# Patient Record
Sex: MALE | Race: WHITE | Employment: UNEMPLOYED | URBAN - METROPOLITAN AREA
[De-identification: names, ages, dates, MRNs, and addresses within clinical notes are randomized per-mention and may not be internally consistent; named-entity substitution may affect disease eponyms.]

---

## 2020-12-12 ENCOUNTER — HOSPITAL ENCOUNTER (OUTPATIENT)
Age: 55
Discharge: HOME | End: 2020-12-12
Payer: COMMERCIAL

## 2020-12-12 LAB
ALBUMIN SERPL-MCNC: 3.8 G/DL (ref 3.5–5.2)
ALP BLD-CCNC: 132 U/L (ref 40–129)
ALT SERPL-CCNC: 24 U/L (ref 0–40)
ANION GAP SERPL CALCULATED.3IONS-SCNC: 10 MMOL/L (ref 7–16)
AST SERPL-CCNC: 16 U/L (ref 0–39)
BASOPHILS ABSOLUTE: 0.02 E9/L (ref 0–0.2)
BASOPHILS RELATIVE PERCENT: 0.4 % (ref 0–2)
BILIRUB SERPL-MCNC: 0.3 MG/DL (ref 0–1.2)
BUN BLDV-MCNC: 12 MG/DL (ref 6–20)
CALCIUM SERPL-MCNC: 9.6 MG/DL (ref 8.6–10.2)
CHLORIDE BLD-SCNC: 101 MMOL/L (ref 98–107)
CHOLESTEROL, TOTAL: 223 MG/DL (ref 0–199)
CO2: 26 MMOL/L (ref 22–29)
CREAT SERPL-MCNC: 0.8 MG/DL (ref 0.7–1.2)
EOSINOPHILS ABSOLUTE: 0.07 E9/L (ref 0.05–0.5)
EOSINOPHILS RELATIVE PERCENT: 1.4 % (ref 0–6)
FOLATE: 15.6 NG/ML (ref 4.8–24.2)
GFR AFRICAN AMERICAN: >60
GFR NON-AFRICAN AMERICAN: >60 ML/MIN/1.73
GLUCOSE BLD-MCNC: 342 MG/DL (ref 74–99)
HBA1C MFR BLD: 11.9 % (ref 4–5.6)
HCT VFR BLD CALC: 45.6 % (ref 37–54)
HDLC SERPL-MCNC: 50 MG/DL
HEMOGLOBIN: 15.6 G/DL (ref 12.5–16.5)
IMMATURE GRANULOCYTES #: 0.02 E9/L
IMMATURE GRANULOCYTES %: 0.4 % (ref 0–5)
LACTATE DEHYDROGENASE: 144 U/L (ref 135–225)
LDL CHOLESTEROL CALCULATED: 139 MG/DL (ref 0–99)
LYMPHOCYTES ABSOLUTE: 1.09 E9/L (ref 1.5–4)
LYMPHOCYTES RELATIVE PERCENT: 21.6 % (ref 20–42)
MCH RBC QN AUTO: 30.4 PG (ref 26–35)
MCHC RBC AUTO-ENTMCNC: 34.2 % (ref 32–34.5)
MCV RBC AUTO: 88.7 FL (ref 80–99.9)
MONOCYTES ABSOLUTE: 0.63 E9/L (ref 0.1–0.95)
MONOCYTES RELATIVE PERCENT: 12.5 % (ref 2–12)
NEUTROPHILS ABSOLUTE: 3.21 E9/L (ref 1.8–7.3)
NEUTROPHILS RELATIVE PERCENT: 63.7 % (ref 43–80)
PDW BLD-RTO: 12.1 FL (ref 11.5–15)
PHENYTOIN DOSE AMOUNT: ABNORMAL
PHENYTOIN LEVEL: 6.5 UG/ML (ref 10–20)
PLATELET # BLD: 145 E9/L (ref 130–450)
PMV BLD AUTO: 10.2 FL (ref 7–12)
POTASSIUM SERPL-SCNC: 5.2 MMOL/L (ref 3.5–5)
PROSTATE SPECIFIC ANTIGEN: 0.84 NG/ML (ref 0–4)
RBC # BLD: 5.14 E12/L (ref 3.8–5.8)
SODIUM BLD-SCNC: 137 MMOL/L (ref 132–146)
T4 FREE: 1.13 NG/DL (ref 0.93–1.7)
TOTAL CK: 35 U/L (ref 20–200)
TOTAL PROTEIN: 7 G/DL (ref 6.4–8.3)
TRIGL SERPL-MCNC: 171 MG/DL (ref 0–149)
TSH SERPL DL<=0.05 MIU/L-ACNC: 1.39 UIU/ML (ref 0.27–4.2)
VITAMIN B-12: 280 PG/ML (ref 211–946)
VITAMIN D 25-HYDROXY: 12 NG/ML (ref 30–100)
VLDLC SERPL CALC-MCNC: 34 MG/DL
WBC # BLD: 5 E9/L (ref 4.5–11.5)

## 2020-12-12 PROCEDURE — 84439 ASSAY OF FREE THYROXINE: CPT

## 2020-12-12 PROCEDURE — 82306 VITAMIN D 25 HYDROXY: CPT

## 2020-12-12 PROCEDURE — 82746 ASSAY OF FOLIC ACID SERUM: CPT

## 2020-12-12 PROCEDURE — 84443 ASSAY THYROID STIM HORMONE: CPT

## 2020-12-12 PROCEDURE — 83615 LACTATE (LD) (LDH) ENZYME: CPT

## 2020-12-12 PROCEDURE — 80185 ASSAY OF PHENYTOIN TOTAL: CPT

## 2020-12-12 PROCEDURE — 80053 COMPREHEN METABOLIC PANEL: CPT

## 2020-12-12 PROCEDURE — 36415 COLL VENOUS BLD VENIPUNCTURE: CPT

## 2020-12-12 PROCEDURE — 83036 HEMOGLOBIN GLYCOSYLATED A1C: CPT

## 2020-12-12 PROCEDURE — 85025 COMPLETE CBC W/AUTO DIFF WBC: CPT

## 2020-12-12 PROCEDURE — 80061 LIPID PANEL: CPT

## 2020-12-12 PROCEDURE — 82550 ASSAY OF CK (CPK): CPT

## 2020-12-12 PROCEDURE — G0103 PSA SCREENING: HCPCS

## 2020-12-12 PROCEDURE — 82607 VITAMIN B-12: CPT

## 2021-02-05 ENCOUNTER — HOSPITAL ENCOUNTER (OUTPATIENT)
Dept: DIABETES SERVICES | Age: 56
Setting detail: THERAPIES SERIES
Discharge: HOME | End: 2021-02-05
Payer: COMMERCIAL

## 2021-02-05 PROCEDURE — G0109 DIAB MANAGE TRN IND/GROUP: HCPCS

## 2021-02-05 SDOH — ECONOMIC STABILITY: FOOD INSECURITY: ADDITIONAL INFORMATION: NO

## 2021-02-05 ASSESSMENT — PROBLEM AREAS IN DIABETES QUESTIONNAIRE (PAID)
FEELING DEPRESSED WHEN YOU THINK ABOUT LIVING WITH DIABETES: 0
COPING WITH COMPLICATIONS OF DIABETES: 0
WORRYING ABOUT THE FUTURE AND THE POSSIBILITY OF SERIOUS COMPLICATIONS: 0

## 2021-02-05 NOTE — PROGRESS NOTES
Diabetes Self-Management Education Record    Participant Name: Chavo Tran  Referring Provider: Rosalva Velez DO  Assessment/Evaluation Ratings:  1=Needs Instruction   4=Demonstrates Understanding/Competency  2=Needs Review   NC=Not Covered    3=Comprehends Key Points  N/A=Not Applicable  Topics/Learning Objectives Pre-session Assess Date:  Instructor initials/date  2/5/21 KMS Instr. Date    Instructor initials/date  2/5/21 KMS Follow-up Post- session Eval Comments   Diabetes disease process & Treatment process:   -Define type of diabetes in simple terms.  - Describe the ABCs of  diabetes management  -Identify own type of diabetes  -Identify lifestyle changes/treatment options  -other:  1 [x] All     []  []  []  []  []  4 2/5/21 KMS  Newly diagnosed type 2, A1C 11.9%    Developing strategies for Healthy coping/psychosocial issues:    -Describe feelings about living with diabetes  -Identify coping strategies and sources of stress  -Identify support needed & support network available  -Complete PAID-5 Diabetes questionnaire 1 [x] All     []  []  []    []  4   2/5/21 KMS  PAID-5 score: 0     Prevention, detection & treatment of Chronic complications:    -Identify the prevention, detection and treatment for complications including immunizations, preventive eye, foot, dental and renal exams as indicated per the participant's duration of diabetes and health status.  -Define the natural course of diabetes and the relationship of blood glucose levels to long term complications of diabetes.   1 [x] All     []            []  4    Prevention, detection & treatment of acute complications:    -State the causes,signs & symptoms of hyper & hypoglycemia, and prevention & treatment strategies.   -Describe sick day guidelines  DKA /indications for ketone testing &  when to call physician  1 [x] All     []      []    4            -Identify severe weather/situation crisis  & diabetes supplies management  []      Using medications safely:   -State effects of diabetes medicines on blood glucose levels;  -List diabetes medication taken, action & side effects 1 [x] All     []  []  4 2/5/21 KMS  Metformin 500 mg BID  Januvia 100 mg daily    Insulin/Injectables/glucagon  -Name appropriate injection sites; proper storage; supplies needed;  n/a   []       Demonstrate proper technique  []      Monitoring blood glucose, interpreting and using results:   -Identify the purpose of testing   -Identify recommended & personal blood glucose targets & HgbA1C target levels  -State the Importance of logging blood glucose levels for pattern recognition;   -State benefits of reading/using pt generated health data  -Verbalize safe lancet disposal 1 [x] All     []  []    []  []  []  4 2/5/21 KMS  Instructed patient on an One Touch Ultra 2 glucose meter. Patient successfully performed a return demonstration with a glucose reading of 239 mg/dl after lunch. Target glucose ranges reviewed. Patient recorded result on his glucose log sheet. Discussed pattern recognition and problem solving. Patient understands he is ordered to test twice daily and as needed. -Demonstrate proper testing technique  []      Incorporating physical activity into lifestyle:   -State effect of exercise on blood glucose levels;   -State benefits of regular exercise;   -Define safety considerations/food choices if needed.  -Describe contraindications/maintenance of activity. 1 [x] All     []  []    []  []  4 2/5/21 KMS  Walks occasionally, but plans to increase activity as part of his goal.  May join gym with his daughter and/or walk at the mall.     Incorporating nutritional management into lifestyle:   -Describe effect of type, amount & timing of food on blood glucose  -Describe methods for preparing and planning healthy meals  -Correctly read food labels  -Name 3 foods high in Carbohydrate 1 [x] All       []    []    []  []  3 2/5/2021 Discussed food sources of carbohydrate, portion control, timing of meals. Pt eats fast food breakfast some days. Briefly discussed dining out   -Plan a carbohydrate-controlled meal based on individualized meal plan  -Demonstrate CHO counting/portion control  1 [x]  [x]  3 2/5/2021 Instructed on 2200 calorie meal plan, 4 carbohydrate choices at each meal, 1 in between breakfast and lunch and lunch and dinner. 2 at night. 12 oz protein, 6 fats daily. Provided sample meals and snacks   Developing strategies for problem solving to promote health/change behavior. -Identify 7 self-care behaviors; Personal health risk factors; Benefits, challenges & strategies for behavioral change and set an individualized goal selection. 1   [x]  4 2/5/21 KMS  [x]Nutrition: I will follow my meal plan. []Monitoring  [x]Exercise:  I will start exercising for at least 30 minutes 3 days per week. []Medication  []Other     Identified Barriers to learning/adherence to self management plan:    None  []  other    Instruction Method:  Lecture/Discussion and Handouts    Supporting Education Materials/Equipment Provided: Self-management manual   []Kiswahili materials       [] services     []Other:      Encounter Type Date Attended Start Time End Time Comments No Show Dates   Assessment          Session 1 2/5/21 KMS 1300 1415      Session 2 2/5/21 JA 1415 1500     1:1 DSMES          In person Follow-up         Gestational Diabetes         Individual MNT        Meter Instrx 2/5/21 KMS    mg/dl    Insulin Instrx           Additional Comments: [x] Patient attended class with his wife. PCP notified via EMR.          Date:   Follow-up goal attainment based on patients initial DSMES goal    Dr Notified by [] EMR []Fax        []Post class Hgb A1C  []Medication compliance   []Plate method/meal plan compliance   []Able to state the number of Carbohydrate servings eaten at B,L,D   []Testing blood glucose as prescribed by PCP   []Exercise Routine   []Other:   []Other:     []Patient lost to follow-up  Dr Notified by []EMR []Fax     Personal Support Plan:      [x] Keep all scheduled doctor appointments   [x] Make and keep appointments with specialists (foot, eye, dentist) as recommended   [] Consult my pharmacist about all new medications or to ask any medication questions   [] Get tested for sleep apnea   [] Seek help for:   [] Make an appointment with:   [] Attend smoking cessation classes or call 1-800-QUIT-NOW  [] Attend Diabetes Support Group   [] Use diabetes magazines, books, or credible web-sites like the ADA for more information  [x] Increase exercise at home or join an exercise program  [] Other:

## 2021-02-05 NOTE — LETTER
[facility]- Diabetes Education Department Initial Diabetes Education Letter    2021       Re:     Mady Guerrero         :  1965  Dear Dr. Vasile Gutierrez: Thank you for referring your patient, Mady Guerrero, for diabetes education. Mady Guerrero has completed his comprehensive education plan today which included the topics below:    Nursing/Medical [x]      Nutrition [x]  [] Diabetes disease process & treatment   [] Diabetes medication use and safety   [] Self-monitoring blood glucose/interpreting results  [] Prevention, detection and treatment of acute complications  [] Prevention, detection and treatment of chronic complications  [] Developing strategies to address psychosocial issues   2021 Instructed on 2200 calorie meal plan, 4 carbohydrate choices at each meal, 1 in between breakfast and lunch and lunch and dinner. 2 at night. 12 oz protein, 6 fats daily. Provided sample meals and snacks [] Nutritional management: basic principles   [] Carbohydrate counting, plate method, label reading  [] Benefits of/ways to incorporate physical activity  [] Problem solving and preparing for crisis situations  [] Diabetes supply management  [] Goal setting and behavior modification       In addition, we provided the following services. [x]  Glucometer instruction. Blood glucose was 239 mg/dl after lunch. Patient successfully performed a return demonstration. []  Insulin instruction   [] Other:    PAID -5 (Problem Areas in Diabetes) Survey Results  0  A total score of 8 or greater indicates possible diabetes related emotional distress which warrants further assessment.  [] [address] and Crisis Resource information provided. Comments:     PATIENT SELECTED GOAL:   [x]  I will follow my meal plan and measure my carbohydrate foods. [x]  I will increase my activity to: at least 30 minutes 3 days per week.   []  I will check my blood glucose as ordered by my doctor. []  I will take my medications at the correct times as ordered by my doctor. []  Other:      DIABETES SELF-MANAGEMENT SUPPORT PLAN/REFERRALS (patient identified):  [x] Keep my scheduled visits with my doctor   [x] Make and keep appointments with specialists (foot, eye, dentist) as recommended  [] Consult my pharmacist with all new medications and/or any medication questions  [] Get tested for sleep apnea  [] Seek help for:   [] Make an appointment with:   [] Attend smoking cessation classes or call help-line (1-817-QUIT-NOW; 854.541.8751)  [] Attend a diabetes support group  [] Use diabetes magazines, books, or the American Diabetes Association website (www.diabetes. org) for more information    [x] Start or increase exercising at home or join a program:  [] Other: There will be a follow-up in 3 months to evaluate the attainment of their chosen goal, and self identified support plan and you will be notified of their progress. Thank you for referring this patient to our program.  Please do not hesitate to call if you have any questions at [phone] [facility] or [facility]) or [phone] ([address] [place] [address]).         Sincerely,     Haritha Gusman RN, BSN, Yoselyn Rush RDN, JENNIFER, LD    [facility]) Diabetes Education Department  American Diabetes Association Recognized DSMES Program

## 2021-02-05 NOTE — PROGRESS NOTES
Diabetes Self-Management Education Record    Participant Name: Mady Guerrero  Referring Provider: Jeannine Shaver DO  Assessment/Evaluation Ratings:  1=Needs Instruction   4=Demonstrates Understanding/Competency  2=Needs Review   NC=Not Covered    3=Comprehends Key Points  N/A=Not Applicable  Topics/Learning Objectives Pre-session Assess Date:  Instructor initials/date  2/5/21 KMS Instr. Date    Instructor initials/date  2/5/21 KMS Follow-up Post- session Eval Comments   Diabetes disease process & Treatment process:   -Define type of diabetes in simple terms.  - Describe the ABCs of  diabetes management  -Identify own type of diabetes  -Identify lifestyle changes/treatment options  -other:  1 [x] All     []  []  []  []  []  4 2/5/21 KMS  Newly diagnosed type 2, A1C 11.9%    Developing strategies for Healthy coping/psychosocial issues:    -Describe feelings about living with diabetes  -Identify coping strategies and sources of stress  -Identify support needed & support network available  -Complete PAID-5 Diabetes questionnaire 1 [x] All     []  []  []    []  4   2/5/21 KMS  PAID-5 score: 0     Prevention, detection & treatment of Chronic complications:    -Identify the prevention, detection and treatment for complications including immunizations, preventive eye, foot, dental and renal exams as indicated per the participant's duration of diabetes and health status.  -Define the natural course of diabetes and the relationship of blood glucose levels to long term complications of diabetes.   1 [x] All     []            []  4    Prevention, detection & treatment of acute complications:    -State the causes,signs & symptoms of hyper & hypoglycemia, and prevention & treatment strategies.   -Describe sick day guidelines  DKA /indications for ketone testing &  when to call physician  1 [x] All     []      []    4            -Identify severe weather/situation crisis  & diabetes supplies management  []      Using medications safely:   -State effects of diabetes medicines on blood glucose levels;  -List diabetes medication taken, action & side effects 1 [x] All     []  []  4 2/5/21 KMS  Metformin 500 mg BID  Januvia 100 mg daily    Insulin/Injectables/glucagon  -Name appropriate injection sites; proper storage; supplies needed;  n/a   []       Demonstrate proper technique  []      Monitoring blood glucose, interpreting and using results:   -Identify the purpose of testing   -Identify recommended & personal blood glucose targets & HgbA1C target levels  -State the Importance of logging blood glucose levels for pattern recognition;   -State benefits of reading/using pt generated health data  -Verbalize safe lancet disposal 1 [x] All     []  []    []  []  []  4 2/5/21 KMS  Instructed patient on an One Touch Ultra 2 glucose meter. Patient successfully performed a return demonstration with a glucose reading of 239 mg/dl after lunch. Target glucose ranges reviewed. Patient recorded result on his glucose log sheet. Discussed pattern recognition and problem solving. Patient understands he is ordered to test twice daily and as needed. -Demonstrate proper testing technique  []      Incorporating physical activity into lifestyle:   -State effect of exercise on blood glucose levels;   -State benefits of regular exercise;   -Define safety considerations/food choices if needed.  -Describe contraindications/maintenance of activity. 1 [x] All     []  []    []  []  4 2/5/21 KMS  Walks occasionally, but plans to increase activity as part of his goal.  May join gym with his daughter and/or walk at the mall.     Incorporating nutritional management into lifestyle:   -Describe effect of type, amount & timing of food on blood glucose  -Describe methods for preparing and planning healthy meals  -Correctly read food labels  -Name 3 foods high in Carbohydrate 1 [] All       []    []    []  []      -Plan a carbohydrate-controlled meal based on individualized meal plan  -Demonstrate CHO counting/portion control  1 []  []      Developing strategies for problem solving to promote health/change behavior. -Identify 7 self-care behaviors; Personal health risk factors; Benefits, challenges & strategies for behavioral change and set an individualized goal selection. 1   [x]  4 2/5/21 KMS  [x]Nutrition: I will follow my meal plan. []Monitoring  [x]Exercise:  I will start exercising for at least 30 minutes 3 days per week. []Medication  []Other     Identified Barriers to learning/adherence to self management plan:    None  []  other    Instruction Method:  Lecture/Discussion and Handouts    Supporting Education Materials/Equipment Provided: Self-management manual   [][place] materials       [] services     []Other:      Encounter Type Date Attended Start Time End Time Comments No Show Dates   Assessment          Session 1 2/5/21 KMS 1300 1415      Session 2 2/5/21 JA 1415 1500     1:1 DSMES          In person Follow-up         Gestational Diabetes         Individual MNT        Meter Instrx 2/5/21 KMS    mg/dl    Insulin Instrx           Additional Comments: [x] Patient attended class with his wife. PCP notified via fax.          Date:   Follow-up goal attainment based on patients initial DSMES goal    Dr Notified by [] EMR []Fax        []Post class Hgb A1C  []Medication compliance   []Plate method/meal plan compliance   []Able to state the number of Carbohydrate servings eaten at B,L,D   []Testing blood glucose as prescribed by PCP   []Exercise Routine   []Other:   []Other:     []Patient lost to follow-up  Dr Notified by []EMR []Fax     Personal Support Plan:      [x] Keep all scheduled doctor appointments   [x] Make and keep appointments with specialists (foot, eye, dentist) as recommended   [] Consult my pharmacist about all new medications or to ask any medication questions   [] Get tested for sleep apnea   [] Seek help for:   [] Make an appointment with:   [] Attend smoking cessation classes or call 1-800-QUIT-NOW  [] Attend Diabetes Support Group   [] Use diabetes magazines, books, or credible web-sites like the ADA for more information  [x] Increase exercise at home or join an exercise program  [] Other:

## 2021-02-25 LAB — DIABETIC RETINOPATHY: NEGATIVE

## 2021-05-10 ENCOUNTER — FOLLOWUP TELEPHONE ENCOUNTER (OUTPATIENT)
Dept: DIABETES SERVICES | Age: 56
End: 2021-05-10

## 2021-05-10 NOTE — LETTER
[facility] Diabetes Education DSMES Follow-up Letter    Name: Lio Covington  :   1965    Follow-up plan/Date:   5/10/2021               POST CLASS Letter     Dear Gera Morris; Thank you for referring  Lio Covington  to [facility] Diabetes Education Services. Lio Covington  has completed their personalized comprehensive education plan. The education plan included the following topics: Diabetes Disease Process, Nutrition, Exercise, Glucose Monitoring, Acute and Chronic Complication, Behavioral and Lifestyle Change, Healthy Coping and goal setting. We contact participants 3 months after attending our services to review their progress on their chosen goal.      The following area was chosen: [x] Healthy Eating   [x] Being Active  [] Monitoring [] Problem Solving [] Taking Medication [] Healthy Coping  []Reducing Risks    Selected goal outcome Post Education:     Patient states they met their goal 75 % of time, but not activity goal      Thank you for referring this patient to our program. Please do not hesitate to call if we can be of any service for this patient.      Elisabet Mcdonald [facility] or [facility]: Lucia: Naty    [facility] Diabetes Education Department  American Diabetes Association Recognized [facility] Program

## 2021-05-10 NOTE — PROGRESS NOTES
Diabetes Self-Management Education Record    Participant Name: Digna De Santiago  Referring Provider: Soledad Faulkner DO  Assessment/Evaluation Ratings:  1=Needs Instruction   4=Demonstrates Understanding/Competency  2=Needs Review   NC=Not Covered    3=Comprehends Key Points  N/A=Not Applicable  Topics/Learning Objectives Pre-session Assess Date:  Instructor initials/date  2/5/21 KMS Instr. Date    Instructor initials/date  2/5/21 KMS Follow-up Post- session Eval Comments   Diabetes disease process & Treatment process:   -Define type of diabetes in simple terms.  - Describe the ABCs of  diabetes management  -Identify own type of diabetes  -Identify lifestyle changes/treatment options  -other:  1 [x] All     []  []  []  []  []  4 2/5/21 KMS  Newly diagnosed type 2, A1C 11.9%    Developing strategies for Healthy coping/psychosocial issues:    -Describe feelings about living with diabetes  -Identify coping strategies and sources of stress  -Identify support needed & support network available  -Complete PAID-5 Diabetes questionnaire 1 [x] All     []  []  []    []  4   2/5/21 KMS  PAID-5 score: 0     Prevention, detection & treatment of Chronic complications:    -Identify the prevention, detection and treatment for complications including immunizations, preventive eye, foot, dental and renal exams as indicated per the participant's duration of diabetes and health status.  -Define the natural course of diabetes and the relationship of blood glucose levels to long term complications of diabetes.   1 [x] All     []            []  4    Prevention, detection & treatment of acute complications:    -State the causes,signs & symptoms of hyper & hypoglycemia, and prevention & treatment strategies.   -Describe sick day guidelines  DKA /indications for ketone testing &  when to call physician  1 [x] All     []      []    4            -Identify severe weather/situation crisis  & diabetes supplies management  []      Using medications safely:   -State effects of diabetes medicines on blood glucose levels;  -List diabetes medication taken, action & side effects 1 [x] All     []  []  4 2/5/21 KMS  Metformin 500 mg BID  Januvia 100 mg daily    Insulin/Injectables/glucagon  -Name appropriate injection sites; proper storage; supplies needed;  n/a   []       Demonstrate proper technique  []      Monitoring blood glucose, interpreting and using results:   -Identify the purpose of testing   -Identify recommended & personal blood glucose targets & HgbA1C target levels  -State the Importance of logging blood glucose levels for pattern recognition;   -State benefits of reading/using pt generated health data  -Verbalize safe lancet disposal 1 [x] All     []  []    []  []  []  4 2/5/21 KMS  Instructed patient on an One Touch Ultra 2 glucose meter. Patient successfully performed a return demonstration with a glucose reading of 239 mg/dl after lunch. Target glucose ranges reviewed. Patient recorded result on his glucose log sheet. Discussed pattern recognition and problem solving. Patient understands he is ordered to test twice daily and as needed. -Demonstrate proper testing technique  []      Incorporating physical activity into lifestyle:   -State effect of exercise on blood glucose levels;   -State benefits of regular exercise;   -Define safety considerations/food choices if needed.  -Describe contraindications/maintenance of activity. 1 [x] All     []  []    []  []  4 2/5/21 KMS  Walks occasionally, but plans to increase activity as part of his goal.  May join gym with his daughter and/or walk at the mall.     Incorporating nutritional management into lifestyle:   -Describe effect of type, amount & timing of food on blood glucose  -Describe methods for preparing and planning healthy meals  -Correctly read food labels  -Name 3 foods high in Carbohydrate 1 [x] All       []    []    []  []  3 2/5/2021 Discussed food sources of carbohydrate, portion control, timing of meals. Pt eats fast food breakfast some days. Briefly discussed dining out   -Plan a carbohydrate-controlled meal based on individualized meal plan  -Demonstrate CHO counting/portion control  1 [x]  [x]  3 2/5/2021 Instructed on 2200 calorie meal plan, 4 carbohydrate choices at each meal, 1 in between breakfast and lunch and lunch and dinner. 2 at night. 12 oz protein, 6 fats daily. Provided sample meals and snacks   Developing strategies for problem solving to promote health/change behavior. -Identify 7 self-care behaviors; Personal health risk factors; Benefits, challenges & strategies for behavioral change and set an individualized goal selection. 1   [x]  4 2/5/21 KMS  [x]Nutrition: I will follow my meal plan. []Monitoring  [x]Exercise:  I will start exercising for at least 30 minutes 3 days per week. []Medication  []Other     Identified Barriers to learning/adherence to self management plan:    None  []  other    Instruction Method:  Lecture/Discussion and Handouts    Supporting Education Materials/Equipment Provided: Self-management manual   []Turkmen materials       [] services     []Other:      Encounter Type Date Attended Start Time End Time Comments No Show Dates   Assessment          Session 1 2/5/21 KMS 1300 1415      Session 2 2/5/21 JA 1415 1500     1:1 DSMES          In person Follow-up         Gestational Diabetes         Individual MNT        Meter Instrx 2/5/21 KMS    mg/dl    Insulin Instrx           Additional Comments: [x] Patient attended class with his wife. PCP notified via EMR.          Date:   Follow-up goal attainment based on patients initial DSMES goal  5/10/21   Dr Notified by [] EMR [x]Fax        []Post class Hgb A1C  []Medication compliance   []Plate method/meal plan compliance   [x]Able to state the number of Carbohydrate servings eaten at B,L,D   []Testing blood glucose as prescribed by PCP   []Exercise Routine   []Other:   []Other: []Patient lost to follow-up  Dr Notified by []EMR []Fax     Personal Support Plan:      [x] Keep all scheduled doctor appointments   [x] Make and keep appointments with specialists (foot, eye, dentist) as recommended   [] Consult my pharmacist about all new medications or to ask any medication questions   [] Get tested for sleep apnea   [] Seek help for:   [] Make an appointment with:   [] Attend smoking cessation classes or call 1-800-QUIT-NOW  [] Attend Diabetes Support Group   [] Use diabetes magazines, books, or credible web-sites like the ADA for more information  [x] Increase exercise at home or join an exercise program  [] Other:

## 2021-08-10 ENCOUNTER — TELEPHONE (OUTPATIENT)
Dept: FAMILY MEDICINE CLINIC | Age: 56
End: 2021-08-10

## 2021-08-10 ENCOUNTER — OFFICE VISIT (OUTPATIENT)
Dept: FAMILY MEDICINE CLINIC | Age: 56
End: 2021-08-10
Payer: COMMERCIAL

## 2021-08-10 VITALS
HEIGHT: 73 IN | SYSTOLIC BLOOD PRESSURE: 155 MMHG | DIASTOLIC BLOOD PRESSURE: 87 MMHG | RESPIRATION RATE: 20 BRPM | HEART RATE: 92 BPM | WEIGHT: 315 LBS | TEMPERATURE: 96.8 F | BODY MASS INDEX: 41.75 KG/M2

## 2021-08-10 DIAGNOSIS — E11.9 TYPE 2 DIABETES MELLITUS WITHOUT COMPLICATION, WITHOUT LONG-TERM CURRENT USE OF INSULIN (HCC): Primary | ICD-10-CM

## 2021-08-10 DIAGNOSIS — E55.9 VITAMIN D DEFICIENCY: ICD-10-CM

## 2021-08-10 DIAGNOSIS — R56.9 SEIZURE (HCC): ICD-10-CM

## 2021-08-10 DIAGNOSIS — E78.5 HYPERLIPIDEMIA, UNSPECIFIED HYPERLIPIDEMIA TYPE: ICD-10-CM

## 2021-08-10 DIAGNOSIS — I10 ESSENTIAL HYPERTENSION: ICD-10-CM

## 2021-08-10 LAB — HBA1C MFR BLD: 6.2 %

## 2021-08-10 PROCEDURE — 99204 OFFICE O/P NEW MOD 45 MIN: CPT | Performed by: FAMILY MEDICINE

## 2021-08-10 PROCEDURE — 2022F DILAT RTA XM EVC RTNOPTHY: CPT | Performed by: FAMILY MEDICINE

## 2021-08-10 PROCEDURE — 1036F TOBACCO NON-USER: CPT | Performed by: FAMILY MEDICINE

## 2021-08-10 PROCEDURE — 3044F HG A1C LEVEL LT 7.0%: CPT | Performed by: FAMILY MEDICINE

## 2021-08-10 PROCEDURE — G8417 CALC BMI ABV UP PARAM F/U: HCPCS | Performed by: FAMILY MEDICINE

## 2021-08-10 PROCEDURE — 82044 UR ALBUMIN SEMIQUANTITATIVE: CPT | Performed by: FAMILY MEDICINE

## 2021-08-10 PROCEDURE — G8427 DOCREV CUR MEDS BY ELIG CLIN: HCPCS | Performed by: FAMILY MEDICINE

## 2021-08-10 PROCEDURE — 83036 HEMOGLOBIN GLYCOSYLATED A1C: CPT | Performed by: FAMILY MEDICINE

## 2021-08-10 PROCEDURE — 3017F COLORECTAL CA SCREEN DOC REV: CPT | Performed by: FAMILY MEDICINE

## 2021-08-10 RX ORDER — ATORVASTATIN CALCIUM 40 MG/1
TABLET, FILM COATED ORAL
COMMUNITY
Start: 2021-07-12 | End: 2021-08-10 | Stop reason: SDUPTHER

## 2021-08-10 RX ORDER — TERAZOSIN 10 MG/1
CAPSULE ORAL
COMMUNITY
Start: 2021-06-12 | End: 2021-08-10 | Stop reason: SDUPTHER

## 2021-08-10 RX ORDER — BLOOD SUGAR DIAGNOSTIC
STRIP MISCELLANEOUS
COMMUNITY
Start: 2021-07-14 | End: 2021-08-10 | Stop reason: SDUPTHER

## 2021-08-10 RX ORDER — AMLODIPINE BESYLATE 10 MG/1
10 TABLET ORAL DAILY
Qty: 90 TABLET | Refills: 3 | Status: SHIPPED | OUTPATIENT
Start: 2021-08-10

## 2021-08-10 RX ORDER — SITAGLIPTIN 100 MG/1
TABLET, FILM COATED ORAL
COMMUNITY
Start: 2021-07-11 | End: 2021-08-10 | Stop reason: SDUPTHER

## 2021-08-10 RX ORDER — PHENYTOIN SODIUM 100 MG/1
CAPSULE, EXTENDED RELEASE ORAL
COMMUNITY
Start: 2021-06-03 | End: 2021-08-10 | Stop reason: SDUPTHER

## 2021-08-10 RX ORDER — BENAZEPRIL HYDROCHLORIDE 40 MG/1
40 TABLET, FILM COATED ORAL DAILY
Qty: 90 TABLET | Refills: 3 | Status: ON HOLD
Start: 2021-08-10 | End: 2021-09-20 | Stop reason: HOSPADM

## 2021-08-10 RX ORDER — BENAZEPRIL HYDROCHLORIDE 40 MG/1
TABLET, FILM COATED ORAL
COMMUNITY
Start: 2021-06-12 | End: 2021-08-10 | Stop reason: SDUPTHER

## 2021-08-10 RX ORDER — TERAZOSIN 10 MG/1
10 CAPSULE ORAL NIGHTLY
Qty: 90 CAPSULE | Refills: 3 | Status: ON HOLD
Start: 2021-08-10 | End: 2021-09-20 | Stop reason: HOSPADM

## 2021-08-10 RX ORDER — PHENYTOIN SODIUM 100 MG/1
CAPSULE, EXTENDED RELEASE ORAL
Qty: 450 CAPSULE | Refills: 3 | Status: SHIPPED
Start: 2021-08-10 | End: 2021-12-10

## 2021-08-10 RX ORDER — LANCETS 33 GAUGE
EACH MISCELLANEOUS
COMMUNITY
Start: 2021-07-14 | End: 2021-08-11 | Stop reason: SDUPTHER

## 2021-08-10 RX ORDER — ATORVASTATIN CALCIUM 40 MG/1
40 TABLET, FILM COATED ORAL DAILY
Qty: 90 TABLET | Refills: 3 | Status: SHIPPED
Start: 2021-08-10 | End: 2021-12-01

## 2021-08-10 SDOH — ECONOMIC STABILITY: FOOD INSECURITY: WITHIN THE PAST 12 MONTHS, THE FOOD YOU BOUGHT JUST DIDN'T LAST AND YOU DIDN'T HAVE MONEY TO GET MORE.: NEVER TRUE

## 2021-08-10 SDOH — ECONOMIC STABILITY: FOOD INSECURITY: WITHIN THE PAST 12 MONTHS, YOU WORRIED THAT YOUR FOOD WOULD RUN OUT BEFORE YOU GOT MONEY TO BUY MORE.: NEVER TRUE

## 2021-08-10 ASSESSMENT — ENCOUNTER SYMPTOMS
DIARRHEA: 0
RHINORRHEA: 0
BACK PAIN: 0
NAUSEA: 0
CONSTIPATION: 0
COUGH: 0
EYE ITCHING: 0
CHEST TIGHTNESS: 0
EYE PAIN: 0
ABDOMINAL PAIN: 0
SHORTNESS OF BREATH: 0
SORE THROAT: 0

## 2021-08-10 ASSESSMENT — PATIENT HEALTH QUESTIONNAIRE - PHQ9
2. FEELING DOWN, DEPRESSED OR HOPELESS: 0
SUM OF ALL RESPONSES TO PHQ QUESTIONS 1-9: 0
1. LITTLE INTEREST OR PLEASURE IN DOING THINGS: 0
SUM OF ALL RESPONSES TO PHQ9 QUESTIONS 1 & 2: 0
SUM OF ALL RESPONSES TO PHQ QUESTIONS 1-9: 0
SUM OF ALL RESPONSES TO PHQ QUESTIONS 1-9: 0

## 2021-08-10 ASSESSMENT — SOCIAL DETERMINANTS OF HEALTH (SDOH): HOW HARD IS IT FOR YOU TO PAY FOR THE VERY BASICS LIKE FOOD, HOUSING, MEDICAL CARE, AND HEATING?: NOT HARD AT ALL

## 2021-08-10 NOTE — TELEPHONE ENCOUNTER
Pt remembered at check out that he needs   ONETOUCH ULTRA strip      and Lancets ([address]) [address] [facility]

## 2021-08-10 NOTE — PROGRESS NOTES
 None     Social Determinants of Health     Financial Resource Strain: Low Risk     Difficulty of Paying Living Expenses: Not hard at all   Food Insecurity: No Food Insecurity    Worried About Running Out of Food in the Last Year: Never true    Teresa of Food in the Last Year: Never true   Transportation Needs:     Lack of Transportation (Medical):  Lack of Transportation (Non-Medical):    Physical Activity:     Days of Exercise per Week:     Minutes of Exercise per Session:    Stress:     Feeling of Stress :    Social Connections:     Frequency of Communication with Friends and Family:     Frequency of Social Gatherings with Friends and Family:     Attends Religion Services:     Active Member of Clubs or Organizations:     Attends Club or Organization Meetings:     Marital Status:    Intimate Partner Violence:     Fear of Current or Ex-Partner:     Emotionally Abused:     Physically Abused:     Sexually Abused:        No family history on file. Current Outpatient Medications   Medication Sig Dispense Refill    ONETOUCH ULTRA strip       Lancets (ONETOUCH DELICA PLUS [id number]) MISC       amLODIPine (NORVASC) 10 MG tablet Take 1 tablet by mouth daily 90 tablet 3    atorvastatin (LIPITOR) 40 MG tablet Take 1 tablet by mouth daily 90 tablet 3    benazepril (LOTENSIN) 40 MG tablet Take 1 tablet by mouth daily 90 tablet 3    SITagliptin (JANUVIA) 100 MG tablet Take 1 tablet by mouth daily 90 tablet 3    metFORMIN (GLUCOPHAGE) 1000 MG tablet Take 1 tablet by mouth 2 times daily (with meals) 180 tablet 3    phenytoin (DILANTIN) 100 MG ER capsule Take 2 tabs in the am and 3 tabs in pm 450 capsule 3    terazosin (HYTRIN) 10 MG capsule Take 1 capsule by mouth nightly 90 capsule 3     No current facility-administered medications for this visit.        No Known Allergies    Health Maintenance Due   Topic Date Due    Hepatitis C screen  Never done    Diabetic foot exam  Never done   Salome Philippe Diabetic retinal exam  Never done    COVID-19 Vaccine (1) Never done    HIV screen  Never done    Diabetic microalbuminuria test  Never done    DTaP/Tdap/Td vaccine (1 - Tdap) Never done    Colon cancer screen colonoscopy  Never done    Shingles Vaccine (1 of 2) Never done           REVIEW OF SYSTEMS  Review of Systems   Constitutional: Negative for chills, fatigue and fever. HENT: Negative for congestion, ear pain, postnasal drip, rhinorrhea and sore throat. Eyes: Negative for pain and itching. Respiratory: Negative for cough, chest tightness and shortness of breath. Cardiovascular: Negative for chest pain and leg swelling. Gastrointestinal: Negative for abdominal pain, constipation, diarrhea and nausea. Endocrine: Negative for heat intolerance. Genitourinary: Negative for flank pain, frequency and urgency. Musculoskeletal: Negative for arthralgias and back pain. Skin: Negative for pallor and rash. Allergic/Immunologic: Negative for environmental allergies. Neurological: Negative for dizziness, numbness and headaches. Hematological: Does not bruise/bleed easily. Psychiatric/Behavioral: Negative for agitation, behavioral problems and hallucinations. PHYSICAL EXAM  BP (!) 155/87   Pulse 92   Temp 96.8 °F (36 °C)   Resp 20   Ht 6' 1\" (1.854 m)   Wt (!) 321 lb 9.6 oz (145.9 kg)   BMI 42.43 kg/m²   Physical Exam  Vitals and nursing note reviewed. Constitutional:       General: He is not in acute distress. Appearance: He is well-developed. He is not diaphoretic. HENT:      Head: Normocephalic and atraumatic. Right Ear: External ear normal.      Left Ear: External ear normal.      Nose: Nose normal.      Mouth/Throat:      Pharynx: No oropharyngeal exudate. Eyes:      General:         Right eye: No discharge. Left eye: No discharge. Conjunctiva/sclera: Conjunctivae normal.   Neck:      Thyroid: No thyromegaly.    Cardiovascular:      Rate and Rhythm: Results   Component Value Date    LDLCALC 139 (H) 12/12/2020    CREATININE 0.8 12/12/2020       ASSESSMENT/PLAN:     Diagnosis Orders   1. Type 2 diabetes mellitus without complication, without long-term current use of insulin (HCC)  POCT glycosylated hemoglobin (Hb A1C)    POCT microalbumin   2. Seizure ([address]     3. Hyperlipidemia, unspecified hyperlipidemia type  Lipid Panel   4. Essential hypertension     5. Vitamin D deficiency  Vitamin D 25 Hydroxy     A1c under very good control will continue all medications at this time. Micro to be completed at his convenience. Seizures under good control continue to monitor. We will repeat lipid panel as cholesterol slightly high on last check. We will continue statin in the meantime. BP slightly elevated will have him return for reevaluation and continue to monitor this at home. We will continue other medications at this time. Will recheck vitamin D as well. He is not currently taking supplementation. Problem list reviewed andsimplified/updated  HM reviewed today and counseled as appropriate    Call or go to ED immediately if symptoms worsen or persist.  Future Appointments   Date Time Provider Sandra Neville   12/7/2021  4:45 PM DO Grant Grijalva [facility]     Or sooner if necessary.       Educational materials and/or homeexercises printed for patient's review and were included in patient instructions on his/her After Visit Summary and given to patient at the end of visit.       Counseled regarding above diagnosis, including possible risks and complications,  especially if left uncontrolled.     Counseled regarding the possible side effects, risks, benefits and alternatives to treatment; patient and/or guardian verbalizes understanding, agrees,feels comfortable with and wishes to proceed with above treatment plan.     Advised patient to call [facility] new medication issues, and read all Rx info from pharmacy to assure aware of all possible risks and

## 2021-08-11 ENCOUNTER — TELEPHONE (OUTPATIENT)
Dept: FAMILY MEDICINE CLINIC | Age: 56
End: 2021-08-11

## 2021-08-11 RX ORDER — LANCETS 33 GAUGE
EACH MISCELLANEOUS
Qty: 100 EACH | Refills: 2 | Status: ON HOLD
Start: 2021-08-11 | End: 2021-09-20 | Stop reason: HOSPADM

## 2021-08-11 RX ORDER — BLOOD SUGAR DIAGNOSTIC
STRIP MISCELLANEOUS
Qty: 100 EACH | Refills: 2 | Status: ON HOLD
Start: 2021-08-11 | End: 2021-09-20 | Stop reason: HOSPADM

## 2021-08-11 NOTE — TELEPHONE ENCOUNTER
Calli Souza from [employer] called today, need clarification for testing supplies for this pt    [phone]

## 2021-08-16 ENCOUNTER — NURSE TRIAGE (OUTPATIENT)
Dept: OTHER | Facility: CLINIC | Age: 56
End: 2021-08-16

## 2021-08-16 NOTE — TELEPHONE ENCOUNTER
Reason for Disposition   Symptoms of anxiety or panic and has not been evaluated for this by physician    Answer Assessment - Initial Assessment Questions  1. CONCERN: \"What happened that made you call today? \"      He is having several symptoms pile up and is concerned. Panic attacks (shaking and panicky) no appetite, metallic taste in the mouth, problems sleeping    2. ANXIETY SYMPTOM SCREENING: \"Can you describe how you have been feeling? \"  (e.g., tense, restless, panicky, anxious, keyed up, trouble sleeping, trouble concentrating)      Trouble sleeping, panicky (shaking), no appetite    3. ONSET: \"How long have you been feeling this way? \"      Onset was Friday    4. RECURRENT: \"Have you felt this way before? \"  If yes: \"What happened that time? \" \"What helped these feelings go away in the past?\"       No    5. RISK OF HARM - SUICIDAL IDEATION:  \"Do you ever have thoughts of hurting or killing yourself? \"  (e.g., yes, no, no but preoccupation with thoughts about death)    - INTENT:  \"Do you have thoughts of hurting or killing yourself right NOW? \" (e.g., yes, no, N/A)    - PLAN: \"Do you have a specific plan for how you would do this? \" (e.g., gun, knife, overdose, no plan, N/A)      No thoughts of suicide    6. RISK OF HARM - HOMICIDAL IDEATION:  \"Do you ever have thoughts of hurting or killing someone else? \"  (e.g., yes, no, no but preoccupation with thoughts about death)    - INTENT:  \"Do you have thoughts of hurting or killing someone right NOW? \" (e.g., yes, no, N/A)    - PLAN: \"Do you have a specific plan for how you would do this? \" (e.g., gun, knife, no plan, N/A)       No thoughts of harming anyone else    7. FUNCTIONAL IMPAIRMENT: \"How have things been going for you overall in your life? Have you had any more difficulties than usual doing your normal daily activities? \"  (e.g., better, same, worse; self-care, school, work, interactions)      Handles normal daily activities    8.  SUPPORT: \"Who is with you

## 2021-08-17 ENCOUNTER — APPOINTMENT (OUTPATIENT)
Dept: GENERAL RADIOLOGY | Age: 56
End: 2021-08-17
Payer: COMMERCIAL

## 2021-08-17 ENCOUNTER — HOSPITAL ENCOUNTER (EMERGENCY)
Age: 56
Discharge: HOME | End: 2021-08-18
Attending: EMERGENCY MEDICINE
Payer: COMMERCIAL

## 2021-08-17 ENCOUNTER — TELEPHONE (OUTPATIENT)
Dept: FAMILY MEDICINE CLINIC | Age: 56
End: 2021-08-17

## 2021-08-17 VITALS
HEIGHT: 73 IN | RESPIRATION RATE: 18 BRPM | TEMPERATURE: 98.7 F | DIASTOLIC BLOOD PRESSURE: 81 MMHG | HEART RATE: 100 BPM | WEIGHT: 315 LBS | OXYGEN SATURATION: 95 % | SYSTOLIC BLOOD PRESSURE: 136 MMHG | BODY MASS INDEX: 41.75 KG/M2

## 2021-08-17 DIAGNOSIS — J18.9 PNEUMONIA OF BOTH LOWER LOBES DUE TO INFECTIOUS ORGANISM: Primary | ICD-10-CM

## 2021-08-17 DIAGNOSIS — Z20.822 ENCOUNTER FOR LABORATORY TESTING FOR COVID-19 VIRUS: ICD-10-CM

## 2021-08-17 PROCEDURE — 71046 X-RAY EXAM CHEST 2 VIEWS: CPT

## 2021-08-17 PROCEDURE — 99284 EMERGENCY DEPT VISIT MOD MDM: CPT

## 2021-08-17 NOTE — TELEPHONE ENCOUNTER
----- Message from Minerva Letagalo sent at 8/16/2021  6:03 PM EDT -----  Subject: Appointment Request    Reason for Call: Semi-Routine Return from RN Triage    QUESTIONS  Type of Appointment? Established Patient  Reason for appointment request? No appointments available during search  Additional Information for Provider? Pt RN triage for   shaky/panicky/metallic taste in mouth-to be seen within 3 days-no appts   available; screened green  ---------------------------------------------------------------------------  --------------  CALL BACK INFO  What is the best way for the office to contact you? OK to leave message on   voicemail  Preferred Call Back Phone Number? [phone]  ---------------------------------------------------------------------------  --------------  SCRIPT ANSWERS  Patient needs to be seen within 5 days? Yes  Nurse Name? Ray  Have you been diagnosed with, awaiting test results for, or told that you   are suspected of having COVID-19 (Coronavirus)? (If patient has tested   negative or was tested as a requirement for work, school, or travel and   not based on symptoms, answer no)? No  Do you currently have flu-like symptoms including fever or chills, cough,   shortness of breath, difficulty breathing, or new loss of taste or smell? No  Have you had close contact with someone with COVID-19 in the last 14 days? No  (Service Expert - click yes below to proceed with [employer] As Usual   Scheduling)?  Yes      Please Advise

## 2021-08-18 PROCEDURE — 6370000000 HC RX 637 (ALT 250 FOR IP): Performed by: EMERGENCY MEDICINE

## 2021-08-18 PROCEDURE — U0003 INFECTIOUS AGENT DETECTION BY NUCLEIC ACID (DNA OR RNA); SEVERE ACUTE RESPIRATORY SYNDROME CORONAVIRUS 2 (SARS-COV-2) (CORONAVIRUS DISEASE [COVID-19]), AMPLIFIED PROBE TECHNIQUE, MAKING USE OF HIGH THROUGHPUT TECHNOLOGIES AS DESCRIBED BY CMS-2020-01-R: HCPCS

## 2021-08-18 PROCEDURE — U0005 INFEC AGEN DETEC AMPLI PROBE: HCPCS

## 2021-08-18 RX ORDER — BENZONATATE 100 MG/1
200 CAPSULE ORAL 3 TIMES DAILY PRN
Qty: 21 CAPSULE | Refills: 0 | Status: ON HOLD | OUTPATIENT
Start: 2021-08-18 | End: 2021-09-20

## 2021-08-18 RX ORDER — CEFDINIR 300 MG/1
300 CAPSULE ORAL EVERY 12 HOURS SCHEDULED
Status: DISCONTINUED | OUTPATIENT
Start: 2021-08-18 | End: 2021-08-18 | Stop reason: HOSPADM

## 2021-08-18 RX ORDER — GUAIFENESIN/DEXTROMETHORPHAN 100-10MG/5
10 SYRUP ORAL ONCE
Status: COMPLETED | OUTPATIENT
Start: 2021-08-18 | End: 2021-08-18

## 2021-08-18 RX ORDER — LORATADINE AND PSEUDOEPHEDRINE SULFATE 5; 120 MG/1; MG/1
1 TABLET, EXTENDED RELEASE ORAL 2 TIMES DAILY
Qty: 20 TABLET | Refills: 0 | Status: ON HOLD
Start: 2021-08-18 | End: 2021-09-20 | Stop reason: HOSPADM

## 2021-08-18 RX ORDER — DOXYCYCLINE HYCLATE 100 MG
100 TABLET ORAL 2 TIMES DAILY
Qty: 20 TABLET | Refills: 0 | Status: ON HOLD
Start: 2021-08-18 | End: 2021-09-20 | Stop reason: HOSPADM

## 2021-08-18 RX ORDER — CEFDINIR 300 MG/1
300 CAPSULE ORAL 2 TIMES DAILY
Qty: 20 CAPSULE | Refills: 0 | Status: ON HOLD
Start: 2021-08-18 | End: 2021-09-20 | Stop reason: HOSPADM

## 2021-08-18 RX ORDER — DOXYCYCLINE HYCLATE 100 MG/1
100 CAPSULE ORAL ONCE
Status: COMPLETED | OUTPATIENT
Start: 2021-08-18 | End: 2021-08-18

## 2021-08-18 RX ADMIN — GUAIFENESIN SYRUP AND DEXTROMETHORPHAN 10 ML: 100; 10 SYRUP ORAL at 01:11

## 2021-08-18 RX ADMIN — DOXYCYCLINE HYCLATE 100 MG: 100 CAPSULE ORAL at 01:11

## 2021-08-18 NOTE — ED NOTES
Multiple rx escribed   To follow with pmd  Encourage my chart for covid results     Ethel Bonner RN  08/18/21 2046

## 2021-08-19 ENCOUNTER — CARE COORDINATION (OUTPATIENT)
Dept: CARE COORDINATION | Age: 56
End: 2021-08-19

## 2021-08-19 NOTE — CARE COORDINATION
Date/Time:  8/19/2021 8:41 AM  Attempted to reach patient by telephone. Call within 2 business days of discharge: Yes Left HIPPA compliant message requesting a return call. Will attempt to reach patient again.

## 2021-08-19 NOTE — ED PROVIDER NOTES
HPI:  8/19/21, Time: 7:41 AM EDT        Maximino Arriaga is a 60 y.o. male presenting to the ED for cough and congestion but no shortness of breath, beginning 1 to 2 days ago. The complaint has been intermittent, moderate in severity, and worsened by cough. Patient states he is gagging secondary to thick phlegm. She has not had any fever/chills associated, no history of any known infectious exposures. No relieving factors are reported. No other complaints. No chest heaviness/pain/tightness, no abdominal pain, no nausea/vomiting, no other complaints. Review of Systems:   A complete review of systems was performed and pertinent positives and negatives are stated within HPI, all other systems reviewed and are negative.    --------------------------------------------- PAST HISTORY ---------------------------------------------  Past Medical History:  has a past medical history of HIGH CHOLESTEROL, Hypertension, and Seizures ([facility] [address] Past Surgical History:  has a past surgical history that includes Appendectomy. Social History:  reports that he has never smoked. He has never used smokeless tobacco.    Family History: family history is not on file. The patients home medications have been reviewed. Allergies: Patient has no known allergies. -------------------------------------------------- RESULTS -------------------------------------------------  All laboratory and radiology results have been personally reviewed by myself   LABS:  No results found for this visit on 08/17/21. RADIOLOGY:  Interpreted by Radiologist.  XR CHEST (2 VW)   Final Result   Multifocal bilateral patchy opacities are most pronounced in the mid and   lower lung zones. Differential considerations include an   infectious/inflammatory process including atypical or viral pneumonia and   pulmonary edema.              ------------------------- NURSING NOTES AND VITALS REVIEWED ---------------------------      The nursing notes within the ED encounter and vital signs as below have been reviewed. /81   Pulse 100   Temp 98.7 °F (37.1 °C) (Oral)   Resp 18   Ht 6' 1\" (1.854 m)   Wt (!) 320 lb (145.2 kg)   SpO2 95%   BMI 42.22 kg/m²   Oxygen Saturation Interpretation: Normal      ---------------------------------------------------PHYSICAL EXAM--------------------------------------      Constitutional/General: Alert and oriented x3, well appearing, non toxic in mild distress  Head: Normocephalic and atraumatic  Eyes: PERRL, EOMI  Mouth: Oropharynx clear, handling secretions, no trismus  Neck: Supple, full ROM, no stridor no dysphonia trachea midline  Pulmonary: Lungs --crackles noted at the bases with decreased air entry but no wheezes, good air entry throughout; not in respiratory distress  Cardiovascular:  Regular rate and rhythm, no murmurs, gallops, or rubs. 2+ distal pulses  GI: Abdomen soft non tender, non distended, otherwise normal no organomegaly no masses no rebound no guarding  Extremities: Moves all extremities x 4. Warm and well perfused  Skin: warm and dry without rash  Neurologic: GCS 15, nerves II through XII intact no focal deficits. Psych: Normal Affect      ------------------------------ ED COURSE/MEDICAL DECISION MAKING----------------------  Medications   doxycycline hyclate (VIBRAMYCIN) capsule 100 mg (100 mg Oral Given 8/18/21 0111)   guaiFENesin-dextromethorphan (ROBITUSSIN DM) 100-10 MG/5ML syrup 10 mL (10 mLs Oral Given 8/18/21 0111)         ED COURSE:       Medical Decision Making:   Rapid Covid 19 testing was not available but send out Covid screen was provided for the patient. Patient given no prescriptions for antibiotics for coverage for possible community-acquired pneumonia but he is also advised his symptoms could be related to COVID-19. Patient understands he is to get immediate medical attention if any new or worsening symptoms develop. Counseling:   The emergency provider has spoken with the patient and spouse/SO and discussed todays results, in addition to providing specific details for the plan of care and counseling regarding the diagnosis and prognosis. Questions are answered at this time and they are agreeable with the plan.      --------------------------------- IMPRESSION AND DISPOSITION ---------------------------------    IMPRESSION  1. Pneumonia of both lower lobes due to infectious organism    2. Encounter for laboratory testing for COVID-19 virus        DISPOSITION  Disposition: Discharge to home  Patient condition is stable      NOTE: This report was transcribed using voice recognition software.  Every effort was made to ensure accuracy; however, inadvertent computerized transcription errors may be present       Jacob Vergara MD  08/19/21 8411

## 2021-08-20 LAB — SARS-COV-2, PCR: DETECTED

## 2021-08-20 NOTE — CARE COORDINATION
Patient contacted regarding COVID-19 risk, exposure, diagnosis, pulse oximeter ordered at discharge and monoclonal antibody infusion follow up. Discussed COVID-19 related testing which was available at this time. Test results were positive. Patient informed of results, if available? Yes. LPN Care Coordinator contacted the patient by telephone to perform post discharge assessment. Call within 2 business days of discharge: Yes. Verified name and  with patient as identifiers. Provided introduction to self, and explanation of the CTN/ACM role, and reason for call due to risk factors for infection and/or exposure to COVID-19. Symptoms reviewed with patient who verbalized the following symptoms: fatigue, pain or aching joints and cough. Due to no new or worsening symptoms encounter was not routed to provider for escalation. Discussed follow-up appointments. If no appointment was previously scheduled, appointment scheduling offered: No.  [facility] follow up appointment(s):   Future Appointments   Date Time Provider Sandra Neville   2021  3:00 PM HOMERO MAJOR CNP [facility] AND [facility]   2021  4:45 PM DO Grant Linares [facility] follow up appointment(s): pt states he is feeling a little better does have a follow up appt with pcp reviewed cdc guidelines and advised to return to er if symptoms persist or worsen will follow up with pt in one week    Non-face-to-face services provided:  Obtained and reviewed discharge summary and/or continuity of care documents     Advance Care Planning:   Does patient have an Advance Directive:  not on file. Educated patient about risk for severe COVID-19 due to risk factors according to CDC guidelines. LPN CC reviewed discharge instructions, medical action plan and red flag symptoms with the patient who verbalized understanding. Discussed COVID vaccination status: No. Education provided on COVID-19 vaccination as appropriate. Discussed exposure protocols and quarantine with CDC Guidelines. Patient was given an opportunity to verbalize any questions and concerns and agrees to contact LPN CC or health care provider for questions related to their healthcare. Reviewed and educated patient on any new and changed medications related to discharge diagnosis     Was patient discharged with a pulse oximeter? No Discussed and confirmed pulse oximeter discharge instructions and when to notify provider or seek emergency care. LPN CC provided contact information. Plan for follow-up call in 5-7 days based on severity of symptoms and risk factors.

## 2021-08-21 ENCOUNTER — APPOINTMENT (OUTPATIENT)
Dept: GENERAL RADIOLOGY | Age: 56
DRG: 005 | End: 2021-08-21
Payer: COMMERCIAL

## 2021-08-21 ENCOUNTER — HOSPITAL ENCOUNTER (INPATIENT)
Age: 56
LOS: 30 days | Discharge: NURSING, REHAB OR LONG-TERM CARE | DRG: 005 | End: 2021-09-20
Attending: EMERGENCY MEDICINE | Admitting: FAMILY MEDICINE
Payer: COMMERCIAL

## 2021-08-21 ENCOUNTER — APPOINTMENT (OUTPATIENT)
Dept: CT IMAGING | Age: 56
DRG: 005 | End: 2021-08-21
Payer: COMMERCIAL

## 2021-08-21 DIAGNOSIS — G89.4 CHRONIC PAIN SYNDROME: ICD-10-CM

## 2021-08-21 DIAGNOSIS — F41.9 ANXIETY: ICD-10-CM

## 2021-08-21 DIAGNOSIS — J12.82 PNEUMONIA DUE TO COVID-19 VIRUS: Primary | ICD-10-CM

## 2021-08-21 DIAGNOSIS — J96.01 ACUTE HYPOXEMIC RESPIRATORY FAILURE DUE TO COVID-19 (HCC): ICD-10-CM

## 2021-08-21 DIAGNOSIS — U07.1 ACUTE HYPOXEMIC RESPIRATORY FAILURE DUE TO COVID-19 (HCC): ICD-10-CM

## 2021-08-21 DIAGNOSIS — U07.1 PNEUMONIA DUE TO COVID-19 VIRUS: Primary | ICD-10-CM

## 2021-08-21 LAB
AADO2: 582.1 MMHG
ALBUMIN SERPL-MCNC: 3.2 G/DL (ref 3.5–5.2)
ALP BLD-CCNC: 78 U/L (ref 40–129)
ALT SERPL-CCNC: 48 U/L (ref 0–40)
ANION GAP SERPL CALCULATED.3IONS-SCNC: 21 MMOL/L (ref 7–16)
ANISOCYTOSIS: ABNORMAL
AST SERPL-CCNC: 72 U/L (ref 0–39)
B.E.: -7.8 MMOL/L (ref -3–3)
BACTERIA: ABNORMAL /HPF
BASOPHILS ABSOLUTE: 0.02 E9/L (ref 0–0.2)
BASOPHILS RELATIVE PERCENT: 0.2 % (ref 0–2)
BILIRUB SERPL-MCNC: 0.5 MG/DL (ref 0–1.2)
BILIRUBIN URINE: NEGATIVE
BLOOD, URINE: ABNORMAL
BUN BLDV-MCNC: 45 MG/DL (ref 6–20)
BURR CELLS: ABNORMAL
C-REACTIVE PROTEIN: 26.3 MG/DL (ref 0–0.4)
CALCIUM SERPL-MCNC: 8.4 MG/DL (ref 8.6–10.2)
CHLORIDE BLD-SCNC: 98 MMOL/L (ref 98–107)
CLARITY: CLEAR
CO2: 17 MMOL/L (ref 22–29)
COARSE CASTS, UA: ABNORMAL /LPF (ref 0–2)
COHB: 1.2 % (ref 0–1.5)
COLOR: YELLOW
CREAT SERPL-MCNC: 1.3 MG/DL (ref 0.7–1.2)
CRITICAL: ABNORMAL
D DIMER: >5250 NG/ML DDU
DATE ANALYZED: ABNORMAL
DATE OF COLLECTION: ABNORMAL
EOSINOPHILS ABSOLUTE: 0 E9/L (ref 0.05–0.5)
EOSINOPHILS RELATIVE PERCENT: 0 % (ref 0–6)
FIO2: 100 %
GFR AFRICAN AMERICAN: >60
GFR NON-AFRICAN AMERICAN: 57 ML/MIN/1.73
GLUCOSE BLD-MCNC: 231 MG/DL (ref 74–99)
GLUCOSE URINE: NEGATIVE MG/DL
HBA1C MFR BLD: 6.7 % (ref 4–5.6)
HCO3: 16.3 MMOL/L (ref 22–26)
HCT VFR BLD CALC: 42.2 % (ref 37–54)
HEMOGLOBIN: 14.6 G/DL (ref 12.5–16.5)
HHB: 5.9 % (ref 0–5)
IMMATURE GRANULOCYTES #: 0.15 E9/L
IMMATURE GRANULOCYTES %: 1.3 % (ref 0–5)
KETONES, URINE: NEGATIVE MG/DL
LAB: ABNORMAL
LACTATE DEHYDROGENASE: 746 U/L (ref 135–225)
LACTIC ACID, SEPSIS: 1.3 MMOL/L (ref 0.5–1.9)
LACTIC ACID, SEPSIS: 1.3 MMOL/L (ref 0.5–1.9)
LACTIC ACID: 3.9 MMOL/L (ref 0.5–2.2)
LEUKOCYTE ESTERASE, URINE: NEGATIVE
LYMPHOCYTES ABSOLUTE: 0.47 E9/L (ref 1.5–4)
LYMPHOCYTES RELATIVE PERCENT: 4.1 % (ref 20–42)
Lab: ABNORMAL
MCH RBC QN AUTO: 29.6 PG (ref 26–35)
MCHC RBC AUTO-ENTMCNC: 34.6 % (ref 32–34.5)
MCV RBC AUTO: 85.4 FL (ref 80–99.9)
METER GLUCOSE: 179 MG/DL (ref 74–99)
METER GLUCOSE: 235 MG/DL (ref 74–99)
METHB: 0.3 % (ref 0–1.5)
MODE: ABNORMAL
MONOCYTES ABSOLUTE: 0.39 E9/L (ref 0.1–0.95)
MONOCYTES RELATIVE PERCENT: 3.4 % (ref 2–12)
NEUTROPHILS ABSOLUTE: 10.46 E9/L (ref 1.8–7.3)
NEUTROPHILS RELATIVE PERCENT: 91 % (ref 43–80)
NITRITE, URINE: NEGATIVE
O2 CONTENT: 20.2 ML/DL
O2 SATURATION: 94 % (ref 92–98.5)
O2HB: 92.6 % (ref 94–97)
OPERATOR ID: 797
OVALOCYTES: ABNORMAL
PATIENT TEMP: 37 C
PCO2: 30.1 MMHG (ref 35–45)
PDW BLD-RTO: 12.9 FL (ref 11.5–15)
PFO2: 0.76 MMHG/%
PH BLOOD GAS: 7.35 (ref 7.35–7.45)
PH UA: 5 (ref 5–9)
PLATELET # BLD: 163 E9/L (ref 130–450)
PMV BLD AUTO: 10 FL (ref 7–12)
PO2: 75.8 MMHG (ref 75–100)
POIKILOCYTES: ABNORMAL
POTASSIUM REFLEX MAGNESIUM: 3.7 MMOL/L (ref 3.5–5)
PRO-BNP: 216 PG/ML (ref 0–125)
PROCALCITONIN: 0.5 NG/ML (ref 0–0.08)
PROTEIN UA: NEGATIVE MG/DL
RBC # BLD: 4.94 E12/L (ref 3.8–5.8)
RBC UA: ABNORMAL /HPF (ref 0–2)
RI(T): 768 %
SEDIMENTATION RATE, ERYTHROCYTE: 32 MM/HR (ref 0–15)
SODIUM BLD-SCNC: 136 MMOL/L (ref 132–146)
SOURCE, BLOOD GAS: ABNORMAL
SPECIFIC GRAVITY UA: 1.02 (ref 1–1.03)
TEAR DROP CELLS: ABNORMAL
THB: 15.5 G/DL (ref 11.5–16.5)
TIME ANALYZED: 1017
TOTAL PROTEIN: 7.1 G/DL (ref 6.4–8.3)
TROPONIN, HIGH SENSITIVITY: 17 NG/L (ref 0–11)
UROBILINOGEN, URINE: 0.2 E.U./DL
WBC # BLD: 11.5 E9/L (ref 4.5–11.5)
WBC UA: ABNORMAL /HPF (ref 0–5)

## 2021-08-21 PROCEDURE — 2500000003 HC RX 250 WO HCPCS: Performed by: EMERGENCY MEDICINE

## 2021-08-21 PROCEDURE — 2500000003 HC RX 250 WO HCPCS: Performed by: STUDENT IN AN ORGANIZED HEALTH CARE EDUCATION/TRAINING PROGRAM

## 2021-08-21 PROCEDURE — 71275 CT ANGIOGRAPHY CHEST: CPT

## 2021-08-21 PROCEDURE — 2580000003 HC RX 258: Performed by: SPECIALIST

## 2021-08-21 PROCEDURE — 96365 THER/PROPH/DIAG IV INF INIT: CPT

## 2021-08-21 PROCEDURE — 6370000000 HC RX 637 (ALT 250 FOR IP): Performed by: INTERNAL MEDICINE

## 2021-08-21 PROCEDURE — 81001 URINALYSIS AUTO W/SCOPE: CPT

## 2021-08-21 PROCEDURE — 2580000003 HC RX 258: Performed by: EMERGENCY MEDICINE

## 2021-08-21 PROCEDURE — 6360000002 HC RX W HCPCS: Performed by: EMERGENCY MEDICINE

## 2021-08-21 PROCEDURE — 84484 ASSAY OF TROPONIN QUANT: CPT

## 2021-08-21 PROCEDURE — 6370000000 HC RX 637 (ALT 250 FOR IP): Performed by: STUDENT IN AN ORGANIZED HEALTH CARE EDUCATION/TRAINING PROGRAM

## 2021-08-21 PROCEDURE — 82962 GLUCOSE BLOOD TEST: CPT

## 2021-08-21 PROCEDURE — 85651 RBC SED RATE NONAUTOMATED: CPT

## 2021-08-21 PROCEDURE — 2580000003 HC RX 258: Performed by: STUDENT IN AN ORGANIZED HEALTH CARE EDUCATION/TRAINING PROGRAM

## 2021-08-21 PROCEDURE — 82805 BLOOD GASES W/O2 SATURATION: CPT

## 2021-08-21 PROCEDURE — 96375 TX/PRO/DX INJ NEW DRUG ADDON: CPT

## 2021-08-21 PROCEDURE — 82728 ASSAY OF FERRITIN: CPT

## 2021-08-21 PROCEDURE — 87449 NOS EACH ORGANISM AG IA: CPT

## 2021-08-21 PROCEDURE — 6360000002 HC RX W HCPCS: Performed by: STUDENT IN AN ORGANIZED HEALTH CARE EDUCATION/TRAINING PROGRAM

## 2021-08-21 PROCEDURE — 6360000004 HC RX CONTRAST MEDICATION: Performed by: RADIOLOGY

## 2021-08-21 PROCEDURE — 83615 LACTATE (LD) (LDH) ENZYME: CPT

## 2021-08-21 PROCEDURE — 87040 BLOOD CULTURE FOR BACTERIA: CPT

## 2021-08-21 PROCEDURE — 86140 C-REACTIVE PROTEIN: CPT

## 2021-08-21 PROCEDURE — 36415 COLL VENOUS BLD VENIPUNCTURE: CPT

## 2021-08-21 PROCEDURE — 2000000000 HC ICU R&B

## 2021-08-21 PROCEDURE — 6360000002 HC RX W HCPCS: Performed by: INTERNAL MEDICINE

## 2021-08-21 PROCEDURE — 51702 INSERT TEMP BLADDER CATH: CPT

## 2021-08-21 PROCEDURE — 6360000002 HC RX W HCPCS: Performed by: SPECIALIST

## 2021-08-21 PROCEDURE — XW033E5 INTRODUCTION OF REMDESIVIR ANTI-INFECTIVE INTO PERIPHERAL VEIN, PERCUTANEOUS APPROACH, NEW TECHNOLOGY GROUP 5: ICD-10-PCS | Performed by: INTERNAL MEDICINE

## 2021-08-21 PROCEDURE — 71045 X-RAY EXAM CHEST 1 VIEW: CPT

## 2021-08-21 PROCEDURE — 83036 HEMOGLOBIN GLYCOSYLATED A1C: CPT

## 2021-08-21 PROCEDURE — 85025 COMPLETE CBC W/AUTO DIFF WBC: CPT

## 2021-08-21 PROCEDURE — 99223 1ST HOSP IP/OBS HIGH 75: CPT | Performed by: INTERNAL MEDICINE

## 2021-08-21 PROCEDURE — 94660 CPAP INITIATION&MGMT: CPT

## 2021-08-21 PROCEDURE — XW033H5 INTRODUCTION OF TOCILIZUMAB INTO PERIPHERAL VEIN, PERCUTANEOUS APPROACH, NEW TECHNOLOGY GROUP 5: ICD-10-PCS | Performed by: SPECIALIST

## 2021-08-21 PROCEDURE — 84145 PROCALCITONIN (PCT): CPT

## 2021-08-21 PROCEDURE — 83605 ASSAY OF LACTIC ACID: CPT

## 2021-08-21 PROCEDURE — C9113 INJ PANTOPRAZOLE SODIUM, VIA: HCPCS | Performed by: STUDENT IN AN ORGANIZED HEALTH CARE EDUCATION/TRAINING PROGRAM

## 2021-08-21 PROCEDURE — 99285 EMERGENCY DEPT VISIT HI MDM: CPT

## 2021-08-21 PROCEDURE — 80053 COMPREHEN METABOLIC PANEL: CPT

## 2021-08-21 PROCEDURE — 85378 FIBRIN DEGRADE SEMIQUANT: CPT

## 2021-08-21 PROCEDURE — 83880 ASSAY OF NATRIURETIC PEPTIDE: CPT

## 2021-08-21 RX ORDER — DEXTROSE MONOHYDRATE 50 MG/ML
100 INJECTION, SOLUTION INTRAVENOUS PRN
Status: DISCONTINUED | OUTPATIENT
Start: 2021-08-21 | End: 2021-08-21 | Stop reason: SDUPTHER

## 2021-08-21 RX ORDER — DEXTROSE MONOHYDRATE 25 G/50ML
12.5 INJECTION, SOLUTION INTRAVENOUS PRN
Status: DISCONTINUED | OUTPATIENT
Start: 2021-08-21 | End: 2021-08-21 | Stop reason: SDUPTHER

## 2021-08-21 RX ORDER — NICOTINE POLACRILEX 4 MG
15 LOZENGE BUCCAL PRN
Status: DISCONTINUED | OUTPATIENT
Start: 2021-08-21 | End: 2021-08-21 | Stop reason: SDUPTHER

## 2021-08-21 RX ORDER — ALBUTEROL SULFATE 90 UG/1
2 AEROSOL, METERED RESPIRATORY (INHALATION) EVERY 6 HOURS PRN
Status: DISCONTINUED | OUTPATIENT
Start: 2021-08-21 | End: 2021-08-23

## 2021-08-21 RX ORDER — DEXAMETHASONE SODIUM PHOSPHATE 4 MG/ML
6 INJECTION, SOLUTION INTRA-ARTICULAR; INTRALESIONAL; INTRAMUSCULAR; INTRAVENOUS; SOFT TISSUE EVERY 12 HOURS
Status: DISCONTINUED | OUTPATIENT
Start: 2021-08-22 | End: 2021-08-24

## 2021-08-21 RX ORDER — BENZONATATE 100 MG/1
200 CAPSULE ORAL 3 TIMES DAILY PRN
Status: DISCONTINUED | OUTPATIENT
Start: 2021-08-21 | End: 2021-09-20 | Stop reason: HOSPADM

## 2021-08-21 RX ORDER — AMLODIPINE BESYLATE 10 MG/1
10 TABLET ORAL DAILY
Status: DISCONTINUED | OUTPATIENT
Start: 2021-08-22 | End: 2021-09-20 | Stop reason: HOSPADM

## 2021-08-21 RX ORDER — DOXAZOSIN MESYLATE 4 MG/1
8 TABLET ORAL DAILY
Status: DISCONTINUED | OUTPATIENT
Start: 2021-08-22 | End: 2021-09-05

## 2021-08-21 RX ORDER — 0.9 % SODIUM CHLORIDE 0.9 %
30 INTRAVENOUS SOLUTION INTRAVENOUS PRN
Status: DISCONTINUED | OUTPATIENT
Start: 2021-08-21 | End: 2021-08-23

## 2021-08-21 RX ORDER — PHENYTOIN SODIUM 100 MG/1
200 CAPSULE, EXTENDED RELEASE ORAL
Status: DISCONTINUED | OUTPATIENT
Start: 2021-08-22 | End: 2021-08-23

## 2021-08-21 RX ORDER — DEXAMETHASONE SODIUM PHOSPHATE 4 MG/ML
6 INJECTION, SOLUTION INTRA-ARTICULAR; INTRALESIONAL; INTRAMUSCULAR; INTRAVENOUS; SOFT TISSUE EVERY 24 HOURS
Status: DISCONTINUED | OUTPATIENT
Start: 2021-08-21 | End: 2021-08-21

## 2021-08-21 RX ORDER — SODIUM CHLORIDE 9 MG/ML
1000 INJECTION, SOLUTION INTRAVENOUS CONTINUOUS
Status: DISCONTINUED | OUTPATIENT
Start: 2021-08-21 | End: 2021-08-21

## 2021-08-21 RX ORDER — CETIRIZINE HYDROCHLORIDE 10 MG/1
5 TABLET ORAL 2 TIMES DAILY
Status: DISCONTINUED | OUTPATIENT
Start: 2021-08-22 | End: 2021-09-04

## 2021-08-21 RX ORDER — DEXTROSE MONOHYDRATE 50 MG/ML
100 INJECTION, SOLUTION INTRAVENOUS PRN
Status: DISCONTINUED | OUTPATIENT
Start: 2021-08-21 | End: 2021-09-20 | Stop reason: HOSPADM

## 2021-08-21 RX ORDER — 0.9 % SODIUM CHLORIDE 0.9 %
30 INTRAVENOUS SOLUTION INTRAVENOUS PRN
Status: DISCONTINUED | OUTPATIENT
Start: 2021-08-21 | End: 2021-09-20 | Stop reason: HOSPADM

## 2021-08-21 RX ORDER — DEXTROSE MONOHYDRATE 25 G/50ML
12.5 INJECTION, SOLUTION INTRAVENOUS PRN
Status: DISCONTINUED | OUTPATIENT
Start: 2021-08-21 | End: 2021-09-20 | Stop reason: HOSPADM

## 2021-08-21 RX ORDER — PSEUDOEPHEDRINE HCL 120 MG/1
120 TABLET, FILM COATED, EXTENDED RELEASE ORAL 2 TIMES DAILY
Status: DISCONTINUED | OUTPATIENT
Start: 2021-08-22 | End: 2021-08-23

## 2021-08-21 RX ORDER — SODIUM CHLORIDE 9 MG/ML
INJECTION, SOLUTION INTRAVENOUS CONTINUOUS
Status: DISCONTINUED | OUTPATIENT
Start: 2021-08-21 | End: 2021-08-24

## 2021-08-21 RX ORDER — ZINC SULFATE 50(220)MG
50 CAPSULE ORAL DAILY
Status: DISCONTINUED | OUTPATIENT
Start: 2021-08-22 | End: 2021-09-20 | Stop reason: HOSPADM

## 2021-08-21 RX ORDER — LISINOPRIL 20 MG/1
40 TABLET ORAL DAILY
Status: DISCONTINUED | OUTPATIENT
Start: 2021-08-22 | End: 2021-08-22

## 2021-08-21 RX ORDER — PHENYTOIN SODIUM 100 MG/1
300 CAPSULE, EXTENDED RELEASE ORAL NIGHTLY
Status: DISCONTINUED | OUTPATIENT
Start: 2021-08-21 | End: 2021-08-23

## 2021-08-21 RX ORDER — ASCORBIC ACID 500 MG
500 TABLET ORAL 2 TIMES DAILY
Status: DISCONTINUED | OUTPATIENT
Start: 2021-08-21 | End: 2021-08-21 | Stop reason: SDUPTHER

## 2021-08-21 RX ORDER — ASCORBIC ACID 500 MG
500 TABLET ORAL DAILY
Status: DISCONTINUED | OUTPATIENT
Start: 2021-08-22 | End: 2021-09-05

## 2021-08-21 RX ORDER — PANTOPRAZOLE SODIUM 40 MG/10ML
40 INJECTION, POWDER, LYOPHILIZED, FOR SOLUTION INTRAVENOUS DAILY
Status: DISCONTINUED | OUTPATIENT
Start: 2021-08-21 | End: 2021-09-13

## 2021-08-21 RX ORDER — VITAMIN B COMPLEX
2000 TABLET ORAL DAILY
Status: DISCONTINUED | OUTPATIENT
Start: 2021-08-22 | End: 2021-09-20 | Stop reason: HOSPADM

## 2021-08-21 RX ORDER — 0.9 % SODIUM CHLORIDE 0.9 %
500 INTRAVENOUS SOLUTION INTRAVENOUS ONCE
Status: COMPLETED | OUTPATIENT
Start: 2021-08-21 | End: 2021-08-21

## 2021-08-21 RX ORDER — ZINC SULFATE 50(220)MG
50 CAPSULE ORAL DAILY
Status: DISCONTINUED | OUTPATIENT
Start: 2021-08-21 | End: 2021-08-21 | Stop reason: SDUPTHER

## 2021-08-21 RX ORDER — NICOTINE POLACRILEX 4 MG
15 LOZENGE BUCCAL PRN
Status: DISCONTINUED | OUTPATIENT
Start: 2021-08-21 | End: 2021-09-20 | Stop reason: HOSPADM

## 2021-08-21 RX ORDER — INSULIN GLARGINE 100 [IU]/ML
10 INJECTION, SOLUTION SUBCUTANEOUS NIGHTLY
Status: DISCONTINUED | OUTPATIENT
Start: 2021-08-21 | End: 2021-08-26

## 2021-08-21 RX ORDER — ATORVASTATIN CALCIUM 40 MG/1
40 TABLET, FILM COATED ORAL DAILY
Status: DISCONTINUED | OUTPATIENT
Start: 2021-08-22 | End: 2021-09-20 | Stop reason: HOSPADM

## 2021-08-21 RX ORDER — DEXAMETHASONE SODIUM PHOSPHATE 10 MG/ML
10 INJECTION, SOLUTION INTRAMUSCULAR; INTRAVENOUS ONCE
Status: COMPLETED | OUTPATIENT
Start: 2021-08-21 | End: 2021-08-21

## 2021-08-21 RX ADMIN — CEFTRIAXONE 1000 MG: 1 INJECTION, POWDER, FOR SOLUTION INTRAMUSCULAR; INTRAVENOUS at 10:35

## 2021-08-21 RX ADMIN — OXYCODONE HYDROCHLORIDE AND ACETAMINOPHEN 500 MG: 500 TABLET ORAL at 21:45

## 2021-08-21 RX ADMIN — PHENYTOIN SODIUM 300 MG: 100 CAPSULE ORAL at 23:46

## 2021-08-21 RX ADMIN — ENOXAPARIN SODIUM 40 MG: 40 INJECTION SUBCUTANEOUS at 17:02

## 2021-08-21 RX ADMIN — INSULIN LISPRO 2 UNITS: 100 INJECTION, SOLUTION INTRAVENOUS; SUBCUTANEOUS at 17:06

## 2021-08-21 RX ADMIN — REMDESIVIR 200 MG: 100 INJECTION, POWDER, LYOPHILIZED, FOR SOLUTION INTRAVENOUS at 21:07

## 2021-08-21 RX ADMIN — PANTOPRAZOLE SODIUM 40 MG: 40 INJECTION, POWDER, FOR SOLUTION INTRAVENOUS at 17:50

## 2021-08-21 RX ADMIN — SODIUM CHLORIDE 500 ML: 9 INJECTION, SOLUTION INTRAVENOUS at 10:29

## 2021-08-21 RX ADMIN — DOXYCYCLINE 100 MG: 100 INJECTION, POWDER, LYOPHILIZED, FOR SOLUTION INTRAVENOUS at 10:39

## 2021-08-21 RX ADMIN — TOCILIZUMAB 810 MG: 180 INJECTION, SOLUTION SUBCUTANEOUS at 21:51

## 2021-08-21 RX ADMIN — SODIUM CHLORIDE 500 ML: 9 INJECTION, SOLUTION INTRAVENOUS at 13:00

## 2021-08-21 RX ADMIN — DEXAMETHASONE SODIUM PHOSPHATE 6 MG: 4 INJECTION INTRA-ARTICULAR; INTRALESIONAL; INTRAMUSCULAR; INTRAVENOUS; SOFT TISSUE at 17:02

## 2021-08-21 RX ADMIN — DEXAMETHASONE SODIUM PHOSPHATE 10 MG: 10 INJECTION, SOLUTION INTRAMUSCULAR; INTRAVENOUS at 10:33

## 2021-08-21 RX ADMIN — IOPAMIDOL 75 ML: 755 INJECTION, SOLUTION INTRAVENOUS at 13:25

## 2021-08-21 RX ADMIN — ZINC SULFATE 220 MG (50 MG) CAPSULE 50 MG: CAPSULE at 17:49

## 2021-08-21 RX ADMIN — SODIUM CHLORIDE 1000 ML: 9 INJECTION, SOLUTION INTRAVENOUS at 10:51

## 2021-08-21 RX ADMIN — INSULIN LISPRO 1 UNITS: 100 INJECTION, SOLUTION INTRAVENOUS; SUBCUTANEOUS at 21:46

## 2021-08-21 ASSESSMENT — ENCOUNTER SYMPTOMS
ABDOMINAL PAIN: 0
VOMITING: 0
COUGH: 1
SHORTNESS OF BREATH: 1

## 2021-08-21 ASSESSMENT — PAIN SCALES - GENERAL
PAINLEVEL_OUTOF10: 0

## 2021-08-21 NOTE — H&P
[facility] History and Physical      CHIEF COMPLAINT:  Shortness of breath, cough    History of Present Illness: 49-year-old male presents to the ER with complaints of worsening shortness of breath and cough. Patient states he was diagnosed with COVID-19 2 days ago. His symptoms began about 4 days ago. As outpatient patient was initially started on doxycycline as well as Omnicef as his provider thought it was pneumonia however his symptoms worsened. Patient denies any tobacco use currently or in the past.  Past medical history includes hyperlipidemia, hypertension, seizures. In the ER patient received 500 cc NS bolus x2, Rocephin 1 g IV x1, Decadron 10 mg IV x1, doxycycline 100 mg IV x1. Informant(s) for H&P: Patient    REVIEW OF SYSTEMS:  A comprehensive review of systems was negative except for: what is in the HPI      PMH:  Past Medical History:   Diagnosis Date    HIGH CHOLESTEROL     Hypertension     Seizures ([address]        Surgical History:  Past Surgical History:   Procedure Laterality Date    APPENDECTOMY         Medications Prior to Admission:    Prior to Admission medications    Medication Sig Start Date End Date Taking? Authorizing Provider   cefdinir (OMNICEF) 300 MG capsule Take 1 capsule by mouth 2 times daily for 10 days 8/18/21 8/28/21  Graciela Huang MD   doxycycline hyclate (VIBRA-TABS) 100 MG tablet Take 1 tablet by mouth 2 times daily for 10 days (Pharmacist: May substitute monohydrate form prn) 8/18/21 8/28/21  Graciela Huang MD   CLARITIN-D 12 HOUR 5-120 MG per extended release tablet Take 1 tablet by mouth 2 times daily For congestion relief as needed.  8/18/21   Graciela Huang MD   benzonatate (TESSALON PERLES) 100 MG capsule Take 2 capsules by mouth 3 times daily as needed for Cough 8/18/21 8/25/21  Graciela Huang MD   [facility] ULTRA strip Use daily 8/11/21   DO [facility] PLUS [id number]) MISC Use daily 8/11/21   David Han Recent Labs     08/21/21  1014   WBC 11.5   RBC 4.94   HGB 14.6   HCT 42.2   MCV 85.4   MCH 29.6   MCHC 34.6*   RDW 12.9      MPV 10.0       No results for input(s): POCGLU in the last 72 hours. Radiology:   CTA PULMONARY W CONTRAST   Final Result   Extensive ground-glass and consolidative airspace opacities throughout both   lungs, in keeping with the provided history of COVID pneumonia. The examination is nondiagnostic for detection of pulmonary emboli in   multiple segmental and subsegmental pulmonary artery distributions, secondary   to poor contrast bolus timing and respiratory motion artifact. There is no   evidence of a central pulmonary embolism. Hepatic steatosis. Enlarged mediastinal lymph nodes, likely reactive. XR CHEST PORTABLE   Final Result   Significantly increased airspace opacities throughout both lungs, in keeping   with multifocal pneumonia. EKG: None ordered    ASSESSMENT:      Active Problems:    Acute hypoxemic respiratory failure due to COVID-19 [facility])  Resolved Problems:    * No resolved hospital problems. *      PLAN:    1. Acute hypoxic respiratory failure secondary to COVID-19 pneumonia -blood gases show primary high anion gap metabolic acidosis with respiratory alkalosis. Continue BiPAP as per pulmonology. Patient is to continue Spiriva as well as albuterol. 2.  COVID-19 pneumonia -inpatient consult to pharmacy has been placed for remdesivir. Continue Decadron 6 mg IV daily for 10 days. Patient was started on zinc, vitamin C, vitamin D. Continue to monitor inflammatory markers. Follow-up with further recommendations from intensivist.  3.  Elevated LFTs secondary to COVID-19 -continue to trend  4. OSCAR likely prerenal -hold nephrotoxins such as the benazapril  5. Diabetes mellitus type 2 -continue insulin sliding scale. Hold Metformin as well as Januvia  6. Hypertension -controlled on Norvasc, benazepril  7.   Hyperlipidemia -continue Lipitor  8. DVT prophylaxis -Lovenox    Code Status: Full code    NOTE: This report was transcribed using voice recognition software. Every effort was made to ensure accuracy; however, inadvertent computerized transcription errors may be present.   Electronically signed by Rita Day DO on 8/21/2021 at 3:45 PM

## 2021-08-21 NOTE — PROGRESS NOTES
Date: 8/21/2021    Time: 10:16 AM    Patient Placed On BIPAP/CPAP/ Non-Invasive Ventilation? Yes    If no must comment. Facial area red/color change? No           If YES are Blister/Lesion present? No   If yes must notify nursing staff  BIPAP/CPAP skin barrier?   Yes    Skin barrier type:mepilexlite       Comments:     08/21/21 1013   NIV Type   Skin Assessment Clean, dry, & intact   Skin Protection for O2 Device Yes   Orientation Middle   Location Nose   NIV Started/Stopped On   Equipment Type v60   Mode Bilevel   Mask Type Full face mask   Mask Size Medium   Settings/Measurements   IPAP 16 cmH20   CPAP/EPAP 8 cmH2O   Rate Ordered 12   Resp (!) 55   FiO2  100 %   I Time/ I Time % 0.9 s   Vt Exhaled 946 mL   Minute Volume 44 Liters   Mask Leak (lpm) 43 lpm   Comfort Level Good   Using Accessory Muscles Yes   SpO2 98   Alarm Settings   Alarms On Y   High Pressure Alarm 40 cmH2O   Apnea (secs) 20 secs   Resp Rate Low Alarm 10   High Respiratory Rate 60 br/min  (pt currently breathing 50 BPM)         Kristin Jackson RCP

## 2021-08-21 NOTE — ED PROVIDER NOTES
HPI:  8/21/21,   Time: 10:20 AM EDT         Maria Alejandra Alvarado is a 60 y.o. male presenting to the ED for severe shortness of breath diaphoresis and cough, beginning 4 days ago. The complaint has been persistent, severe in severity, and worsened by light exertion. Patient was seen in the emergency department at [facility] and diagnosed with COVID-19 versus community-acquired pneumonia started on doxycycline and Omnicef but has gotten progressively worse. Patient presents today with profound dyspnea with an initial oxygen saturation that was extremely low for EMS and they were only able to get up to 74 on nonrebreather mask. Patient denies chest pain abdominal pain vomiting diarrhea or melena. He states he has no fever    ROS:   Pertinent positives and negatives are stated within HPI, all other systems reviewed and are negative.  --------------------------------------------- PAST HISTORY ---------------------------------------------  Past Medical History:  has a past medical history of HIGH CHOLESTEROL, Hypertension, and Seizures ([address] Past Surgical History:  has a past surgical history that includes Appendectomy. Social History:  reports that he has never smoked. He has never used smokeless tobacco.    Family History: family history is not on file. The patients home medications have been reviewed. Allergies: Patient has no known allergies.     -------------------------------------------------- RESULTS -------------------------------------------------  All laboratory and radiology results have been personally reviewed by myself   LABS:  Results for orders placed or performed during the hospital encounter of 08/21/21   CBC Auto Differential   Result Value Ref Range    WBC 11.5 4.5 - 11.5 E9/L    RBC 4.94 3.80 - 5.80 E12/L    Hemoglobin 14.6 12.5 - 16.5 g/dL    Hematocrit 42.2 37.0 - 54.0 %    MCV 85.4 80.0 - 99.9 fL    MCH 29.6 26.0 - 35.0 pg    MCHC 34.6 (H) 32.0 - 34.5 %    RDW 12.9 11.5 - 15.0 fL    Platelets 783 546 - 751 E9/L    MPV 10.0 7.0 - 12.0 fL   Comprehensive Metabolic Panel w/ Reflex to MG   Result Value Ref Range    Sodium 136 132 - 146 mmol/L    Potassium reflex Magnesium 3.7 3.5 - 5.0 mmol/L    Chloride 98 98 - 107 mmol/L    CO2 17 (L) 22 - 29 mmol/L    Anion Gap 21 (H) 7 - 16 mmol/L    Glucose 231 (H) 74 - 99 mg/dL    BUN 45 (H) 6 - 20 mg/dL    CREATININE 1.3 (H) 0.7 - 1.2 mg/dL    GFR Non-African American 57 >=60 mL/min/1.73    GFR African American >60     Calcium 8.4 (L) 8.6 - 10.2 mg/dL    Total Protein 7.1 6.4 - 8.3 g/dL    Albumin 3.2 (L) 3.5 - 5.2 g/dL    Total Bilirubin 0.5 0.0 - 1.2 mg/dL    Alkaline Phosphatase 78 40 - 129 U/L    ALT 48 (H) 0 - 40 U/L    AST 72 (H) 0 - 39 U/L   Troponin   Result Value Ref Range    Troponin, High Sensitivity 17 (H) 0 - 11 ng/L   Brain Natriuretic Peptide   Result Value Ref Range    Pro- (H) 0 - 125 pg/mL   Lactic Acid, Plasma   Result Value Ref Range    Lactic Acid 3.9 (HH) 0.5 - 2.2 mmol/L   Blood Gas, Arterial   Result Value Ref Range    Date Analyzed [id number]     Time Analyzed 1017     Source: Blood Arterial     pH, Blood Gas 7.351 7.350 - 7.450    PCO2 30.1 (L) 35.0 - 45.0 mmHg    PO2 75.8 75.0 - 100.0 mmHg    HCO3 16.3 (L) 22.0 - 26.0 mmol/L    B.E. -7.8 (L) -3.0 - 3.0 mmol/L    O2 Sat 94.0 92.0 - 98.5 %    PO2/FIO2 0.76 mmHg/%    AaDO2 582.1 mmHg    RI(T) 768 %    O2Hb 92.6 (L) 94.0 - 97.0 %    COHb 1.2 0.0 - 1.5 %    MetHb 0.3 0.0 - 1.5 %    O2 Content 20.2 mL/dL    HHb 5.9 (H) 0.0 - 5.0 %    tHb (est) 15.5 11.5 - 16.5 g/dL    Mode BILEVEL 14/8     FIO2 100.0 %    Date Of Collection      Time Collected      Pt Temp 37.0 C          Lab [id number]     Critical(s) Notified . No Critical Values        RADIOLOGY:  Interpreted by Radiologist.  XR CHEST PORTABLE   Final Result   Significantly increased airspace opacities throughout both lungs, in keeping   with multifocal pneumonia.          CTA PULMONARY W CONTRAST    (Results Pending)       ------------------------- NURSING NOTES AND VITALS REVIEWED ---------------------------   The nursing notes within the ED encounter and vital signs as below have been reviewed. BP (!) 152/78   Pulse 110   Temp 98.7 °F (37.1 °C) (Oral)   Resp (!) 55   Ht 6' (1.829 m)   Wt (!) 302 lb (137 kg)   SpO2 97%   BMI 40.96 kg/m²   Oxygen Saturation Interpretation: Abnormal      ---------------------------------------------------PHYSICAL EXAM--------------------------------------      Constitutional/General: Alert and oriented x3, well appearing, non toxic in NAD  Head: NC/AT  Eyes: PERRL, EOMI  Mouth: Oropharynx clear, handling secretions, no trismus  Neck: Supple, full ROM, no meningeal signs  Pulmonary: Bilateral crackles are noted with profound increased work of breathing even on BiPAP   cardiovascular: Tachycardic rate and rhythm, no murmurs, gallops, or rubs. 2+ distal pulses  Abdomen: Soft, non tender, non distended,   Extremities: Moves all extremities x 4. Warm and well perfused  Skin: Cool and moist without rash  Neurologic: GCS 15, cranial nerves intact grossly nonfocal  Psych: Normal Affect      ------------------------------ ED COURSE/MEDICAL DECISION MAKING----------------------  Medications   0.9 % sodium chloride infusion (1,000 mLs Intravenous New Bag 8/21/21 1051)   dexamethasone (PF) (DECADRON) injection 10 mg (10 mg Intravenous Given 8/21/21 1033)   0.9 % sodium chloride bolus (500 mLs Intravenous New Bag 8/21/21 1029)   cefTRIAXone (ROCEPHIN) 1,000 mg in sterile water 10 mL IV syringe (0 mg Intravenous Stopped 8/21/21 1054)   doxycycline (VIBRAMYCIN) 100 mg in dextrose 5 % 100 mL IVPB (100 mg Intravenous New Bag 8/21/21 1039)         Medical Decision Making:    Patient was given IV fluids he was placed on BiPAP he was given Decadron ceftriaxone and doxycycline. He was discussed with the [facility] hospitalist and admitted to the hospital to the intensive care unit.   Patient was also discussed with the intensivist.  Patient did improve with treatment. Please note that the withdrawal or failure to initiate urgent interventions for this patient would likely result in a life threatening deterioration or permanent disability. Accordingly this patient received 30 minutes of critical care time, excluding separately billable procedures. Counseling: The emergency provider has spoken with the patient and discussed todays results, in addition to providing specific details for the plan of care and counseling regarding the diagnosis and prognosis. Questions are answered at this time and they are agreeable with the plan.      --------------------------------- IMPRESSION AND DISPOSITION ---------------------------------    IMPRESSION  1. Pneumonia due to COVID-19 virus    2.  Acute hypoxemic respiratory failure due to COVID-19 [facility])        DISPOSITION  Disposition: Admit to CCU/ICU  Patient condition is critical                  Gokul Fofana MD  08/21/21 5491

## 2021-08-21 NOTE — CONSULTS
Critical Care Admit/Consult Note         Patient - Jama Graves   MRN -  [id number]   [facility]   - 1965      Date of Admission -  2021  9:35 AM  Date of evaluation -  2021  [address] Day - 0            ADMIT/CONSULT DETAILS     Reason for Admit/Consult   Acute hypoxemic respiratory failure due to  [facility]  Primary Care Physician - [address], DO         HPI   The patient is a 60 y.o. male with significant past medical history of obesity, seizures and diabetes. Presenting with 4 days of shortness of breath symptoms have been worsening. Patient was diagnosed with Covid 19 and started on cefdinir/doxycycline. Symptoms continuing to get worse. He arrived to the ED 74% on nonrebreather. Patient placed on BiPAP with improvement of pulse ox. He is not complaining of any chest pain, nausea, vomiting, diarrhea. Past Medical History         Diagnosis Date    HIGH CHOLESTEROL     Hypertension     Seizures ([address]         Past Surgical History           Procedure Laterality Date    APPENDECTOMY         Lines and Devices   Peripheral access    Current Medications   Current Medications    insulin lispro  0-6 Units Subcutaneous TID WC    insulin lispro  0-3 Units Subcutaneous Nightly    insulin glargine  10 Units Subcutaneous Nightly     glucose, dextrose, glucagon (rDNA), dextrose  IV Drips/Infusions   sodium chloride 1,000 mL (21 1051)    dextrose       Home Medications  Medications Prior to Admission: cefdinir (OMNICEF) 300 MG capsule, Take 1 capsule by mouth 2 times daily for 10 days  doxycycline hyclate (VIBRA-TABS) 100 MG tablet, Take 1 tablet by mouth 2 times daily for 10 days (Pharmacist: May substitute monohydrate form prn)  CLARITIN-D 12 HOUR 5-120 MG per extended release tablet, Take 1 tablet by mouth 2 times daily For congestion relief as needed.   benzonatate (TESSALON PERLES) 100 MG temperature is 98.6 °F (37 °C). His blood pressure is 124/78 and his pulse is 117. His respiration is 42 (abnormal) and oxygen saturation is 96%. Temperature Range: Temp: 98.6 °F (37 °C) Temp  Av.7 °F (37.1 °C)  Min: 98.6 °F (37 °C)  Max: 98.7 °F (37.1 °C)  BP Range:  Systolic (18NBH), NZV:596 , Min:113 , AYR:030     Diastolic (74PYZ), UEO:72, Min:73, Max:78    Pulse Range: Pulse  Av.5  Min: 103  Max: 124  Respiration Range: Resp  Av.8  Min: 30  Max: 55  Current Pulse Ox[de-identified]  SpO2: 96 %  24HR Pulse Ox Range:  SpO2  Av.3 %  Min: 96 %  Max: 99 %  Oxygen Amount and Delivery:        I/O (24 Hours)    Patient Vitals for the past 8 hrs:   BP Temp Temp src Pulse Resp SpO2 Height Weight   21 1420 -- 98.6 °F (37 °C) Bladder -- -- -- 6' (1.829 m) (!) 302 lb (137 kg)   21 1412 -- -- -- -- (!) 42 -- -- --   21 1345 124/78 -- -- 117 (!) 45 96 % -- --   21 1226 113/73 -- -- 103 (!) 32 99 % -- --   21 1054 -- -- -- 110 -- -- -- --   21 1028 -- -- -- -- -- -- 6' (1.829 m) (!) 302 lb (137 kg)   21 1017 -- -- -- -- -- 97 % -- --   21 1013 -- -- -- -- (!) 55 -- -- --   21 1009 -- -- -- -- 30 -- -- --   21 0947 (!) 152/78 98.7 °F (37.1 °C) Oral 124 -- -- -- --     No intake or output data in the 24 hours ending 21 1520  No intake/output data recorded.       Patient Vitals for the past 96 hrs (Last 3 readings):   Weight   21 1420 (!) 302 lb (137 kg)   21 1028 (!) 302 lb (137 kg)       Exam         PHYSICAL EXAM:    General appearance -patient is alert, in respiratory distress on BiPAP   mental status - alert, oriented to person, place, and time, normal mood, behavior, speech, dress, motor activity, and thought processes  Eyes - pupils equal and reactive, extraocular eye movements intact, sclera anicteric  Ears - external ear normal  Nose - normal and patent, no erythema, discharge or polyps  Mouth - mucous membranes moist, pharynx 8/18/2021  EXAMINATION: TWO XRAY VIEWS OF THE CHEST 8/17/2021 11:32 pm COMPARISON: None. HISTORY: ORDERING SYSTEM PROVIDED HISTORY: cough TECHNOLOGIST PROVIDED HISTORY: Reason for exam:->cough FINDINGS: Frontal and lateral views of the chest.  Low lung volume. Multifocal bilateral patchy opacities are most pronounced in the mid and lower lung zones. No pleural effusion or pneumothorax. Normal cardiomediastinal silhouette and great vessels. Multilevel degenerative disc disease. Multifocal bilateral patchy opacities are most pronounced in the mid and lower lung zones. Differential considerations include an infectious/inflammatory process including atypical or viral pneumonia and pulmonary edema. XR CHEST PORTABLE    Result Date: 8/21/2021  EXAMINATION: ONE XRAY VIEW OF THE CHEST 8/21/2021 9:56 am COMPARISON: August 17, 2021 HISTORY: ORDERING SYSTEM PROVIDED HISTORY: pneumonia TECHNOLOGIST PROVIDED HISTORY: Reason for exam:->pneumonia FINDINGS: Compared with most recent examination, there has been a significant interval increase in the degree of patchy bilateral airspace opacities, in keeping with the patient's known diagnosis of COVID pneumonia. No evidence of a sizable pleural effusion. No pneumothorax is identified. Heart size is unable to be accurately assessed on this single portable view of the chest, but appears to be stable. No acute osseous abnormality. Significantly increased airspace opacities throughout both lungs, in keeping with multifocal pneumonia. CTA PULMONARY W CONTRAST    Result Date: 8/21/2021  EXAMINATION: CTA OF THE CHEST, 8/21/2021 12:11 pm TECHNIQUE: CTA of the chest was performed after the administration of intravenous contrast.  Multiplanar reformatted images are provided for review. MIP images are provided for review.  Dose modulation, iterative reconstruction, and/or weight based adjustment of the mA/kV was utilized to reduce the radiation dose to as low as reasonably achievable. COMPARISON: None HISTORY: ORDERING SYSTEM PROVIDED HISTORY:  PE vs COVID TECHNOLOGIST PROVIDED HISTORY: Reason for Exam:  PE vs COVID Decision Support Exception - unselect if not a suspected or confirmed emergency medical condition->Emergency Medical Condition (MA) FINDINGS: Pulmonary Arteries: There is a combination of poor contrast bolus timing as well as significant respiratory motion artifact that severely degrades the examination. Unfortunately, multiple segmental and subsegmental pulmonary artery branches are not opacified well enough to exclude a pulmonary embolism. There is no evidence of a central pulmonary embolism or convincing evidence of right heart strain. Mediastinum: There is at least one enlarged mediastinal lymph node measuring 17 mm in short axis in the subcarinal distribution. Several additional smaller but nonenlarged mediastinal lymph nodes are present, likely reactive. The thoracic esophagus is decompressed, limiting assessment. No obvious esophageal abnormality. The heart size is top normal.  Trace pericardial fluid is present. Lungs/pleura: The central airways are patent. There is no evidence of a pleural effusion or pneumothorax. Extensive ground-glass and consolidative airspace opacities are present bilaterally, in keeping with the provided history of COVID pneumonia. Upper Abdomen: Imaged portions of the upper abdomen demonstrates diffuse hepatic steatosis. There are also multiple nonobstructing bilateral renal calculi, partially imaged. Soft Tissues/Bones: No acute bone or soft tissue abnormality. Extensive ground-glass and consolidative airspace opacities throughout both lungs, in keeping with the provided history of COVID pneumonia. The examination is nondiagnostic for detection of pulmonary emboli in multiple segmental and subsegmental pulmonary artery distributions, secondary to poor contrast bolus timing and respiratory motion artifact.   There is no evidence of a central pulmonary embolism. Hepatic steatosis. Enlarged mediastinal lymph nodes, likely reactive. SYSTEMS ASSESSMENT    Neuro   Alert and oriented no acute concerns    Respiratory   Acute hypoxic respiratory failure due to COVID-19  -Requiring BiPAP  -Started on Decadron  -We will try weaning patient with OptiFlow  -Continue monitoring here in the ICU    Cardiovascular   Continue telemetry monitoring no acute concerns  Hyperlipidemia  -On statins    Gastrointestinal   GI prophylaxis  -Protonix    Renal   OSCAR  -Mildly elevated creatinine 1.3  -Continue fluid rehydration  -Hold ACE inhibitor    Lactic acidosis  -Repeat after fluids     Infectious Disease   COVID-19  -Given Rocephin and doxy in the ED  -Started on Decadron  -Start remdesivir  -Respiratory cultures pending  -Inflammatory markers  -Consult infectious disease    Hematology/Oncology   DVT prophylaxis  -Lovenox    Endocrine   Type 2 diabetes  -On Lantus and sliding scale  -Diabetic diet    Social/Spiritual/DNR/Other   Full code    PLAN:     WEAN PER PROTOCOL:  [] No   [x] Yes  [] N/A    DISCONTINUE ANY LABS:   [x] No   [] Yes    ICU PROPHYLAXIS:  Stress ulcer:  [x] PPI Agent  [] [id number] [] Sucralfate  [] Other:  VTE:   [x] Enoxaparin  [] Unfract. Heparin Subcut  [] EPC Cuffs    NUTRITION:  [x] NPO [] Tube Feeding (Specify: ) [] TPN  [] PO (Diet: No diet orders on file)    HOME MEDICATIONS RECONCILED: [] No  [] Yes    INSULIN DRIP:   [x] No   [] Yes    CONSULTATION NEEDED:  [] No   [x] Yes    FAMILY UPDATED:    [x] No   [] Yes    TRANSFER OUT OF ICU:   [x] No   [] Yes      Opal Oliveira DO, PGY2                       8/21/2021, 3:20 PM          I personally saw, examined and provided care for the patient. Radiographs, labs and medication list were reviewed by me independently. Review of Residents documentation was conducted and revisions were made as appropriate.  I agree with the above documented exam, problem list and plan of

## 2021-08-22 ENCOUNTER — APPOINTMENT (OUTPATIENT)
Dept: GENERAL RADIOLOGY | Age: 56
DRG: 005 | End: 2021-08-22
Payer: COMMERCIAL

## 2021-08-22 PROBLEM — E78.5 HYPERLIPIDEMIA: Status: ACTIVE | Noted: 2021-08-22

## 2021-08-22 PROBLEM — E87.20 LACTIC ACIDOSIS: Status: ACTIVE | Noted: 2021-08-22

## 2021-08-22 PROBLEM — N17.9 AKI (ACUTE KIDNEY INJURY) (HCC): Status: RESOLVED | Noted: 2021-08-22 | Resolved: 2021-08-22

## 2021-08-22 PROBLEM — N17.9 AKI (ACUTE KIDNEY INJURY) (HCC): Status: ACTIVE | Noted: 2021-08-22

## 2021-08-22 LAB
AADO2: 587.4 MMHG
AADO2: 601.6 MMHG
ALBUMIN SERPL-MCNC: 2.9 G/DL (ref 3.5–5.2)
ALP BLD-CCNC: 80 U/L (ref 40–129)
ALT SERPL-CCNC: 37 U/L (ref 0–40)
ANION GAP SERPL CALCULATED.3IONS-SCNC: 8 MMOL/L (ref 7–16)
AST SERPL-CCNC: 57 U/L (ref 0–39)
B.E.: -3.6 MMOL/L (ref -3–3)
B.E.: -4 MMOL/L (ref -3–3)
BASOPHILS ABSOLUTE: 0.01 E9/L (ref 0–0.2)
BASOPHILS RELATIVE PERCENT: 0.1 % (ref 0–2)
BILIRUB SERPL-MCNC: 0.4 MG/DL (ref 0–1.2)
BUN BLDV-MCNC: 26 MG/DL (ref 6–20)
BURR CELLS: ABNORMAL
C-REACTIVE PROTEIN: 24.3 MG/DL (ref 0–0.4)
CALCIUM SERPL-MCNC: 7.9 MG/DL (ref 8.6–10.2)
CHLORIDE BLD-SCNC: 109 MMOL/L (ref 98–107)
CO2: 24 MMOL/L (ref 22–29)
COHB: 0.8 % (ref 0–1.5)
COHB: 0.8 % (ref 0–1.5)
CREAT SERPL-MCNC: 1 MG/DL (ref 0.7–1.2)
CRITICAL: ABNORMAL
CRITICAL: ABNORMAL
D DIMER: >5250 NG/ML DDU
DATE ANALYZED: ABNORMAL
DATE ANALYZED: ABNORMAL
DATE OF COLLECTION: ABNORMAL
DATE OF COLLECTION: ABNORMAL
EOSINOPHILS ABSOLUTE: 0 E9/L (ref 0.05–0.5)
EOSINOPHILS RELATIVE PERCENT: 0 % (ref 0–6)
FERRITIN: 3476 NG/ML
FIBRINOGEN: 603 MG/DL (ref 225–540)
FIO2: 100 %
FIO2: 100 %
GFR AFRICAN AMERICAN: >60
GFR NON-AFRICAN AMERICAN: >60 ML/MIN/1.73
GLUCOSE BLD-MCNC: 169 MG/DL (ref 74–99)
HCO3: 19.5 MMOL/L (ref 22–26)
HCO3: 19.7 MMOL/L (ref 22–26)
HCT VFR BLD CALC: 40.1 % (ref 37–54)
HEMOGLOBIN: 13.5 G/DL (ref 12.5–16.5)
HHB: 12.2 % (ref 0–5)
HHB: 5.9 % (ref 0–5)
IMMATURE GRANULOCYTES #: 0.08 E9/L
IMMATURE GRANULOCYTES %: 1.2 % (ref 0–5)
L. PNEUMOPHILA SEROGP 1 UR AG: NORMAL
LAB: ABNORMAL
LAB: ABNORMAL
LYMPHOCYTES ABSOLUTE: 0.54 E9/L (ref 1.5–4)
LYMPHOCYTES RELATIVE PERCENT: 8 % (ref 20–42)
Lab: ABNORMAL
Lab: ABNORMAL
MCH RBC QN AUTO: 29.6 PG (ref 26–35)
MCHC RBC AUTO-ENTMCNC: 33.7 % (ref 32–34.5)
MCV RBC AUTO: 87.9 FL (ref 80–99.9)
METER GLUCOSE: 120 MG/DL (ref 74–99)
METER GLUCOSE: 160 MG/DL (ref 74–99)
METER GLUCOSE: 179 MG/DL (ref 74–99)
METER GLUCOSE: 181 MG/DL (ref 74–99)
METHB: 0.2 % (ref 0–1.5)
METHB: 0.3 % (ref 0–1.5)
MODE: ABNORMAL
MODE: ABNORMAL
MONOCYTES ABSOLUTE: 0.2 E9/L (ref 0.1–0.95)
MONOCYTES RELATIVE PERCENT: 3 % (ref 2–12)
NEUTROPHILS ABSOLUTE: 5.94 E9/L (ref 1.8–7.3)
NEUTROPHILS RELATIVE PERCENT: 87.7 % (ref 43–80)
O2 CONTENT: 17.2 ML/DL
O2 CONTENT: 19.1 ML/DL
O2 SATURATION: 87.7 % (ref 92–98.5)
O2 SATURATION: 94 % (ref 92–98.5)
O2HB: 86.7 % (ref 94–97)
O2HB: 93.1 % (ref 94–97)
OPERATOR ID: 2485
OPERATOR ID: 420
PATIENT TEMP: 37 C
PATIENT TEMP: 37 C
PCO2: 30.3 MMHG (ref 35–45)
PCO2: 32.3 MMHG (ref 35–45)
PDW BLD-RTO: 13.2 FL (ref 11.5–15)
PEEP/CPAP: 10 CMH2O
PEEP/CPAP: 8 CMH2O
PFO2: 0.54 MMHG/%
PFO2: 0.7 MMHG/%
PH BLOOD GAS: 7.4 (ref 7.35–7.45)
PH BLOOD GAS: 7.43 (ref 7.35–7.45)
PLATELET # BLD: 144 E9/L (ref 130–450)
PMV BLD AUTO: 9.6 FL (ref 7–12)
PO2: 54.1 MMHG (ref 75–100)
PO2: 70.3 MMHG (ref 75–100)
POIKILOCYTES: ABNORMAL
POTASSIUM SERPL-SCNC: 3.9 MMOL/L (ref 3.5–5)
PS: 16 CMH20
RBC # BLD: 4.56 E12/L (ref 3.8–5.8)
RI(T): 1112 %
RI(T): 836 %
RR MECHANICAL: 18 B/MIN
SEDIMENTATION RATE, ERYTHROCYTE: 44 MM/HR (ref 0–15)
SODIUM BLD-SCNC: 141 MMOL/L (ref 132–146)
SOURCE, BLOOD GAS: ABNORMAL
SOURCE, BLOOD GAS: ABNORMAL
STREP PNEUMONIAE ANTIGEN, URINE: NORMAL
THB: 14.1 G/DL (ref 11.5–16.5)
THB: 14.6 G/DL (ref 11.5–16.5)
TIME ANALYZED: 1244
TIME ANALYZED: 538
TOTAL PROTEIN: 5.6 G/DL (ref 6.4–8.3)
VT MECHANICAL: 500 ML
WBC # BLD: 6.8 E9/L (ref 4.5–11.5)

## 2021-08-22 PROCEDURE — 2580000003 HC RX 258: Performed by: STUDENT IN AN ORGANIZED HEALTH CARE EDUCATION/TRAINING PROGRAM

## 2021-08-22 PROCEDURE — 36600 WITHDRAWAL OF ARTERIAL BLOOD: CPT

## 2021-08-22 PROCEDURE — 71045 X-RAY EXAM CHEST 1 VIEW: CPT

## 2021-08-22 PROCEDURE — 82962 GLUCOSE BLOOD TEST: CPT

## 2021-08-22 PROCEDURE — 85651 RBC SED RATE NONAUTOMATED: CPT

## 2021-08-22 PROCEDURE — 85378 FIBRIN DEGRADE SEMIQUANT: CPT

## 2021-08-22 PROCEDURE — 6370000000 HC RX 637 (ALT 250 FOR IP): Performed by: INTERNAL MEDICINE

## 2021-08-22 PROCEDURE — 6360000002 HC RX W HCPCS: Performed by: INTERNAL MEDICINE

## 2021-08-22 PROCEDURE — 2500000003 HC RX 250 WO HCPCS: Performed by: STUDENT IN AN ORGANIZED HEALTH CARE EDUCATION/TRAINING PROGRAM

## 2021-08-22 PROCEDURE — 85384 FIBRINOGEN ACTIVITY: CPT

## 2021-08-22 PROCEDURE — 86140 C-REACTIVE PROTEIN: CPT

## 2021-08-22 PROCEDURE — 82805 BLOOD GASES W/O2 SATURATION: CPT

## 2021-08-22 PROCEDURE — C9113 INJ PANTOPRAZOLE SODIUM, VIA: HCPCS | Performed by: STUDENT IN AN ORGANIZED HEALTH CARE EDUCATION/TRAINING PROGRAM

## 2021-08-22 PROCEDURE — 6360000002 HC RX W HCPCS: Performed by: STUDENT IN AN ORGANIZED HEALTH CARE EDUCATION/TRAINING PROGRAM

## 2021-08-22 PROCEDURE — 80053 COMPREHEN METABOLIC PANEL: CPT

## 2021-08-22 PROCEDURE — 36415 COLL VENOUS BLD VENIPUNCTURE: CPT

## 2021-08-22 PROCEDURE — 2000000000 HC ICU R&B

## 2021-08-22 PROCEDURE — 99233 SBSQ HOSP IP/OBS HIGH 50: CPT | Performed by: INTERNAL MEDICINE

## 2021-08-22 PROCEDURE — 85025 COMPLETE CBC W/AUTO DIFF WBC: CPT

## 2021-08-22 PROCEDURE — 94660 CPAP INITIATION&MGMT: CPT

## 2021-08-22 RX ORDER — LORAZEPAM 2 MG/ML
0.5 INJECTION INTRAMUSCULAR ONCE
Status: COMPLETED | OUTPATIENT
Start: 2021-08-22 | End: 2021-08-22

## 2021-08-22 RX ORDER — SODIUM CHLORIDE 0.9 % (FLUSH) 0.9 %
10 SYRINGE (ML) INJECTION PRN
Status: DISCONTINUED | OUTPATIENT
Start: 2021-08-22 | End: 2021-08-23

## 2021-08-22 RX ORDER — SODIUM CHLORIDE 0.9 % (FLUSH) 0.9 %
10 SYRINGE (ML) INJECTION 2 TIMES DAILY
Status: DISCONTINUED | OUTPATIENT
Start: 2021-08-22 | End: 2021-09-09

## 2021-08-22 RX ADMIN — INSULIN LISPRO 1 UNITS: 100 INJECTION, SOLUTION INTRAVENOUS; SUBCUTANEOUS at 08:38

## 2021-08-22 RX ADMIN — Medication 10 ML: at 08:43

## 2021-08-22 RX ADMIN — REMDESIVIR 100 MG: 100 INJECTION, POWDER, LYOPHILIZED, FOR SOLUTION INTRAVENOUS at 22:10

## 2021-08-22 RX ADMIN — DEXAMETHASONE SODIUM PHOSPHATE 6 MG: 4 INJECTION, SOLUTION INTRAMUSCULAR; INTRAVENOUS at 05:50

## 2021-08-22 RX ADMIN — PSEUDOEPHEDRINE HCL 120 MG: 120 TABLET, FILM COATED, EXTENDED RELEASE ORAL at 21:36

## 2021-08-22 RX ADMIN — LORAZEPAM 0.5 MG: 2 INJECTION INTRAMUSCULAR; INTRAVENOUS at 04:47

## 2021-08-22 RX ADMIN — LISINOPRIL 40 MG: 20 TABLET ORAL at 08:40

## 2021-08-22 RX ADMIN — ATORVASTATIN CALCIUM 40 MG: 40 TABLET, FILM COATED ORAL at 21:34

## 2021-08-22 RX ADMIN — PANTOPRAZOLE SODIUM 40 MG: 40 INJECTION, POWDER, FOR SOLUTION INTRAVENOUS at 08:39

## 2021-08-22 RX ADMIN — DEXAMETHASONE SODIUM PHOSPHATE 6 MG: 4 INJECTION, SOLUTION INTRAMUSCULAR; INTRAVENOUS at 17:08

## 2021-08-22 RX ADMIN — PSEUDOEPHEDRINE HCL 120 MG: 120 TABLET, FILM COATED, EXTENDED RELEASE ORAL at 08:39

## 2021-08-22 RX ADMIN — DOXAZOSIN 8 MG: 4 TABLET ORAL at 08:40

## 2021-08-22 RX ADMIN — AMLODIPINE BESYLATE 10 MG: 10 TABLET ORAL at 08:40

## 2021-08-22 RX ADMIN — CETIRIZINE HYDROCHLORIDE 5 MG: 10 TABLET, FILM COATED ORAL at 08:40

## 2021-08-22 RX ADMIN — ENOXAPARIN SODIUM 70 MG: 80 INJECTION SUBCUTANEOUS at 21:35

## 2021-08-22 RX ADMIN — CETIRIZINE HYDROCHLORIDE 5 MG: 10 TABLET, FILM COATED ORAL at 21:34

## 2021-08-22 RX ADMIN — Medication 2000 UNITS: at 08:40

## 2021-08-22 RX ADMIN — ENOXAPARIN SODIUM 40 MG: 40 INJECTION SUBCUTANEOUS at 08:39

## 2021-08-22 RX ADMIN — OXYCODONE HYDROCHLORIDE AND ACETAMINOPHEN 500 MG: 500 TABLET ORAL at 08:40

## 2021-08-22 RX ADMIN — INSULIN LISPRO 1 UNITS: 100 INJECTION, SOLUTION INTRAVENOUS; SUBCUTANEOUS at 12:00

## 2021-08-22 RX ADMIN — INSULIN LISPRO 1 UNITS: 100 INJECTION, SOLUTION INTRAVENOUS; SUBCUTANEOUS at 21:38

## 2021-08-22 RX ADMIN — Medication 10 ML: at 21:36

## 2021-08-22 RX ADMIN — PHENYTOIN SODIUM 300 MG: 100 CAPSULE ORAL at 21:34

## 2021-08-22 RX ADMIN — PHENYTOIN SODIUM 200 MG: 100 CAPSULE ORAL at 08:40

## 2021-08-22 RX ADMIN — ZINC SULFATE 220 MG (50 MG) CAPSULE 50 MG: CAPSULE at 08:40

## 2021-08-22 ASSESSMENT — PAIN SCALES - GENERAL
PAINLEVEL_OUTOF10: 0

## 2021-08-22 NOTE — CONSULTS
[address] [place] [address] [facility]    Consultation Note     Admit Date: 8/21/2021  9:35 AM    Reason for Consult:   COVID-19    Attending Physician:  Susan De Los Santos DO     Chief Complaint: Shortness of breath and cough    HISTORY OF PRESENT ILLNESS:   The patient is a 60 y.o.  man not known to the Infectious Diseases service. The patient is admitted with shortness of breath and cough he was recently diagnosed with Covid 19 2 days prior to admission. When his symptoms started he saw his primary care and he was started on doxycycline and Omnicef. Because of this being Covid pneumonia the antimicrobials were not effective and he was admitted. The patient started vaccinated and is high risk with hyperlipidemia hypertension and CVA/seizure issues. He was started on fluid boluses Rocephin dexamethasone and doxycycline infectious disease was asked to evaluate  BUN of 45 and creatinine 1.3 as well as a lactic acid of 3.9 LFTs of 48 and 72 respectively ALT and AST with a white count of 11.5. C-reactive protein is pending as is the Legionella antigen and urine strep pneumo blood cultures are pending as well. The patient's wife is a healthcare worker she is not vaccinated neither is the patient  He is on 100% FiO2 awake and alert.             Past Medical History:        Diagnosis Date    HIGH CHOLESTEROL     Hypertension     Seizures ([address]      Past Surgical History:        Procedure Laterality Date    APPENDECTOMY       Current Medications:   Scheduled Meds:   insulin lispro  0-6 Units Subcutaneous TID WC    insulin lispro  0-3 Units Subcutaneous Nightly    insulin glargine  10 Units Subcutaneous Nightly    tiotropium  18 mcg Inhalation Daily    enoxaparin  40 mg Subcutaneous Daily    zinc sulfate  50 mg Oral Daily    ascorbic acid  500 mg Oral BID    pantoprazole  40 mg Intravenous Daily    remdesivir IVPB  200 mg Intravenous Once    Followed by   Erich Epstein ON 8/22/2021] remdesivir IVPB  100 mg Intravenous Q24H    [START ON 8/22/2021] dexamethasone  6 mg Intravenous Q12H     Continuous Infusions:   dextrose       PRN Meds:glucose, dextrose, glucagon (rDNA), dextrose, albuterol sulfate HFA, sodium chloride    Allergies:  Patient has no known allergies. Social History:   Social History     Socioeconomic History    Marital status:      Spouse name: None    Number of children: None    Years of education: None    Highest education level: None   Occupational History    None   Tobacco Use    Smoking status: Never Smoker    Smokeless tobacco: Never Used   Substance and Sexual Activity    Alcohol use: None    Drug use: None    Sexual activity: None   Other Topics Concern    None   Social History Narrative    None     Social Determinants of Health     Financial Resource Strain: Low Risk     Difficulty of Paying Living Expenses: Not hard at all   Food Insecurity: No Food Insecurity    Worried About Running Out of Food in the Last Year: Never true    [address] in the Last Year: Never true   Transportation Needs:     Lack of Transportation (Medical):  Lack of Transportation (Non-Medical):    Physical Activity:     Days of Exercise per Week:     Minutes of Exercise per Session:    Stress:     Feeling of Stress :    Social Connections:     Frequency of Communication with Friends and Family:     Frequency of Social Gatherings with Friends and Family:     Attends L[employer] Services:     Active Member of Clubs or Organizations:     Attends Club or Organization Meetings:     Marital Status:    Intimate Partner Violence:     Fear of Current or Ex-Partner:     Emotionally Abused:     Physically Abused:     Sexually Abused:      Family History:   No family history on file. . Otherwise non-pertinent to the chief complaint. REVIEW OF SYSTEMS:    CONSTITUTIONAL:  No chills, positive fevers or night sweats. No loss of weight.   EYES:  No double vision or drainage from eyes, ears or throat. HEENT:  No neck stiffness. No dysphagia. No drainage from eyes, ears or throat  RESPIRATORY: Positive cough, productive sputum or hemoptysis. CARDIOVASCULAR:  No chest pain, palpitations, orthopnea or dyspnea on exertion. GASTROINTESTINAL:  No nausea, vomiting, diarrhea or constipation or hematochezia   GENITOURINARY:  No frequency burning dysuria or hematuria. INTEGUMENT/BREAST:  No rash or breast masses. HEMATOLOGIC/LYMPHATIC:  No lymphadenopathy or blood dyscrasics. ALLERGIC/IMMUNOLOGIC:  No anaphylaxis. ENDOCRINE:  No polyuria or polydipsia or temperature intolerance. MUSCULOSKELETAL: Positive myalgia or arthralgia. Full ROM. NEUROLOGICAL:  No focal motor sensory deficit. BEHAVIOR/PSYCH:  No psychosis. PHYSICAL EXAM:    Vitals:    BP (!) 151/86   Pulse 112   Temp 99.1 °F (37.3 °C) (Bladder)   Resp (!) 49   Ht 6' (1.829 m)   Wt (!) 302 lb (137 kg)   SpO2 96%   BMI 40.96 kg/m²   Constitutional: The patient is awake, alert, and oriented. Positive airway pressure mask:  Skin: Warm and dry. No rashes were noted. No jaundice. HEENT: Eyes show round, and reactive pupils. Moist mucous membranes, no ulcerations, no thrush. Neck: Supple to movements. No lymphadenopathy. Chest: No use of accessory muscles to breathe. Symmetrical expansion. Auscultation reveals no wheezing,, or rhonchi. Poor overall air exchange with a few crackles at the bases  Cardiovascular: S1 and S2 are rhythmic and regular. No murmurs appreciated. Abdomen: Positive bowel sounds to auscultation. Benign to palpation. No masses felt. No hepatosplenomegaly. Genitourinary: Male  Extremities: No clubbing, no cyanosis, no edema.   Musculoskeletal: Equal and symmetrica  Neurological: No focal  Lines: peripheral      CBC+dif:  Recent Labs     08/21/21  1014   WBC 11.5   HGB 14.6   HCT 42.2   MCV 85.4      NEUTROABS 10.46*     No results found for: CRP  No results found for: CRPHS  No results found for: SEDRATE  Lab Results   Component Value Date    ALT 48 (H) 08/21/2021    AST 72 (H) 08/21/2021    ALKPHOS 78 08/21/2021    BILITOT 0.5 08/21/2021     Lab Results   Component Value Date     08/21/2021    K 3.7 08/21/2021    CL 98 08/21/2021    CO2 17 08/21/2021    BUN 45 08/21/2021    CREATININE 1.3 08/21/2021    GFRAA >60 08/21/2021    LABGLOM 57 08/21/2021    GLUCOSE 231 08/21/2021    PROT 7.1 08/21/2021    LABALBU 3.2 08/21/2021    CALCIUM 8.4 08/21/2021    BILITOT 0.5 08/21/2021    ALKPHOS 78 08/21/2021    AST 72 08/21/2021    ALT 48 08/21/2021       No results found for: PROTIME, INR    Lab Results   Component Value Date    TSH 1.390 12/12/2020       Lab Results   Component Value Date    COLORU Yellow 08/21/2021    PHUR 5.0 08/21/2021    WBCUA 0-1 08/21/2021    RBCUA 2-5 08/21/2021    BACTERIA MODERATE 08/21/2021    CLARITYU Clear 08/21/2021    SPECGRAV 1.020 08/21/2021    LEUKOCYTESUR Negative 08/21/2021    UROBILINOGEN 0.2 08/21/2021    BILIRUBINUR Negative 08/21/2021    BLOODU SMALL 08/21/2021    GLUCOSEU Negative 08/21/2021       No results found for: Karma Outhouse, PHART, THGBART, LCI7TOF, PO2ART, TNI3HXB  Radiology:  CTA PULMONARY W CONTRAST   Final Result   Extensive ground-glass and consolidative airspace opacities throughout both   lungs, in keeping with the provided history of COVID pneumonia. The examination is nondiagnostic for detection of pulmonary emboli in   multiple segmental and subsegmental pulmonary artery distributions, secondary   to poor contrast bolus timing and respiratory motion artifact. There is no   evidence of a central pulmonary embolism. Hepatic steatosis. Enlarged mediastinal lymph nodes, likely reactive. XR CHEST PORTABLE   Final Result   Significantly increased airspace opacities throughout both lungs, in keeping   with multifocal pneumonia.              Microbiology:  Pending  No results for input(s): BC in the last 72 hours. No results for input(s): ORG in the last 72 hours. No results for input(s): Felix Konig in the last 72 hours. No results for input(s): STREPNEUMAGU in the last 72 hours. No results for input(s): LP1UAG in the last 72 hours. No results for input(s): ASO in the last 72 hours. No results for input(s): CULTRESP in the last 72 hours. Assessment:  · Severe COVID-19 pneumonia    Plan:    · Cont steroids and remdesivir  · Start Tosi  · Check cultures  · Baseline ESR, CRP  · Monitor labs  · Will follow with you    Thank you for having us see this patient in consultation. I will be discussing this case with the treating physicians.       Electronically signed by Ken Jensen MD on 8/21/2021 at 8:29 PM

## 2021-08-22 NOTE — PROGRESS NOTES
pupils. Moist mucous membranes. No ulcerations or thrush. Neck: Supple to movements. Chest: No use of accessory muscles to breathe. Symmetrical expansion. No wheezing, crackles or rhonchi. Cardiovascular: S1 and S2 are rhythmic and regular. No murmurs appreciated. Abdomen: Positive bowel sounds to auscultation. Benign to palpation. No masses felt. No hepatosplenomegaly. Genitourinary:   Extremities: No clubbing, no cyanosis, no edema.   Lines: peripheral    Laboratory and Tests Review:  Lab Results   Component Value Date    WBC 6.8 08/22/2021    WBC 11.5 08/21/2021    WBC 5.0 12/12/2020    HGB 13.5 08/22/2021    HCT 40.1 08/22/2021    MCV 87.9 08/22/2021     08/22/2021     Lab Results   Component Value Date    NEUTROABS 5.94 08/22/2021    NEUTROABS 10.46 (H) 08/21/2021    NEUTROABS 3.21 12/12/2020     No results found for: CRP  Lab Results   Component Value Date    ALT 37 08/22/2021    AST 57 (H) 08/22/2021    ALKPHOS 80 08/22/2021    BILITOT 0.4 08/22/2021     Lab Results   Component Value Date     08/22/2021    K 3.9 08/22/2021    K 3.7 08/21/2021     08/22/2021    CO2 24 08/22/2021    BUN 26 08/22/2021    CREATININE 1.0 08/22/2021    CREATININE 1.3 08/21/2021    CREATININE 0.8 12/12/2020    GFRAA >60 08/22/2021    LABGLOM >60 08/22/2021    GLUCOSE 169 08/22/2021    PROT 5.6 08/22/2021    LABALBU 2.9 08/22/2021    CALCIUM 7.9 08/22/2021    BILITOT 0.4 08/22/2021    ALKPHOS 80 08/22/2021    AST 57 08/22/2021    ALT 37 08/22/2021     Lab Results   Component Value Date    CRP 24.3 (H) 08/22/2021    CRP 26.3 (H) 08/21/2021     Lab Results   Component Value Date    SEDRATE 44 (H) 08/22/2021    SEDRATE 32 (H) 08/21/2021     Radiology:  CAT scan diffuse groundglass infiltrates    Microbiology:   No results found for: BC, ORG  No results found for: BLOODCULT2, ORG  No results found for: WNDABS  No results found for: RESPSMEAR  No results found for: Joylene Friendly, LABLEGI, AFBCX, FUNGSM, LABFUNG  No results found for: CULTRESP  No results found for: CXCATHTIP  No results found for: BFCS  No results found for: CXSURG  No results found for: LABURIN  No results found for: [facility]    ASSESSMENT:  · Severe Covid pneumonia   · cytokine storm  · Poor prognosis    PLAN:  · Continue remdesivir; steroids;  Tosi x1 completed  · Check final cultures  · Monitor labs  · Discussed with critical care    Elijah Cantrell MD  12:21 PM  8/22/2021

## 2021-08-22 NOTE — PATIENT CARE CONFERENCE
Intensive Care Daily Quality Rounding Checklist      ICU Team Members: Dr. Guido Haas, resident, charge nurse, bedside nurse and respiratory therpy    ICU Day #: 2    Intubation Date: N/A    Ventilator Day #: N/A    Central Line Insertion Date: N/A        Day #: N/A     Arterial Line Insertion Date: N/A      Day #: N/A    Temporary Hemodialysis Catheter Insertion Date: N/A      Day # N/A    DVT Prophylaxis: Lovenox    GI Prophylaxis: Protonix    Busch Catheter Insertion Date: 08/21/2021       Day #: 2        Continued need (if yes, reason documented and discussed with physician): critical care patient, strict I+O    Skin Issues/ Wounds and ordered treatment discussed on rounds: None    Goals/ Plans for the Day: monitor labs and vitals.  Switch to AVAPS, ABGS and chest x-ray

## 2021-08-22 NOTE — PROGRESS NOTES
puff, Q6H PRN  sodium chloride, 30 mL, PRN  sodium chloride, 30 mL, PRN         Objective:    BP (!) 154/81   Pulse 95   Temp 98.4 °F (36.9 °C) (Bladder)   Resp (!) 40   Ht 6' (1.829 m)   Wt (!) 302 lb (137 kg)   SpO2 (!) 89%   BMI 40.96 kg/m²   General Appearance: alert and oriented to person, place and time, well-developed and well-nourished, in no acute distress  Skin: warm and dry, no rash or erythema  Head: normocephalic and atraumatic  Eyes: pupils equal, round, and reactive to light, extraocular eye movements intact, conjunctivae normal  ENT: tympanic membrane, external ear and ear canal normal bilaterally, oropharynx clear and moist with normal mucous membranes  Neck: neck supple and non tender without mass, no thyromegaly or thyroid nodules, no cervical lymphadenopathy   Pulmonary/Chest: clear to auscultation bilaterally- no wheezes, rales or rhonchi, normal air movement, no respiratory distress  Cardiovascular: normal rate, normal S1 and S2, no gallops, intact distal pulses and no carotid bruits  Abdomen: soft, non-tender, non-distended, normal bowel sounds, no masses or organomegaly      Recent Labs     08/21/21  1014 08/22/21  0520    141   K 3.7 3.9   CL 98 109*   CO2 17* 24   BUN 45* 26*   CREATININE 1.3* 1.0   GLUCOSE 231* 169*   CALCIUM 8.4* 7.9*       Recent Labs     08/21/21  1014 08/22/21  0520   WBC 11.5 6.8   RBC 4.94 4.56   HGB 14.6 13.5   HCT 42.2 40.1   MCV 85.4 87.9   MCH 29.6 29.6   MCHC 34.6* 33.7   RDW 12.9 13.2    144   MPV 10.0 9.6     WBC 6.8  Creatinine improved back to 1.0    Radiology:   CTA PULMONARY W CONTRAST   Final Result   Extensive ground-glass and consolidative airspace opacities throughout both   lungs, in keeping with the provided history of COVID pneumonia.       The examination is nondiagnostic for detection of pulmonary emboli in   multiple segmental and subsegmental pulmonary artery distributions, secondary   to poor contrast bolus timing and respiratory motion artifact. There is no   evidence of a central pulmonary embolism. Hepatic steatosis. Enlarged mediastinal lymph nodes, likely reactive. XR CHEST PORTABLE   Final Result   Significantly increased airspace opacities throughout both lungs, in keeping   with multifocal pneumonia. XR CHEST PORTABLE    (Results Pending)       Assessment:    Principal Problem:    Acute hypoxemic respiratory failure due to COVID-19 [facility])  Active Problems:    Type 2 diabetes mellitus without complication, without long-term current use of insulin (HCC)    Seizure (HCC)    Hyperlipidemia    Lactic acidosis  Resolved Problems:    OSCAR (acute kidney injury) ([facility] [address]      Plan:  1. Acute hypoxic respiratory failure, now requiring support with AVAPS, patient remains in ICU under care of critical care. 2. More due to severe COVID-19 pneumonia, cytokine storm, ID on board, patient remains on Decadron, remdesivir, Tocilizumab. Also on supplements of zinc, vitamin D, vitamin C.  3. Hypertension, controlled. 4. Hyperlipidemia, remains on statins. 5. Diabetes, requiring insulin, Accu-Cheks well controlled  6. OSCAR, improved. 7. Patient on DVT prophylaxis.         Electronically signed by Manny Baron MD on 8/22/2021 at 3:15 PM

## 2021-08-22 NOTE — PROGRESS NOTES
Date: 8/21/2021    Time: 10:18 PM    Patient Placed On BIPAP/CPAP/ Non-Invasive Ventilation? No    If no must comment. Facial area red/color change? No           If YES are Blister/Lesion present? No   If yes must notify nursing staff  BIPAP/CPAP skin barrier? Yes    Skin barrier type:mepilexlite        Comments: Pt remains on BiPAP at this time, mask adjusted for comfort. FiO2 weaned to 95%.         [facility]     08/21/21 3551   NIV Type   Mode Bilevel   Mask Type Full face mask   Mask Size Medium   Settings/Measurements   IPAP 16 cmH20   CPAP/EPAP 8 cmH2O   Rate Ordered 12   Resp (!) 49   FiO2  95 %   Vt Exhaled 872 mL   Minute Volume 40 Liters   Mask Leak (lpm) 45 lpm   Comfort Level Good   Using Accessory Muscles No   SpO2 93

## 2021-08-22 NOTE — PROGRESS NOTES
Pt. Is comfortable. VSS pt. Family to the window to visit for a moment. Remains on Avaps tolerating well.

## 2021-08-22 NOTE — PROGRESS NOTES
Critical Care Team - Daily Progress Note         Date and time: 2021 2:48 PM  Patient's name:  Maria Alejandra Alvarado  Medical Record Number: [id number]  Patient's account/billing number: [de-identified]  Patient's YOB: 1965  Age: 60 y.o. Date of Admission: 2021  9:35 AM  Length of stay during current admission: 1      Primary Care Physician: Ally Jasmien DO  ICU Attending Physician: Dr. Kaleigh Rodriguez    Code Status: No Order, full    Reason for ICU admission: Acute hypoxemic respiratory failure due to COVID-19      SUBJECTIVE:     OVERNIGHT EVENTS:         : No acute events overnight. This morning patient states he is a little tired with breathing so quickly.   He denies chest pain, nausea, vomiting      CURRENT VENTILATION STATUS:     [] Ventilator  [x] BIPAP  [] Nasal Cannula [] Room Air      IF INTUBATED, ET TUBE MARKING AT LOWER LIP:  cms    SECRETIONS  Amount:  [] Small [] Moderate  [] Large  [x] None  Color:     [] White [] Colored  [] Bloody    SEDATION:  RAAS Score:  [] Propofol gtt  [] Versed gtt  [] Fentanyl gtt [] Ativan gtt   [x] No Sedation    PARALYZED:  [x] No    [] Yes      VASOPRESSORS:  [x] No    [] Yes    If yes -   [] Levophed       [] Dopamine     [] Vasopressin       [] Dobutamine  [] Phenylephrine         [] Epinephrine    CENTRAL LINES:     [x] No   [] Yes   (Date of Insertion: )           If yes -     [] Right IJ     [] Left IJ [] Right Femoral [] Left Femoral                   [] Right Subclavian [] Left Subclavian       ANDERSON'S CATHETER:   [] No   [x] Yes  (Date of Insertion: )     URINE OUTPUT:            [x] Good   [] Low              [] Anuric      OBJECTIVE:     VITAL SIGNS:  BP (!) 154/81   Pulse 95   Temp 98.4 °F (36.9 °C) (Bladder)   Resp (!) 40   Ht 6' (1.829 m)   Wt (!) 302 lb (137 kg)   SpO2 (!) 89%   BMI 40.96 kg/m²   Tmax over 24 hours:  Temp (24hrs), Av.9 °F (37.2 °C), Min:98.4 °F (36.9 °C), Max:99.3 °F (37.4 °C)      Patient Vitals for the past 6 hrs:   BP Temp Temp src Pulse Resp SpO2   08/22/21 1300 (!) 154/81 -- -- 95 (!) 40 (!) 89 %   08/22/21 1200 (!) 146/78 98.4 °F (36.9 °C) Bladder 92 (!) 44 93 %   08/22/21 1100 (!) 143/87 -- -- 97 (!) 40 91 %   08/22/21 1000 (!) 160/85 -- -- 102 (!) 44 92 %   08/22/21 0926 -- -- -- -- (!) 48 --   08/22/21 0900 (!) 181/85 -- -- 100 (!) 38 (!) 88 %         Intake/Output Summary (Last 24 hours) at 8/22/2021 1448  Last data filed at 8/22/2021 0555  Gross per 24 hour   Intake 2360 ml   Output 2150 ml   Net 210 ml     Wt Readings from Last 2 Encounters:   08/21/21 (!) 302 lb (137 kg)   08/17/21 (!) 320 lb (145.2 kg)     Body mass index is 40.96 kg/m². PHYSICAL EXAMINATION:    General appearance -patient is alert, somewhat diaphoretic, on BiPAP   Mental status - alert, oriented to person, place, and time, normal mood, behavior, speech, dress, motor activity, and thought processes  Eyes - pupils equal and reactive, extraocular eye movements intact, sclera anicteric  Ears - external ears normal  Nose - normal and patent, no erythema, discharge or polyps  Mouth - mucous membranes moist, pharynx normal without lesions  Neck - supple, no significant adenopathy  Chest -tachypneic, diminished and coarse breath sounds  Heart - normal rate, regular rhythm, normal S1, S2, no murmurs, rubs, clicks or gallops  Abdomen - soft, nontender, nondistended, obese  Neurological - alert, oriented, normal speech, no focal findings or movement disorder noted  Extremities - peripheral pulses normal, no clubbing or cyanosis. No edema.   Skin - normal coloration and turgor, no rashes, no suspicious skin lesions noted    Any additional physical findings:     MEDICATIONS:    Scheduled Meds:   sodium chloride flush  10 mL Intravenous BID    enoxaparin  0.5 mg/kg Subcutaneous BID    amLODIPine  10 mg Oral Daily    atorvastatin  40 mg Oral Daily    phenytoin  200 mg Oral QAM AC    doxazosin  8 mg Oral Daily    phenytoin 300 mg Oral Nightly    ascorbic acid  500 mg Oral Daily    Vitamin D  2,000 Units Oral Daily    zinc sulfate  50 mg Oral Daily    insulin lispro  0-6 Units Subcutaneous TID WC    insulin lispro  0-3 Units Subcutaneous Nightly    insulin glargine  10 Units Subcutaneous Nightly    tiotropium  18 mcg Inhalation Daily    pantoprazole  40 mg Intravenous Daily    remdesivir IVPB  100 mg Intravenous Q24H    dexamethasone  6 mg Intravenous Q12H    pseudoephedrine  120 mg Oral BID    And    cetirizine  5 mg Oral BID     Continuous Infusions:   dextrose      sodium chloride 100 mL/hr at 08/21/21 2304     PRN Meds:   sodium chloride flush, 10 mL, PRN  benzonatate, 200 mg, TID PRN  glucose, 15 g, PRN  dextrose, 12.5 g, PRN  glucagon (rDNA), 1 mg, PRN  dextrose, 100 mL/hr, PRN  albuterol sulfate HFA, 2 puff, Q6H PRN  sodium chloride, 30 mL, PRN  sodium chloride, 30 mL, PRN          VENT SETTINGS (Comprehensive) (if applicable):  Vent Information  Skin Assessment: Clean, dry, & intact  FiO2 : 100 %  SpO2: (!) 89 %  SpO2/FiO2 ratio: 89  I Time/ I Time %: 0.9 s  Mask Type: Full face mask  Mask Size: Medium  Additional Respiratory  Assessments  Pulse: 95  Resp: (!) 40  SpO2: (!) 89 %    ABGs:   Recent Labs     08/22/21  1244   PH 7.427   PCO2 30.3*   PO2 70.3*   HCO3 19.5*   BE -3.6*   O2SAT 94.0       Laboratory findings:    Complete Blood Count:   Recent Labs     08/21/21  1014 08/22/21  0520   WBC 11.5 6.8   HGB 14.6 13.5   HCT 42.2 40.1    144        Last 3 Blood Glucose:   Recent Labs     08/21/21  1014 08/22/21  0520   GLUCOSE 231* 169*        PT/INR:  No results found for: PROTIME, INR  PTT:  No results found for: APTT, PTT    Comprehensive Metabolic Profile:   Recent Labs     08/21/21  1014 08/21/21  1014 08/22/21  0520     --  141   K 3.7  --  3.9   CL 98  --  109*   CO2 17*  --  24   BUN 45*  --  26*   CREATININE 1.3*  --  1.0   GLUCOSE 231*  --  169*   CALCIUM 8.4*  --  7.9*   PROT 7.1   < > 5.6*   LABALBU 3.2*   < > 2.9*   BILITOT 0.5   < > 0.4   ALKPHOS 78   < > 80   AST 72*   < > 57*   ALT 48*   < > 37    < > = values in this interval not displayed. Magnesium: No results found for: MG  Phosphorus: No results found for: PHOS  Ionized Calcium: No results found for: CAION     Urinalysis: No results found for: UA     Troponin: No results for input(s): TROPONINI in the last 72 hours. Microbiology: No results found for: CULTBLDPCRRP, ISO1, CX, BLOODCULT2, BFCX, CULTUREFLUID, CULTRESP, RESPVCX    Cultures during this admission:     Blood cultures: Pending [] None drawn      [] Negative             []  Positive (Details:  )  Urine Culture:   [x] None drawn      [] Negative             []  Positive (Details:  )  Sputum Culture:  [x] None drawn      [] Negative             []  Positive (Details:  )   Endotracheal aspirate: [x] None drawn      [] Negative             []  Positive (Details:  )     Other pertinent Labs:   Positive for COVID-19    Radiology/Imaging:     Chest Xray (8/22/2021):  Pending    ASSESSMENT:     Principal Problem:    Acute hypoxemic respiratory failure due to COVID-19 [facility])  Active Problems:    Type 2 diabetes mellitus without complication, without long-term current use of insulin (HCC)    Seizure ([facility] [address]    Hyperlipidemia    Lactic acidosis  Resolved Problems:    [facility] ([facility]      PLAN:     WEAN PER PROTOCOL:  [] No   [x] Yes  [] N/A    DISCONTINUE ANY LABS:   [x] No   [] Yes    ICU PROPHYLAXIS:  Stress ulcer:  [x] PPI Agent  [] [id number] [] Sucralfate  [] Other:  VTE:   [x] Enoxaparin  [] Unfract.  Heparin Subcut  [] EPC Cuffs    NUTRITION:  [x] NPO [] Tube Feeding (Specify: ) [] TPN  [] PO (Diet: No diet orders on file), patient unable to maintain oxygen saturations long enough to eat    HOME MEDICATIONS RECONCILED: [] No  [x] Yes    INSULIN DRIP:   [x] No   [] Yes    CONSULTATION NEEDED:  [x] No   [] Yes    FAMILY UPDATED:    [] No   [x] Yes    TRANSFER OUT OF ICU:   [x] No   [] Yes    SYSTEMS ASSESSMENT    Neuro   Alert and oriented no acute concerns     Seizures, history of  -Continue home phenytoin  -Telemetry monitoring    Respiratory   Acute hypoxic respiratory failure due to COVID-19  -Requiring BiPAP, trial of AVAPS today  -Continue Decadron, tocilizumab, remdesivir day 2  -Wean oxygen as tolerated to maintain saturations above 88%     Cardiovascular   Continue telemetry monitoring no acute concerns  Hyperlipidemia  -On statins    Gastrointestinal   GI prophylaxis  -Protonix     Renal   Lactic acidosis  -Repeat level tomorrow a.m. Infectious Disease   COVID-19  -Given Rocephin and doxy in the ED  -Continue Decadron, tocilizumab, remdesivir day 2  -Respiratory cultures pending  -Inflammatory markers  -ID following     Hematology/Oncology   DVT prophylaxis  -Lovenox, weight-based     Endocrine   Type 2 diabetes  -On Lantus and sliding scale  -Diabetic diet when able to tolerate being off BiPAP or AVAPS for long enough to eat, currently n.p.o.     Social/Spiritual/DNR/Other   Full code      Ruthie Sood DO, PGY-2            8/22/2021, 2:48 PM      NOTE: This report, in part or full, may have been transcribed using voice recognition software. Every effort was made to ensure accuracy; however, inadvertent computerized transcription errors may be present. Please excuse any transcriptional grammatical or spelling errors that may have escaped my editorial review. I personally saw, examined and provided care for the patient. Radiographs, labs and medication list were reviewed by me independently. Review of Residents documentation was conducted and revisions were made as appropriate. I agree with the above documented exam, problem list and plan of care. NIV adjusted to AVAPS with improvement in blood gas. Respiratory status remains tenuous. Monitor closely and if worsens then would need intubation.     CCT excluding procedures 35 minutes    Duy Keepers, DO

## 2021-08-22 NOTE — PROGRESS NOTES
In to assess pt. Pt. Awake alert and oriented. VSS pt. Denies any complaints of pain or discomfort. Pt. States that his breathing is no worse and no better. Attempted to place pt. On Airbo to give him a break from the bipap. Pt. Was able to take meds and sips of water and desaturated to the 60 %. Pt. Placed back on bi-pap. Pt. Became very anxious and agitated. Emotional support given.

## 2021-08-23 ENCOUNTER — APPOINTMENT (OUTPATIENT)
Dept: GENERAL RADIOLOGY | Age: 56
DRG: 005 | End: 2021-08-23
Payer: COMMERCIAL

## 2021-08-23 LAB
AADO2: 297 MMHG
AADO2: 521.2 MMHG
AADO2: 601.2 MMHG
ALBUMIN SERPL-MCNC: 2.6 G/DL (ref 3.5–5.2)
ALP BLD-CCNC: 105 U/L (ref 40–129)
ALT SERPL-CCNC: 31 U/L (ref 0–40)
ANION GAP SERPL CALCULATED.3IONS-SCNC: 14 MMOL/L (ref 7–16)
AST SERPL-CCNC: 53 U/L (ref 0–39)
B.E.: -2 MMOL/L (ref -3–3)
B.E.: -3.8 MMOL/L (ref -3–3)
B.E.: -6.1 MMOL/L (ref -3–3)
BASOPHILS ABSOLUTE: 0 E9/L (ref 0–0.2)
BASOPHILS RELATIVE PERCENT: 0 % (ref 0–2)
BILIRUB SERPL-MCNC: 0.4 MG/DL (ref 0–1.2)
BUN BLDV-MCNC: 23 MG/DL (ref 6–20)
BURR CELLS: ABNORMAL
C-REACTIVE PROTEIN: 14.3 MG/DL (ref 0–0.4)
CALCIUM SERPL-MCNC: 7.9 MG/DL (ref 8.6–10.2)
CHLORIDE BLD-SCNC: 112 MMOL/L (ref 98–107)
CO2: 19 MMOL/L (ref 22–29)
COHB: 0.7 % (ref 0–1.5)
COHB: 0.9 % (ref 0–1.5)
COHB: 1 % (ref 0–1.5)
CREAT SERPL-MCNC: 0.8 MG/DL (ref 0.7–1.2)
CRITICAL: ABNORMAL
D DIMER: >5250 NG/ML DDU
DATE ANALYZED: ABNORMAL
DATE OF COLLECTION: ABNORMAL
EOSINOPHILS ABSOLUTE: 0.07 E9/L (ref 0.05–0.5)
EOSINOPHILS RELATIVE PERCENT: 1 % (ref 0–6)
FIO2: 100 %
FIO2: 100 %
FIO2: 70 %
GFR AFRICAN AMERICAN: >60
GFR NON-AFRICAN AMERICAN: >60 ML/MIN/1.73
GLUCOSE BLD-MCNC: 141 MG/DL (ref 74–99)
HCO3: 19.7 MMOL/L (ref 22–26)
HCO3: 20.7 MMOL/L (ref 22–26)
HCO3: 21.1 MMOL/L (ref 22–26)
HCT VFR BLD CALC: 41.1 % (ref 37–54)
HEMOGLOBIN: 13.8 G/DL (ref 12.5–16.5)
HHB: 1.6 % (ref 0–5)
HHB: 2 % (ref 0–5)
HHB: 9.5 % (ref 0–5)
LAB: ABNORMAL
LACTIC ACID: 1.8 MMOL/L (ref 0.5–2.2)
LYMPHOCYTES ABSOLUTE: 0.28 E9/L (ref 1.5–4)
LYMPHOCYTES RELATIVE PERCENT: 4 % (ref 20–42)
Lab: ABNORMAL
MAGNESIUM: 1.8 MG/DL (ref 1.6–2.6)
MCH RBC QN AUTO: 29.8 PG (ref 26–35)
MCHC RBC AUTO-ENTMCNC: 33.6 % (ref 32–34.5)
MCV RBC AUTO: 88.8 FL (ref 80–99.9)
METER GLUCOSE: 161 MG/DL (ref 74–99)
METER GLUCOSE: 167 MG/DL (ref 74–99)
METER GLUCOSE: 187 MG/DL (ref 74–99)
METHB: 0.3 % (ref 0–1.5)
METHB: 0.3 % (ref 0–1.5)
METHB: 0.4 % (ref 0–1.5)
MODE: ABNORMAL
MODE: AC
MODE: AC
MONOCYTES ABSOLUTE: 0.14 E9/L (ref 0.1–0.95)
MONOCYTES RELATIVE PERCENT: 2 % (ref 2–12)
NEUTROPHILS ABSOLUTE: 6.51 E9/L (ref 1.8–7.3)
NEUTROPHILS RELATIVE PERCENT: 93 % (ref 43–80)
O2 CONTENT: 18.3 ML/DL
O2 CONTENT: 18.8 ML/DL
O2 CONTENT: 19.8 ML/DL
O2 SATURATION: 90.4 % (ref 92–98.5)
O2 SATURATION: 98 % (ref 92–98.5)
O2 SATURATION: 98.4 % (ref 92–98.5)
O2HB: 89.2 % (ref 94–97)
O2HB: 96.8 % (ref 94–97)
O2HB: 97.3 % (ref 94–97)
OPERATOR ID: 2485
OPERATOR ID: 316
OPERATOR ID: 421
PATIENT TEMP: 37 C
PCO2: 30 MMHG (ref 35–45)
PCO2: 37.9 MMHG (ref 35–45)
PCO2: 40.1 MMHG (ref 35–45)
PDW BLD-RTO: 13.5 FL (ref 11.5–15)
PEEP/CPAP: 10 CMH2O
PEEP/CPAP: 16 CMH2O
PEEP/CPAP: 16 CMH2O
PFO2: 0.57 MMHG/%
PFO2: 1.27 MMHG/%
PFO2: 2.06 MMHG/%
PH BLOOD GAS: 7.31 (ref 7.35–7.45)
PH BLOOD GAS: 7.36 (ref 7.35–7.45)
PH BLOOD GAS: 7.46 (ref 7.35–7.45)
PHOSPHORUS: 2.3 MG/DL (ref 2.5–4.5)
PHOSPHORUS: 4.7 MG/DL (ref 2.5–4.5)
PLATELET # BLD: 173 E9/L (ref 130–450)
PMV BLD AUTO: 10 FL (ref 7–12)
PO2: 126.7 MMHG (ref 75–100)
PO2: 143.9 MMHG (ref 75–100)
PO2: 56.8 MMHG (ref 75–100)
POIKILOCYTES: ABNORMAL
POTASSIUM SERPL-SCNC: 4.1 MMOL/L (ref 3.5–5)
PRO-BNP: 393 PG/ML (ref 0–125)
PROCALCITONIN: 0.36 NG/ML (ref 0–0.08)
PS: 22 CMH20
RBC # BLD: 4.63 E12/L (ref 3.8–5.8)
RI(T): 1058 %
RI(T): 206 %
RI(T): 411 %
RR MECHANICAL: 18 B/MIN
RR MECHANICAL: 20 B/MIN
RR MECHANICAL: 24 B/MIN
SEDIMENTATION RATE, ERYTHROCYTE: 5 MM/HR (ref 0–15)
SODIUM BLD-SCNC: 145 MMOL/L (ref 132–146)
SOURCE, BLOOD GAS: ABNORMAL
THB: 13.6 G/DL (ref 11.5–16.5)
THB: 14.4 G/DL (ref 11.5–16.5)
THB: 14.6 G/DL (ref 11.5–16.5)
TIME ANALYZED: 1236
TIME ANALYZED: 1822
TIME ANALYZED: 632
TOTAL PROTEIN: 5.5 G/DL (ref 6.4–8.3)
VT MECHANICAL: 500 ML
VT MECHANICAL: 500 ML
WBC # BLD: 7 E9/L (ref 4.5–11.5)

## 2021-08-23 PROCEDURE — 37799 UNLISTED PX VASCULAR SURGERY: CPT

## 2021-08-23 PROCEDURE — 2580000003 HC RX 258

## 2021-08-23 PROCEDURE — 71045 X-RAY EXAM CHEST 1 VIEW: CPT

## 2021-08-23 PROCEDURE — 82962 GLUCOSE BLOOD TEST: CPT

## 2021-08-23 PROCEDURE — C9113 INJ PANTOPRAZOLE SODIUM, VIA: HCPCS | Performed by: STUDENT IN AN ORGANIZED HEALTH CARE EDUCATION/TRAINING PROGRAM

## 2021-08-23 PROCEDURE — 2500000003 HC RX 250 WO HCPCS: Performed by: FAMILY MEDICINE

## 2021-08-23 PROCEDURE — 99233 SBSQ HOSP IP/OBS HIGH 50: CPT | Performed by: FAMILY MEDICINE

## 2021-08-23 PROCEDURE — 80053 COMPREHEN METABOLIC PANEL: CPT

## 2021-08-23 PROCEDURE — 83880 ASSAY OF NATRIURETIC PEPTIDE: CPT

## 2021-08-23 PROCEDURE — 2500000003 HC RX 250 WO HCPCS

## 2021-08-23 PROCEDURE — 86140 C-REACTIVE PROTEIN: CPT

## 2021-08-23 PROCEDURE — 94660 CPAP INITIATION&MGMT: CPT

## 2021-08-23 PROCEDURE — 2500000003 HC RX 250 WO HCPCS: Performed by: INTERNAL MEDICINE

## 2021-08-23 PROCEDURE — 83605 ASSAY OF LACTIC ACID: CPT

## 2021-08-23 PROCEDURE — 82805 BLOOD GASES W/O2 SATURATION: CPT

## 2021-08-23 PROCEDURE — 36415 COLL VENOUS BLD VENIPUNCTURE: CPT

## 2021-08-23 PROCEDURE — 84145 PROCALCITONIN (PCT): CPT

## 2021-08-23 PROCEDURE — 02HV33Z INSERTION OF INFUSION DEVICE INTO SUPERIOR VENA CAVA, PERCUTANEOUS APPROACH: ICD-10-PCS | Performed by: INTERNAL MEDICINE

## 2021-08-23 PROCEDURE — 36600 WITHDRAWAL OF ARTERIAL BLOOD: CPT

## 2021-08-23 PROCEDURE — 83735 ASSAY OF MAGNESIUM: CPT

## 2021-08-23 PROCEDURE — 6360000002 HC RX W HCPCS: Performed by: INTERNAL MEDICINE

## 2021-08-23 PROCEDURE — 84100 ASSAY OF PHOSPHORUS: CPT

## 2021-08-23 PROCEDURE — 6360000002 HC RX W HCPCS

## 2021-08-23 PROCEDURE — 2580000003 HC RX 258: Performed by: INTERNAL MEDICINE

## 2021-08-23 PROCEDURE — 85651 RBC SED RATE NONAUTOMATED: CPT

## 2021-08-23 PROCEDURE — 6370000000 HC RX 637 (ALT 250 FOR IP): Performed by: INTERNAL MEDICINE

## 2021-08-23 PROCEDURE — C1751 CATH, INF, PER/CENT/MIDLINE: HCPCS

## 2021-08-23 PROCEDURE — 85378 FIBRIN DEGRADE SEMIQUANT: CPT

## 2021-08-23 PROCEDURE — 94640 AIRWAY INHALATION TREATMENT: CPT

## 2021-08-23 PROCEDURE — 31500 INSERT EMERGENCY AIRWAY: CPT

## 2021-08-23 PROCEDURE — 2500000003 HC RX 250 WO HCPCS: Performed by: STUDENT IN AN ORGANIZED HEALTH CARE EDUCATION/TRAINING PROGRAM

## 2021-08-23 PROCEDURE — 85025 COMPLETE CBC W/AUTO DIFF WBC: CPT

## 2021-08-23 PROCEDURE — 36556 INSERT NON-TUNNEL CV CATH: CPT

## 2021-08-23 PROCEDURE — 2000000000 HC ICU R&B

## 2021-08-23 PROCEDURE — 94002 VENT MGMT INPAT INIT DAY: CPT

## 2021-08-23 PROCEDURE — 6360000002 HC RX W HCPCS: Performed by: STUDENT IN AN ORGANIZED HEALTH CARE EDUCATION/TRAINING PROGRAM

## 2021-08-23 PROCEDURE — 2580000003 HC RX 258: Performed by: FAMILY MEDICINE

## 2021-08-23 PROCEDURE — 36620 INSERTION CATHETER ARTERY: CPT

## 2021-08-23 PROCEDURE — 2580000003 HC RX 258: Performed by: STUDENT IN AN ORGANIZED HEALTH CARE EDUCATION/TRAINING PROGRAM

## 2021-08-23 PROCEDURE — 94664 DEMO&/EVAL PT USE INHALER: CPT

## 2021-08-23 RX ORDER — ROCURONIUM BROMIDE 10 MG/ML
130 INJECTION, SOLUTION INTRAVENOUS ONCE
Status: COMPLETED | OUTPATIENT
Start: 2021-08-23 | End: 2021-08-23

## 2021-08-23 RX ORDER — PHENYTOIN SODIUM 50 MG/ML
200 INJECTION, SOLUTION INTRAMUSCULAR; INTRAVENOUS
Status: DISCONTINUED | OUTPATIENT
Start: 2021-08-24 | End: 2021-08-26

## 2021-08-23 RX ORDER — VECURONIUM BROMIDE 1 MG/ML
INJECTION, POWDER, LYOPHILIZED, FOR SOLUTION INTRAVENOUS
Status: COMPLETED
Start: 2021-08-23 | End: 2021-08-23

## 2021-08-23 RX ORDER — LIDOCAINE HYDROCHLORIDE 10 MG/ML
5 INJECTION, SOLUTION EPIDURAL; INFILTRATION; INTRACAUDAL; PERINEURAL ONCE
Status: DISCONTINUED | OUTPATIENT
Start: 2021-08-23 | End: 2021-08-23

## 2021-08-23 RX ORDER — SODIUM CHLORIDE 0.9 % (FLUSH) 0.9 %
5-40 SYRINGE (ML) INJECTION EVERY 12 HOURS SCHEDULED
Status: DISCONTINUED | OUTPATIENT
Start: 2021-08-23 | End: 2021-08-23

## 2021-08-23 RX ORDER — MIDAZOLAM HYDROCHLORIDE 1 MG/ML
INJECTION INTRAMUSCULAR; INTRAVENOUS
Status: COMPLETED
Start: 2021-08-23 | End: 2021-08-23

## 2021-08-23 RX ORDER — ETOMIDATE 2 MG/ML
20 INJECTION INTRAVENOUS ONCE
Status: COMPLETED | OUTPATIENT
Start: 2021-08-23 | End: 2021-08-23

## 2021-08-23 RX ORDER — VECURONIUM BROMIDE 1 MG/ML
10 INJECTION, POWDER, LYOPHILIZED, FOR SOLUTION INTRAVENOUS ONCE
Status: COMPLETED | OUTPATIENT
Start: 2021-08-23 | End: 2021-08-23

## 2021-08-23 RX ORDER — SODIUM CHLORIDE 9 MG/ML
25 INJECTION, SOLUTION INTRAVENOUS PRN
Status: DISCONTINUED | OUTPATIENT
Start: 2021-08-23 | End: 2021-09-09

## 2021-08-23 RX ORDER — HEPARIN SODIUM (PORCINE) LOCK FLUSH IV SOLN 100 UNIT/ML 100 UNIT/ML
3 SOLUTION INTRAVENOUS PRN
Status: DISCONTINUED | OUTPATIENT
Start: 2021-08-23 | End: 2021-08-23

## 2021-08-23 RX ORDER — PHENYTOIN 125 MG/5ML
300 SUSPENSION ORAL NIGHTLY
Status: DISCONTINUED | OUTPATIENT
Start: 2021-08-23 | End: 2021-08-23

## 2021-08-23 RX ORDER — HEPARIN SODIUM (PORCINE) LOCK FLUSH IV SOLN 100 UNIT/ML 100 UNIT/ML
3 SOLUTION INTRAVENOUS EVERY 12 HOURS SCHEDULED
Status: DISCONTINUED | OUTPATIENT
Start: 2021-08-23 | End: 2021-08-23

## 2021-08-23 RX ORDER — PHENYTOIN 125 MG/5ML
200 SUSPENSION ORAL DAILY
Status: DISCONTINUED | OUTPATIENT
Start: 2021-08-24 | End: 2021-08-23

## 2021-08-23 RX ORDER — ALBUTEROL SULFATE 2.5 MG/3ML
2.5 SOLUTION RESPIRATORY (INHALATION) EVERY 6 HOURS PRN
Status: DISCONTINUED | OUTPATIENT
Start: 2021-08-23 | End: 2021-09-20 | Stop reason: HOSPADM

## 2021-08-23 RX ORDER — PROPOFOL 10 MG/ML
5-50 INJECTION, EMULSION INTRAVENOUS
Status: DISCONTINUED | OUTPATIENT
Start: 2021-08-23 | End: 2021-08-23

## 2021-08-23 RX ORDER — IPRATROPIUM BROMIDE AND ALBUTEROL SULFATE 2.5; .5 MG/3ML; MG/3ML
1 SOLUTION RESPIRATORY (INHALATION) EVERY 4 HOURS
Status: DISCONTINUED | OUTPATIENT
Start: 2021-08-23 | End: 2021-09-20 | Stop reason: HOSPADM

## 2021-08-23 RX ORDER — MIDAZOLAM HYDROCHLORIDE 2 MG/2ML
4 INJECTION, SOLUTION INTRAMUSCULAR; INTRAVENOUS ONCE
Status: COMPLETED | OUTPATIENT
Start: 2021-08-23 | End: 2021-08-23

## 2021-08-23 RX ORDER — PROPOFOL 10 MG/ML
INJECTION, EMULSION INTRAVENOUS
Status: DISCONTINUED
Start: 2021-08-23 | End: 2021-08-23

## 2021-08-23 RX ORDER — SODIUM CHLORIDE 0.9 % (FLUSH) 0.9 %
5-40 SYRINGE (ML) INJECTION PRN
Status: DISCONTINUED | OUTPATIENT
Start: 2021-08-23 | End: 2021-09-09

## 2021-08-23 RX ADMIN — SODIUM BICARBONATE 50 MEQ: 84 INJECTION, SOLUTION INTRAVENOUS at 13:27

## 2021-08-23 RX ADMIN — Medication 10 MG/HR: at 18:34

## 2021-08-23 RX ADMIN — MIDAZOLAM: 1 INJECTION INTRAMUSCULAR; INTRAVENOUS at 11:26

## 2021-08-23 RX ADMIN — INSULIN LISPRO 1 UNITS: 100 INJECTION, SOLUTION INTRAVENOUS; SUBCUTANEOUS at 08:00

## 2021-08-23 RX ADMIN — Medication 200 MCG/HR: at 23:15

## 2021-08-23 RX ADMIN — IPRATROPIUM BROMIDE AND ALBUTEROL SULFATE 1 AMPULE: .5; 3 SOLUTION RESPIRATORY (INHALATION) at 15:54

## 2021-08-23 RX ADMIN — DEXAMETHASONE SODIUM PHOSPHATE 6 MG: 4 INJECTION, SOLUTION INTRAMUSCULAR; INTRAVENOUS at 18:21

## 2021-08-23 RX ADMIN — Medication 10 ML: at 09:00

## 2021-08-23 RX ADMIN — Medication 200 MCG/HR: at 17:28

## 2021-08-23 RX ADMIN — SODIUM CHLORIDE, PRESERVATIVE FREE 10 ML: 5 INJECTION INTRAVENOUS at 09:00

## 2021-08-23 RX ADMIN — DEXAMETHASONE SODIUM PHOSPHATE 6 MG: 4 INJECTION, SOLUTION INTRAMUSCULAR; INTRAVENOUS at 06:00

## 2021-08-23 RX ADMIN — POTASSIUM PHOSPHATE, MONOBASIC AND POTASSIUM PHOSPHATE, DIBASIC 30 MMOL: 224; 236 INJECTION, SOLUTION, CONCENTRATE INTRAVENOUS at 12:00

## 2021-08-23 RX ADMIN — ENOXAPARIN SODIUM 70 MG: 80 INJECTION SUBCUTANEOUS at 09:00

## 2021-08-23 RX ADMIN — VECURONIUM BROMIDE 0.5 MCG/KG/MIN: 1 INJECTION, POWDER, LYOPHILIZED, FOR SOLUTION INTRAVENOUS at 12:46

## 2021-08-23 RX ADMIN — ATORVASTATIN CALCIUM 40 MG: 40 TABLET, FILM COATED ORAL at 20:28

## 2021-08-23 RX ADMIN — PHENYTOIN SODIUM 300 MG: 50 INJECTION INTRAMUSCULAR; INTRAVENOUS at 20:52

## 2021-08-23 RX ADMIN — ENOXAPARIN SODIUM 70 MG: 80 INJECTION SUBCUTANEOUS at 20:28

## 2021-08-23 RX ADMIN — PANTOPRAZOLE SODIUM 40 MG: 40 INJECTION, POWDER, FOR SOLUTION INTRAVENOUS at 09:00

## 2021-08-23 RX ADMIN — ETOMIDATE 20 MG: 2 INJECTION, SOLUTION INTRAVENOUS at 11:26

## 2021-08-23 RX ADMIN — Medication 50 MEQ: at 13:27

## 2021-08-23 RX ADMIN — MIDAZOLAM HYDROCHLORIDE 4 MG: 1 INJECTION, SOLUTION INTRAMUSCULAR; INTRAVENOUS at 11:25

## 2021-08-23 RX ADMIN — Medication 2 MG/HR: at 11:34

## 2021-08-23 RX ADMIN — ROCURONIUM BROMIDE 130 MG: 50 INJECTION, SOLUTION INTRAVENOUS at 11:26

## 2021-08-23 RX ADMIN — INSULIN LISPRO 1 UNITS: 100 INJECTION, SOLUTION INTRAVENOUS; SUBCUTANEOUS at 12:30

## 2021-08-23 RX ADMIN — PHENYTOIN SODIUM 200 MG: 100 CAPSULE ORAL at 05:59

## 2021-08-23 RX ADMIN — Medication 100 MCG/HR: at 11:34

## 2021-08-23 RX ADMIN — SODIUM CHLORIDE: 9 INJECTION, SOLUTION INTRAVENOUS at 20:51

## 2021-08-23 RX ADMIN — WATER: 1 INJECTION INTRAMUSCULAR; INTRAVENOUS; SUBCUTANEOUS at 16:15

## 2021-08-23 RX ADMIN — IPRATROPIUM BROMIDE AND ALBUTEROL SULFATE 1 AMPULE: .5; 3 SOLUTION RESPIRATORY (INHALATION) at 12:54

## 2021-08-23 RX ADMIN — IPRATROPIUM BROMIDE AND ALBUTEROL SULFATE 1 AMPULE: .5; 3 SOLUTION RESPIRATORY (INHALATION) at 20:36

## 2021-08-23 RX ADMIN — VECURONIUM BROMIDE 1 MCG/KG/MIN: 1 INJECTION, POWDER, LYOPHILIZED, FOR SOLUTION INTRAVENOUS at 23:13

## 2021-08-23 RX ADMIN — Medication 10 ML: at 20:29

## 2021-08-23 RX ADMIN — VECURONIUM BROMIDE 10 MG: 1 INJECTION, POWDER, LYOPHILIZED, FOR SOLUTION INTRAVENOUS at 16:15

## 2021-08-23 RX ADMIN — INSULIN LISPRO 1 UNITS: 100 INJECTION, SOLUTION INTRAVENOUS; SUBCUTANEOUS at 18:25

## 2021-08-23 RX ADMIN — CETIRIZINE HYDROCHLORIDE 5 MG: 10 TABLET, FILM COATED ORAL at 20:28

## 2021-08-23 RX ADMIN — VECURONIUM BROMIDE 10 MG: 10 INJECTION, POWDER, LYOPHILIZED, FOR SOLUTION INTRAVENOUS at 16:15

## 2021-08-23 RX ADMIN — REMDESIVIR 100 MG: 100 INJECTION, POWDER, LYOPHILIZED, FOR SOLUTION INTRAVENOUS at 20:48

## 2021-08-23 ASSESSMENT — PULMONARY FUNCTION TESTS
PIF_VALUE: 39
PIF_VALUE: 37
PIF_VALUE: 39
PIF_VALUE: 37
PIF_VALUE: 39
PIF_VALUE: 37
PIF_VALUE: 39
PIF_VALUE: 37
PIF_VALUE: 39
PIF_VALUE: 37
PIF_VALUE: 39

## 2021-08-23 ASSESSMENT — PAIN SCALES - GENERAL
PAINLEVEL_OUTOF10: 0

## 2021-08-23 NOTE — PROGRESS NOTES
Verbal order to retract ET tube 1 cm per resident. Currently reading 27 at the lip, retracted to 26. Tube resecured. Performed byAlma.  Dinora RRT-ACCS

## 2021-08-23 NOTE — CONSULTS
8/23/2021   4:28 PM      Comprehensive Nutrition Assessment    Type and Reason for Visit:  Initial    Nutrition Recommendations/Plan: Continue current TF, as tolerated    TF ORDERED: Diabetic TF to goal rate 60 ml/hr and Protein Modular once daily and 30 ml water flush  This will provide: 1440 ml/d, 2260 angel, 145 g pro, 1273 ml total free water    Nutrition Assessment:  Pt admit 2/2 Covid and intubated/sedated and paralyzed. Recent diagnosis DM. Will order Diabetic TF to meet needs and promote BGL, as pt on steroid. Continue to monitor tolerance    Malnutrition Assessment:  Malnutrition Status: At risk of Malnutrition   Context:     Acute illness   Findings of the 6 clinical characteristics of malnutrition:  Energy Intake:   Mild decrease in intake  Weight Loss:      Unable to assess  Body Fat Loss:      Unable to assess (Covid Isolation)  Muscle Mass Loss:     Unable to assess (Covid Isolation)  Fluid Accumulation:     No significant fluid accumulation (multiple factors)   Strength:   Not performed    Estimated Daily Nutrient Needs:  Energy (kcal):   (PSE x 80% wk 1 critical care); Weight Used for Energy Requirements:  Current     Protein (g):  125-160 (1.5-1.8 g Pro/kg);  Weight Used for Protein Requirements:  Ideal        Fluid (ml/day):  Per Critical Care management; Method Used for Fluid Requirements:  Other (Comment)      Nutrition Related Findings:  intubated/sedated/paralyzed, NGT, distended abd +BS, +I/O 1L      Wounds:  None       Current Nutrition Therapies:    ADULT TUBE FEEDING; Nasogastric; Diabetic; Continuous; 20; Yes; 20; Q 6 hours; 60; 30; Q 4 hours; Protein; Proteinex 2 go once daily    Anthropometric Measures:  · Height: 6' (182.9 cm)  · Current Body Weight: 302 lb (137 kg) (8/21 [facility])    · Usual Body Weight:  (No recent actual wt hx available)     · Ideal Body Weight: 178 lbs; % Ideal Body Weight 169.7 %   · BMI: 40.9  · BMI Categories: Obese Class 3 (BMI 40.0 or greater) Nutrition Diagnosis:   · Inadequate oral intake related to impaired respiratory function as evidenced by NPO or clear liquid status due to medical condition, intubation, nutrition support - enteral nutrition      Nutrition Interventions:   Food and/or Nutrient Delivery:  Continue NPO, Continue Current Tube Feeding  Nutrition Education/Counseling:  No recommendation at this time   Coordination of Nutrition Care:  Continue to monitor while inpatient    Goals:  Pt tolerate EN at goal rate       Nutrition Monitoring and Evaluation:   Behavioral-Environmental Outcomes:  None Identified   Food/Nutrient Intake Outcomes:  Enteral Nutrition Intake/Tolerance  Physical Signs/Symptoms Outcomes:  Biochemical Data, Fluid Status or Edema, Hemodynamic Status, Nutrition Focused Physical Findings, Nausea or Vomiting, GI Status     Discharge Planning:     Too soon to determine     Electronically signed by Castillo Piper RD, CNSC, LD on 8/23/21 at 4:28 PM EDT    Contact: [phone]

## 2021-08-23 NOTE — PROGRESS NOTES
Dr. Earle Tejeda at bedside discussed intubation with pt. Got the pt's wife on the phone and discussed with her as well. All parties verbalized understanding. Wife and pt. Were able to talk for a moment. 11:26 pt. Medicated with versed, rocuronium and etomidate, pt. Intubated with glidescope with 8.0 ETT at 27 cm at the lip. And NG was placed into the left nare at the 65cm moni. Right radial art line placed and Right IJ triple lumen was placed. Pt. Remains sedated and paralyzed.

## 2021-08-23 NOTE — PLAN OF CARE
Problem: Airway Clearance - Ineffective  Goal: Achieve or maintain patent airway  Outcome: Met This Shift     Problem: Gas Exchange - Impaired  Goal: Absence of hypoxia  Outcome: Ongoing  Goal: Promote optimal lung function  Outcome: Met This Shift     Problem: Breathing Pattern - Ineffective  Goal: Ability to achieve and maintain a regular respiratory rate  Outcome: Not Met This Shift     Problem:  Body Temperature -  Risk of, Imbalanced  Goal: Ability to maintain a body temperature within defined limits  Outcome: Met This Shift  Goal: Will regain or maintain usual level of consciousness  Outcome: Met This Shift  Goal: Complications related to the disease process, condition or treatment will be avoided or minimized  Outcome: Met This Shift     Problem: Isolation Precautions - Risk of Spread of Infection  Goal: Prevent transmission of infection  Outcome: Met This Shift     Problem: Nutrition Deficits  Goal: Optimize nutritional status  Outcome: Met This Shift     Problem: Risk for Fluid Volume Deficit  Goal: Maintain normal heart rhythm  Outcome: Met This Shift  Goal: Maintain absence of muscle cramping  Outcome: Met This Shift  Goal: Maintain normal serum potassium, sodium, calcium, phosphorus, and pH  Outcome: Met This Shift     Problem: Loneliness or Risk for Loneliness  Goal: Demonstrate positive use of time alone when socialization is not possible  Outcome: Met This Shift     Problem: Fatigue  Goal: Verbalize increase energy and improved vitality  Outcome: Met This Shift     Problem: Patient Education: Go to Patient Education Activity  Goal: Patient/Family Education  Outcome: Met This Shift     Problem: Skin Integrity:  Goal: Will show no infection signs and symptoms  Description: Will show no infection signs and symptoms  Outcome: Met This Shift  Goal: Absence of new skin breakdown  Description: Absence of new skin breakdown  Outcome: Met This Shift

## 2021-08-23 NOTE — PROGRESS NOTES
Date: 8/23/2021    Time: 8:33 AM    Patient Placed On BIPAP/CPAP/ Non-Invasive Ventilation? No    If no must comment. Facial area red/color change? No           If YES are Blister/Lesion present? No   If yes must notify nursing staff  BIPAP/CPAP skin barrier? Yes    Skin barrier type:mepilexlite     Comments: pt remains on from previous shift    Josefina Villalba

## 2021-08-23 NOTE — PROGRESS NOTES
[facility] Progress Note    Admitting Date and Time: 8/21/2021  9:35 AM  Admit Dx: Acute hypoxemic respiratory failure due to COVID-19 (HCC) [U07.1, J96.01]  Pneumonia due to COVID-19 virus [U07.1, J12.82]    Subjective:  Patient is being followed for Acute hypoxemic respiratory failure due to COVID-19 ([address] [U07.1, J96.01]  Pneumonia due to COVID-19 virus [U07.1, J12.82]     Pt with increased respiratory rate. On NIV. RR in the 40s. Feeling slightly better. Aware of need for ET to improve RR and avoid fatigue    O2 sat:  100%  on AVAPS    Fever:  98.8    Per RN: RR in the 50s with low sats. Possibly ET for treatment and avoidance of respiratory fatigue. ROS: denies cp, n/v, HA unless stated above.       lidocaine PF  5 mL Intradermal Once    sodium chloride flush  5-40 mL Intravenous 2 times per day    heparin flush  3 mL Intravenous 2 times per day    sodium chloride flush  10 mL Intravenous BID    enoxaparin  0.5 mg/kg Subcutaneous BID    amLODIPine  10 mg Oral Daily    atorvastatin  40 mg Oral Daily    phenytoin  200 mg Oral QAM AC    doxazosin  8 mg Oral Daily    phenytoin  300 mg Oral Nightly    ascorbic acid  500 mg Oral Daily    Vitamin D  2,000 Units Oral Daily    zinc sulfate  50 mg Oral Daily    insulin lispro  0-6 Units Subcutaneous TID WC    insulin lispro  0-3 Units Subcutaneous Nightly    insulin glargine  10 Units Subcutaneous Nightly    tiotropium  18 mcg Inhalation Daily    pantoprazole  40 mg Intravenous Daily    remdesivir IVPB  100 mg Intravenous Q24H    dexamethasone  6 mg Intravenous Q12H    pseudoephedrine  120 mg Oral BID    And    cetirizine  5 mg Oral BID     sodium chloride flush, 5-40 mL, PRN  sodium chloride, 25 mL, PRN  heparin flush, 3 mL, PRN  sodium chloride flush, 10 mL, PRN  benzonatate, 200 mg, TID PRN  glucose, 15 g, PRN  dextrose, 12.5 g, PRN  glucagon (rDNA), 1 mg, PRN  dextrose, 100 mL/hr, PRN  albuterol sulfate HFA, 2 puff, Q6H PRN  sodium chloride, 30 mL, PRN  sodium chloride, 30 mL, PRN    Objective:    BP (!) 157/88   Pulse 104   Temp 98.8 °F (37.1 °C) (Bladder)   Resp (!) 52   Ht 6' (1.829 m)   Wt (!) 302 lb (137 kg)   SpO2 (!) 89%   BMI 40.96 kg/m²     General Appearance: alert and oriented to person, place and time and in  acute respiratory distress. Retractions and Tachypnea noted  Skin: warm and dry  Head: normocephalic and atraumatic  Eyes: pupils equal, round, and reactive to light, extraocular eye movements intact, conjunctivae normal  Neck: neck supple and non tender without mass   Pulmonary/Chest: crackles bilaterally- no wheezes, acute respiratory distress noted  Cardiovascular: normal rate, normal S1 and S2 and no carotid bruits  Abdomen: soft, non-tender, non-distended, normal bowel sounds, no masses or organomegaly  Extremities: no cyanosis, no clubbing and no edema  Neurologic: no cranial nerve deficit and speech normal    Recent Labs     08/21/21  1014 08/22/21  0520    141   K 3.7 3.9   CL 98 109*   CO2 17* 24   BUN 45* 26*   CREATININE 1.3* 1.0   GLUCOSE 231* 169*   CALCIUM 8.4* 7.9*       Recent Labs     08/21/21  1014 08/22/21  0520   WBC 11.5 6.8   RBC 4.94 4.56   HGB 14.6 13.5   HCT 42.2 40.1   MCV 85.4 87.9   MCH 29.6 29.6   MCHC 34.6* 33.7   RDW 12.9 13.2    144   MPV 10.0 9.6       Radiology:   XR CHEST PORTABLE    Result Date: 8/23/2021  EXAMINATION: ONE XRAY VIEW OF THE CHEST 8/23/2021 7:51 am COMPARISON: Previous CT of the thorax of 08/21/2021 and previous chest x-ray of 08/22/2021 HISTORY: ORDERING SYSTEM PROVIDED HISTORY: increasing SOB TECHNOLOGIST PROVIDED HISTORY: Reason for exam:->increasing SOB FINDINGS: There is significant motion artifact throughout the lungs. Significant multifocal bilateral airspace disease is noted unchanged compared to prior study. There is no right or left gross pleural effusion. The heart is enlarged.      1. There is no interval change in the significant multifocal bilateral airspace disease. XR CHEST PORTABLE    Result Date: 8/22/2021  EXAMINATION: ONE XRAY VIEW OF THE CHEST 8/22/2021 12:00 pm COMPARISON: 08/21/2021 HISTORY: ORDERING SYSTEM PROVIDED HISTORY: Acute resp failure, COVID TECHNOLOGIST PROVIDED HISTORY: Reason for exam:->Acute resp failure, COVID FINDINGS: Bilateral airspace opacities with improved aeration of the right lung compared to prior study. There is no effusion or pneumothorax. The cardiomediastinal silhouette is without acute process. The osseous structures are without acute process. Improved aeration of the right lung compared to prior study. XR CHEST PORTABLE    Result Date: 8/21/2021  EXAMINATION: ONE XRAY VIEW OF THE CHEST 8/21/2021 9:56 am COMPARISON: August 17, 2021 HISTORY: ORDERING SYSTEM PROVIDED HISTORY: pneumonia TECHNOLOGIST PROVIDED HISTORY: Reason for exam:->pneumonia FINDINGS: Compared with most recent examination, there has been a significant interval increase in the degree of patchy bilateral airspace opacities, in keeping with the patient's known diagnosis of COVID pneumonia. No evidence of a sizable pleural effusion. No pneumothorax is identified. Heart size is unable to be accurately assessed on this single portable view of the chest, but appears to be stable. No acute osseous abnormality. Significantly increased airspace opacities throughout both lungs, in keeping with multifocal pneumonia. CTA PULMONARY W CONTRAST    Result Date: 8/21/2021  EXAMINATION: CTA OF THE CHEST, 8/21/2021 12:11 pm TECHNIQUE: CTA of the chest was performed after the administration of intravenous contrast.  Multiplanar reformatted images are provided for review. MIP images are provided for review. Dose modulation, iterative reconstruction, and/or weight based adjustment of the mA/kV was utilized to reduce the radiation dose to as low as reasonably achievable.  COMPARISON: None HISTORY: ORDERING SYSTEM PROVIDED HISTORY:  PE vs COVID TECHNOLOGIST PROVIDED HISTORY: Reason for Exam:  PE vs COVID Decision Support Exception - unselect if not a suspected or confirmed emergency medical condition->Emergency Medical Condition (MA) FINDINGS: Pulmonary Arteries: There is a combination of poor contrast bolus timing as well as significant respiratory motion artifact that severely degrades the examination. Unfortunately, multiple segmental and subsegmental pulmonary artery branches are not opacified well enough to exclude a pulmonary embolism. There is no evidence of a central pulmonary embolism or convincing evidence of right heart strain. Mediastinum: There is at least one enlarged mediastinal lymph node measuring 17 mm in short axis in the subcarinal distribution. Several additional smaller but nonenlarged mediastinal lymph nodes are present, likely reactive. The thoracic esophagus is decompressed, limiting assessment. No obvious esophageal abnormality. The heart size is top normal.  Trace pericardial fluid is present. Lungs/pleura: The central airways are patent. There is no evidence of a pleural effusion or pneumothorax. Extensive ground-glass and consolidative airspace opacities are present bilaterally, in keeping with the provided history of COVID pneumonia. Upper Abdomen: Imaged portions of the upper abdomen demonstrates diffuse hepatic steatosis. There are also multiple nonobstructing bilateral renal calculi, partially imaged. Soft Tissues/Bones: No acute bone or soft tissue abnormality. Extensive ground-glass and consolidative airspace opacities throughout both lungs, in keeping with the provided history of COVID pneumonia. The examination is nondiagnostic for detection of pulmonary emboli in multiple segmental and subsegmental pulmonary artery distributions, secondary to poor contrast bolus timing and respiratory motion artifact. There is no evidence of a central pulmonary embolism. Hepatic steatosis.  Enlarged mediastinal lymph nodes, likely reactive. Assessment:    Principal Problem:    Acute hypoxemic respiratory failure due to COVID-19 [facility])  Active Problems:    Type 2 diabetes mellitus without complication, without long-term current use of insulin (HCC)    Seizure ([facility] [address]    Hyperlipidemia     Resolved Problems:    OSCAR (acute kidney injury) ([facility]    Lactic acidosis    Plan:  1. Acute hypoxic respiratory failure, most likely due to viral pneumonia, O2 sat was below 90% on room air, requiring  AVAPS with oxygen saturation below 90%. Treating the underlying process. ET most likely  2. Acute viral pneumonia, rapid influenza a and B were negative, respiratory panel was not done, procalcitonin was  0.51, CAT scan of the chest showed bilateral patchy groundglass opacities, sent for COVID-19, currently still pending. 3.  T2DM - Glucose POCT. Continue basal and SS insulin. 4.  Hyperlipidemia - continue statins. 5.  HTN - continue meds. 6.  Elevated D-dimer - continue Lovenox bid. NOTE: This report was transcribed using voice recognition software. Every effort was made to ensure accuracy; however, inadvertent computerized transcription errors may be present.     Electronically signed by Allie Mancera MD on 8/23/2021 at 9:04 AM

## 2021-08-23 NOTE — PROGRESS NOTES
Date: 8/22/2021    Time: 10:25 PM    Patient Placed On BIPAP/CPAP/ Non-Invasive Ventilation? No    If no must comment. Facial area red/color change? No           If YES are Blister/Lesion present? No   If yes must notify nursing staff  BIPAP/CPAP skin barrier? Yes    Skin barrier type:mepilexlite       Comments: Pt remains on AVAPS at this time, FiO2 remains at 100% (sat 89-90). Mepilex in place.         Vita Reyes PEBBLES      08/22/21 4702   NIV Type   Mode AVAPS   Mask Type Full face mask   Mask Size Medium   Settings/Measurements   CPAP/EPAP 10 cmH2O   IPAP Min 20 cmH2O   IPAP Max 30 cmH2O   Rate Ordered 18   Resp (!) 42   FiO2  100 %   Vt Exhaled 862 mL   Minute Volume 36.3 Liters   Mask Leak (lpm) 81 lpm   Comfort Level Good   Using Accessory Muscles No   SpO2 90

## 2021-08-23 NOTE — PROGRESS NOTES
[address] [place] [address] [facility]  Progress Note      Chief Complaint   Patient presents with    Shortness of Breath     covid, per family SaO2 75% RA, hx of DM and panic attack        SUBJECTIVE:  8/22/2021  Patient is tolerating medications. No reported adverse drug reactions. No nausea, vomiting, diarrhea. Afebrile BiPAP 100% FiO2 with breathing at 44 times a minute and probably is going to decompensate  8/23/2021  Not doing well intubated on a rotating bed  FiO2 70% and PEEP of 16        Review of systems:  As stated above in the chief complaint, otherwise negative.     Medications:  Scheduled Meds:   insulin lispro  0-6 Units Subcutaneous Q6H    ipratropium-albuterol  1 ampule Inhalation Q4H    [START ON 8/24/2021] phenytoin  200 mg Intravenous QAM AC    phenytoin  300 mg Intravenous Nightly    sodium chloride flush  10 mL Intravenous BID    enoxaparin  0.5 mg/kg Subcutaneous BID    amLODIPine  10 mg Oral Daily    atorvastatin  40 mg Oral Daily    doxazosin  8 mg Oral Daily    ascorbic acid  500 mg Oral Daily    Vitamin D  2,000 Units Oral Daily    zinc sulfate  50 mg Oral Daily    insulin glargine  10 Units Subcutaneous Nightly    pantoprazole  40 mg Intravenous Daily    remdesivir IVPB  100 mg Intravenous Q24H    dexamethasone  6 mg Intravenous Q12H    cetirizine  5 mg Oral BID     Continuous Infusions:   sodium chloride      fentaNYL 5 mcg/ml in 0.9%  ml infusion 200 mcg/hr (08/23/21 1728)    midazolam 10 mg/hr (08/23/21 1244)    vecuronium (NORCURON) infusion 0.5 mcg/kg/min (08/23/21 1246)    dextrose      sodium chloride 75 mL/hr at 08/23/21 1804     PRN Meds:sodium chloride flush, sodium chloride, albuterol, benzonatate, glucose, dextrose, glucagon (rDNA), dextrose, sodium chloride    OBJECTIVE:  BP (!) 154/89   Pulse 82   Temp 97.2 °F (36.2 °C) (Bladder)   Resp 23   Ht 6' (1.829 m)   Wt (!) 302 lb (137 kg)   SpO2 100%   BMI 40.96 kg/m²   Temp  Avg: (H) 08/22/2021    SEDRATE 32 (H) 08/21/2021     Radiology:  CAT scan diffuse groundglass infiltrates    Microbiology:   Lab Results   Component Value Date    BC 24 Hours no growth 08/21/2021     Lab Results   Component Value Date    BLOODCULT2 24 Hours no growth 08/21/2021     No results found for: WNDABS  No results found for: RESPSMEAR  No results found for: MPNEUMO, CLAMYDCU, LABLEGI, AFBCX, FUNGSM, LABFUNG  No results found for: CULTRESP  No results found for: CXCATHTIP  No results found for: BFCS  No results found for: CXSURG  No results found for: LABURIN  No results found for: [facility]    ASSESSMENT:  · Severe Covid pneumonia   · cytokine storm  · Poor prognosis    PLAN:  · Continue remdesivir; steroids;  Tosi x1 completed  · Check final cultures  · Monitor labs  · Discussed with critical care    Wandy Starks MD  6:31 PM  8/23/2021

## 2021-08-23 NOTE — PATIENT CARE CONFERENCE
Intensive Care Daily Quality Rounding Checklist        ICU Team Members:  Dr. Teto Hu, Shila  [address] [facility] (residents), clinical pharmacist, charge nurse, bedside nurse    ICU Day #: 3     Intubation Date: N/A     Ventilator Day #: N/A     Central Line Insertion Date: N/A                                                    Day #: N/A      Arterial Line Insertion Date: N/A                             Day #: N/A     Temporary Hemodialysis Catheter Insertion Date: N/A                             Day # N/A     DVT Prophylaxis: Lovenox    GI Prophylaxis: Protonix     Busch Catheter Insertion Date: 08/21/2021                                        Day #: 3       Continued need (if yes, reason documented and discussed with physician): critical care patient, strict I+O     Skin Issues/ Wounds and ordered treatment discussed on rounds: None     Goals/ Plans for the Day: Daily labs, wean O2 as able, will intubate and place lines today with +/- prone and paralyze

## 2021-08-23 NOTE — CARE COORDINATION
Social Work discharge planning   Pt intubated and sedated. Can not complete ACP at this time.    Electronically signed by Natalya Neri on 8/23/2021 at 3:56 PM

## 2021-08-23 NOTE — CARE COORDINATION
COVID+ 8/18. [facility] ICU. Vent support, paralytic. Iv decadron. Referral given to [facility].  Will follow-adairo

## 2021-08-23 NOTE — PROGRESS NOTES
Critical Care Team - Daily Progress Note      Date and time: 8/23/2021 8:47 AM  Patient's name:  Graciela Lesch  Medical Record Number: [id number]  Patient's account/billing number: [de-identified]  Patient's YOB: 1965  Age: 60 y.o. Date of Admission: 8/21/2021  9:35 AM  Length of stay during current admission: 2      Primary Care Physician: Salena Chamberlain DO  ICU Attending Physician: Dr. Lenwood Merlin    Code Status: No Order    Reason for ICU admission: Acute hypoxic respiratory failure secondary to COVID-19      SUBJECTIVE:     OVERNIGHT EVENTS:       Overnight patient remained on the BiPAP. Pulse ox hovering around 88 to 90%. Patient still very tachypneic. This morning nurse asked me to get labs on patient as his IVs were not drawn back.     AWAKE & FOLLOWING COMMANDS:  [] No   [x] Yes    CURRENT VENTILATION STATUS:     [] Ventilator  [x] BIPAP  [] Nasal Cannula [] Room Air      IF INTUBATED, ET TUBE MARKING AT LOWER LIP:       cms    SECRETIONS Amount:  [] Small [] Moderate  [] Large  [x] None  Color:     [] White [] Colored  [] Bloody    SEDATION:  RAAS Score:  [] Propofol gtt  [] Versed gtt  [] Ativan gtt   [x] No Sedation    PARALYZED:  [x] No    [] Yes    DIARRHEA:                [x] No                [] Yes  (C. Difficile status: [] positive                                                                                                                       [] negative                                                                                                                     [] pending)    VASOPRESSORS:  [x] No    [] Yes    If yes -   [] Levophed       [] Dopamine     [] Vasopressin       [] Dobutamine  [] Phenylephrine         [] Epinephrine    CENTRAL LINES:     [x] No   [] Yes         If yes -     [] Right IJ     [] Left IJ [] Right Femoral [] Left Femoral                   [] Right Subclavian [] Left Subclavian       ANDERSON'S CATHETER:   [x] No   [] Yes          OBJECTIVE: VITAL SIGNS:  BP (!) 157/88   Pulse 104   Temp 98.8 °F (37.1 °C) (Bladder)   Resp (!) 52   Ht 6' (1.829 m)   Wt (!) 302 lb (137 kg)   SpO2 (!) 89%   BMI 40.96 kg/m²   Tmax over 24 hours:  Temp (24hrs), Av.7 °F (37.1 °C), Min:98.4 °F (36.9 °C), Max:99 °F (37.2 °C)      Patient Vitals for the past 6 hrs:   BP Temp Temp src Pulse Resp SpO2   21 0831 -- -- -- -- (!) 52 --   21 0800 (!) 157/88 98.8 °F (37.1 °C) Bladder 104 (!) 57 (!) 89 %   21 0700 (!) 150/87 -- -- 89 (!) 38 93 %   21 0600 (!) 157/88 -- -- 97 (!) 45 (!) 85 %   21 0500 (!) 147/80 -- -- 83 (!) 41 93 %   21 0400 (!) 141/84 98.6 °F (37 °C) Bladder 85 (!) 42 91 %   21 0358 -- -- -- -- (!) 40 --   21 0300 (!) 148/85 -- -- 70 (!) 39 93 %         Intake/Output Summary (Last 24 hours) at 2021 0847  Last data filed at 2021 0600  Gross per 24 hour   Intake 2674 ml   Output 1850 ml   Net 824 ml     Wt Readings from Last 2 Encounters:   21 (!) 302 lb (137 kg)   21 (!) 320 lb (145.2 kg)     Body mass index is 40.96 kg/m². PHYSICAL EXAMINATION:  General appearance -patient is alert, on BiPAP   Mental status - alert, oriented to person, place, and time, normal mood, behavior, speech, dress, motor activity, and thought processes  Eyes - pupils equal and reactive, extraocular eye movements intact, sclera anicteric  Ears - external ears normal  Nose - normal and patent, no erythema, discharge or polyps  Mouth - mucous membranes moist, pharynx normal without lesions  Neck - supple, no significant adenopathy  Chest -tachypneic, diminished and coarse breath sounds  Heart - normal rate, regular rhythm, normal S1, S2, no murmurs, rubs, clicks or gallops  Abdomen - soft, nontender, nondistended, obese  Neurological - alert, oriented, normal speech, no focal findings or movement disorder noted  Extremities - peripheral pulses normal, no clubbing or cyanosis. No edema.   Skin - normal coloration and turgor, no rashes, no suspicious skin lesions noted    Any additional physical findings:       MEDICATIONS:    Scheduled Meds:   lidocaine PF  5 mL Intradermal Once    sodium chloride flush  5-40 mL Intravenous 2 times per day    heparin flush  3 mL Intravenous 2 times per day    sodium chloride flush  10 mL Intravenous BID    enoxaparin  0.5 mg/kg Subcutaneous BID    amLODIPine  10 mg Oral Daily    atorvastatin  40 mg Oral Daily    phenytoin  200 mg Oral QAM AC    doxazosin  8 mg Oral Daily    phenytoin  300 mg Oral Nightly    ascorbic acid  500 mg Oral Daily    Vitamin D  2,000 Units Oral Daily    zinc sulfate  50 mg Oral Daily    insulin lispro  0-6 Units Subcutaneous TID WC    insulin lispro  0-3 Units Subcutaneous Nightly    insulin glargine  10 Units Subcutaneous Nightly    tiotropium  18 mcg Inhalation Daily    pantoprazole  40 mg Intravenous Daily    remdesivir IVPB  100 mg Intravenous Q24H    dexamethasone  6 mg Intravenous Q12H    pseudoephedrine  120 mg Oral BID    And    cetirizine  5 mg Oral BID     Continuous Infusions:   sodium chloride      dextrose      sodium chloride 100 mL/hr at 08/21/21 2304     PRN Meds:   sodium chloride flush, 5-40 mL, PRN  sodium chloride, 25 mL, PRN  heparin flush, 3 mL, PRN  sodium chloride flush, 10 mL, PRN  benzonatate, 200 mg, TID PRN  glucose, 15 g, PRN  dextrose, 12.5 g, PRN  glucagon (rDNA), 1 mg, PRN  dextrose, 100 mL/hr, PRN  albuterol sulfate HFA, 2 puff, Q6H PRN  sodium chloride, 30 mL, PRN  sodium chloride, 30 mL, PRN          VENT SETTINGS (Comprehensive) (if applicable):  Vent Information  Skin Assessment: Clean, dry, & intact  Vt Ordered: 500 mL  FiO2 : 100 %  SpO2: (!) 89 %  SpO2/FiO2 ratio: 89  I Time/ I Time %: 0.9 s  Mask Type: Full face mask  Mask Size: Medium  Additional Respiratory  Assessments  Pulse: 104  Resp: (!) 52  SpO2: (!) 89 %    ABG  Lab Results   Component Value Date    PH 7.457 08/23/2021    PCO2 30.0 and great vessels. Multilevel degenerative disc disease. Multifocal bilateral patchy opacities are most pronounced in the mid and lower lung zones. Differential considerations include an infectious/inflammatory process including atypical or viral pneumonia and pulmonary edema. XR CHEST PORTABLE    Result Date: 8/23/2021  EXAMINATION: ONE XRAY VIEW OF THE CHEST 8/23/2021 7:51 am COMPARISON: Previous CT of the thorax of 08/21/2021 and previous chest x-ray of 08/22/2021 HISTORY: ORDERING SYSTEM PROVIDED HISTORY: increasing SOB TECHNOLOGIST PROVIDED HISTORY: Reason for exam:->increasing SOB FINDINGS: There is significant motion artifact throughout the lungs. Significant multifocal bilateral airspace disease is noted unchanged compared to prior study. There is no right or left gross pleural effusion. The heart is enlarged. 1. There is no interval change in the significant multifocal bilateral airspace disease. XR CHEST PORTABLE    Result Date: 8/22/2021  EXAMINATION: ONE XRAY VIEW OF THE CHEST 8/22/2021 12:00 pm COMPARISON: 08/21/2021 HISTORY: ORDERING SYSTEM PROVIDED HISTORY: Acute resp failure, COVID TECHNOLOGIST PROVIDED HISTORY: Reason for exam:->Acute resp failure, COVID FINDINGS: Bilateral airspace opacities with improved aeration of the right lung compared to prior study. There is no effusion or pneumothorax. The cardiomediastinal silhouette is without acute process. The osseous structures are without acute process. Improved aeration of the right lung compared to prior study.      XR CHEST PORTABLE    Result Date: 8/21/2021  EXAMINATION: ONE XRAY VIEW OF THE CHEST 8/21/2021 9:56 am COMPARISON: August 17, 2021 HISTORY: ORDERING SYSTEM PROVIDED HISTORY: pneumonia TECHNOLOGIST PROVIDED HISTORY: Reason for exam:->pneumonia FINDINGS: Compared with most recent examination, there has been a significant interval increase in the degree of patchy bilateral airspace opacities, in keeping with the patient's known diagnosis of COVID pneumonia. No evidence of a sizable pleural effusion. No pneumothorax is identified. Heart size is unable to be accurately assessed on this single portable view of the chest, but appears to be stable. No acute osseous abnormality. Significantly increased airspace opacities throughout both lungs, in keeping with multifocal pneumonia. CTA PULMONARY W CONTRAST    Result Date: 8/21/2021  EXAMINATION: CTA OF THE CHEST, 8/21/2021 12:11 pm TECHNIQUE: CTA of the chest was performed after the administration of intravenous contrast.  Multiplanar reformatted images are provided for review. MIP images are provided for review. Dose modulation, iterative reconstruction, and/or weight based adjustment of the mA/kV was utilized to reduce the radiation dose to as low as reasonably achievable. COMPARISON: None HISTORY: ORDERING SYSTEM PROVIDED HISTORY:  PE vs COVID TECHNOLOGIST PROVIDED HISTORY: Reason for Exam:  PE vs COVID Decision Support Exception - unselect if not a suspected or confirmed emergency medical condition->Emergency Medical Condition (MA) FINDINGS: Pulmonary Arteries: There is a combination of poor contrast bolus timing as well as significant respiratory motion artifact that severely degrades the examination. Unfortunately, multiple segmental and subsegmental pulmonary artery branches are not opacified well enough to exclude a pulmonary embolism. There is no evidence of a central pulmonary embolism or convincing evidence of right heart strain. Mediastinum: There is at least one enlarged mediastinal lymph node measuring 17 mm in short axis in the subcarinal distribution. Several additional smaller but nonenlarged mediastinal lymph nodes are present, likely reactive. The thoracic esophagus is decompressed, limiting assessment. No obvious esophageal abnormality. The heart size is top normal.  Trace pericardial fluid is present. Lungs/pleura:  The central airways are patent. There is no evidence of a pleural effusion or pneumothorax. Extensive ground-glass and consolidative airspace opacities are present bilaterally, in keeping with the provided history of COVID pneumonia. Upper Abdomen: Imaged portions of the upper abdomen demonstrates diffuse hepatic steatosis. There are also multiple nonobstructing bilateral renal calculi, partially imaged. Soft Tissues/Bones: No acute bone or soft tissue abnormality. Extensive ground-glass and consolidative airspace opacities throughout both lungs, in keeping with the provided history of COVID pneumonia. The examination is nondiagnostic for detection of pulmonary emboli in multiple segmental and subsegmental pulmonary artery distributions, secondary to poor contrast bolus timing and respiratory motion artifact. There is no evidence of a central pulmonary embolism. Hepatic steatosis. Enlarged mediastinal lymph nodes, likely reactive.         Chest Xray (8/23/2021):    SYSTEMS ASSESSMENT  Neuro   Alert and oriented no acute concerns     Seizures, history of  -Continue home phenytoin  -Telemetry monitoring     Respiratory   Acute hypoxic respiratory failure due to COVID-19  -Worsening P/F today is 0.57  -Will require intubation today  -Continue Decadron, tocilizumab, remdesivir day 3  -Patient will need to be proned today as well     Cardiovascular   Continue telemetry monitoring no acute concerns  Hyperlipidemia  -On statins     Gastrointestinal   GI prophylaxis  -Protonix     Renal   Lactic acidosis  -resolved     Infectious Disease   COVID-19  -Given Rocephin and doxy in the ED  -Continue Decadron, tocilizumab, remdesivir day 3  -Respiratory cultures pending  -Inflammatory markers  -ID following     Hematology/Oncology   DVT prophylaxis  -Lovenox, weight-based     Endocrine   Type 2 diabetes  -On Lantus and sliding scale  -Diabetic diet when able to tolerate being off AVAPS for long enough to eat, currently n.p.o.     Social/Spiritual/DNR/Other   Full code      PLAN:     WEAN PER PROTOCOL:  [] No   [x] Yes  [] N/A    DISCONTINUE ANY LABS:   [x] No   [] Yes    ICU PROPHYLAXIS:  Stress ulcer:  [x] PPI Agent  [] [id number] [] Sucralfate  [] Other:  VTE:   [x] Enoxaparin  [] Unfract. Heparin Subcut  [] EPC Cuffs    NUTRITION:  [] NPO [] Tube Feeding (Specify: ) [] TPN  [x] PO (Diet: No diet orders on file)    HOME MEDICATIONS RECONCILED: [] No  [x] Yes    INSULIN DRIP:   [x] No   [] Yes    CONSULTATION NEEDED:  [x] No   [] Yes    FAMILY UPDATED:    [x] No   [] Yes    TRANSFER OUT OF ICU:   [x] No   [] Yes      Chencho Valera DO, PGY2              8/23/2021, 8:47 AM      Addendum: This morning despite being on maximum NIV patient still hypoxic severely tachypneic with a respiratory rate in the 50s. I's personally talk to patient and also communicated with his wife via phone at the bedside. Patient is agreeable to proceed with intubation. He admits that he is tiring out. Proceeded with intubating patient please see the note followed by placing central line arterial line. Following intubation with rapid sequence proceeded with sedating and paralyzing patient. Had to place him on pressure control ventilation to improve his oxygenation. Also proceeded with prone positioning him. After proning patient's oxygenation significantly improved. Saturations 100%. I was able to wean down his FiO2 so far down to 60%. I personally saw, examined and provided care for the patient. Radiographs, labs and medication list were reviewed by me independently. I spoke with bedside nursing, therapists and consultants. Critical care services and times documented are independent of procedures and multidisciplinary rounds with Residents. Additionally comprehensive, multidisciplinary rounds were conducted with the MICU team. The case was discussed in detail and plans for care were established.  Review of Residents documentation was conducted and revisions were made as appropriate. I agree with the above documented exam, problem list and plan of care.   Alley Cornejo MD   CCT excluding procedures: 80'

## 2021-08-24 LAB
AADO2: 301 MMHG
AADO2: 445 MMHG
ALBUMIN SERPL-MCNC: 2.3 G/DL (ref 3.5–5.2)
ALP BLD-CCNC: 109 U/L (ref 40–129)
ALT SERPL-CCNC: 24 U/L (ref 0–40)
ANION GAP SERPL CALCULATED.3IONS-SCNC: 12 MMOL/L (ref 7–16)
AST SERPL-CCNC: 43 U/L (ref 0–39)
B.E.: -3 MMOL/L (ref -3–3)
B.E.: -3.3 MMOL/L (ref -3–3)
BACTERIA: ABNORMAL /HPF
BASOPHILS ABSOLUTE: 0.02 E9/L (ref 0–0.2)
BASOPHILS RELATIVE PERCENT: 0.2 % (ref 0–2)
BILIRUB SERPL-MCNC: 0.3 MG/DL (ref 0–1.2)
BILIRUBIN URINE: NEGATIVE
BLOOD, URINE: ABNORMAL
BUN BLDV-MCNC: 20 MG/DL (ref 6–20)
C-REACTIVE PROTEIN: 6.1 MG/DL (ref 0–0.4)
CALCIUM SERPL-MCNC: 7.6 MG/DL (ref 8.6–10.2)
CHLORIDE BLD-SCNC: 115 MMOL/L (ref 98–107)
CLARITY: ABNORMAL
CO2: 22 MMOL/L (ref 22–29)
COHB: 1.1 % (ref 0–1.5)
COHB: 1.5 % (ref 0–1.5)
COLOR: ABNORMAL
CREAT SERPL-MCNC: 0.8 MG/DL (ref 0.7–1.2)
CRITICAL: ABNORMAL
CRITICAL: ABNORMAL
CRYSTALS, UA: ABNORMAL /HPF
D DIMER: >5250 NG/ML DDU
DATE ANALYZED: ABNORMAL
DATE ANALYZED: ABNORMAL
DATE OF COLLECTION: ABNORMAL
DATE OF COLLECTION: ABNORMAL
EOSINOPHILS ABSOLUTE: 0.09 E9/L (ref 0.05–0.5)
EOSINOPHILS RELATIVE PERCENT: 0.8 % (ref 0–6)
FIO2: 100 %
FIO2: 60 %
GFR AFRICAN AMERICAN: >60
GFR NON-AFRICAN AMERICAN: >60 ML/MIN/1.73
GLUCOSE BLD-MCNC: 185 MG/DL (ref 74–99)
GLUCOSE URINE: NEGATIVE MG/DL
HCO3: 20.3 MMOL/L (ref 22–26)
HCO3: 22.3 MMOL/L (ref 22–26)
HCT VFR BLD CALC: 40.6 % (ref 37–54)
HEMOGLOBIN: 13.7 G/DL (ref 12.5–16.5)
HHB: 0.9 % (ref 0–5)
HHB: 4.7 % (ref 0–5)
IMMATURE GRANULOCYTES #: 0.16 E9/L
IMMATURE GRANULOCYTES %: 1.5 % (ref 0–5)
KETONES, URINE: NEGATIVE MG/DL
LAB: ABNORMAL
LAB: ABNORMAL
LEUKOCYTE ESTERASE, URINE: NEGATIVE
LYMPHOCYTES ABSOLUTE: 0.73 E9/L (ref 1.5–4)
LYMPHOCYTES RELATIVE PERCENT: 6.7 % (ref 20–42)
Lab: ABNORMAL
Lab: ABNORMAL
MAGNESIUM: 1.8 MG/DL (ref 1.6–2.6)
MCH RBC QN AUTO: 30.2 PG (ref 26–35)
MCHC RBC AUTO-ENTMCNC: 33.7 % (ref 32–34.5)
MCV RBC AUTO: 89.6 FL (ref 80–99.9)
METER GLUCOSE: 172 MG/DL (ref 74–99)
METER GLUCOSE: 188 MG/DL (ref 74–99)
METER GLUCOSE: 200 MG/DL (ref 74–99)
METER GLUCOSE: 202 MG/DL (ref 74–99)
METHB: 0.1 % (ref 0–1.5)
METHB: 0.3 % (ref 0–1.5)
MODE: ABNORMAL
MODE: ABNORMAL
MONOCYTES ABSOLUTE: 0.19 E9/L (ref 0.1–0.95)
MONOCYTES RELATIVE PERCENT: 1.7 % (ref 2–12)
NEUTROPHILS ABSOLUTE: 9.7 E9/L (ref 1.8–7.3)
NEUTROPHILS RELATIVE PERCENT: 89.1 % (ref 43–80)
NITRITE, URINE: NEGATIVE
O2 CONTENT: 18.7 ML/DL
O2 CONTENT: 19.9 ML/DL
O2 SATURATION: 95.2 % (ref 92–98.5)
O2 SATURATION: 99.1 % (ref 92–98.5)
O2HB: 93.7 % (ref 94–97)
O2HB: 97.7 % (ref 94–97)
OPERATOR ID: 316
OPERATOR ID: 316
PATIENT TEMP: 37 C
PATIENT TEMP: 37 C
PCO2: 31.5 MMHG (ref 35–45)
PCO2: 42.1 MMHG (ref 35–45)
PDW BLD-RTO: 13.9 FL (ref 11.5–15)
PEEP/CPAP: 10 CMH2O
PEEP/CPAP: 12 CMH2O
PFO2: 1.29 MMHG/%
PFO2: 2.01 MMHG/%
PH BLOOD GAS: 7.34 (ref 7.35–7.45)
PH BLOOD GAS: 7.43 (ref 7.35–7.45)
PH UA: 5.5 (ref 5–9)
PHOSPHORUS: 3 MG/DL (ref 2.5–4.5)
PLATELET # BLD: 114 E9/L (ref 130–450)
PMV BLD AUTO: 9.3 FL (ref 7–12)
PO2: 200.9 MMHG (ref 75–100)
PO2: 77.2 MMHG (ref 75–100)
POTASSIUM SERPL-SCNC: 4.3 MMOL/L (ref 3.5–5)
PROTEIN UA: NEGATIVE MG/DL
PS: 22 CMH20
PS: 22 CMH20
RBC # BLD: 4.53 E12/L (ref 3.8–5.8)
RBC UA: ABNORMAL /HPF (ref 0–2)
RI(T): 222 %
RI(T): 390 %
RR MECHANICAL: 24 B/MIN
RR MECHANICAL: 24 B/MIN
SEDIMENTATION RATE, ERYTHROCYTE: 0 MM/HR (ref 0–15)
SODIUM BLD-SCNC: 149 MMOL/L (ref 132–146)
SOURCE, BLOOD GAS: ABNORMAL
SOURCE, BLOOD GAS: ABNORMAL
SPECIFIC GRAVITY UA: 1.01 (ref 1–1.03)
THB: 14.2 G/DL (ref 11.5–16.5)
THB: 14.2 G/DL (ref 11.5–16.5)
TIME ANALYZED: 1227
TIME ANALYZED: 812
TOTAL PROTEIN: 5 G/DL (ref 6.4–8.3)
UROBILINOGEN, URINE: 0.2 E.U./DL
WBC # BLD: 10.9 E9/L (ref 4.5–11.5)
WBC UA: ABNORMAL /HPF (ref 0–5)

## 2021-08-24 PROCEDURE — 51702 INSERT TEMP BLADDER CATH: CPT

## 2021-08-24 PROCEDURE — 2000000000 HC ICU R&B

## 2021-08-24 PROCEDURE — 85025 COMPLETE CBC W/AUTO DIFF WBC: CPT

## 2021-08-24 PROCEDURE — 94003 VENT MGMT INPAT SUBQ DAY: CPT

## 2021-08-24 PROCEDURE — 86140 C-REACTIVE PROTEIN: CPT

## 2021-08-24 PROCEDURE — 6370000000 HC RX 637 (ALT 250 FOR IP): Performed by: INTERNAL MEDICINE

## 2021-08-24 PROCEDURE — 6360000002 HC RX W HCPCS: Performed by: STUDENT IN AN ORGANIZED HEALTH CARE EDUCATION/TRAINING PROGRAM

## 2021-08-24 PROCEDURE — 83735 ASSAY OF MAGNESIUM: CPT

## 2021-08-24 PROCEDURE — 82805 BLOOD GASES W/O2 SATURATION: CPT

## 2021-08-24 PROCEDURE — 2580000003 HC RX 258: Performed by: INTERNAL MEDICINE

## 2021-08-24 PROCEDURE — 87081 CULTURE SCREEN ONLY: CPT

## 2021-08-24 PROCEDURE — 2580000003 HC RX 258: Performed by: STUDENT IN AN ORGANIZED HEALTH CARE EDUCATION/TRAINING PROGRAM

## 2021-08-24 PROCEDURE — 99233 SBSQ HOSP IP/OBS HIGH 50: CPT | Performed by: FAMILY MEDICINE

## 2021-08-24 PROCEDURE — 87088 URINE BACTERIA CULTURE: CPT

## 2021-08-24 PROCEDURE — 84100 ASSAY OF PHOSPHORUS: CPT

## 2021-08-24 PROCEDURE — 2500000003 HC RX 250 WO HCPCS: Performed by: STUDENT IN AN ORGANIZED HEALTH CARE EDUCATION/TRAINING PROGRAM

## 2021-08-24 PROCEDURE — 6360000002 HC RX W HCPCS: Performed by: INTERNAL MEDICINE

## 2021-08-24 PROCEDURE — 2500000003 HC RX 250 WO HCPCS: Performed by: INTERNAL MEDICINE

## 2021-08-24 PROCEDURE — 85651 RBC SED RATE NONAUTOMATED: CPT

## 2021-08-24 PROCEDURE — 82962 GLUCOSE BLOOD TEST: CPT

## 2021-08-24 PROCEDURE — 81001 URINALYSIS AUTO W/SCOPE: CPT

## 2021-08-24 PROCEDURE — 36592 COLLECT BLOOD FROM PICC: CPT

## 2021-08-24 PROCEDURE — 85378 FIBRIN DEGRADE SEMIQUANT: CPT

## 2021-08-24 PROCEDURE — C9113 INJ PANTOPRAZOLE SODIUM, VIA: HCPCS | Performed by: STUDENT IN AN ORGANIZED HEALTH CARE EDUCATION/TRAINING PROGRAM

## 2021-08-24 PROCEDURE — 80053 COMPREHEN METABOLIC PANEL: CPT

## 2021-08-24 PROCEDURE — 37799 UNLISTED PX VASCULAR SURGERY: CPT

## 2021-08-24 PROCEDURE — 36415 COLL VENOUS BLD VENIPUNCTURE: CPT

## 2021-08-24 PROCEDURE — 94640 AIRWAY INHALATION TREATMENT: CPT

## 2021-08-24 RX ORDER — LISINOPRIL 20 MG/1
40 TABLET ORAL DAILY
Status: DISCONTINUED | OUTPATIENT
Start: 2021-08-24 | End: 2021-09-05

## 2021-08-24 RX ORDER — DEXAMETHASONE SODIUM PHOSPHATE 4 MG/ML
10 INJECTION, SOLUTION INTRA-ARTICULAR; INTRALESIONAL; INTRAMUSCULAR; INTRAVENOUS; SOFT TISSUE EVERY 12 HOURS
Status: DISCONTINUED | OUTPATIENT
Start: 2021-08-25 | End: 2021-08-31

## 2021-08-24 RX ORDER — MINERAL OIL, PETROLATUM 425; 568 MG/G; MG/G
OINTMENT OPHTHALMIC EVERY 6 HOURS
Status: DISCONTINUED | OUTPATIENT
Start: 2021-08-24 | End: 2021-09-09

## 2021-08-24 RX ORDER — CHLORHEXIDINE GLUCONATE 0.12 MG/ML
15 RINSE ORAL 2 TIMES DAILY
Status: DISCONTINUED | OUTPATIENT
Start: 2021-08-24 | End: 2021-09-20 | Stop reason: HOSPADM

## 2021-08-24 RX ADMIN — ENOXAPARIN SODIUM 70 MG: 80 INJECTION SUBCUTANEOUS at 21:07

## 2021-08-24 RX ADMIN — CHLORHEXIDINE GLUCONATE 0.12% ORAL RINSE 15 ML: 1.2 LIQUID ORAL at 21:08

## 2021-08-24 RX ADMIN — ENOXAPARIN SODIUM 70 MG: 80 INJECTION SUBCUTANEOUS at 09:12

## 2021-08-24 RX ADMIN — INSULIN LISPRO 1 UNITS: 100 INJECTION, SOLUTION INTRAVENOUS; SUBCUTANEOUS at 17:59

## 2021-08-24 RX ADMIN — PANTOPRAZOLE SODIUM 40 MG: 40 INJECTION, POWDER, FOR SOLUTION INTRAVENOUS at 10:38

## 2021-08-24 RX ADMIN — Medication 10 ML: at 21:07

## 2021-08-24 RX ADMIN — INSULIN LISPRO 1 UNITS: 100 INJECTION, SOLUTION INTRAVENOUS; SUBCUTANEOUS at 06:20

## 2021-08-24 RX ADMIN — CETIRIZINE HYDROCHLORIDE 5 MG: 10 TABLET, FILM COATED ORAL at 21:07

## 2021-08-24 RX ADMIN — DEXAMETHASONE SODIUM PHOSPHATE 6 MG: 4 INJECTION, SOLUTION INTRAMUSCULAR; INTRAVENOUS at 17:01

## 2021-08-24 RX ADMIN — INSULIN LISPRO 2 UNITS: 100 INJECTION, SOLUTION INTRAVENOUS; SUBCUTANEOUS at 12:00

## 2021-08-24 RX ADMIN — VECURONIUM BROMIDE 1 MCG/KG/MIN: 1 INJECTION, POWDER, LYOPHILIZED, FOR SOLUTION INTRAVENOUS at 21:45

## 2021-08-24 RX ADMIN — FENTANYL CITRATE: 0.05 INJECTION, SOLUTION INTRAMUSCULAR; INTRAVENOUS at 17:31

## 2021-08-24 RX ADMIN — CETIRIZINE HYDROCHLORIDE 5 MG: 10 TABLET, FILM COATED ORAL at 09:12

## 2021-08-24 RX ADMIN — DEXAMETHASONE SODIUM PHOSPHATE 6 MG: 4 INJECTION, SOLUTION INTRAMUSCULAR; INTRAVENOUS at 05:50

## 2021-08-24 RX ADMIN — Medication 10 ML: at 09:12

## 2021-08-24 RX ADMIN — Medication 10 MG/HR: at 04:18

## 2021-08-24 RX ADMIN — IPRATROPIUM BROMIDE AND ALBUTEROL SULFATE 1 AMPULE: .5; 3 SOLUTION RESPIRATORY (INHALATION) at 16:14

## 2021-08-24 RX ADMIN — LISINOPRIL 40 MG: 20 TABLET ORAL at 18:19

## 2021-08-24 RX ADMIN — Medication 10 MG/HR: at 15:36

## 2021-08-24 RX ADMIN — REMDESIVIR 100 MG: 100 INJECTION, POWDER, LYOPHILIZED, FOR SOLUTION INTRAVENOUS at 21:20

## 2021-08-24 RX ADMIN — ZINC SULFATE 220 MG (50 MG) CAPSULE 50 MG: CAPSULE at 09:12

## 2021-08-24 RX ADMIN — PHENYTOIN SODIUM 200 MG: 50 INJECTION INTRAMUSCULAR; INTRAVENOUS at 08:21

## 2021-08-24 RX ADMIN — IPRATROPIUM BROMIDE AND ALBUTEROL SULFATE 1 AMPULE: .5; 3 SOLUTION RESPIRATORY (INHALATION) at 08:30

## 2021-08-24 RX ADMIN — Medication 2000 UNITS: at 09:12

## 2021-08-24 RX ADMIN — Medication 200 MCG/HR: at 06:33

## 2021-08-24 RX ADMIN — OXYCODONE HYDROCHLORIDE AND ACETAMINOPHEN 500 MG: 500 TABLET ORAL at 09:12

## 2021-08-24 RX ADMIN — DOXAZOSIN 8 MG: 4 TABLET ORAL at 09:12

## 2021-08-24 RX ADMIN — VECURONIUM BROMIDE 1 MCG/KG/MIN: 1 INJECTION, POWDER, LYOPHILIZED, FOR SOLUTION INTRAVENOUS at 10:53

## 2021-08-24 RX ADMIN — FENTANYL CITRATE: 0.05 INJECTION, SOLUTION INTRAMUSCULAR; INTRAVENOUS at 10:53

## 2021-08-24 RX ADMIN — PHENYTOIN SODIUM 300 MG: 50 INJECTION INTRAMUSCULAR; INTRAVENOUS at 21:42

## 2021-08-24 RX ADMIN — AMLODIPINE BESYLATE 10 MG: 10 TABLET ORAL at 10:37

## 2021-08-24 RX ADMIN — FENTANYL CITRATE: 0.05 INJECTION, SOLUTION INTRAMUSCULAR; INTRAVENOUS at 23:02

## 2021-08-24 RX ADMIN — IPRATROPIUM BROMIDE AND ALBUTEROL SULFATE 1 AMPULE: .5; 3 SOLUTION RESPIRATORY (INHALATION) at 00:10

## 2021-08-24 RX ADMIN — IPRATROPIUM BROMIDE AND ALBUTEROL SULFATE 1 AMPULE: .5; 3 SOLUTION RESPIRATORY (INHALATION) at 04:13

## 2021-08-24 RX ADMIN — CHLORHEXIDINE GLUCONATE 0.12% ORAL RINSE 15 ML: 1.2 LIQUID ORAL at 09:12

## 2021-08-24 RX ADMIN — ATORVASTATIN CALCIUM 40 MG: 40 TABLET, FILM COATED ORAL at 21:07

## 2021-08-24 RX ADMIN — Medication 10 MG/HR: at 23:02

## 2021-08-24 RX ADMIN — IPRATROPIUM BROMIDE AND ALBUTEROL SULFATE 1 AMPULE: .5; 3 SOLUTION RESPIRATORY (INHALATION) at 12:41

## 2021-08-24 ASSESSMENT — PULMONARY FUNCTION TESTS
PIF_VALUE: 36
PIF_VALUE: 34
PIF_VALUE: 33
PIF_VALUE: 35
PIF_VALUE: 33
PIF_VALUE: 33
PIF_VALUE: 37
PIF_VALUE: 36
PIF_VALUE: 34
PIF_VALUE: 35
PIF_VALUE: 34
PIF_VALUE: 35
PIF_VALUE: 33
PIF_VALUE: 33
PIF_VALUE: 34
PIF_VALUE: 34
PIF_VALUE: 36
PIF_VALUE: 35
PIF_VALUE: 35
PIF_VALUE: 36
PIF_VALUE: 34
PIF_VALUE: 33
PIF_VALUE: 33
PIF_VALUE: 36
PIF_VALUE: 34
PIF_VALUE: 33
PIF_VALUE: 33
PIF_VALUE: 35
PIF_VALUE: 33
PIF_VALUE: 33
PIF_VALUE: 36
PIF_VALUE: 34
PIF_VALUE: 34
PIF_VALUE: 33
PIF_VALUE: 33

## 2021-08-24 ASSESSMENT — PAIN SCALES - GENERAL: PAINLEVEL_OUTOF10: 0

## 2021-08-24 NOTE — PROGRESS NOTES
[facility] Progress Note    Admitting Date and Time: 8/21/2021  9:35 AM  Admit Dx: Acute hypoxemic respiratory failure due to COVID-19 (HCC) [U07.1, J96.01]  Pneumonia due to COVID-19 virus [U07.1, J12.82]    Subjective:  Patient is being followed for Acute hypoxemic respiratory failure due to COVID-19 ([address] [U07.1, J96.01]  Pneumonia due to COVID-19 virus [U07.1, J12.82]     Pt intubated and sedated    Per RN:   On Rotoprone bed. Had issue with desaturations early this am. As Busch being irrigated. Better now. ROS: denies fever, chills, cp, sob, n/v, HA unless stated above.       chlorhexidine  15 mL Mouth/Throat BID    Refresh Lacri-Lube   Ophthalmic Q6H    insulin lispro  0-6 Units Subcutaneous Q6H    ipratropium-albuterol  1 ampule Inhalation Q4H    phenytoin  200 mg Intravenous QAM AC    phenytoin  300 mg Intravenous Nightly    sodium chloride flush  10 mL Intravenous BID    enoxaparin  0.5 mg/kg Subcutaneous BID    amLODIPine  10 mg Oral Daily    atorvastatin  40 mg Oral Daily    doxazosin  8 mg Oral Daily    ascorbic acid  500 mg Oral Daily    Vitamin D  2,000 Units Oral Daily    zinc sulfate  50 mg Oral Daily    insulin glargine  10 Units Subcutaneous Nightly    pantoprazole  40 mg Intravenous Daily    remdesivir IVPB  100 mg Intravenous Q24H    dexamethasone  6 mg Intravenous Q12H    cetirizine  5 mg Oral BID     sodium chloride flush, 5-40 mL, PRN  sodium chloride, 25 mL, PRN  albuterol, 2.5 mg, Q6H PRN  benzonatate, 200 mg, TID PRN  glucose, 15 g, PRN  dextrose, 12.5 g, PRN  glucagon (rDNA), 1 mg, PRN  dextrose, 100 mL/hr, PRN  sodium chloride, 30 mL, PRN         Objective:    BP (!) 154/89   Pulse 108   Temp 97.2 °F (36.2 °C) (Bladder)   Resp 24   Ht 6' (1.829 m)   Wt (!) 302 lb (137 kg)   SpO2 100%   BMI 40.96 kg/m²     General Appearance: intubated sedated and paralyzed  Skin: warm and dry  Head: normocephalic and atraumatic  Eyes: pupils equal, round, and reactive to light, extraocular eye movements intact, conjunctivae normal  Neck: neck supple and non tender without mass   Pulmonary/Chest: clear to auscultation bilaterally- no wheezes, rales or rhonchi, normal air movement, no respiratory distress  Cardiovascular: normal rate, normal S1 and S2 and no carotid bruits  Abdomen: soft, non-tender, non-distended, normal bowel sounds, no masses or organomegaly  Extremities: no cyanosis, no clubbing and no edema  Neurologic:  Intubated and sedated    Recent Labs     08/22/21  0520 08/23/21  0540 08/24/21  0550    145 149*   K 3.9 4.1 4.3   * 112* 115*   CO2 24 19* 22   BUN 26* 23* 20   CREATININE 1.0 0.8 0.8   GLUCOSE 169* 141* 185*   CALCIUM 7.9* 7.9* 7.6*       Recent Labs     08/22/21  0520 08/23/21  0540 08/24/21  0550   WBC 6.8 7.0 10.9   RBC 4.56 4.63 4.53   HGB 13.5 13.8 13.7   HCT 40.1 41.1 40.6   MCV 87.9 88.8 89.6   MCH 29.6 29.8 30.2   MCHC 33.7 33.6 33.7   RDW 13.2 13.5 13.9    173 114*   MPV 9.6 10.0 9.3       Radiology:   XR CHEST PORTABLE    Result Date: 8/23/2021  EXAMINATION: ONE XRAY VIEW OF THE CHEST 8/23/2021 12:26 pm COMPARISON: August 23, 2021 HISTORY: ORDERING SYSTEM PROVIDED HISTORY: intubated, cvc placement TECHNOLOGIST PROVIDED HISTORY: Reason for exam:->intubated, cvc placement FINDINGS: Endotracheal tube is 3.1 cm above the donna. NG tube courses below the level of the diaphragm. Right IJ central venous catheter is present with distal tip of location of SVC. Stable interstitial and hazy opacities throughout both lungs. No pneumothorax. The heart appears normal in size. 1. Endotracheal tube is 3.1 cm above the donna. 2. Stable interstitial pulmonary edema or pneumonia throughout both lungs.      XR CHEST PORTABLE    Result Date: 8/23/2021  EXAMINATION: ONE XRAY VIEW OF THE CHEST 8/23/2021 7:51 am COMPARISON: Previous CT of the thorax of 08/21/2021 and previous chest x-ray of 08/22/2021 HISTORY: [address] PROVIDED HISTORY: increasing SOB TECHNOLOGIST PROVIDED HISTORY: Reason for exam:->increasing SOB FINDINGS: There is significant motion artifact throughout the lungs. Significant multifocal bilateral airspace disease is noted unchanged compared to prior study. There is no right or left gross pleural effusion. The heart is enlarged. 1. There is no interval change in the significant multifocal bilateral airspace disease. XR CHEST PORTABLE    Result Date: 8/22/2021  EXAMINATION: ONE XRAY VIEW OF THE CHEST 8/22/2021 12:00 pm COMPARISON: 08/21/2021 HISTORY: ORDERING SYSTEM PROVIDED HISTORY: Acute resp failure, COVID TECHNOLOGIST PROVIDED HISTORY: Reason for exam:->Acute resp failure, COVID FINDINGS: Bilateral airspace opacities with improved aeration of the right lung compared to prior study. There is no effusion or pneumothorax. The cardiomediastinal silhouette is without acute process. The osseous structures are without acute process. Improved aeration of the right lung compared to prior study. XR CHEST ABDOMEN NG PLACEMENT    Result Date: 8/23/2021  EXAMINATION: ONE SUPINE XRAY VIEW(S) OF THE ABDOMEN 8/23/2021 12:26 pm COMPARISON: None. HISTORY: ORDERING SYSTEM PROVIDED HISTORY: ng placement TECHNOLOGIST PROVIDED HISTORY: Reason for exam:->ng placement FINDINGS: Nasogastric tube courses below the level of the diaphragm with distal tip in the expected location of the stomach. Satisfactory position of nasogastric tube. Assessment:    Principal Problem:    Acute hypoxemic respiratory failure due to COVID-19 [facility])  Active Problems:    Type 2 diabetes mellitus without complication, without long-term current use of insulin (HCC)    Seizure (HCC)    Hyperlipidemia    Lactic acidosis  Resolved Problems:    OSCAR (acute kidney injury) ([facility] [address]      Plan:  1. Acute hypoxic respiratory failure, most likely due to viral pneumonia, ABG better. On Rotoprone bed. Ventilation corrected.   Continue same therapy. 2.  Acute viral pneumonia, rapid influenza a and B were negative, respiratory panel was not done, procalcitonin was  0.51, CAT scan of the chest showed bilateral patchy groundglass opacities, sent for COVID-19, positive. 3.  T2DM - Glucose POCT. Continue basal and SS insulin. 4.  Hyperlipidemia - continue statins. 5.  HTN - continue meds. 6.  Elevated D-dimer - continue Lovenox bid. NOTE: This report was transcribed using voice recognition software. Every effort was made to ensure accuracy; however, inadvertent computerized transcription errors may be present.     Electronically signed by Marvin Frank MD on 8/24/2021 at 8:42 AM

## 2021-08-24 NOTE — PROGRESS NOTES
[address] [place] [address] [facility]  Progress Note      Chief Complaint   Patient presents with    Shortness of Breath     covid, per family SaO2 75% RA, hx of DM and panic attack        SUBJECTIVE:  8/21/21  Prone. FiO2 60% and PEEP of 16    8/22/2021  Patient is tolerating medications. No reported adverse drug reactions. No nausea, vomiting, diarrhea. Afebrile BiPAP 100% FiO2 with breathing at 44 times a minute and probably is going to decompensate  8/23/2021  Not doing well intubated on a rotating bed  FiO2 70% and PEEP 16  8/24/2021  Prone 50% FiO2-PEEP of 10  Issues with the urinary Busch last night this was changed  Paralyzed and on Versed  Review of systems:  As stated above in the chief complaint, otherwise negative.     Medications:  Scheduled Meds:   chlorhexidine  15 mL Mouth/Throat BID    Refresh Lacri-Lube   Ophthalmic Q6H    insulin lispro  0-6 Units Subcutaneous Q6H    ipratropium-albuterol  1 ampule Inhalation Q4H    phenytoin  200 mg Intravenous QAM AC    phenytoin  300 mg Intravenous Nightly    sodium chloride flush  10 mL Intravenous BID    enoxaparin  0.5 mg/kg Subcutaneous BID    amLODIPine  10 mg Oral Daily    atorvastatin  40 mg Oral Daily    doxazosin  8 mg Oral Daily    ascorbic acid  500 mg Oral Daily    Vitamin D  2,000 Units Oral Daily    zinc sulfate  50 mg Oral Daily    insulin glargine  10 Units Subcutaneous Nightly    pantoprazole  40 mg Intravenous Daily    remdesivir IVPB  100 mg Intravenous Q24H    dexamethasone  6 mg Intravenous Q12H    cetirizine  5 mg Oral BID     Continuous Infusions:   IV infusion builder 40 mL/hr at 08/24/21 1053    sodium chloride      midazolam 10 mg/hr (08/24/21 0418)    vecuronium (NORCURON) infusion 1 mcg/kg/min (08/24/21 1053)    dextrose       PRN Meds:sodium chloride flush, sodium chloride, albuterol, benzonatate, glucose, dextrose, glucagon (rDNA), dextrose, sodium chloride    OBJECTIVE:  BP (!) 154/89 Pulse 82   Temp 98.2 °F (36.8 °C) (Bladder)   Resp 24   Ht 6' (1.829 m)   Wt (!) 302 lb (137 kg)   SpO2 98%   BMI 40.96 kg/m²   Temp  Av.7 °F (36.5 °C)  Min: 97.2 °F (36.2 °C)  Max: 98.2 °F (36.8 °C)  Constitutional: The patient is intubated, sedated, prone. Skin: Warm and dry. No rashes were noted. HEENT: Round and reactive pupils. Moist mucous membranes. No ulcerations or thrush. Neck: Supple to movements. Chest: No use of accessory muscles to breathe. Symmetrical expansion. No wheezing, crackles. Per RN Rhonchi  Cardiovascular: S1 and S2 are rhythmic and regular. Abdomen: prone  Genitourinary:   Extremities: No clubbing, no cyanosis, no edema.   Lines: peripheral  Art line 2021  Triple-lumen catheter 2021  Busch changed 3/24/2021  Laboratory and Tests Review:  Lab Results   Component Value Date    WBC 10.9 2021    WBC 7.0 2021    WBC 6.8 2021    HGB 13.7 2021    HCT 40.6 2021    MCV 89.6 2021     (L) 2021     Lab Results   Component Value Date    NEUTROABS 9.70 (H) 2021    NEUTROABS 6.51 2021    NEUTROABS 5.94 2021     No results found for: [facility]  Lab Results   Component Value Date    ALT 24 2021    AST 43 (H) 2021    ALKPHOS 109 2021    BILITOT 0.3 2021     Lab Results   Component Value Date     2021    K 4.3 2021    K 3.7 2021     2021    CO2 22 2021    BUN 20 2021    CREATININE 0.8 2021    CREATININE 0.8 2021    CREATININE 1.0 2021    GFRAA >60 2021    LABGLOM >60 2021    GLUCOSE 185 2021    PROT 5.0 2021    LABALBU 2.3 2021    CALCIUM 7.6 2021    BILITOT 0.3 2021    ALKPHOS 109 2021    AST 43 2021    ALT 24 2021     Lab Results   Component Value Date    CRP 6.1 (H) 2021    CRP 14.3 (H) 2021    CRP 24.3 (H) 2021     Lab Results   Component Value Date    SEDRATE 0 08/24/2021    SEDRATE 5 08/23/2021    SEDRATE 40 (H) 08/22/2021     Radiology:  CAT scan diffuse groundglass infiltrates    Microbiology:   Lab Results   Component Value Date    BC 24 Hours no growth 08/21/2021     Lab Results   Component Value Date    BLOODCULT2 24 Hours no growth 08/21/2021     No results found for: WNDABS  No results found for: RESPSMEAR  No results found for: MPNEUMO, CLAMYDCU, LABLEGI, AFBCX, FUNGSM, LABFUNG  No results found for: CULTRESP  No results found for: CXCATHTIP  No results found for: BFCS  No results found for: CXSURG  No results found for: LABURIN  No results found for: [address]     ASSESSMENT:  · Severe Covid pneumonia   · cytokine storm  · Poor prognosis    PLAN:  · Continue remdesivir; steroids; Tosi x1 completed  · Follow-up sputum and urine culture  · Check final cultures  · Monitor labs      HOMERO Vargas - CNP  11:27 AM       Pt seen and examined. Above discussed agree with advanced practice nurse. Labs, cultures, and radiographs reviewed. Face to Face encounter occurred. Changes made as necessary.      Tiffanie Cisneros MD  8/24/2021

## 2021-08-24 NOTE — PROGRESS NOTES
This note also relates to the following rows which could not be included:  Vt Ordered - Cannot attach notes to unvalidated device data  Rate Set - Cannot attach notes to unvalidated device data  Peak Flow - Cannot attach notes to unvalidated device data  Pressure Support - Cannot attach notes to unvalidated device data  FiO2  - Cannot attach notes to unvalidated device data  SpO2 - Cannot attach notes to unvalidated device data  Sensitivity - Cannot attach notes to unvalidated device data  I Time/ I Time % - Cannot attach notes to unvalidated device data  High Peep/I Pressure - Cannot attach notes to unvalidated device data  Peak Inspiratory Pressure - Cannot attach notes to unvalidated device data  Mean Airway Pressure - Cannot attach notes to unvalidated device data  Rate Measured - Cannot attach notes to unvalidated device data  Vt Exhaled - Cannot attach notes to unvalidated device data  Minute Volume - Cannot attach notes to unvalidated device data  I:E Ratio - Cannot attach notes to unvalidated device data  Pulse - Cannot attach notes to unvalidated device data  Resp - Cannot attach notes to unvalidated device data  High Pressure Alarm - Cannot attach notes to unvalidated device data       08/24/21 0543   Vent Information   Vent Mode AC/PC   PEEP/CPAP 10  (decreased at this time per md order)

## 2021-08-24 NOTE — PROGRESS NOTES
We placed pt. In supine position, villagran catheter removed. Pt. Incontinent of large amount of urine. New villagran placed. Villagran quickly emptied for 550cc. Pt. Secured and placed back in prone position as pt. Is unable to tolerate being supine. Respiratory therapy and 3 RN's at bedside to facilitate safe rotation of pt. With Dr. Aleta Brown outside of the room for assistance if necessary.

## 2021-08-24 NOTE — PROGRESS NOTES
This note also relates to the following rows which could not be included:  Vt Ordered - Cannot attach notes to unvalidated device data  Rate Set - Cannot attach notes to unvalidated device data  Peak Flow - Cannot attach notes to unvalidated device data  Pressure Support - Cannot attach notes to unvalidated device data  FiO2  - Cannot attach notes to unvalidated device data  SpO2 - Cannot attach notes to unvalidated device data  Sensitivity - Cannot attach notes to unvalidated device data  PEEP/CPAP - Cannot attach notes to unvalidated device data  I Time/ I Time % - Cannot attach notes to unvalidated device data  High Peep/I Pressure - Cannot attach notes to unvalidated device data  Peak Inspiratory Pressure - Cannot attach notes to unvalidated device data  Mean Airway Pressure - Cannot attach notes to unvalidated device data  Rate Measured - Cannot attach notes to unvalidated device data  Vt Exhaled - Cannot attach notes to unvalidated device data  Minute Volume - Cannot attach notes to unvalidated device data  I:E Ratio - Cannot attach notes to unvalidated device data  Pulse - Cannot attach notes to unvalidated device data  Resp - Cannot attach notes to unvalidated device data  High Pressure Alarm - Cannot attach notes to unvalidated device data       08/24/21 0412   Vent Information   Vent Mode SIMV/PC   Vent Patient Data   Plateau Pressure 31 GMM17   Static Compliance 36 mL/cmH2O

## 2021-08-24 NOTE — CARE COORDINATION
Continue ICU. COVID+ 8/18. Continue vent support, paralytic, proned. Iv decadron. Remdesivir day 4. [facility] following.  Discharge TBD pending progress-adairo

## 2021-08-24 NOTE — PROGRESS NOTES
Critical Care Team - Daily Progress Note      Date and time: 8/24/2021 1:39 PM  Patient's name:  Geoff Markham  Medical Record Number: [id number]  Patient's account/billing number: [de-identified]  Patient's YOB: 1965  Age: 60 y.o. Date of Admission: 8/21/2021  9:35 AM  Length of stay during current admission: 3      Primary Care Physician: Virginia Frye DO  ICU Attending Physician: Dr. Lev Centeno    Code Status: No Order    Reason for ICU admission: Acute hypoxic respiratory failure      SUBJECTIVE:     OVERNIGHT EVENTS:      Patient remained proned overnight. No new acute events. Still sedated, paralyzed, intubated.     AWAKE & FOLLOWING COMMANDS:  [x] No   [] Yes    CURRENT VENTILATION STATUS:     [x] Ventilator  [] BIPAP  [] Nasal Cannula [] Room Air      IF INTUBATED, ET TUBE MARKING AT LOWER LIP:       cms    SECRETIONS Amount:  [] Small [] Moderate  [] Large  [x] None  Color:     [] White [] Colored  [] Bloody    SEDATION:  RAAS Score:  [x]Fentanyl gtt  [x] Versed gtt  [] Ativan gtt   [] No Sedation    PARALYZED:  [] No    [x] Yes    DIARRHEA:                [x] No                [] Yes  (C. Difficile status: [] positive                                                                                                                       [] negative                                                                                                                     [] pending)    VASOPRESSORS:  [x] No    [] Yes    If yes -   [] Levophed       [] Dopamine     [] Vasopressin       [] Dobutamine  [] Phenylephrine         [] Epinephrine    CENTRAL LINES:     [x] No   [x] Yes        If yes -     [] Right IJ     [] Left IJ [] Right Femoral [] Left Femoral                   [] Right Subclavian [] Left Subclavian       ANDERSON'S CATHETER:   [] No   [x] Yes           OBJECTIVE:     VITAL SIGNS:  BP (!) 154/89   Pulse 95   Temp 98.8 °F (37.1 °C) (Bladder)   Resp 23   Ht 6' (1.829 m)   Wt (!) 302 lb (137 kg)   SpO2 96%   BMI 40.96 kg/m²   Tmax over 24 hours:  Temp (24hrs), Av.8 °F (36.6 °C), Min:97.2 °F (36.2 °C), Max:98.8 °F (37.1 °C)      Patient Vitals for the past 6 hrs:   Temp Temp src Pulse Resp SpO2   21 1300 -- -- 95 23 96 %   21 1241 -- -- 76 23 96 %   21 1200 98.8 °F (37.1 °C) Bladder 82 24 96 %   21 1100 -- -- 84 24 93 %   21 1006 -- -- 82 24 98 %   21 1000 -- -- 95 24 98 %   21 0900 -- -- 116 23 99 %   21 0832 -- -- 108 24 100 %   21 0830 -- -- -- 23 100 %   21 0800 98.2 °F (36.8 °C) Bladder 101 24 100 %         Intake/Output Summary (Last 24 hours) at 2021 1339  Last data filed at 2021 0413  Gross per 24 hour   Intake 2098 ml   Output 1250 ml   Net 848 ml     Wt Readings from Last 2 Encounters:   21 (!) 302 lb (137 kg)   21 (!) 320 lb (145.2 kg)     Body mass index is 40.96 kg/m². PHYSICAL EXAMINATION:    General appearance -intubated, sedated, paralyzed, proned  Mental status -unable to assess secondary to patient being intubated  Eyes -unable to assess as patient is inside RotoProne  Ears - external ear normal  Nose - normal and patent, no erythema, discharge or polyps  Mouth -endotracheal tube in place  Neck - supple, no significant adenopathy  Chest -decreased breath sounds  Heart - normal rate, regular rhythm, normal S1, S2, no murmurs, rubs, clicks or gallops  Abdomen -unable to access abdomen series   neurological -sedated  Extremities - peripheral pulses normal, no clubbing or cyanosis. No edema.   Skin - normal coloration and turgor, no rashes, no suspicious skin lesions noted      Any additional physical findings:    MEDICATIONS:    Scheduled Meds:   chlorhexidine  15 mL Mouth/Throat BID    Refresh Lacri-Lube   Ophthalmic Q6H    insulin lispro  0-6 Units Subcutaneous Q6H    ipratropium-albuterol  1 ampule Inhalation Q4H    phenytoin  200 mg IntraVENous QAM AC    phenytoin  300 mg IntraVENous Nightly    sodium chloride flush  10 mL IntraVENous BID    enoxaparin  0.5 mg/kg Subcutaneous BID    amLODIPine  10 mg Oral Daily    atorvastatin  40 mg Oral Daily    doxazosin  8 mg Oral Daily    ascorbic acid  500 mg Oral Daily    Vitamin D  2,000 Units Oral Daily    zinc sulfate  50 mg Oral Daily    insulin glargine  10 Units Subcutaneous Nightly    pantoprazole  40 mg IntraVENous Daily    remdesivir IVPB  100 mg IntraVENous Q24H    dexamethasone  6 mg IntraVENous Q12H    cetirizine  5 mg Oral BID     Continuous Infusions:   IV infusion builder 40 mL/hr at 08/24/21 1053    sodium chloride      midazolam 10 mg/hr (08/24/21 0418)    vecuronium (NORCURON) infusion 1 mcg/kg/min (08/24/21 1053)    dextrose       PRN Meds:   sodium chloride flush, 5-40 mL, PRN  sodium chloride, 25 mL, PRN  albuterol, 2.5 mg, Q6H PRN  benzonatate, 200 mg, TID PRN  glucose, 15 g, PRN  dextrose, 12.5 g, PRN  glucagon (rDNA), 1 mg, PRN  dextrose, 100 mL/hr, PRN  sodium chloride, 30 mL, PRN          VENT SETTINGS (Comprehensive) (if applicable):  Vent Information  $Ventilation: $Subsequent Day  Skin Assessment: Clean, dry, & intact  Equipment ID: [id number]  Vent Type: [phone]  Vent Mode: AC/PC  Vt Ordered: 0 mL  Rate Set: 24 bmp  Peak Flow: 0 L/min  Pressure Support: 0 cmH20  FiO2 : 60 %  SpO2: 96 %  SpO2/FiO2 ratio: 160  Sensitivity: 3  PEEP/CPAP: 10  I Time/ I Time %: 0 s  Humidification Source: Heated wire  Humidification Temp: 37  Humidification Temp Measured: 36.5  Circuit Condensation: Drained  Mask Type: Full face mask  Mask Size: Medium  Additional Respiratory  Assessments  Pulse: 95  Resp: 23  SpO2: 96 %  Humidification Source: Heated wire  Humidification Temp: 37  Circuit Condensation: Drained  Airway Type: ET  Airway Size: 8  Cuff Pressure (cm H2O): 29 cm H2O    ABG  Lab Results   Component Value Date    PH 7.427 08/24/2021    PCO2 31.5 08/24/2021    PO2 77.2 08/24/2021    HCO3 20.3 08/24/2021    O2SAT 95.2 08/24/2021         Laboratory findings:    Complete Blood Count:   Recent Labs     08/22/21  0520 08/23/21  0540 08/24/21  0550   WBC 6.8 7.0 10.9   HGB 13.5 13.8 13.7   HCT 40.1 41.1 40.6    173 114*        Lactic Acid  Lab Results   Component Value Date    LACTA 1.8 08/23/2021    LACTA 3.9 08/21/2021        Last 3 Blood Glucose:   Recent Labs     08/22/21  0520 08/23/21  0540 08/24/21  0550   GLUCOSE 169* 141* 185*        PT/INR:  No results found for: PROTIME, INR  PTT:  No results found for: APTT, PTT    Comprehensive Metabolic Profile:   Recent Labs     08/22/21  0520 08/22/21  0520 08/23/21  0540 08/23/21  0540 08/24/21  0550     --  145  --  149*   K 3.9  --  4.1  --  4.3   *  --  112*  --  115*   CO2 24  --  19*  --  22   BUN 26*  --  23*  --  20   CREATININE 1.0  --  0.8  --  0.8   GLUCOSE 169*  --  141*  --  185*   CALCIUM 7.9*  --  7.9*  --  7.6*   PROT 5.6*   < > 5.5*   < > 5.0*   LABALBU 2.9*   < > 2.6*   < > 2.3*   BILITOT 0.4   < > 0.4   < > 0.3   ALKPHOS 80   < > 105   < > 109   AST 57*   < > 53*   < > 43*   ALT 37   < > 31   < > 24    < > = values in this interval not displayed. Magnesium:   Lab Results   Component Value Date    MG 1.8 08/24/2021     Phosphorus:   Lab Results   Component Value Date    PHOS 3.0 08/24/2021     Ionized Calcium: No results found for: CAION       Urinalysis:     Troponin: No results for input(s): TROPONINI in the last 72 hours. Cultures     Cultures during this admission:       No results for input(s): BC in the last 72 hours. No results for input(s): Edwin Crumble in the last 72 hours. No results for input(s): LABURIN in the last 72 hours. Other pertinent Labs:       Radiology/Imaging:   XR CHEST (2 VW)    Result Date: 8/18/2021  EXAMINATION: TWO XRAY VIEWS OF THE CHEST 8/17/2021 11:32 pm COMPARISON: None.  HISTORY: ORDERING SYSTEM PROVIDED HISTORY: cough TECHNOLOGIST PROVIDED HISTORY: Reason for exam:->cough FINDINGS: Frontal and lateral views of the chest.  Low lung volume. Multifocal bilateral patchy opacities are most pronounced in the mid and lower lung zones. No pleural effusion or pneumothorax. Normal cardiomediastinal silhouette and great vessels. Multilevel degenerative disc disease. Multifocal bilateral patchy opacities are most pronounced in the mid and lower lung zones. Differential considerations include an infectious/inflammatory process including atypical or viral pneumonia and pulmonary edema. XR CHEST PORTABLE    Result Date: 8/23/2021  EXAMINATION: ONE XRAY VIEW OF THE CHEST 8/23/2021 12:26 pm COMPARISON: August 23, 2021 HISTORY: ORDERING SYSTEM PROVIDED HISTORY: intubated, cvc placement TECHNOLOGIST PROVIDED HISTORY: Reason for exam:->intubated, cvc placement FINDINGS: Endotracheal tube is 3.1 cm above the donna. NG tube courses below the level of the diaphragm. Right IJ central venous catheter is present with distal tip of location of SVC. Stable interstitial and hazy opacities throughout both lungs. No pneumothorax. The heart appears normal in size. 1. Endotracheal tube is 3.1 cm above the donna. 2. Stable interstitial pulmonary edema or pneumonia throughout both lungs. XR CHEST PORTABLE    Result Date: 8/23/2021  EXAMINATION: ONE XRAY VIEW OF THE CHEST 8/23/2021 7:51 am COMPARISON: Previous CT of the thorax of 08/21/2021 and previous chest x-ray of 08/22/2021 HISTORY: ORDERING SYSTEM PROVIDED HISTORY: increasing SOB TECHNOLOGIST PROVIDED HISTORY: Reason for exam:->increasing SOB FINDINGS: There is significant motion artifact throughout the lungs. Significant multifocal bilateral airspace disease is noted unchanged compared to prior study. There is no right or left gross pleural effusion. The heart is enlarged. 1. There is no interval change in the significant multifocal bilateral airspace disease.      XR CHEST PORTABLE    Result Date: 8/22/2021  EXAMINATION: ONE XRAY VIEW OF THE CHEST 8/22/2021 12:00 pm COMPARISON: 08/21/2021 HISTORY: ORDERING SYSTEM PROVIDED HISTORY: Acute resp failure, COVID TECHNOLOGIST PROVIDED HISTORY: Reason for exam:->Acute resp failure, COVID FINDINGS: Bilateral airspace opacities with improved aeration of the right lung compared to prior study. There is no effusion or pneumothorax. The cardiomediastinal silhouette is without acute process. The osseous structures are without acute process. Improved aeration of the right lung compared to prior study. XR CHEST PORTABLE    Result Date: 8/21/2021  EXAMINATION: ONE XRAY VIEW OF THE CHEST 8/21/2021 9:56 am COMPARISON: August 17, 2021 HISTORY: ORDERING SYSTEM PROVIDED HISTORY: pneumonia TECHNOLOGIST PROVIDED HISTORY: Reason for exam:->pneumonia FINDINGS: Compared with most recent examination, there has been a significant interval increase in the degree of patchy bilateral airspace opacities, in keeping with the patient's known diagnosis of COVID pneumonia. No evidence of a sizable pleural effusion. No pneumothorax is identified. Heart size is unable to be accurately assessed on this single portable view of the chest, but appears to be stable. No acute osseous abnormality. Significantly increased airspace opacities throughout both lungs, in keeping with multifocal pneumonia. CTA PULMONARY W CONTRAST    Result Date: 8/21/2021  EXAMINATION: CTA OF THE CHEST, 8/21/2021 12:11 pm TECHNIQUE: CTA of the chest was performed after the administration of intravenous contrast.  Multiplanar reformatted images are provided for review. MIP images are provided for review. Dose modulation, iterative reconstruction, and/or weight based adjustment of the mA/kV was utilized to reduce the radiation dose to as low as reasonably achievable.  COMPARISON: None HISTORY: ORDERING SYSTEM PROVIDED HISTORY:  PE vs COVID TECHNOLOGIST PROVIDED HISTORY: Reason for Exam:  PE vs COVID Decision Support Exception - unselect if not a suspected or confirmed emergency medical condition->Emergency Medical Condition (MA) FINDINGS: Pulmonary Arteries: There is a combination of poor contrast bolus timing as well as significant respiratory motion artifact that severely degrades the examination. Unfortunately, multiple segmental and subsegmental pulmonary artery branches are not opacified well enough to exclude a pulmonary embolism. There is no evidence of a central pulmonary embolism or convincing evidence of right heart strain. Mediastinum: There is at least one enlarged mediastinal lymph node measuring 17 mm in short axis in the subcarinal distribution. Several additional smaller but nonenlarged mediastinal lymph nodes are present, likely reactive. The thoracic esophagus is decompressed, limiting assessment. No obvious esophageal abnormality. The heart size is top normal.  Trace pericardial fluid is present. Lungs/pleura: The central airways are patent. There is no evidence of a pleural effusion or pneumothorax. Extensive ground-glass and consolidative airspace opacities are present bilaterally, in keeping with the provided history of COVID pneumonia. Upper Abdomen: Imaged portions of the upper abdomen demonstrates diffuse hepatic steatosis. There are also multiple nonobstructing bilateral renal calculi, partially imaged. Soft Tissues/Bones: No acute bone or soft tissue abnormality. Extensive ground-glass and consolidative airspace opacities throughout both lungs, in keeping with the provided history of COVID pneumonia. The examination is nondiagnostic for detection of pulmonary emboli in multiple segmental and subsegmental pulmonary artery distributions, secondary to poor contrast bolus timing and respiratory motion artifact. There is no evidence of a central pulmonary embolism. Hepatic steatosis. Enlarged mediastinal lymph nodes, likely reactive.      XR CHEST ABDOMEN NG PLACEMENT    Result Date: 8/23/2021  EXAMINATION: ONE SUPINE XRAY VIEW(S) OF THE ABDOMEN 8/23/2021 12:26 pm COMPARISON: None. HISTORY: ORDERING SYSTEM PROVIDED HISTORY: ng placement TECHNOLOGIST PROVIDED HISTORY: Reason for exam:->ng placement FINDINGS: Nasogastric tube courses below the level of the diaphragm with distal tip in the expected location of the stomach. Satisfactory position of nasogastric tube. Chest Xray (8/24/2021):    SYSTEMS ASSESSMENT  Neuro   Intubated, sedated, paralyzed     History of seizures  -Continue home phenytoin  -Telemetry monitoring     Respiratory   Acute hypoxic respiratory failure due to COVID-19  -P/F today 1.29  -Currently proned  -Continue Decadron  -Supinated patient for a few minutes he could not tolerate it     Cardiovascular   Continue telemetry monitoring no acute concerns  Hyperlipidemia  -On statins     Gastrointestinal   GI prophylaxis  -Protonix     Renal   Lactic acidosis  -resolved     Infectious Disease   COVID-19  -Given Rocephin and doxy in the ED  -Continue Decadron, remdesivir. Tocilizumab complete  -Cultures so far negative  -ID following     Hematology/Oncology   DVT prophylaxis  -Lovenox, weight-based     Endocrine   Type 2 diabetes  -Receiving tube feeds  -Glucose running high  -Sliding scale insulin     Social/Spiritual/DNR/Other   Full code      PLAN:     WEAN PER PROTOCOL:  [] No   [x] Yes  [] N/A    DISCONTINUE ANY LABS:   [x] No   [] Yes    ICU PROPHYLAXIS:  Stress ulcer:  [x] PPI Agent  [] [id number] [] Sucralfate  [] Other:  VTE:   [x] Enoxaparin  [] Unfract.  Heparin Subcut  [] EPC Cuffs    NUTRITION:  [] NPO [] Tube Feeding (Specify: ) [] TPN  [x] PO (Diet: ADULT TUBE FEEDING; Nasogastric; Diabetic; Continuous; 20; Yes; 20; Q 6 hours; 60; 30; Q 4 hours; Protein; Proteinex 2 go once daily)    HOME MEDICATIONS RECONCILED: [] No  [x] Yes    INSULIN DRIP:   [x] No   [] Yes    CONSULTATION NEEDED:  [x] No   [] Yes    FAMILY UPDATED:    [x] No   [] Yes    TRANSFER OUT OF ICU:   [x] No   [] Yes    Sherly Michael DO, PGY2

## 2021-08-24 NOTE — PLAN OF CARE
Problem: Airway Clearance - Ineffective  Goal: Achieve or maintain patent airway  Outcome: Ongoing     Problem: Gas Exchange - Impaired  Goal: Absence of hypoxia  Outcome: Met This Shift  Goal: Promote optimal lung function  Outcome: Met This Shift     Problem: Breathing Pattern - Ineffective  Goal: Ability to achieve and maintain a regular respiratory rate  Outcome: Met This Shift     Problem: Body Temperature -  Risk of, Imbalanced  Goal: Ability to maintain a body temperature within defined limits  Outcome: Met This Shift  Goal: Complications related to the disease process, condition or treatment will be avoided or minimized  Outcome: Not Met This Shift     Problem: Isolation Precautions - Risk of Spread of Infection  Goal: Prevent transmission of infection  Outcome: Met This Shift     Problem: Risk for Fluid Volume Deficit  Goal: Maintain normal heart rhythm  Outcome: Met This Shift  Goal: Maintain normal serum potassium, sodium, calcium, phosphorus, and pH  Outcome: Met This Shift     Problem: Skin Integrity:  Goal: Will show no infection signs and symptoms  Description: Will show no infection signs and symptoms  Outcome: Met This Shift  Goal: Absence of new skin breakdown  Description: Absence of new skin breakdown  Outcome: Met This Shift     Problem: Inadequate oral food/beverage intake (NI-2.1)  Goal: Food and/or Nutrient Delivery  Description: Individualized approach for food/nutrient provision.   8/23/2021 1627 by Maryan Navas RD, CNSC, LD  Outcome: Met This Shift

## 2021-08-24 NOTE — FLOWSHEET NOTE
Urine output tapering off. Attempts to irrigate the villagran were  unsuccessful while patient prone. Villagran not obviously occluded. 2 RNs in room and attempted to supine patient to irrigate the villagran. When patient supine and first latches were released vent began to alarm low tidal volume. Sat rapidly dropping to the point of unable to obtain. HR became bradycardic. Dr Anthony Newell in the room. Patient returned to prone.  HR and sats returned to normal.

## 2021-08-25 ENCOUNTER — APPOINTMENT (OUTPATIENT)
Dept: GENERAL RADIOLOGY | Age: 56
DRG: 005 | End: 2021-08-25
Payer: COMMERCIAL

## 2021-08-25 LAB
AADO2: 231.4 MMHG
AADO2: 260.1 MMHG
ALBUMIN SERPL-MCNC: 2.4 G/DL (ref 3.5–5.2)
ALP BLD-CCNC: 122 U/L (ref 40–129)
ALT SERPL-CCNC: 35 U/L (ref 0–40)
ANION GAP SERPL CALCULATED.3IONS-SCNC: 8 MMOL/L (ref 7–16)
ANION GAP SERPL CALCULATED.3IONS-SCNC: 9 MMOL/L (ref 7–16)
AST SERPL-CCNC: 67 U/L (ref 0–39)
B.E.: -1.8 MMOL/L (ref -3–3)
B.E.: -3 MMOL/L (ref -3–3)
BACTERIA: ABNORMAL /HPF
BASOPHILS ABSOLUTE: 0 E9/L (ref 0–0.2)
BASOPHILS ABSOLUTE: 0.03 E9/L (ref 0–0.2)
BASOPHILS RELATIVE PERCENT: 0 % (ref 0–2)
BASOPHILS RELATIVE PERCENT: 0.3 % (ref 0–2)
BILIRUB SERPL-MCNC: 0.3 MG/DL (ref 0–1.2)
BILIRUBIN URINE: NEGATIVE
BLOOD, URINE: ABNORMAL
BUN BLDV-MCNC: 18 MG/DL (ref 6–20)
BUN BLDV-MCNC: 19 MG/DL (ref 6–20)
BURR CELLS: ABNORMAL
BURR CELLS: ABNORMAL
C-REACTIVE PROTEIN: 4 MG/DL (ref 0–0.4)
CALCIUM SERPL-MCNC: 7.8 MG/DL (ref 8.6–10.2)
CALCIUM SERPL-MCNC: 8 MG/DL (ref 8.6–10.2)
CHLORIDE BLD-SCNC: 113 MMOL/L (ref 98–107)
CHLORIDE BLD-SCNC: 114 MMOL/L (ref 98–107)
CLARITY: CLEAR
CO2: 24 MMOL/L (ref 22–29)
CO2: 25 MMOL/L (ref 22–29)
COHB: 0.9 % (ref 0–1.5)
COHB: 1 % (ref 0–1.5)
COLOR: YELLOW
CREAT SERPL-MCNC: 0.8 MG/DL (ref 0.7–1.2)
CREAT SERPL-MCNC: 0.8 MG/DL (ref 0.7–1.2)
CRITICAL: ABNORMAL
CRITICAL: ABNORMAL
D DIMER: >5250 NG/ML DDU
DATE ANALYZED: ABNORMAL
DATE ANALYZED: ABNORMAL
DATE OF COLLECTION: ABNORMAL
DATE OF COLLECTION: ABNORMAL
EOSINOPHILS ABSOLUTE: 0.06 E9/L (ref 0.05–0.5)
EOSINOPHILS ABSOLUTE: 0.14 E9/L (ref 0.05–0.5)
EOSINOPHILS RELATIVE PERCENT: 0.7 % (ref 0–6)
EOSINOPHILS RELATIVE PERCENT: 1.7 % (ref 0–6)
FIO2: 50 %
FIO2: 55 %
GFR AFRICAN AMERICAN: >60
GFR AFRICAN AMERICAN: >60
GFR NON-AFRICAN AMERICAN: >60 ML/MIN/1.73
GFR NON-AFRICAN AMERICAN: >60 ML/MIN/1.73
GLUCOSE BLD-MCNC: 188 MG/DL (ref 74–99)
GLUCOSE BLD-MCNC: 255 MG/DL (ref 74–99)
GLUCOSE URINE: NEGATIVE MG/DL
HCO3: 20.4 MMOL/L (ref 22–26)
HCO3: 21.6 MMOL/L (ref 22–26)
HCT VFR BLD CALC: 40.9 % (ref 37–54)
HCT VFR BLD CALC: 41.7 % (ref 37–54)
HEMOGLOBIN: 13.6 G/DL (ref 12.5–16.5)
HEMOGLOBIN: 13.7 G/DL (ref 12.5–16.5)
HHB: 3.9 % (ref 0–5)
HHB: 4.8 % (ref 0–5)
IMMATURE GRANULOCYTES #: 0.39 E9/L
IMMATURE GRANULOCYTES %: 4.4 % (ref 0–5)
KETONES, URINE: NEGATIVE MG/DL
LAB: ABNORMAL
LAB: ABNORMAL
LACTIC ACID: 2.2 MMOL/L (ref 0.5–2.2)
LEUKOCYTE ESTERASE, URINE: NEGATIVE
LYMPHOCYTES ABSOLUTE: 0.16 E9/L (ref 1.5–4)
LYMPHOCYTES ABSOLUTE: 0.53 E9/L (ref 1.5–4)
LYMPHOCYTES RELATIVE PERCENT: 1.7 % (ref 20–42)
LYMPHOCYTES RELATIVE PERCENT: 6 % (ref 20–42)
Lab: ABNORMAL
Lab: ABNORMAL
MAGNESIUM: 1.9 MG/DL (ref 1.6–2.6)
MCH RBC QN AUTO: 29.9 PG (ref 26–35)
MCH RBC QN AUTO: 30 PG (ref 26–35)
MCHC RBC AUTO-ENTMCNC: 32.9 % (ref 32–34.5)
MCHC RBC AUTO-ENTMCNC: 33.3 % (ref 32–34.5)
MCV RBC AUTO: 89.9 FL (ref 80–99.9)
MCV RBC AUTO: 91.4 FL (ref 80–99.9)
METAMYELOCYTES RELATIVE PERCENT: 0.9 % (ref 0–1)
METER GLUCOSE: 144 MG/DL (ref 74–99)
METER GLUCOSE: 202 MG/DL (ref 74–99)
METER GLUCOSE: 237 MG/DL (ref 74–99)
METHB: 0.2 % (ref 0–1.5)
METHB: 0.2 % (ref 0–1.5)
MODE: ABNORMAL
MODE: ABNORMAL
MONOCYTES ABSOLUTE: 0 E9/L (ref 0.1–0.95)
MONOCYTES ABSOLUTE: 0.2 E9/L (ref 0.1–0.95)
MONOCYTES RELATIVE PERCENT: 0 % (ref 2–12)
MONOCYTES RELATIVE PERCENT: 2.3 % (ref 2–12)
MRSA CULTURE ONLY: NORMAL
MYELOCYTE PERCENT: 1.8 % (ref 0–0)
NEUTROPHILS ABSOLUTE: 7.67 E9/L (ref 1.8–7.3)
NEUTROPHILS ABSOLUTE: 7.95 E9/L (ref 1.8–7.3)
NEUTROPHILS RELATIVE PERCENT: 86.3 % (ref 43–80)
NEUTROPHILS RELATIVE PERCENT: 93.9 % (ref 43–80)
NITRITE, URINE: NEGATIVE
NUCLEATED RED BLOOD CELLS: 0 /100 WBC
O2 CONTENT: 18.2 ML/DL
O2 CONTENT: 18.5 ML/DL
O2 SATURATION: 95.1 % (ref 92–98.5)
O2 SATURATION: 96.1 % (ref 92–98.5)
O2HB: 94 % (ref 94–97)
O2HB: 95 % (ref 94–97)
OPERATOR ID: 2485
OPERATOR ID: 3342
OVALOCYTES: ABNORMAL
PATIENT TEMP: 37 C
PATIENT TEMP: 37 C
PCO2: 32.1 MMHG (ref 35–45)
PCO2: 33.1 MMHG (ref 35–45)
PDW BLD-RTO: 14 FL (ref 11.5–15)
PDW BLD-RTO: 14 FL (ref 11.5–15)
PEEP/CPAP: 12 CMH2O
PEEP/CPAP: 12 CMH2O
PFO2: 1.48 MMHG/%
PFO2: 1.53 MMHG/%
PH BLOOD GAS: 7.42 (ref 7.35–7.45)
PH BLOOD GAS: 7.43 (ref 7.35–7.45)
PH UA: 6 (ref 5–9)
PHOSPHORUS: 2.7 MG/DL (ref 2.5–4.5)
PLATELET # BLD: 100 E9/L (ref 130–450)
PLATELET # BLD: 89 E9/L (ref 130–450)
PLATELET CONFIRMATION: NORMAL
PMV BLD AUTO: 10.1 FL (ref 7–12)
PMV BLD AUTO: 9.8 FL (ref 7–12)
PO2: 76.5 MMHG (ref 75–100)
PO2: 81.5 MMHG (ref 75–100)
POIKILOCYTES: ABNORMAL
POIKILOCYTES: ABNORMAL
POTASSIUM SERPL-SCNC: 3.8 MMOL/L (ref 3.5–5)
POTASSIUM SERPL-SCNC: 4.4 MMOL/L (ref 3.5–5)
PROTEIN UA: ABNORMAL MG/DL
PS: 20 CMH20
RBC # BLD: 4.55 E12/L (ref 3.8–5.8)
RBC # BLD: 4.56 E12/L (ref 3.8–5.8)
RBC UA: ABNORMAL /HPF (ref 0–2)
RI(T): 302 %
RI(T): 319 %
RR MECHANICAL: 26 B/MIN
RR MECHANICAL: 26 B/MIN
SEDIMENTATION RATE, ERYTHROCYTE: 2 MM/HR (ref 0–15)
SMUDGE CELLS: ABNORMAL
SODIUM BLD-SCNC: 146 MMOL/L (ref 132–146)
SODIUM BLD-SCNC: 147 MMOL/L (ref 132–146)
SOURCE, BLOOD GAS: ABNORMAL
SOURCE, BLOOD GAS: ABNORMAL
SPECIFIC GRAVITY UA: 1.01 (ref 1–1.03)
TEAR DROP CELLS: ABNORMAL
TEAR DROP CELLS: ABNORMAL
THB: 13.6 G/DL (ref 11.5–16.5)
THB: 14 G/DL (ref 11.5–16.5)
TIME ANALYZED: 549
TIME ANALYZED: 8
TOTAL PROTEIN: 4.9 G/DL (ref 6.4–8.3)
UROBILINOGEN, URINE: 0.2 E.U./DL
WBC # BLD: 8.2 E9/L (ref 4.5–11.5)
WBC # BLD: 8.9 E9/L (ref 4.5–11.5)
WBC UA: ABNORMAL /HPF (ref 0–5)

## 2021-08-25 PROCEDURE — 2500000003 HC RX 250 WO HCPCS: Performed by: STUDENT IN AN ORGANIZED HEALTH CARE EDUCATION/TRAINING PROGRAM

## 2021-08-25 PROCEDURE — 2500000003 HC RX 250 WO HCPCS: Performed by: INTERNAL MEDICINE

## 2021-08-25 PROCEDURE — 94640 AIRWAY INHALATION TREATMENT: CPT

## 2021-08-25 PROCEDURE — 82805 BLOOD GASES W/O2 SATURATION: CPT

## 2021-08-25 PROCEDURE — 85025 COMPLETE CBC W/AUTO DIFF WBC: CPT

## 2021-08-25 PROCEDURE — 84100 ASSAY OF PHOSPHORUS: CPT

## 2021-08-25 PROCEDURE — 6360000002 HC RX W HCPCS: Performed by: INTERNAL MEDICINE

## 2021-08-25 PROCEDURE — 2580000003 HC RX 258: Performed by: STUDENT IN AN ORGANIZED HEALTH CARE EDUCATION/TRAINING PROGRAM

## 2021-08-25 PROCEDURE — 94003 VENT MGMT INPAT SUBQ DAY: CPT

## 2021-08-25 PROCEDURE — 2580000003 HC RX 258: Performed by: INTERNAL MEDICINE

## 2021-08-25 PROCEDURE — 83735 ASSAY OF MAGNESIUM: CPT

## 2021-08-25 PROCEDURE — P9047 ALBUMIN (HUMAN), 25%, 50ML: HCPCS | Performed by: STUDENT IN AN ORGANIZED HEALTH CARE EDUCATION/TRAINING PROGRAM

## 2021-08-25 PROCEDURE — C9113 INJ PANTOPRAZOLE SODIUM, VIA: HCPCS | Performed by: STUDENT IN AN ORGANIZED HEALTH CARE EDUCATION/TRAINING PROGRAM

## 2021-08-25 PROCEDURE — 6360000002 HC RX W HCPCS: Performed by: STUDENT IN AN ORGANIZED HEALTH CARE EDUCATION/TRAINING PROGRAM

## 2021-08-25 PROCEDURE — 99233 SBSQ HOSP IP/OBS HIGH 50: CPT | Performed by: FAMILY MEDICINE

## 2021-08-25 PROCEDURE — 37799 UNLISTED PX VASCULAR SURGERY: CPT

## 2021-08-25 PROCEDURE — 85378 FIBRIN DEGRADE SEMIQUANT: CPT

## 2021-08-25 PROCEDURE — 85651 RBC SED RATE NONAUTOMATED: CPT

## 2021-08-25 PROCEDURE — 86140 C-REACTIVE PROTEIN: CPT

## 2021-08-25 PROCEDURE — 6370000000 HC RX 637 (ALT 250 FOR IP): Performed by: INTERNAL MEDICINE

## 2021-08-25 PROCEDURE — 36415 COLL VENOUS BLD VENIPUNCTURE: CPT

## 2021-08-25 PROCEDURE — 83605 ASSAY OF LACTIC ACID: CPT

## 2021-08-25 PROCEDURE — 80048 BASIC METABOLIC PNL TOTAL CA: CPT

## 2021-08-25 PROCEDURE — 82962 GLUCOSE BLOOD TEST: CPT

## 2021-08-25 PROCEDURE — 2000000000 HC ICU R&B

## 2021-08-25 PROCEDURE — 81001 URINALYSIS AUTO W/SCOPE: CPT

## 2021-08-25 PROCEDURE — 80053 COMPREHEN METABOLIC PANEL: CPT

## 2021-08-25 RX ORDER — MAGNESIUM SULFATE IN WATER 40 MG/ML
2000 INJECTION, SOLUTION INTRAVENOUS ONCE
Status: COMPLETED | OUTPATIENT
Start: 2021-08-25 | End: 2021-08-25

## 2021-08-25 RX ORDER — ALBUMIN (HUMAN) 12.5 G/50ML
25 SOLUTION INTRAVENOUS 2 TIMES DAILY
Status: COMPLETED | OUTPATIENT
Start: 2021-08-25 | End: 2021-08-28

## 2021-08-25 RX ORDER — POTASSIUM CHLORIDE 29.8 MG/ML
40 INJECTION INTRAVENOUS ONCE
Status: COMPLETED | OUTPATIENT
Start: 2021-08-25 | End: 2021-08-25

## 2021-08-25 RX ADMIN — OXYCODONE HYDROCHLORIDE AND ACETAMINOPHEN 500 MG: 500 TABLET ORAL at 08:42

## 2021-08-25 RX ADMIN — REMDESIVIR 100 MG: 100 INJECTION, POWDER, LYOPHILIZED, FOR SOLUTION INTRAVENOUS at 21:30

## 2021-08-25 RX ADMIN — IPRATROPIUM BROMIDE AND ALBUTEROL SULFATE 1 AMPULE: .5; 3 SOLUTION RESPIRATORY (INHALATION) at 04:28

## 2021-08-25 RX ADMIN — FENTANYL CITRATE: 0.05 INJECTION, SOLUTION INTRAMUSCULAR; INTRAVENOUS at 06:39

## 2021-08-25 RX ADMIN — ATORVASTATIN CALCIUM 40 MG: 40 TABLET, FILM COATED ORAL at 20:24

## 2021-08-25 RX ADMIN — IPRATROPIUM BROMIDE AND ALBUTEROL SULFATE 1 AMPULE: .5; 3 SOLUTION RESPIRATORY (INHALATION) at 00:04

## 2021-08-25 RX ADMIN — VECURONIUM BROMIDE 1 MCG/KG/MIN: 1 INJECTION, POWDER, LYOPHILIZED, FOR SOLUTION INTRAVENOUS at 13:02

## 2021-08-25 RX ADMIN — ENOXAPARIN SODIUM 70 MG: 80 INJECTION SUBCUTANEOUS at 20:23

## 2021-08-25 RX ADMIN — INSULIN LISPRO 2 UNITS: 100 INJECTION, SOLUTION INTRAVENOUS; SUBCUTANEOUS at 00:27

## 2021-08-25 RX ADMIN — CETIRIZINE HYDROCHLORIDE 5 MG: 10 TABLET, FILM COATED ORAL at 08:41

## 2021-08-25 RX ADMIN — Medication 10 ML: at 08:42

## 2021-08-25 RX ADMIN — IPRATROPIUM BROMIDE AND ALBUTEROL SULFATE 1 AMPULE: .5; 3 SOLUTION RESPIRATORY (INHALATION) at 13:42

## 2021-08-25 RX ADMIN — INSULIN LISPRO 1 UNITS: 100 INJECTION, SOLUTION INTRAVENOUS; SUBCUTANEOUS at 18:29

## 2021-08-25 RX ADMIN — INSULIN GLARGINE 10 UNITS: 100 INJECTION, SOLUTION SUBCUTANEOUS at 00:26

## 2021-08-25 RX ADMIN — ALBUMIN (HUMAN) 25 G: 0.25 INJECTION, SOLUTION INTRAVENOUS at 10:14

## 2021-08-25 RX ADMIN — Medication 2000 UNITS: at 08:42

## 2021-08-25 RX ADMIN — MAGNESIUM SULFATE HEPTAHYDRATE 2000 MG: 2 INJECTION, SOLUTION INTRAVENOUS at 08:27

## 2021-08-25 RX ADMIN — DOXAZOSIN 8 MG: 4 TABLET ORAL at 08:42

## 2021-08-25 RX ADMIN — PHENYTOIN SODIUM 200 MG: 50 INJECTION INTRAMUSCULAR; INTRAVENOUS at 07:45

## 2021-08-25 RX ADMIN — ZINC SULFATE 220 MG (50 MG) CAPSULE 50 MG: CAPSULE at 08:42

## 2021-08-25 RX ADMIN — VECURONIUM BROMIDE 1 MCG/KG/MIN: 1 INJECTION, POWDER, LYOPHILIZED, FOR SOLUTION INTRAVENOUS at 20:01

## 2021-08-25 RX ADMIN — IPRATROPIUM BROMIDE AND ALBUTEROL SULFATE 1 AMPULE: .5; 3 SOLUTION RESPIRATORY (INHALATION) at 16:58

## 2021-08-25 RX ADMIN — CETIRIZINE HYDROCHLORIDE 5 MG: 10 TABLET, FILM COATED ORAL at 20:24

## 2021-08-25 RX ADMIN — FENTANYL CITRATE: 0.05 INJECTION, SOLUTION INTRAMUSCULAR; INTRAVENOUS at 13:03

## 2021-08-25 RX ADMIN — CHLORHEXIDINE GLUCONATE 0.12% ORAL RINSE 15 ML: 1.2 LIQUID ORAL at 08:43

## 2021-08-25 RX ADMIN — CHLORHEXIDINE GLUCONATE 0.12% ORAL RINSE 15 ML: 1.2 LIQUID ORAL at 20:23

## 2021-08-25 RX ADMIN — DEXAMETHASONE SODIUM PHOSPHATE 10 MG: 4 INJECTION, SOLUTION INTRAMUSCULAR; INTRAVENOUS at 05:46

## 2021-08-25 RX ADMIN — INSULIN LISPRO 1 UNITS: 100 INJECTION, SOLUTION INTRAVENOUS; SUBCUTANEOUS at 07:20

## 2021-08-25 RX ADMIN — ENOXAPARIN SODIUM 70 MG: 80 INJECTION SUBCUTANEOUS at 08:41

## 2021-08-25 RX ADMIN — AMLODIPINE BESYLATE 10 MG: 10 TABLET ORAL at 08:42

## 2021-08-25 RX ADMIN — PHENYTOIN SODIUM 300 MG: 50 INJECTION INTRAMUSCULAR; INTRAVENOUS at 21:59

## 2021-08-25 RX ADMIN — POTASSIUM CHLORIDE 40 MEQ: 29.8 INJECTION, SOLUTION INTRAVENOUS at 08:27

## 2021-08-25 RX ADMIN — DEXAMETHASONE SODIUM PHOSPHATE 10 MG: 4 INJECTION, SOLUTION INTRAMUSCULAR; INTRAVENOUS at 17:55

## 2021-08-25 RX ADMIN — Medication 10 MG/HR: at 15:24

## 2021-08-25 RX ADMIN — LISINOPRIL 40 MG: 20 TABLET ORAL at 08:42

## 2021-08-25 RX ADMIN — IPRATROPIUM BROMIDE AND ALBUTEROL SULFATE 1 AMPULE: .5; 3 SOLUTION RESPIRATORY (INHALATION) at 09:58

## 2021-08-25 RX ADMIN — IPRATROPIUM BROMIDE AND ALBUTEROL SULFATE 1 AMPULE: .5; 3 SOLUTION RESPIRATORY (INHALATION) at 20:52

## 2021-08-25 RX ADMIN — INSULIN LISPRO 2 UNITS: 100 INJECTION, SOLUTION INTRAVENOUS; SUBCUTANEOUS at 12:03

## 2021-08-25 RX ADMIN — Medication 10 MG/HR: at 09:52

## 2021-08-25 RX ADMIN — FENTANYL CITRATE: 0.05 INJECTION, SOLUTION INTRAMUSCULAR; INTRAVENOUS at 20:00

## 2021-08-25 RX ADMIN — ALBUMIN (HUMAN) 25 G: 0.25 INJECTION, SOLUTION INTRAVENOUS at 21:59

## 2021-08-25 RX ADMIN — MINERAL OIL AND PETROLATUM: 150; 830 OINTMENT OPHTHALMIC at 20:23

## 2021-08-25 RX ADMIN — PANTOPRAZOLE SODIUM 40 MG: 40 INJECTION, POWDER, FOR SOLUTION INTRAVENOUS at 08:41

## 2021-08-25 ASSESSMENT — PULMONARY FUNCTION TESTS
PIF_VALUE: 33
PIF_VALUE: 31
PIF_VALUE: 31
PIF_VALUE: 33
PIF_VALUE: 34
PIF_VALUE: 33
PIF_VALUE: 31
PIF_VALUE: 33
PIF_VALUE: 31
PIF_VALUE: 31
PIF_VALUE: 33
PIF_VALUE: 31
PIF_VALUE: 34
PIF_VALUE: 33
PIF_VALUE: 34
PIF_VALUE: 33
PIF_VALUE: 33
PIF_VALUE: 34
PIF_VALUE: 33
PIF_VALUE: 33

## 2021-08-25 ASSESSMENT — PAIN SCALES - GENERAL: PAINLEVEL_OUTOF10: 0

## 2021-08-25 NOTE — PROGRESS NOTES
infusion 1 mcg/kg/min (21 1530)    dextrose       PRN Meds:sodium chloride flush, sodium chloride, albuterol, benzonatate, glucose, dextrose, glucagon (rDNA), dextrose, sodium chloride    OBJECTIVE:  BP (!) 154/89   Pulse 118   Temp 99.7 °F (37.6 °C) (Bladder)   Resp 25   Ht 6' (1.829 m)   Wt (!) 302 lb (137 kg)   SpO2 93%   BMI 40.96 kg/m²   Temp  Av.4 °F (37.4 °C)  Min: 98.8 °F (37.1 °C)  Max: 99.7 °F (37.6 °C)  Constitutional: The patient is intubated, sedated, prone. Skin: Warm and dry. No rashes were noted. HEENT: Round and reactive pupils. Moist mucous membranes. No ulcerations or thrush. Neck: Supple to movements. Chest: No use of accessory muscles to breathe. Symmetrical expansion. No wheezing, crackles. Per RN Rhonchi  Cardiovascular: S1 and S2 are rhythmic and regular. Abdomen: prone  Genitourinary:   Extremities: No clubbing, no cyanosis, no edema.   Lines: peripheral  Art line 2021  Triple-lumen catheter 2021  Busch changed 3/24/2021  Laboratory and Tests Review:  Lab Results   Component Value Date    WBC 8.2 2021    WBC 8.9 2021    WBC 10.9 2021    HGB 13.7 2021    HCT 41.7 2021    MCV 91.4 2021     (L) 2021     Lab Results   Component Value Date    NEUTROABS 7.95 (H) 2021    NEUTROABS 7.67 (H) 2021    NEUTROABS 9.70 (H) 2021     No results found for: [facility]  Lab Results   Component Value Date    ALT 35 2021    AST 67 (H) 2021    ALKPHOS 122 2021    BILITOT 0.3 2021     Lab Results   Component Value Date     2021    K 3.8 2021    K 3.7 2021     2021    CO2 25 2021    BUN 18 2021    CREATININE 0.8 2021    CREATININE 0.8 2021    CREATININE 0.8 2021    GFRAA >60 2021    LABGLOM >60 2021    GLUCOSE 188 2021    PROT 4.9 2021    LABALBU 2.4 2021    CALCIUM 8.0 2021    BILITOT 0.3 08/25/2021    ALKPHOS 122 08/25/2021    AST 67 08/25/2021    ALT 35 08/25/2021     Lab Results   Component Value Date    CRP 4.0 (H) 08/25/2021    CRP 6.1 (H) 08/24/2021    CRP 14.3 (H) 08/23/2021     Lab Results   Component Value Date    SEDRATE 2 08/25/2021    SEDRATE 0 08/24/2021    SEDRATE 5 08/23/2021     Radiology:  CAT scan diffuse groundglass infiltrates    Microbiology:   Lab Results   Component Value Date    BC 24 Hours no growth 08/21/2021     Lab Results   Component Value Date    BLOODCULT2 24 Hours no growth 08/21/2021     No results found for: WNDABS  No results found for: RESPSMEAR  No results found for: MPNEUMO, CLAMYDCU, LABLEGI, AFBCX, FUNGSM, LABFUNG  No results found for: CULTRESP  No results found for: CXCATHTIP  No results found for: BFCS  No results found for: CXSURG  Urine Culture, Routine   Date Value Ref Range Status   08/24/2021 Growth not present, incubation continues  Preliminary     MRSA Culture Only   Date Value Ref Range Status   08/24/2021 Methicillin resistant Staph aureus not isolated  Final   Microbiology:  Blood cultures pending  Legionella antigen and strep pneumo antigen are negative  Urine culture negative    ASSESSMENT:  · Severe Covid pneumonia   · cytokine storm  · Poor prognosis    PLAN:  · Continue remdesivir; steroids;  Tosi x1 completed   · Check final cultures  · Monitor labs      Gee De MD  6:13 PM   8/25/2021

## 2021-08-25 NOTE — CARE COORDINATION
Continue ICU. COVID+8/18. Continue vent, sedated and paralytic. Pt currently proned. [facility] is following.  Discharge TBD pending progress-adairo

## 2021-08-25 NOTE — PROGRESS NOTES
Pt. Remains sedated and paralyzed VSS at this time. Pt. Is in the roto prone bed. He has generalized edema, his tongue is swollen and protruding from his mouth, his eyes are swollen. Unable to assess remainder of skin due to position in bed. Wife updated.

## 2021-08-25 NOTE — PROGRESS NOTES
(Bladder)   Resp 26   Ht 6' (1.829 m)   Wt (!) 302 lb (137 kg)   SpO2 95%   BMI 40.96 kg/m²     General Appearance: intubated and prone with Rotoprone apparatus  Skin: warm and dry  Head: normocephalic and atraumatic  Eyes: pupils equal, round, and reactive to light, extraocular eye movements intact, conjunctivae normal  Neck: neck supple and non tender without mass   Pulmonary/Chest: crackles bilaterally- no wheezes, no respiratory distress  Cardiovascular: normal rate, normal S1 and S2 and no carotid bruits  Abdomen: soft, non-tender, non-distended, normal bowel sounds, no masses or organomegaly  Extremities: no cyanosis, no clubbing and no edema  Neurologic: intubated and sedated; cannot assess    Recent Labs     08/24/21  0550 08/25/21  0020 08/25/21  0520   * 146 147*   K 4.3 4.4 3.8   * 113* 114*   CO2 22 24 25   BUN 20 19 18   CREATININE 0.8 0.8 0.8   GLUCOSE 185* 255* 188*   CALCIUM 7.6* 7.8* 8.0*       Recent Labs     08/24/21  0550 08/25/21  0020 08/25/21  0520   WBC 10.9 8.9 8.2   RBC 4.53 4.55 4.56   HGB 13.7 13.6 13.7   HCT 40.6 40.9 41.7   MCV 89.6 89.9 91.4   MCH 30.2 29.9 30.0   MCHC 33.7 33.3 32.9   RDW 13.9 14.0 14.0   * 89* 100*   MPV 9.3 9.8 10.1       Radiology:   XR CHEST PORTABLE    Result Date: 8/23/2021  EXAMINATION: ONE XRAY VIEW OF THE CHEST 8/23/2021 12:26 pm COMPARISON: August 23, 2021 HISTORY: ORDERING SYSTEM PROVIDED HISTORY: intubated, cvc placement TECHNOLOGIST PROVIDED HISTORY: Reason for exam:->intubated, cvc placement FINDINGS: Endotracheal tube is 3.1 cm above the donna. NG tube courses below the level of the diaphragm. Right IJ central venous catheter is present with distal tip of location of SVC. Stable interstitial and hazy opacities throughout both lungs. No pneumothorax. The heart appears normal in size. 1. Endotracheal tube is 3.1 cm above the donna. 2. Stable interstitial pulmonary edema or pneumonia throughout both lungs.      XR CHEST ABDOMEN NG PLACEMENT    Result Date: 8/23/2021  EXAMINATION: ONE SUPINE XRAY VIEW(S) OF THE ABDOMEN 8/23/2021 12:26 pm COMPARISON: None. HISTORY: ORDERING SYSTEM PROVIDED HISTORY: ng placement TECHNOLOGIST PROVIDED HISTORY: Reason for exam:->ng placement FINDINGS: Nasogastric tube courses below the level of the diaphragm with distal tip in the expected location of the stomach. Satisfactory position of nasogastric tube. Assessment:    Principal Problem:    Acute hypoxemic respiratory failure due to COVID-19 [facility])  Active Problems:    Type 2 diabetes mellitus without complication, without long-term current use of insulin (HCC)    Seizure (HCC)    Hyperlipidemia    Lactic acidosis  Resolved Problems:    OSCAR (acute kidney injury) ([facility] [address]      Plan:  1. Acute hypoxic respiratory failure, most likely due to viral pneumonia, O2 sat was below 90% on room air, requiring AC and Rotoprone apparatus with oxygen saturation above 90%. Treating the underlying process. 2.  Acute viral pneumonia, rapid influenza a and B were negative, respiratory panel was not done, procalcitonin was 0.36, CAT scan of the chest showed bilateral patchy groundglass opacities, sent for COVID-19, results positive. 3.  T2DM - Glucose POCT.  Continue basal and SS insulin. 4.  Hyperlipidemia - continue statins. 5.  HTN - continue meds. 6.  Elevated D-dimer - continue Lovenox bid. NOTE: This report was transcribed using voice recognition software. Every effort was made to ensure accuracy; however, inadvertent computerized transcription errors may be present.     Electronically signed by Gulshan Bryan MD on 8/25/2021 at 8:29 AM

## 2021-08-25 NOTE — PATIENT CARE CONFERENCE
Intensive Care Daily Quality Rounding Checklist        ICU Team Members: Dr. Keshawn Robles, Shila  [facility] (residents), clinical pharmacist, charge nurse, bedside nurse, respiratory therapist     ICU Day #: 5     Intubation Date: August 23     Ventilator Day #: 3     Central Line Insertion Date: August 23                                                    Day #: 3      Arterial Line Insertion Date: August 23                             Day #: 3     Temporary Hemodialysis Catheter Insertion Date: N/A                             Day # N/A     DVT Prophylaxis: Lovenox    GI Prophylaxis: Protonix     Busch Catheter Insertion Date: 08/24/2021 Changed)                                        Day #: 2                             Continued need (if yes, reason documented and discussed with physician): critical care patient, strict I+O     Skin Issues/ Wounds and ordered treatment discussed on rounds: No issues, SOS precautions     Goals/ Plans for the Day: Daily labs, wean vent as able, continue tube feeds, give Albumin, continue paralytic and proning

## 2021-08-25 NOTE — PROGRESS NOTES
Critical Care Team - Daily Progress Note      Date and time: 8/25/2021 11:22 AM  Patient's name:  Richardson Dewey Record Number: [id number]  Patient's account/billing number: [de-identified]  Patient's YOB: 1965  Age: 60 y.o. Date of Admission: 8/21/2021  9:35 AM  Length of stay during current admission: 4      Primary Care Physician: Loco Salazar, DO  ICU Attending Physician: Dr. Hogue    Code Status: No Order    Reason for ICU admission: Acute hypoxic respiratory failure      SUBJECTIVE:     OVERNIGHT EVENTS:      Patient remained proned overnight. No new acute events. Still sedated, paralyzed, intubated. 11/25: Patient remains intubated sedated paralyzed and in prone position. Overnight his blood pressure slightly was low but never required to be started on pressors. Is currently on FiO2 of 50% and PEEP of 12 and his PF ratio is 153. Has good urine output. Tolerating tube feeds.     AWAKE & FOLLOWING COMMANDS:  [x] No   [] Yes    CURRENT VENTILATION STATUS:     [x] Ventilator  [] BIPAP  [] Nasal Cannula [] Room Air      IF INTUBATED, ET TUBE MARKING AT LOWER LIP:       cms    SECRETIONS Amount:  [] Small [] Moderate  [] Large  [x] None  Color:     [] White [] Colored  [] Bloody    SEDATION:  RAAS Score:  [x]Fentanyl gtt  [x] Versed gtt  [] Ativan gtt   [] No Sedation    PARALYZED:  [] No    [x] Yes    DIARRHEA:                [x] No                [] Yes  (C. Difficile status: [] positive                                                                                                                       [] negative                                                                                                                     [] pending)    VASOPRESSORS:  [x] No    [] Yes    If yes -   [] Levophed       [] Dopamine     [] Vasopressin       [] Dobutamine  [] Phenylephrine         [] Epinephrine    CENTRAL LINES:     [x] No   [x] Yes        If yes -     [] Right IJ     [] Left IJ [] Right Femoral [] Left Femoral                   [] Right Subclavian [] Left Subclavian       ANDERSON'S CATHETER:   [] No   [x] Yes           OBJECTIVE:     VITAL SIGNS:  BP (!) 154/89   Pulse 96   Temp 99.7 °F (37.6 °C) (Bladder)   Resp 25   Ht 6' (1.829 m)   Wt (!) 302 lb (137 kg)   SpO2 91%   BMI 40.96 kg/m²   Tmax over 24 hours:  Temp (24hrs), Av.1 °F (37.3 °C), Min:98.6 °F (37 °C), Max:99.7 °F (37.6 °C)      Patient Vitals for the past 6 hrs:   Temp Temp src Pulse Resp SpO2   21 1000 -- -- 96 25 91 %   21 0959 -- -- 100 25 90 %   21 0900 -- -- 95 25 94 %   21 0800 99.7 °F (37.6 °C) Bladder 114 25 91 %   21 0700 -- -- 129 26 95 %   21 0600 -- -- 143 25 94 %         Intake/Output Summary (Last 24 hours) at 2021 1122  Last data filed at 2021 0700  Gross per 24 hour   Intake 5905 ml   Output 2475 ml   Net 3430 ml     Wt Readings from Last 2 Encounters:   21 (!) 302 lb (137 kg)   21 (!) 320 lb (145.2 kg)     Body mass index is 40.96 kg/m². PHYSICAL EXAMINATION:    General appearance -intubated, sedated, paralyzed, proned  Mental status -unable to assess secondary to patient being intubated  Eyes -unable to assess as patient is inside RotoProne  Ears - external ear normal  Nose - normal and patent, no erythema, discharge or polyps  Mouth -endotracheal tube in place  Neck - supple, no significant adenopathy  Chest -upon posterior examination clear to auscultation no wheeze or rhonchi  Heart - normal rate, regular rhythm, normal S1, S2, no murmurs, rubs, clicks or gallops  Abdomen -unable to access abdomen series   neurological -sedated  Extremities - peripheral pulses normal, no clubbing or cyanosis. No edema.   Skin - normal coloration and turgor, no rashes, no suspicious skin lesions noted      Any additional physical findings:    MEDICATIONS:    Scheduled Meds:   albumin human  25 g IntraVENous BID    chlorhexidine  15 mL Mouth/Throat BID    Refresh Lacri-Lube   Ophthalmic Q6H    dexamethasone  10 mg IntraVENous Q12H    lisinopril  40 mg Oral Daily    insulin lispro  0-6 Units Subcutaneous Q6H    ipratropium-albuterol  1 ampule Inhalation Q4H    phenytoin  200 mg IntraVENous QAM AC    phenytoin  300 mg IntraVENous Nightly    sodium chloride flush  10 mL IntraVENous BID    enoxaparin  0.5 mg/kg Subcutaneous BID    amLODIPine  10 mg Oral Daily    atorvastatin  40 mg Oral Daily    doxazosin  8 mg Oral Daily    ascorbic acid  500 mg Oral Daily    Vitamin D  2,000 Units Oral Daily    zinc sulfate  50 mg Oral Daily    insulin glargine  10 Units Subcutaneous Nightly    pantoprazole  40 mg IntraVENous Daily    remdesivir IVPB  100 mg IntraVENous Q24H    cetirizine  5 mg Oral BID     Continuous Infusions:   midazolam 10 mg/hr (08/25/21 0952)    IV infusion builder 40 mL/hr at 08/25/21 0639    norepinephrine      sodium chloride      vecuronium (NORCURON) infusion 1 mcg/kg/min (08/24/21 2023)    dextrose       PRN Meds:   sodium chloride flush, 5-40 mL, PRN  sodium chloride, 25 mL, PRN  albuterol, 2.5 mg, Q6H PRN  benzonatate, 200 mg, TID PRN  glucose, 15 g, PRN  dextrose, 12.5 g, PRN  glucagon (rDNA), 1 mg, PRN  dextrose, 100 mL/hr, PRN  sodium chloride, 30 mL, PRN          VENT SETTINGS (Comprehensive) (if applicable):  Vent Information  $Ventilation: $Subsequent Day  Skin Assessment: Clean, dry, & intact  Equipment ID: [id number]  Equipment Changed: Humidification  Vent Type: [phone]  Vent Mode: AC/PC  Vt Ordered: 0 mL  Pressure Ordered: 20  Rate Set: 26 bmp  Peak Flow: 0 L/min  Pressure Support: 0 cmH20  FiO2 : 50 %  SpO2: 91 %  SpO2/FiO2 ratio: 182  Sensitivity: 3  PEEP/CPAP: 12  I Time/ I Time %: 0 s  Humidification Source: Heated wire  Humidification Temp: 37  Humidification Temp Measured: 37  Circuit Condensation: Drained  Mask Type: Full face mask  Mask Size: Medium  Additional Respiratory  Assessments  Pulse: 96  Resp: 25  SpO2: 91 %  Position: Prone  Humidification Source: Heated wire  Humidification Temp: 37  Circuit Condensation: Drained  Oral Care: Lip moisturizer applied, Mouthwash with chlorhexidine, Mouth swabbed, Mouth suctioned  Subglottic Suction Done?: Yes  Airway Type: ET  Airway Size: 8  Cuff Pressure (cm H2O): 29 cm H2O    ABG  Lab Results   Component Value Date    PH 7.422 08/25/2021    PCO2 32.1 08/25/2021    PO2 76.5 08/25/2021    HCO3 20.4 08/25/2021    O2SAT 95.1 08/25/2021         Laboratory findings:    Complete Blood Count:   Recent Labs     08/24/21  0550 08/25/21  0020 08/25/21  0520   WBC 10.9 8.9 8.2   HGB 13.7 13.6 13.7   HCT 40.6 40.9 41.7   * 89* 100*        Lactic Acid  Lab Results   Component Value Date    LACTA 2.2 08/25/2021    LACTA 1.8 08/23/2021    LACTA 3.9 08/21/2021        Last 3 Blood Glucose:   Recent Labs     08/24/21  0550 08/25/21  0020 08/25/21  0520   GLUCOSE 185* 255* 188*        PT/INR:  No results found for: PROTIME, INR  PTT:  No results found for: APTT, PTT    Comprehensive Metabolic Profile:   Recent Labs     08/24/21  0550 08/24/21  0550 08/25/21  0020 08/25/21  0520   *  --  146 147*   K 4.3  --  4.4 3.8   *  --  113* 114*   CO2 22  --  24 25   BUN 20  --  19 18   CREATININE 0.8  --  0.8 0.8   GLUCOSE 185*  --  255* 188*   CALCIUM 7.6*  --  7.8* 8.0*   PROT 5.0*   < >  --  4.9*   LABALBU 2.3*   < >  --  2.4*   BILITOT 0.3   < >  --  0.3   ALKPHOS 109   < >  --  122   AST 43*   < >  --  67*   ALT 24   < >  --  35    < > = values in this interval not displayed. Magnesium:   Lab Results   Component Value Date    MG 1.9 08/25/2021     Phosphorus:   Lab Results   Component Value Date    PHOS 2.7 08/25/2021     Ionized Calcium: No results found for: CAION       Urinalysis:     Troponin: No results for input(s): TROPONINI in the last 72 hours. Cultures     Cultures during this admission:       No results for input(s): BC in the last 72 hours.   No results for input(s): Redgie Jyoti in the last 72 hours. No results for input(s): LABURIN in the last 72 hours. Other pertinent Labs:       Radiology/Imaging:   XR CHEST (2 VW)    Result Date: 8/18/2021  EXAMINATION: TWO XRAY VIEWS OF THE CHEST 8/17/2021 11:32 pm COMPARISON: None. HISTORY: ORDERING SYSTEM PROVIDED HISTORY: cough TECHNOLOGIST PROVIDED HISTORY: Reason for exam:->cough FINDINGS: Frontal and lateral views of the chest.  Low lung volume. Multifocal bilateral patchy opacities are most pronounced in the mid and lower lung zones. No pleural effusion or pneumothorax. Normal cardiomediastinal silhouette and great vessels. Multilevel degenerative disc disease. Multifocal bilateral patchy opacities are most pronounced in the mid and lower lung zones. Differential considerations include an infectious/inflammatory process including atypical or viral pneumonia and pulmonary edema. XR CHEST PORTABLE    Result Date: 8/23/2021  EXAMINATION: ONE XRAY VIEW OF THE CHEST 8/23/2021 12:26 pm COMPARISON: August 23, 2021 HISTORY: ORDERING SYSTEM PROVIDED HISTORY: intubated, cvc placement TECHNOLOGIST PROVIDED HISTORY: Reason for exam:->intubated, cvc placement FINDINGS: Endotracheal tube is 3.1 cm above the donna. NG tube courses below the level of the diaphragm. Right IJ central venous catheter is present with distal tip of location of SVC. Stable interstitial and hazy opacities throughout both lungs. No pneumothorax. The heart appears normal in size. 1. Endotracheal tube is 3.1 cm above the donna. 2. Stable interstitial pulmonary edema or pneumonia throughout both lungs.      XR CHEST PORTABLE    Result Date: 8/23/2021  EXAMINATION: ONE XRAY VIEW OF THE CHEST 8/23/2021 7:51 am COMPARISON: Previous CT of the thorax of 08/21/2021 and previous chest x-ray of 08/22/2021 HISTORY: ORDERING SYSTEM PROVIDED HISTORY: increasing SOB TECHNOLOGIST PROVIDED HISTORY: Reason for exam:->increasing SOB FINDINGS: There is significant motion artifact throughout the lungs. Significant multifocal bilateral airspace disease is noted unchanged compared to prior study. There is no right or left gross pleural effusion. The heart is enlarged. 1. There is no interval change in the significant multifocal bilateral airspace disease. XR CHEST PORTABLE    Result Date: 8/22/2021  EXAMINATION: ONE XRAY VIEW OF THE CHEST 8/22/2021 12:00 pm COMPARISON: 08/21/2021 HISTORY: ORDERING SYSTEM PROVIDED HISTORY: Acute resp failure, COVID TECHNOLOGIST PROVIDED HISTORY: Reason for exam:->Acute resp failure, COVID FINDINGS: Bilateral airspace opacities with improved aeration of the right lung compared to prior study. There is no effusion or pneumothorax. The cardiomediastinal silhouette is without acute process. The osseous structures are without acute process. Improved aeration of the right lung compared to prior study. XR CHEST PORTABLE    Result Date: 8/21/2021  EXAMINATION: ONE XRAY VIEW OF THE CHEST 8/21/2021 9:56 am COMPARISON: August 17, 2021 HISTORY: ORDERING SYSTEM PROVIDED HISTORY: pneumonia TECHNOLOGIST PROVIDED HISTORY: Reason for exam:->pneumonia FINDINGS: Compared with most recent examination, there has been a significant interval increase in the degree of patchy bilateral airspace opacities, in keeping with the patient's known diagnosis of COVID pneumonia. No evidence of a sizable pleural effusion. No pneumothorax is identified. Heart size is unable to be accurately assessed on this single portable view of the chest, but appears to be stable. No acute osseous abnormality. Significantly increased airspace opacities throughout both lungs, in keeping with multifocal pneumonia.      CTA PULMONARY W CONTRAST    Result Date: 8/21/2021  EXAMINATION: CTA OF THE CHEST, 8/21/2021 12:11 pm TECHNIQUE: CTA of the chest was performed after the administration of intravenous contrast.  Multiplanar reformatted images are provided for review. MIP images are provided for review. Dose modulation, iterative reconstruction, and/or weight based adjustment of the mA/kV was utilized to reduce the radiation dose to as low as reasonably achievable. COMPARISON: None HISTORY: ORDERING SYSTEM PROVIDED HISTORY:  PE vs COVID TECHNOLOGIST PROVIDED HISTORY: Reason for Exam:  PE vs COVID Decision Support Exception - unselect if not a suspected or confirmed emergency medical condition->Emergency Medical Condition (MA) FINDINGS: Pulmonary Arteries: There is a combination of poor contrast bolus timing as well as significant respiratory motion artifact that severely degrades the examination. Unfortunately, multiple segmental and subsegmental pulmonary artery branches are not opacified well enough to exclude a pulmonary embolism. There is no evidence of a central pulmonary embolism or convincing evidence of right heart strain. Mediastinum: There is at least one enlarged mediastinal lymph node measuring 17 mm in short axis in the subcarinal distribution. Several additional smaller but nonenlarged mediastinal lymph nodes are present, likely reactive. The thoracic esophagus is decompressed, limiting assessment. No obvious esophageal abnormality. The heart size is top normal.  Trace pericardial fluid is present. Lungs/pleura: The central airways are patent. There is no evidence of a pleural effusion or pneumothorax. Extensive ground-glass and consolidative airspace opacities are present bilaterally, in keeping with the provided history of COVID pneumonia. Upper Abdomen: Imaged portions of the upper abdomen demonstrates diffuse hepatic steatosis. There are also multiple nonobstructing bilateral renal calculi, partially imaged. Soft Tissues/Bones: No acute bone or soft tissue abnormality. Extensive ground-glass and consolidative airspace opacities throughout both lungs, in keeping with the provided history of COVID pneumonia.  The examination is nondiagnostic for detection of pulmonary emboli in multiple segmental and subsegmental pulmonary artery distributions, secondary to poor contrast bolus timing and respiratory motion artifact. There is no evidence of a central pulmonary embolism. Hepatic steatosis. Enlarged mediastinal lymph nodes, likely reactive. XR CHEST ABDOMEN NG PLACEMENT    Result Date: 8/23/2021  EXAMINATION: ONE SUPINE XRAY VIEW(S) OF THE ABDOMEN 8/23/2021 12:26 pm COMPARISON: None. HISTORY: ORDERING SYSTEM PROVIDED HISTORY: ng placement TECHNOLOGIST PROVIDED HISTORY: Reason for exam:->ng placement FINDINGS: Nasogastric tube courses below the level of the diaphragm with distal tip in the expected location of the stomach. Satisfactory position of nasogastric tube. Chest Xray (8/25/2021):    SYSTEMS ASSESSMENT  Neuro   Intubated, sedated, paralyzed     History of seizures  -Continue home phenytoin  -Telemetry monitoring     Respiratory   Acute hypoxic respiratory failure/severe ARDS due to COVID-19  -PF ratio today 153. Plan to wean down PEEP to twin. -Continue prone positioning  -Decadron 10 mg IV twice daily  -We will add bronchodilators DuoNebs every 6 hours.     Cardiovascular     Currently hemodynamically stable. Has fluctuating heart rate. Hyperlipidemia  -On statins     Gastrointestinal   GI prophylaxis  -Protonix  -Tolerating tube feeds  -We will add albumin 25 g IV every 12 hours for 4 doses     Renal   Lactic acidosis  -resolved  -Patient with normal BUN and creatinine good urine output     Infectious Disease   COVID-19  -Given Rocephin and doxy in the ED  -Continue Decadron, remdesivir today last dose.   Tocilizumab complete  -Cultures so far negative  -ID following,      Hematology/Oncology   DVT prophylaxis  -Lovenox, weight-based     Endocrine   Type 2 diabetes  -Receiving tube feeds  -Glucose running high  -Sliding scale insulin     Social/Spiritual/DNR/Other   Full code      PLAN:     WEAN PER PROTOCOL:  [] No   [x] Yes  [] N/A    DISCONTINUE ANY LABS:   [x] No   [] Yes    ICU PROPHYLAXIS:  Stress ulcer:  [x] PPI Agent  [] [id number] [] Sucralfate  [] Other:  VTE:   [x] Enoxaparin  [] Unfract.  Heparin Subcut  [] EPC Cuffs    NUTRITION:  [] NPO [] Tube Feeding (Specify: ) [] TPN  [x] PO (Diet: ADULT TUBE FEEDING; Nasogastric; Diabetic; Continuous; 20; Yes; 20; Q 6 hours; 60; 30; Q 4 hours; Protein; Proteinex 2 go once daily)    HOME MEDICATIONS RECONCILED: [] No  [x] Yes    INSULIN DRIP:   [x] No   [] Yes    CONSULTATION NEEDED:  [x] No   [] Yes    FAMILY UPDATED:    [x] No   [] Yes    TRANSFER OUT OF ICU:   [x] No   [] Yes    Gab Sellers MD, [facility]                  8/25/2021, 11:22 AM    CCT excluding procedures:40'

## 2021-08-26 ENCOUNTER — APPOINTMENT (OUTPATIENT)
Dept: GENERAL RADIOLOGY | Age: 56
DRG: 005 | End: 2021-08-26
Payer: COMMERCIAL

## 2021-08-26 PROBLEM — Z87.898 HISTORY OF SEIZURES: Status: ACTIVE | Noted: 2021-08-10

## 2021-08-26 PROBLEM — E87.20 LACTIC ACIDOSIS: Status: RESOLVED | Noted: 2021-08-22 | Resolved: 2021-08-26

## 2021-08-26 PROBLEM — D69.6 THROMBOCYTOPENIA (HCC): Status: ACTIVE | Noted: 2021-08-26

## 2021-08-26 PROBLEM — T83.9XXA FOLEY CATHETER PROBLEM (HCC): Status: ACTIVE | Noted: 2021-08-26

## 2021-08-26 PROBLEM — A41.9 SEPSIS (HCC): Status: ACTIVE | Noted: 2021-08-26

## 2021-08-26 LAB
AADO2: 248 MMHG
AADO2: 268.8 MMHG
ALBUMIN SERPL-MCNC: 2.7 G/DL (ref 3.5–5.2)
ALP BLD-CCNC: 119 U/L (ref 40–129)
ALT SERPL-CCNC: 47 U/L (ref 0–40)
ANION GAP SERPL CALCULATED.3IONS-SCNC: 10 MMOL/L (ref 7–16)
AST SERPL-CCNC: 92 U/L (ref 0–39)
B.E.: -0.1 MMOL/L (ref -3–0)
B.E.: -3.1 MMOL/L (ref -3–3)
B.E.: -3.8 MMOL/L (ref -3–3)
BASOPHILS ABSOLUTE: 0.03 E9/L (ref 0–0.2)
BASOPHILS RELATIVE PERCENT: 0.3 % (ref 0–2)
BILIRUB SERPL-MCNC: 0.4 MG/DL (ref 0–1.2)
BLOOD CULTURE, ROUTINE: NORMAL
BUN BLDV-MCNC: 24 MG/DL (ref 6–20)
BURR CELLS: ABNORMAL
C-REACTIVE PROTEIN: 1.9 MG/DL (ref 0–0.4)
CALCIUM SERPL-MCNC: 7.9 MG/DL (ref 8.6–10.2)
CHLORIDE BLD-SCNC: 111 MMOL/L (ref 98–107)
CO2: 25 MMOL/L (ref 22–29)
COHB: 1.2 % (ref 0–1.5)
COHB: 1.2 % (ref 0–1.5)
CREAT SERPL-MCNC: 0.8 MG/DL (ref 0.7–1.2)
CRITICAL: ABNORMAL
CRITICAL: ABNORMAL
CULTURE, BLOOD 2: NORMAL
DATE ANALYZED: ABNORMAL
DATE ANALYZED: ABNORMAL
DATE OF COLLECTION: ABNORMAL
DATE OF COLLECTION: ABNORMAL
DEVICE: NORMAL
EOSINOPHILS ABSOLUTE: 0.13 E9/L (ref 0.05–0.5)
EOSINOPHILS RELATIVE PERCENT: 1.3 % (ref 0–6)
FIO2 ARTERIAL: 70
FIO2: 55 %
FIO2: 55 %
GFR AFRICAN AMERICAN: >60
GFR NON-AFRICAN AMERICAN: >60 ML/MIN/1.73
GLUCOSE BLD-MCNC: 249 MG/DL (ref 74–99)
HCO3 ARTERIAL: 25.5 MMOL/L (ref 22–26)
HCO3: 19.9 MMOL/L (ref 22–26)
HCO3: 23.4 MMOL/L (ref 22–26)
HCT VFR BLD CALC: 40.4 % (ref 37–54)
HEMOGLOBIN: 13.5 G/DL (ref 12.5–16.5)
HHB: 5.9 % (ref 0–5)
HHB: 6.9 % (ref 0–5)
IMMATURE GRANULOCYTES #: 0.46 E9/L
IMMATURE GRANULOCYTES %: 4.8 % (ref 0–5)
LAB: ABNORMAL
LAB: ABNORMAL
LYMPHOCYTES ABSOLUTE: 0.83 E9/L (ref 1.5–4)
LYMPHOCYTES RELATIVE PERCENT: 8.6 % (ref 20–42)
Lab: ABNORMAL
Lab: ABNORMAL
MAGNESIUM: 1.9 MG/DL (ref 1.6–2.6)
MCH RBC QN AUTO: 30.5 PG (ref 26–35)
MCHC RBC AUTO-ENTMCNC: 33.4 % (ref 32–34.5)
MCV RBC AUTO: 91.2 FL (ref 80–99.9)
METER GLUCOSE: 210 MG/DL (ref 74–99)
METER GLUCOSE: 239 MG/DL (ref 74–99)
METER GLUCOSE: 272 MG/DL (ref 74–99)
METHB: 0.2 % (ref 0–1.5)
METHB: 0.3 % (ref 0–1.5)
MODE: ABNORMAL
MODE: AC
MODE: AC
MONOCYTES ABSOLUTE: 0.32 E9/L (ref 0.1–0.95)
MONOCYTES RELATIVE PERCENT: 3.3 % (ref 2–12)
NEUTROPHILS ABSOLUTE: 7.89 E9/L (ref 1.8–7.3)
NEUTROPHILS RELATIVE PERCENT: 81.7 % (ref 43–80)
O2 CONTENT: 16.8 ML/DL
O2 CONTENT: 18.6 ML/DL
O2 SATURATION: 93 % (ref 92–98.5)
O2 SATURATION: 94 % (ref 92–98.5)
O2 SATURATION: 96.8 % (ref 92–98.5)
O2HB: 91.7 % (ref 94–97)
O2HB: 92.6 % (ref 94–97)
OPERATOR ID: 5100
OPERATOR ID: 5100
OPERATOR ID: ABNORMAL
PATIENT TEMP: 37
PATIENT TEMP: 37 C
PATIENT TEMP: 37 C
PCO2 (TEMP CORRECTED): 44.4 MMHG (ref 35–45)
PCO2: 32.2 MMHG (ref 35–45)
PCO2: 47.7 MMHG (ref 35–45)
PDW BLD-RTO: 14.1 FL (ref 11.5–15)
PEEP/CPAP: 10 CMH2O
PEEP/CPAP: 10 CMH2O
PFO2: 1.34 MMHG/%
PFO2: 1.41 MMHG/%
PH (TEMPERATURE CORRECTED): 7.37 (ref 7.35–7.45)
PH BLOOD GAS: 7.31 (ref 7.35–7.45)
PH BLOOD GAS: 7.41 (ref 7.35–7.45)
PHENYTOIN DOSE AMOUNT: ABNORMAL
PHENYTOIN LEVEL: 5.4 UG/ML (ref 10–20)
PHOSPHORUS: 3.8 MG/DL (ref 2.5–4.5)
PLATELET # BLD: 93 E9/L (ref 130–450)
PLATELET CONFIRMATION: NORMAL
PMV BLD AUTO: 10.3 FL (ref 7–12)
PO2 (TEMP CORRECTED): 92.3 MMHG (ref 60–100)
PO2: 73.8 MMHG (ref 75–100)
PO2: 77.3 MMHG (ref 75–100)
POIKILOCYTES: ABNORMAL
POSITIVE END EXP PRESS: 10 CMH2O
POTASSIUM SERPL-SCNC: 4.3 MMOL/L (ref 3.5–5)
PS: 22 CMH20
RBC # BLD: 4.43 E12/L (ref 3.8–5.8)
RESPIRATORY RATE: 30 B/MIN
RI(T): 321 %
RI(T): 364 %
RR MECHANICAL: 26 B/MIN
RR MECHANICAL: 28 B/MIN
SEDIMENTATION RATE, ERYTHROCYTE: 0 MM/HR (ref 0–15)
SODIUM BLD-SCNC: 146 MMOL/L (ref 132–146)
SOURCE, BLOOD GAS: ABNORMAL
SOURCE, BLOOD GAS: ABNORMAL
SOURCE, BLOOD GAS: NORMAL
THB: 13 G/DL (ref 11.5–16.5)
THB: 14.3 G/DL (ref 11.5–16.5)
TIDAL VOLUME: 550 ML
TIME ANALYZED: 1215
TIME ANALYZED: 622
TOTAL PROTEIN: 5.3 G/DL (ref 6.4–8.3)
URINE CULTURE, ROUTINE: NORMAL
VT MECHANICAL: 480 ML
WBC # BLD: 9.7 E9/L (ref 4.5–11.5)

## 2021-08-26 PROCEDURE — 51702 INSERT TEMP BLADDER CATH: CPT

## 2021-08-26 PROCEDURE — 80053 COMPREHEN METABOLIC PANEL: CPT

## 2021-08-26 PROCEDURE — 2580000003 HC RX 258: Performed by: FAMILY MEDICINE

## 2021-08-26 PROCEDURE — 99233 SBSQ HOSP IP/OBS HIGH 50: CPT | Performed by: FAMILY MEDICINE

## 2021-08-26 PROCEDURE — 82805 BLOOD GASES W/O2 SATURATION: CPT

## 2021-08-26 PROCEDURE — C9113 INJ PANTOPRAZOLE SODIUM, VIA: HCPCS | Performed by: STUDENT IN AN ORGANIZED HEALTH CARE EDUCATION/TRAINING PROGRAM

## 2021-08-26 PROCEDURE — 80185 ASSAY OF PHENYTOIN TOTAL: CPT

## 2021-08-26 PROCEDURE — 85025 COMPLETE CBC W/AUTO DIFF WBC: CPT

## 2021-08-26 PROCEDURE — 2500000003 HC RX 250 WO HCPCS: Performed by: INTERNAL MEDICINE

## 2021-08-26 PROCEDURE — 6370000000 HC RX 637 (ALT 250 FOR IP): Performed by: INTERNAL MEDICINE

## 2021-08-26 PROCEDURE — 6360000002 HC RX W HCPCS: Performed by: STUDENT IN AN ORGANIZED HEALTH CARE EDUCATION/TRAINING PROGRAM

## 2021-08-26 PROCEDURE — 84100 ASSAY OF PHOSPHORUS: CPT

## 2021-08-26 PROCEDURE — 2000000000 HC ICU R&B

## 2021-08-26 PROCEDURE — 85651 RBC SED RATE NONAUTOMATED: CPT

## 2021-08-26 PROCEDURE — 94003 VENT MGMT INPAT SUBQ DAY: CPT

## 2021-08-26 PROCEDURE — P9047 ALBUMIN (HUMAN), 25%, 50ML: HCPCS | Performed by: STUDENT IN AN ORGANIZED HEALTH CARE EDUCATION/TRAINING PROGRAM

## 2021-08-26 PROCEDURE — 82962 GLUCOSE BLOOD TEST: CPT

## 2021-08-26 PROCEDURE — 6360000002 HC RX W HCPCS: Performed by: INTERNAL MEDICINE

## 2021-08-26 PROCEDURE — 82803 BLOOD GASES ANY COMBINATION: CPT

## 2021-08-26 PROCEDURE — 6360000002 HC RX W HCPCS: Performed by: FAMILY MEDICINE

## 2021-08-26 PROCEDURE — 2580000003 HC RX 258: Performed by: STUDENT IN AN ORGANIZED HEALTH CARE EDUCATION/TRAINING PROGRAM

## 2021-08-26 PROCEDURE — 86140 C-REACTIVE PROTEIN: CPT

## 2021-08-26 PROCEDURE — 83735 ASSAY OF MAGNESIUM: CPT

## 2021-08-26 PROCEDURE — 36415 COLL VENOUS BLD VENIPUNCTURE: CPT

## 2021-08-26 PROCEDURE — 71045 X-RAY EXAM CHEST 1 VIEW: CPT

## 2021-08-26 PROCEDURE — 37799 UNLISTED PX VASCULAR SURGERY: CPT

## 2021-08-26 PROCEDURE — 2580000003 HC RX 258: Performed by: INTERNAL MEDICINE

## 2021-08-26 PROCEDURE — 6360000002 HC RX W HCPCS: Performed by: SPECIALIST

## 2021-08-26 PROCEDURE — 94640 AIRWAY INHALATION TREATMENT: CPT

## 2021-08-26 RX ORDER — INSULIN GLARGINE 100 [IU]/ML
5 INJECTION, SOLUTION SUBCUTANEOUS ONCE
Status: COMPLETED | OUTPATIENT
Start: 2021-08-26 | End: 2021-08-26

## 2021-08-26 RX ORDER — INSULIN GLARGINE 100 [IU]/ML
14 INJECTION, SOLUTION SUBCUTANEOUS NIGHTLY
Status: DISCONTINUED | OUTPATIENT
Start: 2021-08-26 | End: 2021-08-26

## 2021-08-26 RX ORDER — INSULIN GLARGINE 100 [IU]/ML
15 INJECTION, SOLUTION SUBCUTANEOUS DAILY
Status: DISCONTINUED | OUTPATIENT
Start: 2021-08-27 | End: 2021-08-27

## 2021-08-26 RX ORDER — FLUCONAZOLE 2 MG/ML
400 INJECTION, SOLUTION INTRAVENOUS EVERY 24 HOURS
Status: COMPLETED | OUTPATIENT
Start: 2021-08-26 | End: 2021-08-29

## 2021-08-26 RX ORDER — PROPOFOL 10 MG/ML
5-50 INJECTION, EMULSION INTRAVENOUS
Status: DISCONTINUED | OUTPATIENT
Start: 2021-08-26 | End: 2021-09-13

## 2021-08-26 RX ORDER — FLUCONAZOLE 2 MG/ML
400 INJECTION, SOLUTION INTRAVENOUS EVERY 24 HOURS
Status: DISCONTINUED | OUTPATIENT
Start: 2021-08-26 | End: 2021-08-26 | Stop reason: SDUPTHER

## 2021-08-26 RX ORDER — FLUCONAZOLE 2 MG/ML
400 INJECTION, SOLUTION INTRAVENOUS EVERY 24 HOURS
Status: DISCONTINUED | OUTPATIENT
Start: 2021-08-26 | End: 2021-08-26 | Stop reason: CLARIF

## 2021-08-26 RX ADMIN — MINERAL OIL AND PETROLATUM: 150; 830 OINTMENT OPHTHALMIC at 02:00

## 2021-08-26 RX ADMIN — IPRATROPIUM BROMIDE AND ALBUTEROL SULFATE 1 AMPULE: .5; 3 SOLUTION RESPIRATORY (INHALATION) at 15:25

## 2021-08-26 RX ADMIN — CETIRIZINE HYDROCHLORIDE 5 MG: 10 TABLET, FILM COATED ORAL at 22:02

## 2021-08-26 RX ADMIN — INSULIN LISPRO 6 UNITS: 100 INJECTION, SOLUTION INTRAVENOUS; SUBCUTANEOUS at 12:16

## 2021-08-26 RX ADMIN — FENTANYL CITRATE: 0.05 INJECTION, SOLUTION INTRAMUSCULAR; INTRAVENOUS at 09:34

## 2021-08-26 RX ADMIN — FENTANYL CITRATE: 0.05 INJECTION, SOLUTION INTRAMUSCULAR; INTRAVENOUS at 02:34

## 2021-08-26 RX ADMIN — PROPOFOL 15 MCG/KG/MIN: 10 INJECTION, EMULSION INTRAVENOUS at 15:45

## 2021-08-26 RX ADMIN — PANTOPRAZOLE SODIUM 40 MG: 40 INJECTION, POWDER, FOR SOLUTION INTRAVENOUS at 09:59

## 2021-08-26 RX ADMIN — Medication 10 MG/HR: at 08:50

## 2021-08-26 RX ADMIN — PROPOFOL 25 MCG/KG/MIN: 10 INJECTION, EMULSION INTRAVENOUS at 09:58

## 2021-08-26 RX ADMIN — FLUCONAZOLE 400 MG: 2 INJECTION, SOLUTION INTRAVENOUS at 22:35

## 2021-08-26 RX ADMIN — FENTANYL CITRATE: 0.05 INJECTION, SOLUTION INTRAMUSCULAR; INTRAVENOUS at 16:28

## 2021-08-26 RX ADMIN — Medication 10 ML: at 22:21

## 2021-08-26 RX ADMIN — PHENYTOIN SODIUM 300 MG: 50 INJECTION INTRAMUSCULAR; INTRAVENOUS at 22:01

## 2021-08-26 RX ADMIN — DEXAMETHASONE SODIUM PHOSPHATE 10 MG: 4 INJECTION, SOLUTION INTRAMUSCULAR; INTRAVENOUS at 17:01

## 2021-08-26 RX ADMIN — Medication 6 MG/HR: at 23:27

## 2021-08-26 RX ADMIN — CHLORHEXIDINE GLUCONATE 0.12% ORAL RINSE 15 ML: 1.2 LIQUID ORAL at 10:02

## 2021-08-26 RX ADMIN — IPRATROPIUM BROMIDE AND ALBUTEROL SULFATE 1 AMPULE: .5; 3 SOLUTION RESPIRATORY (INHALATION) at 20:28

## 2021-08-26 RX ADMIN — CHLORHEXIDINE GLUCONATE 0.12% ORAL RINSE 15 ML: 1.2 LIQUID ORAL at 22:21

## 2021-08-26 RX ADMIN — INSULIN LISPRO 2 UNITS: 100 INJECTION, SOLUTION INTRAVENOUS; SUBCUTANEOUS at 07:40

## 2021-08-26 RX ADMIN — AMLODIPINE BESYLATE 10 MG: 10 TABLET ORAL at 10:38

## 2021-08-26 RX ADMIN — Medication 2000 UNITS: at 09:59

## 2021-08-26 RX ADMIN — ATORVASTATIN CALCIUM 40 MG: 40 TABLET, FILM COATED ORAL at 22:02

## 2021-08-26 RX ADMIN — Medication 10 ML: at 09:59

## 2021-08-26 RX ADMIN — INSULIN GLARGINE 10 UNITS: 100 INJECTION, SOLUTION SUBCUTANEOUS at 00:38

## 2021-08-26 RX ADMIN — LISINOPRIL 40 MG: 20 TABLET ORAL at 10:38

## 2021-08-26 RX ADMIN — OXYCODONE HYDROCHLORIDE AND ACETAMINOPHEN 500 MG: 500 TABLET ORAL at 09:59

## 2021-08-26 RX ADMIN — CETIRIZINE HYDROCHLORIDE 5 MG: 10 TABLET, FILM COATED ORAL at 09:59

## 2021-08-26 RX ADMIN — INSULIN GLARGINE 5 UNITS: 100 INJECTION, SOLUTION SUBCUTANEOUS at 10:35

## 2021-08-26 RX ADMIN — DOXAZOSIN 8 MG: 4 TABLET ORAL at 10:38

## 2021-08-26 RX ADMIN — ENOXAPARIN SODIUM 70 MG: 80 INJECTION SUBCUTANEOUS at 09:59

## 2021-08-26 RX ADMIN — PROPOFOL 25 MCG/KG/MIN: 10 INJECTION, EMULSION INTRAVENOUS at 22:29

## 2021-08-26 RX ADMIN — FENTANYL CITRATE: 0.05 INJECTION, SOLUTION INTRAMUSCULAR; INTRAVENOUS at 23:27

## 2021-08-26 RX ADMIN — IPRATROPIUM BROMIDE AND ALBUTEROL SULFATE 1 AMPULE: .5; 3 SOLUTION RESPIRATORY (INHALATION) at 12:12

## 2021-08-26 RX ADMIN — ZINC SULFATE 220 MG (50 MG) CAPSULE 50 MG: CAPSULE at 09:59

## 2021-08-26 RX ADMIN — IPRATROPIUM BROMIDE AND ALBUTEROL SULFATE 1 AMPULE: .5; 3 SOLUTION RESPIRATORY (INHALATION) at 04:49

## 2021-08-26 RX ADMIN — DEXAMETHASONE SODIUM PHOSPHATE 10 MG: 4 INJECTION, SOLUTION INTRAMUSCULAR; INTRAVENOUS at 06:04

## 2021-08-26 RX ADMIN — ALBUMIN (HUMAN) 25 G: 0.25 INJECTION, SOLUTION INTRAVENOUS at 22:02

## 2021-08-26 RX ADMIN — ENOXAPARIN SODIUM 70 MG: 80 INJECTION SUBCUTANEOUS at 22:15

## 2021-08-26 RX ADMIN — IPRATROPIUM BROMIDE AND ALBUTEROL SULFATE 1 AMPULE: .5; 3 SOLUTION RESPIRATORY (INHALATION) at 00:22

## 2021-08-26 RX ADMIN — VECURONIUM BROMIDE 1 MCG/KG/MIN: 1 INJECTION, POWDER, LYOPHILIZED, FOR SOLUTION INTRAVENOUS at 14:54

## 2021-08-26 RX ADMIN — IPRATROPIUM BROMIDE AND ALBUTEROL SULFATE 1 AMPULE: .5; 3 SOLUTION RESPIRATORY (INHALATION) at 08:08

## 2021-08-26 RX ADMIN — ALBUMIN (HUMAN) 25 G: 0.25 INJECTION, SOLUTION INTRAVENOUS at 09:05

## 2021-08-26 RX ADMIN — MINERAL OIL AND PETROLATUM: 150; 830 OINTMENT OPHTHALMIC at 22:03

## 2021-08-26 RX ADMIN — INSULIN LISPRO 2 UNITS: 100 INJECTION, SOLUTION INTRAVENOUS; SUBCUTANEOUS at 00:38

## 2021-08-26 RX ADMIN — INSULIN LISPRO 4 UNITS: 100 INJECTION, SOLUTION INTRAVENOUS; SUBCUTANEOUS at 17:54

## 2021-08-26 RX ADMIN — PHENYTOIN SODIUM 200 MG: 50 INJECTION INTRAMUSCULAR; INTRAVENOUS at 07:38

## 2021-08-26 ASSESSMENT — PULMONARY FUNCTION TESTS
PIF_VALUE: 32
PIF_VALUE: 32
PIF_VALUE: 41
PIF_VALUE: 31
PIF_VALUE: 27
PIF_VALUE: 28
PIF_VALUE: 31
PIF_VALUE: 31
PIF_VALUE: 32
PIF_VALUE: 32
PIF_VALUE: 28
PIF_VALUE: 29
PIF_VALUE: 31
PIF_VALUE: 27
PIF_VALUE: 24
PIF_VALUE: 27
PIF_VALUE: 25
PIF_VALUE: 27
PIF_VALUE: 32
PIF_VALUE: 24
PIF_VALUE: 27
PIF_VALUE: 32
PIF_VALUE: 28
PIF_VALUE: 24
PIF_VALUE: 28
PIF_VALUE: 27
PIF_VALUE: 31
PIF_VALUE: 32
PIF_VALUE: 31
PIF_VALUE: 24
PIF_VALUE: 29

## 2021-08-26 NOTE — PROGRESS NOTES
[facility] Progress Note    Admitting Date and Time: 8/21/2021  9:35 AM  Admit Dx: Acute hypoxemic respiratory failure due to COVID-19 (HCC) [U07.1, J96.01]  Pneumonia due to COVID-19 virus [U07.1, J12.82]    Subjective:  Patient is being followed for Acute hypoxemic respiratory failure due to COVID-19 ([address] [U07.1, J96.01]  Pneumonia due to COVID-19 virus [U07.1, J12.82]     Pt paralyzed,intubated and sedated    O2 sat:  92% on 55% FiO2     Fever:  99.5    Per RN:  Desaturates when supine. Busch clogging. UOP good. Ventilation unchanged. Rotoprone use in place. ROS: denies cp, n/v, HA unless stated above.         insulin lispro  0-12 Units Subcutaneous TID WC    insulin lispro  0-6 Units Subcutaneous Nightly    insulin glargine  14 Units Subcutaneous Nightly    albumin human  25 g IntraVENous BID    chlorhexidine  15 mL Mouth/Throat BID    Refresh Lacri-Lube   Ophthalmic Q6H    dexamethasone  10 mg IntraVENous Q12H    lisinopril  40 mg Oral Daily    ipratropium-albuterol  1 ampule Inhalation Q4H    phenytoin  200 mg IntraVENous QAM AC    phenytoin  300 mg IntraVENous Nightly    sodium chloride flush  10 mL IntraVENous BID    enoxaparin  0.5 mg/kg Subcutaneous BID    amLODIPine  10 mg Oral Daily    atorvastatin  40 mg Oral Daily    doxazosin  8 mg Oral Daily    ascorbic acid  500 mg Oral Daily    Vitamin D  2,000 Units Oral Daily    zinc sulfate  50 mg Oral Daily    pantoprazole  40 mg IntraVENous Daily    cetirizine  5 mg Oral BID     sodium chloride flush, 5-40 mL, PRN  sodium chloride, 25 mL, PRN  albuterol, 2.5 mg, Q6H PRN  benzonatate, 200 mg, TID PRN  glucose, 15 g, PRN  dextrose, 12.5 g, PRN  glucagon (rDNA), 1 mg, PRN  dextrose, 100 mL/hr, PRN  sodium chloride, 30 mL, PRN         Objective:    BP (!) 154/89   Pulse 127   Temp 100 °F (37.8 °C) (Bladder)   Resp 26   Ht 6' (1.829 m)   Wt (!) 302 lb (137 kg)   SpO2 96%   BMI 40.96 kg/m²     General Appearance: intubated and sedated  Skin: warm and dry  Head: normocephalic and atraumatic  Eyes: pupils equal, round, and reactive to light, extraocular eye movements intact, conjunctivae normal  Neck: neck supple and non tender without mass   Pulmonary/Chest: crackles bilaterally- no wheezes, no respiratory distress  Cardiovascular: normal rate, normal S1 and S2 and no carotid bruits  Abdomen: soft, non-tender, non-distended, normal bowel sounds, no masses or organomegaly  Extremities: no cyanosis, no clubbing and no edema  Neurologic: intubated and sedated      Recent Labs     08/25/21  0020 08/25/21  0520 08/26/21  0600    147* 146   K 4.4 3.8 4.3   * 114* 111*   CO2 24 25 25   BUN 19 18 24*   CREATININE 0.8 0.8 0.8   GLUCOSE 255* 188* 249*   CALCIUM 7.8* 8.0* 7.9*       Recent Labs     08/25/21  0020 08/25/21  0520 08/26/21  0600   WBC 8.9 8.2 9.7   RBC 4.55 4.56 4.43   HGB 13.6 13.7 13.5   HCT 40.9 41.7 40.4   MCV 89.9 91.4 91.2   MCH 29.9 30.0 30.5   MCHC 33.3 32.9 33.4   RDW 14.0 14.0 14.1   PLT 89* 100* 93*   MPV 9.8 10.1 10.3     Radiology:   No results found. Assessment:    Principal Problem:    Acute hypoxemic respiratory failure due to COVID-19 [facility])  Active Problems:    Type 2 diabetes mellitus without complication, without long-term current use of insulin (HCC)    Seizure (HCC)    Hyperlipidemia    Lactic acidosis  Resolved Problems:    OSCAR (acute kidney injury) ([facility] [address]      Plan:  1. Acute hypoxic respiratory failure, most likely due to viral pneumonia, O2 sat was below 90% on room air, requiring 55% on PCV and oxygen saturation above 90%. Treating the underlying process. 2.  Acute viral pneumonia, rapid influenza a and B were negative, respiratory panel was not done, procalcitonin was 0.36, CAT scan of the chest showed bilateral patchy groundglass opacities, sent for COVID-19, results positive. 3.  T2DM - Glucose POCT.  Continue basal and SS insulin.   4.  Hyperlipidemia - continue statins. 5.  HTN - continue meds. 6.  Elevated D-dimer - continue Lovenox bid. NOTE: This report was transcribed using voice recognition software. Every effort was made to ensure accuracy; however, inadvertent computerized transcription errors may be present.     Electronically signed by Jessica Upton MD on 8/26/2021 at 8:24 AM

## 2021-08-26 NOTE — FLOWSHEET NOTE
Wife and then separately daughter here to see patient from outside of room. Updated on status.  Dr. Christy Ojeda did speak to wife and answered all questions

## 2021-08-26 NOTE — FLOWSHEET NOTE
Intensive Care Daily Quality Rounding Checklist        ICU Team Members: Dr. Ashok Pan, Dr. Goss & (resident), clinical pharmacist, charge nurse, bedside nurse, respiratory therapist     ICU Day #: 6     Intubation Date: August 23     Ventilator Day #: 4     Central Line Insertion Date: August 23                                                    Day #: 4      Arterial Line Insertion Date: August 23                             Day #: 4     Temporary Hemodialysis Catheter Insertion Date: N/A                             Day # N/A     DVT Prophylaxis: Lovenox    GI Prophylaxis: Protonix     Busch Catheter Insertion Date: 08/26/2021 Changed)                                        Day #: 1                             Continued need (if yes, reason documented and discussed with physician): critical care patient, strict I+O     Skin Issues/ Wounds and ordered treatment discussed on rounds: No issues, SOS precautions     Goals/ Plans for the Day: Daily labs, wean O2 as able, change to volume control ventilation, continue to prone-possible supine later today, add Propofol for sedation

## 2021-08-26 NOTE — PROGRESS NOTES
[address] [place] [address] [facility]  Progress Note      Chief Complaint   Patient presents with    Shortness of Breath     covid, per family SaO2 75% RA, hx of DM and panic attack        SUBJECTIVE:  8/21/21  Prone. FiO2 60% and PEEP of 16    8/22/2021  Patient is tolerating medications. No reported adverse drug reactions. No nausea, vomiting, diarrhea. Afebrile BiPAP 100% FiO2 with breathing at 44 times a minute and probably is going to decompensate  8/23/2021  Not doing well intubated on a rotating bed  FiO2 70% and PEEP 16  8/24/2021  Prone 50% FiO2-PEEP of 10  Issues with the urinary Busch last night this was changed  Paralyzed and on Versed  8/25/2021  Paralyzed and sedated with Versed no pressors  Prone intubated on 50% FiO2, PEEP of 10  Tolerating medications    8/26/21  Paralyzed and sedated with Versed no pressors  Prone intubated on 70% FiO2, PEEP of 10  Does not tolerate supine position    Review of systems:  As stated above in the chief complaint, otherwise negative.     Medications:  Scheduled Meds:   phenytoin  300 mg IntraVENous Q12H    insulin lispro  0-12 Units Subcutaneous Q6H    [START ON 8/27/2021] insulin glargine  15 Units Subcutaneous Daily    albumin human  25 g IntraVENous BID    chlorhexidine  15 mL Mouth/Throat BID    Refresh Lacri-Lube   Ophthalmic Q6H    dexamethasone  10 mg IntraVENous Q12H    lisinopril  40 mg Oral Daily    ipratropium-albuterol  1 ampule Inhalation Q4H    sodium chloride flush  10 mL IntraVENous BID    enoxaparin  0.5 mg/kg Subcutaneous BID    amLODIPine  10 mg Oral Daily    atorvastatin  40 mg Oral Daily    doxazosin  8 mg Oral Daily    ascorbic acid  500 mg Oral Daily    Vitamin D  2,000 Units Oral Daily    zinc sulfate  50 mg Oral Daily    pantoprazole  40 mg IntraVENous Daily    cetirizine  5 mg Oral BID     Continuous Infusions:   propofol 15 mcg/kg/min (08/26/21 2890)    midazolam 6 mg/hr (08/26/21 125)    IV infusion builder 40 mL/hr at 21 0934    norepinephrine      sodium chloride      vecuronium (NORCURON) infusion 1 mcg/kg/min (21 1454)    dextrose       PRN Meds:sodium chloride flush, sodium chloride, albuterol, benzonatate, glucose, dextrose, glucagon (rDNA), dextrose, sodium chloride    OBJECTIVE:  BP (!) 154/89   Pulse 121   Temp 99.9 °F (37.7 °C) (Bladder)   Resp 30   Ht 6' (1.829 m)   Wt (!) 302 lb (137 kg)   SpO2 94%   BMI 40.96 kg/m²   Temp  Av.4 °F (37.4 °C)  Min: 98.6 °F (37 °C)  Max: 100 °F (37.8 °C)  Constitutional: The patient is intubated, sedated, prone. Skin: Warm and dry. No rashes were noted. HEENT: Round and reactive pupils. Moist mucous membranes. No ulcerations or thrush. Neck: Supple to movements. Chest: No use of accessory muscles to breathe. Symmetrical expansion. No wheezing, crackles. Rhonchi  Cardiovascular: S1 and S2 are rhythmic and regular. Abdomen: prone  Genitourinary:   Extremities: No clubbing, no cyanosis, no edema.   Lines: peripheral  Art line 2021  Triple-lumen catheter 2021  Busch changed 3/24/2021  Laboratory and Tests Review:  Lab Results   Component Value Date    WBC 9.7 2021    WBC 8.2 2021    WBC 8.9 2021    HGB 13.5 2021    HCT 40.4 2021    MCV 91.2 2021    PLT 93 (L) 2021     Lab Results   Component Value Date    NEUTROABS 7.89 (H) 2021    NEUTROABS 7.95 (H) 2021    NEUTROABS 7.67 (H) 2021     No results found for: [facility]  Lab Results   Component Value Date    ALT 47 (H) 2021    AST 92 (H) 2021    ALKPHOS 119 2021    BILITOT 0.4 2021     Lab Results   Component Value Date     2021    K 4.3 2021    K 3.7 2021     2021    CO2 25 2021    BUN 24 2021    CREATININE 0.8 2021    CREATININE 0.8 2021    CREATININE 0.8 2021    GFRAA >60 2021    LABGLOM >60 2021    GLUCOSE 249 08/26/2021    PROT 5.3 08/26/2021    LABALBU 2.7 08/26/2021    CALCIUM 7.9 08/26/2021    BILITOT 0.4 08/26/2021    ALKPHOS 119 08/26/2021    AST 92 08/26/2021    ALT 47 08/26/2021     Lab Results   Component Value Date    CRP 1.9 (H) 08/26/2021    CRP 4.0 (H) 08/25/2021    CRP 6.1 (H) 08/24/2021     Lab Results   Component Value Date    SEDRATE 0 08/26/2021    SEDRATE 2 08/25/2021    SEDRATE 0 08/24/2021     Radiology:  CAT scan diffuse groundglass infiltrates    Microbiology:   Lab Results   Component Value Date    BC 24 Hours no growth 08/21/2021     Lab Results   Component Value Date    BLOODCULT2 24 Hours no growth 08/21/2021     No results found for: WNDABS  No results found for: RESPSMEAR  No results found for: Betsy Yo, CLAMYDCU, LABLEGI, AFBCX, FUNGSM, LABFUNG  No results found for: CULTRESP  No results found for: CXCATHTIP  No results found for: BFCS  No results found for: CXSURG  Urine Culture, Routine   Date Value Ref Range Status   08/24/2021 Growth not present  Final     MRSA Culture Only   Date Value Ref Range Status   08/24/2021 Methicillin resistant Staph aureus not isolated  Final   Microbiology:  Blood cultures pending  Legionella antigen and strep pneumo antigen are negative  Urine culture negative    ASSESSMENT:  · Severe Covid pneumonia   · cytokine storm  · Poor prognosis    PLAN:  · Completed remdesivir; steroids; Tosi x1 completed   · Check final cultures  · Monitor labs; check fungitell and start diflucan      Clau Thorpe APRN - CNP  3:50 PM   8/26/2021       Pt seen and examined. Above discussed agree with advanced practice nurse. Labs, cultures, and radiographs reviewed. Face to Face encounter occurred. Changes made as necessary.      Nehal Vega MD

## 2021-08-26 NOTE — PROGRESS NOTES
Critical Care Team - Daily Progress Note      Date and time: 8/26/2021 1:55 PM  Patient's name:  Kait Robles  Medical Record Number: [id number]  Patient's account/billing number: [de-identified]  Patient's YOB: 1965  Age: 60 y.o. Date of Admission: 8/21/2021  9:35 AM  Length of stay during current admission: 5      Primary Care Physician: Loco Salazar,   ICU Attending Physician: Dr. Teto Hu    Code Status: No Order    Reason for ICU admission: Acute hypoxic respiratory failure      SUBJECTIVE:     OVERNIGHT EVENTS:     8/25: Patient remains intubated sedated paralyzed and in prone position. Overnight his blood pressure slightly was low but never required to be started on pressors. Is currently on FiO2 of 50% and PEEP of 12 and his PF ratio is 153. Has good urine output. Tolerating tube feeds. 8/26: Patient was noted to have a blood pressure dropped into the 80s overnight. Patient was started on Levophed very briefly, his blood pressure recovered quickly. Patient's Busch catheter was also noted to clogged with sediment and was not patent. This was replaced overnight. Patient remains proned.     AWAKE & FOLLOWING COMMANDS:  [x] No   [] Yes    CURRENT VENTILATION STATUS:     [x] Ventilator  [] BIPAP  [] Nasal Cannula [] Room Air      IF INTUBATED, ET TUBE MARKING AT LOWER LIP:       cms    SECRETIONS Amount:  [x] Small [] Moderate  [] Large  [] None  Color:     [x] White [] Colored  [] Bloody    SEDATION:  RAAS Score: -3  [x]Fentanyl gtt  [x] Versed gtt  [] Ativan gtt   [] No Sedation    PARALYZED:  [] No    [x] Yes    DIARRHEA:                [x] No                [] Yes  (C. Difficile status: [] positive                                                                                                                       [] negative                                                                                                                     [] pending)    VASOPRESSORS:  [x] No [] Yes    If yes -   [] Levophed       [] Dopamine     [] Vasopressin       [] Dobutamine  [] Phenylephrine         [] Epinephrine    CENTRAL LINES:     [] No   [x] Yes        If yes -     [x] Right IJ     [] Left IJ [] Right Femoral [] Left Femoral                   [] Right Subclavian [] Left Subclavian       ANDERSON'S CATHETER:   [] No   [x] Yes           OBJECTIVE:     VITAL SIGNS:  BP (!) 154/89   Pulse 99   Temp 99.5 °F (37.5 °C) (Bladder)   Resp 29   Ht 6' (1.829 m)   Wt (!) 302 lb (137 kg)   SpO2 93%   BMI 40.96 kg/m²   Tmax over 24 hours:  Temp (24hrs), Av.3 °F (37.4 °C), Min:98.6 °F (37 °C), Max:100 °F (37.8 °C)      Patient Vitals for the past 6 hrs:   Temp Temp src Pulse Resp SpO2   21 1300 -- -- 99 29 93 %   21 1223 -- -- 108 30 92 %   21 1211 -- -- -- 28 93 %   21 1210 -- -- 109 28 94 %   21 1200 99.5 °F (37.5 °C) Bladder 105 28 92 %   21 1100 -- -- 118 24 91 %   21 1000 -- -- 133 23 93 %   21 0946 -- -- 135 24 94 %   21 0900 -- -- 132 25 95 %   21 0814 -- -- -- 26 96 %   21 0809 -- -- 127 23 95 %   21 0800 100 °F (37.8 °C) Bladder 128 25 95 %         Intake/Output Summary (Last 24 hours) at 2021 1355  Last data filed at 2021 1200  Gross per 24 hour   Intake 2739 ml   Output 2504 ml   Net 235 ml     Wt Readings from Last 2 Encounters:   21 (!) 302 lb (137 kg)   21 (!) 320 lb (145.2 kg)     Body mass index is 40.96 kg/m².         PHYSICAL EXAMINATION:    General appearance -intubated, sedated, paralyzed, proned  Mental status -unable to assess secondary to patient being intubated  Eyes -unable to assess as patient is inside RotoProne  Ears - external ear normal  Nose - normal and patent, no erythema, discharge or polyps  Mouth -endotracheal tube in place, patient's tongue is swollen and protruding from mouth, remains moist  Neck - unable to assess secondary to RotoProne  Chest -upon posterior examination diffuse rhonchi, bilateral upper lobe crackles  Heart -tachycardic rate, regular rhythm, normal S1, S2, no murmurs, rubs, clicks or gallops  Abdomen -unable to access abdomen  neurological -sedated  Extremities - peripheral pulses normal, no clubbing or cyanosis  Skin - normal coloration and turgor, no rashes, no suspicious skin lesions noted      Any additional physical findings:    MEDICATIONS:    Scheduled Meds:   phenytoin  300 mg IntraVENous Q12H    insulin lispro  0-12 Units Subcutaneous Q6H    [START ON 8/27/2021] insulin glargine  15 Units Subcutaneous Daily    albumin human  25 g IntraVENous BID    chlorhexidine  15 mL Mouth/Throat BID    Refresh Lacri-Lube   Ophthalmic Q6H    dexamethasone  10 mg IntraVENous Q12H    lisinopril  40 mg Oral Daily    ipratropium-albuterol  1 ampule Inhalation Q4H    sodium chloride flush  10 mL IntraVENous BID    enoxaparin  0.5 mg/kg Subcutaneous BID    amLODIPine  10 mg Oral Daily    atorvastatin  40 mg Oral Daily    doxazosin  8 mg Oral Daily    ascorbic acid  500 mg Oral Daily    Vitamin D  2,000 Units Oral Daily    zinc sulfate  50 mg Oral Daily    pantoprazole  40 mg IntraVENous Daily    cetirizine  5 mg Oral BID     Continuous Infusions:   propofol 10 mcg/kg/min (08/26/21 1236)    midazolam 6 mg/hr (08/26/21 1259)    IV infusion builder 40 mL/hr at 08/26/21 0934    norepinephrine      sodium chloride      vecuronium (NORCURON) infusion 1 mcg/kg/min (08/25/21 2001)    dextrose       PRN Meds:   sodium chloride flush, 5-40 mL, PRN  sodium chloride, 25 mL, PRN  albuterol, 2.5 mg, Q6H PRN  benzonatate, 200 mg, TID PRN  glucose, 15 g, PRN  dextrose, 12.5 g, PRN  glucagon (rDNA), 1 mg, PRN  dextrose, 100 mL/hr, PRN  sodium chloride, 30 mL, PRN          VENT SETTINGS (Comprehensive) (if applicable):  Vent Information  $Ventilation: $Subsequent Day  Skin Assessment: Clean, dry, & intact  Equipment ID: [id number]  Equipment Changed: (S) Humidification  Vent Type: [id number]  Vent Mode: AC/VC  Vt Ordered: 550 mL  Pressure Ordered: 20  Rate Set: 30 bmp  Peak Flow: 65 L/min  Pressure Support: 0 cmH20  FiO2 : 55 %  SpO2: 93 %  SpO2/FiO2 ratio: 169.09  Sensitivity: 3  PEEP/CPAP: 10  I Time/ I Time %: 0 s  Humidification Source: Heated wire  Humidification Temp: 37  Humidification Temp Measured: 35.7  Circuit Condensation: (S) Drained (f ull bag standby)  Mask Type: Full face mask  Mask Size: Medium  Additional Respiratory  Assessments  Pulse: 99  Resp: 29  SpO2: 93 %  Position: Prone  Humidification Source: Heated wire  Humidification Temp: 37  Circuit Condensation: (S) Drained (f ull bag standby)  Oral Care: Lip moisturizer applied, Mouthwash with chlorhexidine, Mouth swabbed, Mouth suctioned  Subglottic Suction Done?: Yes  Airway Type: ET  Airway Size: 8  Cuff Pressure (cm H2O): 29 cm H2O    ABG  Lab Results   Component Value Date    PH 7.308 08/26/2021    PCO2 47.7 08/26/2021    PO2 77.3 08/26/2021    HCO3 23.4 08/26/2021    O2SAT 93.0 08/26/2021         Laboratory findings:    Complete Blood Count:   Recent Labs     08/25/21  0020 08/25/21  0520 08/26/21  0600   WBC 8.9 8.2 9.7   HGB 13.6 13.7 13.5   HCT 40.9 41.7 40.4   PLT 89* 100* 93*        Lactic Acid  Lab Results   Component Value Date    LACTA 2.2 08/25/2021    LACTA 1.8 08/23/2021    LACTA 3.9 08/21/2021        Last 3 Blood Glucose:   Recent Labs     08/25/21  0020 08/25/21  0520 08/26/21  0600   GLUCOSE 255* 188* 249*        PT/INR:  No results found for: PROTIME, INR  PTT:  No results found for: APTT, PTT    Comprehensive Metabolic Profile:   Recent Labs     08/24/21  0550 08/25/21  0020 08/25/21  0520 08/25/21  0520 08/26/21  0600   NA  --  146 147*  --  146   K  --  4.4 3.8  --  4.3   CL  --  113* 114*  --  111*   CO2  --  24 25  --  25   BUN  --  19 18  --  24*   CREATININE  --  0.8 0.8  --  0.8   GLUCOSE  --  255* 188*  --  249*   CALCIUM  --  7.8* 8.0*  --  7.9*   PROT   < >  --  4.9*   < > 5.3*   LABALBU   < >  --  2.4*   < > 2.7*   BILITOT   < >  --  0.3   < > 0.4   ALKPHOS   < >  --  122   < > 119   AST   < >  --  67*   < > 92*   ALT   < >  --  35   < > 47*    < > = values in this interval not displayed. Magnesium:   Lab Results   Component Value Date    MG 1.9 08/26/2021     Phosphorus:   Lab Results   Component Value Date    PHOS 3.8 08/26/2021     Ionized Calcium: No results found for: CAION       Urinalysis:     Troponin: No results for input(s): TROPONINI in the last 72 hours. Cultures     Cultures during this admission:       No results for input(s): BC in the last 72 hours. No results for input(s): Cletis Daft in the last 72 hours. Recent Labs     08/24/21  1505   [address] not present       Other pertinent Labs:       Radiology/Imaging:   XR CHEST (2 VW)    Result Date: 8/18/2021  EXAMINATION: TWO XRAY VIEWS OF THE CHEST 8/17/2021 11:32 pm COMPARISON: None. HISTORY: ORDERING SYSTEM PROVIDED HISTORY: cough TECHNOLOGIST PROVIDED HISTORY: Reason for exam:->cough FINDINGS: Frontal and lateral views of the chest.  Low lung volume. Multifocal bilateral patchy opacities are most pronounced in the mid and lower lung zones. No pleural effusion or pneumothorax. Normal cardiomediastinal silhouette and great vessels. Multilevel degenerative disc disease. Multifocal bilateral patchy opacities are most pronounced in the mid and lower lung zones. Differential considerations include an infectious/inflammatory process including atypical or viral pneumonia and pulmonary edema. XR CHEST PORTABLE    Result Date: 8/23/2021  EXAMINATION: ONE XRAY VIEW OF THE CHEST 8/23/2021 12:26 pm COMPARISON: August 23, 2021 HISTORY: ORDERING SYSTEM PROVIDED HISTORY: intubated, cvc placement TECHNOLOGIST PROVIDED HISTORY: Reason for exam:->intubated, cvc placement FINDINGS: Endotracheal tube is 3.1 cm above the donna. NG tube courses below the level of the diaphragm.   Right IJ central venous catheter is present with distal tip of location of SVC. Stable interstitial and hazy opacities throughout both lungs. No pneumothorax. The heart appears normal in size. 1. Endotracheal tube is 3.1 cm above the donna. 2. Stable interstitial pulmonary edema or pneumonia throughout both lungs. XR CHEST PORTABLE    Result Date: 8/23/2021  EXAMINATION: ONE XRAY VIEW OF THE CHEST 8/23/2021 7:51 am COMPARISON: Previous CT of the thorax of 08/21/2021 and previous chest x-ray of 08/22/2021 HISTORY: ORDERING SYSTEM PROVIDED HISTORY: increasing SOB TECHNOLOGIST PROVIDED HISTORY: Reason for exam:->increasing SOB FINDINGS: There is significant motion artifact throughout the lungs. Significant multifocal bilateral airspace disease is noted unchanged compared to prior study. There is no right or left gross pleural effusion. The heart is enlarged. 1. There is no interval change in the significant multifocal bilateral airspace disease. XR CHEST PORTABLE    Result Date: 8/22/2021  EXAMINATION: ONE XRAY VIEW OF THE CHEST 8/22/2021 12:00 pm COMPARISON: 08/21/2021 HISTORY: ORDERING SYSTEM PROVIDED HISTORY: Acute resp failure, COVID TECHNOLOGIST PROVIDED HISTORY: Reason for exam:->Acute resp failure, COVID FINDINGS: Bilateral airspace opacities with improved aeration of the right lung compared to prior study. There is no effusion or pneumothorax. The cardiomediastinal silhouette is without acute process. The osseous structures are without acute process. Improved aeration of the right lung compared to prior study.      XR CHEST PORTABLE    Result Date: 8/21/2021  EXAMINATION: ONE XRAY VIEW OF THE CHEST 8/21/2021 9:56 am COMPARISON: August 17, 2021 HISTORY: ORDERING SYSTEM PROVIDED HISTORY: pneumonia TECHNOLOGIST PROVIDED HISTORY: Reason for exam:->pneumonia FINDINGS: Compared with most recent examination, there has been a significant interval increase in the degree of patchy bilateral airspace opacities, in keeping with the patient's known diagnosis of COVID pneumonia. No evidence of a sizable pleural effusion. No pneumothorax is identified. Heart size is unable to be accurately assessed on this single portable view of the chest, but appears to be stable. No acute osseous abnormality. Significantly increased airspace opacities throughout both lungs, in keeping with multifocal pneumonia. CTA PULMONARY W CONTRAST    Result Date: 8/21/2021  EXAMINATION: CTA OF THE CHEST, 8/21/2021 12:11 pm TECHNIQUE: CTA of the chest was performed after the administration of intravenous contrast.  Multiplanar reformatted images are provided for review. MIP images are provided for review. Dose modulation, iterative reconstruction, and/or weight based adjustment of the mA/kV was utilized to reduce the radiation dose to as low as reasonably achievable. COMPARISON: None HISTORY: ORDERING SYSTEM PROVIDED HISTORY:  PE vs COVID TECHNOLOGIST PROVIDED HISTORY: Reason for Exam:  PE vs COVID Decision Support Exception - unselect if not a suspected or confirmed emergency medical condition->Emergency Medical Condition (MA) FINDINGS: Pulmonary Arteries: There is a combination of poor contrast bolus timing as well as significant respiratory motion artifact that severely degrades the examination. Unfortunately, multiple segmental and subsegmental pulmonary artery branches are not opacified well enough to exclude a pulmonary embolism. There is no evidence of a central pulmonary embolism or convincing evidence of right heart strain. Mediastinum: There is at least one enlarged mediastinal lymph node measuring 17 mm in short axis in the subcarinal distribution. Several additional smaller but nonenlarged mediastinal lymph nodes are present, likely reactive. The thoracic esophagus is decompressed, limiting assessment. No obvious esophageal abnormality. The heart size is top normal.  Trace pericardial fluid is present. Lungs/pleura:  The central airways are patent. There is no evidence of a pleural effusion or pneumothorax. Extensive ground-glass and consolidative airspace opacities are present bilaterally, in keeping with the provided history of COVID pneumonia. Upper Abdomen: Imaged portions of the upper abdomen demonstrates diffuse hepatic steatosis. There are also multiple nonobstructing bilateral renal calculi, partially imaged. Soft Tissues/Bones: No acute bone or soft tissue abnormality. Extensive ground-glass and consolidative airspace opacities throughout both lungs, in keeping with the provided history of COVID pneumonia. The examination is nondiagnostic for detection of pulmonary emboli in multiple segmental and subsegmental pulmonary artery distributions, secondary to poor contrast bolus timing and respiratory motion artifact. There is no evidence of a central pulmonary embolism. Hepatic steatosis. Enlarged mediastinal lymph nodes, likely reactive. XR CHEST ABDOMEN NG PLACEMENT    Result Date: 8/23/2021  EXAMINATION: ONE SUPINE XRAY VIEW(S) OF THE ABDOMEN 8/23/2021 12:26 pm COMPARISON: None. HISTORY: ORDERING SYSTEM PROVIDED HISTORY: ng placement TECHNOLOGIST PROVIDED HISTORY: Reason for exam:->ng placement FINDINGS: Nasogastric tube courses below the level of the diaphragm with distal tip in the expected location of the stomach. Satisfactory position of nasogastric tube.         Chest Xray (8/26/2021):  None today        Principal Problem:    Acute hypoxemic respiratory failure due to COVID-19 [facility])  Active Problems:    Type 2 diabetes mellitus without complication, without long-term current use of insulin (HCC)    History of seizures    Hyperlipidemia    Busch catheter problem (HCC)    Sepsis (HCC)    Thrombocytopenia ([address]  Resolved Problems:    OSCAR (acute kidney injury) ([address]    Lactic acidosis    SYSTEMS ASSESSMENT    Neuro   Intubated, sedated, paralyzed  -Sedated with fentanyl, Versed, added propofol today due to elevated BIS and elevated blood pressure, pulse     History of seizures  -Continue home phenytoin, dose increased secondary to low phenytoin level  -Recheck phenytoin level on 8/30, order placed  -Telemetry monitoring     Respiratory   Acute hypoxic respiratory failure/severe ARDS due to COVID-19  -Intubated 8/23, day 4  -Completed remdesivir, tocilizumab  -Continues on AC VC  -Chest x-ray when supine briefly for care  -Continue prone positioning  -Decadron 10 mg IV twice daily  -DuoNebs every 6 hours  -ID following     Cardiovascular   Sepsis secondary to COVID-19  -Has fluctuating heart rate and blood pressure  -Was on Levophed very briefly overnight for systolic blood pressure less than 80  -Antihypertensives held this morning secondary to low pressure overnight, however blood pressure this morning is in the 542U systolic and antihypertensives were administered  -Continue to monitor with telemetry    Hyperlipidemia  -On statin     Gastrointestinal   GI prophylaxis  -Protonix  -Tolerating tube feeds  -We will add albumin 25 g IV every 12 hours for 4 doses     Renal   Busch catheter dysfunction  -Busch has been unable to be irrigated twice now, replaced  -Kidney function appears to be preserved  -Scheduled irrigations  -Continue to monitor urine output, sediment     Infectious Disease   COVID-19  -Given Rocephin and doxy in the ED  -Continue Decadron  -Status post remdesivir, tocilizumab  -Cultures so far negative  -ID following     Hematology/Oncology   DVT prophylaxis  -Lovenox, weight-based    Thrombocytopenia  -Monitor     Endocrine   Type 2 diabetes  -Receiving tube feeds  -Glucose running high  -Sliding scale insulin, change from low to medium today  -Lantus increased from 10 to 14 units today     Social/Spiritual/DNR/Other   Full code      PLAN:     WEAN PER PROTOCOL:  [] No   [x] Yes  [] N/A    DISCONTINUE ANY LABS:   [x] No   [] Yes    ICU PROPHYLAXIS:  Stress ulcer:  [x] PPI Agent  [] [id number] [] Sucralfate  [] Other:  VTE:   [x] Enoxaparin  [] Unfract. Heparin Subcut  [] EPC Cuffs    NUTRITION:  [] NPO [x] Tube Feeding (Specify: ) [] TPN  [] PO (Diet: ADULT TUBE FEEDING; Nasogastric; Diabetic; Continuous; 20; Yes; 20; Q 6 hours; 60; 30; Q 4 hours; Protein; Proteinex 2 go once daily)    HOME MEDICATIONS RECONCILED: [] No  [x] Yes    INSULIN DRIP:   [x] No   [] Yes    CONSULTATION NEEDED:  [x] No   [] Yes    FAMILY UPDATED:    [] No   [x] Yes    TRANSFER OUT OF ICU:   [x] No   [] Yes    Mega Vidales DO, PGY-2                  8/26/2021, 1:55 PM     I personally saw, examined and provided care for the patient. Radiographs, labs and medication list were reviewed by me independently. I spoke with bedside nursing, therapists and consultants. Critical care services and times documented are independent of procedures and multidisciplinary rounds with Residents. Additionally comprehensive, multidisciplinary rounds were conducted with the MICU team. The case was discussed in detail and plans for care were established. Review of Residents documentation was conducted and revisions were made as appropriate. I agree with the above documented exam, problem list and plan of care.   Bryson Mooney MD   CCT excluding procedures:38'

## 2021-08-26 NOTE — CARE COORDINATION
Continue ICU. COVID+ 8/18. Continue prone. Continue vent support, paralytic, and sedation. LTAC following.  Discharge planning TBD pending progress-james

## 2021-08-26 NOTE — FLOWSHEET NOTE
UPDATE: spoke with Dr Rox Mora regarding urine output that had been tapering off tonight. Attempts to irrigate the villagran were unsuccessful (unable to instill any irrigant into the villagran). Order rec'd to put FiO2 up to 100%, supine the patient and change the villagran as quickly as possible. Respiratory therapist and four nurses  in the room and villagran was changed as ordered. There a blood clot and brown drainage on the tip with initial blood draining followed by 550 mL dark favian urine once the villagran was placed. HR increased to 140s, BP down as low as 88 systolic. SpO2 down to 79%. Once villagran was secure and latches closed on Rotoprone, the patient returned to prone position, BP, HR and SpO2 returned to WNL.

## 2021-08-27 ENCOUNTER — APPOINTMENT (OUTPATIENT)
Dept: GENERAL RADIOLOGY | Age: 56
DRG: 005 | End: 2021-08-27
Payer: COMMERCIAL

## 2021-08-27 LAB
AADO2: 270.6 MMHG
AADO2: 481.3 MMHG
AADO2: 569.5 MMHG
ALBUMIN SERPL-MCNC: 2.9 G/DL (ref 3.5–5.2)
ALP BLD-CCNC: 77 U/L (ref 40–129)
ALT SERPL-CCNC: 31 U/L (ref 0–40)
ANION GAP SERPL CALCULATED.3IONS-SCNC: 5 MMOL/L (ref 7–16)
ANISOCYTOSIS: ABNORMAL
AST SERPL-CCNC: 51 U/L (ref 0–39)
ATYPICAL LYMPHOCYTE RELATIVE PERCENT: 1 % (ref 0–4)
B.E.: -0.3 MMOL/L (ref -3–3)
B.E.: -2.1 MMOL/L (ref -3–3)
B.E.: 2 MMOL/L (ref -3–3)
BASOPHILS ABSOLUTE: 0 E9/L (ref 0–0.2)
BASOPHILS RELATIVE PERCENT: 0 % (ref 0–2)
BILIRUB SERPL-MCNC: 0.3 MG/DL (ref 0–1.2)
BUN BLDV-MCNC: 29 MG/DL (ref 6–20)
BURR CELLS: ABNORMAL
C-REACTIVE PROTEIN: 1 MG/DL (ref 0–0.4)
CALCIUM SERPL-MCNC: 8.2 MG/DL (ref 8.6–10.2)
CHLORIDE BLD-SCNC: 112 MMOL/L (ref 98–107)
CO2: 29 MMOL/L (ref 22–29)
COHB: 0.6 % (ref 0–1.5)
COHB: 0.7 % (ref 0–1.5)
COHB: 0.9 % (ref 0–1.5)
CREAT SERPL-MCNC: 0.8 MG/DL (ref 0.7–1.2)
CRITICAL: ABNORMAL
DATE ANALYZED: ABNORMAL
DATE OF COLLECTION: ABNORMAL
EOSINOPHILS ABSOLUTE: 0.42 E9/L (ref 0.05–0.5)
EOSINOPHILS RELATIVE PERCENT: 4 % (ref 0–6)
FIO2: 100 %
FIO2: 100 %
FIO2: 60 %
GFR AFRICAN AMERICAN: >60
GFR NON-AFRICAN AMERICAN: >60 ML/MIN/1.73
GLUCOSE BLD-MCNC: 186 MG/DL (ref 74–99)
HCO3: 22 MMOL/L (ref 22–26)
HCO3: 25.2 MMOL/L (ref 22–26)
HCO3: 26.4 MMOL/L (ref 22–26)
HCT VFR BLD CALC: 37.7 % (ref 37–54)
HEMOGLOBIN: 11.8 G/DL (ref 12.5–16.5)
HHB: 1 % (ref 0–5)
HHB: 2.5 % (ref 0–5)
HHB: 5.9 % (ref 0–5)
HYPOCHROMIA: ABNORMAL
LAB: ABNORMAL
LYMPHOCYTES ABSOLUTE: 0.53 E9/L (ref 1.5–4)
LYMPHOCYTES RELATIVE PERCENT: 4 % (ref 20–42)
Lab: ABNORMAL
MAGNESIUM: 2.2 MG/DL (ref 1.6–2.6)
MCH RBC QN AUTO: 29.1 PG (ref 26–35)
MCHC RBC AUTO-ENTMCNC: 31.3 % (ref 32–34.5)
MCV RBC AUTO: 92.9 FL (ref 80–99.9)
METAMYELOCYTES RELATIVE PERCENT: 1 % (ref 0–1)
METER GLUCOSE: 186 MG/DL (ref 74–99)
METER GLUCOSE: 201 MG/DL (ref 74–99)
METER GLUCOSE: 252 MG/DL (ref 74–99)
METER GLUCOSE: 262 MG/DL (ref 74–99)
METHB: 0.1 % (ref 0–1.5)
METHB: 0.2 % (ref 0–1.5)
METHB: 0.2 % (ref 0–1.5)
MODE: ABNORMAL
MODE: AC
MODE: AC
MONOCYTES ABSOLUTE: 0.52 E9/L (ref 0.1–0.95)
MONOCYTES RELATIVE PERCENT: 5 % (ref 2–12)
NEUTROPHILS ABSOLUTE: 9.03 E9/L (ref 1.8–7.3)
NEUTROPHILS RELATIVE PERCENT: 85 % (ref 43–80)
O2 CONTENT: 15.3 ML/DL
O2 CONTENT: 16.3 ML/DL
O2 CONTENT: 16.5 ML/DL
O2 SATURATION: 94 % (ref 92–98.5)
O2 SATURATION: 97.5 % (ref 92–98.5)
O2 SATURATION: 99 % (ref 92–98.5)
O2HB: 93 % (ref 94–97)
O2HB: 96.8 % (ref 94–97)
O2HB: 98.1 % (ref 94–97)
OPERATOR ID: 1741
OPERATOR ID: 3224
OPERATOR ID: ABNORMAL
OVALOCYTES: ABNORMAL
PATIENT TEMP: 37 C
PCO2: 35.5 MMHG (ref 35–45)
PCO2: 40.2 MMHG (ref 35–45)
PCO2: 44.1 MMHG (ref 35–45)
PDW BLD-RTO: 14.2 FL (ref 11.5–15)
PEEP/CPAP: 10 CMH2O
PEEP/CPAP: 14 CMH2O
PEEP/CPAP: 14 CMH2O
PFO2: 0.74 MMHG/%
PFO2: 1.66 MMHG/%
PFO2: 1.72 MMHG/%
PH BLOOD GAS: 7.37 (ref 7.35–7.45)
PH BLOOD GAS: 7.41 (ref 7.35–7.45)
PH BLOOD GAS: 7.43 (ref 7.35–7.45)
PHOSPHORUS: 3.6 MG/DL (ref 2.5–4.5)
PIP: 34 CMH2O
PLATELET # BLD: 91 E9/L (ref 130–450)
PLATELET CONFIRMATION: NORMAL
PMV BLD AUTO: 10.1 FL (ref 7–12)
PO2: 103.2 MMHG (ref 75–100)
PO2: 166.5 MMHG (ref 75–100)
PO2: 74.4 MMHG (ref 75–100)
POIKILOCYTES: ABNORMAL
POLYCHROMASIA: ABNORMAL
POTASSIUM SERPL-SCNC: 4.4 MMOL/L (ref 3.5–5)
RBC # BLD: 4.06 E12/L (ref 3.8–5.8)
RI(T): 262 %
RI(T): 289 %
RI(T): 765 %
RR MECHANICAL: 30 B/MIN
SEDIMENTATION RATE, ERYTHROCYTE: 2 MM/HR (ref 0–15)
SMUDGE CELLS: ABNORMAL
SODIUM BLD-SCNC: 146 MMOL/L (ref 132–146)
SOURCE, BLOOD GAS: ABNORMAL
THB: 11.1 G/DL (ref 11.5–16.5)
THB: 11.7 G/DL (ref 11.5–16.5)
THB: 12.4 G/DL (ref 11.5–16.5)
TIME ANALYZED: 1309
TIME ANALYZED: 1845
TIME ANALYZED: 623
TOTAL PROTEIN: 5 G/DL (ref 6.4–8.3)
VT MECHANICAL: 550 ML
VT MECHANICAL: 550 ML
WBC # BLD: 10.5 E9/L (ref 4.5–11.5)

## 2021-08-27 PROCEDURE — 2500000003 HC RX 250 WO HCPCS: Performed by: INTERNAL MEDICINE

## 2021-08-27 PROCEDURE — 2000000000 HC ICU R&B

## 2021-08-27 PROCEDURE — 6360000002 HC RX W HCPCS: Performed by: INTERNAL MEDICINE

## 2021-08-27 PROCEDURE — 71045 X-RAY EXAM CHEST 1 VIEW: CPT

## 2021-08-27 PROCEDURE — 83735 ASSAY OF MAGNESIUM: CPT

## 2021-08-27 PROCEDURE — 2580000003 HC RX 258: Performed by: INTERNAL MEDICINE

## 2021-08-27 PROCEDURE — 6370000000 HC RX 637 (ALT 250 FOR IP): Performed by: INTERNAL MEDICINE

## 2021-08-27 PROCEDURE — C9113 INJ PANTOPRAZOLE SODIUM, VIA: HCPCS | Performed by: STUDENT IN AN ORGANIZED HEALTH CARE EDUCATION/TRAINING PROGRAM

## 2021-08-27 PROCEDURE — 99233 SBSQ HOSP IP/OBS HIGH 50: CPT | Performed by: FAMILY MEDICINE

## 2021-08-27 PROCEDURE — 80053 COMPREHEN METABOLIC PANEL: CPT

## 2021-08-27 PROCEDURE — 87206 SMEAR FLUORESCENT/ACID STAI: CPT

## 2021-08-27 PROCEDURE — 84100 ASSAY OF PHOSPHORUS: CPT

## 2021-08-27 PROCEDURE — 87070 CULTURE OTHR SPECIMN AEROBIC: CPT

## 2021-08-27 PROCEDURE — 85651 RBC SED RATE NONAUTOMATED: CPT

## 2021-08-27 PROCEDURE — 94640 AIRWAY INHALATION TREATMENT: CPT

## 2021-08-27 PROCEDURE — P9047 ALBUMIN (HUMAN), 25%, 50ML: HCPCS | Performed by: INTERNAL MEDICINE

## 2021-08-27 PROCEDURE — 2580000003 HC RX 258: Performed by: FAMILY MEDICINE

## 2021-08-27 PROCEDURE — 6360000002 HC RX W HCPCS: Performed by: SPECIALIST

## 2021-08-27 PROCEDURE — 2580000003 HC RX 258: Performed by: STUDENT IN AN ORGANIZED HEALTH CARE EDUCATION/TRAINING PROGRAM

## 2021-08-27 PROCEDURE — 82962 GLUCOSE BLOOD TEST: CPT

## 2021-08-27 PROCEDURE — 37799 UNLISTED PX VASCULAR SURGERY: CPT

## 2021-08-27 PROCEDURE — 94003 VENT MGMT INPAT SUBQ DAY: CPT

## 2021-08-27 PROCEDURE — 85025 COMPLETE CBC W/AUTO DIFF WBC: CPT

## 2021-08-27 PROCEDURE — 36415 COLL VENOUS BLD VENIPUNCTURE: CPT

## 2021-08-27 PROCEDURE — 6360000002 HC RX W HCPCS: Performed by: STUDENT IN AN ORGANIZED HEALTH CARE EDUCATION/TRAINING PROGRAM

## 2021-08-27 PROCEDURE — 82805 BLOOD GASES W/O2 SATURATION: CPT

## 2021-08-27 PROCEDURE — 86140 C-REACTIVE PROTEIN: CPT

## 2021-08-27 PROCEDURE — 74018 RADEX ABDOMEN 1 VIEW: CPT

## 2021-08-27 PROCEDURE — 87449 NOS EACH ORGANISM AG IA: CPT

## 2021-08-27 PROCEDURE — 6360000002 HC RX W HCPCS: Performed by: FAMILY MEDICINE

## 2021-08-27 RX ORDER — INSULIN GLARGINE 100 [IU]/ML
20 INJECTION, SOLUTION SUBCUTANEOUS DAILY
Status: DISCONTINUED | OUTPATIENT
Start: 2021-08-27 | End: 2021-08-31

## 2021-08-27 RX ORDER — SODIUM CHLORIDE FOR INHALATION 3 %
4 VIAL, NEBULIZER (ML) INHALATION PRN
Status: DISCONTINUED | OUTPATIENT
Start: 2021-08-27 | End: 2021-09-20 | Stop reason: HOSPADM

## 2021-08-27 RX ADMIN — DEXAMETHASONE SODIUM PHOSPHATE 10 MG: 4 INJECTION, SOLUTION INTRAMUSCULAR; INTRAVENOUS at 17:12

## 2021-08-27 RX ADMIN — MINERAL OIL AND PETROLATUM: 150; 830 OINTMENT OPHTHALMIC at 10:44

## 2021-08-27 RX ADMIN — PANTOPRAZOLE SODIUM 40 MG: 40 INJECTION, POWDER, FOR SOLUTION INTRAVENOUS at 09:11

## 2021-08-27 RX ADMIN — ENOXAPARIN SODIUM 70 MG: 80 INJECTION SUBCUTANEOUS at 09:10

## 2021-08-27 RX ADMIN — PROPOFOL 30 MCG/KG/MIN: 10 INJECTION, EMULSION INTRAVENOUS at 13:20

## 2021-08-27 RX ADMIN — SODIUM CHLORIDE SOLN NEBU 3% 4 ML: 3 NEBU SOLN at 16:05

## 2021-08-27 RX ADMIN — FENTANYL CITRATE: 0.05 INJECTION, SOLUTION INTRAMUSCULAR; INTRAVENOUS at 11:36

## 2021-08-27 RX ADMIN — Medication 10 ML: at 10:43

## 2021-08-27 RX ADMIN — ALBUMIN (HUMAN) 25 G: 0.25 INJECTION, SOLUTION INTRAVENOUS at 21:15

## 2021-08-27 RX ADMIN — IPRATROPIUM BROMIDE AND ALBUTEROL SULFATE 1 AMPULE: .5; 3 SOLUTION RESPIRATORY (INHALATION) at 21:29

## 2021-08-27 RX ADMIN — PHENYTOIN SODIUM 300 MG: 50 INJECTION INTRAMUSCULAR; INTRAVENOUS at 21:13

## 2021-08-27 RX ADMIN — ENOXAPARIN SODIUM 70 MG: 80 INJECTION SUBCUTANEOUS at 21:13

## 2021-08-27 RX ADMIN — CETIRIZINE HYDROCHLORIDE 5 MG: 10 TABLET, FILM COATED ORAL at 09:12

## 2021-08-27 RX ADMIN — FENTANYL CITRATE: 0.05 INJECTION, SOLUTION INTRAMUSCULAR; INTRAVENOUS at 18:56

## 2021-08-27 RX ADMIN — IPRATROPIUM BROMIDE AND ALBUTEROL SULFATE 1 AMPULE: .5; 3 SOLUTION RESPIRATORY (INHALATION) at 10:20

## 2021-08-27 RX ADMIN — INSULIN GLARGINE 20 UNITS: 100 INJECTION, SOLUTION SUBCUTANEOUS at 09:00

## 2021-08-27 RX ADMIN — IPRATROPIUM BROMIDE AND ALBUTEROL SULFATE 1 AMPULE: .5; 3 SOLUTION RESPIRATORY (INHALATION) at 05:14

## 2021-08-27 RX ADMIN — OXYCODONE HYDROCHLORIDE AND ACETAMINOPHEN 500 MG: 500 TABLET ORAL at 09:12

## 2021-08-27 RX ADMIN — INSULIN LISPRO 4 UNITS: 100 INJECTION, SOLUTION INTRAVENOUS; SUBCUTANEOUS at 13:28

## 2021-08-27 RX ADMIN — Medication 2000 UNITS: at 09:11

## 2021-08-27 RX ADMIN — Medication 5 MG/HR: at 15:11

## 2021-08-27 RX ADMIN — VECURONIUM BROMIDE 1.5 MCG/KG/MIN: 1 INJECTION, POWDER, LYOPHILIZED, FOR SOLUTION INTRAVENOUS at 22:33

## 2021-08-27 RX ADMIN — VECURONIUM BROMIDE 1 MCG/KG/MIN: 1 INJECTION, POWDER, LYOPHILIZED, FOR SOLUTION INTRAVENOUS at 00:25

## 2021-08-27 RX ADMIN — IPRATROPIUM BROMIDE AND ALBUTEROL SULFATE 1 AMPULE: .5; 3 SOLUTION RESPIRATORY (INHALATION) at 12:32

## 2021-08-27 RX ADMIN — PROPOFOL 40 MCG/KG/MIN: 10 INJECTION, EMULSION INTRAVENOUS at 10:18

## 2021-08-27 RX ADMIN — INSULIN LISPRO 2 UNITS: 100 INJECTION, SOLUTION INTRAVENOUS; SUBCUTANEOUS at 06:17

## 2021-08-27 RX ADMIN — FENTANYL CITRATE: 0.05 INJECTION, SOLUTION INTRAMUSCULAR; INTRAVENOUS at 05:42

## 2021-08-27 RX ADMIN — ZINC SULFATE 220 MG (50 MG) CAPSULE 50 MG: CAPSULE at 09:11

## 2021-08-27 RX ADMIN — INSULIN LISPRO 6 UNITS: 100 INJECTION, SOLUTION INTRAVENOUS; SUBCUTANEOUS at 00:23

## 2021-08-27 RX ADMIN — FLUCONAZOLE 400 MG: 2 INJECTION, SOLUTION INTRAVENOUS at 22:31

## 2021-08-27 RX ADMIN — Medication 6 MG/HR: at 00:24

## 2021-08-27 RX ADMIN — PHENYTOIN SODIUM 300 MG: 50 INJECTION INTRAMUSCULAR; INTRAVENOUS at 09:58

## 2021-08-27 RX ADMIN — CHLORHEXIDINE GLUCONATE 0.12% ORAL RINSE 15 ML: 1.2 LIQUID ORAL at 10:44

## 2021-08-27 RX ADMIN — IPRATROPIUM BROMIDE AND ALBUTEROL SULFATE 1 AMPULE: .5; 3 SOLUTION RESPIRATORY (INHALATION) at 01:05

## 2021-08-27 RX ADMIN — ALBUTEROL SULFATE 2.5 MG: 2.5 SOLUTION RESPIRATORY (INHALATION) at 16:05

## 2021-08-27 RX ADMIN — CETIRIZINE HYDROCHLORIDE 5 MG: 10 TABLET, FILM COATED ORAL at 21:14

## 2021-08-27 RX ADMIN — PROPOFOL 25 MCG/KG/MIN: 10 INJECTION, EMULSION INTRAVENOUS at 21:12

## 2021-08-27 RX ADMIN — VECURONIUM BROMIDE 1.3 MCG/KG/MIN: 1 INJECTION, POWDER, LYOPHILIZED, FOR SOLUTION INTRAVENOUS at 13:33

## 2021-08-27 RX ADMIN — MINERAL OIL AND PETROLATUM: 150; 830 OINTMENT OPHTHALMIC at 15:56

## 2021-08-27 RX ADMIN — ATORVASTATIN CALCIUM 40 MG: 40 TABLET, FILM COATED ORAL at 21:14

## 2021-08-27 RX ADMIN — INSULIN LISPRO 6 UNITS: 100 INJECTION, SOLUTION INTRAVENOUS; SUBCUTANEOUS at 17:26

## 2021-08-27 RX ADMIN — ALBUMIN (HUMAN) 25 G: 0.25 INJECTION, SOLUTION INTRAVENOUS at 11:04

## 2021-08-27 RX ADMIN — FENTANYL CITRATE: 0.05 INJECTION, SOLUTION INTRAMUSCULAR; INTRAVENOUS at 00:24

## 2021-08-27 RX ADMIN — PROPOFOL 25 MCG/KG/MIN: 10 INJECTION, EMULSION INTRAVENOUS at 17:06

## 2021-08-27 RX ADMIN — CHLORHEXIDINE GLUCONATE 0.12% ORAL RINSE 15 ML: 1.2 LIQUID ORAL at 21:00

## 2021-08-27 RX ADMIN — PROPOFOL 20 MCG/KG/MIN: 10 INJECTION, EMULSION INTRAVENOUS at 04:01

## 2021-08-27 RX ADMIN — DEXAMETHASONE SODIUM PHOSPHATE 10 MG: 4 INJECTION, SOLUTION INTRAMUSCULAR; INTRAVENOUS at 05:42

## 2021-08-27 ASSESSMENT — PULMONARY FUNCTION TESTS
PIF_VALUE: 34
PIF_VALUE: 34
PIF_VALUE: 32
PIF_VALUE: 26
PIF_VALUE: 36
PIF_VALUE: 26
PIF_VALUE: 36
PIF_VALUE: 27
PIF_VALUE: 26
PIF_VALUE: 26
PIF_VALUE: 31
PIF_VALUE: 33
PIF_VALUE: 36
PIF_VALUE: 31
PIF_VALUE: 26
PIF_VALUE: 44
PIF_VALUE: 26
PIF_VALUE: 36
PIF_VALUE: 29
PIF_VALUE: 36
PIF_VALUE: 32
PIF_VALUE: 31
PIF_VALUE: 27
PIF_VALUE: 32
PIF_VALUE: 36
PIF_VALUE: 34
PIF_VALUE: 27
PIF_VALUE: 36
PIF_VALUE: 36
PIF_VALUE: 34
PIF_VALUE: 45
PIF_VALUE: 34
PIF_VALUE: 26
PIF_VALUE: 31
PIF_VALUE: 33
PIF_VALUE: 38
PIF_VALUE: 26
PIF_VALUE: 26
PIF_VALUE: 34
PIF_VALUE: 28
PIF_VALUE: 37
PIF_VALUE: 25
PIF_VALUE: 26
PIF_VALUE: 37
PIF_VALUE: 35
PIF_VALUE: 26
PIF_VALUE: 32
PIF_VALUE: 31
PIF_VALUE: 27
PIF_VALUE: 34
PIF_VALUE: 34
PIF_VALUE: 38
PIF_VALUE: 36
PIF_VALUE: 26
PIF_VALUE: 36
PIF_VALUE: 46
PIF_VALUE: 27
PIF_VALUE: 27
PIF_VALUE: 32

## 2021-08-27 NOTE — PATIENT CARE CONFERENCE
Intensive Care Daily Quality Rounding Checklist        ICU Team Members: Dr. Scout Parrish, Sh[facility] (residents), clinical pharmacist, charge nurse, bedside nurse, respiratory therapist     ICU Day #: 5     Intubation Date: August 23     Ventilator Day #: 3     Central Line Insertion Date: August 23                                                    Day #: 5      Arterial Line Insertion Date: August 23                             Day #: 5     Temporary Hemodialysis Catheter Insertion Date: N/A                             Day # N/A     DVT Prophylaxis: Lovenox    GI Prophylaxis: Protonix     Busch Catheter Insertion Date: 08/24/2021 Changed)                                        Day #: 2                             Continued need (if yes, reason documented and discussed with physician): critical care patient, strict I+O     Skin Issues/ Wounds and ordered treatment discussed on rounds: No issues, SOS precautions     Goals/ Plans for the Day: Daily labs, wean vent as able, continue tube feeds, give Albumin, continue paralytic and proning.  Vent changes and titrate sedation

## 2021-08-27 NOTE — PROGRESS NOTES
[address] [place] [address] [facility]  Progress Note      Chief Complaint   Patient presents with    Shortness of Breath     covid, per family SaO2 75% RA, hx of DM and panic attack        SUBJECTIVE:  8/21/21  Prone. FiO2 60% and PEEP of 16    8/22/2021  Patient is tolerating medications. No reported adverse drug reactions. No nausea, vomiting, diarrhea. Afebrile BiPAP 100% FiO2 with breathing at 44 times a minute and probably is going to decompensate  8/23/2021  Not doing well intubated on a rotating bed  FiO2 70% and PEEP 16  8/24/2021  Prone 50% FiO2-PEEP of 10  Issues with the urinary Busch last night this was changed  Paralyzed and on Versed  8/25/2021  Paralyzed and sedated with Versed no pressors  Prone intubated on 50% FiO2, PEEP of 10  Tolerating medications    8/26/21  Paralyzed and sedated with Versed no pressors  Prone intubated on 70% FiO2, PEEP of 10  Does not tolerate supine position    8/27/2021  Afebrile  Still prone on FiO2 of 100%  Paralyzed and sedated      Review of systems:  As stated above in the chief complaint, otherwise negative.     Medications:  Scheduled Meds:   insulin glargine  20 Units Subcutaneous Daily    phenytoin  300 mg IntraVENous Q12H    insulin lispro  0-12 Units Subcutaneous Q6H    fluconazole  400 mg IntraVENous Q24H    albumin human  25 g IntraVENous BID    chlorhexidine  15 mL Mouth/Throat BID    Refresh Lacri-Lube   Ophthalmic Q6H    dexamethasone  10 mg IntraVENous Q12H    [Held by provider] lisinopril  40 mg Oral Daily    ipratropium-albuterol  1 ampule Inhalation Q4H    sodium chloride flush  10 mL IntraVENous BID    enoxaparin  0.5 mg/kg Subcutaneous BID    amLODIPine  10 mg Oral Daily    atorvastatin  40 mg Oral Daily    [Held by provider] doxazosin  8 mg Oral Daily    ascorbic acid  500 mg Oral Daily    Vitamin D  2,000 Units Oral Daily    zinc sulfate  50 mg Oral Daily    pantoprazole  40 mg IntraVENous Daily    cetirizine  5 mg Oral BID     Continuous Infusions:   propofol 25 mcg/kg/min (21 1706)    midazolam 5 mg/hr (21 1511)    IV infusion builder 40 mL/hr at 21 1136    sodium chloride      vecuronium (NORCURON) infusion 1.3 mcg/kg/min (21 1333)    dextrose       PRN Meds:sodium chloride (Inhalant), sodium chloride flush, sodium chloride, albuterol, benzonatate, glucose, dextrose, glucagon (rDNA), dextrose, sodium chloride    OBJECTIVE:  /66   Pulse 106   Temp 99 °F (37.2 °C) (Bladder)   Resp 30   Ht 6' (1.829 m)   Wt (!) 302 lb (137 kg)   SpO2 99%   BMI 40.96 kg/m²   Temp  Av.3 °F (37.4 °C)  Min: 99 °F (37.2 °C)  Max: 99.7 °F (37.6 °C)  Constitutional: The patient is intubated, sedated, prone. Skin: Warm and dry. No rashes were noted. HEENT: Round and reactive pupils. Moist mucous membranes. No ulcerations or thrush. Neck: Supple to movements. Chest: No use of accessory muscles to breathe. Symmetrical expansion. No wheezing, crackles. Rhonchi  Cardiovascular: S1 and S2 are rhythmic and regular. Abdomen: prone  Genitourinary:   Extremities: No clubbing, no cyanosis, no edema.   Lines: peripheral  Art line 2021  Triple-lumen catheter 2021  Busch changed 3/24/2021  Laboratory and Tests Review:  Lab Results   Component Value Date    WBC 10.5 2021    WBC 9.7 2021    WBC 8.2 2021    HGB 11.8 (L) 2021    HCT 37.7 2021    MCV 92.9 2021    PLT 91 (L) 2021     Lab Results   Component Value Date    NEUTROABS 9.03 (H) 2021    NEUTROABS 7.89 (H) 2021    NEUTROABS 7.95 (H) 2021     No results found for: [facility]  Lab Results   Component Value Date    ALT 31 2021    AST 51 (H) 2021    ALKPHOS 77 2021    BILITOT 0.3 2021     Lab Results   Component Value Date     2021    K 4.4 2021    K 3.7 2021     2021    CO2 29 2021    BUN 29 2021    CREATININE 0.8 08/27/2021    CREATININE 0.8 08/26/2021    CREATININE 0.8 08/25/2021    GFRAA >60 08/27/2021    LABGLOM >60 08/27/2021    GLUCOSE 186 08/27/2021    PROT 5.0 08/27/2021    LABALBU 2.9 08/27/2021    CALCIUM 8.2 08/27/2021    BILITOT 0.3 08/27/2021    ALKPHOS 77 08/27/2021    AST 51 08/27/2021    ALT 31 08/27/2021     Lab Results   Component Value Date    CRP 1.0 (H) 08/27/2021    CRP 1.9 (H) 08/26/2021    CRP 4.0 (H) 08/25/2021     Lab Results   Component Value Date    SEDRATE 2 08/27/2021    SEDRATE 0 08/26/2021    SEDRATE 2 08/25/2021     Radiology:  CAT scan diffuse groundglass infiltrates    Microbiology:   Lab Results   Component Value Date    BC 5 Days no growth 08/21/2021     Lab Results   Component Value Date    BLOODCULT2 5 Days no growth 08/21/2021     No results found for: WNDABS  No results found for: RESPSMEAR  No results found for: MPNEUMO, CLAMYDCU, LABLEGI, AFBCX, FUNGSM, LABFUNG  No results found for: CULTRESP  No results found for: CXCATHTIP  No results found for: BFCS  No results found for: CXSURG  Urine Culture, Routine   Date Value Ref Range Status   08/24/2021 Growth not present  Final     MRSA Culture Only   Date Value Ref Range Status   08/24/2021 Methicillin resistant Staph aureus not isolated  Final   Microbiology:  Blood cultures pending  Legionella antigen and strep pneumo antigen are negative  Urine culture negative    ASSESSMENT:  · Severe Covid pneumonia   · cytokine storm  · Poor prognosis    PLAN:  · Completed remdesivir; steroids;  Tosi x1 completed   · Check final cultures  · Monitor labs  · check fungitell -continue Martita Vasques MD  5:19 PM   8/27/2021

## 2021-08-27 NOTE — PROGRESS NOTES
ml Q4 zw=361 ml/d  · Current TF & Flush Orders Provides: 20 ml/hr (prone)=480 ml/d, 820 angel (1580 angel total w/propofol) 66 g pro, 544 ml total free water  · Goal TF & Flush Orders Provides: 1440 ml/d, 2260 angel (3020 angel total w/propofol), 145 g pro, 1273 ml total free water    Additional Calorie Sources:    propofol = 760 angel    Anthropometric Measures:  · Height: 6' (182.9 cm)  · Current Body Weight: 302 lb (137 kg) (8/21 bedscale)   · Admission Body Weight:      · Usual Body Weight:  (No recent actual wt hx available)     · Ideal Body Weight: 178 lbs; % Ideal Body Weight 169.7 %   · BMI: 40.9  · BMI Categories: Obese Class 3 (BMI 40.0 or greater)       Nutrition Diagnosis:   · Inadequate oral intake related to impaired respiratory function as evidenced by NPO or clear liquid status due to medical condition, intubation, nutrition support - enteral nutrition      Nutrition Interventions:   Food and/or Nutrient Delivery:  Continue NPO, Modify Tube Feeding (will provide TF rec goal rate to include propofol angel)  Nutrition Education/Counseling:  No recommendation at this time   Coordination of Nutrition Care:  Continue to monitor while inpatient    Goals:  Pt michelle EN at goal rate       Nutrition Monitoring and Evaluation:   Behavioral-Environmental Outcomes:  None Identified   Food/Nutrient Intake Outcomes:  Enteral Nutrition Intake/Tolerance  Physical Signs/Symptoms Outcomes:  GI Status, Biochemical Data, Fluid Status or Edema, Nutrition Focused Physical Findings, Skin, Weight     Discharge Planning:     Too soon to determine     Electronically signed by Kostas Murillo RD, CNSC, LD on 8/27/21 at 10:40 AM EDT    Contact: [phone]

## 2021-08-27 NOTE — PROGRESS NOTES
Critical Care Team - Daily Progress Note      Date and time: 8/27/2021 7:13 AM  Patient's name:  Richardson Dewey Record Number: [id number]  Patient's account/billing number: [de-identified]  Patient's YOB: 1965  Age: 60 y.o. Date of Admission: 8/21/2021  9:35 AM  Length of stay during current admission: 6      Primary Care Physician: Loco Salazar, DO  ICU Attending Physician: Dr. Hogue    Code Status: No Order    Reason for ICU admission: Acute hypoxic respiratory failure      SUBJECTIVE:     OVERNIGHT EVENTS:     8/25: Patient remains intubated sedated paralyzed and in prone position. Overnight his blood pressure slightly was low but never required to be started on pressors. Is currently on FiO2 of 50% and PEEP of 12 and his PF ratio is 153. Has good urine output. Tolerating tube feeds. 8/26: Patient was noted to have a blood pressure dropped into the 80s overnight. Patient was started on Levophed very briefly, his blood pressure recovered quickly. Patient's Busch catheter was also noted to clogged with sediment and was not patent. This was replaced overnight. Patient remains proned. 8/27: Patient remains prone overnight. Fall he did not have a dysfunction overnight. Patient was noted to have soft blood pressures on propofol, Versed, vecuronium, fentanyl, Cardura, Norvasc. Patient's Versed to be weaned off. Patient's blood pressure very labile going from systolic of 374V to 630A in a few minutes time. Today, plan to supine patient and see how he does. Does have facial swelling which should improve once supine for a while.     AWAKE & FOLLOWING COMMANDS:  [x] No   [] Yes    CURRENT VENTILATION STATUS:     [x] Ventilator  [] BIPAP  [] Nasal Cannula [] Room Air      IF INTUBATED, ET TUBE MARKING AT LOWER LIP:       cms    SECRETIONS Amount:  [x] Small [] Moderate  [] Large  [] None  Color:     [] White [x] Colored  [] Bloody    SEDATION:  RAAS Score: -3  [x]Fentanyl gtt, propofol  [x] Versed gtt  [] Ativan gtt   [] No Sedation    PARALYZED:  [] No    [x] Yes    DIARRHEA:                [x] No                [] Yes  (C. Difficile status: [] positive                                                                                                                       [] negative                                                                                                                     [] pending)    VASOPRESSORS:  [x] No    [] Yes    If yes -   [] Levophed       [] Dopamine     [] Vasopressin       [] Dobutamine  [] Phenylephrine         [] Epinephrine    CENTRAL LINES:     [] No   [x] Yes     placed    If yes -     [x] Right IJ     [] Left IJ [] Right Femoral [] Left Femoral                   [] Right Subclavian [] Left Subclavian       ANDERSON'S CATHETER:   [] No   [x] Yes           OBJECTIVE:     VITAL SIGNS:  BP (!) 154/89   Pulse 80   Temp 99.5 °F (37.5 °C) (Bladder)   Resp 30   Ht 6' (1.829 m)   Wt (!) 302 lb (137 kg)   SpO2 94%   BMI 40.96 kg/m²   Tmax over 24 hours:  Temp (24hrs), Av.6 °F (37.6 °C), Min:99.1 °F (37.3 °C), Max:100 °F (37.8 °C)      Patient Vitals for the past 6 hrs:   Temp Temp src Pulse Resp SpO2   21 0600 -- -- 80 30 94 %   21 0512 -- -- 73 30 93 %   21 0500 -- -- 74 30 92 %   21 0400 99.5 °F (37.5 °C) Bladder 78 30 92 %   21 0300 -- -- 84 30 92 %   21 0200 -- -- 89 29 93 %         Intake/Output Summary (Last 24 hours) at 2021 0713  Last data filed at 2021 0542  Gross per 24 hour   Intake 4238 ml   Output 1185 ml   Net 3053 ml     Wt Readings from Last 2 Encounters:   21 (!) 302 lb (137 kg)   21 (!) 320 lb (145.2 kg)     Body mass index is 40.96 kg/m².         PHYSICAL EXAMINATION:    General appearance -intubated, sedated, paralyzed, proned  Mental status -unable to assess secondary to patient being intubated  Eyes -unable to assess as patient is inside RotoProne  Ears - external ear normal  Nose - normal and patent, no erythema, discharge or polyps  Mouth -endotracheal tube in place, patient's tongue is swollen and protruding from mouth, remains moist  Neck - unable to assess secondary to RotoProne  Chest -upon posterior examination diffuse rhonchi, improved from yesterday, bilateral upper lobe crackles  Heart -tachycardic rate, regular rhythm  Abdomen -unable to access abdomen  neurological -sedated  Extremities - peripheral pulses normal, no clubbing or cyanosis  Skin - normal coloration and turgor, no rashes, no suspicious skin lesions noted      Any additional physical findings:    MEDICATIONS:    Scheduled Meds:   phenytoin  300 mg IntraVENous Q12H    insulin lispro  0-12 Units Subcutaneous Q6H    insulin glargine  15 Units Subcutaneous Daily    fluconazole  400 mg IntraVENous Q24H    albumin human  25 g IntraVENous BID    chlorhexidine  15 mL Mouth/Throat BID    Refresh Lacri-Lube   Ophthalmic Q6H    dexamethasone  10 mg IntraVENous Q12H    lisinopril  40 mg Oral Daily    ipratropium-albuterol  1 ampule Inhalation Q4H    sodium chloride flush  10 mL IntraVENous BID    enoxaparin  0.5 mg/kg Subcutaneous BID    amLODIPine  10 mg Oral Daily    atorvastatin  40 mg Oral Daily    doxazosin  8 mg Oral Daily    ascorbic acid  500 mg Oral Daily    Vitamin D  2,000 Units Oral Daily    zinc sulfate  50 mg Oral Daily    pantoprazole  40 mg IntraVENous Daily    cetirizine  5 mg Oral BID     Continuous Infusions:   propofol 20 mcg/kg/min (08/27/21 0401)    midazolam 6 mg/hr (08/27/21 0024)    IV infusion builder 40 mL/hr at 08/27/21 0542    norepinephrine      sodium chloride      vecuronium (NORCURON) infusion 1 mcg/kg/min (08/27/21 0025)    dextrose       PRN Meds:   sodium chloride flush, 5-40 mL, PRN  sodium chloride, 25 mL, PRN  albuterol, 2.5 mg, Q6H PRN  benzonatate, 200 mg, TID PRN  glucose, 15 g, PRN  dextrose, 12.5 g, PRN  glucagon (rDNA), 1 mg, PRN  dextrose, 100 mL/hr, PRN  sodium chloride, 30 mL, PRN          VENT SETTINGS (Comprehensive) (if applicable):  Vent Information  $Ventilation: $Subsequent Day  Skin Assessment: Clean, dry, & intact  Equipment ID: [id number]  Equipment Changed: (S) Humidification  Vent Type: [id number]  Vent Mode: AC/VC  Vt Ordered: 550 mL  Pressure Ordered: 20  Rate Set: 30 bmp  Peak Flow: 65 L/min  Pressure Support: 0 cmH20  FiO2 : 60 %  SpO2: 94 %  SpO2/FiO2 ratio: 156.67  Sensitivity: 3  PEEP/CPAP: 10  I Time/ I Time %: 0 s  Humidification Source: Heated wire  Humidification Temp: 37  Humidification Temp Measured: 36.4  Circuit Condensation: Drained  Mask Type: Full face mask  Mask Size: Medium  Additional Respiratory  Assessments  Pulse: 80  Resp: 30  SpO2: 94 %  Position: Prone  Humidification Source: Heated wire  Humidification Temp: 37  Circuit Condensation: Drained  Oral Care: Lip moisturizer applied, Mouthwash with chlorhexidine, Mouth swabbed, Mouth suctioned  Subglottic Suction Done?: Yes  Airway Type: ET  Airway Size: 8  Cuff Pressure (cm H2O): 29 cm H2O    ABG  Lab Results   Component Value Date    PH 7.411 08/27/2021    PCO2 35.5 08/27/2021    PO2 103.2 08/27/2021    HCO3 22.0 08/27/2021    O2SAT 97.5 08/27/2021         Laboratory findings:    Complete Blood Count:   Recent Labs     08/25/21  0020 08/25/21  0520 08/26/21  0600   WBC 8.9 8.2 9.7   HGB 13.6 13.7 13.5   HCT 40.9 41.7 40.4   PLT 89* 100* 93*        Lactic Acid  Lab Results   Component Value Date    LACTA 2.2 08/25/2021    LACTA 1.8 08/23/2021    LACTA 3.9 08/21/2021        Last 3 Blood Glucose:   Recent Labs     08/25/21  0020 08/25/21  0520 08/26/21  0600   GLUCOSE 255* 188* 249*        PT/INR:  No results found for: PROTIME, INR  PTT:  No results found for: APTT, PTT    Comprehensive Metabolic Profile:   Recent Labs     08/25/21  0020 08/25/21  0520 08/25/21  0520 08/26/21  0600    147*  --  146   K 4.4 3.8  --  4.3   * 114*  --  111*   CO2 24 25  --  25   BUN 19 18  --  24*   CREATININE 0.8 0.8  --  0.8   GLUCOSE 255* 188*  --  249*   CALCIUM 7.8* 8.0*  --  7.9*   PROT  --  4.9*   < > 5.3*   LABALBU  --  2.4*   < > 2.7*   BILITOT  --  0.3   < > 0.4   ALKPHOS  --  122   < > 119   AST  --  67*   < > 92*   ALT  --  35   < > 47*    < > = values in this interval not displayed. Magnesium:   Lab Results   Component Value Date    MG 1.9 08/26/2021     Phosphorus:   Lab Results   Component Value Date    PHOS 3.8 08/26/2021     Ionized Calcium: No results found for: CAION       Urinalysis:     Troponin: No results for input(s): TROPONINI in the last 72 hours. Cultures     Cultures during this admission:       No results for input(s): BC in the last 72 hours. No results for input(s): Felix Konig in the last 72 hours. Recent Labs     08/24/21  1505   [address] not present       Other pertinent Labs:       Radiology/Imaging:   XR CHEST (2 VW)    Result Date: 8/18/2021  EXAMINATION: TWO XRAY VIEWS OF THE CHEST 8/17/2021 11:32 pm COMPARISON: None. HISTORY: ORDERING SYSTEM PROVIDED HISTORY: cough TECHNOLOGIST PROVIDED HISTORY: Reason for exam:->cough FINDINGS: Frontal and lateral views of the chest.  Low lung volume. Multifocal bilateral patchy opacities are most pronounced in the mid and lower lung zones. No pleural effusion or pneumothorax. Normal cardiomediastinal silhouette and great vessels. Multilevel degenerative disc disease. Multifocal bilateral patchy opacities are most pronounced in the mid and lower lung zones. Differential considerations include an infectious/inflammatory process including atypical or viral pneumonia and pulmonary edema.      XR CHEST PORTABLE    Result Date: 8/23/2021  EXAMINATION: ONE XRAY VIEW OF THE CHEST 8/23/2021 12:26 pm COMPARISON: August 23, 2021 HISTORY: ORDERING SYSTEM PROVIDED HISTORY: intubated, cvc placement TECHNOLOGIST PROVIDED HISTORY: Reason for exam:->intubated, cvc placement FINDINGS: Endotracheal tube is 3.1 cm significant interval increase in the degree of patchy bilateral airspace opacities, in keeping with the patient's known diagnosis of COVID pneumonia. No evidence of a sizable pleural effusion. No pneumothorax is identified. Heart size is unable to be accurately assessed on this single portable view of the chest, but appears to be stable. No acute osseous abnormality. Significantly increased airspace opacities throughout both lungs, in keeping with multifocal pneumonia. CTA PULMONARY W CONTRAST    Result Date: 8/21/2021  EXAMINATION: CTA OF THE CHEST, 8/21/2021 12:11 pm TECHNIQUE: CTA of the chest was performed after the administration of intravenous contrast.  Multiplanar reformatted images are provided for review. MIP images are provided for review. Dose modulation, iterative reconstruction, and/or weight based adjustment of the mA/kV was utilized to reduce the radiation dose to as low as reasonably achievable. COMPARISON: None HISTORY: ORDERING SYSTEM PROVIDED HISTORY:  PE vs COVID TECHNOLOGIST PROVIDED HISTORY: Reason for Exam:  PE vs COVID Decision Support Exception - unselect if not a suspected or confirmed emergency medical condition->Emergency Medical Condition (MA) FINDINGS: Pulmonary Arteries: There is a combination of poor contrast bolus timing as well as significant respiratory motion artifact that severely degrades the examination. Unfortunately, multiple segmental and subsegmental pulmonary artery branches are not opacified well enough to exclude a pulmonary embolism. There is no evidence of a central pulmonary embolism or convincing evidence of right heart strain. Mediastinum: There is at least one enlarged mediastinal lymph node measuring 17 mm in short axis in the subcarinal distribution. Several additional smaller but nonenlarged mediastinal lymph nodes are present, likely reactive. The thoracic esophagus is decompressed, limiting assessment. No obvious esophageal abnormality.   The heart size is top normal.  Trace pericardial fluid is present. Lungs/pleura: The central airways are patent. There is no evidence of a pleural effusion or pneumothorax. Extensive ground-glass and consolidative airspace opacities are present bilaterally, in keeping with the provided history of COVID pneumonia. Upper Abdomen: Imaged portions of the upper abdomen demonstrates diffuse hepatic steatosis. There are also multiple nonobstructing bilateral renal calculi, partially imaged. Soft Tissues/Bones: No acute bone or soft tissue abnormality. Extensive ground-glass and consolidative airspace opacities throughout both lungs, in keeping with the provided history of COVID pneumonia. The examination is nondiagnostic for detection of pulmonary emboli in multiple segmental and subsegmental pulmonary artery distributions, secondary to poor contrast bolus timing and respiratory motion artifact. There is no evidence of a central pulmonary embolism. Hepatic steatosis. Enlarged mediastinal lymph nodes, likely reactive. XR CHEST ABDOMEN NG PLACEMENT    Result Date: 8/23/2021  EXAMINATION: ONE SUPINE XRAY VIEW(S) OF THE ABDOMEN 8/23/2021 12:26 pm COMPARISON: None. HISTORY: ORDERING SYSTEM PROVIDED HISTORY: ng placement TECHNOLOGIST PROVIDED HISTORY: Reason for exam:->ng placement FINDINGS: Nasogastric tube courses below the level of the diaphragm with distal tip in the expected location of the stomach. Satisfactory position of nasogastric tube.         Chest Xray (8/27/2021):  Pending        Principal Problem:    Acute hypoxemic respiratory failure due to COVID-19 [facility])  Active Problems:    Type 2 diabetes mellitus without complication, without long-term current use of insulin (HCC)    History of seizures    Hyperlipidemia    Busch catheter problem (HCC)    Sepsis (HCC)    Thrombocytopenia ([address]  Resolved Problems:    OSCAR (acute kidney injury) ([address]    Lactic acidosis    SYSTEMS ASSESSMENT    Neuro   Intubated, Agent  [] [id number] [] Sucralfate  [] Other:  VTE:   [x] Enoxaparin  [] Unfract. Heparin Subcut  [] EPC Cuffs    NUTRITION:  [] NPO [x] Tube Feeding (Specify: ) [] TPN  [] PO (Diet: ADULT TUBE FEEDING; Nasogastric; Diabetic; Continuous; 20; Yes; 20; Q 6 hours; 60; 30; Q 4 hours; Protein; Proteinex 2 go once daily)    HOME MEDICATIONS RECONCILED: [] No  [x] Yes    INSULIN DRIP:   [x] No   [] Yes    CONSULTATION NEEDED:  [x] No   [] Yes    FAMILY UPDATED:    [] No   [x] Yes    TRANSFER OUT OF ICU:   [x] No   [] Yes    Gerard Hong DO, PGY-2                  8/27/2021, 7:13 AM     Addendum:  X-ray today shows worsening of a right lung infiltrate/mucous plug. We put patient in supine position. Due to significant lavage with suctioning thick dark tan secretions. Sputum was sent for respiratory cultures. Patient's PF ratios down to 73 in supine position proceeded with proning him again. I personally saw, examined and provided care for the patient. Radiographs, labs and medication list were reviewed by me independently. I spoke with bedside nursing, therapists and consultants. Critical care services and times documented are independent of procedures and multidisciplinary rounds with Residents. Additionally comprehensive, multidisciplinary rounds were conducted with the MICU team. The case was discussed in detail and plans for care were established. Review of Residents documentation was conducted and revisions were made as appropriate. I agree with the above documented exam, problem list and plan of care.   Barbi Burns MD   CCT excluding procedures: 39'

## 2021-08-27 NOTE — PROGRESS NOTES
[facility] Progress Note    Admitting Date and Time: 8/21/2021  9:35 AM  Admit Dx: Acute hypoxemic respiratory failure due to COVID-19 (HCC) [U07.1, J96.01]  Pneumonia due to COVID-19 virus [U07.1, J12.82]    Subjective:  Patient is being followed for Acute hypoxemic respiratory failure due to COVID-19 ([address] [U07.1, J96.01]  Pneumonia due to COVID-19 virus [U07.1, J12.82]     Pt intubated and sedated. On Rotoprone apparatus. O2 sat:  94% on  AC 60%     Fever:  995    Per RN:  Not improving. ROS: denies cp, n/v, HA unless stated above.  insulin glargine  20 Units Subcutaneous Daily    phenytoin  300 mg IntraVENous Q12H    insulin lispro  0-12 Units Subcutaneous Q6H    fluconazole  400 mg IntraVENous Q24H    albumin human  25 g IntraVENous BID    chlorhexidine  15 mL Mouth/Throat BID    Refresh Lacri-Lube   Ophthalmic Q6H    dexamethasone  10 mg IntraVENous Q12H    lisinopril  40 mg Oral Daily    ipratropium-albuterol  1 ampule Inhalation Q4H    sodium chloride flush  10 mL IntraVENous BID    enoxaparin  0.5 mg/kg Subcutaneous BID    amLODIPine  10 mg Oral Daily    atorvastatin  40 mg Oral Daily    doxazosin  8 mg Oral Daily    ascorbic acid  500 mg Oral Daily    Vitamin D  2,000 Units Oral Daily    zinc sulfate  50 mg Oral Daily    pantoprazole  40 mg IntraVENous Daily    cetirizine  5 mg Oral BID     sodium chloride flush, 5-40 mL, PRN  sodium chloride, 25 mL, PRN  albuterol, 2.5 mg, Q6H PRN  benzonatate, 200 mg, TID PRN  glucose, 15 g, PRN  dextrose, 12.5 g, PRN  glucagon (rDNA), 1 mg, PRN  dextrose, 100 mL/hr, PRN  sodium chloride, 30 mL, PRN         Objective:    BP (!) 154/89   Pulse 80   Temp 99 °F (37.2 °C) (Bladder)   Resp 30   Ht 6' (1.829 m)   Wt (!) 302 lb (137 kg)   SpO2 94%   BMI 40.96 kg/m²     General Appearance: intubated and sedated. Prone on Rotoprone apparatus  Skin: warm and dry; unchanged  Head: normocephalic and atraumatic.   Facial pressure points noted by nursing  Neck: neck supple and non tender without mass   Pulmonary/Chest: crackles bilaterally- unchanged; respiratory distress when supine  Cardiovascular: normal rate, normal S1 and S2 and no carotid bruits  Abdomen: soft, non-tender, non-distended, normal bowel sounds, no masses or organomegaly  Extremities: no cyanosis, no clubbing and edema  noted  Neurologic: sedated and intubated; unable to assess    Recent Labs     08/25/21  0020 08/25/21  0520 08/26/21  0600    147* 146   K 4.4 3.8 4.3   * 114* 111*   CO2 24 25 25   BUN 19 18 24*   CREATININE 0.8 0.8 0.8   GLUCOSE 255* 188* 249*   CALCIUM 7.8* 8.0* 7.9*       Recent Labs     08/25/21  0020 08/25/21  0520 08/26/21  0600   WBC 8.9 8.2 9.7   RBC 4.55 4.56 4.43   HGB 13.6 13.7 13.5   HCT 40.9 41.7 40.4   MCV 89.9 91.4 91.2   MCH 29.9 30.0 30.5   MCHC 33.3 32.9 33.4   RDW 14.0 14.0 14.1   PLT 89* 100* 93*   MPV 9.8 10.1 10.3       Radiology:  XR CHEST PORTABLE    Result Date: 8/26/2021  EXAMINATION: ONE XRAY VIEW OF THE CHEST 8/26/2021 3:24 pm COMPARISON: August 23, 2021 HISTORY: ORDERING SYSTEM PROVIDED HISTORY: Covid pneumonia TECHNOLOGIST PROVIDED HISTORY: Reason for exam:->Covid pneumonia FINDINGS: Slight improved aeration in right lung base. Slight worsening in mid left lung field and left lung base. Persistent interstitial and hazy opacities throughout both lungs. NG tube courses below the diaphragm. Distal tip of right IJ central venous catheter is at level of superior vena cava. Endotracheal tube is 5.1 cm above the donna. No evidence of pneumothorax. 1. Slight improved aeration in right lung base. Slight worsening in mid left lung field and left lung base. 2. Persistent diffuse interstitial pulmonary edema or bilateral pneumonia.      Assessment:    Principal Problem:    Acute hypoxemic respiratory failure due to COVID-19 [facility])  Active Problems:    Type 2 diabetes mellitus without complication, without long-term current use of insulin (HCC)    History of seizures    Hyperlipidemia    Busch catheter problem (HCC)    Sepsis ([facility] [address]    Thrombocytopenia ([facility]  Resolved Problems:    OSCAR (acute kidney injury) ([facility] [address]    Lactic acidosis    Plan:  1. Acute hypoxic respiratory failure, most likely due to viral pneumonia, O2 sat was below 90% on room air, AC 60% FiO2, RR 30; 550 IV; +10 PEEP. oxygen saturation above 90%. Treating the underlying process. 2.  Acute viral pneumonia, rapid influenza a and B were negative, respiratory panel was not done, procalcitonin was 0.36. CAT scan of the chest showed bilateral patchy groundglass opacities, sent for COVID-19, results positive. 3.  T2DM - Glucose POCT.  Continue basal and SS insulin. 4.  Hyperlipidemia - continue statins. 5.  HTN - continue meds. 6.  Elevated D-dimer - continue Lovenox bid. NOTE: This report was transcribed using voice recognition software. Every effort was made to ensure accuracy; however, inadvertent computerized transcription errors may be present.     Electronically signed by Allie Mancera MD on 8/27/2021 at 8:48 AM

## 2021-08-27 NOTE — PROGRESS NOTES
Patient tolerated supine position for 4 hrs. And proned with Dr Carmen Gomez at bedside after much suction and culture sent.   Wife was updated on todays events

## 2021-08-28 ENCOUNTER — APPOINTMENT (OUTPATIENT)
Dept: GENERAL RADIOLOGY | Age: 56
DRG: 005 | End: 2021-08-28
Payer: COMMERCIAL

## 2021-08-28 LAB
(1,3)-BETA-D-GLUCAN (FUNGITELL) INTERPRETATION: NEGATIVE
(1,3)-BETA-D-GLUCAN (FUNGITELL): 50 PG/ML
AADO2: 287.3 MMHG
AADO2: 333.2 MMHG
ALBUMIN SERPL-MCNC: 3.5 G/DL (ref 3.5–5.2)
ALP BLD-CCNC: 88 U/L (ref 40–129)
ALT SERPL-CCNC: 28 U/L (ref 0–40)
ANION GAP SERPL CALCULATED.3IONS-SCNC: 8 MMOL/L (ref 7–16)
AST SERPL-CCNC: 45 U/L (ref 0–39)
B.E.: 1.6 MMOL/L (ref -3–3)
B.E.: 2.2 MMOL/L (ref -3–3)
BASOPHILS ABSOLUTE: 0.01 E9/L (ref 0–0.2)
BASOPHILS RELATIVE PERCENT: 0.1 % (ref 0–2)
BILIRUB SERPL-MCNC: 0.4 MG/DL (ref 0–1.2)
BUN BLDV-MCNC: 27 MG/DL (ref 6–20)
C-REACTIVE PROTEIN: 0.7 MG/DL (ref 0–0.4)
CALCIUM SERPL-MCNC: 8.7 MG/DL (ref 8.6–10.2)
CHLORIDE BLD-SCNC: 110 MMOL/L (ref 98–107)
CO2: 26 MMOL/L (ref 22–29)
COHB: 0.2 % (ref 0–1.5)
COHB: 0.6 % (ref 0–1.5)
CREAT SERPL-MCNC: 0.7 MG/DL (ref 0.7–1.2)
CRITICAL: ABNORMAL
CRITICAL: ABNORMAL
DATE ANALYZED: ABNORMAL
DATE ANALYZED: ABNORMAL
DATE OF COLLECTION: ABNORMAL
DATE OF COLLECTION: ABNORMAL
EOSINOPHILS ABSOLUTE: 0.11 E9/L (ref 0.05–0.5)
EOSINOPHILS RELATIVE PERCENT: 1.1 % (ref 0–6)
FIO2: 60 %
FIO2: 80 %
GFR AFRICAN AMERICAN: >60
GFR NON-AFRICAN AMERICAN: >60 ML/MIN/1.73
GLUCOSE BLD-MCNC: 202 MG/DL (ref 74–99)
HCO3: 24.9 MMOL/L (ref 22–26)
HCO3: 26.4 MMOL/L (ref 22–26)
HCT VFR BLD CALC: 33.4 % (ref 37–54)
HEMOGLOBIN: 10.8 G/DL (ref 12.5–16.5)
HHB: 1 % (ref 0–5)
HHB: 5.3 % (ref 0–5)
IMMATURE GRANULOCYTES #: 0.46 E9/L
IMMATURE GRANULOCYTES %: 4.7 % (ref 0–5)
LAB: ABNORMAL
LAB: ABNORMAL
LYMPHOCYTES ABSOLUTE: 0.9 E9/L (ref 1.5–4)
LYMPHOCYTES RELATIVE PERCENT: 9.2 % (ref 20–42)
Lab: ABNORMAL
Lab: ABNORMAL
MAGNESIUM: 2.1 MG/DL (ref 1.6–2.6)
MCH RBC QN AUTO: 29.6 PG (ref 26–35)
MCHC RBC AUTO-ENTMCNC: 32.3 % (ref 32–34.5)
MCV RBC AUTO: 91.5 FL (ref 80–99.9)
METER GLUCOSE: 181 MG/DL (ref 74–99)
METER GLUCOSE: 195 MG/DL (ref 74–99)
METER GLUCOSE: 242 MG/DL (ref 74–99)
METER GLUCOSE: 273 MG/DL (ref 74–99)
METHB: 0.1 % (ref 0–1.5)
METHB: 0.2 % (ref 0–1.5)
MODE: ABNORMAL
MODE: ABNORMAL
MONOCYTES ABSOLUTE: 0.52 E9/L (ref 0.1–0.95)
MONOCYTES RELATIVE PERCENT: 5.3 % (ref 2–12)
NEUTROPHILS ABSOLUTE: 7.74 E9/L (ref 1.8–7.3)
NEUTROPHILS RELATIVE PERCENT: 79.6 % (ref 43–80)
O2 CONTENT: 15.5 ML/DL
O2 CONTENT: 16.5 ML/DL
O2 SATURATION: 94.7 % (ref 92–98.5)
O2 SATURATION: 99 % (ref 92–98.5)
O2HB: 93.9 % (ref 94–97)
O2HB: 98.7 % (ref 94–97)
OPERATOR ID: 316
OPERATOR ID: ABNORMAL
PATIENT TEMP: 37 C
PATIENT TEMP: 37 C
PCO2: 32.3 MMHG (ref 35–45)
PCO2: 42.5 MMHG (ref 35–45)
PDW BLD-RTO: 13.9 FL (ref 11.5–15)
PEEP/CPAP: 12 CMH2O
PEEP/CPAP: 14 CMH2O
PFO2: 1.31 MMHG/%
PFO2: 2.29 MMHG/%
PH BLOOD GAS: 7.41 (ref 7.35–7.45)
PH BLOOD GAS: 7.5 (ref 7.35–7.45)
PHOSPHORUS: 2.7 MG/DL (ref 2.5–4.5)
PIP: 20 CMH2O
PLATELET # BLD: 103 E9/L (ref 130–450)
PMV BLD AUTO: 10.8 FL (ref 7–12)
PO2: 183.3 MMHG (ref 75–100)
PO2: 78.8 MMHG (ref 75–100)
POTASSIUM SERPL-SCNC: 4.5 MMOL/L (ref 3.5–5)
PS: 18 CMH20
RBC # BLD: 3.65 E12/L (ref 3.8–5.8)
RI(T): 182 %
RI(T): 365 %
RR MECHANICAL: 26 B/MIN
RR MECHANICAL: 30 B/MIN
SEDIMENTATION RATE, ERYTHROCYTE: 2 MM/HR (ref 0–15)
SODIUM BLD-SCNC: 144 MMOL/L (ref 132–146)
SOURCE, BLOOD GAS: ABNORMAL
SOURCE, BLOOD GAS: ABNORMAL
THB: 11.6 G/DL (ref 11.5–16.5)
THB: 11.7 G/DL (ref 11.5–16.5)
TIME ANALYZED: 1735
TIME ANALYZED: 540
TOTAL PROTEIN: 5.7 G/DL (ref 6.4–8.3)
WBC # BLD: 9.7 E9/L (ref 4.5–11.5)

## 2021-08-28 PROCEDURE — 6360000002 HC RX W HCPCS: Performed by: INTERNAL MEDICINE

## 2021-08-28 PROCEDURE — 6370000000 HC RX 637 (ALT 250 FOR IP): Performed by: INTERNAL MEDICINE

## 2021-08-28 PROCEDURE — 82962 GLUCOSE BLOOD TEST: CPT

## 2021-08-28 PROCEDURE — 71045 X-RAY EXAM CHEST 1 VIEW: CPT

## 2021-08-28 PROCEDURE — 82805 BLOOD GASES W/O2 SATURATION: CPT

## 2021-08-28 PROCEDURE — C9113 INJ PANTOPRAZOLE SODIUM, VIA: HCPCS | Performed by: STUDENT IN AN ORGANIZED HEALTH CARE EDUCATION/TRAINING PROGRAM

## 2021-08-28 PROCEDURE — 2580000003 HC RX 258: Performed by: FAMILY MEDICINE

## 2021-08-28 PROCEDURE — 2500000003 HC RX 250 WO HCPCS: Performed by: INTERNAL MEDICINE

## 2021-08-28 PROCEDURE — 36415 COLL VENOUS BLD VENIPUNCTURE: CPT

## 2021-08-28 PROCEDURE — 94640 AIRWAY INHALATION TREATMENT: CPT

## 2021-08-28 PROCEDURE — 99233 SBSQ HOSP IP/OBS HIGH 50: CPT | Performed by: FAMILY MEDICINE

## 2021-08-28 PROCEDURE — 84100 ASSAY OF PHOSPHORUS: CPT

## 2021-08-28 PROCEDURE — 80053 COMPREHEN METABOLIC PANEL: CPT

## 2021-08-28 PROCEDURE — 6360000002 HC RX W HCPCS: Performed by: STUDENT IN AN ORGANIZED HEALTH CARE EDUCATION/TRAINING PROGRAM

## 2021-08-28 PROCEDURE — 37799 UNLISTED PX VASCULAR SURGERY: CPT

## 2021-08-28 PROCEDURE — 2000000000 HC ICU R&B

## 2021-08-28 PROCEDURE — 86140 C-REACTIVE PROTEIN: CPT

## 2021-08-28 PROCEDURE — P9047 ALBUMIN (HUMAN), 25%, 50ML: HCPCS | Performed by: INTERNAL MEDICINE

## 2021-08-28 PROCEDURE — 6360000002 HC RX W HCPCS: Performed by: FAMILY MEDICINE

## 2021-08-28 PROCEDURE — 85025 COMPLETE CBC W/AUTO DIFF WBC: CPT

## 2021-08-28 PROCEDURE — 85651 RBC SED RATE NONAUTOMATED: CPT

## 2021-08-28 PROCEDURE — 6360000002 HC RX W HCPCS: Performed by: SPECIALIST

## 2021-08-28 PROCEDURE — 2580000003 HC RX 258: Performed by: INTERNAL MEDICINE

## 2021-08-28 PROCEDURE — 94003 VENT MGMT INPAT SUBQ DAY: CPT

## 2021-08-28 PROCEDURE — 83735 ASSAY OF MAGNESIUM: CPT

## 2021-08-28 RX ADMIN — IPRATROPIUM BROMIDE AND ALBUTEROL SULFATE 1 AMPULE: .5; 3 SOLUTION RESPIRATORY (INHALATION) at 06:57

## 2021-08-28 RX ADMIN — INSULIN LISPRO 4 UNITS: 100 INJECTION, SOLUTION INTRAVENOUS; SUBCUTANEOUS at 01:33

## 2021-08-28 RX ADMIN — FENTANYL CITRATE: 0.05 INJECTION, SOLUTION INTRAMUSCULAR; INTRAVENOUS at 09:05

## 2021-08-28 RX ADMIN — CETIRIZINE HYDROCHLORIDE 5 MG: 10 TABLET, FILM COATED ORAL at 08:42

## 2021-08-28 RX ADMIN — PROPOFOL 25 MCG/KG/MIN: 10 INJECTION, EMULSION INTRAVENOUS at 14:58

## 2021-08-28 RX ADMIN — IPRATROPIUM BROMIDE AND ALBUTEROL SULFATE 1 AMPULE: .5; 3 SOLUTION RESPIRATORY (INHALATION) at 12:38

## 2021-08-28 RX ADMIN — OXYCODONE HYDROCHLORIDE AND ACETAMINOPHEN 500 MG: 500 TABLET ORAL at 08:42

## 2021-08-28 RX ADMIN — INSULIN LISPRO 2 UNITS: 100 INJECTION, SOLUTION INTRAVENOUS; SUBCUTANEOUS at 17:36

## 2021-08-28 RX ADMIN — CHLORHEXIDINE GLUCONATE 0.12% ORAL RINSE 15 ML: 1.2 LIQUID ORAL at 21:30

## 2021-08-28 RX ADMIN — ATORVASTATIN CALCIUM 40 MG: 40 TABLET, FILM COATED ORAL at 21:31

## 2021-08-28 RX ADMIN — PROPOFOL 25 MCG/KG/MIN: 10 INJECTION, EMULSION INTRAVENOUS at 05:03

## 2021-08-28 RX ADMIN — INSULIN LISPRO 2 UNITS: 100 INJECTION, SOLUTION INTRAVENOUS; SUBCUTANEOUS at 07:00

## 2021-08-28 RX ADMIN — CHLORHEXIDINE GLUCONATE 0.12% ORAL RINSE 15 ML: 1.2 LIQUID ORAL at 08:42

## 2021-08-28 RX ADMIN — VECURONIUM BROMIDE 1.5 MCG/KG/MIN: 1 INJECTION, POWDER, LYOPHILIZED, FOR SOLUTION INTRAVENOUS at 21:23

## 2021-08-28 RX ADMIN — IPRATROPIUM BROMIDE AND ALBUTEROL SULFATE 1 AMPULE: .5; 3 SOLUTION RESPIRATORY (INHALATION) at 23:45

## 2021-08-28 RX ADMIN — FENTANYL CITRATE: 0.05 INJECTION, SOLUTION INTRAMUSCULAR; INTRAVENOUS at 22:25

## 2021-08-28 RX ADMIN — Medication 5 MG/HR: at 15:08

## 2021-08-28 RX ADMIN — ZINC SULFATE 220 MG (50 MG) CAPSULE 50 MG: CAPSULE at 08:42

## 2021-08-28 RX ADMIN — CETIRIZINE HYDROCHLORIDE 5 MG: 10 TABLET, FILM COATED ORAL at 21:30

## 2021-08-28 RX ADMIN — ENOXAPARIN SODIUM 70 MG: 80 INJECTION SUBCUTANEOUS at 08:42

## 2021-08-28 RX ADMIN — AMLODIPINE BESYLATE 10 MG: 10 TABLET ORAL at 08:42

## 2021-08-28 RX ADMIN — INSULIN LISPRO 6 UNITS: 100 INJECTION, SOLUTION INTRAVENOUS; SUBCUTANEOUS at 12:19

## 2021-08-28 RX ADMIN — FENTANYL CITRATE: 0.05 INJECTION, SOLUTION INTRAMUSCULAR; INTRAVENOUS at 01:53

## 2021-08-28 RX ADMIN — PHENYTOIN SODIUM 300 MG: 50 INJECTION INTRAMUSCULAR; INTRAVENOUS at 21:09

## 2021-08-28 RX ADMIN — VECURONIUM BROMIDE 1.5 MCG/KG/MIN: 1 INJECTION, POWDER, LYOPHILIZED, FOR SOLUTION INTRAVENOUS at 14:59

## 2021-08-28 RX ADMIN — IPRATROPIUM BROMIDE AND ALBUTEROL SULFATE 1 AMPULE: .5; 3 SOLUTION RESPIRATORY (INHALATION) at 18:37

## 2021-08-28 RX ADMIN — IPRATROPIUM BROMIDE AND ALBUTEROL SULFATE 1 AMPULE: .5; 3 SOLUTION RESPIRATORY (INHALATION) at 15:43

## 2021-08-28 RX ADMIN — VECURONIUM BROMIDE 1.5 MCG/KG/MIN: 1 INJECTION, POWDER, LYOPHILIZED, FOR SOLUTION INTRAVENOUS at 06:49

## 2021-08-28 RX ADMIN — PROPOFOL 25 MCG/KG/MIN: 10 INJECTION, EMULSION INTRAVENOUS at 01:53

## 2021-08-28 RX ADMIN — Medication 2000 UNITS: at 08:42

## 2021-08-28 RX ADMIN — PANTOPRAZOLE SODIUM 40 MG: 40 INJECTION, POWDER, FOR SOLUTION INTRAVENOUS at 08:42

## 2021-08-28 RX ADMIN — PROPOFOL 25 MCG/KG/MIN: 10 INJECTION, EMULSION INTRAVENOUS at 19:30

## 2021-08-28 RX ADMIN — IPRATROPIUM BROMIDE AND ALBUTEROL SULFATE 1 AMPULE: .5; 3 SOLUTION RESPIRATORY (INHALATION) at 03:24

## 2021-08-28 RX ADMIN — FENTANYL CITRATE: 0.05 INJECTION, SOLUTION INTRAMUSCULAR; INTRAVENOUS at 15:50

## 2021-08-28 RX ADMIN — ALBUMIN (HUMAN) 25 G: 0.25 INJECTION, SOLUTION INTRAVENOUS at 08:42

## 2021-08-28 RX ADMIN — PHENYTOIN SODIUM 300 MG: 50 INJECTION INTRAMUSCULAR; INTRAVENOUS at 09:41

## 2021-08-28 RX ADMIN — FLUCONAZOLE 400 MG: 2 INJECTION, SOLUTION INTRAVENOUS at 22:10

## 2021-08-28 RX ADMIN — ENOXAPARIN SODIUM 70 MG: 80 INJECTION SUBCUTANEOUS at 21:27

## 2021-08-28 RX ADMIN — DEXAMETHASONE SODIUM PHOSPHATE 10 MG: 4 INJECTION, SOLUTION INTRAMUSCULAR; INTRAVENOUS at 17:16

## 2021-08-28 RX ADMIN — ALBUMIN (HUMAN) 25 G: 0.25 INJECTION, SOLUTION INTRAVENOUS at 21:27

## 2021-08-28 RX ADMIN — INSULIN GLARGINE 20 UNITS: 100 INJECTION, SOLUTION SUBCUTANEOUS at 08:45

## 2021-08-28 RX ADMIN — PROPOFOL 25 MCG/KG/MIN: 10 INJECTION, EMULSION INTRAVENOUS at 10:26

## 2021-08-28 RX ADMIN — DEXAMETHASONE SODIUM PHOSPHATE 10 MG: 4 INJECTION, SOLUTION INTRAMUSCULAR; INTRAVENOUS at 06:00

## 2021-08-28 RX ADMIN — IPRATROPIUM BROMIDE AND ALBUTEROL SULFATE 1 AMPULE: .5; 3 SOLUTION RESPIRATORY (INHALATION) at 00:02

## 2021-08-28 ASSESSMENT — PULMONARY FUNCTION TESTS
PIF_VALUE: 36
PIF_VALUE: 31
PIF_VALUE: 36
PIF_VALUE: 31
PIF_VALUE: 32
PIF_VALUE: 31
PIF_VALUE: 37

## 2021-08-28 ASSESSMENT — PAIN SCALES - GENERAL: PAINLEVEL_OUTOF10: 0

## 2021-08-28 NOTE — PROGRESS NOTES
[facility] Progress Note    Admitting Date and Time: 8/21/2021  9:35 AM  Admit Dx: Acute hypoxemic respiratory failure due to COVID-19 (HCC) [U07.1, J96.01]  Pneumonia due to COVID-19 virus [U07.1, J12.82]    Subjective:  Patient is being followed for Acute hypoxemic respiratory failure due to COVID-19 ([address] [U07.1, J96.01]  Pneumonia due to COVID-19 virus [U07.1, J12.82]     Pt intubated and sedated    O2 sat:  96%  On PCV 30/ 550/+14/80%    Fever:  97    Per RN:  Still desaturates quickly if not in prone positioning. Developing dependent facial edema. Still issues with Busch    ROS: denies cp, n/v, HA unless stated above.       insulin glargine  20 Units Subcutaneous Daily    phenytoin  300 mg IntraVENous Q12H    insulin lispro  0-12 Units Subcutaneous Q6H    fluconazole  400 mg IntraVENous Q24H    albumin human  25 g IntraVENous BID    chlorhexidine  15 mL Mouth/Throat BID    Refresh Lacri-Lube   Ophthalmic Q6H    dexamethasone  10 mg IntraVENous Q12H    [Held by provider] lisinopril  40 mg Oral Daily    ipratropium-albuterol  1 ampule Inhalation Q4H    sodium chloride flush  10 mL IntraVENous BID    enoxaparin  0.5 mg/kg Subcutaneous BID    amLODIPine  10 mg Oral Daily    atorvastatin  40 mg Oral Daily    [Held by provider] doxazosin  8 mg Oral Daily    ascorbic acid  500 mg Oral Daily    Vitamin D  2,000 Units Oral Daily    zinc sulfate  50 mg Oral Daily    pantoprazole  40 mg IntraVENous Daily    cetirizine  5 mg Oral BID     sodium chloride (Inhalant), 4 mL, PRN  sodium chloride flush, 5-40 mL, PRN  sodium chloride, 25 mL, PRN  albuterol, 2.5 mg, Q6H PRN  benzonatate, 200 mg, TID PRN  glucose, 15 g, PRN  dextrose, 12.5 g, PRN  glucagon (rDNA), 1 mg, PRN  dextrose, 100 mL/hr, PRN  sodium chloride, 30 mL, PRN         Objective:    /66   Pulse 61   Temp 97 °F (36.1 °C) (Bladder)   Resp 29   Ht 6' (1.829 m)   Wt (!) 302 lb (137 kg)   SpO2 98%   BMI 40.96 kg/m²     General Appearance: intubated and sedated  Skin: warm and dry  Head: normocephalic and atraumatic  Eyes: unchanged  Neck: neck supple and non tender without mass   Pulmonary/Chest: as per nursing  Cardiovascular: normal rate, normal S1 and S2 and no carotid bruits  Abdomen: as per nursing  Extremities: no cyanosis, no clubbing and dependent edema noted  Neurologic: intubated and sedated    Recent Labs     08/26/21  0600 08/27/21  0610 08/28/21  0530    146 144   K 4.3 4.4 4.5   * 112* 110*   CO2 25 29 26   BUN 24* 29* 27*   CREATININE 0.8 0.8 0.7   GLUCOSE 249* 186* 202*   CALCIUM 7.9* 8.2* 8.7       Recent Labs     08/26/21  0600 08/27/21  1015 08/28/21  0530   WBC 9.7 10.5 9.7   RBC 4.43 4.06 3.65*   HGB 13.5 11.8* 10.8*   HCT 40.4 37.7 33.4*   MCV 91.2 92.9 91.5   MCH 30.5 29.1 29.6   MCHC 33.4 31.3* 32.3   RDW 14.1 14.2 13.9   PLT 93* 91* 103*   MPV 10.3 10.1 10.8       Radiology:   XR CHEST PORTABLE    Result Date: 8/26/2021  EXAMINATION: ONE XRAY VIEW OF THE CHEST 8/26/2021 3:24 pm COMPARISON: August 23, 2021 HISTORY: ORDERING SYSTEM PROVIDED HISTORY: Covid pneumonia TECHNOLOGIST PROVIDED HISTORY: Reason for exam:->Covid pneumonia FINDINGS: Slight improved aeration in right lung base. Slight worsening in mid left lung field and left lung base. Persistent interstitial and hazy opacities throughout both lungs. NG tube courses below the diaphragm. Distal tip of right IJ central venous catheter is at level of superior vena cava. Endotracheal tube is 5.1 cm above the donna. No evidence of pneumothorax. 1. Slight improved aeration in right lung base. Slight worsening in mid left lung field and left lung base. 2. Persistent diffuse interstitial pulmonary edema or bilateral pneumonia.      XR ABDOMEN FOR NG/OG/NE TUBE PLACEMENT    Result Date: 8/27/2021  EXAMINATION: ONE SUPINE XRAY VIEW(S) OF THE ABDOMEN 8/27/2021 1:01 pm COMPARISON: August 23, 2021 HISTORY: [id number] PROVIDED HISTORY: Confirmation of course of NG/OG/NE tube and location of tip of tube TECHNOLOGIST PROVIDED HISTORY: Reason for exam:->Confirmation of course of NG/OG/NE tube and location of tip of tube Portable? ->Yes FINDINGS: Nasogastric tube courses below the level of the diaphragm with distal tip in the expected location of the stomach. Satisfactory position of nasogastric tube. Assessment:    Principal Problem:    Acute hypoxemic respiratory failure due to COVID-19 [facility])  Active Problems:    Type 2 diabetes mellitus without complication, without long-term current use of insulin ([facility])    History of seizures    Hyperlipidemia    Busch catheter problem ([facility])    Sepsis ([facility])    Thrombocytopenia ([facility] [address]  Resolved Problems:    OSCAR (acute kidney injury) ([facility] [address]    Lactic acidosis      Plan:  1. Acute hypoxic respiratory failure, most likely due to viral pneumonia, O2 sat was below 90% on room air, requiring PCV 80% and oxygen saturation above 90%. Treating the underlying process. Prognosis poor. 2.  Acute viral pneumonia, rapid influenza a and B were negative, respiratory panel was not tested, procalcitonin was   0.36, CAT scan of the chest showed bilateral patchy groundglass opacities, sent for COVID-19, result positive. 3.  T2DM - Glucose POCT.  Continue basal and SS insulin. 4.  Hyperlipidemia - continue statins. 5.  HTN - continue meds. 6.  Elevated D-dimer - continue Lovenox bid.     NOTE: This report was transcribed using voice recognition software. Every effort was made to ensure accuracy; however, inadvertent computerized transcription errors may be present.     Electronically signed by Marcos Melchor MD on 8/28/2021 at 9:04 AM

## 2021-08-28 NOTE — PROGRESS NOTES
Critical Care Team - Daily Progress Note      Date and time: 8/28/2021 4:40 PM  Patient's name:  Juan Luis Markham  Medical Record Number: [id number]  Patient's account/billing number: [de-identified]  Patient's YOB: 1965  Age: 60 y.o. Date of Admission: 8/21/2021  9:35 AM  Length of stay during current admission: 7      Primary Care Physician: Dmitriy Mas DO  ICU Attending Physician: Dr. Jose Alfredo Kilpatrick    Code Status: No Order    Reason for ICU admission: Acute hypoxic respiratory failure      SUBJECTIVE:     OVERNIGHT EVENTS:     8/25: Patient remains intubated sedated paralyzed and in prone position. Overnight his blood pressure slightly was low but never required to be started on pressors. Is currently on FiO2 of 50% and PEEP of 12 and his PF ratio is 153. Has good urine output. Tolerating tube feeds. 8/26: Patient was noted to have a blood pressure dropped into the 80s overnight. Patient was started on Levophed very briefly, his blood pressure recovered quickly. Patient's Busch catheter was also noted to clogged with sediment and was not patent. This was replaced overnight. Patient remains proned. 8/27: Patient remains prone overnight. Fall he did not have a dysfunction overnight. Patient was noted to have soft blood pressures on propofol, Versed, vecuronium, fentanyl, Cardura, Norvasc. Patient's Versed to be weaned off. Patient's blood pressure very labile going from systolic of 653L to 845C in a few minutes time. Today, plan to supine patient and see how he does. Does have facial swelling which should improve once supine for a while. 8/28: Patient remains critically ill. Remains intubated in prone position paralyzed and sedated. I had switched him to pressure control ventilation yesterday due to high peak airway pressures. His ABG this morning on FiO2 of 80% and PEEP of 14 pressure control ventilation with inspiratory pressure of 20 patient had a PF ratio of 229.     Had to put him in supine position for care and obtaining chest x-ray did not tolerate and desaturated to the 60s. Had to place him back in prone positioning. Continues to have thick secretions orally and from his ET tube.     AWAKE & FOLLOWING COMMANDS:  [x] No   [] Yes    CURRENT VENTILATION STATUS:     [x] Ventilator  [] BIPAP  [] Nasal Cannula [] Room Air      IF INTUBATED, ET TUBE MARKING AT LOWER LIP:       cms    SECRETIONS Amount:  [] Small [x] Moderate  [] Large  [] None  Color:     [] White [x] Colored  [] Bloody    SEDATION:  RAAS Score: -3  [x]Fentanyl gtt, propofol  [x] Versed gtt  [] Ativan gtt   [] No Sedation    PARALYZED:  [] No    [x] Yes    DIARRHEA:                [x] No                [] Yes  (C. Difficile status: [] positive                                                                                                                       [] negative                                                                                                                     [] pending)    VASOPRESSORS:  [x] No    [] Yes    If yes -   [] Levophed       [] Dopamine     [] Vasopressin       [] Dobutamine  [] Phenylephrine         [] Epinephrine    CENTRAL LINES:     [] No   [x] Yes     placed    If yes -     [x] Right IJ     [] Left IJ [] Right Femoral [] Left Femoral                   [] Right Subclavian [] Left Subclavian       ANDERSON'S CATHETER:   [] No   [x] Yes           OBJECTIVE:     VITAL SIGNS:  /66   Pulse 70   Temp 99.1 °F (37.3 °C) (Bladder)   Resp 25   Ht 6' (1.829 m)   Wt (!) 302 lb (137 kg)   SpO2 96%   BMI 40.96 kg/m²   Tmax over 24 hours:  Temp (24hrs), Av.1 °F (37.3 °C), Min:97 °F (36.1 °C), Max:100.2 °F (37.9 °C)      Patient Vitals for the past 6 hrs:   Temp Temp src Pulse Resp SpO2   21 1630 -- -- 70 25 96 %   21 1600 99.1 °F (37.3 °C) Bladder 70 25 95 %   21 1544 -- -- 64 22 94 %   21 1543 -- -- -- 25 94 %   21 1540 -- -- 65 26 94 % 08/28/21 1530 -- -- 64 26 95 %   08/28/21 1500 -- -- 76 25 94 %   08/28/21 1430 -- -- 70 (!) 65 96 %   08/28/21 1400 -- -- 62 25 95 %   08/28/21 1330 -- -- 69 25 95 %   08/28/21 1300 -- -- 63 25 95 %   08/28/21 1239 -- -- 58 23 95 %   08/28/21 1238 -- -- -- 26 95 %   08/28/21 1230 -- -- 58 25 96 %   08/28/21 1200 100.2 °F (37.9 °C) -- 60 25 96 %   08/28/21 1130 -- -- 59 26 96 %   08/28/21 1100 -- -- 58 25 96 %         Intake/Output Summary (Last 24 hours) at 8/28/2021 1640  Last data filed at 8/28/2021 1600  Gross per 24 hour   Intake 2466 ml   Output 2965 ml   Net -499 ml     Wt Readings from Last 2 Encounters:   08/21/21 (!) 302 lb (137 kg)   08/17/21 (!) 320 lb (145.2 kg)     Body mass index is 40.96 kg/m².         PHYSICAL EXAMINATION:    General appearance -intubated, sedated, paralyzed, proned  Mental status -unable to assess secondary to patient being intubated  Eyes -unable to assess as patient is inside RotoProne  Ears - external ear normal  Nose - normal and patent, no erythema, discharge or polyps  Mouth -endotracheal tube in place, patient's tongue is swollen and protruding from mouth, remains moist  Neck - unable to assess secondary to RotoProne  Chest -upon posterior examination diffuse rhonchi, improved from yesterday, bilateral upper lobe crackles  Heart -tachycardic rate, regular rhythm  Abdomen -unable to access abdomen  neurological -sedated  Extremities - peripheral pulses normal, no clubbing or cyanosis  Skin - normal coloration and turgor, no rashes, no suspicious skin lesions noted      Any additional physical findings:    MEDICATIONS:    Scheduled Meds:   insulin glargine  20 Units Subcutaneous Daily    phenytoin  300 mg IntraVENous Q12H    insulin lispro  0-12 Units Subcutaneous Q6H    fluconazole  400 mg IntraVENous Q24H    albumin human  25 g IntraVENous BID    chlorhexidine  15 mL Mouth/Throat BID    Refresh Lacri-Lube   Ophthalmic Q6H    dexamethasone  10 mg IntraVENous Q12H    suctioned  Subglottic Suction Done?:  (pt prone unable to see subglottic sx)  Airway Type: ET  Airway Size: 8  Cuff Pressure (cm H2O): 29 cm H2O    ABG  Lab Results   Component Value Date    PH 7.505 08/28/2021    PCO2 32.3 08/28/2021    PO2 183.3 08/28/2021    HCO3 24.9 08/28/2021    O2SAT 99.0 08/28/2021         Laboratory findings:    Complete Blood Count:   Recent Labs     08/26/21  0600 08/27/21  1015 08/28/21  0530   WBC 9.7 10.5 9.7   HGB 13.5 11.8* 10.8*   HCT 40.4 37.7 33.4*   PLT 93* 91* 103*        Lactic Acid  Lab Results   Component Value Date    LACTA 2.2 08/25/2021    LACTA 1.8 08/23/2021    LACTA 3.9 08/21/2021        Last 3 Blood Glucose:   Recent Labs     08/26/21  0600 08/27/21  0610 08/28/21  0530   GLUCOSE 249* 186* 202*        PT/INR:  No results found for: PROTIME, INR  PTT:  No results found for: APTT, PTT    Comprehensive Metabolic Profile:   Recent Labs     08/26/21  0600 08/26/21  0600 08/27/21  0610 08/27/21  0610 08/28/21  0530     --  146  --  144   K 4.3  --  4.4  --  4.5   *  --  112*  --  110*   CO2 25  --  29  --  26   BUN 24*  --  29*  --  27*   CREATININE 0.8  --  0.8  --  0.7   GLUCOSE 249*  --  186*  --  202*   CALCIUM 7.9*  --  8.2*  --  8.7   PROT 5.3*   < > 5.0*   < > 5.7*   LABALBU 2.7*   < > 2.9*   < > 3.5   BILITOT 0.4   < > 0.3   < > 0.4   ALKPHOS 119   < > 77   < > 88   AST 92*   < > 51*   < > 45*   ALT 47*   < > 31   < > 28    < > = values in this interval not displayed. Magnesium:   Lab Results   Component Value Date    MG 2.1 08/28/2021     Phosphorus:   Lab Results   Component Value Date    PHOS 2.7 08/28/2021     Ionized Calcium: No results found for: CAION       Urinalysis:     Troponin: No results for input(s): TROPONINI in the last 72 hours. Cultures     Cultures during this admission:       No results for input(s): BC in the last 72 hours. No results for input(s): Kris Lav in the last 72 hours.     No results for input(s): LABURIN in the last 72 hours. Other pertinent Labs:       Radiology/Imaging:   XR CHEST (2 VW)    Result Date: 8/18/2021  EXAMINATION: TWO XRAY VIEWS OF THE CHEST 8/17/2021 11:32 pm COMPARISON: None. HISTORY: ORDERING SYSTEM PROVIDED HISTORY: cough TECHNOLOGIST PROVIDED HISTORY: Reason for exam:->cough FINDINGS: Frontal and lateral views of the chest.  Low lung volume. Multifocal bilateral patchy opacities are most pronounced in the mid and lower lung zones. No pleural effusion or pneumothorax. Normal cardiomediastinal silhouette and great vessels. Multilevel degenerative disc disease. Multifocal bilateral patchy opacities are most pronounced in the mid and lower lung zones. Differential considerations include an infectious/inflammatory process including atypical or viral pneumonia and pulmonary edema. XR CHEST PORTABLE    Result Date: 8/23/2021  EXAMINATION: ONE XRAY VIEW OF THE CHEST 8/23/2021 12:26 pm COMPARISON: August 23, 2021 HISTORY: ORDERING SYSTEM PROVIDED HISTORY: intubated, cvc placement TECHNOLOGIST PROVIDED HISTORY: Reason for exam:->intubated, cvc placement FINDINGS: Endotracheal tube is 3.1 cm above the donna. NG tube courses below the level of the diaphragm. Right IJ central venous catheter is present with distal tip of location of SVC. Stable interstitial and hazy opacities throughout both lungs. No pneumothorax. The heart appears normal in size. 1. Endotracheal tube is 3.1 cm above the donna. 2. Stable interstitial pulmonary edema or pneumonia throughout both lungs. XR CHEST PORTABLE    Result Date: 8/23/2021  EXAMINATION: ONE XRAY VIEW OF THE CHEST 8/23/2021 7:51 am COMPARISON: Previous CT of the thorax of 08/21/2021 and previous chest x-ray of 08/22/2021 HISTORY: ORDERING SYSTEM PROVIDED HISTORY: increasing SOB TECHNOLOGIST PROVIDED HISTORY: Reason for exam:->increasing SOB FINDINGS: There is significant motion artifact throughout the lungs.  Significant multifocal bilateral airspace disease is noted unchanged compared to prior study. There is no right or left gross pleural effusion. The heart is enlarged. 1. There is no interval change in the significant multifocal bilateral airspace disease. XR CHEST PORTABLE    Result Date: 8/22/2021  EXAMINATION: ONE XRAY VIEW OF THE CHEST 8/22/2021 12:00 pm COMPARISON: 08/21/2021 HISTORY: ORDERING SYSTEM PROVIDED HISTORY: Acute resp failure, COVID TECHNOLOGIST PROVIDED HISTORY: Reason for exam:->Acute resp failure, COVID FINDINGS: Bilateral airspace opacities with improved aeration of the right lung compared to prior study. There is no effusion or pneumothorax. The cardiomediastinal silhouette is without acute process. The osseous structures are without acute process. Improved aeration of the right lung compared to prior study. XR CHEST PORTABLE    Result Date: 8/21/2021  EXAMINATION: ONE XRAY VIEW OF THE CHEST 8/21/2021 9:56 am COMPARISON: August 17, 2021 HISTORY: ORDERING SYSTEM PROVIDED HISTORY: pneumonia TECHNOLOGIST PROVIDED HISTORY: Reason for exam:->pneumonia FINDINGS: Compared with most recent examination, there has been a significant interval increase in the degree of patchy bilateral airspace opacities, in keeping with the patient's known diagnosis of COVID pneumonia. No evidence of a sizable pleural effusion. No pneumothorax is identified. Heart size is unable to be accurately assessed on this single portable view of the chest, but appears to be stable. No acute osseous abnormality. Significantly increased airspace opacities throughout both lungs, in keeping with multifocal pneumonia. CTA PULMONARY W CONTRAST    Result Date: 8/21/2021  EXAMINATION: CTA OF THE CHEST, 8/21/2021 12:11 pm TECHNIQUE: CTA of the chest was performed after the administration of intravenous contrast.  Multiplanar reformatted images are provided for review. MIP images are provided for review.  Dose modulation, iterative reconstruction, and/or weight based adjustment of the mA/kV was utilized to reduce the radiation dose to as low as reasonably achievable. COMPARISON: None HISTORY: ORDERING SYSTEM PROVIDED HISTORY:  PE vs COVID TECHNOLOGIST PROVIDED HISTORY: Reason for Exam:  PE vs COVID Decision Support Exception - unselect if not a suspected or confirmed emergency medical condition->Emergency Medical Condition (MA) FINDINGS: Pulmonary Arteries: There is a combination of poor contrast bolus timing as well as significant respiratory motion artifact that severely degrades the examination. Unfortunately, multiple segmental and subsegmental pulmonary artery branches are not opacified well enough to exclude a pulmonary embolism. There is no evidence of a central pulmonary embolism or convincing evidence of right heart strain. Mediastinum: There is at least one enlarged mediastinal lymph node measuring 17 mm in short axis in the subcarinal distribution. Several additional smaller but nonenlarged mediastinal lymph nodes are present, likely reactive. The thoracic esophagus is decompressed, limiting assessment. No obvious esophageal abnormality. The heart size is top normal.  Trace pericardial fluid is present. Lungs/pleura: The central airways are patent. There is no evidence of a pleural effusion or pneumothorax. Extensive ground-glass and consolidative airspace opacities are present bilaterally, in keeping with the provided history of COVID pneumonia. Upper Abdomen: Imaged portions of the upper abdomen demonstrates diffuse hepatic steatosis. There are also multiple nonobstructing bilateral renal calculi, partially imaged. Soft Tissues/Bones: No acute bone or soft tissue abnormality. Extensive ground-glass and consolidative airspace opacities throughout both lungs, in keeping with the provided history of COVID pneumonia.  The examination is nondiagnostic for detection of pulmonary emboli in multiple segmental and subsegmental pulmonary artery distributions, secondary to poor contrast bolus timing and respiratory motion artifact. There is no evidence of a central pulmonary embolism. Hepatic steatosis. Enlarged mediastinal lymph nodes, likely reactive. XR CHEST ABDOMEN NG PLACEMENT    Result Date: 8/23/2021  EXAMINATION: ONE SUPINE XRAY VIEW(S) OF THE ABDOMEN 8/23/2021 12:26 pm COMPARISON: None. HISTORY: ORDERING SYSTEM PROVIDED HISTORY: ng placement TECHNOLOGIST PROVIDED HISTORY: Reason for exam:->ng placement FINDINGS: Nasogastric tube courses below the level of the diaphragm with distal tip in the expected location of the stomach. Satisfactory position of nasogastric tube. Chest Xray (8/28/2021):    Chest x-ray reviewed. Worsening bilateral patchy infiltrates although it is expected considering patient has been prone for 6 days now. Principal Problem:    1. Acute hypoxemic respiratory failure due to COVID-19 (HCC)/ Severe ARDS  2. Sepsis- resolved  3. Thrombocytopenia - improving  4. Hypertension  5. Type 2 diabetes mellitus without complication, without long-term current use of insulin ([facility] [address]  6. History of seizures  7.   OSCAR - improved      SYSTEMS ASSESSMENT    Neuro   Intubated, sedated, paralyzed  -Sedated with fentanyl, Versed, propofol, vecuronium,        History of seizures  -Continue phenytoin  -Recheck phenytoin level on 8/30, order placed  -Telemetry monitoring     Respiratory   Acute hypoxic respiratory failure/severe ARDS due to COVID-19  -Intubated 8/23, day 6  -Patient desaturates very quickly with slight movements  -Significant facial and tongue swelling secondary to prolonged requirement of prone positioning  -Completed remdesivir, tocilizumab  -Continues on PC/AC  -Chest x-ray when supine  -Continue-Decadron 10 mg IV twice daily for total 5 days and then 10 mg daily  -DuoNebs every 6 hours       Cardiovascular   Sepsis secondary to COVID-19  -Has fluctuating heart rate and blood pressure  -Continue Norvasc 10 mg  -Hold Cardura, lisinopril secondary to labile blood pressure  -Continue to monitor with telemetry    Hyperlipidemia  -On statin     Gastrointestinal   GI prophylaxis  -Protonix  -Tolerating tube feeds     Renal   Busch catheter dysfunction  -Busch has been unable to be irrigated twice now, replaced  -8/27 overnight no Busch issues  -Kidney function appears to be preserved  -Scheduled irrigations  -Continue to monitor urine output, sediment     Infectious Disease   COVID-19  -Given Rocephin and doxy in the ED  -Status post remdesivir, tocilizumab  -Cultures so far negative  -Continue Decadron  -Continue Diflucan per ID  --ID following     Hematology/Oncology   DVT prophylaxis  -Lovenox, weight-based    Thrombocytopenia  -Monitor     Endocrine   Type 2 diabetes  -Receiving tube feeds  -Glucose now closer to goal  -Medium dose sliding scale insulin  -Lantus 20u nightly     Social/Spiritual/DNR/Other   Full code      PLAN:     WEAN PER PROTOCOL:  [] No   [x] Yes  [] N/A    DISCONTINUE ANY LABS:   [x] No   [] Yes    ICU PROPHYLAXIS:  Stress ulcer:  [x] PPI Agent  [] C2Alszc [] Sucralfate  [] Other:  VTE:   [x] Enoxaparin  [] Unfract.  Heparin Subcut  [] EPC Cuffs    NUTRITION:  [] NPO [x] Tube Feeding (Specify: ) [] TPN  [] PO (Diet: ADULT TUBE FEEDING; Nasogastric; Diabetic; Continuous; 20; Yes; 20; Q 6 hours; 60; 30; Q 4 hours; Protein; Proteinex 2 go once daily)    HOME MEDICATIONS RECONCILED: [] No  [x] Yes    INSULIN DRIP:   [x] No   [] Yes    CONSULTATION NEEDED:  [x] No   [] Yes    FAMILY UPDATED:    [] No   [x] Yes    TRANSFER OUT OF ICU:   [x] No   [] Yes    Ramesh Lo MD,                 8/28/2021, 4:40 PM     Ramesh Lo MD    CCT excluding procedures:40'

## 2021-08-28 NOTE — PATIENT CARE CONFERENCE
Intensive Care Daily Quality Rounding Checklist        ICU Team Members: Dr. Brooke Calderon, resident, charge nurse, bedside nurse and respiratory therapy      ICU Day #: 6     Intubation Date: August 23     Ventilator Day #: 6     Central Line Insertion Date: August 23                                                    Day #: 6      Arterial Line Insertion Date: August 23                             Day #: 6     Temporary Hemodialysis Catheter Insertion Date: N/A                             Day # N/A     DVT Prophylaxis: Lovenox    GI Prophylaxis: Protonix     Busch Catheter Insertion Date: 08/24/2021 Changed)                                        Day #: 3                             Continued need (if yes, reason documented and discussed with physician): critical care patient, strict I+O     Skin Issues/ Wounds and ordered treatment discussed on rounds: No issues, SOS precautions     Goals/ Plans for the Day: Monitor labs and vitals.  Vent changes, x-ray, supine if able

## 2021-08-28 NOTE — PROGRESS NOTES
supined patient for xray of chest.  Patient immediately desated to 60%. Dr Vani Mims aware will place patient proned again and get an abg at 1800 tonight.

## 2021-08-28 NOTE — PROGRESS NOTES
Oral Daily    Vitamin D  2,000 Units Oral Daily    zinc sulfate  50 mg Oral Daily    pantoprazole  40 mg IntraVENous Daily    cetirizine  5 mg Oral BID     Continuous Infusions:   propofol 25 mcg/kg/min (21 0503)    midazolam 5 mg/hr (21 1511)    IV infusion builder 40 mL/hr at 21 0153    sodium chloride      vecuronium (NORCURON) infusion 1.5 mcg/kg/min (21 0649)    dextrose       PRN Meds:sodium chloride (Inhalant), sodium chloride flush, sodium chloride, albuterol, benzonatate, glucose, dextrose, glucagon (rDNA), dextrose, sodium chloride    OBJECTIVE:  /66   Pulse 61   Temp 97 °F (36.1 °C) (Bladder)   Resp 29   Ht 6' (1.829 m)   Wt (!) 302 lb (137 kg)   SpO2 98%   BMI 40.96 kg/m²   Temp  Av.6 °F (37 °C)  Min: 97 °F (36.1 °C)  Max: 99.5 °F (37.5 °C)  Constitutional: The patient is intubated, sedated, prone. Physical exam is limited. Skin: Warm and dry. No rashes were noted. HEENT: Not seen  Neck: Not tested  Chest: No use of accessory muscles to breathe. Symmetrical expansion. No wheezing, crackles. Cardiovascular: S1 and S2 are rhythmic and regular. Abdomen: Not examined  Genitourinary:  Busch catheter  Extremities: Minimal edema  Lines: peripheral  Right radial arterial line 2021  Right IJ triple-lumen catheter 2021  Busch changed 3/24/2021    Laboratory and Tests Review:  Lab Results   Component Value Date    WBC 9.7 2021    WBC 10.5 2021    WBC 9.7 2021    HGB 10.8 (L) 2021    HCT 33.4 (L) 2021    MCV 91.5 2021     (L) 2021     Lab Results   Component Value Date    NEUTROABS 7.74 (H) 2021    NEUTROABS 9.03 (H) 2021    NEUTROABS 7.89 (H) 2021     No results found for: CRP  Lab Results   Component Value Date    ALT 28 2021    AST 45 (H) 2021    ALKPHOS 88 2021    BILITOT 0.4 2021     Lab Results   Component Value Date     2021    K 4.5 08/28/2021    K 3.7 08/21/2021     08/28/2021    CO2 26 08/28/2021    BUN 27 08/28/2021    CREATININE 0.7 08/28/2021    CREATININE 0.8 08/27/2021    CREATININE 0.8 08/26/2021    GFRAA >60 08/28/2021    LABGLOM >60 08/28/2021    GLUCOSE 202 08/28/2021    PROT 5.7 08/28/2021    LABALBU 3.5 08/28/2021    CALCIUM 8.7 08/28/2021    BILITOT 0.4 08/28/2021    ALKPHOS 88 08/28/2021    AST 45 08/28/2021    ALT 28 08/28/2021     Lab Results   Component Value Date    CRP 1.0 (H) 08/27/2021    CRP 1.9 (H) 08/26/2021    CRP 4.0 (H) 08/25/2021     Lab Results   Component Value Date    SEDRATE 2 08/27/2021    SEDRATE 0 08/26/2021    SEDRATE 2 08/25/2021     Radiology:  CAT scan diffuse groundglass infiltrates    Microbiology:   Lab Results   Component Value Date    BC 5 Days no growth 08/21/2021     Lab Results   Component Value Date    BLOODCULT2 5 Days no growth 08/21/2021     No results found for: WNDABS  Smear, Respiratory   Date Value Ref Range Status   08/27/2021   Final    Group 6: <25 PMN's/LPF and <25 Epithelial cells/LPF  Few Polymorphonuclear leukocytes  Rare Epithelial cells  No organisms seen       No results found for: MPNEUMO, CLAMYDCU, LABLEGI, AFBCX, FUNGSM, LABFUNG  No results found for: CULTRESP  No results found for: CXCATHTIP  No results found for: BFCS  No results found for: CXSURG  Urine Culture, Routine   Date Value Ref Range Status   08/24/2021 Growth not present  Final     MRSA Culture Only   Date Value Ref Range Status   08/24/2021 Methicillin resistant Staph aureus not isolated  Final   Microbiology:  Blood cultures pending  Legionella antigen and strep pneumo antigen are negative  Urine culture negative    ASSESSMENT:  · Severe Covid pneumonia causing acute respiratory failure  · Cytokine storm  · Poor prognosis    PLAN:  · Completed remdesivir; steroids; Tosi x1 completed   · Monitor labs  · check fungitell.  Continue Diflucan    Discussed with Dr. Deandre Beckett MD  8:49 AM 8/28/2021

## 2021-08-29 ENCOUNTER — APPOINTMENT (OUTPATIENT)
Dept: GENERAL RADIOLOGY | Age: 56
DRG: 005 | End: 2021-08-29
Payer: COMMERCIAL

## 2021-08-29 LAB
AADO2: 246.1 MMHG
AADO2: 395.8 MMHG
ALBUMIN SERPL-MCNC: 3.7 G/DL (ref 3.5–5.2)
ALP BLD-CCNC: 69 U/L (ref 40–129)
ALT SERPL-CCNC: 24 U/L (ref 0–40)
ANION GAP SERPL CALCULATED.3IONS-SCNC: 10 MMOL/L (ref 7–16)
ANION GAP SERPL CALCULATED.3IONS-SCNC: 8 MMOL/L (ref 7–16)
AST SERPL-CCNC: 34 U/L (ref 0–39)
B.E.: 0.6 MMOL/L (ref -3–3)
B.E.: 4.7 MMOL/L (ref -3–3)
BASOPHILS ABSOLUTE: 0.02 E9/L (ref 0–0.2)
BASOPHILS RELATIVE PERCENT: 0.2 % (ref 0–2)
BILIRUB SERPL-MCNC: 0.4 MG/DL (ref 0–1.2)
BUN BLDV-MCNC: 26 MG/DL (ref 6–20)
BUN BLDV-MCNC: 27 MG/DL (ref 6–20)
C-REACTIVE PROTEIN: 0.5 MG/DL (ref 0–0.4)
CALCIUM SERPL-MCNC: 8.7 MG/DL (ref 8.6–10.2)
CALCIUM SERPL-MCNC: 9.1 MG/DL (ref 8.6–10.2)
CHLORIDE BLD-SCNC: 105 MMOL/L (ref 98–107)
CHLORIDE BLD-SCNC: 99 MMOL/L (ref 98–107)
CO2: 25 MMOL/L (ref 22–29)
CO2: 28 MMOL/L (ref 22–29)
COHB: 0.7 % (ref 0–1.5)
COHB: 0.8 % (ref 0–1.5)
CREAT SERPL-MCNC: 0.6 MG/DL (ref 0.7–1.2)
CREAT SERPL-MCNC: 0.6 MG/DL (ref 0.7–1.2)
CRITICAL: ABNORMAL
CRITICAL: NORMAL
CULTURE, RESPIRATORY: NORMAL
DATE ANALYZED: ABNORMAL
DATE ANALYZED: NORMAL
DATE OF COLLECTION: ABNORMAL
DATE OF COLLECTION: NORMAL
EOSINOPHILS ABSOLUTE: 0.02 E9/L (ref 0.05–0.5)
EOSINOPHILS RELATIVE PERCENT: 0.2 % (ref 0–6)
FIO2: 55 %
FIO2: 90 %
GFR AFRICAN AMERICAN: >60
GFR AFRICAN AMERICAN: >60
GFR NON-AFRICAN AMERICAN: >60 ML/MIN/1.73
GFR NON-AFRICAN AMERICAN: >60 ML/MIN/1.73
GLUCOSE BLD-MCNC: 235 MG/DL (ref 74–99)
GLUCOSE BLD-MCNC: 265 MG/DL (ref 74–99)
HCO3: 24.9 MMOL/L (ref 22–26)
HCO3: 28.8 MMOL/L (ref 22–26)
HCT VFR BLD CALC: 33.7 % (ref 37–54)
HEMOGLOBIN: 10.7 G/DL (ref 12.5–16.5)
HHB: 0.8 % (ref 0–5)
HHB: 3.7 % (ref 0–5)
IMMATURE GRANULOCYTES #: 0.3 E9/L
IMMATURE GRANULOCYTES %: 2.4 % (ref 0–5)
LAB: ABNORMAL
LAB: NORMAL
LYMPHOCYTES ABSOLUTE: 0.75 E9/L (ref 1.5–4)
LYMPHOCYTES RELATIVE PERCENT: 6 % (ref 20–42)
Lab: ABNORMAL
Lab: NORMAL
MAGNESIUM: 2 MG/DL (ref 1.6–2.6)
MCH RBC QN AUTO: 29.2 PG (ref 26–35)
MCHC RBC AUTO-ENTMCNC: 31.8 % (ref 32–34.5)
MCV RBC AUTO: 92.1 FL (ref 80–99.9)
METER GLUCOSE: 239 MG/DL (ref 74–99)
METER GLUCOSE: 242 MG/DL (ref 74–99)
METER GLUCOSE: 248 MG/DL (ref 74–99)
METER GLUCOSE: 258 MG/DL (ref 74–99)
METHB: 0.1 % (ref 0–1.5)
METHB: 0.2 % (ref 0–1.5)
MODE: ABNORMAL
MODE: NORMAL
MONOCYTES ABSOLUTE: 0.64 E9/L (ref 0.1–0.95)
MONOCYTES RELATIVE PERCENT: 5.1 % (ref 2–12)
NEUTROPHILS ABSOLUTE: 10.74 E9/L (ref 1.8–7.3)
NEUTROPHILS RELATIVE PERCENT: 86.1 % (ref 43–80)
O2 CONTENT: 15.7 ML/DL
O2 CONTENT: 17.8 ML/DL
O2 SATURATION: 96.3 % (ref 92–98.5)
O2 SATURATION: 99.2 % (ref 92–98.5)
O2HB: 95.4 % (ref 94–97)
O2HB: 98.3 % (ref 94–97)
OPERATOR ID: 1023
OPERATOR ID: 1687
PATIENT TEMP: 37 C
PATIENT TEMP: 37 C
PCO2: 38.8 MMHG (ref 35–45)
PCO2: 40.7 MMHG (ref 35–45)
PDW BLD-RTO: 13.7 FL (ref 11.5–15)
PEEP/CPAP: 12 CMH2O
PEEP/CPAP: 14 CMH2O
PFO2: 1.62 MMHG/%
PFO2: 2.02 MMHG/%
PH BLOOD GAS: 7.43 (ref 7.35–7.45)
PH BLOOD GAS: 7.47 (ref 7.35–7.45)
PHOSPHORUS: 3.8 MG/DL (ref 2.5–4.5)
PIP: 18 CMH2O
PLATELET # BLD: 113 E9/L (ref 130–450)
PMV BLD AUTO: 10.4 FL (ref 7–12)
PO2: 181.7 MMHG (ref 75–100)
PO2: 89.1 MMHG (ref 75–100)
POTASSIUM SERPL-SCNC: 4.8 MMOL/L (ref 3.5–5)
POTASSIUM SERPL-SCNC: 4.9 MMOL/L (ref 3.5–5)
PS: 18 CMH20
RBC # BLD: 3.66 E12/L (ref 3.8–5.8)
RI(T): 218 %
RI(T): 276 %
RR MECHANICAL: 26 B/MIN
RR MECHANICAL: 26 B/MIN
SEDIMENTATION RATE, ERYTHROCYTE: 4 MM/HR (ref 0–15)
SMEAR, RESPIRATORY: NORMAL
SODIUM BLD-SCNC: 137 MMOL/L (ref 132–146)
SODIUM BLD-SCNC: 138 MMOL/L (ref 132–146)
SOURCE, BLOOD GAS: ABNORMAL
SOURCE, BLOOD GAS: NORMAL
THB: 11.6 G/DL (ref 11.5–16.5)
THB: 12.6 G/DL (ref 11.5–16.5)
TIME ANALYZED: 1752
TIME ANALYZED: 525
TOTAL PROTEIN: 5.7 G/DL (ref 6.4–8.3)
WBC # BLD: 12.5 E9/L (ref 4.5–11.5)

## 2021-08-29 PROCEDURE — 6360000002 HC RX W HCPCS: Performed by: FAMILY MEDICINE

## 2021-08-29 PROCEDURE — P9047 ALBUMIN (HUMAN), 25%, 50ML: HCPCS | Performed by: INTERNAL MEDICINE

## 2021-08-29 PROCEDURE — 2500000003 HC RX 250 WO HCPCS: Performed by: INTERNAL MEDICINE

## 2021-08-29 PROCEDURE — 82962 GLUCOSE BLOOD TEST: CPT

## 2021-08-29 PROCEDURE — 84100 ASSAY OF PHOSPHORUS: CPT

## 2021-08-29 PROCEDURE — 6360000002 HC RX W HCPCS: Performed by: STUDENT IN AN ORGANIZED HEALTH CARE EDUCATION/TRAINING PROGRAM

## 2021-08-29 PROCEDURE — 80048 BASIC METABOLIC PNL TOTAL CA: CPT

## 2021-08-29 PROCEDURE — 2000000000 HC ICU R&B

## 2021-08-29 PROCEDURE — 82805 BLOOD GASES W/O2 SATURATION: CPT

## 2021-08-29 PROCEDURE — 37799 UNLISTED PX VASCULAR SURGERY: CPT

## 2021-08-29 PROCEDURE — 36415 COLL VENOUS BLD VENIPUNCTURE: CPT

## 2021-08-29 PROCEDURE — 6370000000 HC RX 637 (ALT 250 FOR IP): Performed by: INTERNAL MEDICINE

## 2021-08-29 PROCEDURE — 2580000003 HC RX 258: Performed by: FAMILY MEDICINE

## 2021-08-29 PROCEDURE — 94003 VENT MGMT INPAT SUBQ DAY: CPT

## 2021-08-29 PROCEDURE — 83735 ASSAY OF MAGNESIUM: CPT

## 2021-08-29 PROCEDURE — 2580000003 HC RX 258: Performed by: STUDENT IN AN ORGANIZED HEALTH CARE EDUCATION/TRAINING PROGRAM

## 2021-08-29 PROCEDURE — 2580000003 HC RX 258: Performed by: INTERNAL MEDICINE

## 2021-08-29 PROCEDURE — 86140 C-REACTIVE PROTEIN: CPT

## 2021-08-29 PROCEDURE — 94640 AIRWAY INHALATION TREATMENT: CPT

## 2021-08-29 PROCEDURE — 6360000002 HC RX W HCPCS: Performed by: INTERNAL MEDICINE

## 2021-08-29 PROCEDURE — 85651 RBC SED RATE NONAUTOMATED: CPT

## 2021-08-29 PROCEDURE — 6360000002 HC RX W HCPCS: Performed by: SPECIALIST

## 2021-08-29 PROCEDURE — 99233 SBSQ HOSP IP/OBS HIGH 50: CPT | Performed by: FAMILY MEDICINE

## 2021-08-29 PROCEDURE — 71045 X-RAY EXAM CHEST 1 VIEW: CPT

## 2021-08-29 PROCEDURE — C9113 INJ PANTOPRAZOLE SODIUM, VIA: HCPCS | Performed by: STUDENT IN AN ORGANIZED HEALTH CARE EDUCATION/TRAINING PROGRAM

## 2021-08-29 PROCEDURE — 80053 COMPREHEN METABOLIC PANEL: CPT

## 2021-08-29 PROCEDURE — 85025 COMPLETE CBC W/AUTO DIFF WBC: CPT

## 2021-08-29 RX ORDER — ALBUMIN (HUMAN) 12.5 G/50ML
25 SOLUTION INTRAVENOUS ONCE
Status: COMPLETED | OUTPATIENT
Start: 2021-08-29 | End: 2021-08-29

## 2021-08-29 RX ORDER — FUROSEMIDE 10 MG/ML
40 INJECTION INTRAMUSCULAR; INTRAVENOUS ONCE
Status: COMPLETED | OUTPATIENT
Start: 2021-08-29 | End: 2021-08-29

## 2021-08-29 RX ADMIN — FLUCONAZOLE 400 MG: 2 INJECTION, SOLUTION INTRAVENOUS at 22:30

## 2021-08-29 RX ADMIN — FENTANYL CITRATE: 0.05 INJECTION, SOLUTION INTRAMUSCULAR; INTRAVENOUS at 11:04

## 2021-08-29 RX ADMIN — ENOXAPARIN SODIUM 70 MG: 80 INJECTION SUBCUTANEOUS at 21:45

## 2021-08-29 RX ADMIN — FENTANYL CITRATE: 0.05 INJECTION, SOLUTION INTRAMUSCULAR; INTRAVENOUS at 05:00

## 2021-08-29 RX ADMIN — INSULIN LISPRO 6 UNITS: 100 INJECTION, SOLUTION INTRAVENOUS; SUBCUTANEOUS at 12:17

## 2021-08-29 RX ADMIN — VECURONIUM BROMIDE 1.7 MCG/KG/MIN: 1 INJECTION, POWDER, LYOPHILIZED, FOR SOLUTION INTRAVENOUS at 21:46

## 2021-08-29 RX ADMIN — INSULIN LISPRO 4 UNITS: 100 INJECTION, SOLUTION INTRAVENOUS; SUBCUTANEOUS at 05:11

## 2021-08-29 RX ADMIN — CHLORHEXIDINE GLUCONATE 0.12% ORAL RINSE 15 ML: 1.2 LIQUID ORAL at 08:19

## 2021-08-29 RX ADMIN — IPRATROPIUM BROMIDE AND ALBUTEROL SULFATE 1 AMPULE: .5; 3 SOLUTION RESPIRATORY (INHALATION) at 15:51

## 2021-08-29 RX ADMIN — PROPOFOL 25 MCG/KG/MIN: 10 INJECTION, EMULSION INTRAVENOUS at 00:50

## 2021-08-29 RX ADMIN — IPRATROPIUM BROMIDE AND ALBUTEROL SULFATE 1 AMPULE: .5; 3 SOLUTION RESPIRATORY (INHALATION) at 13:03

## 2021-08-29 RX ADMIN — ENOXAPARIN SODIUM 70 MG: 80 INJECTION SUBCUTANEOUS at 08:19

## 2021-08-29 RX ADMIN — FUROSEMIDE 40 MG: 10 INJECTION, SOLUTION INTRAMUSCULAR; INTRAVENOUS at 10:14

## 2021-08-29 RX ADMIN — PROPOFOL 25 MCG/KG/MIN: 10 INJECTION, EMULSION INTRAVENOUS at 05:00

## 2021-08-29 RX ADMIN — DEXAMETHASONE SODIUM PHOSPHATE 10 MG: 4 INJECTION, SOLUTION INTRAMUSCULAR; INTRAVENOUS at 05:12

## 2021-08-29 RX ADMIN — PHENYTOIN SODIUM 300 MG: 50 INJECTION INTRAMUSCULAR; INTRAVENOUS at 21:45

## 2021-08-29 RX ADMIN — CETIRIZINE HYDROCHLORIDE 5 MG: 10 TABLET, FILM COATED ORAL at 22:03

## 2021-08-29 RX ADMIN — Medication 10 ML: at 08:20

## 2021-08-29 RX ADMIN — INSULIN LISPRO 4 UNITS: 100 INJECTION, SOLUTION INTRAVENOUS; SUBCUTANEOUS at 17:53

## 2021-08-29 RX ADMIN — OXYCODONE HYDROCHLORIDE AND ACETAMINOPHEN 500 MG: 500 TABLET ORAL at 08:19

## 2021-08-29 RX ADMIN — CETIRIZINE HYDROCHLORIDE 5 MG: 10 TABLET, FILM COATED ORAL at 08:19

## 2021-08-29 RX ADMIN — ZINC SULFATE 220 MG (50 MG) CAPSULE 50 MG: CAPSULE at 08:19

## 2021-08-29 RX ADMIN — PHENYTOIN SODIUM 300 MG: 50 INJECTION INTRAMUSCULAR; INTRAVENOUS at 10:06

## 2021-08-29 RX ADMIN — FUROSEMIDE 40 MG: 10 INJECTION, SOLUTION INTRAMUSCULAR; INTRAVENOUS at 15:34

## 2021-08-29 RX ADMIN — IPRATROPIUM BROMIDE AND ALBUTEROL SULFATE 1 AMPULE: .5; 3 SOLUTION RESPIRATORY (INHALATION) at 06:34

## 2021-08-29 RX ADMIN — MINERAL OIL AND PETROLATUM: 150; 830 OINTMENT OPHTHALMIC at 22:04

## 2021-08-29 RX ADMIN — PROPOFOL 35 MCG/KG/MIN: 10 INJECTION, EMULSION INTRAVENOUS at 18:25

## 2021-08-29 RX ADMIN — PROPOFOL 25 MCG/KG/MIN: 10 INJECTION, EMULSION INTRAVENOUS at 21:44

## 2021-08-29 RX ADMIN — CHLORHEXIDINE GLUCONATE 0.12% ORAL RINSE 15 ML: 1.2 LIQUID ORAL at 22:02

## 2021-08-29 RX ADMIN — PROPOFOL 25 MCG/KG/MIN: 10 INJECTION, EMULSION INTRAVENOUS at 14:23

## 2021-08-29 RX ADMIN — IPRATROPIUM BROMIDE AND ALBUTEROL SULFATE 1 AMPULE: .5; 3 SOLUTION RESPIRATORY (INHALATION) at 20:57

## 2021-08-29 RX ADMIN — IPRATROPIUM BROMIDE AND ALBUTEROL SULFATE 1 AMPULE: .5; 3 SOLUTION RESPIRATORY (INHALATION) at 03:39

## 2021-08-29 RX ADMIN — VECURONIUM BROMIDE 1.5 MCG/KG/MIN: 1 INJECTION, POWDER, LYOPHILIZED, FOR SOLUTION INTRAVENOUS at 13:50

## 2021-08-29 RX ADMIN — DEXAMETHASONE SODIUM PHOSPHATE 10 MG: 4 INJECTION, SOLUTION INTRAMUSCULAR; INTRAVENOUS at 16:40

## 2021-08-29 RX ADMIN — ALBUMIN (HUMAN) 25 G: 0.25 INJECTION, SOLUTION INTRAVENOUS at 15:34

## 2021-08-29 RX ADMIN — INSULIN LISPRO 4 UNITS: 100 INJECTION, SOLUTION INTRAVENOUS; SUBCUTANEOUS at 01:11

## 2021-08-29 RX ADMIN — Medication 5 MG/HR: at 11:05

## 2021-08-29 RX ADMIN — FENTANYL CITRATE: 0.05 INJECTION, SOLUTION INTRAMUSCULAR; INTRAVENOUS at 18:25

## 2021-08-29 RX ADMIN — ATORVASTATIN CALCIUM 40 MG: 40 TABLET, FILM COATED ORAL at 22:03

## 2021-08-29 RX ADMIN — INSULIN GLARGINE 20 UNITS: 100 INJECTION, SOLUTION SUBCUTANEOUS at 08:38

## 2021-08-29 RX ADMIN — AMLODIPINE BESYLATE 10 MG: 10 TABLET ORAL at 08:19

## 2021-08-29 RX ADMIN — Medication 2000 UNITS: at 08:19

## 2021-08-29 RX ADMIN — PANTOPRAZOLE SODIUM 40 MG: 40 INJECTION, POWDER, FOR SOLUTION INTRAVENOUS at 08:19

## 2021-08-29 RX ADMIN — PROPOFOL 15 MCG/KG/MIN: 10 INJECTION, EMULSION INTRAVENOUS at 10:05

## 2021-08-29 ASSESSMENT — PULMONARY FUNCTION TESTS
PIF_VALUE: 34
PIF_VALUE: 31
PIF_VALUE: 31
PIF_VALUE: 32
PIF_VALUE: 32
PIF_VALUE: 34
PIF_VALUE: 34
PIF_VALUE: 31
PIF_VALUE: 34
PIF_VALUE: 34
PIF_VALUE: 32
PIF_VALUE: 31
PIF_VALUE: 31
PIF_VALUE: 40
PIF_VALUE: 32
PIF_VALUE: 32

## 2021-08-29 NOTE — PROGRESS NOTES
Critical Care Team - Daily Progress Note      Date and time: 8/29/2021 8:44 AM  Patient's name:  Karlee Cantor  Medical Record Number: [id number]  Patient's account/billing number: [de-identified]  Patient's YOB: 1965  Age: 60 y.o. Date of Admission: 8/21/2021  9:35 AM  Length of stay during current admission: 8      Primary Care Physician: Loco Salazar, DO  ICU Attending Physician: Dr. Ashok Pan    Code Status: No Order    Reason for ICU admission: Acute hypoxic respiratory failure      SUBJECTIVE:     OVERNIGHT EVENTS:     8/25: Patient remains intubated sedated paralyzed and in prone position. Overnight his blood pressure slightly was low but never required to be started on pressors. Is currently on FiO2 of 50% and PEEP of 12 and his PF ratio is 153. Has good urine output. Tolerating tube feeds. 8/26: Patient was noted to have a blood pressure dropped into the 80s overnight. Patient was started on Levophed very briefly, his blood pressure recovered quickly. Patient's Busch catheter was also noted to clogged with sediment and was not patent. This was replaced overnight. Patient remains proned. 8/27: Patient remains prone overnight. Fall he did not have a dysfunction overnight. Patient was noted to have soft blood pressures on propofol, Versed, vecuronium, fentanyl, Cardura, Norvasc. Patient's Versed to be weaned off. Patient's blood pressure very labile going from systolic of 060F to 951M in a few minutes time. Today, plan to supine patient and see how he does. Does have facial swelling which should improve once supine for a while. 8/28: Patient remains critically ill. Remains intubated in prone position paralyzed and sedated. I had switched him to pressure control ventilation yesterday due to high peak airway pressures. His ABG this morning on FiO2 of 80% and PEEP of 14 pressure control ventilation with inspiratory pressure of 20 patient had a PF ratio of 229.     Had to put him in supine position for care and obtaining chest x-ray did not tolerate and desaturated to the 60s. Had to place him back in prone positioning. Continues to have thick secretions orally and from his ET tube. : Patient did have an episode of desaturation and hypotension overnight, from which he recovered. Patient is responding very well to diuresis, with improvement in chest x-ray. Patient supine today for care and chest x-ray, is currently tolerating this well.     AWAKE & FOLLOWING COMMANDS:  [x] No   [] Yes    CURRENT VENTILATION STATUS:     [x] Ventilator  [] BIPAP  [] Nasal Cannula [] Room Air      IF INTUBATED, ET TUBE MARKING AT LOWER LIP:       cms    SECRETIONS Amount:  [] Small [x] Moderate  [] Large  [] None  Color:     [] White [x] Colored  [] Bloody    SEDATION:  RAAS Score: -3  [x]Fentanyl gtt, propofol  [x] Versed gtt  [] Ativan gtt   [] No Sedation    PARALYZED:  [] No    [x] Yes    DIARRHEA:                [x] No                [] Yes  (C. Difficile status: [] positive                                                                                                                       [] negative                                                                                                                     [] pending)    VASOPRESSORS:  [x] No    [] Yes    If yes -   [] Levophed       [] Dopamine     [] Vasopressin       [] Dobutamine  [] Phenylephrine         [] Epinephrine    CENTRAL LINES:     [] No   [x] Yes     placed    If yes -     [x] Right IJ     [] Left IJ [] Right Femoral [] Left Femoral                   [] Right Subclavian [] Left Subclavian       ANDERSON'S CATHETER:   [] No   [x] Yes           OBJECTIVE:     VITAL SIGNS:  /66   Pulse 54   Temp 98.6 °F (37 °C) (Bladder)   Resp 25   Ht 6' (1.829 m)   Wt (!) 302 lb (137 kg)   SpO2 96%   BMI 40.96 kg/m²   Tmax over 24 hours:  Temp (24hrs), Av.2 °F (37.3 °C), Min:98.6 °F (37 °C), Max:100.2 °F (37.9 lisinopril  40 mg Oral Daily    ipratropium-albuterol  1 ampule Inhalation Q4H    sodium chloride flush  10 mL IntraVENous BID    enoxaparin  0.5 mg/kg Subcutaneous BID    amLODIPine  10 mg Oral Daily    atorvastatin  40 mg Oral Daily    [Held by provider] doxazosin  8 mg Oral Daily    ascorbic acid  500 mg Oral Daily    Vitamin D  2,000 Units Oral Daily    zinc sulfate  50 mg Oral Daily    pantoprazole  40 mg IntraVENous Daily    cetirizine  5 mg Oral BID     Continuous Infusions:   propofol 20 mcg/kg/min (08/29/21 0743)    midazolam 5 mg/hr (08/28/21 1508)    IV infusion builder 40 mL/hr at 08/29/21 0500    sodium chloride      vecuronium (NORCURON) infusion 1.5 mcg/kg/min (08/28/21 8743)    dextrose       PRN Meds:   sodium chloride (Inhalant), 4 mL, PRN  sodium chloride flush, 5-40 mL, PRN  sodium chloride, 25 mL, PRN  albuterol, 2.5 mg, Q6H PRN  benzonatate, 200 mg, TID PRN  glucose, 15 g, PRN  dextrose, 12.5 g, PRN  glucagon (rDNA), 1 mg, PRN  dextrose, 100 mL/hr, PRN  sodium chloride, 30 mL, PRN          VENT SETTINGS (Comprehensive) (if applicable):  Vent Information  $Ventilation: $Subsequent Day  Skin Assessment: Clean, dry, & intact  Equipment ID: [id number]  Equipment Changed: (S) Humidification  Vent Type: [id number]  Vent Mode: AC/PC  Vt Ordered: 0 mL  Pressure Ordered: 18  Rate Set: 26 bmp  Peak Flow: 0 L/min  Pressure Support: 0 cmH20  FiO2 : 55 %  SpO2: 96 %  SpO2/FiO2 ratio: 174.55  Sensitivity: 3  PEEP/CPAP: 12  I Time/ I Time %: 0 s  Humidification Source: Heated wire  Humidification Temp: 37  Humidification Temp Measured: 37  Circuit Condensation: Drained  Mask Type: Full face mask  Mask Size: Medium  Additional Respiratory  Assessments  Pulse: 54  Resp: 25  SpO2: 96 %  Position: Prone  Humidification Source: Heated wire  Humidification Temp: 37  Circuit Condensation: Drained  Oral Care: Lip moisturizer applied, Mouthwash with chlorhexidine, Mouth swabbed, Mouth suctioned  Subglottic Suction Done?: Yes  Airway Type: ET  Airway Size: 8  Cuff Pressure (cm H2O): 29 cm H2O    ABG  Lab Results   Component Value Date    PH 7.426 08/29/2021    PCO2 38.8 08/29/2021    PO2 89.1 08/29/2021    HCO3 24.9 08/29/2021    O2SAT 96.3 08/29/2021         Laboratory findings:    Complete Blood Count:   Recent Labs     08/27/21  1015 08/28/21  0530 08/29/21  0520   WBC 10.5 9.7 12.5*   HGB 11.8* 10.8* 10.7*   HCT 37.7 33.4* 33.7*   PLT 91* 103* 113*        Lactic Acid  Lab Results   Component Value Date    LACTA 2.2 08/25/2021    LACTA 1.8 08/23/2021    LACTA 3.9 08/21/2021        Last 3 Blood Glucose:   Recent Labs     08/27/21  0610 08/28/21  0530 08/29/21  0520   GLUCOSE 186* 202* 235*        PT/INR:  No results found for: PROTIME, INR  PTT:  No results found for: APTT, PTT    Comprehensive Metabolic Profile:   Recent Labs     08/27/21  0610 08/27/21  0610 08/28/21  0530 08/28/21  0530 08/29/21  0520     --  144  --  138   K 4.4  --  4.5  --  4.9   *  --  110*  --  105   CO2 29  --  26  --  25   BUN 29*  --  27*  --  26*   CREATININE 0.8  --  0.7  --  0.6*   GLUCOSE 186*  --  202*  --  235*   CALCIUM 8.2*  --  8.7  --  8.7   PROT 5.0*   < > 5.7*   < > 5.7*   LABALBU 2.9*   < > 3.5   < > 3.7   BILITOT 0.3   < > 0.4   < > 0.4   ALKPHOS 77   < > 88   < > 69   AST 51*   < > 45*   < > 34   ALT 31   < > 28   < > 24    < > = values in this interval not displayed. Magnesium:   Lab Results   Component Value Date    MG 2.0 08/29/2021     Phosphorus:   Lab Results   Component Value Date    PHOS 3.8 08/29/2021     Ionized Calcium: No results found for: CAION       Urinalysis:     Troponin: No results for input(s): TROPONINI in the last 72 hours. Cultures     Cultures during this admission:       No results for input(s): BC in the last 72 hours. No results for input(s): Katherleen Judy in the last 72 hours. No results for input(s): LABURIN in the last 72 hours.     Other pertinent Labs: Radiology/Imaging:   XR CHEST (2 VW)    Result Date: 8/18/2021  EXAMINATION: TWO XRAY VIEWS OF THE CHEST 8/17/2021 11:32 pm COMPARISON: None. HISTORY: ORDERING SYSTEM PROVIDED HISTORY: cough TECHNOLOGIST PROVIDED HISTORY: Reason for exam:->cough FINDINGS: Frontal and lateral views of the chest.  Low lung volume. Multifocal bilateral patchy opacities are most pronounced in the mid and lower lung zones. No pleural effusion or pneumothorax. Normal cardiomediastinal silhouette and great vessels. Multilevel degenerative disc disease. Multifocal bilateral patchy opacities are most pronounced in the mid and lower lung zones. Differential considerations include an infectious/inflammatory process including atypical or viral pneumonia and pulmonary edema. XR CHEST PORTABLE    Result Date: 8/23/2021  EXAMINATION: ONE XRAY VIEW OF THE CHEST 8/23/2021 12:26 pm COMPARISON: August 23, 2021 HISTORY: ORDERING SYSTEM PROVIDED HISTORY: intubated, cvc placement TECHNOLOGIST PROVIDED HISTORY: Reason for exam:->intubated, cvc placement FINDINGS: Endotracheal tube is 3.1 cm above the donna. NG tube courses below the level of the diaphragm. Right IJ central venous catheter is present with distal tip of location of SVC. Stable interstitial and hazy opacities throughout both lungs. No pneumothorax. The heart appears normal in size. 1. Endotracheal tube is 3.1 cm above the donna. 2. Stable interstitial pulmonary edema or pneumonia throughout both lungs. XR CHEST PORTABLE    Result Date: 8/23/2021  EXAMINATION: ONE XRAY VIEW OF THE CHEST 8/23/2021 7:51 am COMPARISON: Previous CT of the thorax of 08/21/2021 and previous chest x-ray of 08/22/2021 HISTORY: ORDERING SYSTEM PROVIDED HISTORY: increasing SOB TECHNOLOGIST PROVIDED HISTORY: Reason for exam:->increasing SOB FINDINGS: There is significant motion artifact throughout the lungs.  Significant multifocal bilateral airspace disease is noted unchanged compared to prior study. There is no right or left gross pleural effusion. The heart is enlarged. 1. There is no interval change in the significant multifocal bilateral airspace disease. XR CHEST PORTABLE    Result Date: 8/22/2021  EXAMINATION: ONE XRAY VIEW OF THE CHEST 8/22/2021 12:00 pm COMPARISON: 08/21/2021 HISTORY: ORDERING SYSTEM PROVIDED HISTORY: Acute resp failure, COVID TECHNOLOGIST PROVIDED HISTORY: Reason for exam:->Acute resp failure, COVID FINDINGS: Bilateral airspace opacities with improved aeration of the right lung compared to prior study. There is no effusion or pneumothorax. The cardiomediastinal silhouette is without acute process. The osseous structures are without acute process. Improved aeration of the right lung compared to prior study. XR CHEST PORTABLE    Result Date: 8/21/2021  EXAMINATION: ONE XRAY VIEW OF THE CHEST 8/21/2021 9:56 am COMPARISON: August 17, 2021 HISTORY: ORDERING SYSTEM PROVIDED HISTORY: pneumonia TECHNOLOGIST PROVIDED HISTORY: Reason for exam:->pneumonia FINDINGS: Compared with most recent examination, there has been a significant interval increase in the degree of patchy bilateral airspace opacities, in keeping with the patient's known diagnosis of COVID pneumonia. No evidence of a sizable pleural effusion. No pneumothorax is identified. Heart size is unable to be accurately assessed on this single portable view of the chest, but appears to be stable. No acute osseous abnormality. Significantly increased airspace opacities throughout both lungs, in keeping with multifocal pneumonia. CTA PULMONARY W CONTRAST    Result Date: 8/21/2021  EXAMINATION: CTA OF THE CHEST, 8/21/2021 12:11 pm TECHNIQUE: CTA of the chest was performed after the administration of intravenous contrast.  Multiplanar reformatted images are provided for review. MIP images are provided for review.  Dose modulation, iterative reconstruction, and/or weight based adjustment of the mA/kV was utilized to reduce the radiation dose to as low as reasonably achievable. COMPARISON: None HISTORY: ORDERING SYSTEM PROVIDED HISTORY:  PE vs COVID TECHNOLOGIST PROVIDED HISTORY: Reason for Exam:  PE vs COVID Decision Support Exception - unselect if not a suspected or confirmed emergency medical condition->Emergency Medical Condition (MA) FINDINGS: Pulmonary Arteries: There is a combination of poor contrast bolus timing as well as significant respiratory motion artifact that severely degrades the examination. Unfortunately, multiple segmental and subsegmental pulmonary artery branches are not opacified well enough to exclude a pulmonary embolism. There is no evidence of a central pulmonary embolism or convincing evidence of right heart strain. Mediastinum: There is at least one enlarged mediastinal lymph node measuring 17 mm in short axis in the subcarinal distribution. Several additional smaller but nonenlarged mediastinal lymph nodes are present, likely reactive. The thoracic esophagus is decompressed, limiting assessment. No obvious esophageal abnormality. The heart size is top normal.  Trace pericardial fluid is present. Lungs/pleura: The central airways are patent. There is no evidence of a pleural effusion or pneumothorax. Extensive ground-glass and consolidative airspace opacities are present bilaterally, in keeping with the provided history of COVID pneumonia. Upper Abdomen: Imaged portions of the upper abdomen demonstrates diffuse hepatic steatosis. There are also multiple nonobstructing bilateral renal calculi, partially imaged. Soft Tissues/Bones: No acute bone or soft tissue abnormality. Extensive ground-glass and consolidative airspace opacities throughout both lungs, in keeping with the provided history of COVID pneumonia.  The examination is nondiagnostic for detection of pulmonary emboli in multiple segmental and subsegmental pulmonary artery distributions, secondary to poor contrast bolus timing and respiratory motion artifact. There is no evidence of a central pulmonary embolism. Hepatic steatosis. Enlarged mediastinal lymph nodes, likely reactive. XR CHEST ABDOMEN NG PLACEMENT    Result Date: 8/23/2021  EXAMINATION: ONE SUPINE XRAY VIEW(S) OF THE ABDOMEN 8/23/2021 12:26 pm COMPARISON: None. HISTORY: ORDERING SYSTEM PROVIDED HISTORY: ng placement TECHNOLOGIST PROVIDED HISTORY: Reason for exam:->ng placement FINDINGS: Nasogastric tube courses below the level of the diaphragm with distal tip in the expected location of the stomach. Satisfactory position of nasogastric tube. Chest Xray (8/29/2021): Improved from yesterday, in process      Principal Problem:    1. Acute hypoxemic respiratory failure due to COVID-19 (HCC)/ Severe ARDS  2. Sepsis- resolved  3. Thrombocytopenia - improving  4. Hypertension  5. Type 2 diabetes mellitus without complication, without long-term current use of insulin ([facility] [address]  6. History of seizures  7.   OSCAR - improved      SYSTEMS ASSESSMENT    Neuro   Intubated, sedated, paralyzed  -Sedated with fentanyl, Versed, propofol, vecuronium    History of seizures  -Continue phenytoin  -Recheck phenytoin level on 8/30, order placed  -Telemetry monitoring     Respiratory   Acute hypoxic respiratory failure/severe ARDS due to COVID-19  -Intubated 8/23, day 7  -Patient desaturates very quickly with slight movements, takes long time to recover  -Significant facial and tongue swelling secondary to prolonged requirement of prone positioning, resolves with supine positioning for a good amount of time  -Completed remdesivir, tocilizumab  -Continues on PC/AC  -Chest x-ray when supine, today improved from previous after diuresis  -Continue-Decadron 10 mg IV twice daily, transition to Decadron 10 mg daily tomorrow  -DuoNebs every 6 hours  -Lasix 40 mg  -CBC to be exchanged tomorrow  -ID following    Cardiovascular   Sepsis secondary to COVID-19  -Has fluctuating heart rate and blood pressure  -Continue Norvasc 10 mg   -Hold Cardura, lisinopril secondary to labile blood pressure  -Continue to monitor with telemetry    Hyperlipidemia  -On statin     Gastrointestinal   GI prophylaxis  -Protonix  -Tolerating tube feeds     Renal   Busch catheter dysfunction, resolved  -Busch has been unable to be irrigated twice now, replaced  -No issues for 3 nights in a row now  -Kidney function appears to be preserved  -Continue to monitor urine output     Infectious Disease   COVID-19  -Given Rocephin and doxy in the ED  -Status post remdesivir, tocilizumab  -Cultures so far negative  -Continue Decadron, dose to be decreased tomorrow  -Continue Diflucan per ID, stop after today's dose  --ID following     Hematology/Oncology   DVT prophylaxis  -Lovenox, weight-based    Thrombocytopenia  -Monitor     Endocrine   Type 2 diabetes  -Receiving tube feeds  -Medium dose sliding scale insulin  -Lantus 20u nightly  -Maintain glucose between 140 and 180     Social/Spiritual/DNR/Other   Full code      PLAN:     WEAN PER PROTOCOL:  [] No   [x] Yes  [] N/A    DISCONTINUE ANY LABS:   [x] No   [] Yes    ICU PROPHYLAXIS:  Stress ulcer:  [x] PPI Agent  [] [id number] [] Sucralfate  [] Other:  VTE:   [x] Enoxaparin  [] Unfract. Heparin Subcut  [] EPC Cuffs    NUTRITION:  [] NPO [x] Tube Feeding (Specify: ) [] TPN  [] PO (Diet: ADULT TUBE FEEDING; Nasogastric; Diabetic; Continuous; 20; Yes; 20; Q 6 hours; 60; 30; Q 4 hours; Protein; Proteinex 2 go once daily)    HOME MEDICATIONS RECONCILED: [] No  [x] Yes    INSULIN DRIP:   [x] No   [] Yes    CONSULTATION NEEDED:  [x] No   [] Yes    FAMILY UPDATED:    [] No   [x] Yes    TRANSFER OUT OF ICU:   [x] No   [] Yes    Jay Leiva DO,                 8/29/2021, 8:44 AM     I personally saw, examined and provided care for the patient. Radiographs, labs and medication list were reviewed by me independently. I spoke with bedside nursing, therapists and consultants.  Critical care services and times documented are independent of procedures and multidisciplinary rounds with Residents. Additionally comprehensive, multidisciplinary rounds were conducted with the MICU team. The case was discussed in detail and plans for care were established. Review of Residents documentation was conducted and revisions were made as appropriate. I agree with the above documented exam, problem list and plan of care.   Rekha Love MD   CCT excluding procedures:40'

## 2021-08-29 NOTE — PROGRESS NOTES
Dr Flip Mascorro notifed of patients abg results and results of bmp. Will leave patient supine for tonight and work on bringing the fio2 to 70% by morning.

## 2021-08-29 NOTE — PROGRESS NOTES
[address] [place] [address] [facility]  Progress Note      Chief Complaint   Patient presents with    Shortness of Breath     covid, per family SaO2 75% RA, hx of DM and panic attack        SUBJECTIVE:  8/21/21  Prone. FiO2 60% and PEEP of 16    8/22/2021  Patient is tolerating medications. No reported adverse drug reactions. No nausea, vomiting, diarrhea. Afebrile BiPAP 100% FiO2 with breathing at 44 times a minute and probably is going to decompensate  8/23/2021  Not doing well intubated on a rotating bed  FiO2 70% and PEEP 16  8/24/2021  Prone 50% FiO2-PEEP of 10  Issues with the urinary Busch last night this was changed  Paralyzed and on Versed  8/25/2021  Paralyzed and sedated with Versed no pressors  Prone intubated on 50% FiO2, PEEP of 10  Tolerating medications    8/26/21  Paralyzed and sedated with Versed no pressors  Prone intubated on 70% FiO2, PEEP of 10  Does not tolerate supine position    8/27/2021  Afebrile  Still prone on FiO2 of 100%  Paralyzed and sedated    8/28/2021  The patient is still in the ICU. He is pronated. FiO2 100%. Is still sedated and paralyzed. Fair amount of thick, brown secretions    8/29/2021  The patient is still on a RotoProne bed. You are still intubated, sedated and paralyzed. O2 is being weaned down. FiO2 55%. PEEP 12. Review of systems:  As stated above in the chief complaint, otherwise negative.     Medications:  Scheduled Meds:   insulin glargine  20 Units Subcutaneous Daily    phenytoin  300 mg IntraVENous Q12H    insulin lispro  0-12 Units Subcutaneous Q6H    fluconazole  400 mg IntraVENous Q24H    chlorhexidine  15 mL Mouth/Throat BID    Refresh Lacri-Lube   Ophthalmic Q6H    dexamethasone  10 mg IntraVENous Q12H    [Held by provider] lisinopril  40 mg Oral Daily    ipratropium-albuterol  1 ampule Inhalation Q4H    sodium chloride flush  10 mL IntraVENous BID    enoxaparin  0.5 mg/kg Subcutaneous BID    amLODIPine  10 mg Oral Daily  atorvastatin  40 mg Oral Daily    [Held by provider] doxazosin  8 mg Oral Daily    ascorbic acid  500 mg Oral Daily    Vitamin D  2,000 Units Oral Daily    zinc sulfate  50 mg Oral Daily    pantoprazole  40 mg IntraVENous Daily    cetirizine  5 mg Oral BID     Continuous Infusions:   propofol 15 mcg/kg/min (21 0858)    midazolam 5 mg/hr (21 1508)    IV infusion builder 40 mL/hr at 21 0500    sodium chloride      vecuronium (NORCURON) infusion 1.5 mcg/kg/min (213)    dextrose       PRN Meds:sodium chloride (Inhalant), sodium chloride flush, sodium chloride, albuterol, benzonatate, glucose, dextrose, glucagon (rDNA), dextrose, sodium chloride    OBJECTIVE:  /66   Pulse 54   Temp 98.6 °F (37 °C) (Bladder)   Resp 25   Ht 6' (1.829 m)   Wt (!) 302 lb (137 kg)   SpO2 96%   BMI 40.96 kg/m²   Temp  Av.2 °F (37.3 °C)  Min: 98.6 °F (37 °C)  Max: 100.2 °F (37.9 °C)  Constitutional: The patient is intubated, sedated, prone. Physical exam is limited. Skin: Warm and dry. No rashes were noted. HEENT: Not seen  Neck: Not tested  Chest: No use of accessory muscles to breathe. Symmetrical expansion. No wheezing, crackles. Cardiovascular: S1 and S2 are rhythmic and regular. Abdomen: Not examined  Genitourinary:  Busch catheter  Extremities: Minimal edema  Lines: peripheral  Right radial arterial line 2021  Right IJ triple-lumen catheter 2021  Busch changed 3/24/2021    Laboratory and Tests Review:  Lab Results   Component Value Date    WBC 12.5 (H) 2021    WBC 9.7 2021    WBC 10.5 2021    HGB 10.7 (L) 2021    HCT 33.7 (L) 2021    MCV 92.1 2021     (L) 2021     Lab Results   Component Value Date    NEUTROABS 10.74 (H) 2021    NEUTROABS 7.74 (H) 2021    NEUTROABS 9.03 (H) 2021     No results found for: [facility]  Lab Results   Component Value Date    ALT 24 2021    AST 34 2021 ALKPHOS 69 08/29/2021    BILITOT 0.4 08/29/2021     Lab Results   Component Value Date     08/29/2021    K 4.9 08/29/2021    K 3.7 08/21/2021     08/29/2021    CO2 25 08/29/2021    BUN 26 08/29/2021    CREATININE 0.6 08/29/2021    CREATININE 0.7 08/28/2021    CREATININE 0.8 08/27/2021    GFRAA >60 08/29/2021    LABGLOM >60 08/29/2021    GLUCOSE 235 08/29/2021    PROT 5.7 08/29/2021    LABALBU 3.7 08/29/2021    CALCIUM 8.7 08/29/2021    BILITOT 0.4 08/29/2021    ALKPHOS 69 08/29/2021    AST 34 08/29/2021    ALT 24 08/29/2021     Lab Results   Component Value Date    CRP 0.5 (H) 08/29/2021    CRP 0.7 (H) 08/28/2021    CRP 1.0 (H) 08/27/2021     Lab Results   Component Value Date    SEDRATE 2 08/28/2021    SEDRATE 2 08/27/2021    SEDRATE 0 08/26/2021     Radiology:  CAT scan diffuse groundglass infiltrates    Microbiology:   Lab Results   Component Value Date    BC 5 Days no growth 08/21/2021     Lab Results   Component Value Date    BLOODCULT2 5 Days no growth 08/21/2021     No results found for: WNDABS  Smear, Respiratory   Date Value Ref Range Status   08/27/2021   Final    Group 6: <25 PMN's/LPF and <25 Epithelial cells/LPF  Few Polymorphonuclear leukocytes  Rare Epithelial cells  No organisms seen       No results found for: MPNEUMO, CLAMYDCU, LABLEGI, AFBCX, FUNGSM, LABFUNG  CULTURE, RESPIRATORY   Date Value Ref Range Status   08/27/2021 Oral Pharyngeal [place] reduced  Final     No results found for: CXCATHTIP  No results found for: BFCS  No results found for: CXSURG  Urine Culture, Routine   Date Value Ref Range Status   08/24/2021 Growth not present  Final     MRSA Culture Only   Date Value Ref Range Status   08/24/2021 Methicillin resistant Staph aureus not isolated  Final   Microbiology:  Blood cultures pending  Legionella antigen and strep pneumo antigen are negative  Urine culture negative    ASSESSMENT:  · Severe Covid pneumonia causing acute respiratory failure  · Cytokine storm    PLAN:  · Completed remdesivir; steroids; Tosi x1 completed   · Monitor labs  · Fungitell was negative.   Stop Fluconazole after today's dose    Discussed with Dr. Foreign Garza MD  9:00 AM   8/29/2021

## 2021-08-29 NOTE — PROGRESS NOTES
[facility] Progress Note    Admitting Date and Time: 8/21/2021  9:35 AM  Admit Dx: Acute hypoxemic respiratory failure due to COVID-19 (HCC) [U07.1, J96.01]  Pneumonia due to COVID-19 virus [U07.1, J12.82]    Subjective:  Patient is being followed for Acute hypoxemic respiratory failure due to COVID-19 ([address] [U07.1, J96.01]  Pneumonia due to COVID-19 virus [U07.1, J12.82]     Pt intubated and sedated    O2 sat:  96% on 55% FiO2    Fever:  986    Per RN:   Desaturates quickly when supine    ROS: denies cp, n/v, HA unless stated above.       insulin glargine  20 Units Subcutaneous Daily    phenytoin  300 mg IntraVENous Q12H    insulin lispro  0-12 Units Subcutaneous Q6H    fluconazole  400 mg IntraVENous Q24H    chlorhexidine  15 mL Mouth/Throat BID    Refresh Lacri-Lube   Ophthalmic Q6H    dexamethasone  10 mg IntraVENous Q12H    [Held by provider] lisinopril  40 mg Oral Daily    ipratropium-albuterol  1 ampule Inhalation Q4H    sodium chloride flush  10 mL IntraVENous BID    enoxaparin  0.5 mg/kg Subcutaneous BID    amLODIPine  10 mg Oral Daily    atorvastatin  40 mg Oral Daily    [Held by provider] doxazosin  8 mg Oral Daily    ascorbic acid  500 mg Oral Daily    Vitamin D  2,000 Units Oral Daily    zinc sulfate  50 mg Oral Daily    pantoprazole  40 mg IntraVENous Daily    cetirizine  5 mg Oral BID     sodium chloride (Inhalant), 4 mL, PRN  sodium chloride flush, 5-40 mL, PRN  sodium chloride, 25 mL, PRN  albuterol, 2.5 mg, Q6H PRN  benzonatate, 200 mg, TID PRN  glucose, 15 g, PRN  dextrose, 12.5 g, PRN  glucagon (rDNA), 1 mg, PRN  dextrose, 100 mL/hr, PRN  sodium chloride, 30 mL, PRN         Objective:    /66   Pulse 51   Temp 98.6 °F (37 °C) (Bladder)   Resp 25   Ht 6' (1.829 m)   Wt (!) 302 lb (137 kg)   SpO2 96%   BMI 40.96 kg/m²     General Appearance: intubated and sedated on Rotoprone device  Skin: warm and dry  Head: normocephalic and atraumatic  Eyes: per Nurse   Neck: neck supple and non tender without mass   Pulmonary/Chest: per Nurse  Cardiovascular: vitals stable  Abdomen: unable to assess 2/2 prone position  Extremities: no cyanosis, no clubbing and no edema  Neurologic: intubated and sedated    Recent Labs     08/27/21  0610 08/28/21  0530 08/29/21  0520    144 138   K 4.4 4.5 4.9   * 110* 105   CO2 29 26 25   BUN 29* 27* 26*   CREATININE 0.8 0.7 0.6*   GLUCOSE 186* 202* 235*   CALCIUM 8.2* 8.7 8.7       Recent Labs     08/27/21  1015 08/28/21  0530 08/29/21  0520   WBC 10.5 9.7 12.5*   RBC 4.06 3.65* 3.66*   HGB 11.8* 10.8* 10.7*   HCT 37.7 33.4* 33.7*   MCV 92.9 91.5 92.1   MCH 29.1 29.6 29.2   MCHC 31.3* 32.3 31.8*   RDW 14.2 13.9 13.7   PLT 91* 103* 113*   MPV 10.1 10.8 10.4     Radiology:   XR CHEST PORTABLE    Result Date: 8/28/2021  EXAMINATION: ONE XRAY VIEW OF THE CHEST 8/28/2021 1:51 pm COMPARISON: August 27, 2021 HISTORY: ORDERING SYSTEM PROVIDED HISTORY: s/p resp failure TECHNOLOGIST PROVIDED HISTORY: On rotoprone will flip at 2 pm today Reason for exam:->s/p resp failure FINDINGS: Endotracheal tube is approximately 3.9 cm above the donna. NG tube courses below the diaphragm. Right IJ central venous catheter is present with distal tip of location of SVC. Diffuse airspace opacities throughout both lungs. Increased opacities in right lung base. Diffuse airspace opacities throughout both lungs increased in right lung base compared to prior. Assessment:    Principal Problem:    Acute hypoxemic respiratory failure due to COVID-19 [facility])  Active Problems:    Type 2 diabetes mellitus without complication, without long-term current use of insulin (HCC)    History of seizures    Hyperlipidemia    Busch catheter problem (HCC)    Sepsis (HCC)    Thrombocytopenia ([facility] [address]  Resolved Problems:    OSCAR (acute kidney injury) ([facility] [address]    Lactic acidosis    Plan:  1.   Acute hypoxic respiratory failure, most likely due to viral pneumonia, O2 sat was below 90% on room air, requiring prone positioning and 55% FiO2 and oxygen saturation above 90%. Treating the underlying process. Not improving. Overall prognosis poor. 2.  Acute viral pneumonia, rapid influenza a and B were negative, respiratory panel was not tested, procalcitonin was 0.36, CAT scan of the chest showed bilateral patchy groundglass opacities, sent for COVID-19, results positive. 3.  T2DM - Glucose POCT.  Continue basal and SS insulin. 4.  Hyperlipidemia - continue statins. 5.  HTN - continue meds. 6.  Elevated D-dimer - continue Lovenox bid.     NOTE: This report was transcribed using voice recognition software. Every effort was made to ensure accuracy; however, inadvertent computerized transcription errors may be present.     Electronically signed by Araceli Calvillo MD on 8/29/2021 at 8:16 AM

## 2021-08-29 NOTE — PATIENT CARE CONFERENCE
Intensive Care Daily Quality Rounding Checklist        ICU Team Members: Dr. Kyle Greenberg, resident, charge nurse, bedside nurse and respiratory therapy      ICU Day #: 7     Intubation Date: August 23     Ventilator Day #: 7     Central Line Insertion Date: August 23                                                    Day #: 7      Arterial Line Insertion Date: August 23                             Day #: 7     Temporary Hemodialysis Catheter Insertion Date: N/A                             Day # N/A     DVT Prophylaxis: Lovenox    GI Prophylaxis: Protonix     Busch Catheter Insertion Date: 08/24/2021 Changed)                                        Day #: 4                             Continued need (if yes, reason documented and discussed with physician): critical care patient, strict I+O     Skin Issues/ Wounds and ordered treatment discussed on rounds: No issues, SOS precautions     Goals/ Plans for the Day: Monitor labs and vitals.  Vent changes, ween fi02 and peep, x-ray, supine if tolerates, start tube feeds, iv lasix

## 2021-08-30 ENCOUNTER — APPOINTMENT (OUTPATIENT)
Dept: GENERAL RADIOLOGY | Age: 56
DRG: 005 | End: 2021-08-30
Payer: COMMERCIAL

## 2021-08-30 LAB
AADO2: 287 MMHG
AADO2: 352.2 MMHG
AADO2: 353.1 MMHG
ALBUMIN SERPL-MCNC: 3.9 G/DL (ref 3.5–5.2)
ALP BLD-CCNC: 71 U/L (ref 40–129)
ALT SERPL-CCNC: 30 U/L (ref 0–40)
ANION GAP SERPL CALCULATED.3IONS-SCNC: 12 MMOL/L (ref 7–16)
AST SERPL-CCNC: 36 U/L (ref 0–39)
B.E.: 2.8 MMOL/L (ref -3–3)
B.E.: 3.6 MMOL/L (ref -3–3)
B.E.: 4.2 MMOL/L (ref -3–3)
BASOPHILS ABSOLUTE: 0.02 E9/L (ref 0–0.2)
BASOPHILS RELATIVE PERCENT: 0.2 % (ref 0–2)
BILIRUB SERPL-MCNC: 0.5 MG/DL (ref 0–1.2)
BUN BLDV-MCNC: 32 MG/DL (ref 6–20)
C-REACTIVE PROTEIN: 0.5 MG/DL (ref 0–0.4)
CALCIUM SERPL-MCNC: 9 MG/DL (ref 8.6–10.2)
CHLORIDE BLD-SCNC: 99 MMOL/L (ref 98–107)
CO2: 26 MMOL/L (ref 22–29)
COHB: 0.3 % (ref 0–1.5)
COHB: 0.5 % (ref 0–1.5)
COHB: 0.9 % (ref 0–1.5)
COMMENT: ABNORMAL
CREAT SERPL-MCNC: 0.7 MG/DL (ref 0.7–1.2)
CRITICAL: ABNORMAL
D DIMER: 755 NG/ML DDU
DATE ANALYZED: ABNORMAL
DATE OF COLLECTION: ABNORMAL
EOSINOPHILS ABSOLUTE: 0.01 E9/L (ref 0.05–0.5)
EOSINOPHILS RELATIVE PERCENT: 0.1 % (ref 0–6)
FIO2: 70 %
FIO2: 70 %
FIO2: 75 %
GFR AFRICAN AMERICAN: >60
GFR NON-AFRICAN AMERICAN: >60 ML/MIN/1.73
GLUCOSE BLD-MCNC: 259 MG/DL (ref 74–99)
HCO3: 26.8 MMOL/L (ref 22–26)
HCO3: 28 MMOL/L (ref 22–26)
HCO3: 28.5 MMOL/L (ref 22–26)
HCT VFR BLD CALC: 35.2 % (ref 37–54)
HEMOGLOBIN: 11.4 G/DL (ref 12.5–16.5)
HHB: 1.3 % (ref 0–5)
HHB: 1.9 % (ref 0–5)
HHB: 3.5 % (ref 0–5)
IMMATURE GRANULOCYTES #: 0.35 E9/L
IMMATURE GRANULOCYTES %: 2.7 % (ref 0–5)
LAB: ABNORMAL
LYMPHOCYTES ABSOLUTE: 0.83 E9/L (ref 1.5–4)
LYMPHOCYTES RELATIVE PERCENT: 6.5 % (ref 20–42)
Lab: ABNORMAL
MAGNESIUM: 2.2 MG/DL (ref 1.6–2.6)
MCH RBC QN AUTO: 29.4 PG (ref 26–35)
MCHC RBC AUTO-ENTMCNC: 32.4 % (ref 32–34.5)
MCV RBC AUTO: 90.7 FL (ref 80–99.9)
METER GLUCOSE: 262 MG/DL (ref 74–99)
METER GLUCOSE: 267 MG/DL (ref 74–99)
METER GLUCOSE: 283 MG/DL (ref 74–99)
METER GLUCOSE: 296 MG/DL (ref 74–99)
METHB: 0.1 % (ref 0–1.5)
METHB: 0.3 % (ref 0–1.5)
METHB: 0.4 % (ref 0–1.5)
MODE: ABNORMAL
MONOCYTES ABSOLUTE: 0.73 E9/L (ref 0.1–0.95)
MONOCYTES RELATIVE PERCENT: 5.7 % (ref 2–12)
NEUTROPHILS ABSOLUTE: 10.79 E9/L (ref 1.8–7.3)
NEUTROPHILS RELATIVE PERCENT: 84.8 % (ref 43–80)
O2 CONTENT: 17 ML/DL
O2 CONTENT: 17.3 ML/DL
O2 CONTENT: 17.7 ML/DL
O2 SATURATION: 96.5 % (ref 92–98.5)
O2 SATURATION: 98.1 % (ref 92–98.5)
O2 SATURATION: 98.7 % (ref 92–98.5)
O2HB: 95.5 % (ref 94–97)
O2HB: 97.4 % (ref 94–97)
O2HB: 97.9 % (ref 94–97)
OPERATOR ID: 316
OPERATOR ID: ABNORMAL
OPERATOR ID: ABNORMAL
PATIENT TEMP: 37 C
PCO2: 38.7 MMHG (ref 35–45)
PCO2: 39.1 MMHG (ref 35–45)
PCO2: 44.3 MMHG (ref 35–45)
PDW BLD-RTO: 13.5 FL (ref 11.5–15)
PEEP/CPAP: 14 CMH2O
PFO2: 1.25 MMHG/%
PFO2: 1.62 MMHG/%
PFO2: 2.1 MMHG/%
PH BLOOD GAS: 7.43 (ref 7.35–7.45)
PH BLOOD GAS: 7.45 (ref 7.35–7.45)
PH BLOOD GAS: 7.48 (ref 7.35–7.45)
PHENYTOIN DOSE AMOUNT: ABNORMAL
PHENYTOIN LEVEL: 8.5 UG/ML (ref 10–20)
PHOSPHORUS: 4 MG/DL (ref 2.5–4.5)
PIP: 16 CMH2O
PIP: 18 CMH2O
PLATELET # BLD: 130 E9/L (ref 130–450)
PMV BLD AUTO: 10.7 FL (ref 7–12)
PO2: 121.8 MMHG (ref 75–100)
PO2: 147 MMHG (ref 75–100)
PO2: 87.4 MMHG (ref 75–100)
POTASSIUM SERPL-SCNC: 4.6 MMOL/L (ref 3.5–5)
PS: 16 CMH20
RBC # BLD: 3.88 E12/L (ref 3.8–5.8)
RI(T): 195 %
RI(T): 290 %
RI(T): 403 %
RR MECHANICAL: 26 B/MIN
RR MECHANICAL: 26 B/MIN
SEDIMENTATION RATE, ERYTHROCYTE: 7 MM/HR (ref 0–15)
SODIUM BLD-SCNC: 137 MMOL/L (ref 132–146)
SOURCE, BLOOD GAS: ABNORMAL
THB: 12.5 G/DL (ref 11.5–16.5)
THB: 12.6 G/DL (ref 11.5–16.5)
THB: 12.7 G/DL (ref 11.5–16.5)
TIME ANALYZED: 1211
TIME ANALYZED: 1813
TIME ANALYZED: 611
TOTAL PROTEIN: 6.1 G/DL (ref 6.4–8.3)
WBC # BLD: 12.7 E9/L (ref 4.5–11.5)

## 2021-08-30 PROCEDURE — 84100 ASSAY OF PHOSPHORUS: CPT

## 2021-08-30 PROCEDURE — 36620 INSERTION CATHETER ARTERY: CPT

## 2021-08-30 PROCEDURE — 6370000000 HC RX 637 (ALT 250 FOR IP): Performed by: INTERNAL MEDICINE

## 2021-08-30 PROCEDURE — 2580000003 HC RX 258: Performed by: STUDENT IN AN ORGANIZED HEALTH CARE EDUCATION/TRAINING PROGRAM

## 2021-08-30 PROCEDURE — 99233 SBSQ HOSP IP/OBS HIGH 50: CPT | Performed by: INTERNAL MEDICINE

## 2021-08-30 PROCEDURE — 2580000003 HC RX 258: Performed by: INTERNAL MEDICINE

## 2021-08-30 PROCEDURE — 6360000002 HC RX W HCPCS: Performed by: INTERNAL MEDICINE

## 2021-08-30 PROCEDURE — 86140 C-REACTIVE PROTEIN: CPT

## 2021-08-30 PROCEDURE — 2580000003 HC RX 258: Performed by: FAMILY MEDICINE

## 2021-08-30 PROCEDURE — 80053 COMPREHEN METABOLIC PANEL: CPT

## 2021-08-30 PROCEDURE — 2500000003 HC RX 250 WO HCPCS: Performed by: INTERNAL MEDICINE

## 2021-08-30 PROCEDURE — 36556 INSERT NON-TUNNEL CV CATH: CPT

## 2021-08-30 PROCEDURE — 87206 SMEAR FLUORESCENT/ACID STAI: CPT

## 2021-08-30 PROCEDURE — 80185 ASSAY OF PHENYTOIN TOTAL: CPT

## 2021-08-30 PROCEDURE — 85378 FIBRIN DEGRADE SEMIQUANT: CPT

## 2021-08-30 PROCEDURE — C1751 CATH, INF, PER/CENT/MIDLINE: HCPCS

## 2021-08-30 PROCEDURE — 6360000002 HC RX W HCPCS: Performed by: STUDENT IN AN ORGANIZED HEALTH CARE EDUCATION/TRAINING PROGRAM

## 2021-08-30 PROCEDURE — 83735 ASSAY OF MAGNESIUM: CPT

## 2021-08-30 PROCEDURE — 94640 AIRWAY INHALATION TREATMENT: CPT

## 2021-08-30 PROCEDURE — 2000000000 HC ICU R&B

## 2021-08-30 PROCEDURE — 87077 CULTURE AEROBIC IDENTIFY: CPT

## 2021-08-30 PROCEDURE — 87186 SC STD MICRODIL/AGAR DIL: CPT

## 2021-08-30 PROCEDURE — 94003 VENT MGMT INPAT SUBQ DAY: CPT

## 2021-08-30 PROCEDURE — 82962 GLUCOSE BLOOD TEST: CPT

## 2021-08-30 PROCEDURE — 85651 RBC SED RATE NONAUTOMATED: CPT

## 2021-08-30 PROCEDURE — 87070 CULTURE OTHR SPECIMN AEROBIC: CPT

## 2021-08-30 PROCEDURE — 37799 UNLISTED PX VASCULAR SURGERY: CPT

## 2021-08-30 PROCEDURE — 85025 COMPLETE CBC W/AUTO DIFF WBC: CPT

## 2021-08-30 PROCEDURE — C9113 INJ PANTOPRAZOLE SODIUM, VIA: HCPCS | Performed by: STUDENT IN AN ORGANIZED HEALTH CARE EDUCATION/TRAINING PROGRAM

## 2021-08-30 PROCEDURE — 6360000002 HC RX W HCPCS: Performed by: FAMILY MEDICINE

## 2021-08-30 PROCEDURE — 82805 BLOOD GASES W/O2 SATURATION: CPT

## 2021-08-30 PROCEDURE — 36415 COLL VENOUS BLD VENIPUNCTURE: CPT

## 2021-08-30 PROCEDURE — 71045 X-RAY EXAM CHEST 1 VIEW: CPT

## 2021-08-30 RX ORDER — VECURONIUM BROMIDE 1 MG/ML
10 INJECTION, POWDER, LYOPHILIZED, FOR SOLUTION INTRAVENOUS
Status: ACTIVE | OUTPATIENT
Start: 2021-08-30 | End: 2021-08-30

## 2021-08-30 RX ORDER — FUROSEMIDE 10 MG/ML
40 INJECTION INTRAMUSCULAR; INTRAVENOUS DAILY
Status: DISCONTINUED | OUTPATIENT
Start: 2021-08-30 | End: 2021-08-31

## 2021-08-30 RX ADMIN — PROPOFOL 25 MCG/KG/MIN: 10 INJECTION, EMULSION INTRAVENOUS at 05:00

## 2021-08-30 RX ADMIN — Medication 6 MG/HR: at 03:26

## 2021-08-30 RX ADMIN — IPRATROPIUM BROMIDE AND ALBUTEROL SULFATE 1 AMPULE: .5; 3 SOLUTION RESPIRATORY (INHALATION) at 10:53

## 2021-08-30 RX ADMIN — MINERAL OIL AND PETROLATUM: 150; 830 OINTMENT OPHTHALMIC at 03:30

## 2021-08-30 RX ADMIN — FUROSEMIDE 40 MG: 10 INJECTION, SOLUTION INTRAMUSCULAR; INTRAVENOUS at 10:24

## 2021-08-30 RX ADMIN — PANTOPRAZOLE SODIUM 40 MG: 40 INJECTION, POWDER, FOR SOLUTION INTRAVENOUS at 08:00

## 2021-08-30 RX ADMIN — INSULIN GLARGINE 20 UNITS: 100 INJECTION, SOLUTION SUBCUTANEOUS at 07:57

## 2021-08-30 RX ADMIN — PROPOFOL 25 MCG/KG/MIN: 10 INJECTION, EMULSION INTRAVENOUS at 11:26

## 2021-08-30 RX ADMIN — CISATRACURIUM BESYLATE 4 MCG/KG/MIN: 10 INJECTION INTRAVENOUS at 21:46

## 2021-08-30 RX ADMIN — ENOXAPARIN SODIUM 70 MG: 80 INJECTION SUBCUTANEOUS at 08:00

## 2021-08-30 RX ADMIN — VECURONIUM BROMIDE 1.7 MCG/KG/MIN: 1 INJECTION, POWDER, LYOPHILIZED, FOR SOLUTION INTRAVENOUS at 03:27

## 2021-08-30 RX ADMIN — INSULIN LISPRO 6 UNITS: 100 INJECTION, SOLUTION INTRAVENOUS; SUBCUTANEOUS at 07:00

## 2021-08-30 RX ADMIN — INSULIN LISPRO 6 UNITS: 100 INJECTION, SOLUTION INTRAVENOUS; SUBCUTANEOUS at 01:42

## 2021-08-30 RX ADMIN — IPRATROPIUM BROMIDE AND ALBUTEROL SULFATE 1 AMPULE: .5; 3 SOLUTION RESPIRATORY (INHALATION) at 14:46

## 2021-08-30 RX ADMIN — INSULIN LISPRO 9 UNITS: 100 INJECTION, SOLUTION INTRAVENOUS; SUBCUTANEOUS at 18:23

## 2021-08-30 RX ADMIN — PHENYTOIN SODIUM 300 MG: 50 INJECTION INTRAMUSCULAR; INTRAVENOUS at 10:06

## 2021-08-30 RX ADMIN — FENTANYL CITRATE: 0.05 INJECTION, SOLUTION INTRAMUSCULAR; INTRAVENOUS at 01:13

## 2021-08-30 RX ADMIN — AMLODIPINE BESYLATE 10 MG: 10 TABLET ORAL at 08:00

## 2021-08-30 RX ADMIN — PROPOFOL 35 MCG/KG/MIN: 10 INJECTION, EMULSION INTRAVENOUS at 20:09

## 2021-08-30 RX ADMIN — CHLORHEXIDINE GLUCONATE 0.12% ORAL RINSE 15 ML: 1.2 LIQUID ORAL at 08:01

## 2021-08-30 RX ADMIN — CISATRACURIUM BESYLATE 4 MCG/KG/MIN: 10 INJECTION INTRAVENOUS at 16:37

## 2021-08-30 RX ADMIN — VECURONIUM BROMIDE 1.7 MCG/KG/MIN: 1 INJECTION, POWDER, LYOPHILIZED, FOR SOLUTION INTRAVENOUS at 11:35

## 2021-08-30 RX ADMIN — Medication 6 MG/HR: at 16:48

## 2021-08-30 RX ADMIN — PROPOFOL 25 MCG/KG/MIN: 10 INJECTION, EMULSION INTRAVENOUS at 16:58

## 2021-08-30 RX ADMIN — ENOXAPARIN SODIUM 70 MG: 80 INJECTION SUBCUTANEOUS at 21:48

## 2021-08-30 RX ADMIN — ATORVASTATIN CALCIUM 40 MG: 40 TABLET, FILM COATED ORAL at 21:47

## 2021-08-30 RX ADMIN — IPRATROPIUM BROMIDE AND ALBUTEROL SULFATE 1 AMPULE: .5; 3 SOLUTION RESPIRATORY (INHALATION) at 18:39

## 2021-08-30 RX ADMIN — DEXAMETHASONE SODIUM PHOSPHATE 10 MG: 4 INJECTION, SOLUTION INTRAMUSCULAR; INTRAVENOUS at 17:22

## 2021-08-30 RX ADMIN — Medication 10 ML: at 08:02

## 2021-08-30 RX ADMIN — PHENYTOIN SODIUM 300 MG: 50 INJECTION INTRAMUSCULAR; INTRAVENOUS at 21:48

## 2021-08-30 RX ADMIN — PROPOFOL 25 MCG/KG/MIN: 10 INJECTION, EMULSION INTRAVENOUS at 07:57

## 2021-08-30 RX ADMIN — IPRATROPIUM BROMIDE AND ALBUTEROL SULFATE 1 AMPULE: .5; 3 SOLUTION RESPIRATORY (INHALATION) at 03:20

## 2021-08-30 RX ADMIN — CHLORHEXIDINE GLUCONATE 0.12% ORAL RINSE 15 ML: 1.2 LIQUID ORAL at 21:47

## 2021-08-30 RX ADMIN — Medication 2000 UNITS: at 08:00

## 2021-08-30 RX ADMIN — IPRATROPIUM BROMIDE AND ALBUTEROL SULFATE 1 AMPULE: .5; 3 SOLUTION RESPIRATORY (INHALATION) at 00:20

## 2021-08-30 RX ADMIN — DEXAMETHASONE SODIUM PHOSPHATE 10 MG: 4 INJECTION, SOLUTION INTRAMUSCULAR; INTRAVENOUS at 05:51

## 2021-08-30 RX ADMIN — Medication 200 MCG/HR: at 21:46

## 2021-08-30 RX ADMIN — FENTANYL CITRATE: 0.05 INJECTION, SOLUTION INTRAMUSCULAR; INTRAVENOUS at 08:15

## 2021-08-30 RX ADMIN — MINERAL OIL AND PETROLATUM: 150; 830 OINTMENT OPHTHALMIC at 08:01

## 2021-08-30 RX ADMIN — Medication 200 MCG/HR: at 14:23

## 2021-08-30 RX ADMIN — OXYCODONE HYDROCHLORIDE AND ACETAMINOPHEN 500 MG: 500 TABLET ORAL at 08:00

## 2021-08-30 RX ADMIN — CETIRIZINE HYDROCHLORIDE 5 MG: 10 TABLET, FILM COATED ORAL at 21:47

## 2021-08-30 RX ADMIN — CETIRIZINE HYDROCHLORIDE 5 MG: 10 TABLET, FILM COATED ORAL at 08:00

## 2021-08-30 RX ADMIN — PROPOFOL 25 MCG/KG/MIN: 10 INJECTION, EMULSION INTRAVENOUS at 01:55

## 2021-08-30 RX ADMIN — SODIUM CHLORIDE SOLN NEBU 3% 4 ML: 3 NEBU SOLN at 14:46

## 2021-08-30 RX ADMIN — IPRATROPIUM BROMIDE AND ALBUTEROL SULFATE 1 AMPULE: .5; 3 SOLUTION RESPIRATORY (INHALATION) at 21:05

## 2021-08-30 RX ADMIN — INSULIN LISPRO 9 UNITS: 100 INJECTION, SOLUTION INTRAVENOUS; SUBCUTANEOUS at 12:31

## 2021-08-30 RX ADMIN — ZINC SULFATE 220 MG (50 MG) CAPSULE 50 MG: CAPSULE at 08:00

## 2021-08-30 RX ADMIN — PROPOFOL 25 MCG/KG/MIN: 10 INJECTION, EMULSION INTRAVENOUS at 13:47

## 2021-08-30 ASSESSMENT — PULMONARY FUNCTION TESTS
PIF_VALUE: 32
PIF_VALUE: 34
PIF_VALUE: 31
PIF_VALUE: 34
PIF_VALUE: 32
PIF_VALUE: 31
PIF_VALUE: 32
PIF_VALUE: 31
PIF_VALUE: 34
PIF_VALUE: 31
PIF_VALUE: 34
PIF_VALUE: 32
PIF_VALUE: 34
PIF_VALUE: 34
PIF_VALUE: 32
PIF_VALUE: 32
PIF_VALUE: 34
PIF_VALUE: 31
PIF_VALUE: 34

## 2021-08-30 NOTE — PROGRESS NOTES
ICU Attending Addendum    Rosa Elena Tidwell was seen, examined and discussed with the multi-disciplinary ICU team during rounds. I have personally seen and examined the patient and the key elements of the encounter were performed by me. All other information is noted in the ICU team note. Briefly,    8/30: tolerating longer periods supine, thick dark brown secretions      Assessment:    1. Acute hypoxic respiratory failure requiring mechanical ventilation  2. Severe COVID-19 pneumonia  3. Obesity BMI 40  4.  HTN       Sedation, NMB - Nimbex   PC - decrease Pi to 16, PEEP 14   Check resp culture   Repeat ABG to assess if further proning needed   Change lines   Gentle diuresis Lasix 40 mg qday      GI/DVT - SUP/Lovenox 0.5 mg/kg BID  Consultants: SEUN White DO

## 2021-08-30 NOTE — PROGRESS NOTES
[address] [place] [address] [facility]  [facility]  Progress Note      Chief Complaint   Patient presents with    Shortness of Breath     covid, per family SaO2 75% RA, hx of DM and panic attack        SUBJECTIVE:  8/21/21  Prone. FiO2 60% and PEEP of 16    8/22/2021  Patient is tolerating medications. No reported adverse drug reactions. No nausea, vomiting, diarrhea. Afebrile BiPAP 100% FiO2 with breathing at 44 times a minute and probably is going to decompensate  8/23/2021  Not doing well intubated on a rotating bed  FiO2 70% and PEEP 16  8/24/2021  Prone 50% FiO2-PEEP of 10  Issues with the urinary Busch last night this was changed  Paralyzed and on Versed  8/25/2021  Paralyzed and sedated with Versed no pressors  Prone intubated on 50% FiO2, PEEP of 10  Tolerating medications    8/26/21  Paralyzed and sedated with Versed no pressors  Prone intubated on 70% FiO2, PEEP of 10  Does not tolerate supine position    8/27/2021  Afebrile  Still prone on FiO2 of 100%  Paralyzed and sedated    8/28/2021  The patient is still in the ICU. He is pronated. FiO2 100%. Is still sedated and paralyzed. Fair amount of thick, brown secretions    8/29/2021  The patient is still on a RotoProne bed. You are still intubated, sedated and paralyzed. O2 is being weaned down. FiO2 55%. PEEP 12.    8/30/2021  Patient remains intubated, sedated and paralyzed. He is still on a RotoProne bed. Supine now. Review of systems:  As stated above in the chief complaint, otherwise negative.     Medications:  Scheduled Meds:   insulin glargine  20 Units Subcutaneous Daily    phenytoin  300 mg IntraVENous Q12H    insulin lispro  0-12 Units Subcutaneous Q6H    chlorhexidine  15 mL Mouth/Throat BID    Refresh Lacri-Lube   Ophthalmic Q6H    dexamethasone  10 mg IntraVENous Q12H    [Held by provider] lisinopril  40 mg Oral Daily    ipratropium-albuterol  1 ampule Inhalation Q4H    sodium chloride flush  10 mL IntraVENous BID    enoxaparin  0.5 mg/kg Subcutaneous BID    amLODIPine  10 mg Oral Daily    atorvastatin  40 mg Oral Daily    [Held by provider] doxazosin  8 mg Oral Daily    ascorbic acid  500 mg Oral Daily    Vitamin D  2,000 Units Oral Daily    zinc sulfate  50 mg Oral Daily    pantoprazole  40 mg IntraVENous Daily    cetirizine  5 mg Oral BID     Continuous Infusions:   propofol 25 mcg/kg/min (21 0757)    midazolam 6 mg/hr (21 0326)    IV infusion builder 40 mL/hr at 21 0113    sodium chloride      vecuronium (NORCURON) infusion 1.7 mcg/kg/min (21 0327)    dextrose       PRN Meds:sodium chloride (Inhalant), sodium chloride flush, sodium chloride, albuterol, benzonatate, glucose, dextrose, glucagon (rDNA), dextrose, sodium chloride    OBJECTIVE:  /66   Pulse 65   Temp 99.1 °F (37.3 °C) (Bladder)   Resp 25   Ht 6' (1.829 m)   Wt (!) 302 lb (137 kg)   SpO2 93%   BMI 40.96 kg/m²   Temp  Av °F (37.2 °C)  Min: 98.6 °F (37 °C)  Max: 99.1 °F (37.3 °C)  Constitutional: The patient is intubated, sedated, supine. Skin: Warm and dry. No rashes were noted. HEENT: Round and symmetrical pupils. ET tube. NG tube. Neck: Supple. Chest: No use of accessory muscles to breathe. Symmetrical expansion. No wheezing, crackles. Cardiovascular: S1 and S2 are rhythmic and regular. Abdomen: No round, soft and benign to palpation. Genitourinary:  Busch catheter  Extremities: Minimal edema  Lines: peripheral  Right radial arterial line 2021  Right IJ triple-lumen catheter 2021  Busch changed 3/24/2021    Laboratory and Tests Review:  Lab Results   Component Value Date    WBC 12.7 (H) 2021    WBC 12.5 (H) 2021    WBC 9.7 2021    HGB 11.4 (L) 2021    HCT 35.2 (L) 2021    MCV 90.7 2021     2021     Lab Results   Component Value Date    NEUTROABS 10.79 (H) 2021    NEUTROABS 10.74 (H) 2021    NEUTROABS 7.74 (H) 2021     No results found for: [facility]  Lab Results   Component Value Date    ALT 30 08/30/2021    AST 36 08/30/2021    ALKPHOS 71 08/30/2021    BILITOT 0.5 08/30/2021     Lab Results   Component Value Date     08/30/2021    K 4.6 08/30/2021    K 3.7 08/21/2021    CL 99 08/30/2021    CO2 26 08/30/2021    BUN 32 08/30/2021    CREATININE 0.7 08/30/2021    CREATININE 0.6 08/29/2021    CREATININE 0.6 08/29/2021    GFRAA >60 08/30/2021    LABGLOM >60 08/30/2021    GLUCOSE 259 08/30/2021    PROT 6.1 08/30/2021    LABALBU 3.9 08/30/2021    CALCIUM 9.0 08/30/2021    BILITOT 0.5 08/30/2021    ALKPHOS 71 08/30/2021    AST 36 08/30/2021    ALT 30 08/30/2021     Lab Results   Component Value Date    CRP 0.5 (H) 08/30/2021    CRP 0.5 (H) 08/29/2021    CRP 0.7 (H) 08/28/2021     Lab Results   Component Value Date    SEDRATE 4 08/29/2021    SEDRATE 2 08/28/2021    SEDRATE 2 08/27/2021     Radiology:      Microbiology:   Streptococcus pneumoniae/Legionella urine Ag: negative  Nares screen MRSA: Negative  Urine culture 8/24/2021: Negative  Blood cultures 8/21/2021: Negative x2  Respiratory culture 8/27/2021: OP terrell reduced    ASSESSMENT:  · Severe Covid pneumonia causing acute respiratory failure. CRP has come down nicely  · Cytokine storm  · Leukocytosis    PLAN:  · Completed remdesivir; steroids; Tosi x1 completed   · Observe off antibiotics  · Fungitell was negative.   Stop Fluconazole after today's dose    Discussed with ICU team    Nella Higginbotham MD  8:50 AM   8/30/2021

## 2021-08-30 NOTE — PROGRESS NOTES
Critical Care Team - Daily Progress Note      Date and time: 8/30/2021 10:51 AM  Patient's name:  Jama Graves  Medical Record Number: [id number]  Patient's account/billing number: [de-identified]  Patient's YOB: 1965  Age: 60 y.o. Date of Admission: 8/21/2021  9:35 AM  Length of stay during current admission: 9      Primary Care Physician: aMrva Resendiz DO  ICU Attending Physician: Dr. Amelia Johnson    Code Status: No Order    Reason for ICU admission: Acute hypoxic respiratory failure      SUBJECTIVE:     OVERNIGHT EVENTS:     8/25: Patient remains intubated sedated paralyzed and in prone position. Overnight his blood pressure slightly was low but never required to be started on pressors. Is currently on FiO2 of 50% and PEEP of 12 and his PF ratio is 153. Has good urine output. Tolerating tube feeds. 8/26: Patient was noted to have a blood pressure dropped into the 80s overnight. Patient was started on Levophed very briefly, his blood pressure recovered quickly. Patient's Busch catheter was also noted to clogged with sediment and was not patent. This was replaced overnight. Patient remains proned. 8/27: Patient remains prone overnight. Fall he did not have a dysfunction overnight. Patient was noted to have soft blood pressures on propofol, Versed, vecuronium, fentanyl, Cardura, Norvasc. Patient's Versed to be weaned off. Patient's blood pressure very labile going from systolic of 262A to 908J in a few minutes time. Today, plan to supine patient and see how he does. Does have facial swelling which should improve once supine for a while. 8/28: Patient remains critically ill. Remains intubated in prone position paralyzed and sedated. I had switched him to pressure control ventilation yesterday due to high peak airway pressures. His ABG this morning on FiO2 of 80% and PEEP of 14 pressure control ventilation with inspiratory pressure of 20 patient had a PF ratio of 229.     Had to put him in supine position for care and obtaining chest x-ray did not tolerate and desaturated to the 60s. Had to place him back in prone positioning. Continues to have thick secretions orally and from his ET tube. 8/29: Patient did have an episode of desaturation and hypotension overnight, from which he recovered. Patient is responding very well to diuresis, with improvement in chest x-ray. Patient supine today for care and chest x-ray, is currently tolerating this well. 8/30: Patient has been supine since about 1400 yesterday. Saturating well, however his PF ratio has declined. Per nursing, patient has been coughing on the vent though he is supposed to be paralyzed. Plan to switch vec to Nimbex, diurese, and get an additional respiratory culture today.     AWAKE & FOLLOWING COMMANDS:  [x] No   [] Yes    CURRENT VENTILATION STATUS:     [x] Ventilator  [] BIPAP  [] Nasal Cannula [] Room Air      IF INTUBATED, ET TUBE MARKING AT LOWER LIP:       cms    SECRETIONS Amount:  [] Small [x] Moderate  [] Large  [] None  Color:     [] White [x] Colored  [] Bloody    SEDATION:  RAAS Score: -3  [x]Fentanyl gtt, propofol  [x] Versed gtt  [] Ativan gtt   [] No Sedation    PARALYZED:  [] No    [x] Yes    DIARRHEA:                [x] No                [] Yes  (C. Difficile status: [] positive                                                                                                                       [] negative                                                                                                                     [] pending)    VASOPRESSORS:  [x] No    [] Yes    If yes -   [] Levophed       [] Dopamine     [] Vasopressin       [] Dobutamine  [] Phenylephrine         [] Epinephrine    CENTRAL LINES:     [] No   [x] Yes     placed 8/23   If yes -     [x] Right IJ     [] Left IJ [] Right Femoral [] Left Femoral                   [] Right Subclavian [] Left Subclavian       ANDERSON'S CATHETER:   [] No [x] Yes           OBJECTIVE:     VITAL SIGNS:  /66   Pulse 60   Temp 99.1 °F (37.3 °C) (Bladder)   Resp 26   Ht 6' (1.829 m)   Wt (!) 302 lb (137 kg)   SpO2 95%   BMI 40.96 kg/m²   Tmax over 24 hours:  Temp (24hrs), Av °F (37.2 °C), Min:98.6 °F (37 °C), Max:99.1 °F (37.3 °C)      Patient Vitals for the past 6 hrs:   Temp Temp src Pulse Resp SpO2   21 1000 -- -- 60 26 95 %   21 0900 -- -- 61 25 95 %   21 0800 99.1 °F (37.3 °C) Bladder 65 25 93 %   21 0700 99.1 °F (37.3 °C) Bladder 60 25 97 %         Intake/Output Summary (Last 24 hours) at 2021 1051  Last data filed at 2021 0600  Gross per 24 hour   Intake 2503 ml   Output 5800 ml   Net -3297 ml     Wt Readings from Last 2 Encounters:   21 (!) 302 lb (137 kg)   21 (!) 320 lb (145.2 kg)     Body mass index is 40.96 kg/m².         PHYSICAL EXAMINATION:    General appearance -intubated, sedated, paralyzed, supine  Mental status -unable to assess secondary to patient being intubated  Eyes -unable to assess as patient is inside RotoProne   Ears - external ear normal  Nose - normal and patent, no erythema, discharge or polyps  Mouth -endotracheal tube in place, patient's tongue is swollen and protruding from mouth, remains moist  Neck - unable to assess secondary to RotoProne  Chest -patient with diffuse rhonchi, crackles bilateral upper lobes  Heart -tachycardic on monitor, unable to auscultate secondary to patient's body habitus in prone position  Abdomen -bowel sounds present, umbilical hernia slightly larger than presentation, reducible  neurological -sedated   Extremities - peripheral pulses normal, no clubbing or cyanosis  Skin - normal coloration and turgor, no rashes, no suspicious skin lesions noted      Any additional physical findings:    MEDICATIONS:    Scheduled Meds:   insulin lispro  0-18 Units SubCUTAneous Q6H    furosemide  40 mg IntraVENous Daily    insulin glargine  20 Units SubCUTAneous Daily    phenytoin  300 mg IntraVENous Q12H    chlorhexidine  15 mL Mouth/Throat BID    Refresh Lacri-Lube   Ophthalmic Q6H    dexamethasone  10 mg IntraVENous Q12H    [Held by provider] lisinopril  40 mg Oral Daily    ipratropium-albuterol  1 ampule Inhalation Q4H    sodium chloride flush  10 mL IntraVENous BID    enoxaparin  0.5 mg/kg SubCUTAneous BID    amLODIPine  10 mg Oral Daily    atorvastatin  40 mg Oral Daily    [Held by provider] doxazosin  8 mg Oral Daily    ascorbic acid  500 mg Oral Daily    Vitamin D  2,000 Units Oral Daily    zinc sulfate  50 mg Oral Daily    pantoprazole  40 mg IntraVENous Daily    cetirizine  5 mg Oral BID     Continuous Infusions:   fentaNYL 5 mcg/ml in 0.9%  ml infusion      propofol 25 mcg/kg/min (08/30/21 0757)    midazolam 6 mg/hr (08/30/21 0326)    IV infusion builder 40 mL/hr at 08/30/21 0815    sodium chloride      vecuronium (NORCURON) infusion 1.7 mcg/kg/min (08/30/21 0327)    dextrose       PRN Meds:   sodium chloride (Inhalant), 4 mL, PRN  sodium chloride flush, 5-40 mL, PRN  sodium chloride, 25 mL, PRN  albuterol, 2.5 mg, Q6H PRN  benzonatate, 200 mg, TID PRN  glucose, 15 g, PRN  dextrose, 12.5 g, PRN  glucagon (rDNA), 1 mg, PRN  dextrose, 100 mL/hr, PRN  sodium chloride, 30 mL, PRN          VENT SETTINGS (Comprehensive) (if applicable):  Vent Information  $Ventilation: $Subsequent Day  Skin Assessment: Clean, dry, & intact  Equipment ID: [id number]  Equipment Changed: (S) Humidification  Vent Type: [id number]  Vent Mode: AC/PC  Vt Ordered: 0 mL  Pressure Ordered: 18  Rate Set: 26 bmp  Peak Flow: 0 L/min  Pressure Support: 0 cmH20  FiO2 : 75 %  SpO2: 95 %  SpO2/FiO2 ratio: 126.67  Sensitivity: 3  PEEP/CPAP: 14  I Time/ I Time %: 0.8 s  Humidification Source: Heated wire  Humidification Temp: 37  Humidification Temp Measured: 37  Circuit Condensation: Drained  Mask Type: Full face mask  Mask Size: Medium  Additional Respiratory  Assessments  Pulse: 60  Resp: 26  SpO2: 95 %  Position: Prone  Humidification Source: Heated wire  Humidification Temp: 37  Circuit Condensation: Drained  Oral Care: Mouth swabbed, Mouth moisturizer, Mouth suctioned, Suction toothette  Subglottic Suction Done?: Yes  Airway Type: ET  Airway Size: 8  Cuff Pressure (cm H2O): 29 cm H2O    ABG  Lab Results   Component Value Date    PH 7.477 08/30/2021    PCO2 38.7 08/30/2021    PO2 121.8 08/30/2021    HCO3 28.0 08/30/2021    O2SAT 98.1 08/30/2021         Laboratory findings:    Complete Blood Count:   Recent Labs     08/28/21  0530 08/29/21  0520 08/30/21  0600   WBC 9.7 12.5* 12.7*   HGB 10.8* 10.7* 11.4*   HCT 33.4* 33.7* 35.2*   * 113* 130        Lactic Acid  Lab Results   Component Value Date    LACTA 2.2 08/25/2021    LACTA 1.8 08/23/2021    LACTA 3.9 08/21/2021        Last 3 Blood Glucose:   Recent Labs     08/29/21  0520 08/29/21  1743 08/30/21  0600   GLUCOSE 235* 265* 259*        PT/INR:  No results found for: PROTIME, INR  PTT:  No results found for: APTT, PTT    Comprehensive Metabolic Profile:   Recent Labs     08/29/21  0520 08/29/21  0520 08/29/21  1743 08/30/21  0600     --  137 137   K 4.9  --  4.8 4.6     --  99 99   CO2 25  --  28 26   BUN 26*  --  27* 32*   CREATININE 0.6*  --  0.6* 0.7   GLUCOSE 235*  --  265* 259*   CALCIUM 8.7  --  9.1 9.0   PROT 5.7*   < >  --  6.1*   LABALBU 3.7   < >  --  3.9   BILITOT 0.4   < >  --  0.5   ALKPHOS 69   < >  --  71   AST 34   < >  --  36   ALT 24   < >  --  30    < > = values in this interval not displayed. Magnesium:   Lab Results   Component Value Date    MG 2.2 08/30/2021     Phosphorus:   Lab Results   Component Value Date    PHOS 4.0 08/30/2021     Ionized Calcium: No results found for: CAION       Urinalysis:     Troponin: No results for input(s): TROPONINI in the last 72 hours. Cultures     Cultures during this admission:       No results for input(s): BC in the last 72 hours.   No results for input(s): BLOODCULT2 in the last 72 hours. No results for input(s): LABURIN in the last 72 hours. Other pertinent Labs:       Radiology/Imaging:   XR CHEST (2 VW)    Result Date: 8/18/2021  EXAMINATION: TWO XRAY VIEWS OF THE CHEST 8/17/2021 11:32 pm COMPARISON: None. HISTORY: ORDERING SYSTEM PROVIDED HISTORY: cough TECHNOLOGIST PROVIDED HISTORY: Reason for exam:->cough FINDINGS: Frontal and lateral views of the chest.  Low lung volume. Multifocal bilateral patchy opacities are most pronounced in the mid and lower lung zones. No pleural effusion or pneumothorax. Normal cardiomediastinal silhouette and great vessels. Multilevel degenerative disc disease. Multifocal bilateral patchy opacities are most pronounced in the mid and lower lung zones. Differential considerations include an infectious/inflammatory process including atypical or viral pneumonia and pulmonary edema. XR CHEST PORTABLE    Result Date: 8/23/2021  EXAMINATION: ONE XRAY VIEW OF THE CHEST 8/23/2021 12:26 pm COMPARISON: August 23, 2021 HISTORY: ORDERING SYSTEM PROVIDED HISTORY: intubated, cvc placement TECHNOLOGIST PROVIDED HISTORY: Reason for exam:->intubated, cvc placement FINDINGS: Endotracheal tube is 3.1 cm above the donna. NG tube courses below the level of the diaphragm. Right IJ central venous catheter is present with distal tip of location of SVC. Stable interstitial and hazy opacities throughout both lungs. No pneumothorax. The heart appears normal in size. 1. Endotracheal tube is 3.1 cm above the donna. 2. Stable interstitial pulmonary edema or pneumonia throughout both lungs.      XR CHEST PORTABLE    Result Date: 8/23/2021  EXAMINATION: ONE XRAY VIEW OF THE CHEST 8/23/2021 7:51 am COMPARISON: Previous CT of the thorax of 08/21/2021 and previous chest x-ray of 08/22/2021 HISTORY: ORDERING SYSTEM PROVIDED HISTORY: increasing SOB TECHNOLOGIST PROVIDED HISTORY: Reason for exam:->increasing SOB FINDINGS: There is review. MIP images are provided for review. Dose modulation, iterative reconstruction, and/or weight based adjustment of the mA/kV was utilized to reduce the radiation dose to as low as reasonably achievable. COMPARISON: None HISTORY: ORDERING SYSTEM PROVIDED HISTORY:  PE vs COVID TECHNOLOGIST PROVIDED HISTORY: Reason for Exam:  PE vs COVID Decision Support Exception - unselect if not a suspected or confirmed emergency medical condition->Emergency Medical Condition (MA) FINDINGS: Pulmonary Arteries: There is a combination of poor contrast bolus timing as well as significant respiratory motion artifact that severely degrades the examination. Unfortunately, multiple segmental and subsegmental pulmonary artery branches are not opacified well enough to exclude a pulmonary embolism. There is no evidence of a central pulmonary embolism or convincing evidence of right heart strain. Mediastinum: There is at least one enlarged mediastinal lymph node measuring 17 mm in short axis in the subcarinal distribution. Several additional smaller but nonenlarged mediastinal lymph nodes are present, likely reactive. The thoracic esophagus is decompressed, limiting assessment. No obvious esophageal abnormality. The heart size is top normal.  Trace pericardial fluid is present. Lungs/pleura: The central airways are patent. There is no evidence of a pleural effusion or pneumothorax. Extensive ground-glass and consolidative airspace opacities are present bilaterally, in keeping with the provided history of COVID pneumonia. Upper Abdomen: Imaged portions of the upper abdomen demonstrates diffuse hepatic steatosis. There are also multiple nonobstructing bilateral renal calculi, partially imaged. Soft Tissues/Bones: No acute bone or soft tissue abnormality. Extensive ground-glass and consolidative airspace opacities throughout both lungs, in keeping with the provided history of COVID pneumonia.  The examination is nondiagnostic for detection of pulmonary emboli in multiple segmental and subsegmental pulmonary artery distributions, secondary to poor contrast bolus timing and respiratory motion artifact. There is no evidence of a central pulmonary embolism. Hepatic steatosis. Enlarged mediastinal lymph nodes, likely reactive. XR CHEST ABDOMEN NG PLACEMENT    Result Date: 8/23/2021  EXAMINATION: ONE SUPINE XRAY VIEW(S) OF THE ABDOMEN 8/23/2021 12:26 pm COMPARISON: None. HISTORY: ORDERING SYSTEM PROVIDED HISTORY: ng placement TECHNOLOGIST PROVIDED HISTORY: Reason for exam:->ng placement FINDINGS: Nasogastric tube courses below the level of the diaphragm with distal tip in the expected location of the stomach. Satisfactory position of nasogastric tube. Chest Xray (8/30/2021): Improved from yesterday, in process      Principal Problem:    1. Acute hypoxemic respiratory failure due to COVID-19 (HCC)/ Severe ARDS  2. Sepsis- resolved  3. Thrombocytopenia - resolved  4. Hypertension  5. Type 2 diabetes mellitus without complication, without long-term current use of insulin ([facility] [address]  6. History of seizures  7.   OSCAR - resolved      SYSTEMS ASSESSMENT    Neuro   Intubated, sedated, paralyzed  -Sedated with fentanyl, Versed, propofol, vecuronium  -Patient with inadequate paralyzed lesion, will switch vec to Nimbex    History of seizures  -Continue phenytoin  -Today's level somewhat low, however will maintain current dosing  -Telemetry monitoring     Respiratory   Acute hypoxic respiratory failure/severe ARDS due to COVID-19  -Intubated 8/23, day 8  -Patient desaturates very quickly with slight movements, takes long time to recover  -Significant facial and tongue swelling secondary to prolonged requirement of prone positioning, resolves with supine positioning for a good amount of time  -Completed remdesivir, tocilizumab  -Continues on PC/AC  -Chest x-ray continues to improve with diuresis  -Continue-Decadron 10 mg IV twice daily, transition to Decadron 10 mg daily tomorrow  -DuoNebs every 6 hours  -Lasix 40 mg daily  -ID following    Cardiovascular   Sepsis secondary to COVID-19  -Has fluctuating heart rate and blood pressure  -Continue Norvasc 10 mg   -Hold Cardura, lisinopril secondary to labile blood pressure  -Continue to monitor with telemetry    Hyperlipidemia  -On statin     Gastrointestinal   GI prophylaxis  -Protonix  -Tolerating tube feeds     Renal   Continue to monitor electrolytes, renal function     Infectious Disease   COVID-19  -Given Rocephin and doxy in the ED  -Status post remdesivir, tocilizumab  -Cultures so far negative  -Continue Decadron  -ID following     Hematology/Oncology   DVT prophylaxis  -Lovenox, weight-based    Thrombocytopenia  -Monitor     Endocrine   Type 2 diabetes  -Receiving tube feeds  -Medium dose sliding scale insulin  -Lantus 20u nightly  -Maintain glucose between 140 and 180     Social/Spiritual/DNR/Other   Full code      PLAN:     WEAN PER PROTOCOL:  [] No   [x] Yes  [] N/A    DISCONTINUE ANY LABS:   [x] No   [] Yes    ICU PROPHYLAXIS:  Stress ulcer:  [x] PPI Agent  [] [id number] [] Sucralfate  [] Other:  VTE:   [x] Enoxaparin  [] Unfract. Heparin Subcut  [] EPC Cuffs    NUTRITION:  [] NPO [x] Tube Feeding (Specify: ) [] TPN  [] PO (Diet: ADULT TUBE FEEDING; Orogastric; Diabetic; Continuous; 20; No; 30; Q 6 hours)    HOME MEDICATIONS RECONCILED: [] No  [x] Yes    INSULIN DRIP:   [x] No   [] Yes    CONSULTATION NEEDED:  [x] No   [] Yes    FAMILY UPDATED:    [] No   [x] Yes    TRANSFER OUT OF ICU:   [x] No   [] Yes    Eveline Quach DO, PGY-2                 8/30/2021, 10:51 AM     I personally saw, examined and provided care for the patient. Radiographs, labs and medication list were reviewed by me independently. Review of Residents documentation was conducted and revisions were made as appropriate. I agree with the above documented exam, problem list and plan of care.         CCT excluding procedures

## 2021-08-30 NOTE — PROGRESS NOTES
Pt. Rotated prone with assist of nurses and respiratory. Tolerated well. Pt. Had tube feeding coming from his nose and mouth when we turned him prone, airways suctioned and tube feeds were turned off.

## 2021-08-30 NOTE — PROGRESS NOTES
[facility] Progress Note    Admitting Date and Time: 8/21/2021  9:35 AM  Admit Dx: Acute hypoxemic respiratory failure due to COVID-19 (HCC) [U07.1, J96.01]  Pneumonia due to COVID-19 virus [U07.1, J12.82]    Subjective:  Patient is being followed for Acute hypoxemic respiratory failure due to COVID-19 ([address] [U07.1, J96.01]  Pneumonia due to COVID-19 virus [U07.1, J12.82]   Patient in proning bed however in supine position. No new events overnight. Afebrile overnight. ROS: denies fever, chills, cp, sob, n/v, HA unless stated above.       insulin lispro  0-18 Units SubCUTAneous Q6H    furosemide  40 mg IntraVENous Daily    insulin glargine  20 Units SubCUTAneous Daily    phenytoin  300 mg IntraVENous Q12H    chlorhexidine  15 mL Mouth/Throat BID    Refresh Lacri-Lube   Ophthalmic Q6H    dexamethasone  10 mg IntraVENous Q12H    [Held by provider] lisinopril  40 mg Oral Daily    ipratropium-albuterol  1 ampule Inhalation Q4H    sodium chloride flush  10 mL IntraVENous BID    enoxaparin  0.5 mg/kg SubCUTAneous BID    amLODIPine  10 mg Oral Daily    atorvastatin  40 mg Oral Daily    [Held by provider] doxazosin  8 mg Oral Daily    ascorbic acid  500 mg Oral Daily    Vitamin D  2,000 Units Oral Daily    zinc sulfate  50 mg Oral Daily    pantoprazole  40 mg IntraVENous Daily    cetirizine  5 mg Oral BID     sodium chloride (Inhalant), 4 mL, PRN  sodium chloride flush, 5-40 mL, PRN  sodium chloride, 25 mL, PRN  albuterol, 2.5 mg, Q6H PRN  benzonatate, 200 mg, TID PRN  glucose, 15 g, PRN  dextrose, 12.5 g, PRN  glucagon (rDNA), 1 mg, PRN  dextrose, 100 mL/hr, PRN  sodium chloride, 30 mL, PRN         Objective:    /66   Pulse 68   Temp 99.1 °F (37.3 °C) (Bladder)   Resp 26   Ht 6' (1.829 m)   Wt (!) 302 lb (137 kg)   SpO2 97%   BMI 40.96 kg/m²     General Appearance: intubated and sedated, supine  Skin: warm and dry  Head: normocephalic and atraumatic  Eyes: pupils equal, round, and reactive to light, extraocular eye movements intact, conjunctivae normal  Neck: neck supple and non tender without mass   Pulmonary/Chest:diffuse rhonchi  Cardiovascular: normal rate, normal S1 and S2 and no carotid bruits  Abdomen: soft, non-tender, non-distended, normal bowel sounds, no masses or organomegaly  Extremities: no cyanosis, no clubbing and no edema  Neurologic: unable to assess        Recent Labs     08/29/21  0520 08/29/21  1743 08/30/21  0600    137 137   K 4.9 4.8 4.6    99 99   CO2 25 28 26   BUN 26* 27* 32*   CREATININE 0.6* 0.6* 0.7   GLUCOSE 235* 265* 259*   CALCIUM 8.7 9.1 9.0       Recent Labs     08/28/21  0530 08/29/21  0520 08/30/21  0600   WBC 9.7 12.5* 12.7*   RBC 3.65* 3.66* 3.88   HGB 10.8* 10.7* 11.4*   HCT 33.4* 33.7* 35.2*   MCV 91.5 92.1 90.7   MCH 29.6 29.2 29.4   MCHC 32.3 31.8* 32.4   RDW 13.9 13.7 13.5   * 113* 130   MPV 10.8 10.4 10.7       Radiology:     CXR:  Extensive bilateral infiltrates worse in the left hemithorax with some   progression in the left lower lobe since the prior study     Assessment:    Principal Problem:    Acute hypoxemic respiratory failure due to COVID-19 [facility])  Active Problems:    Type 2 diabetes mellitus without complication, without long-term current use of insulin (HCC)    History of seizures    Hyperlipidemia    Busch catheter problem (HCC)    Sepsis (HCC)    Thrombocytopenia ([facility])  Resolved Problems:    OSCAR (acute kidney injury) ([facility] [address]    Lactic acidosis      Plan:  1. Acute hypoxic respiratory failure status post mechanical ventilation secondary to COVID-19 pneumonia - Patient desaturates with minimal movement. Plan to start on nimbex. 2. COVID 19 pneumonia - Patient received actemra, completed remdesivir, and is on decadron. ESR  trending up. CRP trending down. Continue zinc, vitamin c, and vitamin d.  3. DM-II - Continue medium q6 SS and lantus  4. HLD - Continue lipitor  5. HTN - Continue norvasc  6.  DVT prophylaxis - lovenox    NOTE: This report was transcribed using voice recognition software. Every effort was made to ensure accuracy; however, inadvertent computerized transcription errors may be present.   Electronically signed by Blanka Morris DO on 8/30/2021 at 3:25 PM

## 2021-08-30 NOTE — PATIENT CARE CONFERENCE
Intensive Care Daily Quality Rounding Checklist        ICU Team Members: Dr. Douglas Rodriguez, Shila  [facility] (residents), clinical pharmacist, charge nurse, bedside nurse     ICU Day #: 10     Intubation Date: August 23     Ventilator Day #: 8     Central Line Insertion Date: August 23                                                    Day #: 8      Arterial Line Insertion Date: August 23                             Day #: 8     Temporary Hemodialysis Catheter Insertion Date: N/A                             Day # N/A     DVT Prophylaxis: Lovenox    GI Prophylaxis: Protonix     Busch Catheter Insertion Date: 08/24/2021 (Changed)                                        Day #: 7                             Continued need (if yes, reason documented and discussed with physician): critical care patient, strict I+O     Skin Issues/ Wounds and ordered treatment discussed on rounds: No issues, SOS precautions     Goals/ Plans for the Day: Daily labs, wean vent as able, keep paralyzed, re-check ABG later today and +/- re-prone

## 2021-08-30 NOTE — PROGRESS NOTES
Upon assessment today pt. Is supine. VSS air leak noted to ETT, mouthcare done. Pt. Suctioned for thick dark brown secretions from ETT. Lung sounds reveal rhonchi abdomen is softly distended with hypoactive bowel sounds. Pt. Does cough when being suctioned. Unable to visualize any twitches with the train of four. BIS reading 45 Pupils are equal and reactive. Spoke with wife and updated her regarding plan of care for today.

## 2021-08-31 ENCOUNTER — APPOINTMENT (OUTPATIENT)
Dept: GENERAL RADIOLOGY | Age: 56
DRG: 005 | End: 2021-08-31
Payer: COMMERCIAL

## 2021-08-31 LAB
AADO2: 237.8 MMHG
AADO2: 250.9 MMHG
AADO2: 273.9 MMHG
ALBUMIN SERPL-MCNC: 3.6 G/DL (ref 3.5–5.2)
ALP BLD-CCNC: 68 U/L (ref 40–129)
ALT SERPL-CCNC: 34 U/L (ref 0–40)
ANION GAP SERPL CALCULATED.3IONS-SCNC: 12 MMOL/L (ref 7–16)
AST SERPL-CCNC: 30 U/L (ref 0–39)
B.E.: 2.2 MMOL/L (ref -3–3)
B.E.: 3.2 MMOL/L (ref -3–3)
B.E.: 3.3 MMOL/L (ref -3–3)
BASOPHILS ABSOLUTE: 0.01 E9/L (ref 0–0.2)
BASOPHILS RELATIVE PERCENT: 0.1 % (ref 0–2)
BILIRUB SERPL-MCNC: 0.4 MG/DL (ref 0–1.2)
BUN BLDV-MCNC: 37 MG/DL (ref 6–20)
C-REACTIVE PROTEIN: 0.5 MG/DL (ref 0–0.4)
CALCIUM SERPL-MCNC: 8.9 MG/DL (ref 8.6–10.2)
CHLORIDE BLD-SCNC: 98 MMOL/L (ref 98–107)
CO2: 26 MMOL/L (ref 22–29)
COHB: 0.4 % (ref 0–1.5)
COHB: 0.5 % (ref 0–1.5)
COHB: 0.6 % (ref 0–1.5)
COMMENT: ABNORMAL
CREAT SERPL-MCNC: 0.6 MG/DL (ref 0.7–1.2)
CRITICAL: ABNORMAL
DATE ANALYZED: ABNORMAL
DATE OF COLLECTION: ABNORMAL
EOSINOPHILS ABSOLUTE: 0 E9/L (ref 0.05–0.5)
EOSINOPHILS RELATIVE PERCENT: 0 % (ref 0–6)
FIO2: 60 %
GFR AFRICAN AMERICAN: >60
GFR NON-AFRICAN AMERICAN: >60 ML/MIN/1.73
GLUCOSE BLD-MCNC: 271 MG/DL (ref 74–99)
HCO3: 25.9 MMOL/L (ref 22–26)
HCO3: 26.1 MMOL/L (ref 22–26)
HCO3: 27.3 MMOL/L (ref 22–26)
HCT VFR BLD CALC: 35.2 % (ref 37–54)
HEMOGLOBIN: 11.5 G/DL (ref 12.5–16.5)
HHB: 1.5 % (ref 0–5)
HHB: 1.8 % (ref 0–5)
HHB: 2.4 % (ref 0–5)
IMMATURE GRANULOCYTES #: 0.38 E9/L
IMMATURE GRANULOCYTES %: 2.6 % (ref 0–5)
LAB: ABNORMAL
LYMPHOCYTES ABSOLUTE: 0.86 E9/L (ref 1.5–4)
LYMPHOCYTES RELATIVE PERCENT: 5.9 % (ref 20–42)
Lab: ABNORMAL
MAGNESIUM: 2.3 MG/DL (ref 1.6–2.6)
MCH RBC QN AUTO: 29.6 PG (ref 26–35)
MCHC RBC AUTO-ENTMCNC: 32.7 % (ref 32–34.5)
MCV RBC AUTO: 90.7 FL (ref 80–99.9)
METER GLUCOSE: 256 MG/DL (ref 74–99)
METER GLUCOSE: 256 MG/DL (ref 74–99)
METER GLUCOSE: 272 MG/DL (ref 74–99)
METER GLUCOSE: 288 MG/DL (ref 74–99)
METHB: 0.1 % (ref 0–1.5)
METHB: 0.3 % (ref 0–1.5)
METHB: 0.3 % (ref 0–1.5)
MODE: ABNORMAL
MODE: ABNORMAL
MODE: AC
MONOCYTES ABSOLUTE: 0.82 E9/L (ref 0.1–0.95)
MONOCYTES RELATIVE PERCENT: 5.6 % (ref 2–12)
NEUTROPHILS ABSOLUTE: 12.51 E9/L (ref 1.8–7.3)
NEUTROPHILS RELATIVE PERCENT: 85.8 % (ref 43–80)
O2 CONTENT: 17.2 ML/DL
O2 CONTENT: 17.4 ML/DL
O2 CONTENT: 17.8 ML/DL
O2 SATURATION: 97.6 % (ref 92–98.5)
O2 SATURATION: 98.2 % (ref 92–98.5)
O2 SATURATION: 98.5 % (ref 92–98.5)
O2HB: 96.9 % (ref 94–97)
O2HB: 97.4 % (ref 94–97)
O2HB: 97.8 % (ref 94–97)
OPERATOR ID: 7874
OPERATOR ID: ABNORMAL
OPERATOR ID: ABNORMAL
PATIENT TEMP: 37 C
PCO2: 34.3 MMHG (ref 35–45)
PCO2: 37.3 MMHG (ref 35–45)
PCO2: 39.4 MMHG (ref 35–45)
PDW BLD-RTO: 13.7 FL (ref 11.5–15)
PEEP/CPAP: 14 CMH2O
PFO2: 1.69 MMHG/%
PFO2: 2.02 MMHG/%
PFO2: 2.19 MMHG/%
PH BLOOD GAS: 7.46 (ref 7.35–7.45)
PH BLOOD GAS: 7.46 (ref 7.35–7.45)
PH BLOOD GAS: 7.5 (ref 7.35–7.45)
PHOSPHORUS: 3.1 MG/DL (ref 2.5–4.5)
PIP: 16 CMH2O
PIP: 16 CMH2O
PLATELET # BLD: 133 E9/L (ref 130–450)
PMV BLD AUTO: 10.6 FL (ref 7–12)
PO2: 101.2 MMHG (ref 75–100)
PO2: 120.9 MMHG (ref 75–100)
PO2: 131.7 MMHG (ref 75–100)
POTASSIUM SERPL-SCNC: 4.5 MMOL/L (ref 3.5–5)
PS: 16 CMH20
RBC # BLD: 3.88 E12/L (ref 3.8–5.8)
RI(T): 181 %
RI(T): 208 %
RI(T): 271 %
RR MECHANICAL: 22 B/MIN
RR MECHANICAL: 26 B/MIN
RR MECHANICAL: 26 B/MIN
SEDIMENTATION RATE, ERYTHROCYTE: 6 MM/HR (ref 0–15)
SODIUM BLD-SCNC: 136 MMOL/L (ref 132–146)
SOURCE, BLOOD GAS: ABNORMAL
THB: 12.5 G/DL (ref 11.5–16.5)
THB: 12.5 G/DL (ref 11.5–16.5)
THB: 12.9 G/DL (ref 11.5–16.5)
TIME ANALYZED: 1501
TIME ANALYZED: 1811
TIME ANALYZED: 551
TOTAL PROTEIN: 5.7 G/DL (ref 6.4–8.3)
WBC # BLD: 14.6 E9/L (ref 4.5–11.5)

## 2021-08-31 PROCEDURE — 2580000003 HC RX 258: Performed by: FAMILY MEDICINE

## 2021-08-31 PROCEDURE — 2580000003 HC RX 258: Performed by: SPECIALIST

## 2021-08-31 PROCEDURE — 2580000003 HC RX 258: Performed by: STUDENT IN AN ORGANIZED HEALTH CARE EDUCATION/TRAINING PROGRAM

## 2021-08-31 PROCEDURE — 2500000003 HC RX 250 WO HCPCS: Performed by: INTERNAL MEDICINE

## 2021-08-31 PROCEDURE — 6370000000 HC RX 637 (ALT 250 FOR IP): Performed by: INTERNAL MEDICINE

## 2021-08-31 PROCEDURE — 37799 UNLISTED PX VASCULAR SURGERY: CPT

## 2021-08-31 PROCEDURE — 36415 COLL VENOUS BLD VENIPUNCTURE: CPT

## 2021-08-31 PROCEDURE — 2000000000 HC ICU R&B

## 2021-08-31 PROCEDURE — 86140 C-REACTIVE PROTEIN: CPT

## 2021-08-31 PROCEDURE — 6360000002 HC RX W HCPCS: Performed by: INTERNAL MEDICINE

## 2021-08-31 PROCEDURE — 71045 X-RAY EXAM CHEST 1 VIEW: CPT

## 2021-08-31 PROCEDURE — 99233 SBSQ HOSP IP/OBS HIGH 50: CPT | Performed by: INTERNAL MEDICINE

## 2021-08-31 PROCEDURE — 82962 GLUCOSE BLOOD TEST: CPT

## 2021-08-31 PROCEDURE — 94640 AIRWAY INHALATION TREATMENT: CPT

## 2021-08-31 PROCEDURE — 84100 ASSAY OF PHOSPHORUS: CPT

## 2021-08-31 PROCEDURE — 6360000002 HC RX W HCPCS: Performed by: FAMILY MEDICINE

## 2021-08-31 PROCEDURE — 80053 COMPREHEN METABOLIC PANEL: CPT

## 2021-08-31 PROCEDURE — 85025 COMPLETE CBC W/AUTO DIFF WBC: CPT

## 2021-08-31 PROCEDURE — 85651 RBC SED RATE NONAUTOMATED: CPT

## 2021-08-31 PROCEDURE — 2580000003 HC RX 258: Performed by: INTERNAL MEDICINE

## 2021-08-31 PROCEDURE — 82805 BLOOD GASES W/O2 SATURATION: CPT

## 2021-08-31 PROCEDURE — 83735 ASSAY OF MAGNESIUM: CPT

## 2021-08-31 PROCEDURE — 6360000002 HC RX W HCPCS: Performed by: SPECIALIST

## 2021-08-31 PROCEDURE — 94003 VENT MGMT INPAT SUBQ DAY: CPT

## 2021-08-31 PROCEDURE — 6360000002 HC RX W HCPCS: Performed by: STUDENT IN AN ORGANIZED HEALTH CARE EDUCATION/TRAINING PROGRAM

## 2021-08-31 PROCEDURE — C9113 INJ PANTOPRAZOLE SODIUM, VIA: HCPCS | Performed by: STUDENT IN AN ORGANIZED HEALTH CARE EDUCATION/TRAINING PROGRAM

## 2021-08-31 RX ORDER — FUROSEMIDE 10 MG/ML
40 INJECTION INTRAMUSCULAR; INTRAVENOUS 2 TIMES DAILY
Status: DISCONTINUED | OUTPATIENT
Start: 2021-08-31 | End: 2021-09-01

## 2021-08-31 RX ORDER — INSULIN GLARGINE 100 [IU]/ML
30 INJECTION, SOLUTION SUBCUTANEOUS DAILY
Status: DISCONTINUED | OUTPATIENT
Start: 2021-08-31 | End: 2021-09-04

## 2021-08-31 RX ORDER — DEXAMETHASONE SODIUM PHOSPHATE 4 MG/ML
10 INJECTION, SOLUTION INTRA-ARTICULAR; INTRALESIONAL; INTRAMUSCULAR; INTRAVENOUS; SOFT TISSUE DAILY
Status: DISCONTINUED | OUTPATIENT
Start: 2021-09-01 | End: 2021-09-12

## 2021-08-31 RX ADMIN — Medication 6 MG/HR: at 06:50

## 2021-08-31 RX ADMIN — INSULIN LISPRO 9 UNITS: 100 INJECTION, SOLUTION INTRAVENOUS; SUBCUTANEOUS at 00:47

## 2021-08-31 RX ADMIN — INSULIN LISPRO 9 UNITS: 100 INJECTION, SOLUTION INTRAVENOUS; SUBCUTANEOUS at 12:10

## 2021-08-31 RX ADMIN — IPRATROPIUM BROMIDE AND ALBUTEROL SULFATE 1 AMPULE: .5; 3 SOLUTION RESPIRATORY (INHALATION) at 08:45

## 2021-08-31 RX ADMIN — OXYCODONE HYDROCHLORIDE AND ACETAMINOPHEN 500 MG: 500 TABLET ORAL at 08:59

## 2021-08-31 RX ADMIN — IPRATROPIUM BROMIDE AND ALBUTEROL SULFATE 1 AMPULE: .5; 3 SOLUTION RESPIRATORY (INHALATION) at 04:23

## 2021-08-31 RX ADMIN — PROPOFOL 35 MCG/KG/MIN: 10 INJECTION, EMULSION INTRAVENOUS at 03:14

## 2021-08-31 RX ADMIN — Medication 2000 UNITS: at 08:59

## 2021-08-31 RX ADMIN — INSULIN GLARGINE 30 UNITS: 100 INJECTION, SOLUTION SUBCUTANEOUS at 09:20

## 2021-08-31 RX ADMIN — IPRATROPIUM BROMIDE AND ALBUTEROL SULFATE 1 AMPULE: .5; 3 SOLUTION RESPIRATORY (INHALATION) at 12:32

## 2021-08-31 RX ADMIN — ZINC SULFATE 220 MG (50 MG) CAPSULE 50 MG: CAPSULE at 08:59

## 2021-08-31 RX ADMIN — MINERAL OIL AND PETROLATUM: 150; 830 OINTMENT OPHTHALMIC at 21:07

## 2021-08-31 RX ADMIN — Medication 200 MCG/HR: at 04:00

## 2021-08-31 RX ADMIN — Medication 200 MCG/HR: at 23:20

## 2021-08-31 RX ADMIN — IPRATROPIUM BROMIDE AND ALBUTEROL SULFATE 1 AMPULE: .5; 3 SOLUTION RESPIRATORY (INHALATION) at 00:38

## 2021-08-31 RX ADMIN — MINERAL OIL AND PETROLATUM: 150; 830 OINTMENT OPHTHALMIC at 15:11

## 2021-08-31 RX ADMIN — PROPOFOL 35 MCG/KG/MIN: 10 INJECTION, EMULSION INTRAVENOUS at 06:50

## 2021-08-31 RX ADMIN — PROPOFOL 35 MCG/KG/MIN: 10 INJECTION, EMULSION INTRAVENOUS at 20:30

## 2021-08-31 RX ADMIN — PROPOFOL 35 MCG/KG/MIN: 10 INJECTION, EMULSION INTRAVENOUS at 17:20

## 2021-08-31 RX ADMIN — CISATRACURIUM BESYLATE 4 MCG/KG/MIN: 10 INJECTION INTRAVENOUS at 09:17

## 2021-08-31 RX ADMIN — PROPOFOL 35 MCG/KG/MIN: 10 INJECTION, EMULSION INTRAVENOUS at 00:45

## 2021-08-31 RX ADMIN — INSULIN LISPRO 9 UNITS: 100 INJECTION, SOLUTION INTRAVENOUS; SUBCUTANEOUS at 06:08

## 2021-08-31 RX ADMIN — Medication 6 MG/HR: at 23:17

## 2021-08-31 RX ADMIN — CHLORHEXIDINE GLUCONATE 0.12% ORAL RINSE 15 ML: 1.2 LIQUID ORAL at 21:18

## 2021-08-31 RX ADMIN — Medication 10 ML: at 09:00

## 2021-08-31 RX ADMIN — ENOXAPARIN SODIUM 70 MG: 80 INJECTION SUBCUTANEOUS at 08:59

## 2021-08-31 RX ADMIN — FUROSEMIDE 40 MG: 10 INJECTION, SOLUTION INTRAMUSCULAR; INTRAVENOUS at 18:04

## 2021-08-31 RX ADMIN — INSULIN LISPRO 9 UNITS: 100 INJECTION, SOLUTION INTRAVENOUS; SUBCUTANEOUS at 18:11

## 2021-08-31 RX ADMIN — PHENYTOIN SODIUM 300 MG: 50 INJECTION INTRAMUSCULAR; INTRAVENOUS at 20:55

## 2021-08-31 RX ADMIN — IPRATROPIUM BROMIDE AND ALBUTEROL SULFATE 1 AMPULE: .5; 3 SOLUTION RESPIRATORY (INHALATION) at 17:26

## 2021-08-31 RX ADMIN — CHLORHEXIDINE GLUCONATE 0.12% ORAL RINSE 15 ML: 1.2 LIQUID ORAL at 09:00

## 2021-08-31 RX ADMIN — FUROSEMIDE 40 MG: 10 INJECTION, SOLUTION INTRAMUSCULAR; INTRAVENOUS at 08:59

## 2021-08-31 RX ADMIN — ERTAPENEM SODIUM 1000 MG: 1 INJECTION, POWDER, LYOPHILIZED, FOR SOLUTION INTRAMUSCULAR; INTRAVENOUS at 12:04

## 2021-08-31 RX ADMIN — PANTOPRAZOLE SODIUM 40 MG: 40 INJECTION, POWDER, FOR SOLUTION INTRAVENOUS at 08:59

## 2021-08-31 RX ADMIN — CETIRIZINE HYDROCHLORIDE 5 MG: 10 TABLET, FILM COATED ORAL at 21:13

## 2021-08-31 RX ADMIN — IPRATROPIUM BROMIDE AND ALBUTEROL SULFATE 1 AMPULE: .5; 3 SOLUTION RESPIRATORY (INHALATION) at 21:27

## 2021-08-31 RX ADMIN — CETIRIZINE HYDROCHLORIDE 5 MG: 10 TABLET, FILM COATED ORAL at 08:59

## 2021-08-31 RX ADMIN — CISATRACURIUM BESYLATE 4 MCG/KG/MIN: 10 INJECTION INTRAVENOUS at 04:00

## 2021-08-31 RX ADMIN — CISATRACURIUM BESYLATE 4 MCG/KG/MIN: 10 INJECTION INTRAVENOUS at 16:14

## 2021-08-31 RX ADMIN — AMLODIPINE BESYLATE 10 MG: 10 TABLET ORAL at 08:59

## 2021-08-31 RX ADMIN — ENOXAPARIN SODIUM 40 MG: 40 INJECTION SUBCUTANEOUS at 21:15

## 2021-08-31 RX ADMIN — DEXAMETHASONE SODIUM PHOSPHATE 10 MG: 4 INJECTION, SOLUTION INTRAMUSCULAR; INTRAVENOUS at 05:52

## 2021-08-31 RX ADMIN — PHENYTOIN SODIUM 300 MG: 50 INJECTION INTRAMUSCULAR; INTRAVENOUS at 09:50

## 2021-08-31 RX ADMIN — ATORVASTATIN CALCIUM 40 MG: 40 TABLET, FILM COATED ORAL at 21:18

## 2021-08-31 RX ADMIN — Medication 200 MCG/HR: at 16:52

## 2021-08-31 ASSESSMENT — PULMONARY FUNCTION TESTS
PIF_VALUE: 32
PIF_VALUE: 31
PIF_VALUE: 32
PIF_VALUE: 32
PIF_VALUE: 31
PIF_VALUE: 32
PIF_VALUE: 32
PIF_VALUE: 31
PIF_VALUE: 32
PIF_VALUE: 31
PIF_VALUE: 32
PIF_VALUE: 31
PIF_VALUE: 32
PIF_VALUE: 31
PIF_VALUE: 32
PIF_VALUE: 31
PIF_VALUE: 32
PIF_VALUE: 32
PIF_VALUE: 31
PIF_VALUE: 32
PIF_VALUE: 32

## 2021-08-31 NOTE — PROGRESS NOTES
[facility] Progress Note    Admitting Date and Time: 8/21/2021  9:35 AM  Admit Dx: Acute hypoxemic respiratory failure due to COVID-19 (HCC) [U07.1, J96.01]  Pneumonia due to COVID-19 virus [U07.1, J12.82]    Subjective:  Patient is being followed for Acute hypoxemic respiratory failure due to COVID-19 ([address] [U07.1, J96.01]  Pneumonia due to COVID-19 virus [U07.1, J12.82]   No acute events overnight. It does not appear as if patient has had a fever overnight. Currently in supine position. Patient is still on Nimbex drip. ROS: denies fever, chills, cp, sob, n/v, HA unless stated above.       insulin glargine  30 Units SubCUTAneous Daily    [START ON 9/1/2021] dexamethasone  10 mg IntraVENous Daily    furosemide  40 mg IntraVENous BID    enoxaparin  40 mg SubCUTAneous BID    ertapenem (INVanz) IVPB  1,000 mg IntraVENous Q24H    insulin lispro  0-18 Units SubCUTAneous Q6H    phenytoin  300 mg IntraVENous Q12H    chlorhexidine  15 mL Mouth/Throat BID    Refresh Lacri-Lube   Ophthalmic Q6H    [Held by provider] lisinopril  40 mg Oral Daily    ipratropium-albuterol  1 ampule Inhalation Q4H    sodium chloride flush  10 mL IntraVENous BID    amLODIPine  10 mg Oral Daily    atorvastatin  40 mg Oral Daily    [Held by provider] doxazosin  8 mg Oral Daily    ascorbic acid  500 mg Oral Daily    Vitamin D  2,000 Units Oral Daily    zinc sulfate  50 mg Oral Daily    pantoprazole  40 mg IntraVENous Daily    cetirizine  5 mg Oral BID     sodium chloride (Inhalant), 4 mL, PRN  sodium chloride flush, 5-40 mL, PRN  sodium chloride, 25 mL, PRN  albuterol, 2.5 mg, Q6H PRN  benzonatate, 200 mg, TID PRN  glucose, 15 g, PRN  dextrose, 12.5 g, PRN  glucagon (rDNA), 1 mg, PRN  dextrose, 100 mL/hr, PRN  sodium chloride, 30 mL, PRN         Objective:    /66   Pulse 60   Temp 98.1 °F (36.7 °C) (Bladder)   Resp 26   Ht 6' (1.829 m)   Wt (!) 302 lb (137 kg)   SpO2 97%   BMI 40.96 kg/m² General Appearance: Intubated and sedated  Skin: warm and dry  Head: normocephalic and atraumatic  Eyes: pupils equal, round, and reactive to light, extraocular eye movements intact, conjunctivae normal  Neck: neck supple and non tender without mass   Pulmonary/Chest: clear to auscultation bilaterally- no wheezes, rales or rhonchi, normal air movement, no respiratory distress  Cardiovascular: normal rate, normal S1 and S2 and no carotid bruits  Abdomen: soft, non-tender, non-distended, normal bowel sounds, no masses or organomegaly  Extremities: no cyanosis, no clubbing and no edema  Neurologic: Unable to assess        Recent Labs     08/29/21  1743 08/30/21  0600 08/31/21  0540    137 136   K 4.8 4.6 4.5   CL 99 99 98   CO2 28 26 26   BUN 27* 32* 37*   CREATININE 0.6* 0.7 0.6*   GLUCOSE 265* 259* 271*   CALCIUM 9.1 9.0 8.9       Recent Labs     08/29/21  0520 08/30/21  0600 08/31/21  0540   WBC 12.5* 12.7* 14.6*   RBC 3.66* 3.88 3.88   HGB 10.7* 11.4* 11.5*   HCT 33.7* 35.2* 35.2*   MCV 92.1 90.7 90.7   MCH 29.2 29.4 29.6   MCHC 31.8* 32.4 32.7   RDW 13.7 13.5 13.7   * 130 133   MPV 10.4 10.7 10.6       Radiology:     CXR  1. Satisfactory position of the support lines and tubes. 2. Extensive multifocal bilateral airspace disease consistent with COVID   pneumonia. Assessment:    Principal Problem:    Acute hypoxemic respiratory failure due to COVID-19 [facility])  Active Problems:    Type 2 diabetes mellitus without complication, without long-term current use of insulin ([facility])    History of seizures    Hyperlipidemia    Busch catheter problem ([facility])    Sepsis ([facility])    Thrombocytopenia ([facility]  Resolved Problems:    OSCAR (acute kidney injury) ([facility]    Lactic acidosis      Plan:    1. Acute hypoxic respiratory failure status post mechanical ventilation secondary to COVID-19 pneumonia - Patient desaturates with minimal movement. Plan to continue nimbex.   2. COVID 19 pneumonia - Patient received actemra, completed remdesivir, and will complete Decadron tomorrow. Continue to follow-up with inflammatory markers. Continue zinc, vitamin c, and vitamin d. Patient is also being diuresed Lasix 40 mg IV twice. 3. DM-II - Continue medium q6 SS and lantus  4. HLD - Continue lipitor  5. HTN - Continue norvasc  6. DVT prophylaxis - lovenox      NOTE: This report was transcribed using voice recognition software. Every effort was made to ensure accuracy; however, inadvertent computerized transcription errors may be present.   Electronically signed by Manolo Matias DO on 8/31/2021 at 4:35 PM

## 2021-08-31 NOTE — PATIENT CARE CONFERENCE
Intensive Care Daily Quality Rounding Checklist        ICU Team Members: Joleen Murillo, Shila  [facility] (residents), clinical pharmacist, charge nurse, bedside nurse,RT     ICU Day #: 11     Intubation Date: August 23     Ventilator Day #: 9     Central Line Insertion Date: August 30 (changed over guidewire)                                                    Day #: 2      Arterial Line Insertion Date: August 30(changed over guidewire                             Day #: 8     Temporary Hemodialysis Catheter Insertion Date: N/A                             Day # N/A     DVT Prophylaxis: Lovenox    GI Prophylaxis: Protonix     Busch Catheter Insertion Date: 08/24/2021 (Changed)                                        Day #: 8                             Continued need (if yes, reason documented and discussed with physician): critical care patient, strict I+O     Skin Issues/ Wounds and ordered treatment discussed on rounds: No issues, SOS precautions     Goals/ Plans for the Day: vent management, labs,supine as tolerated, chest xray

## 2021-08-31 NOTE — PROGRESS NOTES
Pt. Will remain supine in the roto prone bed per Dr. Violeta Dong orders. VSS complete bed bath given. Hair washed. Pt. Incontinent of stool. Rayne care done with soap and water. Mouth care done. Called and updated wife.

## 2021-08-31 NOTE — PROGRESS NOTES
Critical Care Team - Daily Progress Note      Date and time: 8/31/2021 8:24 AM  Patient's name:  Richardson Dewey Record Number: [id number]  Patient's account/billing number: [de-identified]  Patient's YOB: 1965  Age: 60 y.o. Date of Admission: 8/21/2021  9:35 AM  Length of stay during current admission: 10      Primary Care Physician: Kash Orlando DO  ICU Attending Physician: Dr. Lety Lawrence    Code Status: No Order    Reason for ICU admission: Acute hypoxic respiratory failure      SUBJECTIVE:     OVERNIGHT EVENTS:     8/25: Patient remains intubated sedated paralyzed and in prone position. Overnight his blood pressure slightly was low but never required to be started on pressors. Is currently on FiO2 of 50% and PEEP of 12 and his PF ratio is 153. Has good urine output. Tolerating tube feeds. 8/26: Patient was noted to have a blood pressure dropped into the 80s overnight. Patient was started on Levophed very briefly, his blood pressure recovered quickly. Patient's Busch catheter was also noted to clogged with sediment and was not patent. This was replaced overnight. Patient remains proned. 8/27: Patient remains prone overnight. Fall he did not have a dysfunction overnight. Patient was noted to have soft blood pressures on propofol, Versed, vecuronium, fentanyl, Cardura, Norvasc. Patient's Versed to be weaned off. Patient's blood pressure very labile going from systolic of 257Z to 045H in a few minutes time. Today, plan to supine patient and see how he does. Does have facial swelling which should improve once supine for a while. 8/28: Patient remains critically ill. Remains intubated in prone position paralyzed and sedated. I had switched him to pressure control ventilation yesterday due to high peak airway pressures. His ABG this morning on FiO2 of 80% and PEEP of 14 pressure control ventilation with inspiratory pressure of 20 patient had a PF ratio of 229.     Had to put him in supine position for care and obtaining chest x-ray did not tolerate and desaturated to the 60s. Had to place him back in prone positioning. Continues to have thick secretions orally and from his ET tube. 8/29: Patient did have an episode of desaturation and hypotension overnight, from which he recovered. Patient is responding very well to diuresis, with improvement in chest x-ray. Patient supine today for care and chest x-ray, is currently tolerating this well. 8/30: Patient has been supine since about 1400 yesterday. Saturating well, however his PF ratio has declined. Per nursing, patient has been coughing on the vent though he is supposed to be paralyzed. Plan to switch vec to Nimbex, diurese, and get an additional respiratory culture today. 8/31: Patient had to be proned again yesterday evening secondary to declining PF ratios. Patient continues to saturate well. No acute events overnight.     AWAKE & FOLLOWING COMMANDS:  [x] No   [] Yes    CURRENT VENTILATION STATUS:     [x] Ventilator  [] BIPAP  [] Nasal Cannula [] Room Air      IF INTUBATED, ET TUBE MARKING AT LOWER LIP:       cms    SECRETIONS Amount:  [] Small [x] Moderate  [] Large  [] None  Color:     [] White [x] Colored  [] Bloody    SEDATION:  RAAS Score: -3  [x]Fentanyl gtt, propofol  [x] Versed gtt  [] Ativan gtt   [] No Sedation    PARALYZED:  [] No    [x] Yes    DIARRHEA:                [x] No                [] Yes  (C. Difficile status: [] positive                                                                                                                       [] negative                                                                                                                     [] pending)    VASOPRESSORS:  [x] No    [] Yes    If yes -   [] Levophed       [] Dopamine     [] Vasopressin       [] Dobutamine  [] Phenylephrine         [] Epinephrine    CENTRAL LINES:     [] No   [x] Yes     placed 8/23   If yes - clubbing or cyanosis  Skin - normal coloration and turgor, no rashes, no suspicious skin lesions noted      Any additional physical findings:    MEDICATIONS:    Scheduled Meds:   insulin lispro  0-18 Units SubCUTAneous Q6H    furosemide  40 mg IntraVENous Daily    insulin glargine  20 Units SubCUTAneous Daily    phenytoin  300 mg IntraVENous Q12H    chlorhexidine  15 mL Mouth/Throat BID    Refresh Lacri-Lube   Ophthalmic Q6H    dexamethasone  10 mg IntraVENous Q12H    [Held by provider] lisinopril  40 mg Oral Daily    ipratropium-albuterol  1 ampule Inhalation Q4H    sodium chloride flush  10 mL IntraVENous BID    enoxaparin  0.5 mg/kg SubCUTAneous BID    amLODIPine  10 mg Oral Daily    atorvastatin  40 mg Oral Daily    [Held by provider] doxazosin  8 mg Oral Daily    ascorbic acid  500 mg Oral Daily    Vitamin D  2,000 Units Oral Daily    zinc sulfate  50 mg Oral Daily    pantoprazole  40 mg IntraVENous Daily    cetirizine  5 mg Oral BID     Continuous Infusions:   fentaNYL 5 mcg/ml in 0.9%  ml infusion 200 mcg/hr (08/31/21 0400)    cisatracurium (NIMBEX) infusion 4 mcg/kg/min (08/31/21 0400)    propofol 35 mcg/kg/min (08/31/21 0650)    midazolam 6 mg/hr (08/31/21 0650)    IV infusion builder 40 mL/hr at 08/30/21 0815    sodium chloride      dextrose       PRN Meds:   sodium chloride (Inhalant), 4 mL, PRN  sodium chloride flush, 5-40 mL, PRN  sodium chloride, 25 mL, PRN  albuterol, 2.5 mg, Q6H PRN  benzonatate, 200 mg, TID PRN  glucose, 15 g, PRN  dextrose, 12.5 g, PRN  glucagon (rDNA), 1 mg, PRN  dextrose, 100 mL/hr, PRN  sodium chloride, 30 mL, PRN          VENT SETTINGS (Comprehensive) (if applicable):  Vent Information  $Ventilation: $Subsequent Day  Skin Assessment: Clean, dry, & intact  Equipment ID: [id number]  Equipment Changed: (S) Humidification  Vent Type: [id number]  Vent Mode: AC/PC  Vt Ordered: 0 mL  Pressure Ordered: 16  Rate Set: 26 bmp  Peak Flow: 0 L/min  Pressure Support: 0 cmH20  FiO2 : 60 %  SpO2: 96 %  SpO2/FiO2 ratio: 160  Sensitivity: 3  PEEP/CPAP: 14  I Time/ I Time %: 0.8 s  Humidification Source: Heated wire  Humidification Temp: 37  Humidification Temp Measured: 37  Circuit Condensation: Drained  Mask Type: Full face mask  Mask Size: Medium  Additional Respiratory  Assessments  Pulse: 61  Resp: 25  SpO2: 96 %  Position: Prone  Humidification Source: Heated wire  Humidification Temp: 37  Circuit Condensation: Drained  Oral Care: Mouth suctioned, Mouth swabbed, Mouthwash with chlorhexidine  Subglottic Suction Done?: Yes  Airway Type: ET  Airway Size: 8  Cuff Pressure (cm H2O): 29 cm H2O    ABG  Lab Results   Component Value Date    PH 7.458 08/31/2021    PCO2 39.4 08/31/2021    PO2 131.7 08/31/2021    HCO3 27.3 08/31/2021    O2SAT 98.5 08/31/2021         Laboratory findings:    Complete Blood Count:   Recent Labs     08/29/21 0520 08/30/21  0600 08/31/21  0540   WBC 12.5* 12.7* 14.6*   HGB 10.7* 11.4* 11.5*   HCT 33.7* 35.2* 35.2*   * 130 133        Lactic Acid  Lab Results   Component Value Date    LACTA 2.2 08/25/2021    LACTA 1.8 08/23/2021    LACTA 3.9 08/21/2021        Last 3 Blood Glucose:   Recent Labs     08/29/21  1743 08/30/21  0600 08/31/21  0540   GLUCOSE 265* 259* 271*        PT/INR:  No results found for: PROTIME, INR  PTT:  No results found for: APTT, PTT    Comprehensive Metabolic Profile:   Recent Labs     08/29/21  0520 08/29/21  1743 08/30/21  0600 08/30/21  0600 08/31/21  0540   NA  --  137 137  --  136   K  --  4.8 4.6  --  4.5   CL  --  99 99  --  98   CO2  --  28 26  --  26   BUN  --  27* 32*  --  37*   CREATININE  --  0.6* 0.7  --  0.6*   GLUCOSE  --  265* 259*  --  271*   CALCIUM  --  9.1 9.0  --  8.9   PROT   < >  --  6.1*   < > 5.7*   LABALBU   < >  --  3.9   < > 3.6   BILITOT   < >  --  0.5   < > 0.4   ALKPHOS   < >  --  71   < > 68   AST   < >  --  36   < > 30   ALT   < >  --  30   < > 34    < > = values in this interval not displayed. Magnesium:   Lab Results   Component Value Date    MG 2.3 08/31/2021     Phosphorus:   Lab Results   Component Value Date    PHOS 3.1 08/31/2021     Ionized Calcium: No results found for: CANDICE       Urinalysis:     Troponin: No results for input(s): TROPONINI in the last 72 hours. Cultures     Cultures during this admission:       No results for input(s): BC in the last 72 hours. No results for input(s): Evelyn Alpers in the last 72 hours. No results for input(s): LABURIN in the last 72 hours. Other pertinent Labs:        Chest Xray (8/31/2021):  Pending      Principal Problem:    1. Acute hypoxemic respiratory failure due to COVID-19 (HCC)/ Severe ARDS  2. Sepsis- resolved  3. Thrombocytopenia - resolved  4. Hypertension  5. Type 2 diabetes mellitus without complication, without long-term current use of insulin ([facility] [address]  6. History of seizures  7.   OSCAR - resolved      SYSTEMS ASSESSMENT    Neuro   Intubated, sedated, paralyzed  -Sedated with fentanyl, Versed, propofol, Nimbex    History of seizures  -Continue phenytoin   -Telemetry monitoring     Respiratory   Acute hypoxic respiratory failure/severe ARDS due to COVID-19  -Intubated 8/23, day 9  -Patient desaturates very quickly with slight movements, takes long time to recover  -Significant facial and tongue swelling secondary to prolonged requirement of prone positioning, resolves with supine positioning for a good amount of time  -Completed remdesivir, tocilizumab  -Continues on PC/AC  -Chest x-ray continues to improve with diuresis  -Continue Decadron 10 mg IV twice daily  -DuoNebs every 6 hours  -Lasix 40 mg daily changed to twice daily  -ID following    Cardiovascular   Sepsis secondary to COVID-19  -Has fluctuating heart rate and blood pressure  -Continue Norvasc 10 mg   -Hold Cardura, lisinopril secondary to labile blood pressure  -Continue to monitor with telemetry    Hyperlipidemia  -On statin     Gastrointestinal   GI prophylaxis  -Protonix  -Tolerating tube feeds     Renal   Continue to monitor electrolytes, renal function     Infectious Disease   COVID-19  -Given Rocephin and doxy in the ED  -Status post remdesivir, tocilizumab  -Cultures so far negative  -Continue Decadron  -ID following     Hematology/Oncology   DVT prophylaxis  -Lovenox, weight-based    Thrombocytopenia  -Monitor     Endocrine   Type 2 diabetes  -Receiving tube feeds  -Medium dose sliding scale insulin  -Lantus 20u nightly  -Maintain glucose between 140 and 180     Social/Spiritual/DNR/Other   Full code      PLAN:     WEAN PER PROTOCOL:  [] No   [x] Yes  [] N/A    DISCONTINUE ANY LABS:   [x] No   [] Yes    ICU PROPHYLAXIS:  Stress ulcer:  [x] PPI Agent  [] E3Nuesn [] Sucralfate  [] Other:  VTE:   [x] Enoxaparin  [] Unfract. Heparin Subcut  [] EPC Cuffs    NUTRITION:  [] NPO [x] Tube Feeding (Specify: ) [] TPN  [] PO (Diet: ADULT TUBE FEEDING; Orogastric; Diabetic; Continuous; 20; No; 30; Q 6 hours)    HOME MEDICATIONS RECONCILED: [] No  [x] Yes    INSULIN DRIP:   [x] No   [] Yes    CONSULTATION NEEDED:  [x] No   [] Yes    FAMILY UPDATED:    [] No   [x] Yes    TRANSFER OUT OF ICU:   [x] No   [] Yes    Bradford Hamilton DO, PGY-2                 8/31/2021, 8:24 AM     I personally saw, examined and provided care for the patient. Radiographs, labs and medication list were reviewed by me independently. Review of Residents documentation was conducted and revisions were made as appropriate. I agree with the above documented exam, problem list and plan of care.         CCT excluding procedures 35 minutes    Vinita Aguila DO

## 2021-08-31 NOTE — PROGRESS NOTES
[address] [place] [address] [facility]  Progress Note      Chief Complaint   Patient presents with    Shortness of Breath     covid, per family SaO2 75% RA, hx of DM and panic attack        SUBJECTIVE:  8/21/21  Prone. FiO2 60% and PEEP of 16    8/22/2021  Patient is tolerating medications. No reported adverse drug reactions. No nausea, vomiting, diarrhea. Afebrile BiPAP 100% FiO2 with breathing at 44 times a minute and probably is going to decompensate  8/23/2021  Not doing well intubated on a rotating bed  FiO2 70% and PEEP 16  8/24/2021  Prone 50% FiO2-PEEP of 10  Issues with the urinary Busch last night this was changed  Paralyzed and on Versed  8/25/2021  Paralyzed and sedated with Versed no pressors  Prone intubated on 50% FiO2, PEEP of 10  Tolerating medications    8/26/21  Paralyzed and sedated with Versed no pressors  Prone intubated on 70% FiO2, PEEP of 10  Does not tolerate supine position    8/27/2021  Afebrile  Still prone on FiO2 of 100%  Paralyzed and sedated    8/28/2021  The patient is still in the ICU. He is pronated. FiO2 100%. Is still sedated and paralyzed. Fair amount of thick, brown secretions    8/29/2021  The patient is still on a RotoProne bed. You are still intubated, sedated and paralyzed. O2 is being weaned down. FiO2 55%. PEEP 12.    8/30/2021  Patient remains intubated, sedated and paralyzed. He is still on a RotoProne bed. Supine now. 8/31/2021  He is still on a RotoProne bed. Still having fair amount of secretions from the nostrils. Minimal from the ET tube, however. Tube feeding seems to be coming out of his nose. Review of systems:  As stated above in the chief complaint, otherwise negative.     Medications:  Scheduled Meds:   insulin glargine  30 Units SubCUTAneous Daily    [START ON 9/1/2021] dexamethasone  10 mg IntraVENous Daily    insulin lispro  0-18 Units SubCUTAneous Q6H    furosemide  40 mg IntraVENous Daily    phenytoin  300 mg IntraVENous Q12H    chlorhexidine  15 mL Mouth/Throat BID    Refresh Lacri-Lube   Ophthalmic Q6H    [Held by provider] lisinopril  40 mg Oral Daily    ipratropium-albuterol  1 ampule Inhalation Q4H    sodium chloride flush  10 mL IntraVENous BID    enoxaparin  0.5 mg/kg SubCUTAneous BID    amLODIPine  10 mg Oral Daily    atorvastatin  40 mg Oral Daily    [Held by provider] doxazosin  8 mg Oral Daily    ascorbic acid  500 mg Oral Daily    Vitamin D  2,000 Units Oral Daily    zinc sulfate  50 mg Oral Daily    pantoprazole  40 mg IntraVENous Daily    cetirizine  5 mg Oral BID     Continuous Infusions:   fentaNYL 5 mcg/ml in 0.9%  ml infusion 200 mcg/hr (21 0400)    cisatracurium (NIMBEX) infusion 4 mcg/kg/min (21 0400)    propofol 35 mcg/kg/min (21 0650)    midazolam 6 mg/hr (21 0650)    sodium chloride      dextrose       PRN Meds:sodium chloride (Inhalant), sodium chloride flush, sodium chloride, albuterol, benzonatate, glucose, dextrose, glucagon (rDNA), dextrose, sodium chloride    OBJECTIVE:  /66   Pulse 67   Temp 98.4 °F (36.9 °C) (Bladder)   Resp 25   Ht 6' (1.829 m)   Wt (!) 302 lb (137 kg)   SpO2 96%   BMI 40.96 kg/m²   Temp  Av.7 °F (37.1 °C)  Min: 98.4 °F (36.9 °C)  Max: 99.1 °F (37.3 °C)  Constitutional: The patient is intubated, sedated, paralyzed. On RotoProne bed. Physical exam is limited. Skin: Warm and dry. No rashes were noted. HEENT: ET tube. NG tube. Brown secretions from nostril. Neck: Supple. Chest: No use of accessory muscles to breathe. Symmetrical expansion. No wheezing, crackles. Cardiovascular: S1 and S2 are rhythmic and regular. Abdomen: Not examined  Genitourinary: Busch catheter  Extremities: Minimal edema  Lines: peripheral  Right radial arterial line 2021 (changed over guidewire). Right IJ triple-lumen catheter 2021 (changed over a guidewire).   Busch changed 3/24/2021    Laboratory and Tests Review:  Lab Results   Component Value Date    WBC 14.6 (H) 08/31/2021    WBC 12.7 (H) 08/30/2021    WBC 12.5 (H) 08/29/2021    HGB 11.5 (L) 08/31/2021    HCT 35.2 (L) 08/31/2021    MCV 90.7 08/31/2021     08/31/2021     Lab Results   Component Value Date    NEUTROABS 12.51 (H) 08/31/2021    NEUTROABS 10.79 (H) 08/30/2021    NEUTROABS 10.74 (H) 08/29/2021     No results found for: [facility]  Lab Results   Component Value Date    ALT 34 08/31/2021    AST 30 08/31/2021    ALKPHOS 68 08/31/2021    BILITOT 0.4 08/31/2021     Lab Results   Component Value Date     08/31/2021    K 4.5 08/31/2021    K 3.7 08/21/2021    CL 98 08/31/2021    CO2 26 08/31/2021    BUN 37 08/31/2021    CREATININE 0.6 08/31/2021    CREATININE 0.7 08/30/2021    CREATININE 0.6 08/29/2021    GFRAA >60 08/31/2021    LABGLOM >60 08/31/2021    GLUCOSE 271 08/31/2021    PROT 5.7 08/31/2021    LABALBU 3.6 08/31/2021    CALCIUM 8.9 08/31/2021    BILITOT 0.4 08/31/2021    ALKPHOS 68 08/31/2021    AST 30 08/31/2021    ALT 34 08/31/2021     Lab Results   Component Value Date    CRP 0.5 (H) 08/30/2021    CRP 0.5 (H) 08/29/2021    CRP 0.7 (H) 08/28/2021     Lab Results   Component Value Date    SEDRATE 6 08/31/2021    SEDRATE 7 08/30/2021    SEDRATE 4 08/29/2021     Radiology:      Microbiology:   Streptococcus pneumoniae/Legionella urine Ag: negative  Nares screen MRSA: Negative  Urine culture 8/24/2021: Negative  Blood cultures 8/21/2021: Negative x2  Respiratory culture 8/27/2021: OP terrell reduced  Respiratory culture 8/30/2021: Pending    ASSESSMENT:  · Severe Covid pneumonia causing acute respiratory failure CRP has come down nicely  · Cytokine storm  · Possible aspiration pneumonia  · Leukocytosis    PLAN:  · Completed remdesivir; steroids; Tosi x1 completed   · Start Ertapenem  · Fungitell was negative.   Fluconazole was stopped    Discussed with ICU team    Bernarda Fuchs MD  9:18 AM   8/31/2021

## 2021-08-31 NOTE — PROGRESS NOTES
Patient currently supine.      08/31/21 1110   Vent Information   Vent Type 980   Vent Mode AC/PC   Vt Ordered 0 mL   Rate Set 26 bmp   Peak Flow 0 L/min   Pressure Support 0 cmH20   FiO2  60 %   SpO2 94 %   SpO2/FiO2 ratio 156.67   Sensitivity 3   PEEP/CPAP 14   I Time/ I Time % 0 s   Humidification Source Heated wire   Humidification Temp 37   Vent Patient Data   High Peep/I Pressure 16   Peak Inspiratory Pressure 32 cmH2O   Mean Airway Pressure 21 cmH20   Rate Measured 26 br/min   Vt Exhaled 590 mL   Minute Volume 15.4 Liters   I:E Ratio 1:1.90   Spontaneous Breathing Trial (SBT) RT Doc   Pulse 71   Additional Respiratory  Assessments   Resp 25   Alarm Settings   High Pressure Alarm 45 cmH2O

## 2021-08-31 NOTE — PROGRESS NOTES
ICU Attending Addendum    Julian Nicole was seen, examined and discussed with the multi-disciplinary ICU team during rounds. I have personally seen and examined the patient and the key elements of the encounter were performed by me. All other information is noted in the ICU team note. Briefly,    8/30: tolerating longer periods supine, thick dark brown secretions    8/31: secretions out of NG, michelle prone    Assessment:    1. Acute hypoxic respiratory failure requiring mechanical ventilation  2. Severe COVID-19 pneumonia  3. Obesity BMI 40  4.  HTN       Sedation, NMB - Nimbex   PC - decrease Pi to 16, PEEP 14   Supine goal 4 hours   Check resp culture - pending   Repeat ABG to assess if further proning needed   Change lines   increase diuresis Lasix 40 mg BID      GI/DVT - SUP/Lovenox 40 q12  Consultants: SEUN Piedra DO

## 2021-08-31 NOTE — PROGRESS NOTES
10:45 Chest X-ray obtained. Pt. Flipped to the supine position. Pt. Tolerated well and will remain supine as tolerated with ABG's to be drawn in 4 hrs.

## 2021-09-01 ENCOUNTER — APPOINTMENT (OUTPATIENT)
Dept: ULTRASOUND IMAGING | Age: 56
DRG: 005 | End: 2021-09-01
Payer: COMMERCIAL

## 2021-09-01 ENCOUNTER — APPOINTMENT (OUTPATIENT)
Dept: GENERAL RADIOLOGY | Age: 56
DRG: 005 | End: 2021-09-01
Payer: COMMERCIAL

## 2021-09-01 LAB
AADO2: 247.8 MMHG
AADO2: 279 MMHG
AADO2: 293 MMHG
ALBUMIN SERPL-MCNC: 3.6 G/DL (ref 3.5–5.2)
ALP BLD-CCNC: 71 U/L (ref 40–129)
ALT SERPL-CCNC: 32 U/L (ref 0–40)
ANION GAP SERPL CALCULATED.3IONS-SCNC: 13 MMOL/L (ref 7–16)
AST SERPL-CCNC: 27 U/L (ref 0–39)
B.E.: -0.4 MMOL/L (ref -3–3)
B.E.: -2.3 MMOL/L (ref -3–3)
B.E.: -2.3 MMOL/L (ref -3–3)
BASOPHILS ABSOLUTE: 0.04 E9/L (ref 0–0.2)
BASOPHILS RELATIVE PERCENT: 0.2 % (ref 0–2)
BILIRUB SERPL-MCNC: 0.4 MG/DL (ref 0–1.2)
BUN BLDV-MCNC: 53 MG/DL (ref 6–20)
C-REACTIVE PROTEIN: 0.9 MG/DL (ref 0–0.4)
CALCIUM SERPL-MCNC: 8.6 MG/DL (ref 8.6–10.2)
CHLORIDE BLD-SCNC: 101 MMOL/L (ref 98–107)
CO2: 25 MMOL/L (ref 22–29)
COHB: 0.7 % (ref 0–1.5)
COHB: 0.8 % (ref 0–1.5)
COHB: 1.3 % (ref 0–1.5)
CREAT SERPL-MCNC: 1.2 MG/DL (ref 0.7–1.2)
CRITICAL: ABNORMAL
CRITICAL: ABNORMAL
CRITICAL: NORMAL
DATE ANALYZED: ABNORMAL
DATE ANALYZED: ABNORMAL
DATE ANALYZED: NORMAL
DATE OF COLLECTION: ABNORMAL
DATE OF COLLECTION: ABNORMAL
DATE OF COLLECTION: NORMAL
EOSINOPHILS ABSOLUTE: 0.03 E9/L (ref 0.05–0.5)
EOSINOPHILS RELATIVE PERCENT: 0.1 % (ref 0–6)
FIO2: 60 %
GFR AFRICAN AMERICAN: >60
GFR NON-AFRICAN AMERICAN: >60 ML/MIN/1.73
GLUCOSE BLD-MCNC: 134 MG/DL (ref 74–99)
HCO3: 23.7 MMOL/L (ref 22–26)
HCO3: 24.7 MMOL/L (ref 22–26)
HCO3: 24.7 MMOL/L (ref 22–26)
HCT VFR BLD CALC: 38.1 % (ref 37–54)
HEMOGLOBIN: 12.1 G/DL (ref 12.5–16.5)
HHB: 11.3 % (ref 0–5)
HHB: 2.1 % (ref 0–5)
HHB: 3.5 % (ref 0–5)
IMMATURE GRANULOCYTES #: 0.35 E9/L
IMMATURE GRANULOCYTES %: 1.4 % (ref 0–5)
LAB: ABNORMAL
LAB: ABNORMAL
LAB: NORMAL
LYMPHOCYTES ABSOLUTE: 0.98 E9/L (ref 1.5–4)
LYMPHOCYTES RELATIVE PERCENT: 4 % (ref 20–42)
Lab: ABNORMAL
Lab: ABNORMAL
Lab: NORMAL
MAGNESIUM: 2.2 MG/DL (ref 1.6–2.6)
MCH RBC QN AUTO: 29.7 PG (ref 26–35)
MCHC RBC AUTO-ENTMCNC: 31.8 % (ref 32–34.5)
MCV RBC AUTO: 93.4 FL (ref 80–99.9)
METER GLUCOSE: 130 MG/DL (ref 74–99)
METER GLUCOSE: 168 MG/DL (ref 74–99)
METER GLUCOSE: 265 MG/DL (ref 74–99)
METER GLUCOSE: 289 MG/DL (ref 74–99)
METER GLUCOSE: 311 MG/DL (ref 74–99)
METER GLUCOSE: 395 MG/DL (ref 74–99)
METER GLUCOSE: 86 MG/DL (ref 74–99)
METHB: 0.1 % (ref 0–1.5)
METHB: 0.3 % (ref 0–1.5)
METHB: 0.4 % (ref 0–1.5)
MODE: ABNORMAL
MODE: ABNORMAL
MODE: NORMAL
MONOCYTES ABSOLUTE: 0.94 E9/L (ref 0.1–0.95)
MONOCYTES RELATIVE PERCENT: 3.8 % (ref 2–12)
NEUTROPHILS ABSOLUTE: 22.44 E9/L (ref 1.8–7.3)
NEUTROPHILS RELATIVE PERCENT: 90.5 % (ref 43–80)
O2 CONTENT: 16.9 ML/DL
O2 CONTENT: 17 ML/DL
O2 CONTENT: 18.2 ML/DL
O2 SATURATION: 88.5 % (ref 92–98.5)
O2 SATURATION: 96.5 % (ref 92–98.5)
O2 SATURATION: 97.9 % (ref 92–98.5)
O2HB: 87.1 % (ref 94–97)
O2HB: 95.7 % (ref 94–97)
O2HB: 96.7 % (ref 94–97)
OPERATOR ID: 101
OPERATOR ID: ABNORMAL
OPERATOR ID: NORMAL
OVALOCYTES: ABNORMAL
PATIENT TEMP: 37 C
PCO2: 42.5 MMHG (ref 35–45)
PCO2: 45.3 MMHG (ref 35–45)
PCO2: 51.5 MMHG (ref 35–45)
PDW BLD-RTO: 14.2 FL (ref 11.5–15)
PEEP/CPAP: 14 CMH2O
PFO2: 1.05 MMHG/%
PFO2: 1.45 MMHG/%
PFO2: 1.92 MMHG/%
PH BLOOD GAS: 7.3 (ref 7.35–7.45)
PH BLOOD GAS: 7.34 (ref 7.35–7.45)
PH BLOOD GAS: 7.38 (ref 7.35–7.45)
PHOSPHORUS: 5.4 MG/DL (ref 2.5–4.5)
PIP: 16 CMH2O
PIP: 16 CMH2O
PLATELET # BLD: 149 E9/L (ref 130–450)
PMV BLD AUTO: 10.7 FL (ref 7–12)
PO2: 115.2 MMHG (ref 75–100)
PO2: 63.2 MMHG (ref 75–100)
PO2: 87.1 MMHG (ref 75–100)
POIKILOCYTES: ABNORMAL
POTASSIUM SERPL-SCNC: 4 MMOL/L (ref 3.5–5)
PS: 16 CMH20
RBC # BLD: 4.08 E12/L (ref 3.8–5.8)
REASON FOR REJECTION: NORMAL
REJECTED TEST: NORMAL
RI(T): 215 %
RI(T): 320 %
RI(T): 464 %
RR MECHANICAL: 22 B/MIN
RR MECHANICAL: 22 B/MIN
RR MECHANICAL: 24 B/MIN
SODIUM BLD-SCNC: 139 MMOL/L (ref 132–146)
SOURCE, BLOOD GAS: ABNORMAL
SOURCE, BLOOD GAS: ABNORMAL
SOURCE, BLOOD GAS: NORMAL
THB: 12.5 G/DL (ref 11.5–16.5)
THB: 13.3 G/DL (ref 11.5–16.5)
THB: 13.9 G/DL (ref 11.5–16.5)
TIME ANALYZED: 1206
TIME ANALYZED: 1810
TIME ANALYZED: 526
TOTAL PROTEIN: 5.7 G/DL (ref 6.4–8.3)
TRIGL SERPL-MCNC: 247 MG/DL (ref 0–149)
WBC # BLD: 24.8 E9/L (ref 4.5–11.5)

## 2021-09-01 PROCEDURE — 84156 ASSAY OF PROTEIN URINE: CPT

## 2021-09-01 PROCEDURE — 6360000002 HC RX W HCPCS: Performed by: STUDENT IN AN ORGANIZED HEALTH CARE EDUCATION/TRAINING PROGRAM

## 2021-09-01 PROCEDURE — 84478 ASSAY OF TRIGLYCERIDES: CPT

## 2021-09-01 PROCEDURE — 82962 GLUCOSE BLOOD TEST: CPT

## 2021-09-01 PROCEDURE — 2580000003 HC RX 258: Performed by: SPECIALIST

## 2021-09-01 PROCEDURE — 2500000003 HC RX 250 WO HCPCS: Performed by: INTERNAL MEDICINE

## 2021-09-01 PROCEDURE — 83935 ASSAY OF URINE OSMOLALITY: CPT

## 2021-09-01 PROCEDURE — 2580000003 HC RX 258: Performed by: FAMILY MEDICINE

## 2021-09-01 PROCEDURE — 84100 ASSAY OF PHOSPHORUS: CPT

## 2021-09-01 PROCEDURE — 36415 COLL VENOUS BLD VENIPUNCTURE: CPT

## 2021-09-01 PROCEDURE — 84300 ASSAY OF URINE SODIUM: CPT

## 2021-09-01 PROCEDURE — 94003 VENT MGMT INPAT SUBQ DAY: CPT

## 2021-09-01 PROCEDURE — 6370000000 HC RX 637 (ALT 250 FOR IP): Performed by: INTERNAL MEDICINE

## 2021-09-01 PROCEDURE — 6360000002 HC RX W HCPCS: Performed by: INTERNAL MEDICINE

## 2021-09-01 PROCEDURE — 80053 COMPREHEN METABOLIC PANEL: CPT

## 2021-09-01 PROCEDURE — 82570 ASSAY OF URINE CREATININE: CPT

## 2021-09-01 PROCEDURE — 6360000002 HC RX W HCPCS: Performed by: FAMILY MEDICINE

## 2021-09-01 PROCEDURE — 2580000003 HC RX 258: Performed by: INTERNAL MEDICINE

## 2021-09-01 PROCEDURE — C9113 INJ PANTOPRAZOLE SODIUM, VIA: HCPCS | Performed by: STUDENT IN AN ORGANIZED HEALTH CARE EDUCATION/TRAINING PROGRAM

## 2021-09-01 PROCEDURE — 37799 UNLISTED PX VASCULAR SURGERY: CPT

## 2021-09-01 PROCEDURE — 82436 ASSAY OF URINE CHLORIDE: CPT

## 2021-09-01 PROCEDURE — 94640 AIRWAY INHALATION TREATMENT: CPT

## 2021-09-01 PROCEDURE — 76770 US EXAM ABDO BACK WALL COMP: CPT

## 2021-09-01 PROCEDURE — 2000000000 HC ICU R&B

## 2021-09-01 PROCEDURE — 85025 COMPLETE CBC W/AUTO DIFF WBC: CPT

## 2021-09-01 PROCEDURE — 82805 BLOOD GASES W/O2 SATURATION: CPT

## 2021-09-01 PROCEDURE — 2580000003 HC RX 258: Performed by: STUDENT IN AN ORGANIZED HEALTH CARE EDUCATION/TRAINING PROGRAM

## 2021-09-01 PROCEDURE — 6360000002 HC RX W HCPCS: Performed by: SPECIALIST

## 2021-09-01 PROCEDURE — 86140 C-REACTIVE PROTEIN: CPT

## 2021-09-01 PROCEDURE — 99233 SBSQ HOSP IP/OBS HIGH 50: CPT | Performed by: INTERNAL MEDICINE

## 2021-09-01 PROCEDURE — 83735 ASSAY OF MAGNESIUM: CPT

## 2021-09-01 RX ORDER — SODIUM CHLORIDE FOR INHALATION 3 %
4 VIAL, NEBULIZER (ML) INHALATION EVERY 12 HOURS
Status: DISCONTINUED | OUTPATIENT
Start: 2021-09-01 | End: 2021-09-20 | Stop reason: HOSPADM

## 2021-09-01 RX ADMIN — IPRATROPIUM BROMIDE AND ALBUTEROL SULFATE 1 AMPULE: .5; 3 SOLUTION RESPIRATORY (INHALATION) at 22:01

## 2021-09-01 RX ADMIN — VANCOMYCIN HYDROCHLORIDE 2000 MG: 10 INJECTION, POWDER, LYOPHILIZED, FOR SOLUTION INTRAVENOUS at 14:09

## 2021-09-01 RX ADMIN — INSULIN LISPRO 9 UNITS: 100 INJECTION, SOLUTION INTRAVENOUS; SUBCUTANEOUS at 12:03

## 2021-09-01 RX ADMIN — PHENYTOIN SODIUM 300 MG: 50 INJECTION INTRAMUSCULAR; INTRAVENOUS at 20:58

## 2021-09-01 RX ADMIN — PROPOFOL 25 MCG/KG/MIN: 10 INJECTION, EMULSION INTRAVENOUS at 17:37

## 2021-09-01 RX ADMIN — IPRATROPIUM BROMIDE AND ALBUTEROL SULFATE 1 AMPULE: .5; 3 SOLUTION RESPIRATORY (INHALATION) at 04:04

## 2021-09-01 RX ADMIN — IPRATROPIUM BROMIDE AND ALBUTEROL SULFATE 1 AMPULE: .5; 3 SOLUTION RESPIRATORY (INHALATION) at 11:46

## 2021-09-01 RX ADMIN — MINERAL OIL AND PETROLATUM: 150; 830 OINTMENT OPHTHALMIC at 09:02

## 2021-09-01 RX ADMIN — AMLODIPINE BESYLATE 10 MG: 10 TABLET ORAL at 08:44

## 2021-09-01 RX ADMIN — CHLORHEXIDINE GLUCONATE 0.12% ORAL RINSE 15 ML: 1.2 LIQUID ORAL at 08:43

## 2021-09-01 RX ADMIN — SODIUM CHLORIDE SOLN NEBU 3% 4 ML: 3 NEBU SOLN at 22:01

## 2021-09-01 RX ADMIN — IPRATROPIUM BROMIDE AND ALBUTEROL SULFATE 1 AMPULE: .5; 3 SOLUTION RESPIRATORY (INHALATION) at 17:25

## 2021-09-01 RX ADMIN — CETIRIZINE HYDROCHLORIDE 5 MG: 10 TABLET, FILM COATED ORAL at 08:44

## 2021-09-01 RX ADMIN — OXYCODONE HYDROCHLORIDE AND ACETAMINOPHEN 500 MG: 500 TABLET ORAL at 08:44

## 2021-09-01 RX ADMIN — INSULIN LISPRO 3 UNITS: 100 INJECTION, SOLUTION INTRAVENOUS; SUBCUTANEOUS at 01:37

## 2021-09-01 RX ADMIN — CISATRACURIUM BESYLATE 4 MCG/KG/MIN: 10 INJECTION INTRAVENOUS at 07:00

## 2021-09-01 RX ADMIN — ENOXAPARIN SODIUM 40 MG: 40 INJECTION SUBCUTANEOUS at 08:43

## 2021-09-01 RX ADMIN — ENOXAPARIN SODIUM 40 MG: 40 INJECTION SUBCUTANEOUS at 20:31

## 2021-09-01 RX ADMIN — SODIUM CHLORIDE SOLN NEBU 3% 4 ML: 3 NEBU SOLN at 11:47

## 2021-09-01 RX ADMIN — MINERAL OIL AND PETROLATUM: 150; 830 OINTMENT OPHTHALMIC at 21:30

## 2021-09-01 RX ADMIN — DEXAMETHASONE SODIUM PHOSPHATE 10 MG: 4 INJECTION, SOLUTION INTRA-ARTICULAR; INTRALESIONAL; INTRAMUSCULAR; INTRAVENOUS; SOFT TISSUE at 09:47

## 2021-09-01 RX ADMIN — Medication 200 MCG/HR: at 06:08

## 2021-09-01 RX ADMIN — PHENYTOIN SODIUM 300 MG: 50 INJECTION INTRAMUSCULAR; INTRAVENOUS at 09:44

## 2021-09-01 RX ADMIN — PANTOPRAZOLE SODIUM 40 MG: 40 INJECTION, POWDER, FOR SOLUTION INTRAVENOUS at 08:43

## 2021-09-01 RX ADMIN — CHLORHEXIDINE GLUCONATE 0.12% ORAL RINSE 15 ML: 1.2 LIQUID ORAL at 20:30

## 2021-09-01 RX ADMIN — ATORVASTATIN CALCIUM 40 MG: 40 TABLET, FILM COATED ORAL at 20:30

## 2021-09-01 RX ADMIN — PROPOFOL 35 MCG/KG/MIN: 10 INJECTION, EMULSION INTRAVENOUS at 00:01

## 2021-09-01 RX ADMIN — PROPOFOL 35 MCG/KG/MIN: 10 INJECTION, EMULSION INTRAVENOUS at 03:53

## 2021-09-01 RX ADMIN — INSULIN LISPRO 9 UNITS: 100 INJECTION, SOLUTION INTRAVENOUS; SUBCUTANEOUS at 23:41

## 2021-09-01 RX ADMIN — Medication 6 MG/HR: at 18:30

## 2021-09-01 RX ADMIN — CETIRIZINE HYDROCHLORIDE 5 MG: 10 TABLET, FILM COATED ORAL at 20:30

## 2021-09-01 RX ADMIN — INSULIN GLARGINE 30 UNITS: 100 INJECTION, SOLUTION SUBCUTANEOUS at 08:43

## 2021-09-01 RX ADMIN — Medication 10 ML: at 20:30

## 2021-09-01 RX ADMIN — ZINC SULFATE 220 MG (50 MG) CAPSULE 50 MG: CAPSULE at 08:44

## 2021-09-01 RX ADMIN — PROPOFOL 25 MCG/KG/MIN: 10 INJECTION, EMULSION INTRAVENOUS at 14:03

## 2021-09-01 RX ADMIN — Medication 2000 UNITS: at 08:43

## 2021-09-01 RX ADMIN — PROPOFOL 35 MCG/KG/MIN: 10 INJECTION, EMULSION INTRAVENOUS at 06:42

## 2021-09-01 RX ADMIN — INSULIN LISPRO 12 UNITS: 100 INJECTION, SOLUTION INTRAVENOUS; SUBCUTANEOUS at 18:12

## 2021-09-01 RX ADMIN — CISATRACURIUM BESYLATE 4 MCG/KG/MIN: 10 INJECTION INTRAVENOUS at 12:04

## 2021-09-01 RX ADMIN — FUROSEMIDE 40 MG: 10 INJECTION, SOLUTION INTRAMUSCULAR; INTRAVENOUS at 08:43

## 2021-09-01 RX ADMIN — PROPOFOL 35 MCG/KG/MIN: 10 INJECTION, EMULSION INTRAVENOUS at 20:57

## 2021-09-01 RX ADMIN — ERTAPENEM SODIUM 1000 MG: 1 INJECTION, POWDER, LYOPHILIZED, FOR SOLUTION INTRAMUSCULAR; INTRAVENOUS at 12:04

## 2021-09-01 RX ADMIN — CISATRACURIUM BESYLATE 4 MCG/KG/MIN: 10 INJECTION INTRAVENOUS at 19:28

## 2021-09-01 RX ADMIN — Medication 200 MCG/HR: at 19:50

## 2021-09-01 RX ADMIN — IPRATROPIUM BROMIDE AND ALBUTEROL SULFATE 1 AMPULE: .5; 3 SOLUTION RESPIRATORY (INHALATION) at 07:58

## 2021-09-01 RX ADMIN — Medication 200 MCG/HR: at 12:32

## 2021-09-01 RX ADMIN — MINERAL OIL AND PETROLATUM: 150; 830 OINTMENT OPHTHALMIC at 03:00

## 2021-09-01 RX ADMIN — Medication 10 ML: at 08:43

## 2021-09-01 ASSESSMENT — PULMONARY FUNCTION TESTS
PIF_VALUE: 31
PIF_VALUE: 31
PIF_VALUE: 32
PIF_VALUE: 31
PIF_VALUE: 31
PIF_VALUE: 32
PIF_VALUE: 31
PIF_VALUE: 32
PIF_VALUE: 31
PIF_VALUE: 33
PIF_VALUE: 32
PIF_VALUE: 31
PIF_VALUE: 32

## 2021-09-01 ASSESSMENT — PAIN SCALES - GENERAL: PAINLEVEL_OUTOF10: 0

## 2021-09-01 NOTE — PROGRESS NOTES
[address] [place] [address] [facility]  Progress Note      Chief Complaint   Patient presents with    Shortness of Breath     covid, per family SaO2 75% RA, hx of DM and panic attack        SUBJECTIVE:  8/21/21  Prone. FiO2 60% and PEEP of 16    8/22/2021  Patient is tolerating medications. No reported adverse drug reactions. No nausea, vomiting, diarrhea. Afebrile BiPAP 100% FiO2 with breathing at 44 times a minute and probably is going to decompensate  8/23/2021  Not doing well intubated on a rotating bed  FiO2 70% and PEEP 16  8/24/2021  Prone 50% FiO2-PEEP of 10  Issues with the urinary Busch last night this was changed  Paralyzed and on Versed  8/25/2021  Paralyzed and sedated with Versed no pressors  Prone intubated on 50% FiO2, PEEP of 10  Tolerating medications    8/26/21  Paralyzed and sedated with Versed no pressors  Prone intubated on 70% FiO2, PEEP of 10  Does not tolerate supine position    8/27/2021  Afebrile  Still prone on FiO2 of 100%  Paralyzed and sedated    8/28/2021  The patient is still in the ICU. He is pronated. FiO2 100%. Is still sedated and paralyzed. Fair amount of thick, brown secretions    8/29/2021  The patient is still on a RotoProne bed. You are still intubated, sedated and paralyzed. O2 is being weaned down. FiO2 55%. PEEP 12.    8/30/2021  Patient remains intubated, sedated and paralyzed. He is still on a RotoProne bed. Supine now. 8/31/2021  He is still on a RotoProne bed. Still having fair amount of secretions from the nostrils. Minimal from the ET tube, however. Tube feeding seems to be coming out of his nose. 9/1/2021  He is supinated but still on a RotoProne bed. He still has fair amount of secretions from the ET tube. Tolerating antibiotic    Review of systems:  As stated above in the chief complaint, otherwise negative.     Medications:  Scheduled Meds:   sodium chloride (Inhalant)  4 mL Nebulization Q12H    insulin glargine  30 Units SubCUTAneous Daily    dexamethasone  10 mg IntraVENous Daily    enoxaparin  40 mg SubCUTAneous BID    ertapenem (INVanz) IVPB  1,000 mg IntraVENous Q24H    insulin lispro  0-18 Units SubCUTAneous Q6H    phenytoin  300 mg IntraVENous Q12H    chlorhexidine  15 mL Mouth/Throat BID    Refresh Lacri-Lube   Ophthalmic Q6H    [Held by provider] lisinopril  40 mg Oral Daily    ipratropium-albuterol  1 ampule Inhalation Q4H    sodium chloride flush  10 mL IntraVENous BID    amLODIPine  10 mg Oral Daily    atorvastatin  40 mg Oral Daily    [Held by provider] doxazosin  8 mg Oral Daily    ascorbic acid  500 mg Oral Daily    Vitamin D  2,000 Units Oral Daily    zinc sulfate  50 mg Oral Daily    pantoprazole  40 mg IntraVENous Daily    cetirizine  5 mg Oral BID     Continuous Infusions:   fentaNYL 5 mcg/ml in 0.9%  ml infusion 200 mcg/hr (21 4934)    cisatracurium (NIMBEX) infusion 4 mcg/kg/min (21 0700)    propofol 35 mcg/kg/min (21 9837)    midazolam 6 mg/hr (21 7227)    sodium chloride      dextrose       PRN Meds:sodium chloride (Inhalant), sodium chloride flush, sodium chloride, albuterol, benzonatate, glucose, dextrose, glucagon (rDNA), dextrose, sodium chloride    OBJECTIVE:  BP (!) 77/44   Pulse 125   Temp 97.9 °F (36.6 °C) (Bladder)   Resp 21   Ht 6' (1.829 m)   Wt (!) 302 lb (137 kg)   SpO2 92%   BMI 40.96 kg/m²   Temp  Av.2 °F (36.8 °C)  Min: 97.9 °F (36.6 °C)  Max: 98.6 °F (37 °C)  Constitutional: The patient is intubated, sedated, paralyzed. On RotoProne bed. He is supinated. Skin: Warm and dry. No rashes were noted. HEENT: Round and nonreactive pupils. ET tube. NG tube. Brown secretions from nostril. Neck: Supple. Chest: Coarse breath sounds bilaterally. No crackles  Cardiovascular: S1 and S2 are rhythmic and regular. Abdomen: Round, soft and benign to palpation. Genitourinary:  Busch catheter  Extremities: Minimal edema  Lines: peripheral  Right radial arterial line 8/30/2021 (changed over guidewire). Right IJ triple-lumen catheter 8/30/2021 (changed over a guidewire). Busch changed 3/24/2021    Laboratory and Tests Review:  Lab Results   Component Value Date    WBC 24.8 (H) 09/01/2021    WBC 14.6 (H) 08/31/2021    WBC 12.7 (H) 08/30/2021    HGB 12.1 (L) 09/01/2021    HCT 38.1 09/01/2021    MCV 93.4 09/01/2021     09/01/2021     Lab Results   Component Value Date    NEUTROABS 22.44 (H) 09/01/2021    NEUTROABS 12.51 (H) 08/31/2021    NEUTROABS 10.79 (H) 08/30/2021     No results found for: CRPHS  Lab Results   Component Value Date    ALT 32 09/01/2021    AST 27 09/01/2021    ALKPHOS 71 09/01/2021    BILITOT 0.4 09/01/2021     Lab Results   Component Value Date     09/01/2021    K 4.0 09/01/2021    K 3.7 08/21/2021     09/01/2021    CO2 25 09/01/2021    BUN 53 09/01/2021    CREATININE 1.2 09/01/2021    CREATININE 0.6 08/31/2021    CREATININE 0.7 08/30/2021    GFRAA >60 09/01/2021    LABGLOM >60 09/01/2021    GLUCOSE 134 09/01/2021    PROT 5.7 09/01/2021    LABALBU 3.6 09/01/2021    CALCIUM 8.6 09/01/2021    BILITOT 0.4 09/01/2021    ALKPHOS 71 09/01/2021    AST 27 09/01/2021    ALT 32 09/01/2021     Lab Results   Component Value Date    CRP 0.9 (H) 09/01/2021    CRP 0.5 (H) 08/31/2021    CRP 0.5 (H) 08/30/2021     Lab Results   Component Value Date    SEDRATE 6 08/31/2021    SEDRATE 7 08/30/2021    SEDRATE 4 08/29/2021     Radiology:      Microbiology:   Streptococcus pneumoniae/Legionella urine Ag: negative  Nares screen MRSA: Negative  Urine culture 8/24/2021: Negative  Blood cultures 8/21/2021: Negative x2  Respiratory culture 8/27/2021: OP terrell reduced  Respiratory culture 8/30/2021: Pending    ASSESSMENT:  · Severe Covid pneumonia causing acute respiratory failure CRP has come down nicely Remdesivir. Received Tocilizumab  · Cytokine storm  · VAP versus tracheobronchitis  · Leukocytosis associated to the above. Completed    PLAN:  · Continue Ertapenem, day 2  · Vancomycin 2 g IV x1. Check random level in a.m.   · Check respiratory culture    Discussed with ICU team    Terrance Ramachandran MD  11:48 AM   9/1/2021

## 2021-09-01 NOTE — PROGRESS NOTES
Critical Care Team - Daily Progress Note         Date and time: 9/1/2021 10:47 AM  Patient's name:  Michelle Russell  Medical Record Number: [id number]  Patient's account/billing number: [de-identified]  Patient's YOB: 1965  Age: 60 y.o. Date of Admission: 8/21/2021  9:35 AM  Length of stay during current admission: 11      Primary Care Physician: Don Santacruz DO  ICU Attending Physician: Tosha Richey DO    Code Status: No Order    Reason for ICU admission: Respiratory failure      SUBJECTIVE     8/30: tolerating longer periods supine, thick dark brown secretions     8/31: secretions out of NG, michelle prone    9/1: Decreased urine output and increased creatinine today, continues with thick secretions    OBJECTIVE     Vital signs:   height is 6' (1.829 m) and weight is 302 lb (137 kg) (abnormal). His bladder temperature is 97.9 °F (36.6 °C). His blood pressure is 77/44 (abnormal) and his pulse is 122. His respiration is 21 and oxygen saturation is 92%. I/O last 3 completed shifts: In: 2502 [I.V.:2502]  Out: 2625 [Urine:2625]      PHYSICAL EXAM:    Physical Exam  Constitutional:       Appearance: He is obese. He is ill-appearing. Interventions: He is sedated, chemically paralyzed and intubated. HENT:      Head: Normocephalic and atraumatic. Eyes:      Conjunctiva/sclera: Conjunctivae normal.   Neck:      Trachea: No tracheal deviation. Cardiovascular:      Rate and Rhythm: Normal rate and regular rhythm. Heart sounds: Normal heart sounds. Pulmonary:      Effort: Pulmonary effort is normal. He is intubated. Breath sounds: Rales present. Abdominal:      General: Bowel sounds are normal.      Palpations: Abdomen is soft. Tenderness: There is no abdominal tenderness. Musculoskeletal:         General: Swelling present. Cervical back: Neck supple. Lymphadenopathy:      Cervical: No cervical adenopathy. Skin:     General: Skin is warm and dry. Status: Not on file    CCT excluding procedures 35 minutes    ELECTRONICALLY SIGNED:  Pricila Lamar DO

## 2021-09-01 NOTE — PROGRESS NOTES
Pt. Placed prone again due to poor ABG's pt. Turned via roto prone bed with respiratory and RNs. Tolerated well.

## 2021-09-01 NOTE — PATIENT CARE CONFERENCE
Intensive Care Daily Quality Rounding Checklist        ICU Team Members: Dr. Patti Trimble, Dr. Rufina Vanegas (resident), charge nurse, bedside nurse, clinical pharmacist     ICU Day #: 11     Intubation Date: August 23     Ventilator Day #: 10     Central Line Insertion Date: August 30                                                    Day #: 3      Arterial Line Insertion Date: August 30                             Day #: 3     Temporary Hemodialysis Catheter Insertion Date: N/A                             Day # N/A     DVT Prophylaxis: Lovenox    GI Prophylaxis: Protonix     Busch Catheter Insertion Date: 08/26/2021 (Changed)                                        Day #: 7                             Continued need (if yes, reason documented and discussed with physician): critical care patient, strict I+O     Skin Issues/ Wounds and ordered treatment discussed on rounds: No issues, SOS precautions     Goals/ Plans for the Day: Daily labs, wean vent as able, may need to re-prone later today, consult Nephrology

## 2021-09-01 NOTE — PROGRESS NOTES
9/1/2021  11:37 AM      Comprehensive Nutrition Assessment    Type and Reason for Visit:  Reassess    Nutrition Recommendations/Plan: While pt intubated and EN needed for nutrition support, recommend the following TF to meet needs, when pt can tolerate Supine positioning:    TF RECOMMENDATION (supine): Diabetic TF (Glucerna 1.5) to goal rate 45 ml/hr and Protein Modular (Proteinex 2 go) Twice daily: This will provide: 1080 ml/d, 1820 angel (2580 angel total w/propofol), 141 g pro, 820 ml free water (+flushes per Critical care)    Recommend Consult Dietitian for updated TF recommendation when pt is weaned off of propofol calories. Nutrition Assessment:  Pt had coughing over vent and increase secretions that appeared to be TF from his nose while prone. Pt obese abdomen may preclude EN tolerance while prone 2/2 increased intraabdominal pressure prone. Pt remains intubated/sedated and paralyzed. Will provide TF rec for EN to meet needs, when pt michelle supine    Malnutrition Assessment:  Malnutrition Status: At risk for malnutrition (Comment)    Context:  Acute Illness     Findings of the 6 clinical characteristics of malnutrition:  Energy Intake:  Mild decrease in energy intake (Comment)  Weight Loss:  Unable to assess     Body Fat Loss:  Unable to assess (Covid Isolation)     Muscle Mass Loss:  Unable to assess (Covid Isolation)    Fluid Accumulation:  No significant fluid accumulation     Strength:  Not Performed    Estimated Daily Nutrient Needs:  Energy (kcal):  ; Weight Used for Energy Requirements:  Current     Protein (g):  125-160 (1.5-1.8 g/kg);  Weight Used for Protein Requirements:  Ideal        Fluid (ml/day):  per Critical Care; Method Used for Fluid Requirements:  Other (Comment)      Nutrition Related Findings:  paralytic/sedated/intubated, NGT clamp, +3 gen edema, hypoactive BS, +I/O 2.4L      Wounds:   (some blisters, red areas documented)       Current Nutrition Therapies:    Current Tube Feeding (TF) Orders:  · Feeding Route: Nasogastric  · Formula: Diabetic  · Schedule: Continuous (while prone=20 ml/hr)  · Additives/Modulars:  (none)  · Water Flushes: 30 ml Q6hr = 120 ml/d  · Current TF & Flush Orders Provides: Currently on hold  · Goal TF & Flush Orders Provides: 20 ml/hr = 480 ml/d, 820 angel (1580 angel w/propofol) 66 g pro, 506 ml total free water      Anthropometric Measures:  · Height: 6' (182.9 cm)  · Current Body Weight: 302 lb (137 kg) (8/21 [facility] update/Covid isolation)   · Usual Body Weight:  (No recent actual wt hx available)     · Ideal Body Weight: 178 lbs; % Ideal Body Weight 169.7 %   · BMI: 40.9  · BMI Categories: Obese Class 3 (BMI 40.0 or greater)       Nutrition Diagnosis:   · Inadequate oral intake related to impaired respiratory function as evidenced by NPO or clear liquid status due to medical condition, intubation, nutrition support - enteral nutrition      Nutrition Interventions:   Food and/or Nutrient Delivery:  Continue NPO (When pt michelle supine if TF needed for nutrition, will recommend EN to meet needs w/propofol as well)  Nutrition Education/Counseling:  No recommendation at this time   Coordination of Nutrition Care:  Continue to monitor while inpatient    Goals:  Pt michelle EN at goal rate       Nutrition Monitoring and Evaluation:   Behavioral-Environmental Outcomes:  None Identified   Food/Nutrient Intake Outcomes:  Enteral Nutrition Intake/Tolerance  Physical Signs/Symptoms Outcomes:  Biochemical Data, GI Status, Fluid Status or Edema, Nutrition Focused Physical Findings, Skin, Weight     Discharge Planning:     Too soon to determine     Electronically signed by Maryan Lopez RD, CNSC, LD on 9/1/21 at 11:37 AM EDT    Contact: [phone]

## 2021-09-01 NOTE — CARE COORDINATION
Continue ICU. COVID+. Continue vent support. Pt remains supine. Continue nibex, fentanyl, versed, propofol drips. Discharge pending progress.  Select LTAC following-mjo

## 2021-09-01 NOTE — PROGRESS NOTES
[facility] Progress Note    Admitting Date and Time: 8/21/2021  9:35 AM  Admit Dx: Acute hypoxemic respiratory failure due to COVID-19 (HCC) [U07.1, J96.01]  Pneumonia due to COVID-19 virus [U07.1, J12.82]    Subjective:  Patient is being followed for Acute hypoxemic respiratory failure due to COVID-19 ([address] [U07.1, J96.01]  Pneumonia due to COVID-19 virus [U07.1, J12.82]     Patient is intubated and in supine position. ROS: denies fever, chills, cp, sob, n/v, HA unless stated above.       sodium chloride (Inhalant)  4 mL Nebulization Q12H    vancomycin  2,000 mg IntraVENous Once    insulin glargine  30 Units SubCUTAneous Daily    dexamethasone  10 mg IntraVENous Daily    enoxaparin  40 mg SubCUTAneous BID    ertapenem (INVanz) IVPB  1,000 mg IntraVENous Q24H    insulin lispro  0-18 Units SubCUTAneous Q6H    phenytoin  300 mg IntraVENous Q12H    chlorhexidine  15 mL Mouth/Throat BID    Refresh Lacri-Lube   Ophthalmic Q6H    [Held by provider] lisinopril  40 mg Oral Daily    ipratropium-albuterol  1 ampule Inhalation Q4H    sodium chloride flush  10 mL IntraVENous BID    amLODIPine  10 mg Oral Daily    atorvastatin  40 mg Oral Daily    [Held by provider] doxazosin  8 mg Oral Daily    ascorbic acid  500 mg Oral Daily    Vitamin D  2,000 Units Oral Daily    zinc sulfate  50 mg Oral Daily    pantoprazole  40 mg IntraVENous Daily    cetirizine  5 mg Oral BID     sodium chloride (Inhalant), 4 mL, PRN  sodium chloride flush, 5-40 mL, PRN  sodium chloride, 25 mL, PRN  albuterol, 2.5 mg, Q6H PRN  benzonatate, 200 mg, TID PRN  glucose, 15 g, PRN  dextrose, 12.5 g, PRN  glucagon (rDNA), 1 mg, PRN  dextrose, 100 mL/hr, PRN  sodium chloride, 30 mL, PRN         Objective:    BP (!) 77/44   Pulse 119   Temp 98.8 °F (37.1 °C) (Bladder)   Resp 22   Ht 6' (1.829 m)   Wt (!) 302 lb (137 kg)   SpO2 94%   BMI 40.96 kg/m²     General Appearance: intubated and sedated  Skin: warm and dry  Head: normocephalic and atraumatic  Eyes: pupils equal, round, and reactive to light, extraocular eye movements intact, conjunctivae normal  Neck: neck supple and non tender without mass   Pulmonary/Chest: clear to auscultation bilaterally- no wheezes, rales or rhonchi, normal air movement, no respiratory distress  Cardiovascular: normal rate, normal S1 and S2 and no carotid bruits  Abdomen: soft, non-tender, non-distended, normal bowel sounds, no masses or organomegaly  Extremities: no cyanosis, no clubbing and no edema  Neurologic:unable to assess        Recent Labs     08/30/21  0600 08/31/21  0540 09/01/21  0520    136 139   K 4.6 4.5 4.0   CL 99 98 101   CO2 26 26 25   BUN 32* 37* 53*   CREATININE 0.7 0.6* 1.2   GLUCOSE 259* 271* 134*   CALCIUM 9.0 8.9 8.6       Recent Labs     08/30/21  0600 08/31/21  0540 09/01/21  0730   WBC 12.7* 14.6* 24.8*   RBC 3.88 3.88 4.08   HGB 11.4* 11.5* 12.1*   HCT 35.2* 35.2* 38.1   MCV 90.7 90.7 93.4   MCH 29.4 29.6 29.7   MCHC 32.4 32.7 31.8*   RDW 13.5 13.7 14.2    133 149   MPV 10.7 10.6 10.7       Radiology:     RENAL US  1.  Bilateral intrarenal calcifications.  Bilateral renal cortical thinning   which suggest changes related to chronic underlying medical renal disease. No hydronephrosis. 2.  A Busch catheter is decompressing the bladder. Assessment:    Principal Problem:    Acute hypoxemic respiratory failure due to COVID-19 [facility])  Active Problems:    Type 2 diabetes mellitus without complication, without long-term current use of insulin (HCC)    History of seizures    Hyperlipidemia    Busch catheter problem ([facility])    Sepsis ([facility])    Thrombocytopenia ([facility] [address]  Resolved Problems:    OSCAR (acute kidney injury) ([facility]    Lactic acidosis      Plan:    1.  Acute hypoxic respiratory failure status post mechanical ventilation secondary to COVID-19 pneumonia - Patient desaturates with minimal movement. Patient is on nimbex, fentanyl, and versed.    2. COVID 19 pneumonia - Patient received actemra, completed remdesivir, and is on Decadron. Continue zinc, vitamin c, and vitamin d. Patient is also being diuresed Lasix 40 mg IV twice. ESR trending down. Increase in CRP. Chest x-ray from yesterday did indicate worsening opacification left more than right. Respiratory culture growing GNR, possible VAP. He has been started on ertepenem day 2  3. DM-II - Continue medium q6 SS and lantus  4. HLD - Continue lipitor  5. HTN - Continue norvasc  6. DVT prophylaxis - lovenox      NOTE: This report was transcribed using voice recognition software. Every effort was made to ensure accuracy; however, inadvertent computerized transcription errors may be present.   Electronically signed by Sherron Mendoza DO on 9/1/2021 at 2:39 PM

## 2021-09-02 ENCOUNTER — APPOINTMENT (OUTPATIENT)
Dept: GENERAL RADIOLOGY | Age: 56
DRG: 005 | End: 2021-09-02
Payer: COMMERCIAL

## 2021-09-02 LAB
AADO2: 201.3 MMHG
AADO2: 225.9 MMHG
ALBUMIN SERPL-MCNC: 3.3 G/DL (ref 3.5–5.2)
ALP BLD-CCNC: 103 U/L (ref 40–129)
ALT SERPL-CCNC: 38 U/L (ref 0–40)
ANION GAP SERPL CALCULATED.3IONS-SCNC: 9 MMOL/L (ref 7–16)
AST SERPL-CCNC: 30 U/L (ref 0–39)
B.E.: 0.5 MMOL/L (ref -3–3)
B.E.: 0.8 MMOL/L (ref -3–3)
BASOPHILS ABSOLUTE: 0.05 E9/L (ref 0–0.2)
BASOPHILS RELATIVE PERCENT: 0.2 % (ref 0–2)
BILIRUB SERPL-MCNC: 0.5 MG/DL (ref 0–1.2)
BUN BLDV-MCNC: 47 MG/DL (ref 6–20)
C-REACTIVE PROTEIN: 14.7 MG/DL (ref 0–0.4)
CALCIUM SERPL-MCNC: 8.6 MG/DL (ref 8.6–10.2)
CHLORIDE BLD-SCNC: 103 MMOL/L (ref 98–107)
CHLORIDE URINE RANDOM: <20 MMOL/L
CO2: 27 MMOL/L (ref 22–29)
COHB: 0.7 % (ref 0–1.5)
COHB: 0.9 % (ref 0–1.5)
CREAT SERPL-MCNC: 0.7 MG/DL (ref 0.7–1.2)
CREATININE URINE: 63 MG/DL (ref 40–278)
CRITICAL: ABNORMAL
CRITICAL: ABNORMAL
CULTURE, RESPIRATORY: ABNORMAL
DATE ANALYZED: ABNORMAL
DATE ANALYZED: ABNORMAL
DATE OF COLLECTION: ABNORMAL
DATE OF COLLECTION: ABNORMAL
EOSINOPHILS ABSOLUTE: 0.03 E9/L (ref 0.05–0.5)
EOSINOPHILS RELATIVE PERCENT: 0.1 % (ref 0–6)
FERRITIN: 932 NG/ML
FIO2: 50 %
FIO2: 52 %
GFR AFRICAN AMERICAN: >60
GFR NON-AFRICAN AMERICAN: >60 ML/MIN/1.73
GLUCOSE BLD-MCNC: 185 MG/DL (ref 74–99)
HCO3: 23.3 MMOL/L (ref 22–26)
HCO3: 26.2 MMOL/L (ref 22–26)
HCT VFR BLD CALC: 38.8 % (ref 37–54)
HEMOGLOBIN: 12.4 G/DL (ref 12.5–16.5)
HHB: 2.3 % (ref 0–5)
HHB: 3.4 % (ref 0–5)
IMMATURE GRANULOCYTES #: 0.33 E9/L
IMMATURE GRANULOCYTES %: 1.3 % (ref 0–5)
IRON SATURATION: 29 % (ref 20–55)
IRON: 55 MCG/DL (ref 59–158)
LAB: ABNORMAL
LAB: ABNORMAL
LYMPHOCYTES ABSOLUTE: 0.75 E9/L (ref 1.5–4)
LYMPHOCYTES RELATIVE PERCENT: 3 % (ref 20–42)
Lab: ABNORMAL
Lab: ABNORMAL
MAGNESIUM: 2.2 MG/DL (ref 1.6–2.6)
MCH RBC QN AUTO: 29.7 PG (ref 26–35)
MCHC RBC AUTO-ENTMCNC: 32 % (ref 32–34.5)
MCV RBC AUTO: 92.8 FL (ref 80–99.9)
METER GLUCOSE: 172 MG/DL (ref 74–99)
METER GLUCOSE: 222 MG/DL (ref 74–99)
METER GLUCOSE: 238 MG/DL (ref 74–99)
METHB: 0 % (ref 0–1.5)
METHB: 0.2 % (ref 0–1.5)
MODE: ABNORMAL
MODE: ABNORMAL
MONOCYTES ABSOLUTE: 0.81 E9/L (ref 0.1–0.95)
MONOCYTES RELATIVE PERCENT: 3.3 % (ref 2–12)
NEUTROPHILS ABSOLUTE: 22.89 E9/L (ref 1.8–7.3)
NEUTROPHILS RELATIVE PERCENT: 92.1 % (ref 43–80)
O2 CONTENT: 16.4 ML/DL
O2 CONTENT: 18.1 ML/DL
O2 SATURATION: 96.6 % (ref 92–98.5)
O2 SATURATION: 97.7 % (ref 92–98.5)
O2HB: 95.9 % (ref 94–97)
O2HB: 96.6 % (ref 94–97)
OPERATOR ID: 914
OPERATOR ID: ABNORMAL
ORGANISM: ABNORMAL
ORGANISM: ABNORMAL
OSMOLALITY URINE: 583 MOSM/KG (ref 300–900)
PATIENT TEMP: 37 C
PATIENT TEMP: 37 C
PCO2: 31.7 MMHG (ref 35–45)
PCO2: 44.7 MMHG (ref 35–45)
PDW BLD-RTO: 14.4 FL (ref 11.5–15)
PEEP/CPAP: 14 CMH2O
PEEP/CPAP: 14 CMH2O
PFO2: 1.65 MMHG/%
PFO2: 2.05 MMHG/%
PH BLOOD GAS: 7.39 (ref 7.35–7.45)
PH BLOOD GAS: 7.48 (ref 7.35–7.45)
PHOSPHORUS: 3.2 MG/DL (ref 2.5–4.5)
PIP: 16 CMH2O
PIP: 16 CMH2O
PLATELET # BLD: 154 E9/L (ref 130–450)
PMV BLD AUTO: 10.2 FL (ref 7–12)
PO2: 106.4 MMHG (ref 75–100)
PO2: 82.4 MMHG (ref 75–100)
POLYCHROMASIA: ABNORMAL
POTASSIUM SERPL-SCNC: 4.4 MMOL/L (ref 3.5–5)
PROTEIN PROTEIN: 15 MG/DL (ref 0–12)
PROTEIN/CREAT RATIO: 0.2
PROTEIN/CREAT RATIO: 0.2 (ref 0–0.2)
RBC # BLD: 4.18 E12/L (ref 3.8–5.8)
RI(T): 189 %
RI(T): 274 %
RR MECHANICAL: 24 B/MIN
RR MECHANICAL: 24 B/MIN
SEDIMENTATION RATE, ERYTHROCYTE: 19 MM/HR (ref 0–15)
SMEAR, RESPIRATORY: ABNORMAL
SODIUM BLD-SCNC: 139 MMOL/L (ref 132–146)
SODIUM URINE: 32 MMOL/L
SOURCE, BLOOD GAS: ABNORMAL
SOURCE, BLOOD GAS: ABNORMAL
THB: 12.1 G/DL (ref 11.5–16.5)
THB: 13.2 G/DL (ref 11.5–16.5)
TIME ANALYZED: 1632
TIME ANALYZED: 538
TOTAL IRON BINDING CAPACITY: 189 MCG/DL (ref 250–450)
TOTAL PROTEIN: 5.9 G/DL (ref 6.4–8.3)
VANCOMYCIN RANDOM: 8.7 MCG/ML (ref 5–40)
WBC # BLD: 24.9 E9/L (ref 4.5–11.5)

## 2021-09-02 PROCEDURE — 83735 ASSAY OF MAGNESIUM: CPT

## 2021-09-02 PROCEDURE — 6360000002 HC RX W HCPCS: Performed by: STUDENT IN AN ORGANIZED HEALTH CARE EDUCATION/TRAINING PROGRAM

## 2021-09-02 PROCEDURE — 6360000002 HC RX W HCPCS: Performed by: FAMILY MEDICINE

## 2021-09-02 PROCEDURE — 84100 ASSAY OF PHOSPHORUS: CPT

## 2021-09-02 PROCEDURE — 80053 COMPREHEN METABOLIC PANEL: CPT

## 2021-09-02 PROCEDURE — 2000000000 HC ICU R&B

## 2021-09-02 PROCEDURE — 94640 AIRWAY INHALATION TREATMENT: CPT

## 2021-09-02 PROCEDURE — 37799 UNLISTED PX VASCULAR SURGERY: CPT

## 2021-09-02 PROCEDURE — 82728 ASSAY OF FERRITIN: CPT

## 2021-09-02 PROCEDURE — 6360000002 HC RX W HCPCS: Performed by: INTERNAL MEDICINE

## 2021-09-02 PROCEDURE — 6370000000 HC RX 637 (ALT 250 FOR IP): Performed by: INTERNAL MEDICINE

## 2021-09-02 PROCEDURE — 80202 ASSAY OF VANCOMYCIN: CPT

## 2021-09-02 PROCEDURE — 83540 ASSAY OF IRON: CPT

## 2021-09-02 PROCEDURE — 94003 VENT MGMT INPAT SUBQ DAY: CPT

## 2021-09-02 PROCEDURE — 6360000002 HC RX W HCPCS: Performed by: SPECIALIST

## 2021-09-02 PROCEDURE — 85025 COMPLETE CBC W/AUTO DIFF WBC: CPT

## 2021-09-02 PROCEDURE — 36415 COLL VENOUS BLD VENIPUNCTURE: CPT

## 2021-09-02 PROCEDURE — 86140 C-REACTIVE PROTEIN: CPT

## 2021-09-02 PROCEDURE — 82805 BLOOD GASES W/O2 SATURATION: CPT

## 2021-09-02 PROCEDURE — 2580000003 HC RX 258: Performed by: STUDENT IN AN ORGANIZED HEALTH CARE EDUCATION/TRAINING PROGRAM

## 2021-09-02 PROCEDURE — 2580000003 HC RX 258: Performed by: FAMILY MEDICINE

## 2021-09-02 PROCEDURE — 2500000003 HC RX 250 WO HCPCS: Performed by: INTERNAL MEDICINE

## 2021-09-02 PROCEDURE — 99233 SBSQ HOSP IP/OBS HIGH 50: CPT | Performed by: INTERNAL MEDICINE

## 2021-09-02 PROCEDURE — 85651 RBC SED RATE NONAUTOMATED: CPT

## 2021-09-02 PROCEDURE — 83550 IRON BINDING TEST: CPT

## 2021-09-02 PROCEDURE — 2580000003 HC RX 258: Performed by: INTERNAL MEDICINE

## 2021-09-02 PROCEDURE — C9113 INJ PANTOPRAZOLE SODIUM, VIA: HCPCS | Performed by: STUDENT IN AN ORGANIZED HEALTH CARE EDUCATION/TRAINING PROGRAM

## 2021-09-02 PROCEDURE — 82962 GLUCOSE BLOOD TEST: CPT

## 2021-09-02 RX ADMIN — CETIRIZINE HYDROCHLORIDE 5 MG: 10 TABLET, FILM COATED ORAL at 20:19

## 2021-09-02 RX ADMIN — DEXAMETHASONE SODIUM PHOSPHATE 10 MG: 4 INJECTION, SOLUTION INTRA-ARTICULAR; INTRALESIONAL; INTRAMUSCULAR; INTRAVENOUS; SOFT TISSUE at 09:12

## 2021-09-02 RX ADMIN — INSULIN LISPRO 3 UNITS: 100 INJECTION, SOLUTION INTRAVENOUS; SUBCUTANEOUS at 06:19

## 2021-09-02 RX ADMIN — PANTOPRAZOLE SODIUM 40 MG: 40 INJECTION, POWDER, FOR SOLUTION INTRAVENOUS at 09:12

## 2021-09-02 RX ADMIN — Medication 200 MCG/HR: at 09:11

## 2021-09-02 RX ADMIN — IPRATROPIUM BROMIDE AND ALBUTEROL SULFATE 1 AMPULE: .5; 3 SOLUTION RESPIRATORY (INHALATION) at 03:41

## 2021-09-02 RX ADMIN — IPRATROPIUM BROMIDE AND ALBUTEROL SULFATE 1 AMPULE: .5; 3 SOLUTION RESPIRATORY (INHALATION) at 09:16

## 2021-09-02 RX ADMIN — CISATRACURIUM BESYLATE 4 MCG/KG/MIN: 10 INJECTION INTRAVENOUS at 20:21

## 2021-09-02 RX ADMIN — CETIRIZINE HYDROCHLORIDE 5 MG: 10 TABLET, FILM COATED ORAL at 09:11

## 2021-09-02 RX ADMIN — SODIUM CHLORIDE SOLN NEBU 3% 4 ML: 3 NEBU SOLN at 09:16

## 2021-09-02 RX ADMIN — ENOXAPARIN SODIUM 40 MG: 40 INJECTION SUBCUTANEOUS at 20:17

## 2021-09-02 RX ADMIN — INSULIN LISPRO 6 UNITS: 100 INJECTION, SOLUTION INTRAVENOUS; SUBCUTANEOUS at 17:49

## 2021-09-02 RX ADMIN — ENOXAPARIN SODIUM 40 MG: 40 INJECTION SUBCUTANEOUS at 09:12

## 2021-09-02 RX ADMIN — CHLORHEXIDINE GLUCONATE 0.12% ORAL RINSE 15 ML: 1.2 LIQUID ORAL at 09:00

## 2021-09-02 RX ADMIN — PROPOFOL 30 MCG/KG/MIN: 10 INJECTION, EMULSION INTRAVENOUS at 21:58

## 2021-09-02 RX ADMIN — PROPOFOL 30 MCG/KG/MIN: 10 INJECTION, EMULSION INTRAVENOUS at 13:51

## 2021-09-02 RX ADMIN — Medication 6 MG/HR: at 12:25

## 2021-09-02 RX ADMIN — Medication 10 ML: at 09:12

## 2021-09-02 RX ADMIN — CISATRACURIUM BESYLATE 4 MCG/KG/MIN: 10 INJECTION INTRAVENOUS at 07:33

## 2021-09-02 RX ADMIN — Medication 200 MCG/HR: at 02:43

## 2021-09-02 RX ADMIN — AMLODIPINE BESYLATE 10 MG: 10 TABLET ORAL at 20:28

## 2021-09-02 RX ADMIN — IPRATROPIUM BROMIDE AND ALBUTEROL SULFATE 1 AMPULE: .5; 3 SOLUTION RESPIRATORY (INHALATION) at 21:48

## 2021-09-02 RX ADMIN — INSULIN GLARGINE 30 UNITS: 100 INJECTION, SOLUTION SUBCUTANEOUS at 09:12

## 2021-09-02 RX ADMIN — PHENYTOIN SODIUM 300 MG: 50 INJECTION INTRAMUSCULAR; INTRAVENOUS at 21:54

## 2021-09-02 RX ADMIN — CHLORHEXIDINE GLUCONATE 0.12% ORAL RINSE 15 ML: 1.2 LIQUID ORAL at 20:39

## 2021-09-02 RX ADMIN — CISATRACURIUM BESYLATE 4 MCG/KG/MIN: 10 INJECTION INTRAVENOUS at 14:45

## 2021-09-02 RX ADMIN — Medication 200 MCG/HR: at 22:01

## 2021-09-02 RX ADMIN — ATORVASTATIN CALCIUM 40 MG: 40 TABLET, FILM COATED ORAL at 20:19

## 2021-09-02 RX ADMIN — Medication 10 ML: at 20:19

## 2021-09-02 RX ADMIN — IPRATROPIUM BROMIDE AND ALBUTEROL SULFATE 1 AMPULE: .5; 3 SOLUTION RESPIRATORY (INHALATION) at 17:00

## 2021-09-02 RX ADMIN — MINERAL OIL AND PETROLATUM: 150; 830 OINTMENT OPHTHALMIC at 20:39

## 2021-09-02 RX ADMIN — Medication 2000 MG: at 20:19

## 2021-09-02 RX ADMIN — MINERAL OIL AND PETROLATUM: 150; 830 OINTMENT OPHTHALMIC at 15:00

## 2021-09-02 RX ADMIN — Medication 200 MCG/HR: at 16:20

## 2021-09-02 RX ADMIN — IPRATROPIUM BROMIDE AND ALBUTEROL SULFATE 1 AMPULE: .5; 3 SOLUTION RESPIRATORY (INHALATION) at 12:47

## 2021-09-02 RX ADMIN — CISATRACURIUM BESYLATE 4.5 MCG/KG/MIN: 10 INJECTION INTRAVENOUS at 01:32

## 2021-09-02 RX ADMIN — MINERAL OIL AND PETROLATUM: 150; 830 OINTMENT OPHTHALMIC at 03:53

## 2021-09-02 RX ADMIN — OXYCODONE HYDROCHLORIDE AND ACETAMINOPHEN 500 MG: 500 TABLET ORAL at 09:12

## 2021-09-02 RX ADMIN — INSULIN LISPRO 6 UNITS: 100 INJECTION, SOLUTION INTRAVENOUS; SUBCUTANEOUS at 12:41

## 2021-09-02 RX ADMIN — PROPOFOL 30 MCG/KG/MIN: 10 INJECTION, EMULSION INTRAVENOUS at 00:54

## 2021-09-02 RX ADMIN — PHENYTOIN SODIUM 300 MG: 50 INJECTION INTRAMUSCULAR; INTRAVENOUS at 10:23

## 2021-09-02 RX ADMIN — Medication 2000 MG: at 13:31

## 2021-09-02 RX ADMIN — Medication 2000 UNITS: at 09:11

## 2021-09-02 RX ADMIN — PROPOFOL 30 MCG/KG/MIN: 10 INJECTION, EMULSION INTRAVENOUS at 09:37

## 2021-09-02 RX ADMIN — PROPOFOL 30 MCG/KG/MIN: 10 INJECTION, EMULSION INTRAVENOUS at 17:52

## 2021-09-02 RX ADMIN — SODIUM CHLORIDE SOLN NEBU 3% 4 ML: 3 NEBU SOLN at 21:48

## 2021-09-02 RX ADMIN — ZINC SULFATE 220 MG (50 MG) CAPSULE 50 MG: CAPSULE at 09:11

## 2021-09-02 ASSESSMENT — PULMONARY FUNCTION TESTS
PIF_VALUE: 31
PIF_VALUE: 32
PIF_VALUE: 32
PIF_VALUE: 31
PIF_VALUE: 31
PIF_VALUE: 32
PIF_VALUE: 31
PIF_VALUE: 32
PIF_VALUE: 31
PIF_VALUE: 31
PIF_VALUE: 32
PIF_VALUE: 32
PIF_VALUE: 31

## 2021-09-02 ASSESSMENT — PAIN SCALES - GENERAL
PAINLEVEL_OUTOF10: 0

## 2021-09-02 NOTE — PROGRESS NOTES
[facility] Progress Note    Admitting Date and Time: 8/21/2021  9:35 AM  Admit Dx: Acute hypoxemic respiratory failure due to COVID-19 (HCC) [U07.1, J96.01]  Pneumonia due to COVID-19 virus [U07.1, J12.82]    Subjective:  Patient is being followed for Acute hypoxemic respiratory failure due to COVID-19 ([address] [U07.1, J96.01]  Pneumonia due to COVID-19 virus [U07.1, J12.82]     Patient has been afebrile through the night. However inflammatory markers are worsening. Currently in the supine position. ROS: denies fever, chills, cp, sob, n/v, HA unless stated above.       sodium chloride (Inhalant)  4 mL Nebulization Q12H    insulin glargine  30 Units SubCUTAneous Daily    dexamethasone  10 mg IntraVENous Daily    enoxaparin  40 mg SubCUTAneous BID    ertapenem (INVanz) IVPB  1,000 mg IntraVENous Q24H    insulin lispro  0-18 Units SubCUTAneous Q6H    phenytoin  300 mg IntraVENous Q12H    chlorhexidine  15 mL Mouth/Throat BID    Refresh Lacri-Lube   Ophthalmic Q6H    [Held by provider] lisinopril  40 mg Oral Daily    ipratropium-albuterol  1 ampule Inhalation Q4H    sodium chloride flush  10 mL IntraVENous BID    amLODIPine  10 mg Oral Daily    atorvastatin  40 mg Oral Daily    [Held by provider] doxazosin  8 mg Oral Daily    ascorbic acid  500 mg Oral Daily    Vitamin D  2,000 Units Oral Daily    zinc sulfate  50 mg Oral Daily    pantoprazole  40 mg IntraVENous Daily    cetirizine  5 mg Oral BID     sodium chloride (Inhalant), 4 mL, PRN  sodium chloride flush, 5-40 mL, PRN  sodium chloride, 25 mL, PRN  albuterol, 2.5 mg, Q6H PRN  benzonatate, 200 mg, TID PRN  glucose, 15 g, PRN  dextrose, 12.5 g, PRN  glucagon (rDNA), 1 mg, PRN  dextrose, 100 mL/hr, PRN  sodium chloride, 30 mL, PRN         Objective:    BP (!) 92/50   Pulse 86   Temp 98.8 °F (37.1 °C) (Bladder)   Resp 24   Ht 6' (1.829 m)   Wt (!) 302 lb (137 kg)   SpO2 98%   BMI 40.96 kg/m²     General Appearance: Supine, sedated, intubated skin: warm and dry  Head: normocephalic and atraumatic  Eyes: pupils equal, round, and reactive to light, extraocular eye movements intact, conjunctivae normal  Neck: neck supple and non tender without mass   Pulmonary/Chest: clear to auscultation bilaterally- no wheezes, rales or rhonchi, normal air movement, no respiratory distress  Cardiovascular: normal rate, normal S1 and S2 and no carotid bruits  Abdomen: soft, non-tender, non-distended, normal bowel sounds, no masses or organomegaly  Extremities: no cyanosis, no clubbing and no edema  Neurologic: Unable to assess        Recent Labs     08/31/21  0540 09/01/21  0520 09/02/21  0530    139 139   K 4.5 4.0 4.4   CL 98 101 103   CO2 26 25 27   BUN 37* 53* 47*   CREATININE 0.6* 1.2 0.7   GLUCOSE 271* 134* 185*   CALCIUM 8.9 8.6 8.6       Recent Labs     08/31/21  0540 09/01/21  0730 09/02/21  0530   WBC 14.6* 24.8* 24.9*   RBC 3.88 4.08 4.18   HGB 11.5* 12.1* 12.4*   HCT 35.2* 38.1 38.8   MCV 90.7 93.4 92.8   MCH 29.6 29.7 29.7   MCHC 32.7 31.8* 32.0   RDW 13.7 14.2 14.4    149 154   MPV 10.6 10.7 10.2       Radiology:     Complete retroperitoneal ultrasound:  1.  Bilateral intrarenal calcifications.  Bilateral renal cortical thinning   which suggest changes related to chronic underlying medical renal disease. No hydronephrosis. 2.  A Busch catheter is decompressing the bladder.      Assessment:    Principal Problem:    Acute hypoxemic respiratory failure due to COVID-19 [facility])  Active Problems:    Type 2 diabetes mellitus without complication, without long-term current use of insulin (HCC)    History of seizures    Hyperlipidemia    Busch catheter problem (HCC)    Sepsis (HCC)    Thrombocytopenia ([address]  Resolved Problems:    OSCAR (acute kidney injury) ([facility] [address]    Lactic acidosis      Plan:    1.  Acute hypoxic respiratory failure status post mechanical ventilation secondary to COVID-19 pneumonia - Continue  nimbex, fentanyl, and propofol. 2. COVID 19 pneumonia - Patient received actemra, completed remdesivir, and is on Decadron. Continue zinc, vitamin c, and vitamin d.  Patient is off of lasix. Thus far there is a net positive 3.2 L fluid balance. ESR and CRP trending up. Currently in supine position. 3. Possible VAP - Respiratory panel from 8/30 is growing GPC in clusters. Will follow final ID and sensitivity. MRSA from 8/24 is negative. Patient has been started on invanz and vancomycin. ID on board. 4. DM-II - Continue medium q6 SS and lantus  5. HLD - Continue lipitor  6. HTN - Continue norvasc  7. DVT prophylaxis - lovenox      NOTE: This report was transcribed using voice recognition software. Every effort was made to ensure accuracy; however, inadvertent computerized transcription errors may be present.   Electronically signed by Verónica Guerrero DO on 9/2/2021 at 11:13 AM

## 2021-09-02 NOTE — CONSULTS
[facility] MD Cadence Vance MD Janifer Abu, MD Evalee Lobstein, MD Bernardo Relic, CNP   Peri Gallegos, KALA  Consultation  9/2/2021    Thank you for consult  Full note dictated, to follow  Briefly, 60 y.o. gentleman admitted 8/21 with aggressive dyspnea, diagnosed with CO VID-19 pneumonia, subsequent suffered respiratory failure followed by multiorgan system failure. Has developed superimposed bacterial pneumonia. Current intubated via ETT, ventilated, prone    1.   Acute kidney injury, prerenal azotemia due to intravascular volume contraction despite massive third spacing in the setting of attempts to diurese    With cessation of diuretic therapy, creatinine has improved back to his baseline    Hold diuretic therapy  Start tube feed when able  Free water flushes 100 cc every 3 hours for now, adjust as warranted in the next 1 to 3 days  Follow labs, UO  Avoid nephrotoxins as able  Continue intensive supportive care    Cadence Maciel MD, MD

## 2021-09-02 NOTE — PROGRESS NOTES
Critical Care Team - Daily Progress Note         Date and time: 9/2/2021 8:55 AM  Patient's name:  Richardson Dewey Record Number: [id number]  Patient's account/billing number: [de-identified]  Patient's YOB: 1965  Age: 60 y.o. Date of Admission: 8/21/2021  9:35 AM  Length of stay during current admission: 12      Primary Care Physician: Demarcus Edwards DO  ICU Attending Physician: Kortney Still DO    Code Status: No Order    Reason for ICU admission: Respiratory failure      SUBJECTIVE     8/30: tolerating longer periods supine, thick dark brown secretions     8/31: secretions out of NG, michelle prone    9/1: Decreased urine output and increased creatinine today, continues with thick secretions    9/2: Cr and Uop improved    OBJECTIVE     Vital signs:   height is 6' (1.829 m) and weight is 302 lb (137 kg) (abnormal). His bladder temperature is 98.8 °F (37.1 °C). His blood pressure is 92/50 (abnormal) and his pulse is 81. His respiration is 24 and oxygen saturation is 98%. I/O last 3 completed shifts: In: 6583 [I.V.:2739; IV Piggyback:700]  Out: 2265 [Urine:2165; Emesis/NG output:100]      PHYSICAL EXAM:    Physical Exam  Constitutional:       Appearance: He is obese. He is ill-appearing. Interventions: He is sedated, chemically paralyzed and intubated. HENT:      Head: Normocephalic and atraumatic. Eyes:      Conjunctiva/sclera: Conjunctivae normal.   Neck:      Trachea: No tracheal deviation. Cardiovascular:      Rate and Rhythm: Normal rate and regular rhythm. Heart sounds: Normal heart sounds. Pulmonary:      Effort: Pulmonary effort is normal. He is intubated. Breath sounds: Rales present. Abdominal:      General: Bowel sounds are normal.      Palpations: Abdomen is soft. Tenderness: There is no abdominal tenderness. Musculoskeletal:         General: Swelling present. Cervical back: Neck supple.    Lymphadenopathy:      Cervical: No cervical adenopathy. Skin:     General: Skin is warm and dry. Findings: No rash. MEDICATIONS:  Scheduled Meds:   sodium chloride (Inhalant)  4 mL Nebulization Q12H    insulin glargine  30 Units SubCUTAneous Daily    dexamethasone  10 mg IntraVENous Daily    enoxaparin  40 mg SubCUTAneous BID    ertapenem (INVanz) IVPB  1,000 mg IntraVENous Q24H    insulin lispro  0-18 Units SubCUTAneous Q6H    phenytoin  300 mg IntraVENous Q12H    chlorhexidine  15 mL Mouth/Throat BID    Refresh Lacri-Lube   Ophthalmic Q6H    [Held by provider] lisinopril  40 mg Oral Daily    ipratropium-albuterol  1 ampule Inhalation Q4H    sodium chloride flush  10 mL IntraVENous BID    amLODIPine  10 mg Oral Daily    atorvastatin  40 mg Oral Daily    [Held by provider] doxazosin  8 mg Oral Daily    ascorbic acid  500 mg Oral Daily    Vitamin D  2,000 Units Oral Daily    zinc sulfate  50 mg Oral Daily    pantoprazole  40 mg IntraVENous Daily    cetirizine  5 mg Oral BID     Continuous Infusions:   fentaNYL 5 mcg/ml in 0.9%  ml infusion 200 mcg/hr (09/02/21 0243)    cisatracurium (NIMBEX) infusion 4 mcg/kg/min (09/02/21 0733)    propofol 30 mcg/kg/min (09/02/21 0054)    midazolam 6 mg/hr (09/01/21 1830)    sodium chloride      dextrose       PRN Meds:sodium chloride (Inhalant), sodium chloride flush, sodium chloride, albuterol, benzonatate, glucose, dextrose, glucagon (rDNA), dextrose, sodium chloride    PERTINENT LAB RESULTS:  Labs reviewed. DIAGNOSTICS:  Imaging reviewed. ASSESMENT/PLAN     Assessment:     1. Acute hypoxic respiratory failure requiring mechanical ventilation  2. Severe COVID-19 pneumonia s/p Toci, Remdesivir  3. Pneumonia, VAP - MSSA, GNR  4. Obesity BMI 40  5. HTN  6.  OSCAR - improved        · Sedation, NMB - Nimbex  · supine  · Vent - PC -Pi to 16, PEEP 14  · Supine goal 4 hours  · abx per ID - Invanz, Vanco  · Repeat ABG to assess if further proning needed  · Change lines  · Free H2O 100 q3          GI/DVT - SUP/Lovenox 40 q12  Consultants: ID, nephro      Code Status: Not on file    CCT excluding procedures 35 minutes    ELECTRONICALLY SIGNED:  Mary Dickey DO

## 2021-09-02 NOTE — PROGRESS NOTES
17:00 Pt. Removed from rotoprone bed and placed back into ICU bed. Pt. Tolerated well. Complete bedbath given, linens changed, ETT tube menjivar changed. Mouth care done. Called to update his wife.

## 2021-09-02 NOTE — PLAN OF CARE
Intensive Care Daily Quality Rounding Checklist        ICU Team Members: Dr. Sidra Lackey, Dr. Vini Amaya (resident), charge nurse, bedside nurse, clinical pharmacist     ICU Day #: 13     Intubation Date: August 23     Ventilator Day #: 11     Central Line Insertion Date: August 30                                                    Day #: 4      Arterial Line Insertion Date: August 30                             Day #: 4     Temporary Hemodialysis Catheter Insertion Date: N/A                             Day # N/A     DVT Prophylaxis: Lovenox    GI Prophylaxis: Protonix     Busch Catheter Insertion Date: 08/26/2021 (Changed)                                        Day #: 8                             Continued need (if yes, reason documented and discussed with physician): critical care patient, strict I+O     Skin Issues/ Wounds and ordered treatment discussed on rounds: No issues, SOS precautions     Goals/ Plans for the Day: Daily labs, wean vent as able, will try to supine today,

## 2021-09-02 NOTE — PROGRESS NOTES
[address] [place] [address] [facility]  Progress Note      Chief Complaint   Patient presents with    Shortness of Breath     covid, per family SaO2 75% RA, hx of DM and panic attack        SUBJECTIVE:  8/21/21  Prone. FiO2 60% and PEEP of 16    8/22/2021  Patient is tolerating medications. No reported adverse drug reactions. No nausea, vomiting, diarrhea. Afebrile BiPAP 100% FiO2 with breathing at 44 times a minute and probably is going to decompensate  8/23/2021  Not doing well intubated on a rotating bed  FiO2 70% and PEEP 16  8/24/2021  Prone 50% FiO2-PEEP of 10  Issues with the urinary Busch last night this was changed  Paralyzed and on Versed  8/25/2021  Paralyzed and sedated with Versed no pressors  Prone intubated on 50% FiO2, PEEP of 10  Tolerating medications    8/26/21  Paralyzed and sedated with Versed no pressors  Prone intubated on 70% FiO2, PEEP of 10  Does not tolerate supine position    8/27/2021  Afebrile  Still prone on FiO2 of 100%  Paralyzed and sedated    8/28/2021  The patient is still in the ICU. He is pronated. FiO2 100%. Is still sedated and paralyzed. Fair amount of thick, brown secretions    8/29/2021  The patient is still on a RotoProne bed. You are still intubated, sedated and paralyzed. O2 is being weaned down. FiO2 55%. PEEP 12.    8/30/2021  Patient remains intubated, sedated and paralyzed. He is still on a RotoProne bed. Supine now. 8/31/2021  He is still on a RotoProne bed. Still having fair amount of secretions from the nostrils. Minimal from the ET tube, however. Tube feeding seems to be coming out of his nose. 9/1/2021  He is supinated but still on a RotoProne bed. He still has fair amount of secretions from the ET tube. Tolerating antibiotic    9/2/2021. He is still on a RotoProne bed. He is pronated. Less secretions from the ET tube and nostrils. FiO2 50%. PEEP 14.     Review of systems:  As stated above in the chief complaint, Abdomen: R not examined  Genitourinary: Busch catheter  Extremities: Minimal edema  Lines: peripheral  Right radial arterial line 8/30/2021 (changed over guidewire). Right IJ triple-lumen catheter 8/30/2021 (changed over a guidewire). Busch changed 3/24/2021    Laboratory and Tests Review:  Lab Results   Component Value Date    WBC 24.9 (H) 09/02/2021    WBC 24.8 (H) 09/01/2021    WBC 14.6 (H) 08/31/2021    HGB 12.4 (L) 09/02/2021    HCT 38.8 09/02/2021    MCV 92.8 09/02/2021     09/02/2021     Lab Results   Component Value Date    NEUTROABS 22.89 (H) 09/02/2021    NEUTROABS 22.44 (H) 09/01/2021    NEUTROABS 12.51 (H) 08/31/2021     No results found for: CRP  Lab Results   Component Value Date    ALT 38 09/02/2021    AST 30 09/02/2021    ALKPHOS 103 09/02/2021    BILITOT 0.5 09/02/2021     Lab Results   Component Value Date     09/02/2021    K 4.4 09/02/2021    K 3.7 08/21/2021     09/02/2021    CO2 27 09/02/2021    BUN 47 09/02/2021    CREATININE 0.7 09/02/2021    CREATININE 1.2 09/01/2021    CREATININE 0.6 08/31/2021    GFRAA >60 09/02/2021    LABGLOM >60 09/02/2021    GLUCOSE 185 09/02/2021    PROT 5.9 09/02/2021    LABALBU 3.3 09/02/2021    CALCIUM 8.6 09/02/2021    BILITOT 0.5 09/02/2021    ALKPHOS 103 09/02/2021    AST 30 09/02/2021    ALT 38 09/02/2021     Lab Results   Component Value Date    CRP 0.9 (H) 09/01/2021    CRP 0.5 (H) 08/31/2021    CRP 0.5 (H) 08/30/2021     Lab Results   Component Value Date    SEDRATE 19 (H) 09/02/2021    SEDRATE 6 08/31/2021    SEDRATE 7 08/30/2021     Radiology:      Microbiology:   Streptococcus pneumoniae/Legionella urine Ag: negative  Nares screen MRSA: Negative  Urine culture 8/24/2021: Negative  Blood cultures 8/21/2021: Negative x2  Respiratory culture 8/27/2021: OP terrell reduced  Respiratory culture 8/30/2021: E. coli, MSSA     ASSESSMENT:  · Severe Covid pneumonia causing acute respiratory failure CRP has come down nicely Remdesivir.   Received

## 2021-09-03 LAB
AADO2: 211.1 MMHG
ALBUMIN SERPL-MCNC: 3.2 G/DL (ref 3.5–5.2)
ALP BLD-CCNC: 163 U/L (ref 40–129)
ALT SERPL-CCNC: 33 U/L (ref 0–40)
ANION GAP SERPL CALCULATED.3IONS-SCNC: 6 MMOL/L (ref 7–16)
ANION GAP SERPL CALCULATED.3IONS-SCNC: 7 MMOL/L (ref 7–16)
ANION GAP SERPL CALCULATED.3IONS-SCNC: 8 MMOL/L (ref 7–16)
ANION GAP SERPL CALCULATED.3IONS-SCNC: 9 MMOL/L (ref 7–16)
AST SERPL-CCNC: 47 U/L (ref 0–39)
B.E.: -1.3 MMOL/L (ref -3–3)
BASOPHILS ABSOLUTE: 0.03 E9/L (ref 0–0.2)
BASOPHILS RELATIVE PERCENT: 0.2 % (ref 0–2)
BILIRUB SERPL-MCNC: 0.5 MG/DL (ref 0–1.2)
BUN BLDV-MCNC: 35 MG/DL (ref 6–20)
BUN BLDV-MCNC: 36 MG/DL (ref 6–20)
BUN BLDV-MCNC: 36 MG/DL (ref 6–20)
BUN BLDV-MCNC: 39 MG/DL (ref 6–20)
C-REACTIVE PROTEIN: 14.1 MG/DL (ref 0–0.4)
CALCIUM SERPL-MCNC: 8.4 MG/DL (ref 8.6–10.2)
CALCIUM SERPL-MCNC: 8.5 MG/DL (ref 8.6–10.2)
CALCIUM SERPL-MCNC: 8.7 MG/DL (ref 8.6–10.2)
CALCIUM SERPL-MCNC: 8.7 MG/DL (ref 8.6–10.2)
CHLORIDE BLD-SCNC: 107 MMOL/L (ref 98–107)
CHLORIDE BLD-SCNC: 107 MMOL/L (ref 98–107)
CHLORIDE BLD-SCNC: 108 MMOL/L (ref 98–107)
CHLORIDE BLD-SCNC: 109 MMOL/L (ref 98–107)
CO2: 23 MMOL/L (ref 22–29)
CO2: 25 MMOL/L (ref 22–29)
CO2: 25 MMOL/L (ref 22–29)
CO2: 27 MMOL/L (ref 22–29)
COHB: 0.5 % (ref 0–1.5)
CREAT SERPL-MCNC: 0.5 MG/DL (ref 0.7–1.2)
CREAT SERPL-MCNC: 0.6 MG/DL (ref 0.7–1.2)
CRITICAL: NORMAL
DATE ANALYZED: NORMAL
DATE OF COLLECTION: NORMAL
EOSINOPHILS ABSOLUTE: 0.19 E9/L (ref 0.05–0.5)
EOSINOPHILS RELATIVE PERCENT: 1 % (ref 0–6)
FIO2: 50 %
GFR AFRICAN AMERICAN: >60
GFR NON-AFRICAN AMERICAN: >60 ML/MIN/1.73
GLUCOSE BLD-MCNC: 237 MG/DL (ref 74–99)
GLUCOSE BLD-MCNC: 279 MG/DL (ref 74–99)
GLUCOSE BLD-MCNC: 298 MG/DL (ref 74–99)
GLUCOSE BLD-MCNC: 304 MG/DL (ref 74–99)
HCO3: 24.1 MMOL/L (ref 22–26)
HCT VFR BLD CALC: 39.5 % (ref 37–54)
HEMOGLOBIN: 12.4 G/DL (ref 12.5–16.5)
HHB: 3.7 % (ref 0–5)
IMMATURE GRANULOCYTES #: 0.21 E9/L
IMMATURE GRANULOCYTES %: 1.1 % (ref 0–5)
LAB: NORMAL
LYMPHOCYTES ABSOLUTE: 0.75 E9/L (ref 1.5–4)
LYMPHOCYTES RELATIVE PERCENT: 3.9 % (ref 20–42)
Lab: NORMAL
MAGNESIUM: 2.2 MG/DL (ref 1.6–2.6)
MCH RBC QN AUTO: 29.6 PG (ref 26–35)
MCHC RBC AUTO-ENTMCNC: 31.4 % (ref 32–34.5)
MCV RBC AUTO: 94.3 FL (ref 80–99.9)
METER GLUCOSE: 178 MG/DL (ref 74–99)
METER GLUCOSE: 211 MG/DL (ref 74–99)
METER GLUCOSE: 253 MG/DL (ref 74–99)
METER GLUCOSE: 260 MG/DL (ref 74–99)
METER GLUCOSE: 268 MG/DL (ref 74–99)
METER GLUCOSE: 289 MG/DL (ref 74–99)
METHB: 0.2 % (ref 0–1.5)
MODE: NORMAL
MONOCYTES ABSOLUTE: 1.01 E9/L (ref 0.1–0.95)
MONOCYTES RELATIVE PERCENT: 5.3 % (ref 2–12)
NEUTROPHILS ABSOLUTE: 16.96 E9/L (ref 1.8–7.3)
NEUTROPHILS RELATIVE PERCENT: 88.5 % (ref 43–80)
O2 CONTENT: 16.5 ML/DL
O2 SATURATION: 96.3 % (ref 92–98.5)
O2HB: 95.6 % (ref 94–97)
OPERATOR ID: NORMAL
PATIENT TEMP: 37 C
PCO2: 43 MMHG (ref 35–45)
PDW BLD-RTO: 14.3 FL (ref 11.5–15)
PEEP/CPAP: 14 CMH2O
PFO2: 1.69 MMHG/%
PH BLOOD GAS: 7.37 (ref 7.35–7.45)
PHOSPHORUS: 2.9 MG/DL (ref 2.5–4.5)
PIP: 16 CMH2O
PLATELET # BLD: 168 E9/L (ref 130–450)
PMV BLD AUTO: 10.6 FL (ref 7–12)
PO2: 84.5 MMHG (ref 75–100)
POTASSIUM SERPL-SCNC: 4.6 MMOL/L (ref 3.5–5)
POTASSIUM SERPL-SCNC: 5.2 MMOL/L (ref 3.5–5)
POTASSIUM SERPL-SCNC: 5.5 MMOL/L (ref 3.5–5)
POTASSIUM SERPL-SCNC: 5.8 MMOL/L (ref 3.5–5)
RBC # BLD: 4.19 E12/L (ref 3.8–5.8)
RI(T): 250 %
RR MECHANICAL: 22 B/MIN
SEDIMENTATION RATE, ERYTHROCYTE: 30 MM/HR (ref 0–15)
SODIUM BLD-SCNC: 139 MMOL/L (ref 132–146)
SODIUM BLD-SCNC: 140 MMOL/L (ref 132–146)
SODIUM BLD-SCNC: 141 MMOL/L (ref 132–146)
SODIUM BLD-SCNC: 141 MMOL/L (ref 132–146)
SOURCE, BLOOD GAS: NORMAL
THB: 12.2 G/DL (ref 11.5–16.5)
TIME ANALYZED: 555
TOTAL PROTEIN: 5.9 G/DL (ref 6.4–8.3)
WBC # BLD: 19.2 E9/L (ref 4.5–11.5)

## 2021-09-03 PROCEDURE — 2500000003 HC RX 250 WO HCPCS: Performed by: INTERNAL MEDICINE

## 2021-09-03 PROCEDURE — 84100 ASSAY OF PHOSPHORUS: CPT

## 2021-09-03 PROCEDURE — 6370000000 HC RX 637 (ALT 250 FOR IP): Performed by: INTERNAL MEDICINE

## 2021-09-03 PROCEDURE — 2580000003 HC RX 258: Performed by: STUDENT IN AN ORGANIZED HEALTH CARE EDUCATION/TRAINING PROGRAM

## 2021-09-03 PROCEDURE — 2580000003 HC RX 258: Performed by: INTERNAL MEDICINE

## 2021-09-03 PROCEDURE — 85651 RBC SED RATE NONAUTOMATED: CPT

## 2021-09-03 PROCEDURE — 37799 UNLISTED PX VASCULAR SURGERY: CPT

## 2021-09-03 PROCEDURE — 86140 C-REACTIVE PROTEIN: CPT

## 2021-09-03 PROCEDURE — 6360000002 HC RX W HCPCS: Performed by: FAMILY MEDICINE

## 2021-09-03 PROCEDURE — 85025 COMPLETE CBC W/AUTO DIFF WBC: CPT

## 2021-09-03 PROCEDURE — 94003 VENT MGMT INPAT SUBQ DAY: CPT

## 2021-09-03 PROCEDURE — 6360000002 HC RX W HCPCS: Performed by: STUDENT IN AN ORGANIZED HEALTH CARE EDUCATION/TRAINING PROGRAM

## 2021-09-03 PROCEDURE — 94640 AIRWAY INHALATION TREATMENT: CPT

## 2021-09-03 PROCEDURE — 80048 BASIC METABOLIC PNL TOTAL CA: CPT

## 2021-09-03 PROCEDURE — 80053 COMPREHEN METABOLIC PANEL: CPT

## 2021-09-03 PROCEDURE — 6360000002 HC RX W HCPCS: Performed by: INTERNAL MEDICINE

## 2021-09-03 PROCEDURE — 2580000003 HC RX 258: Performed by: FAMILY MEDICINE

## 2021-09-03 PROCEDURE — 36415 COLL VENOUS BLD VENIPUNCTURE: CPT

## 2021-09-03 PROCEDURE — 83735 ASSAY OF MAGNESIUM: CPT

## 2021-09-03 PROCEDURE — 99233 SBSQ HOSP IP/OBS HIGH 50: CPT | Performed by: INTERNAL MEDICINE

## 2021-09-03 PROCEDURE — 2000000000 HC ICU R&B

## 2021-09-03 PROCEDURE — 82805 BLOOD GASES W/O2 SATURATION: CPT

## 2021-09-03 PROCEDURE — 6360000002 HC RX W HCPCS: Performed by: SPECIALIST

## 2021-09-03 PROCEDURE — 82962 GLUCOSE BLOOD TEST: CPT

## 2021-09-03 PROCEDURE — C9113 INJ PANTOPRAZOLE SODIUM, VIA: HCPCS | Performed by: STUDENT IN AN ORGANIZED HEALTH CARE EDUCATION/TRAINING PROGRAM

## 2021-09-03 RX ORDER — DEXTROSE MONOHYDRATE 25 G/50ML
25 INJECTION, SOLUTION INTRAVENOUS ONCE
Status: COMPLETED | OUTPATIENT
Start: 2021-09-03 | End: 2021-09-03

## 2021-09-03 RX ORDER — SODIUM CHLORIDE 9 MG/ML
INJECTION, SOLUTION INTRAVENOUS CONTINUOUS
Status: DISCONTINUED | OUTPATIENT
Start: 2021-09-03 | End: 2021-09-04

## 2021-09-03 RX ORDER — INSULIN GLARGINE 100 [IU]/ML
45 INJECTION, SOLUTION SUBCUTANEOUS DAILY
Status: DISCONTINUED | OUTPATIENT
Start: 2021-09-04 | End: 2021-09-05

## 2021-09-03 RX ORDER — SODIUM POLYSTYRENE SULFONATE 15 G/60ML
15 SUSPENSION ORAL; RECTAL ONCE
Status: COMPLETED | OUTPATIENT
Start: 2021-09-03 | End: 2021-09-03

## 2021-09-03 RX ORDER — INSULIN GLARGINE 100 [IU]/ML
15 INJECTION, SOLUTION SUBCUTANEOUS ONCE
Status: COMPLETED | OUTPATIENT
Start: 2021-09-03 | End: 2021-09-03

## 2021-09-03 RX ORDER — CALCIUM GLUCONATE 94 MG/ML
1000 INJECTION, SOLUTION INTRAVENOUS ONCE
Status: COMPLETED | OUTPATIENT
Start: 2021-09-03 | End: 2021-09-03

## 2021-09-03 RX ADMIN — CISATRACURIUM BESYLATE 4 MCG/KG/MIN: 10 INJECTION INTRAVENOUS at 09:56

## 2021-09-03 RX ADMIN — CHLORHEXIDINE GLUCONATE 0.12% ORAL RINSE 15 ML: 1.2 LIQUID ORAL at 08:10

## 2021-09-03 RX ADMIN — ENOXAPARIN SODIUM 40 MG: 40 INJECTION SUBCUTANEOUS at 08:11

## 2021-09-03 RX ADMIN — SODIUM CHLORIDE: 9 INJECTION, SOLUTION INTRAVENOUS at 21:56

## 2021-09-03 RX ADMIN — PANTOPRAZOLE SODIUM 40 MG: 40 INJECTION, POWDER, FOR SOLUTION INTRAVENOUS at 08:15

## 2021-09-03 RX ADMIN — MINERAL OIL AND PETROLATUM: 150; 830 OINTMENT OPHTHALMIC at 14:30

## 2021-09-03 RX ADMIN — SODIUM POLYSTYRENE SULFONATE 15 G: 15 SUSPENSION ORAL; RECTAL at 21:59

## 2021-09-03 RX ADMIN — CALCIUM GLUCONATE 1000 MG: 98 INJECTION, SOLUTION INTRAVENOUS at 20:18

## 2021-09-03 RX ADMIN — CETIRIZINE HYDROCHLORIDE 5 MG: 10 TABLET, FILM COATED ORAL at 08:11

## 2021-09-03 RX ADMIN — PROPOFOL 30 MCG/KG/MIN: 10 INJECTION, EMULSION INTRAVENOUS at 02:17

## 2021-09-03 RX ADMIN — IPRATROPIUM BROMIDE AND ALBUTEROL SULFATE 1 AMPULE: .5; 3 SOLUTION RESPIRATORY (INHALATION) at 08:37

## 2021-09-03 RX ADMIN — INSULIN LISPRO 9 UNITS: 100 INJECTION, SOLUTION INTRAVENOUS; SUBCUTANEOUS at 18:18

## 2021-09-03 RX ADMIN — PHENYTOIN SODIUM 300 MG: 50 INJECTION INTRAMUSCULAR; INTRAVENOUS at 09:57

## 2021-09-03 RX ADMIN — INSULIN GLARGINE 30 UNITS: 100 INJECTION, SOLUTION SUBCUTANEOUS at 08:12

## 2021-09-03 RX ADMIN — SODIUM CHLORIDE SOLN NEBU 3% 4 ML: 3 NEBU SOLN at 08:37

## 2021-09-03 RX ADMIN — PHENYTOIN SODIUM 300 MG: 50 INJECTION INTRAMUSCULAR; INTRAVENOUS at 21:55

## 2021-09-03 RX ADMIN — MINERAL OIL AND PETROLATUM: 150; 830 OINTMENT OPHTHALMIC at 20:13

## 2021-09-03 RX ADMIN — Medication 10 ML: at 20:12

## 2021-09-03 RX ADMIN — MINERAL OIL AND PETROLATUM: 150; 830 OINTMENT OPHTHALMIC at 08:16

## 2021-09-03 RX ADMIN — Medication 2000 MG: at 05:21

## 2021-09-03 RX ADMIN — CISATRACURIUM BESYLATE 4 MCG/KG/MIN: 10 INJECTION INTRAVENOUS at 16:14

## 2021-09-03 RX ADMIN — DEXTROSE MONOHYDRATE 25 G: 25 INJECTION, SOLUTION INTRAVENOUS at 15:58

## 2021-09-03 RX ADMIN — OXYCODONE HYDROCHLORIDE AND ACETAMINOPHEN 500 MG: 500 TABLET ORAL at 08:11

## 2021-09-03 RX ADMIN — Medication 6 MG/HR: at 03:32

## 2021-09-03 RX ADMIN — DEXAMETHASONE SODIUM PHOSPHATE 10 MG: 4 INJECTION, SOLUTION INTRA-ARTICULAR; INTRALESIONAL; INTRAMUSCULAR; INTRAVENOUS; SOFT TISSUE at 08:12

## 2021-09-03 RX ADMIN — INSULIN LISPRO 9 UNITS: 100 INJECTION, SOLUTION INTRAVENOUS; SUBCUTANEOUS at 11:23

## 2021-09-03 RX ADMIN — Medication 2000 MG: at 20:45

## 2021-09-03 RX ADMIN — IPRATROPIUM BROMIDE AND ALBUTEROL SULFATE 1 AMPULE: .5; 3 SOLUTION RESPIRATORY (INHALATION) at 02:48

## 2021-09-03 RX ADMIN — CETIRIZINE HYDROCHLORIDE 5 MG: 10 TABLET, FILM COATED ORAL at 20:12

## 2021-09-03 RX ADMIN — INSULIN HUMAN 6 UNITS: 100 INJECTION, SOLUTION PARENTERAL at 15:57

## 2021-09-03 RX ADMIN — IPRATROPIUM BROMIDE AND ALBUTEROL SULFATE 1 AMPULE: .5; 3 SOLUTION RESPIRATORY (INHALATION) at 17:08

## 2021-09-03 RX ADMIN — ENOXAPARIN SODIUM 40 MG: 40 INJECTION SUBCUTANEOUS at 20:12

## 2021-09-03 RX ADMIN — Medication 7 MG/HR: at 21:14

## 2021-09-03 RX ADMIN — SODIUM CHLORIDE: 9 INJECTION, SOLUTION INTRAVENOUS at 15:17

## 2021-09-03 RX ADMIN — DEXTROSE MONOHYDRATE 25 G: 25 INJECTION, SOLUTION INTRAVENOUS at 20:06

## 2021-09-03 RX ADMIN — Medication 200 MCG/HR: at 12:44

## 2021-09-03 RX ADMIN — INSULIN GLARGINE 15 UNITS: 100 INJECTION, SOLUTION SUBCUTANEOUS at 11:08

## 2021-09-03 RX ADMIN — INSULIN LISPRO 6 UNITS: 100 INJECTION, SOLUTION INTRAVENOUS; SUBCUTANEOUS at 06:08

## 2021-09-03 RX ADMIN — CALCIUM GLUCONATE 1000 MG: 98 INJECTION, SOLUTION INTRAVENOUS at 16:04

## 2021-09-03 RX ADMIN — IPRATROPIUM BROMIDE AND ALBUTEROL SULFATE 1 AMPULE: .5; 3 SOLUTION RESPIRATORY (INHALATION) at 20:39

## 2021-09-03 RX ADMIN — CISATRACURIUM BESYLATE 4 MCG/KG/MIN: 10 INJECTION INTRAVENOUS at 02:49

## 2021-09-03 RX ADMIN — INSULIN LISPRO 3 UNITS: 100 INJECTION, SOLUTION INTRAVENOUS; SUBCUTANEOUS at 00:30

## 2021-09-03 RX ADMIN — SODIUM BICARBONATE 50 MEQ: 84 INJECTION, SOLUTION INTRAVENOUS at 20:07

## 2021-09-03 RX ADMIN — Medication 200 MCG/HR: at 05:20

## 2021-09-03 RX ADMIN — CISATRACURIUM BESYLATE 4 MCG/KG/MIN: 10 INJECTION INTRAVENOUS at 21:55

## 2021-09-03 RX ADMIN — CHLORHEXIDINE GLUCONATE 0.12% ORAL RINSE 15 ML: 1.2 LIQUID ORAL at 20:11

## 2021-09-03 RX ADMIN — PROPOFOL 25 MCG/KG/MIN: 10 INJECTION, EMULSION INTRAVENOUS at 11:07

## 2021-09-03 RX ADMIN — PROPOFOL 20 MCG/KG/MIN: 10 INJECTION, EMULSION INTRAVENOUS at 17:24

## 2021-09-03 RX ADMIN — INSULIN HUMAN 6 UNITS: 100 INJECTION, SOLUTION PARENTERAL at 20:04

## 2021-09-03 RX ADMIN — Medication 2000 UNITS: at 08:12

## 2021-09-03 RX ADMIN — IPRATROPIUM BROMIDE AND ALBUTEROL SULFATE 1 AMPULE: .5; 3 SOLUTION RESPIRATORY (INHALATION) at 13:21

## 2021-09-03 RX ADMIN — MINERAL OIL AND PETROLATUM: 150; 830 OINTMENT OPHTHALMIC at 01:04

## 2021-09-03 RX ADMIN — Medication 10 ML: at 08:10

## 2021-09-03 RX ADMIN — ZINC SULFATE 220 MG (50 MG) CAPSULE 50 MG: CAPSULE at 08:12

## 2021-09-03 RX ADMIN — ATORVASTATIN CALCIUM 40 MG: 40 TABLET, FILM COATED ORAL at 20:12

## 2021-09-03 RX ADMIN — SODIUM CHLORIDE SOLN NEBU 3% 4 ML: 3 NEBU SOLN at 20:39

## 2021-09-03 RX ADMIN — Medication 2000 MG: at 11:08

## 2021-09-03 RX ADMIN — SODIUM POLYSTYRENE SULFONATE 15 G: 15 SUSPENSION ORAL; RECTAL at 16:07

## 2021-09-03 RX ADMIN — Medication 200 MCG/HR: at 20:04

## 2021-09-03 ASSESSMENT — PAIN SCALES - GENERAL
PAINLEVEL_OUTOF10: 0

## 2021-09-03 ASSESSMENT — PULMONARY FUNCTION TESTS
PIF_VALUE: 31
PIF_VALUE: 31
PIF_VALUE: 27
PIF_VALUE: 31
PIF_VALUE: 31
PIF_VALUE: 29
PIF_VALUE: 31
PIF_VALUE: 31
PIF_VALUE: 27
PIF_VALUE: 29
PIF_VALUE: 26
PIF_VALUE: 26
PIF_VALUE: 27
PIF_VALUE: 27
PIF_VALUE: 31
PIF_VALUE: 27
PIF_VALUE: 31
PIF_VALUE: 29
PIF_VALUE: 27
PIF_VALUE: 31
PIF_VALUE: 31
PIF_VALUE: 29
PIF_VALUE: 27
PIF_VALUE: 26
PIF_VALUE: 31
PIF_VALUE: 32
PIF_VALUE: 32

## 2021-09-03 NOTE — PROGRESS NOTES
9/3/2021  2:10 PM      Comprehensive Nutrition Assessment    Type and Reason for Visit:  Reassess    Nutrition Recommendations/Plan: When able to resume TF, recommend continue to advance to goal rate and add Protein Modular to more closely meet needs, until PO diet/ONS is feasible. Note that recommended goal rate is higher now that pt is not on propofol:    TF RECOMMENDATION: Diabetic TF (Glucerna 1.5) to goal rate 60 ml/hr and Protein Modular once daily  This will provide: 1440 ml/d, 2260 angel, 145 g protein, 1093 ml free water  This regimen will meet: 90% calorie needs, 100% protein needs    Nutrition Assessment:  Pt status is improving. Weaned off pressors. Tolerating supine position, sedation weaning and vent weaning in progress. After weaning done, recommend resume NGT TF until PO is medically advisable. Will continue to monitor. Malnutrition Assessment:  Malnutrition Status: At risk for malnutrition (Comment)    Context:  Acute Illness     Findings of the 6 clinical characteristics of malnutrition:  Energy Intake:  Mild decrease in energy intake (Comment)  Weight Loss:  Unable to assess     Body Fat Loss:  Unable to assess (Covid Isolation)     Muscle Mass Loss:  Unable to assess (Covid Isolation)    Fluid Accumulation:  No significant fluid accumulation     Strength:  Not Performed    Estimated Daily Nutrient Needs:  Energy (kcal):  ; Weight Used for Energy Requirements:  Admission     Protein (g):  125-160 (1.5-1.8 g/kg);  Weight Used for Protein Requirements:  Ideal        Fluid (ml/day):  per Critical Care; Method Used for Fluid Requirements:  Other (Comment)      Nutrition Related Findings:  intubated/paralytic, NGT to TF, distended abd w/hypo BS, general +3 edema      Wounds:   (some blisters, red areas documented)       Current Nutrition Therapies:    Current Tube Feeding (TF) Orders:  · Feeding Route: Nasogastric  · Formula: Diabetic  · Schedule: Continuous (goal = 45 ml/hr =1080 ml/d)  · Additives/Modulars:  (none)  · Water Flushes: 30 ml Q 6 hr  · Current TF & Flush Orders Provides: vent weaning in process (9/3) rate at 25 ml/hr = 600 ml/d, 900 angel, 50 g pro, 635 ml total free water  · Goal TF & Flush Orders Provides: goal =45 ml/hr = 1080 ml/d, 1620 angel, 89 g pro, 1000 ml total free water      Anthropometric Measures:  · Height: 6' (182.9 cm)  · Current Body Weight: 306 lb (138.8 kg) (9/3 bedscale)   · Admission Body Weight: 302 lb (137 kg) (8/21 bedscale - first measured wt this admit)    · Usual Body Weight:  (No recent actual wt hx available)     · Ideal Body Weight: 178 lbs; % Ideal Body Weight 171.9 %   · BMI: 41.5  · BMI Categories: Obese Class 3 (BMI 40.0 or greater)       Nutrition Diagnosis:   · Inadequate oral intake related to impaired respiratory function as evidenced by NPO or clear liquid status due to medical condition, intubation, nutrition support - enteral nutrition      Nutrition Interventions:   Food and/or Nutrient Delivery:  Continue NPO, Continue Current Tube Feeding (When able to resume EN, recommend increase to goal rate and include protein modular)  Nutrition Education/Counseling:  No recommendation at this time   Coordination of Nutrition Care:  Continue to monitor while inpatient    Goals:  Pt michelle EN at goal rate       Nutrition Monitoring and Evaluation:   Behavioral-Environmental Outcomes:  None Identified   Food/Nutrient Intake Outcomes:  Enteral Nutrition Intake/Tolerance  Physical Signs/Symptoms Outcomes:  Biochemical Data, GI Status, Fluid Status or Edema, Nutrition Focused Physical Findings, Skin, Weight     Discharge Planning:     Too soon to determine     Electronically signed by Verónica Nick RD, CNSC, LD on 9/3/21 at 2:10 PM EDT    Contact: [phone]

## 2021-09-03 NOTE — CARE COORDINATION
Continue ICU. Continue vent support. FiO2 50%, PEEP-12. Tolerating supine. Continue IV antibiotics.  Select LTAC following-mjo

## 2021-09-03 NOTE — FLOWSHEET NOTE
Inpatient Wound Care    Admit Date: 8/21/2021  9:35 AM    Reason for consult:  Mult wounds    Significant history:  Admitted with respir failure d/t COVID-19.      Wound history:  HAPI    Findings:     09/03/21 1647   Wound 09/03/21 Face Left   Date First Assessed/Time First Assessed: 09/03/21 1647   Present on Hospital Admission: No  Location: Face  Wound Location Orientation: Left   Wound Image    Wound Etiology Deep tissue/Injury   Wound Length (cm) 3 cm   Wound Width (cm) 7 cm   Wound Surface Area (cm^2) 21 cm^2   Wound Assessment Purple/maroon   Drainage Amount None   Rayne-wound Assessment Fragile   Wound 09/03/21 Face Right   Date First Assessed/Time First Assessed: 09/03/21 1648   Present on Hospital Admission: No  Location: Face  Wound Location Orientation: Right   Wound Image    Wound Etiology Deep tissue/Injury   Wound Length (cm) 4 cm   Wound Width (cm) 2.4 cm   Wound Surface Area (cm^2) 9.6 cm^2   Wound Assessment Purple/maroon   Drainage Amount None   Odor None   Rayne-wound Assessment Fragile   Wound 09/03/21 Face forehead   Date First Assessed/Time First Assessed: 09/03/21 1650   Present on Hospital Admission: No  Location: Face  Wound Description (Comments): forehead   Wound Image    Wound Etiology Deep tissue/Injury   Wound Length (cm) 1 cm   Wound Width (cm) 4 cm   Wound Surface Area (cm^2) 4 cm^2   Wound Assessment Purple/maroon   Drainage Amount None   Odor None   Rayne-wound Assessment Fragile   Wound 09/03/21 Thigh Right   Date First Assessed/Time First Assessed: 09/03/21 1652   Present on Hospital Admission: No  Location: Thigh  Wound Location Orientation: Right   Wound Image    Wound Etiology Pressure Stage  2   Wound Length (cm) 6 cm   Wound Width (cm) 4 cm   Wound Depth (cm) 0.1 cm   Wound Surface Area (cm^2) 24 cm^2   Wound Volume (cm^3) 2.4 cm^3   Wound Assessment Pink/red;Purple/maroon   Drainage Amount None   Odor None   Wound 09/03/21 Leg Left   Date First Assessed/Time First Assessed: 09/03/21 1653   Present on Hospital Admission: No  Location: Leg  Wound Location Orientation: Left   Wound Image    Wound Etiology Pressure Stage  2   Wound Length (cm) 2 cm   Wound Width (cm) 3 cm   Wound Surface Area (cm^2) 6 cm^2   Wound Assessment Fluid filled blister   Drainage Amount None   Odor None   Rayne-wound Assessment Intact   Wound 09/03/21 Heel Left   Date First Assessed/Time First Assessed: 09/03/21 1655   Present on Hospital Admission: No  Location: Heel  Wound Location Orientation: Left   Wound Image    Wound Etiology Deep tissue/Injury   Wound Length (cm) 0.3 cm   Wound Width (cm) 0.3 cm   Wound Surface Area (cm^2) 0.09 cm^2   Wound Assessment Purple/maroon   Drainage Amount None   Odor None   Wound 09/03/21 Abdomen Left   Date First Assessed/Time First Assessed: 09/03/21 1656   Present on Hospital Admission: No  Location: Abdomen  Wound Location Orientation: Left   Wound Image    Wound Etiology Deep tissue/Injury   Wound Length (cm) 7 cm   Wound Width (cm) 2 cm   Wound Surface Area (cm^2) 14 cm^2   Wound Assessment Pink/red;Purple/maroon   Drainage Amount None   Odor None   Rayne-wound Assessment Edematous       Impression:  DTI face, forehead, L heel, R leg, L abd, stage 2 pressure injury L leg. Interventions in place:  Keep areas CHELI , clean and dry unless they drain then apply Versatel. Plan: Keep areas clean and dry, SOS precautions.        Johann Gallagher RN 9/3/2021 4:58 PM

## 2021-09-03 NOTE — PLAN OF CARE
Problem: Airway Clearance - Ineffective  Goal: Achieve or maintain patent airway  Outcome: Met This Shift     Problem: Gas Exchange - Impaired  Goal: Absence of hypoxia  Outcome: Met This Shift  Goal: Promote optimal lung function  Outcome: Met This Shift     Problem: Breathing Pattern - Ineffective  Goal: Ability to achieve and maintain a regular respiratory rate  Outcome: Met This Shift     Problem:  Body Temperature -  Risk of, Imbalanced  Goal: Complications related to the disease process, condition or treatment will be avoided or minimized  Outcome: Met This Shift     Problem: Isolation Precautions - Risk of Spread of Infection  Goal: Prevent transmission of infection  Outcome: Met This Shift     Problem: Nutrition Deficits  Goal: Optimize nutritional status  Outcome: Met This Shift     Problem: Risk for Fluid Volume Deficit  Goal: Maintain normal heart rhythm  Outcome: Met This Shift  Goal: Maintain absence of muscle cramping  Outcome: Met This Shift  Goal: Maintain normal serum potassium, sodium, calcium, phosphorus, and pH  Outcome: Met This Shift     Problem: Risk for Fluid Volume Deficit  Goal: Maintain absence of muscle cramping  Outcome: Met This Shift     Problem: Skin Integrity:  Goal: Will show no infection signs and symptoms  Description: Will show no infection signs and symptoms  Outcome: Met This Shift  Goal: Absence of new skin breakdown  Description: Absence of new skin breakdown  Outcome: Met This Shift

## 2021-09-03 NOTE — PROGRESS NOTES
[address] [place] [address] [facility]  Progress Note      Chief Complaint   Patient presents with    Shortness of Breath     covid, per family SaO2 75% RA, hx of DM and panic attack        SUBJECTIVE:  8/21/21  Prone. FiO2 60% and PEEP of 16    8/22/2021  Patient is tolerating medications. No reported adverse drug reactions. No nausea, vomiting, diarrhea. Afebrile BiPAP 100% FiO2 with breathing at 44 times a minute and probably is going to decompensate  8/23/2021  Not doing well intubated on a rotating bed  FiO2 70% and PEEP 16  8/24/2021  Prone 50% FiO2-PEEP of 10  Issues with the urinary Busch last night this was changed  Paralyzed and on Versed  8/25/2021  Paralyzed and sedated with Versed no pressors  Prone intubated on 50% FiO2, PEEP of 10  Tolerating medications    8/26/21  Paralyzed and sedated with Versed no pressors  Prone intubated on 70% FiO2, PEEP of 10  Does not tolerate supine position    8/27/2021  Afebrile  Still prone on FiO2 of 100%  Paralyzed and sedated    8/28/2021  The patient is still in the ICU. He is pronated. FiO2 100%. Is still sedated and paralyzed. Fair amount of thick, brown secretions    8/29/2021  The patient is still on a RotoProne bed. You are still intubated, sedated and paralyzed. O2 is being weaned down. FiO2 55%. PEEP 12.    8/30/2021  Patient remains intubated, sedated and paralyzed. He is still on a RotoProne bed. Supine now. 8/31/2021  He is still on a RotoProne bed. Still having fair amount of secretions from the nostrils. Minimal from the ET tube, however. Tube feeding seems to be coming out of his nose. 9/1/2021  He is supinated but still on a RotoProne bed. He still has fair amount of secretions from the ET tube. Tolerating antibiotic    9/2/2021. He is still on a RotoProne bed. He is pronated. Less secretions from the ET tube and nostrils. FiO2 50%. PEEP 14.    9/3/2021. He is off the RotoProne bed. Tolerating supination. Tolerating weaning. PEEP was brought down to 12. FiO2 still at 50%. Awaiting antibiotics. Review of systems:  As stated above in the chief complaint, otherwise negative. Medications:  Scheduled Meds:   [START ON 2021] insulin glargine  45 Units SubCUTAneous Daily    ceFAZolin  2,000 mg IntraVENous Q8H    sodium chloride (Inhalant)  4 mL Nebulization Q12H    insulin glargine  30 Units SubCUTAneous Daily    dexamethasone  10 mg IntraVENous Daily    enoxaparin  40 mg SubCUTAneous BID    insulin lispro  0-18 Units SubCUTAneous Q6H    phenytoin  300 mg IntraVENous Q12H    chlorhexidine  15 mL Mouth/Throat BID    Refresh Lacri-Lube   Ophthalmic Q6H    [Held by provider] lisinopril  40 mg Oral Daily    ipratropium-albuterol  1 ampule Inhalation Q4H    sodium chloride flush  10 mL IntraVENous BID    amLODIPine  10 mg Oral Daily    atorvastatin  40 mg Oral Daily    [Held by provider] doxazosin  8 mg Oral Daily    ascorbic acid  500 mg Oral Daily    Vitamin D  2,000 Units Oral Daily    zinc sulfate  50 mg Oral Daily    pantoprazole  40 mg IntraVENous Daily    cetirizine  5 mg Oral BID     Continuous Infusions:   fentaNYL 5 mcg/ml in 0.9%  ml infusion 200 mcg/hr (21 0520)    cisatracurium (NIMBEX) infusion 4 mcg/kg/min (21 0956)    propofol 25 mcg/kg/min (21 1107)    midazolam 6 mg/hr (21 1021)    sodium chloride      dextrose       PRN Meds:sodium chloride (Inhalant), sodium chloride flush, sodium chloride, albuterol, benzonatate, glucose, dextrose, glucagon (rDNA), dextrose, sodium chloride    OBJECTIVE:  BP (!) 171/81   Pulse 117   Temp 99.3 °F (37.4 °C) (Bladder)   Resp 22   Ht 6' (1.829 m)   Wt (!) 306 lb 14.1 oz (139.2 kg)   SpO2 93%   BMI 41.62 kg/m²   Temp  Av.2 °F (37.3 °C)  Min: 99 °F (37.2 °C)  Max: 99.5 °F (37.5 °C)  Constitutional: The patient is intubated, sedated, paralyzed. He is supine. Skin: Warm and dry. No rashes were noted. HEENT: Round pupils. Secretions from nostril clearing. Face cannot be seen. Decubitus lesions on cheeks, hands and lower extremities noted. Neck: Not examined. Chest: Coarse breath sounds bilaterally. No crackles  Cardiovascular: S heart sounds rhythmic and regular. Tachycardic. Abdomen: Round, soft and benign to palpation. Genitourinary: Busch catheter  Extremities: Minimal edema  Lines: Right radial arterial line 8/30/2021 (changed over guidewire). Right IJ triple-lumen catheter 8/30/2021 (changed over a guidewire).   Busch changed 3/24/2021    Laboratory and Tests Review:  Lab Results   Component Value Date    WBC 19.2 (H) 09/03/2021    WBC 24.9 (H) 09/02/2021    WBC 24.8 (H) 09/01/2021    HGB 12.4 (L) 09/03/2021    HCT 39.5 09/03/2021    MCV 94.3 09/03/2021     09/03/2021     Lab Results   Component Value Date    NEUTROABS 16.96 (H) 09/03/2021    NEUTROABS 22.89 (H) 09/02/2021    NEUTROABS 22.44 (H) 09/01/2021     No results found for: [facility]  Lab Results   Component Value Date    ALT 33 09/03/2021    AST 47 (H) 09/03/2021    ALKPHOS 163 (H) 09/03/2021    BILITOT 0.5 09/03/2021     Lab Results   Component Value Date     09/03/2021    K 5.2 09/03/2021    K 3.7 08/21/2021     09/03/2021    CO2 25 09/03/2021    BUN 39 09/03/2021    CREATININE 0.6 09/03/2021    CREATININE 0.7 09/02/2021    CREATININE 1.2 09/01/2021    GFRAA >60 09/03/2021    LABGLOM >60 09/03/2021    GLUCOSE 237 09/03/2021    PROT 5.9 09/03/2021    LABALBU 3.2 09/03/2021    CALCIUM 8.7 09/03/2021    BILITOT 0.5 09/03/2021    ALKPHOS 163 09/03/2021    AST 47 09/03/2021    ALT 33 09/03/2021     Lab Results   Component Value Date    CRP 14.1 (H) 09/03/2021    CRP 14.7 (H) 09/02/2021    CRP 0.9 (H) 09/01/2021     Lab Results   Component Value Date    SEDRATE 30 (H) 09/03/2021    SEDRATE 19 (H) 09/02/2021    SEDRATE 6 08/31/2021     Radiology:      Microbiology:   Streptococcus pneumoniae/Legionella urine Ag: negative  Nares screen MRSA: Negative  Urine culture 8/24/2021: Negative  Blood cultures 8/21/2021: Negative x2  Respiratory culture 8/27/2021: OP terrell reduced  Respiratory culture 8/30/2021: E. coli, MSSA     ASSESSMENT:  · Severe Covid pneumonia causing acute respiratory failure CRP has come down nicely Remdesivir. Received Tocilizumab  · Cytokine storm  · VAP versus tracheobronchitis. Cultures growing MSSA and E. coli  · Leukocytosis associated to the above. Improving    PLAN:  · Continue Cefazolin, day 3  · Check respiratory culture    Spoke with nursing.     Martina Vincent MD  12:22 PM   9/3/2021

## 2021-09-03 NOTE — PROGRESS NOTES
[facility] MD Stephanie Kat, MD Adri Cox, MD Tad Jones, DEUCE Massey, KALA  Progress Note    9/3/2021    SUBJECTIVE:   9/3: Remains critically ill, though clinically improving. Pronator bed discontinued. Breathing comfortably on ventilator via ETT, FiO2 50% PEEP 10. Remains unresponsive to verbal and tactile stimuli hemodynamically stable.   Tube feeding free water flushes running without complication    PROBLEM LIST:    Principal Problem:    Acute hypoxemic respiratory failure due to COVID-19 [facility])  Active Problems:    Type 2 diabetes mellitus without complication, without long-term current use of insulin (HCC)    History of seizures    Hyperlipidemia    Busch catheter problem ([facility])    Sepsis ([facility])    Thrombocytopenia ([facility])    Elevated LFTs  Resolved Problems:    OSCAR (acute kidney injury) ([facility] [address]    Lactic acidosis         DIET:    ADULT TUBE FEEDING; Orogastric; Diabetic; Continuous; 20; Yes; 20; Q 4 hours; 45; 30; Q 6 hours     MEDS (scheduled):    [START ON 9/4/2021] insulin glargine  45 Units SubCUTAneous Daily    ceFAZolin  2,000 mg IntraVENous Q8H    sodium chloride (Inhalant)  4 mL Nebulization Q12H    insulin glargine  30 Units SubCUTAneous Daily    dexamethasone  10 mg IntraVENous Daily    enoxaparin  40 mg SubCUTAneous BID    insulin lispro  0-18 Units SubCUTAneous Q6H    phenytoin  300 mg IntraVENous Q12H    chlorhexidine  15 mL Mouth/Throat BID    Refresh Lacri-Lube   Ophthalmic Q6H    [Held by provider] lisinopril  40 mg Oral Daily    ipratropium-albuterol  1 ampule Inhalation Q4H    sodium chloride flush  10 mL IntraVENous BID    amLODIPine  10 mg Oral Daily    atorvastatin  40 mg Oral Daily    [Held by provider] doxazosin  8 mg Oral Daily    ascorbic acid  500 mg Oral Daily    Vitamin D  2,000 Units Oral Daily    zinc sulfate  50 mg Oral Daily    pantoprazole  40 mg IntraVENous Daily    cetirizine  5 mg Oral BID       MEDS (infusions):   fentaNYL 5 mcg/ml in 0.9%  ml infusion 200 mcg/hr (09/03/21 0520)    cisatracurium (NIMBEX) infusion 4 mcg/kg/min (09/03/21 0956)    propofol 25 mcg/kg/min (09/03/21 1107)    midazolam 6 mg/hr (09/03/21 1021)    sodium chloride      dextrose         MEDS (prn):  sodium chloride (Inhalant), sodium chloride flush, sodium chloride, albuterol, benzonatate, glucose, dextrose, glucagon (rDNA), dextrose, sodium chloride    PHYSICAL EXAM:     Patient Vitals for the past 24 hrs:   BP Temp Temp src Pulse Resp SpO2 Weight   09/03/21 1100 -- -- -- 117 22 93 % --   09/03/21 1051 -- -- -- 115 21 93 % --   09/03/21 1000 -- -- -- -- -- 94 % --   09/03/21 0900 (!) 171/81 -- -- 123 21 95 % --   09/03/21 0840 -- -- -- 121 21 96 % --   09/03/21 0800 -- 99.3 °F (37.4 °C) Bladder 107 21 97 % --   09/03/21 0714 -- -- -- 98 21 95 % --   09/03/21 0700 -- -- -- 87 21 93 % --   09/03/21 0600 -- -- -- 115 21 95 % --   09/03/21 0555 -- -- -- 120 21 95 % --   09/03/21 0500 128/71 -- -- 127 20 95 % --   09/03/21 0400 128/71 -- -- 120 (!) 6 99 % --   09/03/21 0250 -- -- -- 91 21 94 % --   09/03/21 0100 -- -- -- 113 21 94 % --   09/03/21 0030 -- -- -- 114 22 94 % --   09/03/21 0000 (!) 156/70 99.5 °F (37.5 °C) Bladder 115 21 94 % (!) 306 lb 14.1 oz (139.2 kg)   09/02/21 2300 -- -- -- 111 22 95 % --   09/02/21 2200 -- -- -- 116 14 97 % --   09/02/21 2144 -- -- -- 110 18 97 % --   09/02/21 2000 (!) 173/74 99 °F (37.2 °C) Bladder 110 21 97 % --   09/02/21 1900 -- -- -- 107 22 97 % --   09/02/21 1800 -- -- -- 96 21 96 % --   09/02/21 1718 -- -- -- 102 25 96 % --   09/02/21 1700 -- -- -- 94 21 96 % --   09/02/21 1643 -- -- -- 73 21 94 % --   09/02/21 1600 -- 99.1 °F (37.3 °C) Bladder 72 24 95 % --   09/02/21 1500 -- -- -- 74 24 96 % --   09/02/21 1400 -- -- -- 78 23 96 % --   09/02/21 1300 -- -- -- 96 23 94 % --   09/02/21 1249 -- -- -- -- 23 96 % --   09/02/21 1246 -- -- -- 100 23 96 % -- @      Intake/Output Summary (Last 24 hours) at 9/3/2021 1234  Last data filed at 9/3/2021 0600  Gross per 24 hour   Intake 3519.02 ml   Output 2800 ml   Net 719.02 ml         Wt Readings from Last 3 Encounters:   09/03/21 (!) 306 lb 14.1 oz (139.2 kg)   08/17/21 (!) 320 lb (145.2 kg)   08/10/21 (!) 321 lb 9.6 oz (145.9 kg)       Constitutional: Appears critically ill, though in no apparent distress  HEENT: NC/AT, EOMI, sclera and conjunctiva are clear and anicteric, mucus membranes moist  Neck: Trachea midline, no JVD  Cardiovascular: S1, S2 regular rhythm, no murmur,or rub  Respiratory:  No crackles, no wheeze  Gastrointestinal:  Soft, nontender, nondistended, NABS  Ext: no edema, feet warm  Skin: dry, no rash  Neuro: awake, alert, interactive      DATA:    Recent Labs     09/01/21  0730 09/02/21  0530 09/03/21  0540   WBC 24.8* 24.9* 19.2*   HGB 12.1* 12.4* 12.4*   HCT 38.1 38.8 39.5   MCV 93.4 92.8 94.3    154 168     Recent Labs     09/01/21  0520 09/02/21  0530 09/03/21  0540    139 141   K 4.0 4.4 5.2*    103 108*   CO2 25 27 25   MG 2.2 2.2 2.2   PHOS 5.4* 3.2 2.9   BUN 53* 47* 39*   CREATININE 1.2 0.7 0.6*   ALT 32 38 33   AST 27 30 47*   BILITOT 0.4 0.5 0.5   ALKPHOS 71 103 163*       Lab Results   Component Value Date    LABPROT 0.2 09/01/2021    LABPROT 0.2 09/01/2021       ASSESSMENT / RECOMMENDATIONS:    60 y.o. gentleman admitted 8/21 with aggressive dyspnea, diagnosed with CO VID-19 pneumonia, subsequent suffered respiratory failure followed by multiorgan system failure. Has developed superimposed bacterial pneumonia. Current intubated via ETT, ventilated, prone    1.   Acute kidney injury, prerenal azotemia due to intravascular volume contraction despite massive third spacing in the setting of attempts to diurese    With cessation of diuretic therapy, creatinine has improved back to his baseline    Hold diuretic therapy  Continue tube feed when able  Free water flushes 100 cc every 3 hours for now, adjust as warranted in the next 1 to 3 days  Follow labs, UO  Avoid nephrotoxins as able  Continue intensive supportive care      Electronically signed by Juany Washington MD on 9/3/2021

## 2021-09-03 NOTE — PROGRESS NOTES
[facility] Progress Note    Admitting Date and Time: 8/21/2021  9:35 AM  Admit Dx: Acute hypoxemic respiratory failure due to COVID-19 (HCC) [U07.1, J96.01]  Pneumonia due to COVID-19 virus [U07.1, J12.82]    Subjective:  Patient is being followed for Acute hypoxemic respiratory failure due to COVID-19 ([address] [U07.1, J96.01]  Pneumonia due to COVID-19 virus [U07.1, J12.82]     No acute events overnight. Afebrile through the night. ROS: denies fever, chills, cp, sob, n/v, HA unless stated above.       [START ON 9/4/2021] insulin glargine  45 Units SubCUTAneous Daily    ceFAZolin  2,000 mg IntraVENous Q8H    sodium chloride (Inhalant)  4 mL Nebulization Q12H    insulin glargine  30 Units SubCUTAneous Daily    dexamethasone  10 mg IntraVENous Daily    enoxaparin  40 mg SubCUTAneous BID    insulin lispro  0-18 Units SubCUTAneous Q6H    phenytoin  300 mg IntraVENous Q12H    chlorhexidine  15 mL Mouth/Throat BID    Refresh Lacri-Lube   Ophthalmic Q6H    [Held by provider] lisinopril  40 mg Oral Daily    ipratropium-albuterol  1 ampule Inhalation Q4H    sodium chloride flush  10 mL IntraVENous BID    amLODIPine  10 mg Oral Daily    atorvastatin  40 mg Oral Daily    [Held by provider] doxazosin  8 mg Oral Daily    ascorbic acid  500 mg Oral Daily    Vitamin D  2,000 Units Oral Daily    zinc sulfate  50 mg Oral Daily    pantoprazole  40 mg IntraVENous Daily    cetirizine  5 mg Oral BID     sodium chloride (Inhalant), 4 mL, PRN  sodium chloride flush, 5-40 mL, PRN  sodium chloride, 25 mL, PRN  albuterol, 2.5 mg, Q6H PRN  benzonatate, 200 mg, TID PRN  glucose, 15 g, PRN  dextrose, 12.5 g, PRN  glucagon (rDNA), 1 mg, PRN  dextrose, 100 mL/hr, PRN  sodium chloride, 30 mL, PRN         Objective:    BP (!) 171/81   Pulse 115   Temp 99.5 °F (37.5 °C) (Bladder)   Resp 21   Ht 6' (1.829 m)   Wt (!) 306 lb 14.1 oz (139.2 kg)   SpO2 96%   BMI 41.62 kg/m²     General Appearance: intubated and sedated  Skin: warm and dry  Head: normocephalic and atraumatic  Eyes: pupils equal, round, and reactive to light, extraocular eye movements intact, conjunctivae normal  Neck: neck supple and non tender without mass   Pulmonary/Chest: clear to auscultation bilaterally- no wheezes, rales or rhonchi, normal air movement, no respiratory distress  Cardiovascular: normal rate, normal S1 and S2 and no carotid bruits  Abdomen: soft, non-tender, non-distended, normal bowel sounds, no masses or organomegaly  Extremities: no cyanosis, no clubbing and no edema  Neurologic: unable to assess        Recent Labs     09/02/21  0530 09/03/21  0540 09/03/21  1400    141 139   K 4.4 5.2* 5.8*    108* 107   CO2 27 25 23   BUN 47* 39* 36*   CREATININE 0.7 0.6* 0.6*   GLUCOSE 185* 237* 304*   CALCIUM 8.6 8.7 8.7       Recent Labs     09/01/21  0730 09/02/21  0530 09/03/21  0540   WBC 24.8* 24.9* 19.2*   RBC 4.08 4.18 4.19   HGB 12.1* 12.4* 12.4*   HCT 38.1 38.8 39.5   MCV 93.4 92.8 94.3   MCH 29.7 29.7 29.6   MCHC 31.8* 32.0 31.4*   RDW 14.2 14.4 14.3    154 168   MPV 10.7 10.2 10.6       Radiology:   CXR:  Interval progression of previously described patchy bilateral airspace   opacities.  These are nonspecific and may be on the basis of   infectious/inflammatory pneumonitis. Stable positioning of support lines and tubes.      Assessment:    Principal Problem:    Acute hypoxemic respiratory failure due to COVID-19 [facility])  Active Problems:    Type 2 diabetes mellitus without complication, without long-term current use of insulin (HCC)    History of seizures    Hyperlipidemia    Busch catheter problem (HCC)    Sepsis (HCC)    Thrombocytopenia (HCC)    Elevated LFTs  Resolved Problems:    OSCAR (acute kidney injury) ([facility] [address]    Lactic acidosis      Plan:     1.  Acute hypoxic respiratory failure status post mechanical ventilation secondary to COVID-19 pneumonia - Continue  nimbex, fentanyl, and propofol.   2. COVID 19 pneumonia - Patient received actemra, completed remdesivir, and is on Decadron. Continue zinc, vitamin c, and vitamin d.  Patient is off of lasix. Thus far there is a net positive 3.2 L fluid balance. Continue to trend ESR/CRP. Currently in supine position. 3. Possible VAP - Respiratory panel from 8/30 is growing Staph aureus and E coli. MRSA from 8/24 is negative. Patient is to continue cefazolin day2. [facility] was [facility] yesterday. 4. DM-II - Continue medium q6 SS and lantus  5. HLD - Continue lipitor  6. HTN - Continue norvasc  7. DVT prophylaxis - lovenox    NOTE: This report was transcribed using voice recognition software. Every effort was made to ensure accuracy; however, inadvertent computerized transcription errors may be present. Electronically signed by Dev Mathew DO on 9/3/2021 at 4:36 PM     Due to the patient's Covid 19+ status, to reduce exposure, contamination, and an effort to conserve PPE, this patient was evaluated 3 combination of review of the medical record, nursing assessments, other physicians exam and assessments, as well as telemedicine interaction.

## 2021-09-03 NOTE — PROGRESS NOTES
.  Intensive Care Daily Quality Rounding Checklist      ICU Team Members: Dr. Rand Neff, Dr. Theron Max (resident), clinical pharmacist, charge nurse, bedside nurse, respiratory therapist    ICU Day #: NUMBER: 14    Intubation Date: August 23,2021    Ventilator Day #: NUMBER: 12    Central Line Insertion Date: August 30,2021        Day #: NUMBER: 5     Arterial Line Insertion Date: August 30,2021      Day #: NUMBER: 5    Temporary Hemodialysis Catheter Insertion Date:  N/A      Day # N/A    DVT Prophylaxis: Lovenox    GI Prophylaxis: Protonix    Busch Catheter Insertion Date: August 26  (changed)       Day #: 9      Continued need (if yes, reason documented and discussed with physician): yes, strict I&O in critical patient    Skin Issues/ Wounds and ordered treatment discussed on rounds: yes, wound care consulted    Goals/ Plans for the Day: Daily labs, wean vent as able, advance tube feeds to goal, continue paralytic

## 2021-09-04 ENCOUNTER — APPOINTMENT (OUTPATIENT)
Dept: GENERAL RADIOLOGY | Age: 56
DRG: 005 | End: 2021-09-04
Payer: COMMERCIAL

## 2021-09-04 LAB
AADO2: 152.8 MMHG
AADO2: 214.1 MMHG
ALBUMIN SERPL-MCNC: 2.8 G/DL (ref 3.5–5.2)
ALP BLD-CCNC: 87 U/L (ref 40–129)
ALT SERPL-CCNC: 27 U/L (ref 0–40)
ANION GAP SERPL CALCULATED.3IONS-SCNC: 8 MMOL/L (ref 7–16)
ANISOCYTOSIS: ABNORMAL
AST SERPL-CCNC: 31 U/L (ref 0–39)
B.E.: 1 MMOL/L (ref -3–3)
B.E.: 3 MMOL/L (ref -3–3)
BASOPHILS ABSOLUTE: 0.04 E9/L (ref 0–0.2)
BASOPHILS RELATIVE PERCENT: 0.3 % (ref 0–2)
BILIRUB SERPL-MCNC: 0.3 MG/DL (ref 0–1.2)
BUN BLDV-MCNC: 35 MG/DL (ref 6–20)
C-REACTIVE PROTEIN: 8.8 MG/DL (ref 0–0.4)
CALCIUM SERPL-MCNC: 8.2 MG/DL (ref 8.6–10.2)
CHLORIDE BLD-SCNC: 111 MMOL/L (ref 98–107)
CO2: 27 MMOL/L (ref 22–29)
COHB: 0.8 % (ref 0–1.5)
COHB: 1 % (ref 0–1.5)
CREAT SERPL-MCNC: 0.6 MG/DL (ref 0.7–1.2)
CRITICAL: ABNORMAL
CRITICAL: ABNORMAL
DATE ANALYZED: ABNORMAL
DATE ANALYZED: ABNORMAL
DATE OF COLLECTION: ABNORMAL
DATE OF COLLECTION: ABNORMAL
EOSINOPHILS ABSOLUTE: 0.17 E9/L (ref 0.05–0.5)
EOSINOPHILS RELATIVE PERCENT: 1.2 % (ref 0–6)
FIO2: 50 %
FIO2: 50 %
GFR AFRICAN AMERICAN: >60
GFR NON-AFRICAN AMERICAN: >60 ML/MIN/1.73
GLUCOSE BLD-MCNC: 269 MG/DL (ref 74–99)
HCO3: 25.7 MMOL/L (ref 22–26)
HCO3: 28 MMOL/L (ref 22–26)
HCT VFR BLD CALC: 34.5 % (ref 37–54)
HEMOGLOBIN: 10.9 G/DL (ref 12.5–16.5)
HHB: 1.5 % (ref 0–5)
HHB: 4 % (ref 0–5)
IMMATURE GRANULOCYTES #: 0.14 E9/L
IMMATURE GRANULOCYTES %: 1 % (ref 0–5)
LAB: ABNORMAL
LAB: ABNORMAL
LYMPHOCYTES ABSOLUTE: 0.59 E9/L (ref 1.5–4)
LYMPHOCYTES RELATIVE PERCENT: 4.2 % (ref 20–42)
Lab: ABNORMAL
Lab: ABNORMAL
MAGNESIUM: 2 MG/DL (ref 1.6–2.6)
MCH RBC QN AUTO: 29.8 PG (ref 26–35)
MCHC RBC AUTO-ENTMCNC: 31.6 % (ref 32–34.5)
MCV RBC AUTO: 94.3 FL (ref 80–99.9)
METER GLUCOSE: 226 MG/DL (ref 74–99)
METER GLUCOSE: 229 MG/DL (ref 74–99)
METER GLUCOSE: 258 MG/DL (ref 74–99)
METER GLUCOSE: 267 MG/DL (ref 74–99)
METHB: 0 % (ref 0–1.5)
METHB: 0.2 % (ref 0–1.5)
MODE: ABNORMAL
MODE: ABNORMAL
MONOCYTES ABSOLUTE: 0.97 E9/L (ref 0.1–0.95)
MONOCYTES RELATIVE PERCENT: 6.9 % (ref 2–12)
NEUTROPHILS ABSOLUTE: 12.15 E9/L (ref 1.8–7.3)
NEUTROPHILS RELATIVE PERCENT: 86.4 % (ref 43–80)
O2 CONTENT: 15.8 ML/DL
O2 CONTENT: 16.1 ML/DL
O2 SATURATION: 96 % (ref 92–98.5)
O2 SATURATION: 98.5 % (ref 92–98.5)
O2HB: 95 % (ref 94–97)
O2HB: 97.5 % (ref 94–97)
OPERATOR ID: 101
OPERATOR ID: 3342
OVALOCYTES: ABNORMAL
PATIENT TEMP: 37 C
PATIENT TEMP: 37 C
PCO2: 41.1 MMHG (ref 35–45)
PCO2: 44.5 MMHG (ref 35–45)
PDW BLD-RTO: 14.5 FL (ref 11.5–15)
PEEP/CPAP: 8 CMH2O
PEEP/CPAP: 8 CMH2O
PFO2: 1.6 MMHG/%
PFO2: 2.9 MMHG/%
PH BLOOD GAS: 7.41 (ref 7.35–7.45)
PH BLOOD GAS: 7.42 (ref 7.35–7.45)
PHOSPHORUS: 3.1 MG/DL (ref 2.5–4.5)
PIP: 16 CMH2O
PLATELET # BLD: 138 E9/L (ref 130–450)
PMV BLD AUTO: 10.5 FL (ref 7–12)
PO2: 145 MMHG (ref 75–100)
PO2: 79.8 MMHG (ref 75–100)
POIKILOCYTES: ABNORMAL
POTASSIUM SERPL-SCNC: 4.7 MMOL/L (ref 3.5–5)
PS: 16 CMH20
RBC # BLD: 3.66 E12/L (ref 3.8–5.8)
RI(T): 105 %
RI(T): 268 %
RR MECHANICAL: 26 B/MIN
RR MECHANICAL: 26 B/MIN
SEDIMENTATION RATE, ERYTHROCYTE: 22 MM/HR (ref 0–15)
SODIUM BLD-SCNC: 146 MMOL/L (ref 132–146)
SOURCE, BLOOD GAS: ABNORMAL
SOURCE, BLOOD GAS: ABNORMAL
THB: 11.3 G/DL (ref 11.5–16.5)
THB: 12 G/DL (ref 11.5–16.5)
TIME ANALYZED: 2010
TIME ANALYZED: 628
TOTAL PROTEIN: 4.8 G/DL (ref 6.4–8.3)
WBC # BLD: 14.1 E9/L (ref 4.5–11.5)

## 2021-09-04 PROCEDURE — 71045 X-RAY EXAM CHEST 1 VIEW: CPT

## 2021-09-04 PROCEDURE — 36620 INSERTION CATHETER ARTERY: CPT

## 2021-09-04 PROCEDURE — 85025 COMPLETE CBC W/AUTO DIFF WBC: CPT

## 2021-09-04 PROCEDURE — 86140 C-REACTIVE PROTEIN: CPT

## 2021-09-04 PROCEDURE — C1751 CATH, INF, PER/CENT/MIDLINE: HCPCS

## 2021-09-04 PROCEDURE — 6370000000 HC RX 637 (ALT 250 FOR IP): Performed by: INTERNAL MEDICINE

## 2021-09-04 PROCEDURE — 94640 AIRWAY INHALATION TREATMENT: CPT

## 2021-09-04 PROCEDURE — 2580000003 HC RX 258: Performed by: FAMILY MEDICINE

## 2021-09-04 PROCEDURE — 37799 UNLISTED PX VASCULAR SURGERY: CPT

## 2021-09-04 PROCEDURE — 99233 SBSQ HOSP IP/OBS HIGH 50: CPT | Performed by: INTERNAL MEDICINE

## 2021-09-04 PROCEDURE — 2580000003 HC RX 258: Performed by: INTERNAL MEDICINE

## 2021-09-04 PROCEDURE — 82805 BLOOD GASES W/O2 SATURATION: CPT

## 2021-09-04 PROCEDURE — 2500000003 HC RX 250 WO HCPCS: Performed by: INTERNAL MEDICINE

## 2021-09-04 PROCEDURE — 80053 COMPREHEN METABOLIC PANEL: CPT

## 2021-09-04 PROCEDURE — 6360000002 HC RX W HCPCS: Performed by: SPECIALIST

## 2021-09-04 PROCEDURE — 6360000002 HC RX W HCPCS: Performed by: INTERNAL MEDICINE

## 2021-09-04 PROCEDURE — 85651 RBC SED RATE NONAUTOMATED: CPT

## 2021-09-04 PROCEDURE — 84100 ASSAY OF PHOSPHORUS: CPT

## 2021-09-04 PROCEDURE — 82962 GLUCOSE BLOOD TEST: CPT

## 2021-09-04 PROCEDURE — 2000000000 HC ICU R&B

## 2021-09-04 PROCEDURE — 83735 ASSAY OF MAGNESIUM: CPT

## 2021-09-04 PROCEDURE — 36415 COLL VENOUS BLD VENIPUNCTURE: CPT

## 2021-09-04 PROCEDURE — C9113 INJ PANTOPRAZOLE SODIUM, VIA: HCPCS | Performed by: STUDENT IN AN ORGANIZED HEALTH CARE EDUCATION/TRAINING PROGRAM

## 2021-09-04 PROCEDURE — 2580000003 HC RX 258: Performed by: STUDENT IN AN ORGANIZED HEALTH CARE EDUCATION/TRAINING PROGRAM

## 2021-09-04 PROCEDURE — 94003 VENT MGMT INPAT SUBQ DAY: CPT

## 2021-09-04 PROCEDURE — 2720000010 HC SURG SUPPLY STERILE

## 2021-09-04 PROCEDURE — 6360000002 HC RX W HCPCS: Performed by: STUDENT IN AN ORGANIZED HEALTH CARE EDUCATION/TRAINING PROGRAM

## 2021-09-04 PROCEDURE — 6360000002 HC RX W HCPCS: Performed by: FAMILY MEDICINE

## 2021-09-04 RX ADMIN — Medication 8 MG/HR: at 09:25

## 2021-09-04 RX ADMIN — Medication 2000 MG: at 11:52

## 2021-09-04 RX ADMIN — Medication 200 MCG/HR: at 15:00

## 2021-09-04 RX ADMIN — CHLORHEXIDINE GLUCONATE 0.12% ORAL RINSE 15 ML: 1.2 LIQUID ORAL at 08:57

## 2021-09-04 RX ADMIN — Medication 2000 MG: at 21:08

## 2021-09-04 RX ADMIN — CISATRACURIUM BESYLATE 4 MCG/KG/MIN: 10 INJECTION INTRAVENOUS at 12:00

## 2021-09-04 RX ADMIN — INSULIN GLARGINE 45 UNITS: 100 INJECTION, SOLUTION SUBCUTANEOUS at 08:47

## 2021-09-04 RX ADMIN — SODIUM CHLORIDE SOLN NEBU 3% 4 ML: 3 NEBU SOLN at 09:48

## 2021-09-04 RX ADMIN — CISATRACURIUM BESYLATE 4 MCG/KG/MIN: 10 INJECTION INTRAVENOUS at 05:46

## 2021-09-04 RX ADMIN — CETIRIZINE HYDROCHLORIDE 5 MG: 10 TABLET, FILM COATED ORAL at 08:53

## 2021-09-04 RX ADMIN — PROPOFOL 35 MCG/KG/MIN: 10 INJECTION, EMULSION INTRAVENOUS at 05:18

## 2021-09-04 RX ADMIN — ENOXAPARIN SODIUM 40 MG: 40 INJECTION SUBCUTANEOUS at 08:52

## 2021-09-04 RX ADMIN — Medication 200 MCG/HR: at 20:00

## 2021-09-04 RX ADMIN — IPRATROPIUM BROMIDE AND ALBUTEROL SULFATE 1 AMPULE: .5; 3 SOLUTION RESPIRATORY (INHALATION) at 21:02

## 2021-09-04 RX ADMIN — INSULIN LISPRO 9 UNITS: 100 INJECTION, SOLUTION INTRAVENOUS; SUBCUTANEOUS at 20:00

## 2021-09-04 RX ADMIN — Medication 8 MG/HR: at 20:00

## 2021-09-04 RX ADMIN — INSULIN LISPRO 6 UNITS: 100 INJECTION, SOLUTION INTRAVENOUS; SUBCUTANEOUS at 11:52

## 2021-09-04 RX ADMIN — Medication 200 MCG/HR: at 08:38

## 2021-09-04 RX ADMIN — PANTOPRAZOLE SODIUM 40 MG: 40 INJECTION, POWDER, FOR SOLUTION INTRAVENOUS at 08:39

## 2021-09-04 RX ADMIN — PHENYTOIN SODIUM 300 MG: 50 INJECTION INTRAMUSCULAR; INTRAVENOUS at 11:05

## 2021-09-04 RX ADMIN — ATORVASTATIN CALCIUM 40 MG: 40 TABLET, FILM COATED ORAL at 21:08

## 2021-09-04 RX ADMIN — Medication 2000 MG: at 05:47

## 2021-09-04 RX ADMIN — CHLORHEXIDINE GLUCONATE 0.12% ORAL RINSE 15 ML: 1.2 LIQUID ORAL at 21:00

## 2021-09-04 RX ADMIN — PROPOFOL 35 MCG/KG/MIN: 10 INJECTION, EMULSION INTRAVENOUS at 09:15

## 2021-09-04 RX ADMIN — MINERAL OIL AND PETROLATUM: 150; 830 OINTMENT OPHTHALMIC at 15:53

## 2021-09-04 RX ADMIN — IPRATROPIUM BROMIDE AND ALBUTEROL SULFATE 1 AMPULE: .5; 3 SOLUTION RESPIRATORY (INHALATION) at 04:54

## 2021-09-04 RX ADMIN — Medication 200 MCG/HR: at 01:43

## 2021-09-04 RX ADMIN — IPRATROPIUM BROMIDE AND ALBUTEROL SULFATE 1 AMPULE: .5; 3 SOLUTION RESPIRATORY (INHALATION) at 13:19

## 2021-09-04 RX ADMIN — MINERAL OIL AND PETROLATUM: 150; 830 OINTMENT OPHTHALMIC at 08:57

## 2021-09-04 RX ADMIN — INSULIN LISPRO 6 UNITS: 100 INJECTION, SOLUTION INTRAVENOUS; SUBCUTANEOUS at 01:04

## 2021-09-04 RX ADMIN — Medication 2000 UNITS: at 08:52

## 2021-09-04 RX ADMIN — MINERAL OIL AND PETROLATUM: 150; 830 OINTMENT OPHTHALMIC at 21:09

## 2021-09-04 RX ADMIN — MINERAL OIL AND PETROLATUM: 150; 830 OINTMENT OPHTHALMIC at 03:30

## 2021-09-04 RX ADMIN — CISATRACURIUM BESYLATE 5 MCG/KG/MIN: 10 INJECTION INTRAVENOUS at 17:54

## 2021-09-04 RX ADMIN — OXYCODONE HYDROCHLORIDE AND ACETAMINOPHEN 500 MG: 500 TABLET ORAL at 08:53

## 2021-09-04 RX ADMIN — ENOXAPARIN SODIUM 40 MG: 40 INJECTION SUBCUTANEOUS at 20:05

## 2021-09-04 RX ADMIN — PHENYTOIN SODIUM 300 MG: 50 INJECTION INTRAMUSCULAR; INTRAVENOUS at 22:51

## 2021-09-04 RX ADMIN — ZINC SULFATE 220 MG (50 MG) CAPSULE 50 MG: CAPSULE at 08:53

## 2021-09-04 RX ADMIN — PROPOFOL 30 MCG/KG/MIN: 10 INJECTION, EMULSION INTRAVENOUS at 17:09

## 2021-09-04 RX ADMIN — PROPOFOL 30 MCG/KG/MIN: 10 INJECTION, EMULSION INTRAVENOUS at 21:38

## 2021-09-04 RX ADMIN — PROPOFOL 15 MCG/KG/MIN: 10 INJECTION, EMULSION INTRAVENOUS at 00:56

## 2021-09-04 RX ADMIN — IPRATROPIUM BROMIDE AND ALBUTEROL SULFATE 1 AMPULE: .5; 3 SOLUTION RESPIRATORY (INHALATION) at 01:36

## 2021-09-04 RX ADMIN — SODIUM CHLORIDE SOLN NEBU 3% 4 ML: 3 NEBU SOLN at 21:02

## 2021-09-04 RX ADMIN — DEXAMETHASONE SODIUM PHOSPHATE 10 MG: 4 INJECTION, SOLUTION INTRA-ARTICULAR; INTRALESIONAL; INTRAMUSCULAR; INTRAVENOUS; SOFT TISSUE at 08:39

## 2021-09-04 RX ADMIN — Medication 10 ML: at 08:53

## 2021-09-04 RX ADMIN — INSULIN LISPRO 9 UNITS: 100 INJECTION, SOLUTION INTRAVENOUS; SUBCUTANEOUS at 07:40

## 2021-09-04 RX ADMIN — IPRATROPIUM BROMIDE AND ALBUTEROL SULFATE 1 AMPULE: .5; 3 SOLUTION RESPIRATORY (INHALATION) at 09:48

## 2021-09-04 ASSESSMENT — PULMONARY FUNCTION TESTS
PIF_VALUE: 25
PIF_VALUE: 26
PIF_VALUE: 25
PIF_VALUE: 25
PIF_VALUE: 32
PIF_VALUE: 25
PIF_VALUE: 26
PIF_VALUE: 25
PIF_VALUE: 26
PIF_VALUE: 44
PIF_VALUE: 25
PIF_VALUE: 26
PIF_VALUE: 25
PIF_VALUE: 26
PIF_VALUE: 25
PIF_VALUE: 26
PIF_VALUE: 25
PIF_VALUE: 26
PIF_VALUE: 25
PIF_VALUE: 25

## 2021-09-04 ASSESSMENT — PAIN SCALES - GENERAL: PAINLEVEL_OUTOF10: 0

## 2021-09-04 NOTE — PROGRESS NOTES
Critical Care Team - Daily Progress Note         Date and time: 9/4/2021 10:43 AM  Patient's name:  Richardson Dewey Record Number: [id number]  Patient's account/billing number: [de-identified]  Patient's YOB: 1965  Age: 60 y.o. Date of Admission: 8/21/2021  9:35 AM  Length of stay during current admission: 14      Primary Care Physician: Anjel Alexander DO  ICU Attending Physician: Maurice Waldron DO    Code Status: Full Code    Reason for ICU admission: Respiratory failure      SUBJECTIVE     8/30: tolerating longer periods supine, thick dark brown secretions     8/31: secretions out of NG, michelle prone    9/1: Decreased urine output and increased creatinine today, continues with thick secretions    9/2: Cr and Uop improved    9/3: P/F improved and has remained supine    9/4: thick secretions    OBJECTIVE     Vital signs:   height is 6' (1.829 m) and weight is 306 lb 14.1 oz (139.2 kg) (abnormal). His bladder temperature is 99.5 °F (37.5 °C). His blood pressure is 87/49 (abnormal) and his pulse is 82. His respiration is 25 and oxygen saturation is 96%. I/O last 3 completed shifts: In: 4805.7 [I.V.:3285.7; NG/GT:1370; IV Piggyback:150]  Out: 4346 [Urine:3175]      PHYSICAL EXAM:    Physical Exam  Constitutional:       Appearance: He is obese. He is ill-appearing. Interventions: He is sedated, chemically paralyzed and intubated. HENT:      Head: Normocephalic and atraumatic. Eyes:      Conjunctiva/sclera: Conjunctivae normal.   Neck:      Trachea: No tracheal deviation. Cardiovascular:      Rate and Rhythm: Normal rate and regular rhythm. Heart sounds: Normal heart sounds. Pulmonary:      Effort: Pulmonary effort is normal. He is intubated. Breath sounds: Rales present. Abdominal:      General: Bowel sounds are normal.      Palpations: Abdomen is soft. Tenderness: There is no abdominal tenderness.    Musculoskeletal:         General: Swelling present. Cervical back: Neck supple. Lymphadenopathy:      Cervical: No cervical adenopathy. Skin:     General: Skin is warm and dry. Findings: No rash. MEDICATIONS:  Scheduled Meds:   insulin glargine  45 Units SubCUTAneous Daily    ceFAZolin  2,000 mg IntraVENous Q8H    sodium chloride (Inhalant)  4 mL Nebulization Q12H    dexamethasone  10 mg IntraVENous Daily    enoxaparin  40 mg SubCUTAneous BID    insulin lispro  0-18 Units SubCUTAneous Q6H    phenytoin  300 mg IntraVENous Q12H    chlorhexidine  15 mL Mouth/Throat BID    Refresh Lacri-Lube   Ophthalmic Q6H    [Held by provider] lisinopril  40 mg Oral Daily    ipratropium-albuterol  1 ampule Inhalation Q4H    sodium chloride flush  10 mL IntraVENous BID    amLODIPine  10 mg Oral Daily    atorvastatin  40 mg Oral Daily    [Held by provider] doxazosin  8 mg Oral Daily    ascorbic acid  500 mg Oral Daily    Vitamin D  2,000 Units Oral Daily    zinc sulfate  50 mg Oral Daily    pantoprazole  40 mg IntraVENous Daily    cetirizine  5 mg Oral BID     Continuous Infusions:   fentaNYL 5 mcg/ml in 0.9%  ml infusion 200 mcg/hr (09/04/21 0838)    cisatracurium (NIMBEX) infusion 4 mcg/kg/min (09/04/21 0803)    propofol 30 mcg/kg/min (09/04/21 0927)    midazolam 8 mg/hr (09/04/21 0925)    sodium chloride      dextrose       PRN Meds:sodium chloride (Inhalant), sodium chloride flush, sodium chloride, albuterol, benzonatate, glucose, dextrose, glucagon (rDNA), dextrose, sodium chloride    PERTINENT LAB RESULTS:  Labs reviewed. DIAGNOSTICS:  Imaging reviewed. ASSESMENT/PLAN     Assessment:     1. Acute hypoxic respiratory failure requiring mechanical ventilation  2. Severe COVID-19 pneumonia s/p Toci, Remdesivir  3. Pneumonia, VAP - MSSA, GNR  4. Obesity BMI 40  5. HTN  6.  OSCAR - improved        · Sedation, NMB - Nimbex, possible trial off soon  · supine  · Vent - PC -Pi to 16, PEEP 10, continue weaning as michelle  · Supine goal 4 hours  · abx per ID - Invanz, Cefazolin  · Trach/peg  · Free H2O 100 q3          GI/DVT - SUP/Lovenox 40 q12  Consultants: ID, nephro, GS      Code Status: Full Code    CCT excluding procedures >30 minutes    ELECTRONICALLY SIGNED:  Sacha Graham DO

## 2021-09-04 NOTE — PATIENT CARE CONFERENCE
Intensive Care Daily Quality Rounding Checklist        ICU Team Members: Dr. Kalpesh Pfeiffer, Dr. Abiodun Santos (resident),  charge nurse, bedside nurse, respiratory therapist     ICU Day #: NUMBER: 15     Intubation Date: August 23,2021     Ventilator Day #: NUMBER: 13     Central Line Insertion Date: August 30,2021                                                    Day #: NUMBER: 6      Arterial Line Insertion Date: August 30,2021                             Day #: NUMBER: 6     Temporary Hemodialysis Catheter Insertion Date:  N/A                             Day # N/A     DVT Prophylaxis: Lovenox    GI Prophylaxis: Protonix     Busch Catheter Insertion Date: August 26  (changed)                                     Day #: 10                               Continued need (if yes, reason documented and discussed with physician): yes, strict I&O in critical patient     Skin Issues/ Wounds and ordered treatment discussed on rounds: yes, wound care consulted     Goals/ Plans for the Day: daily labs, wean vent as able, continue supine, consult general surgery for trach/peg

## 2021-09-04 NOTE — PROGRESS NOTES
[facility] Progress Note    Admitting Date and Time: 8/21/2021  9:35 AM  Admit Dx: Acute hypoxemic respiratory failure due to COVID-19 (HCC) [U07.1, J96.01]  Pneumonia due to COVID-19 virus [U07.1, J12.82]    Subjective:  Patient is being followed for Acute hypoxemic respiratory failure due to COVID-19 ([address] [U07.1, J96.01]  Pneumonia due to COVID-19 virus [U07.1, J12.82]     Art line placed. Remains afebrile. Still on paralytics    ROS: denies fever, chills, cp, sob, n/v, HA unless stated above.      insulin glargine  45 Units SubCUTAneous Daily    ceFAZolin  2,000 mg IntraVENous Q8H    sodium chloride (Inhalant)  4 mL Nebulization Q12H    dexamethasone  10 mg IntraVENous Daily    enoxaparin  40 mg SubCUTAneous BID    insulin lispro  0-18 Units SubCUTAneous Q6H    phenytoin  300 mg IntraVENous Q12H    chlorhexidine  15 mL Mouth/Throat BID    Refresh Lacri-Lube   Ophthalmic Q6H    [Held by provider] lisinopril  40 mg Oral Daily    ipratropium-albuterol  1 ampule Inhalation Q4H    sodium chloride flush  10 mL IntraVENous BID    amLODIPine  10 mg Oral Daily    atorvastatin  40 mg Oral Daily    [Held by provider] doxazosin  8 mg Oral Daily    ascorbic acid  500 mg Oral Daily    Vitamin D  2,000 Units Oral Daily    zinc sulfate  50 mg Oral Daily    pantoprazole  40 mg IntraVENous Daily     sodium chloride (Inhalant), 4 mL, PRN  sodium chloride flush, 5-40 mL, PRN  sodium chloride, 25 mL, PRN  albuterol, 2.5 mg, Q6H PRN  benzonatate, 200 mg, TID PRN  glucose, 15 g, PRN  dextrose, 12.5 g, PRN  glucagon (rDNA), 1 mg, PRN  dextrose, 100 mL/hr, PRN  sodium chloride, 30 mL, PRN         Objective:    BP (!) 175/71   Pulse 98   Temp 99.7 °F (37.6 °C) (Bladder)   Resp 26   Ht 6' (1.829 m)   Wt (!) 306 lb 14.1 oz (139.2 kg)   SpO2 98%   BMI 41.62 kg/m²     General Appearance: intubated and sedated  Skin: warm and dry  Head: normocephalic and atraumatic  Eyes: pupils equal, round, and reactive to light, extraocular eye movements intact, conjunctivae normal  Neck: neck supple and non tender without mass   Pulmonary/Chest: clear to auscultation bilaterally- no wheezes, rales or rhonchi, normal air movement, no respiratory distress  Cardiovascular: normal rate, normal S1 and S2 and no carotid bruits  Abdomen: soft, non-tender, non-distended, normal bowel sounds, no masses or organomegaly  Extremities: no cyanosis, no clubbing and no edema  Neurologic: unable to assess        Recent Labs     09/03/21  1800 09/03/21  2205 09/04/21  0600    140 146   K 5.5* 4.6 4.7   * 107 111*   CO2 25 27 27   BUN 36* 35* 35*   CREATININE 0.6* 0.5* 0.6*   GLUCOSE 298* 279* 269*   CALCIUM 8.4* 8.5* 8.2*       Recent Labs     09/02/21  0530 09/03/21  0540 09/04/21  0600   WBC 24.9* 19.2* 14.1*   RBC 4.18 4.19 3.66*   HGB 12.4* 12.4* 10.9*   HCT 38.8 39.5 34.5*   MCV 92.8 94.3 94.3   MCH 29.7 29.6 29.8   MCHC 32.0 31.4* 31.6*   RDW 14.4 14.3 14.5    168 138   MPV 10.2 10.6 10.5       Radiology:   CXR  No significant change.  Continued follow-up recommended. Assessment:    Principal Problem:    Acute hypoxemic respiratory failure due to COVID-19 [facility])  Active Problems:    Type 2 diabetes mellitus without complication, without long-term current use of insulin (HCC)    History of seizures    Hyperlipidemia    Busch catheter problem (HCC)    Sepsis (HCC)    Thrombocytopenia (HCC)    Elevated LFTs  Resolved Problems:    OSCAR (acute kidney injury) ([facility] [address]    Lactic acidosis      Plan:    1.  Acute hypoxic respiratory failure status post mechanical ventilation secondary to COVID-19 pneumonia - Continue  nimbex, fentanyl, and versed  2. COVID 19 pneumonia - Patient received actemra, completed remdesivir, and is on Decadron. Continue zinc, vitamin c, and vitamin d.  Patient is off of lasix. Thus far there is a net positive 6 L fluid balance. Continue to trend ESR/CRP. Currently in supine position.   3. Possible VAP - Respiratory panel from 8/30 is growing Staph aureus and E coli. MRSA from 8/24 is negative. Patient is to continue cefazolin day 4.   4. Leukocytosis 2/2 steroids and infection  5. DM-II, hyperglycemia - Continue high q6 SS and lantus increased to 45 u  6. HLD - Continue lipitor  7. HTN - Continue norvasc  8. H/O seizures - Continue keppra  9. Diet - NPO  10. DVT prophylaxis - lovenox       Due to the patient's Covid 19+ status, to reduce exposure, contamination, and an effort to conserve PPE, this patient was evaluated by combination of review of the medical record, nursing assessments, other physicians exam and assessments, as well as telemedicine interaction. NOTE: This report was transcribed using voice recognition software. Every effort was made to ensure accuracy; however, inadvertent computerized transcription errors may be present.   Electronically signed by Valerie Cordero DO on 9/4/2021 at 4:46 PM

## 2021-09-04 NOTE — PROGRESS NOTES
[facility]  [facility]  Progress Note      Chief Complaint   Patient presents with    Shortness of Breath     covid, per family SaO2 75% RA, hx of DM and panic attack        SUBJECTIVE:  8/21/21  Prone. FiO2 60% and PEEP of 16    8/22/2021  Patient is tolerating medications. No reported adverse drug reactions. No nausea, vomiting, diarrhea. Afebrile BiPAP 100% FiO2 with breathing at 44 times a minute and probably is going to decompensate  8/23/2021  Not doing well intubated on a rotating bed  FiO2 70% and PEEP 16  8/24/2021  Prone 50% FiO2-PEEP of 10  Issues with the urinary Busch last night this was changed  Paralyzed and on Versed  8/25/2021  Paralyzed and sedated with Versed no pressors  Prone intubated on 50% FiO2, PEEP of 10  Tolerating medications    8/26/21  Paralyzed and sedated with Versed no pressors  Prone intubated on 70% FiO2, PEEP of 10  Does not tolerate supine position    8/27/2021  Afebrile  Still prone on FiO2 of 100%  Paralyzed and sedated    8/28/2021  The patient is still in the ICU. He is pronated. FiO2 100%. Is still sedated and paralyzed. Fair amount of thick, brown secretions    8/29/2021  The patient is still on a RotoProne bed. You are still intubated, sedated and paralyzed. O2 is being weaned down. FiO2 55%. PEEP 12.    8/30/2021  Patient remains intubated, sedated and paralyzed. He is still on a RotoProne bed. Supine now. 8/31/2021  He is still on a RotoProne bed. Still having fair amount of secretions from the nostrils. Minimal from the ET tube, however. Tube feeding seems to be coming out of his nose. 9/1/2021  He is supinated but still on a RotoProne bed. He still has fair amount of secretions from the ET tube. Tolerating antibiotic    9/2/2021. He is still on a RotoProne bed. He is pronated. Less secretions from the ET tube and nostrils. FiO2 50%. PEEP 14.    9/3/2021. He is off the RotoProne bed. Tolerating supination.   Tolerating weaning. PEEP was brought down to 12. FiO2 still at 50%. Tolerating antibiotics. 2021. He is still in the ICU. He is still on a ventilator. He is still paralyzed. Review of systems:  As stated above in the chief complaint, otherwise negative. Medications:  Scheduled Meds:   insulin glargine  45 Units SubCUTAneous Daily    ceFAZolin  2,000 mg IntraVENous Q8H    sodium chloride (Inhalant)  4 mL Nebulization Q12H    dexamethasone  10 mg IntraVENous Daily    enoxaparin  40 mg SubCUTAneous BID    insulin lispro  0-18 Units SubCUTAneous Q6H    phenytoin  300 mg IntraVENous Q12H    chlorhexidine  15 mL Mouth/Throat BID    Refresh Lacri-Lube   Ophthalmic Q6H    [Held by provider] lisinopril  40 mg Oral Daily    ipratropium-albuterol  1 ampule Inhalation Q4H    sodium chloride flush  10 mL IntraVENous BID    amLODIPine  10 mg Oral Daily    atorvastatin  40 mg Oral Daily    [Held by provider] doxazosin  8 mg Oral Daily    ascorbic acid  500 mg Oral Daily    Vitamin D  2,000 Units Oral Daily    zinc sulfate  50 mg Oral Daily    pantoprazole  40 mg IntraVENous Daily    cetirizine  5 mg Oral BID     Continuous Infusions:   fentaNYL 5 mcg/ml in 0.9%  ml infusion 200 mcg/hr (21)    cisatracurium (NIMBEX) infusion 4 mcg/kg/min (21)    propofol 35 mcg/kg/min (21)    midazolam 8 mg/hr (21)    sodium chloride      dextrose       PRN Meds:sodium chloride (Inhalant), sodium chloride flush, sodium chloride, albuterol, benzonatate, glucose, dextrose, glucagon (rDNA), dextrose, sodium chloride    OBJECTIVE:  /61   Pulse 89   Temp 99.5 °F (37.5 °C) (Bladder)   Resp 26   Ht 6' (1.829 m)   Wt (!) 306 lb 14.1 oz (139.2 kg)   SpO2 92%   BMI 41.62 kg/m²   Temp  Av.5 °F (37.5 °C)  Min: 99.3 °F (37.4 °C)  Max: 99.5 °F (37.5 °C)  Constitutional: The patient is intubated, sedated, paralyzed. He is supine. Skin: Warm and dry.  No rashes were noted. HEENT: Round and nonreactive pupils. No thrush. Decubitus lesions on cheeks, hands and lower extremities noted. Neck: Supple. Chest: Coarse breath sounds bilaterally. No crackles  Cardiovascular: Heart sounds rhythmic and regular. Tachycardic. Abdomen: Round, soft and benign to palpation. Genitourinary: Busch catheter  Extremities: Minimal edema  Lines: Right radial arterial line 8/30/2021 (changed over guidewire). Right IJ triple-lumen catheter 8/30/2021 (changed over a guidewire).   Busch changed 3/24/2021    Laboratory and Tests Review:  Lab Results   Component Value Date    WBC 14.1 (H) 09/04/2021    WBC 19.2 (H) 09/03/2021    WBC 24.9 (H) 09/02/2021    HGB 10.9 (L) 09/04/2021    HCT 34.5 (L) 09/04/2021    MCV 94.3 09/04/2021     09/04/2021     Lab Results   Component Value Date    NEUTROABS 12.15 (H) 09/04/2021    NEUTROABS 16.96 (H) 09/03/2021    NEUTROABS 22.89 (H) 09/02/2021     No results found for: [facility]  Lab Results   Component Value Date    ALT 27 09/04/2021    AST 31 09/04/2021    ALKPHOS 87 09/04/2021    BILITOT 0.3 09/04/2021     Lab Results   Component Value Date     09/04/2021    K 4.7 09/04/2021    K 3.7 08/21/2021     09/04/2021    CO2 27 09/04/2021    BUN 35 09/04/2021    CREATININE 0.6 09/04/2021    CREATININE 0.5 09/03/2021    CREATININE 0.6 09/03/2021    GFRAA >60 09/04/2021    LABGLOM >60 09/04/2021    GLUCOSE 269 09/04/2021    PROT 4.8 09/04/2021    LABALBU 2.8 09/04/2021    CALCIUM 8.2 09/04/2021    BILITOT 0.3 09/04/2021    ALKPHOS 87 09/04/2021    AST 31 09/04/2021    ALT 27 09/04/2021     Lab Results   Component Value Date    CRP 14.1 (H) 09/03/2021    CRP 14.7 (H) 09/02/2021    CRP 0.9 (H) 09/01/2021     Lab Results   Component Value Date    SEDRATE 30 (H) 09/03/2021    SEDRATE 19 (H) 09/02/2021    SEDRATE 6 08/31/2021     Radiology:      Microbiology:   Streptococcus pneumoniae/Legionella urine Ag: negative  Nares screen MRSA: Negative  Urine culture 8/24/2021: Negative  Blood cultures 8/21/2021: Negative x2  Respiratory culture 8/27/2021: OP terrell reduced  Respiratory culture 8/30/2021: E. coli, MSSA     ASSESSMENT:  · Severe Covid pneumonia causing acute respiratory failure CRP has come down nicely. Completed Remdesivir. Received Tocilizumab  · Cytokine storm  · VAP versus tracheobronchitis. Cultures growing MSSA and E. coli  · Leukocytosis associated to the above. Improving    PLAN:  · Continue Cefazolin, day 4  · Supportive care by critical care    Spoke with nursing.     Tulio Milligan MD  9:00 AM   9/4/2021

## 2021-09-04 NOTE — PROGRESS NOTES
IntraVENous Daily       MEDS (infusions):   fentaNYL 5 mcg/ml in 0.9%  ml infusion 200 mcg/hr (09/04/21 1500)    cisatracurium (NIMBEX) infusion 5 mcg/kg/min (09/04/21 1754)    propofol 25 mcg/kg/min (09/04/21 1758)    midazolam 8 mg/hr (09/04/21 0925)    sodium chloride      dextrose         MEDS (prn):  sodium chloride (Inhalant), sodium chloride flush, sodium chloride, albuterol, benzonatate, glucose, dextrose, glucagon (rDNA), dextrose, sodium chloride    PHYSICAL EXAM:     Patient Vitals for the past 24 hrs:   BP Temp Temp src Pulse Resp SpO2   09/04/21 1545 (!) 175/71 99.7 °F (37.6 °C) Bladder 98 26 98 %   09/04/21 1500 -- -- -- -- -- 93 %   09/04/21 1400 (!) 176/82 -- -- 105 25 93 %   09/04/21 1319 -- -- -- 106 25 91 %   09/04/21 1300 (!) 181/81 -- -- 108 25 91 %   09/04/21 1200 (!) 184/84 -- -- 112 25 92 %   09/04/21 1130 (!) 181/76 99.1 °F (37.3 °C) Bladder 115 26 92 %   09/04/21 1100 (!) 193/78 -- -- 113 26 96 %   09/04/21 1000 (!) 93/56 -- -- 84 25 91 %   09/04/21 0954 -- -- -- 82 25 96 %   09/04/21 0900 (!) 87/49 -- -- 88 25 97 %   09/04/21 0800 116/64 -- -- 81 26 91 %   09/04/21 0730 117/61 99.5 °F (37.5 °C) Bladder 89 26 92 %   09/04/21 0700 (!) 193/86 -- -- 106 25 96 %   09/04/21 0635 (!) 193/86 -- -- 109 25 97 %   09/04/21 0455 -- -- -- 112 22 91 %   09/04/21 0222 -- -- -- 118 21 93 %   09/04/21 0208 -- -- -- 118 22 93 %   09/04/21 0200 -- -- -- 117 21 93 %   09/04/21 0138 -- -- -- 117 22 94 %   09/04/21 0113 -- -- -- 105 21 97 %   09/04/21 0100 -- -- -- 82 21 94 %   09/04/21 0000 -- 99.3 °F (37.4 °C) Bladder 77 21 95 %   09/03/21 2200 -- -- -- 83 21 93 %   09/03/21 2100 -- -- -- 114 21 95 %   09/03/21 2044 -- -- -- 115 22 95 %   09/03/21 2000 -- 99.5 °F (37.5 °C) Bladder 124 21 96 %   09/03/21 1900 -- -- -- 105 22 98 %   09/03/21 1803 -- -- -- 76 21 --   @      Intake/Output Summary (Last 24 hours) at 9/4/2021 1802  Last data filed at 9/4/2021 1604  Gross per 24 hour   Intake 4179.65 ml Output 1175 ml   Net 3004.65 ml         Wt Readings from Last 3 Encounters:   09/03/21 (!) 306 lb 14.1 oz (139.2 kg)   08/17/21 (!) 320 lb (145.2 kg)   08/10/21 (!) 321 lb 9.6 oz (145.9 kg)       Constitutional: Appears critically ill, though in no apparent distress  HEENT: NC/AT, EOMI, sclera and conjunctiva are clear and anicteric, mucus membranes moist  Neck: Trachea midline, no JVD  Cardiovascular: S1, S2 regular rhythm, no murmur,or rub  Respiratory:  No crackles, no wheeze  Gastrointestinal:  Soft, nontender, nondistended, NABS  Ext: no edema, feet warm  Skin: dry, no rash  Neuro: awake, alert, interactive      DATA:    Recent Labs     09/02/21  0530 09/03/21  0540 09/04/21  0600   WBC 24.9* 19.2* 14.1*   HGB 12.4* 12.4* 10.9*   HCT 38.8 39.5 34.5*   MCV 92.8 94.3 94.3    168 138     Recent Labs     09/02/21  0530 09/02/21  0530 09/03/21  0540 09/03/21  1400 09/03/21  1800 09/03/21  2205 09/04/21  0600      < > 141   < > 141 140 146   K 4.4   < > 5.2*   < > 5.5* 4.6 4.7      < > 108*   < > 109* 107 111*   CO2 27   < > 25   < > 25 27 27   MG 2.2  --  2.2  --   --   --  2.0   PHOS 3.2  --  2.9  --   --   --  3.1   BUN 47*   < > 39*   < > 36* 35* 35*   CREATININE 0.7   < > 0.6*   < > 0.6* 0.5* 0.6*   ALT 38  --  33  --   --   --  27   AST 30  --  47*  --   --   --  31   BILITOT 0.5  --  0.5  --   --   --  0.3   ALKPHOS 103  --  163*  --   --   --  87    < > = values in this interval not displayed. Lab Results   Component Value Date    LABPROT 0.2 09/01/2021    LABPROT 0.2 09/01/2021       ASSESSMENT / RECOMMENDATIONS:    60 y.o. gentleman admitted 8/21 with aggressive dyspnea, diagnosed with CO VID-19 pneumonia, subsequent suffered respiratory failure followed by multiorgan system failure. Has developed superimposed bacterial pneumonia. Current intubated via ETT, ventilated, prone    1.   Acute kidney injury, prerenal azotemia due to intravascular volume contraction despite massive third spacing in the setting of attempts to diurese    With cessation of diuretic therapy, creatinine has improved back to his baseline  Renal function stable.     Continue to hold diuretic therapy  Continue tube feed when able   Free water flushes 100 cc every 3 hours for now, adjust as warranted in the next 1 to 3 days  IV normal saline at conservative rate started last night --continue it for now  Follow labs, UO  Avoid nephrotoxins as able  Continue intensive supportive care      Electronically signed by Meagan Osei MD on 9/4/2021

## 2021-09-04 NOTE — PROGRESS NOTES
Critical Care Team - Daily Progress Note         Date and time: 9/3/2021 11:46 PM  Patient's name:  Erica Espinoza  Medical Record Number: [id number]  Patient's account/billing number: [de-identified]  Patient's YOB: 1965  Age: 60 y.o. Date of Admission: 8/21/2021  9:35 AM  Length of stay during current admission: 13      Primary Care Physician: Eboni Jimenez DO  ICU Attending Physician: Marleny Jimenes DO    Code Status: Full Code    Reason for ICU admission: Respiratory failure      SUBJECTIVE     8/30: tolerating longer periods supine, thick dark brown secretions     8/31: secretions out of NG, michelle prone    9/1: Decreased urine output and increased creatinine today, continues with thick secretions    9/2: Cr and Uop improved    9/3: P/F improved and has remained supine    OBJECTIVE     Vital signs:   height is 6' (1.829 m) and weight is 306 lb 14.1 oz (139.2 kg) (abnormal). His bladder temperature is 99.5 °F (37.5 °C). His blood pressure is 171/81 (abnormal) and his pulse is 83. His respiration is 21 and oxygen saturation is 93%. I/O last 3 completed shifts: In: 5926.7 [I.V.:3680.7; NG/GT:1996; IV Piggyback:250]  Out: 7229 [Urine:3375]      PHYSICAL EXAM:    Physical Exam  Constitutional:       Appearance: He is obese. He is ill-appearing. Interventions: He is sedated, chemically paralyzed and intubated. HENT:      Head: Normocephalic and atraumatic. Eyes:      Conjunctiva/sclera: Conjunctivae normal.   Neck:      Trachea: No tracheal deviation. Cardiovascular:      Rate and Rhythm: Normal rate and regular rhythm. Heart sounds: Normal heart sounds. Pulmonary:      Effort: Pulmonary effort is normal. He is intubated. Breath sounds: Rales present. Abdominal:      General: Bowel sounds are normal.      Palpations: Abdomen is soft. Tenderness: There is no abdominal tenderness. Musculoskeletal:         General: Swelling present.       Cervical back: trial off soon  · supine  · Vent - PC -Pi to 16, PEEP 10, continue weaning as michelle  · Supine goal 4 hours  · abx per ID - Invanz, Cefazolin  · Free H2O 100 q3          GI/DVT - SUP/Lovenox 40 q12  Consultants: ID, nephro      Code Status: Full Code    CCT excluding procedures >30 minutes    ELECTRONICALLY SIGNED:  Kori Haas DO

## 2021-09-04 NOTE — PLAN OF CARE
Problem: Airway Clearance - Ineffective  Goal: Achieve or maintain patent airway  Outcome: Met This Shift     Problem: Gas Exchange - Impaired  Goal: Absence of hypoxia  Outcome: Met This Shift  Goal: Promote optimal lung function  Outcome: Met This Shift     Problem: Breathing Pattern - Ineffective  Goal: Ability to achieve and maintain a regular respiratory rate  Outcome: Met This Shift     Problem:  Body Temperature -  Risk of, Imbalanced  Goal: Complications related to the disease process, condition or treatment will be avoided or minimized  Outcome: Met This Shift     Problem: Isolation Precautions - Risk of Spread of Infection  Goal: Prevent transmission of infection  Outcome: Met This Shift     Problem: Nutrition Deficits  Goal: Optimize nutritional status  Outcome: Met This Shift     Problem: Risk for Fluid Volume Deficit  Goal: Maintain normal heart rhythm  Outcome: Met This Shift  Goal: Maintain absence of muscle cramping  Outcome: Met This Shift  Goal: Maintain normal serum potassium, sodium, calcium, phosphorus, and pH  Outcome: Met This Shift     Problem: Loneliness or Risk for Loneliness  Goal: Demonstrate positive use of time alone when socialization is not possible  Outcome: Met This Shift     Problem: Fatigue  Goal: Verbalize increase energy and improved vitality  Outcome: Met This Shift     Problem: Skin Integrity:  Goal: Will show no infection signs and symptoms  Description: Will show no infection signs and symptoms  Outcome: Met This Shift  Goal: Absence of new skin breakdown  Description: Absence of new skin breakdown  Outcome: Met This Shift

## 2021-09-05 LAB
AADO2: 288.8 MMHG
AADO2: 424.8 MMHG
ALBUMIN SERPL-MCNC: 2.7 G/DL (ref 3.5–5.2)
ALP BLD-CCNC: 92 U/L (ref 40–129)
ALT SERPL-CCNC: 27 U/L (ref 0–40)
ANION GAP SERPL CALCULATED.3IONS-SCNC: 9 MMOL/L (ref 7–16)
AST SERPL-CCNC: 47 U/L (ref 0–39)
B.E.: 3 MMOL/L (ref -3–3)
B.E.: 4.4 MMOL/L (ref -3–3)
BASOPHILS ABSOLUTE: 0.01 E9/L (ref 0–0.2)
BASOPHILS RELATIVE PERCENT: 0.1 % (ref 0–2)
BILIRUB SERPL-MCNC: 0.2 MG/DL (ref 0–1.2)
BUN BLDV-MCNC: 32 MG/DL (ref 6–20)
C-REACTIVE PROTEIN: 9.9 MG/DL (ref 0–0.4)
CALCIUM SERPL-MCNC: 8.4 MG/DL (ref 8.6–10.2)
CHLORIDE BLD-SCNC: 109 MMOL/L (ref 98–107)
CO2: 26 MMOL/L (ref 22–29)
COHB: 0.6 % (ref 0–1.5)
COHB: 0.6 % (ref 0–1.5)
CREAT SERPL-MCNC: 0.5 MG/DL (ref 0.7–1.2)
CRITICAL: ABNORMAL
CRITICAL: ABNORMAL
DATE ANALYZED: ABNORMAL
DATE ANALYZED: ABNORMAL
DATE OF COLLECTION: ABNORMAL
DATE OF COLLECTION: ABNORMAL
EOSINOPHILS ABSOLUTE: 0.21 E9/L (ref 0.05–0.5)
EOSINOPHILS RELATIVE PERCENT: 2 % (ref 0–6)
FIO2: 60 %
FIO2: 80 %
GFR AFRICAN AMERICAN: >60
GFR NON-AFRICAN AMERICAN: >60 ML/MIN/1.73
GLUCOSE BLD-MCNC: 216 MG/DL (ref 74–99)
HCO3: 27.6 MMOL/L (ref 22–26)
HCO3: 29.1 MMOL/L (ref 22–26)
HCT VFR BLD CALC: 34.7 % (ref 37–54)
HEMOGLOBIN: 10.8 G/DL (ref 12.5–16.5)
HHB: 3.9 % (ref 0–5)
HHB: 4.8 % (ref 0–5)
IMMATURE GRANULOCYTES #: 0.1 E9/L
IMMATURE GRANULOCYTES %: 1 % (ref 0–5)
LAB: ABNORMAL
LAB: ABNORMAL
LYMPHOCYTES ABSOLUTE: 0.81 E9/L (ref 1.5–4)
LYMPHOCYTES RELATIVE PERCENT: 7.9 % (ref 20–42)
Lab: ABNORMAL
Lab: ABNORMAL
MAGNESIUM: 1.8 MG/DL (ref 1.6–2.6)
MCH RBC QN AUTO: 29.4 PG (ref 26–35)
MCHC RBC AUTO-ENTMCNC: 31.1 % (ref 32–34.5)
MCV RBC AUTO: 94.6 FL (ref 80–99.9)
METER GLUCOSE: 177 MG/DL (ref 74–99)
METER GLUCOSE: 207 MG/DL (ref 74–99)
METER GLUCOSE: 220 MG/DL (ref 74–99)
METER GLUCOSE: 223 MG/DL (ref 74–99)
METER GLUCOSE: 238 MG/DL (ref 74–99)
METHB: 0.1 % (ref 0–1.5)
METHB: 0.2 % (ref 0–1.5)
MODE: ABNORMAL
MODE: ABNORMAL
MONOCYTES ABSOLUTE: 0.96 E9/L (ref 0.1–0.95)
MONOCYTES RELATIVE PERCENT: 9.3 % (ref 2–12)
NEUTROPHILS ABSOLUTE: 8.22 E9/L (ref 1.8–7.3)
NEUTROPHILS RELATIVE PERCENT: 79.7 % (ref 43–80)
O2 CONTENT: 15.5 ML/DL
O2 CONTENT: 16.8 ML/DL
O2 SATURATION: 95.2 % (ref 92–98.5)
O2 SATURATION: 96.1 % (ref 92–98.5)
O2HB: 94.4 % (ref 94–97)
O2HB: 95.4 % (ref 94–97)
OPERATOR ID: 46
OPERATOR ID: ABNORMAL
PATIENT TEMP: 37 C
PATIENT TEMP: 37 C
PCO2: 42.3 MMHG (ref 35–45)
PCO2: 44.2 MMHG (ref 35–45)
PDW BLD-RTO: 14.3 FL (ref 11.5–15)
PEEP/CPAP: 10 CMH2O
PEEP/CPAP: 9 CMH2O
PFO2: 1.01 MMHG/%
PFO2: 1.26 MMHG/%
PH BLOOD GAS: 7.43 (ref 7.35–7.45)
PH BLOOD GAS: 7.44 (ref 7.35–7.45)
PHOSPHORUS: 2.7 MG/DL (ref 2.5–4.5)
PIP: 20 CMH2O
PLATELET # BLD: 124 E9/L (ref 130–450)
PMV BLD AUTO: 10.4 FL (ref 7–12)
PO2: 75.4 MMHG (ref 75–100)
PO2: 81.2 MMHG (ref 75–100)
POTASSIUM SERPL-SCNC: 4.2 MMOL/L (ref 3.5–5)
PS: 20 CMH20
RBC # BLD: 3.67 E12/L (ref 3.8–5.8)
RI(T): 383 %
RI(T): 523 %
RR MECHANICAL: 26 B/MIN
RR MECHANICAL: 26 B/MIN
SEDIMENTATION RATE, ERYTHROCYTE: 41 MM/HR (ref 0–15)
SODIUM BLD-SCNC: 144 MMOL/L (ref 132–146)
SOURCE, BLOOD GAS: ABNORMAL
SOURCE, BLOOD GAS: ABNORMAL
THB: 11.5 G/DL (ref 11.5–16.5)
THB: 12.6 G/DL (ref 11.5–16.5)
TIME ANALYZED: 1803
TIME ANALYZED: 613
TOTAL PROTEIN: 5.4 G/DL (ref 6.4–8.3)
WBC # BLD: 10.3 E9/L (ref 4.5–11.5)

## 2021-09-05 PROCEDURE — 2000000000 HC ICU R&B

## 2021-09-05 PROCEDURE — 94640 AIRWAY INHALATION TREATMENT: CPT

## 2021-09-05 PROCEDURE — 83735 ASSAY OF MAGNESIUM: CPT

## 2021-09-05 PROCEDURE — 6370000000 HC RX 637 (ALT 250 FOR IP): Performed by: INTERNAL MEDICINE

## 2021-09-05 PROCEDURE — 82805 BLOOD GASES W/O2 SATURATION: CPT

## 2021-09-05 PROCEDURE — 37799 UNLISTED PX VASCULAR SURGERY: CPT

## 2021-09-05 PROCEDURE — 85651 RBC SED RATE NONAUTOMATED: CPT

## 2021-09-05 PROCEDURE — 2580000003 HC RX 258: Performed by: INTERNAL MEDICINE

## 2021-09-05 PROCEDURE — 2500000003 HC RX 250 WO HCPCS: Performed by: INTERNAL MEDICINE

## 2021-09-05 PROCEDURE — 85025 COMPLETE CBC W/AUTO DIFF WBC: CPT

## 2021-09-05 PROCEDURE — 94003 VENT MGMT INPAT SUBQ DAY: CPT

## 2021-09-05 PROCEDURE — 6360000002 HC RX W HCPCS: Performed by: INTERNAL MEDICINE

## 2021-09-05 PROCEDURE — 6360000002 HC RX W HCPCS

## 2021-09-05 PROCEDURE — 84100 ASSAY OF PHOSPHORUS: CPT

## 2021-09-05 PROCEDURE — 80053 COMPREHEN METABOLIC PANEL: CPT

## 2021-09-05 PROCEDURE — 2500000003 HC RX 250 WO HCPCS

## 2021-09-05 PROCEDURE — 36415 COLL VENOUS BLD VENIPUNCTURE: CPT

## 2021-09-05 PROCEDURE — 2580000003 HC RX 258: Performed by: FAMILY MEDICINE

## 2021-09-05 PROCEDURE — 6360000002 HC RX W HCPCS: Performed by: FAMILY MEDICINE

## 2021-09-05 PROCEDURE — 6360000002 HC RX W HCPCS: Performed by: STUDENT IN AN ORGANIZED HEALTH CARE EDUCATION/TRAINING PROGRAM

## 2021-09-05 PROCEDURE — 99233 SBSQ HOSP IP/OBS HIGH 50: CPT | Performed by: INTERNAL MEDICINE

## 2021-09-05 PROCEDURE — 6360000002 HC RX W HCPCS: Performed by: SPECIALIST

## 2021-09-05 PROCEDURE — 2580000003 HC RX 258: Performed by: STUDENT IN AN ORGANIZED HEALTH CARE EDUCATION/TRAINING PROGRAM

## 2021-09-05 PROCEDURE — 82962 GLUCOSE BLOOD TEST: CPT

## 2021-09-05 PROCEDURE — C9113 INJ PANTOPRAZOLE SODIUM, VIA: HCPCS | Performed by: STUDENT IN AN ORGANIZED HEALTH CARE EDUCATION/TRAINING PROGRAM

## 2021-09-05 PROCEDURE — 86140 C-REACTIVE PROTEIN: CPT

## 2021-09-05 RX ORDER — ASCORBIC ACID 500 MG
1000 TABLET ORAL DAILY
Status: DISCONTINUED | OUTPATIENT
Start: 2021-09-06 | End: 2021-09-20 | Stop reason: HOSPADM

## 2021-09-05 RX ORDER — INSULIN GLARGINE 100 [IU]/ML
25 INJECTION, SOLUTION SUBCUTANEOUS 2 TIMES DAILY
Status: DISCONTINUED | OUTPATIENT
Start: 2021-09-05 | End: 2021-09-17

## 2021-09-05 RX ORDER — LABETALOL HYDROCHLORIDE 5 MG/ML
10 INJECTION, SOLUTION INTRAVENOUS EVERY 6 HOURS PRN
Status: DISCONTINUED | OUTPATIENT
Start: 2021-09-05 | End: 2021-09-08

## 2021-09-05 RX ADMIN — CISATRACURIUM BESYLATE 5 MCG/KG/MIN: 10 INJECTION INTRAVENOUS at 20:22

## 2021-09-05 RX ADMIN — MINERAL OIL AND PETROLATUM: 150; 830 OINTMENT OPHTHALMIC at 14:42

## 2021-09-05 RX ADMIN — PANTOPRAZOLE SODIUM 40 MG: 40 INJECTION, POWDER, FOR SOLUTION INTRAVENOUS at 07:42

## 2021-09-05 RX ADMIN — ATORVASTATIN CALCIUM 40 MG: 40 TABLET, FILM COATED ORAL at 20:32

## 2021-09-05 RX ADMIN — CHLORHEXIDINE GLUCONATE 0.12% ORAL RINSE 15 ML: 1.2 LIQUID ORAL at 07:42

## 2021-09-05 RX ADMIN — INSULIN GLARGINE 45 UNITS: 100 INJECTION, SOLUTION SUBCUTANEOUS at 08:06

## 2021-09-05 RX ADMIN — INSULIN LISPRO 6 UNITS: 100 INJECTION, SOLUTION INTRAVENOUS; SUBCUTANEOUS at 06:34

## 2021-09-05 RX ADMIN — IPRATROPIUM BROMIDE AND ALBUTEROL SULFATE 1 AMPULE: .5; 3 SOLUTION RESPIRATORY (INHALATION) at 13:36

## 2021-09-05 RX ADMIN — PROPOFOL 15 MCG/KG/MIN: 10 INJECTION, EMULSION INTRAVENOUS at 01:40

## 2021-09-05 RX ADMIN — PHENYTOIN SODIUM 300 MG: 50 INJECTION INTRAMUSCULAR; INTRAVENOUS at 22:00

## 2021-09-05 RX ADMIN — Medication 9 MG/HR: at 06:34

## 2021-09-05 RX ADMIN — INSULIN LISPRO 6 UNITS: 100 INJECTION, SOLUTION INTRAVENOUS; SUBCUTANEOUS at 00:26

## 2021-09-05 RX ADMIN — CHLORHEXIDINE GLUCONATE 0.12% ORAL RINSE 15 ML: 1.2 LIQUID ORAL at 20:32

## 2021-09-05 RX ADMIN — Medication 10 ML: at 21:16

## 2021-09-05 RX ADMIN — MINERAL OIL AND PETROLATUM: 150; 830 OINTMENT OPHTHALMIC at 03:03

## 2021-09-05 RX ADMIN — IPRATROPIUM BROMIDE AND ALBUTEROL SULFATE 1 AMPULE: .5; 3 SOLUTION RESPIRATORY (INHALATION) at 05:37

## 2021-09-05 RX ADMIN — ENOXAPARIN SODIUM 40 MG: 40 INJECTION SUBCUTANEOUS at 20:32

## 2021-09-05 RX ADMIN — MINERAL OIL AND PETROLATUM: 150; 830 OINTMENT OPHTHALMIC at 07:43

## 2021-09-05 RX ADMIN — MINERAL OIL AND PETROLATUM: 150; 830 OINTMENT OPHTHALMIC at 21:16

## 2021-09-05 RX ADMIN — PHENYTOIN SODIUM 300 MG: 50 INJECTION INTRAMUSCULAR; INTRAVENOUS at 10:44

## 2021-09-05 RX ADMIN — Medication 200 MCG/HR: at 09:42

## 2021-09-05 RX ADMIN — CISATRACURIUM BESYLATE 5 MCG/KG/MIN: 10 INJECTION INTRAVENOUS at 15:27

## 2021-09-05 RX ADMIN — Medication 200 MCG/HR: at 15:27

## 2021-09-05 RX ADMIN — LABETALOL HYDROCHLORIDE 10 MG: 5 INJECTION INTRAVENOUS at 23:45

## 2021-09-05 RX ADMIN — IPRATROPIUM BROMIDE AND ALBUTEROL SULFATE 1 AMPULE: .5; 3 SOLUTION RESPIRATORY (INHALATION) at 15:59

## 2021-09-05 RX ADMIN — Medication 9 MG/HR: at 17:04

## 2021-09-05 RX ADMIN — INSULIN LISPRO 6 UNITS: 100 INJECTION, SOLUTION INTRAVENOUS; SUBCUTANEOUS at 18:03

## 2021-09-05 RX ADMIN — DEXAMETHASONE SODIUM PHOSPHATE 10 MG: 4 INJECTION, SOLUTION INTRA-ARTICULAR; INTRALESIONAL; INTRAMUSCULAR; INTRAVENOUS; SOFT TISSUE at 07:42

## 2021-09-05 RX ADMIN — CISATRACURIUM BESYLATE 5 MCG/KG/MIN: 10 INJECTION INTRAVENOUS at 00:19

## 2021-09-05 RX ADMIN — IPRATROPIUM BROMIDE AND ALBUTEROL SULFATE 1 AMPULE: .5; 3 SOLUTION RESPIRATORY (INHALATION) at 23:55

## 2021-09-05 RX ADMIN — Medication 2000 MG: at 04:03

## 2021-09-05 RX ADMIN — INSULIN LISPRO 6 UNITS: 100 INJECTION, SOLUTION INTRAVENOUS; SUBCUTANEOUS at 11:20

## 2021-09-05 RX ADMIN — PROPOFOL 25 MCG/KG/MIN: 10 INJECTION, EMULSION INTRAVENOUS at 11:47

## 2021-09-05 RX ADMIN — Medication 2000 MG: at 12:34

## 2021-09-05 RX ADMIN — IPRATROPIUM BROMIDE AND ALBUTEROL SULFATE 1 AMPULE: .5; 3 SOLUTION RESPIRATORY (INHALATION) at 01:16

## 2021-09-05 RX ADMIN — SODIUM CHLORIDE SOLN NEBU 3% 4 ML: 3 NEBU SOLN at 08:19

## 2021-09-05 RX ADMIN — OXYCODONE HYDROCHLORIDE AND ACETAMINOPHEN 500 MG: 500 TABLET ORAL at 07:42

## 2021-09-05 RX ADMIN — PROPOFOL 20 MCG/KG/MIN: 10 INJECTION, EMULSION INTRAVENOUS at 18:06

## 2021-09-05 RX ADMIN — ZINC SULFATE 220 MG (50 MG) CAPSULE 50 MG: CAPSULE at 07:42

## 2021-09-05 RX ADMIN — ENOXAPARIN SODIUM 40 MG: 40 INJECTION SUBCUTANEOUS at 10:00

## 2021-09-05 RX ADMIN — LABETALOL HYDROCHLORIDE 10 MG: 5 INJECTION INTRAVENOUS at 10:01

## 2021-09-05 RX ADMIN — SODIUM CHLORIDE SOLN NEBU 3% 4 ML: 3 NEBU SOLN at 20:42

## 2021-09-05 RX ADMIN — Medication 10 ML: at 07:49

## 2021-09-05 RX ADMIN — Medication 2000 UNITS: at 07:42

## 2021-09-05 RX ADMIN — Medication 200 MCG/HR: at 03:13

## 2021-09-05 RX ADMIN — PROPOFOL 30 MCG/KG/MIN: 10 INJECTION, EMULSION INTRAVENOUS at 06:33

## 2021-09-05 RX ADMIN — IPRATROPIUM BROMIDE AND ALBUTEROL SULFATE 1 AMPULE: .5; 3 SOLUTION RESPIRATORY (INHALATION) at 08:19

## 2021-09-05 RX ADMIN — INSULIN GLARGINE 25 UNITS: 100 INJECTION, SOLUTION SUBCUTANEOUS at 21:00

## 2021-09-05 RX ADMIN — IPRATROPIUM BROMIDE AND ALBUTEROL SULFATE 1 AMPULE: .5; 3 SOLUTION RESPIRATORY (INHALATION) at 20:42

## 2021-09-05 RX ADMIN — Medication 2000 MG: at 20:24

## 2021-09-05 RX ADMIN — CISATRACURIUM BESYLATE 5.5 MCG/KG/MIN: 10 INJECTION INTRAVENOUS at 04:44

## 2021-09-05 ASSESSMENT — PULMONARY FUNCTION TESTS
PIF_VALUE: 32
PIF_VALUE: 31
PIF_VALUE: 32
PIF_VALUE: 33
PIF_VALUE: 31
PIF_VALUE: 32
PIF_VALUE: 32
PIF_VALUE: 33
PIF_VALUE: 31
PIF_VALUE: 32
PIF_VALUE: 31
PIF_VALUE: 32
PIF_VALUE: 33
PIF_VALUE: 33
PIF_VALUE: 32
PIF_VALUE: 31
PIF_VALUE: 33
PIF_VALUE: 32

## 2021-09-05 NOTE — PROGRESS NOTES
Critical Care Team - Daily Progress Note         Date and time: 9/5/2021 8:37 AM  Patient's name:  Lord Coello  Medical Record Number: [id number]  Patient's account/billing number: [de-identified]  Patient's YOB: 1965  Age: 60 y.o. Date of Admission: 8/21/2021  9:35 AM  Length of stay during current admission: 15      Primary Care Physician: Jose Perez DO  ICU Attending Physician: Judy Bettencourt DO    Code Status: Full Code    Reason for ICU admission: Respiratory failure      SUBJECTIVE     8/30: tolerating longer periods supine, thick dark brown secretions     8/31: secretions out of NG, michelle prone    9/1: Decreased urine output and increased creatinine today, continues with thick secretions    9/2: Cr and Uop improved    9/3: P/F improved and has remained supine    9/4: thick secretions    9/5: desat with turning, 1500 loose stool from FMS    OBJECTIVE     Vital signs:   height is 6' (1.829 m) and weight is 314 lb 9.5 oz (142.7 kg) (abnormal). His bladder temperature is 99.5 °F (37.5 °C). His blood pressure is 100/48 (abnormal) and his pulse is 79. His respiration is 26 and oxygen saturation is 93%. I/O last 3 completed shifts: In: 4580.6 [I.V.:2825.6; NG/GT:1755]  Out: 2700 [Urine:1200; Stool:1500]      PHYSICAL EXAM:    Physical Exam  Constitutional:       Appearance: He is obese. He is ill-appearing. Interventions: He is sedated, chemically paralyzed and intubated. HENT:      Head: Normocephalic and atraumatic. Eyes:      Conjunctiva/sclera: Conjunctivae normal.   Neck:      Trachea: No tracheal deviation. Cardiovascular:      Rate and Rhythm: Normal rate and regular rhythm. Heart sounds: Normal heart sounds. Pulmonary:      Effort: Pulmonary effort is normal. He is intubated. Breath sounds: Rales present. Abdominal:      General: Bowel sounds are normal.      Palpations: Abdomen is soft. Tenderness: There is no abdominal tenderness. Musculoskeletal:         General: Swelling present. Cervical back: Neck supple. Lymphadenopathy:      Cervical: No cervical adenopathy. Skin:     General: Skin is warm and dry. Findings: No rash. MEDICATIONS:  Scheduled Meds:   insulin glargine  45 Units SubCUTAneous Daily    ceFAZolin  2,000 mg IntraVENous Q8H    sodium chloride (Inhalant)  4 mL Nebulization Q12H    dexamethasone  10 mg IntraVENous Daily    enoxaparin  40 mg SubCUTAneous BID    insulin lispro  0-18 Units SubCUTAneous Q6H    phenytoin  300 mg IntraVENous Q12H    chlorhexidine  15 mL Mouth/Throat BID    Refresh Lacri-Lube   Ophthalmic Q6H    [Held by provider] lisinopril  40 mg Oral Daily    ipratropium-albuterol  1 ampule Inhalation Q4H    sodium chloride flush  10 mL IntraVENous BID    amLODIPine  10 mg Oral Daily    atorvastatin  40 mg Oral Daily    [Held by provider] doxazosin  8 mg Oral Daily    ascorbic acid  500 mg Oral Daily    Vitamin D  2,000 Units Oral Daily    zinc sulfate  50 mg Oral Daily    pantoprazole  40 mg IntraVENous Daily     Continuous Infusions:   fentaNYL 5 mcg/ml in 0.9%  ml infusion 200 mcg/hr (09/05/21 0313)    cisatracurium (NIMBEX) infusion 5.5 mcg/kg/min (09/05/21 0444)    propofol 20 mcg/kg/min (09/05/21 0821)    midazolam 9 mg/hr (09/05/21 0634)    sodium chloride      dextrose       PRN Meds:sodium chloride (Inhalant), sodium chloride flush, sodium chloride, albuterol, benzonatate, glucose, dextrose, glucagon (rDNA), dextrose, sodium chloride    PERTINENT LAB RESULTS:  Labs reviewed. DIAGNOSTICS:  Imaging reviewed. ASSESMENT/PLAN     Assessment:     1. Acute hypoxic respiratory failure requiring mechanical ventilation  2. Severe COVID-19 pneumonia s/p Toci, Remdesivir  3. Pneumonia, VAP - MSSA, E.Coli  4. Obesity BMI 40  5. HTN  6.  OSCAR - improved        · Sedation, NMB - Nimbex, possible trial off soon  · supine  · Vent - PC -Pi to 16, PEEP 10, continue weaning as michelle  · abx per ID - Invanz, Cefazolin  · Trach/peg when oxygen garments improve  · Free H2O 100 q3  · Lantus adjusted, increase ISS          GI/DVT - SUP/Lovenox 40 q12  Consultants: ID, nephro, GS      Code Status: Full Code    CCT excluding procedures >30 minutes    ELECTRONICALLY SIGNED:  Neha Gutierrez DO

## 2021-09-05 NOTE — PATIENT CARE CONFERENCE
Intensive Care Daily Quality Rounding Checklist        ICU Team Members: Dr. Jermain Calvert, Dr. Horace Glaser (resident),  charge nurse, bedside nurse, respiratory therapist     ICU Day #: NUMBER: 16     Intubation Date: August 23,2021     Ventilator Day #: NUMBER: [address] Insertion XLTJ: [id number]                                                    QQT #: NUMBER: 7      Arterial Line Insertion Date: August 30,2021                             Day #: NUMBER: 7     Temporary Hemodialysis Catheter Insertion Date:  N/A                             Day # N/A     DVT Prophylaxis: Lovenox    GI Prophylaxis: Protonix     Busch Catheter Insertion Date: August 26  (changed)                                     Day #: 11                                Continued need (if yes, reason documented and discussed with physician): yes, strict I&O in critical patient     Skin Issues/ Wounds and ordered treatment discussed on rounds: yes, wound care consulted     Goals/ Plans for the Day: daily labs, wean vent as able, continue supine, await trach/peg

## 2021-09-05 NOTE — FLOWSHEET NOTE
Large loose stool. Turned patient to clean. Very large amount of loose stool once over onto side. Patient desat to 2% became bradycardic into 40's. Taken off vent and bagged with 100% oxygen until SpO2 back up to 90%. Place back on the vent. O2 saturation began to drop again. Respiratory therapist, hospitalist and ICU resident at the bedside. Once stabilized an FMS was placed.

## 2021-09-05 NOTE — PROGRESS NOTES
[facility] Progress Note    Admitting Date and Time: 8/21/2021  9:35 AM  Admit Dx: Acute hypoxemic respiratory failure due to COVID-19 (HCC) [U07.1, J96.01]  Pneumonia due to COVID-19 virus [U07.1, J12.82]    Subjective:  Patient is being followed for Acute hypoxemic respiratory failure due to COVID-19 ([address] [U07.1, J96.01]  Pneumonia due to COVID-19 virus [U07.1, J12.82]       Patient had a RRT yesterday for hypoxia. Patient was seen by surgery for trach and PEG. ROS: denies fever, chills, cp, sob, n/v, HA unless stated above.       insulin glargine  25 Units SubCUTAneous BID    [START ON 9/6/2021] ascorbic acid  1,000 mg Oral Daily    ceFAZolin  2,000 mg IntraVENous Q8H    sodium chloride (Inhalant)  4 mL Nebulization Q12H    dexamethasone  10 mg IntraVENous Daily    enoxaparin  40 mg SubCUTAneous BID    insulin lispro  0-18 Units SubCUTAneous Q6H    phenytoin  300 mg IntraVENous Q12H    chlorhexidine  15 mL Mouth/Throat BID    Refresh Lacri-Lube   Ophthalmic Q6H    ipratropium-albuterol  1 ampule Inhalation Q4H    sodium chloride flush  10 mL IntraVENous BID    amLODIPine  10 mg Oral Daily    atorvastatin  40 mg Oral Daily    Vitamin D  2,000 Units Oral Daily    zinc sulfate  50 mg Oral Daily    pantoprazole  40 mg IntraVENous Daily     labetalol, 10 mg, Q6H PRN  sodium chloride (Inhalant), 4 mL, PRN  sodium chloride flush, 5-40 mL, PRN  sodium chloride, 25 mL, PRN  albuterol, 2.5 mg, Q6H PRN  benzonatate, 200 mg, TID PRN  glucose, 15 g, PRN  dextrose, 12.5 g, PRN  glucagon (rDNA), 1 mg, PRN  dextrose, 100 mL/hr, PRN  sodium chloride, 30 mL, PRN         Objective:    BP (!) 146/83   Pulse 90   Temp 99.5 °F (37.5 °C) (Bladder)   Resp 26   Ht 6' (1.829 m)   Wt (!) 314 lb 9.5 oz (142.7 kg)   SpO2 93%   BMI 42.67 kg/m²     General Appearance: intubated and sedate  Skin: warm and dry  Head: normocephalic and atraumatic  Eyes: pupils equal, round, and reactive to light, extraocular eye movements intact, conjunctivae normal  Neck: neck supple and non tender without mass   Pulmonary/Chest: clear to auscultation bilaterally- no wheezes, rales or rhonchi, normal air movement, no respiratory distress  Cardiovascular: normal rate, normal S1 and S2 and no carotid bruits  Abdomen: soft, non-tender, non-distended, normal bowel sounds, no masses or organomegaly, placement of PEG  Extremities: no cyanosis, no clubbing and no edema  Neurologic: unable to assess as on sedation        Recent Labs     09/03/21  2205 09/04/21  0600 09/05/21  0555    146 144   K 4.6 4.7 4.2    111* 109*   CO2 27 27 26   BUN 35* 35* 32*   CREATININE 0.5* 0.6* 0.5*   GLUCOSE 279* 269* 216*   CALCIUM 8.5* 8.2* 8.4*       Recent Labs     09/03/21  0540 09/04/21  0600 09/05/21  0555   WBC 19.2* 14.1* 10.3   RBC 4.19 3.66* 3.67*   HGB 12.4* 10.9* 10.8*   HCT 39.5 34.5* 34.7*   MCV 94.3 94.3 94.6   MCH 29.6 29.8 29.4   MCHC 31.4* 31.6* 31.1*   RDW 14.3 14.5 14.3    138 124*   MPV 10.6 10.5 10.4       Radiology:     cxr    1. Stable chest   2. The position alignment of the tubes and catheters are within normal range   3. Bilateral parenchymal densities suggest atelectasis and/or consolidation,   stable     Assessment:    Principal Problem:    Acute hypoxemic respiratory failure due to COVID-19 [facility])  Active Problems:    Type 2 diabetes mellitus without complication, without long-term current use of insulin (HCC)    History of seizures    Hyperlipidemia    Busch catheter problem (HCC)    Sepsis (HCC)    Thrombocytopenia (HCC)    Elevated LFTs  Resolved Problems:    OSCAR (acute kidney injury) ([facility] [address]    Lactic acidosis      Plan:    1.  Acute hypoxic respiratory failure status S/P trach secondary to COVID-19 pneumonia - Continue  nimbex, fentanyl, propofol, and versed  2.  COVID 19 pneumonia - Patient received actemra, completed remdesivir, and is on Decadron. Continue zinc, vitamin c, and vitamin d.  Continue to trend ESR/CRP. Currently in supine position. Increase in CRP and ESR. 3. Possible VAP - Respiratory panel from 8/30 is growing Staph aureus and E coli. MRSA from 8/24 is negative. Patient is to continue cefazolin day 5  4. Leukocytosis 2/2 steroids and infection  5. DM-II, hyperglycemia - Continues to be hyperglycemic and so lantus increased to 25 u bid and high dose ss  6. HLD - Continue lipitor  7. HTN - Continue norvasc  8. H/O seizures - Continue keppra  9. Diet - PEG placed  10. DVT prophylaxis - lovenox    Will require DC planning likely to LTAC    NOTE: This report was transcribed using voice recognition software. Every effort was made to ensure accuracy; however, inadvertent computerized transcription errors may be present.   Electronically signed by Fayne Collet DO on 9/5/2021 at 4:22 PM

## 2021-09-05 NOTE — CONSULTS
IntraVENous Continuous Maurita Liming [place], DO 22.6 mL/hr at 09/05/21 0444 5.5 mcg/kg/min at 09/05/21 0444    sodium chloride (Inhalant) 3 % nebulizer solution 4 mL  4 mL Nebulization PRN Rekha Love MD   4 mL at 08/30/21 1446    phenytoin (DILANTIN) 300 mg in sodium chloride 0.9 % 100 mL IVPB (maintenance dose)  300 mg IntraVENous Q12H Shani Goss DO   Stopped at 09/05/21 1119    propofol injection  5-50 mcg/kg/min IntraVENous Titrated Rekha Love MD 20.6 mL/hr at 09/05/21 0948 25 mcg/kg/min at 09/05/21 0948    midazolam (VERSED) 1 mg/mL in D5W infusion  1-10 mg/hr IntraVENous Continuous Rekha Love MD 9 mL/hr at 09/05/21 0634 9 mg/hr at 09/05/21 0634    chlorhexidine (PERIDEX) 0.12 % solution 15 mL  15 mL Mouth/Throat BID Rekha Love MD   15 mL at 09/05/21 0742    Refresh Lacri-Lube ointment OINT   Ophthalmic Q6H Rekha Love MD   Given at 09/05/21 0743    sodium chloride flush 0.9 % injection 5-40 mL  5-40 mL IntraVENous PRN Twylla Pack, DO        0.9 % sodium chloride infusion  25 mL IntraVENous PRN Twylla Pack, DO        ipratropium-albuterol (DUONEB) nebulizer solution 1 ampule  1 ampule Inhalation Q4H Rekha Love MD   1 ampule at 09/05/21 0819    albuterol (PROVENTIL) nebulizer solution 2.5 mg  2.5 mg Nebulization Q6H PRN Rekha Love MD   2.5 mg at 08/27/21 1605    sodium chloride flush 0.9 % injection 10 mL  10 mL IntraVENous BID Twylla Pack, DO   10 mL at 09/05/21 0749    amLODIPine (NORVASC) tablet 10 mg  10 mg Oral Daily Marjan FORTE [place], DO   10 mg at 09/02/21 2028    atorvastatin (LIPITOR) tablet 40 mg  40 mg Oral Daily Marjan J [place], DO   40 mg at 09/04/21 2108    benzonatate (TESSALON) capsule 200 mg  200 mg Oral TID PRN Vergil Levar, DO        vitamin D (CHOLECALCIFEROL) tablet 2,000 Units  2,000 Units Oral Daily Vergil Levar, DO   2,000 Units at 09/05/21 0742    zinc sulfate (ZINCATE) capsule 50 mg  50 mg Oral Daily Marjan FORTE [place], DO   50 mg at 09/05/21 0742    glucose (GLUTOSE) 40 % oral gel 15 g  15 g Oral PRN Marjan J [place], DO        dextrose 50 % IV solution  12.5 g IntraVENous PRN Marjan J Ronald, DO        glucagon (rDNA) injection 1 mg  1 mg IntraMUSCular PRN Marjan J Ronald, DO        dextrose 5 % solution  100 mL/hr IntraVENous PRN Marjan J [place], DO        pantoprazole (PROTONIX) injection 40 mg  40 mg IntraVENous Daily Filomena Diaz, DO   40 mg at 09/05/21 0742    0.9 % sodium chloride bolus  30 mL IntraVENous PRN Marjan J Ronald, DO           No Known Allergies    No family history on file. Social History     Socioeconomic History    Marital status:      Spouse name: Not on file    Number of children: Not on file    Years of education: Not on file    Highest education level: Not on file   Occupational History    Not on file   Tobacco Use    Smoking status: Never Smoker    Smokeless tobacco: Never Used   Substance and Sexual Activity    Alcohol use: Not on file    Drug use: Not on file    Sexual activity: Not on file   Other Topics Concern    Not on file   Social History Narrative    Not on file     Social Determinants of Health     Financial Resource Strain: Low Risk     Difficulty of Paying Living Expenses: Not hard at all   Food Insecurity: No Food Insecurity    Worried About Running Out of Food in the Last Year: Never true    [address] in the Last Year: Never true   Transportation Needs:     Lack of Transportation (Medical):      Lack of Transportation (Non-Medical):    Physical Activity:     Days of Exercise per Week:     Minutes of Exercise per Session:    Stress:     Feeling of Stress :    Social Connections:     Frequency of Communication with Friends and Family:     Frequency of Social Gatherings with Friends and Family:     Attends [employer] Services:     Active Member of Clubs or Organizations:     Attends Club or Organization Meetings:     Marital Status:    Intimate

## 2021-09-05 NOTE — PROGRESS NOTES
Daily    pantoprazole  40 mg IntraVENous Daily       MEDS (infusions):   fentaNYL 5 mcg/ml in 0.9%  ml infusion 200 mcg/hr (09/05/21 1527)    cisatracurium (NIMBEX) infusion 5 mcg/kg/min (09/05/21 1527)    propofol 20 mcg/kg/min (09/05/21 1806)    midazolam 9 mg/hr (09/05/21 1704)    sodium chloride      dextrose         MEDS (prn):  labetalol, sodium chloride (Inhalant), sodium chloride flush, sodium chloride, albuterol, benzonatate, glucose, dextrose, glucagon (rDNA), dextrose, sodium chloride    PHYSICAL EXAM:     Patient Vitals for the past 24 hrs:   BP Temp Temp src Pulse Resp SpO2 Weight   09/05/21 1800 (!) 169/82 -- -- 100 25 94 % --   09/05/21 1700 (!) 177/83 -- -- 102 25 93 % --   09/05/21 1630 (!) 172/87 -- -- 102 26 94 % --   09/05/21 1600 (!) 176/83 -- -- 99 25 94 % --   09/05/21 1530 (!) 146/83 99.5 °F (37.5 °C) Bladder 90 26 93 % --   09/05/21 1500 125/68 -- -- 84 25 97 % --   09/05/21 1430 (!) 84/49 -- -- 74 25 96 % --   09/05/21 1400 122/80 -- -- 85 25 96 % --   09/05/21 1337 -- -- -- 75 25 95 % --   09/05/21 1330 (!) 92/52 -- -- 77 25 95 % --   09/05/21 1300 124/74 -- -- 93 26 94 % --   09/05/21 1230 (!) 176/84 -- -- 94 26 93 % --   09/05/21 1215 (!) 175/83 -- -- 94 25 93 % --   09/05/21 1200 (!) 173/83 -- -- 93 25 93 % --   09/05/21 1145 (!) 178/84 -- -- 93 25 92 % --   09/05/21 1130 (!) 176/80 99.4 °F (37.4 °C) Bladder 93 25 92 % --   09/05/21 1115 (!) 172/82 -- -- 91 25 91 % --   09/05/21 1100 (!) 178/80 -- -- 91 25 91 % --   09/05/21 1045 (!) 159/86 -- -- 92 26 90 % --   09/05/21 1030 (!) 163/81 -- -- 92 25 91 % --   09/05/21 1015 (!) 173/84 -- -- 93 26 92 % --   09/05/21 1000 (!) 97/54 -- -- 104 25 92 % --   09/05/21 0945 (!) 97/54 -- -- 105 26 93 % --   09/05/21 0930 (!) 97/54 -- -- 94 26 96 % --   09/05/21 0900 -- -- -- 90 25 94 % --   09/05/21 0815 -- -- -- 79 26 93 % --   09/05/21 0800 -- -- -- 84 27 99 % --   09/05/21 0738 (!) 100/48 99.5 °F (37.5 °C) Bladder 71 26 98 % -- 09/05/21 0700 (!) 145/74 -- -- 77 25 96 % --   09/05/21 0600 (!) 145/74 -- -- 98 25 97 % (!) 314 lb 9.5 oz (142.7 kg)   09/05/21 0539 -- -- -- 90 26 97 % --   09/05/21 0500 -- -- -- 98 25 98 % --   09/05/21 0400 -- 99.1 °F (37.3 °C) Bladder 96 25 98 % --   09/05/21 0300 (!) 82/49 -- -- 85 25 100 % --   09/05/21 0200 (!) 82/49 -- -- 73 25 99 % --   09/05/21 0117 -- -- -- 70 26 99 % --   09/05/21 0100 -- -- -- 69 25 99 % --   09/05/21 0000 -- 99.3 °F (37.4 °C) Bladder 81 25 99 % --   09/04/21 2300 (!) 203/80 -- -- 98 26 98 % --   09/04/21 2200 (!) 203/80 -- -- 115 23 (!) 80 % --   09/04/21 2100 -- -- -- 101 25 95 % --   09/04/21 2005 -- -- -- 81 26 94 % --   09/04/21 2000 -- 99.3 °F (37.4 °C) Bladder 79 25 94 % --   @      Intake/Output Summary (Last 24 hours) at 9/5/2021 1941  Last data filed at 9/5/2021 1452  Gross per 24 hour   Intake 3855.55 ml   Output 3850 ml   Net 5.55 ml         Wt Readings from Last 3 Encounters:   09/05/21 (!) 314 lb 9.5 oz (142.7 kg)   08/17/21 (!) 320 lb (145.2 kg)   08/10/21 (!) 321 lb 9.6 oz (145.9 kg)       Constitutional: Appears critically ill, though in no apparent distress  HEENT: NC/AT, EOMI, sclera and conjunctiva are clear and anicteric, mucus membranes moist  Neck: Trachea midline, no JVD  Cardiovascular: S1, S2 regular rhythm, no murmur,or rub  Respiratory:  No crackles, no wheeze  Gastrointestinal:  Soft, nontender, nondistended, NABS  Ext: no edema, feet warm  Skin: dry, no rash  Neuro: awake, alert, interactive      DATA:    Recent Labs     09/03/21  0540 09/04/21  0600 09/05/21  0555   WBC 19.2* 14.1* 10.3   HGB 12.4* 10.9* 10.8*   HCT 39.5 34.5* 34.7*   MCV 94.3 94.3 94.6    138 124*     Recent Labs     09/03/21  0540 09/03/21  1400 09/03/21  2205 09/04/21  0600 09/05/21  0555      < > 140 146 144   K 5.2*   < > 4.6 4.7 4.2   *   < > 107 111* 109*   CO2 25   < > 27 27 26   MG 2.2  --   --  2.0 1.8   PHOS 2.9  --   --  3.1 2.7   BUN 39*   < > 35* 35* 32*   CREATININE 0.6*   < > 0.5* 0.6* 0.5*   ALT 33  --   --  27 27   AST 47*  --   --  31 47*   BILITOT 0.5  --   --  0.3 0.2   ALKPHOS 163*  --   --  87 92    < > = values in this interval not displayed. Lab Results   Component Value Date    LABPROT 0.2 09/01/2021    LABPROT 0.2 09/01/2021       ASSESSMENT / RECOMMENDATIONS:    60 y.o. gentleman admitted 8/21 with aggressive dyspnea, diagnosed with CO VID-19 pneumonia, subsequent suffered respiratory failure followed by multiorgan system failure. Has developed superimposed bacterial pneumonia. Current intubated via ETT, ventilated, prone    1. Acute kidney injury, prerenal azotemia due to intravascular volume contraction despite massive third spacing in the setting of attempts to diurese    2. Hypertension, BP rather elevated, though has had a number of particularly low values    With cessation of diuretic therapy, creatinine has improved back to his baseline  Renal function stable.     Continue to hold diuretic therapy  Continue tube feed when able   Free water flushes 100 cc every 3 hours for now, adjust as warranted in the next 1 to 3 days  Follow labs, UO  Avoid nephrotoxins as able  Continue intensive supportive care      Electronically signed by Carlene Garner MD on 9/5/2021

## 2021-09-05 NOTE — PROGRESS NOTES
weaning. PEEP was brought down to 12. FiO2 still at 50%. Tolerating antibiotics. 2021. He is still in the ICU. He is still on a ventilator. He is still paralyzed. 2021. He had an episode of acute desaturation and had to be bagged. He is now back on the ventilator. He is still sedated and paralyzed. Review of systems:  As stated above in the chief complaint, otherwise negative.     Medications:  Scheduled Meds:   insulin glargine  45 Units SubCUTAneous Daily    ceFAZolin  2,000 mg IntraVENous Q8H    sodium chloride (Inhalant)  4 mL Nebulization Q12H    dexamethasone  10 mg IntraVENous Daily    enoxaparin  40 mg SubCUTAneous BID    insulin lispro  0-18 Units SubCUTAneous Q6H    phenytoin  300 mg IntraVENous Q12H    chlorhexidine  15 mL Mouth/Throat BID    Refresh Lacri-Lube   Ophthalmic Q6H    [Held by provider] lisinopril  40 mg Oral Daily    ipratropium-albuterol  1 ampule Inhalation Q4H    sodium chloride flush  10 mL IntraVENous BID    amLODIPine  10 mg Oral Daily    atorvastatin  40 mg Oral Daily    [Held by provider] doxazosin  8 mg Oral Daily    ascorbic acid  500 mg Oral Daily    Vitamin D  2,000 Units Oral Daily    zinc sulfate  50 mg Oral Daily    pantoprazole  40 mg IntraVENous Daily     Continuous Infusions:   fentaNYL 5 mcg/ml in 0.9%  ml infusion 200 mcg/hr (21 0313)    cisatracurium (NIMBEX) infusion 5.5 mcg/kg/min (21 0444)    propofol 20 mcg/kg/min (21 0821)    midazolam 9 mg/hr (21 0634)    sodium chloride      dextrose       PRN Meds:sodium chloride (Inhalant), sodium chloride flush, sodium chloride, albuterol, benzonatate, glucose, dextrose, glucagon (rDNA), dextrose, sodium chloride    OBJECTIVE:  BP (!) 100/48   Pulse 79   Temp 99.5 °F (37.5 °C) (Bladder)   Resp 26   Ht 6' (1.829 m)   Wt (!) 314 lb 9.5 oz (142.7 kg)   SpO2 93%   BMI 42.67 kg/m²   Temp  Av.3 °F (37.4 °C)  Min: 99.1 °F (37.3 °C)  Max: 99.7 °F (37.6 °C)  Constitutional: The patient is intubated, sedated, paralyzed. He is supine. Skin: Warm and dry. No rashes were noted. HEENT: Round and nonreactive pupils. No thrush. Decubitus lesions on cheeks, hands and lower extremities noted. Neck: Supple. Chest: Coarse breath sounds bilaterally. No crackles  Cardiovascular: Heart sounds rhythmic and regular. Tachycardic. Abdomen: Round, soft and benign to palpation. Genitourinary: Busch catheter  Extremities: Moderate edema. Blistering of hands and feet. Lines: Right radial arterial line 9/4/2021. Right IJ triple-lumen catheter 8/30/2021 (changed over a guidewire).   Busch changed 3/24/2021    Laboratory and Tests Review:  Lab Results   Component Value Date    WBC 10.3 09/05/2021    WBC 14.1 (H) 09/04/2021    WBC 19.2 (H) 09/03/2021    HGB 10.8 (L) 09/05/2021    HCT 34.7 (L) 09/05/2021    MCV 94.6 09/05/2021     (L) 09/05/2021     Lab Results   Component Value Date    NEUTROABS 8.22 (H) 09/05/2021    NEUTROABS 12.15 (H) 09/04/2021    NEUTROABS 16.96 (H) 09/03/2021     No results found for: [facility]  Lab Results   Component Value Date    ALT 27 09/05/2021    AST 47 (H) 09/05/2021    ALKPHOS 92 09/05/2021    BILITOT 0.2 09/05/2021     Lab Results   Component Value Date     09/05/2021    K 4.2 09/05/2021    K 3.7 08/21/2021     09/05/2021    CO2 26 09/05/2021    BUN 32 09/05/2021    CREATININE 0.5 09/05/2021    CREATININE 0.6 09/04/2021    CREATININE 0.5 09/03/2021    GFRAA >60 09/05/2021    LABGLOM >60 09/05/2021    GLUCOSE 216 09/05/2021    PROT 5.4 09/05/2021    LABALBU 2.7 09/05/2021    CALCIUM 8.4 09/05/2021    BILITOT 0.2 09/05/2021    ALKPHOS 92 09/05/2021    AST 47 09/05/2021    ALT 27 09/05/2021     Lab Results   Component Value Date    CRP 8.8 (H) 09/04/2021    CRP 14.1 (H) 09/03/2021    CRP 14.7 (H) 09/02/2021     Lab Results   Component Value Date    SEDRATE 22 (H) 09/04/2021    SEDRATE 30 (H) 09/03/2021    SEDRATE 19 (H) 09/02/2021     Radiology:      Microbiology:   Streptococcus pneumoniae/Legionella urine Ag: negative  Nares screen MRSA: Negative  Urine culture 8/24/2021: Negative  Blood cultures 8/21/2021: Negative x2  Respiratory culture 8/27/2021: OP terrell reduced  Respiratory culture 8/30/2021: E. coli, MSSA     ASSESSMENT:  · Severe Covid pneumonia causing acute respiratory failure CRP has come down nicely. Completed Remdesivir. Received Tocilizumab  · VAP versus tracheobronchitis. Cultures growing MSSA and E. coli  · Leukocytosis associated to the above. Improving    PLAN:  · Continue Cefazolin, day 5  · Supportive care by critical care    Spoke with nursing.     Boom Willard MD  9:10 AM   9/5/2021

## 2021-09-05 NOTE — SIGNIFICANT EVENT
RRT Note  Called for bradycardia and hypoxia after turning. Pulse ox down to 80% on vent therefore was switched to pressure bag ventilation with improvement in pulse ox. Peep increased to 10, pulse ox 89% on vent. cxr pending.  Discussed with ICU team Dr. Stuart Nyhan, MD 10:07 PM 09/04/21

## 2021-09-06 ENCOUNTER — APPOINTMENT (OUTPATIENT)
Dept: GENERAL RADIOLOGY | Age: 56
DRG: 005 | End: 2021-09-06
Payer: COMMERCIAL

## 2021-09-06 LAB
AADO2: 253.8 MMHG
ALBUMIN SERPL-MCNC: 2.9 G/DL (ref 3.5–5.2)
ALP BLD-CCNC: 98 U/L (ref 40–129)
ALT SERPL-CCNC: 34 U/L (ref 0–40)
ANION GAP SERPL CALCULATED.3IONS-SCNC: 9 MMOL/L (ref 7–16)
AST SERPL-CCNC: 63 U/L (ref 0–39)
B.E.: 2.5 MMOL/L (ref -3–3)
BASOPHILS ABSOLUTE: 0.03 E9/L (ref 0–0.2)
BASOPHILS RELATIVE PERCENT: 0.2 % (ref 0–2)
BILIRUB SERPL-MCNC: 0.3 MG/DL (ref 0–1.2)
BUN BLDV-MCNC: 23 MG/DL (ref 6–20)
C-REACTIVE PROTEIN: 12 MG/DL (ref 0–0.4)
CALCIUM SERPL-MCNC: 8.8 MG/DL (ref 8.6–10.2)
CHLORIDE BLD-SCNC: 104 MMOL/L (ref 98–107)
CO2: 29 MMOL/L (ref 22–29)
COHB: 1 % (ref 0–1.5)
CREAT SERPL-MCNC: 0.4 MG/DL (ref 0.7–1.2)
CRITICAL: ABNORMAL
DATE ANALYZED: ABNORMAL
DATE OF COLLECTION: ABNORMAL
EOSINOPHILS ABSOLUTE: 0.33 E9/L (ref 0.05–0.5)
EOSINOPHILS RELATIVE PERCENT: 2.7 % (ref 0–6)
FIO2: 55 %
GFR AFRICAN AMERICAN: >60
GFR NON-AFRICAN AMERICAN: >60 ML/MIN/1.73
GLUCOSE BLD-MCNC: 209 MG/DL (ref 74–99)
HCO3: 26.1 MMOL/L (ref 22–26)
HCT VFR BLD CALC: 36.5 % (ref 37–54)
HEMOGLOBIN: 11.4 G/DL (ref 12.5–16.5)
HHB: 3.6 % (ref 0–5)
IMMATURE GRANULOCYTES #: 0.16 E9/L
IMMATURE GRANULOCYTES %: 1.3 % (ref 0–5)
LAB: ABNORMAL
LYMPHOCYTES ABSOLUTE: 0.85 E9/L (ref 1.5–4)
LYMPHOCYTES RELATIVE PERCENT: 6.9 % (ref 20–42)
Lab: ABNORMAL
MAGNESIUM: 1.8 MG/DL (ref 1.6–2.6)
MCH RBC QN AUTO: 29.5 PG (ref 26–35)
MCHC RBC AUTO-ENTMCNC: 31.2 % (ref 32–34.5)
MCV RBC AUTO: 94.3 FL (ref 80–99.9)
METER GLUCOSE: 210 MG/DL (ref 74–99)
METER GLUCOSE: 211 MG/DL (ref 74–99)
METER GLUCOSE: 225 MG/DL (ref 74–99)
METHB: 0.2 % (ref 0–1.5)
MODE: ABNORMAL
MONOCYTES ABSOLUTE: 1.12 E9/L (ref 0.1–0.95)
MONOCYTES RELATIVE PERCENT: 9.1 % (ref 2–12)
NEUTROPHILS ABSOLUTE: 9.82 E9/L (ref 1.8–7.3)
NEUTROPHILS RELATIVE PERCENT: 79.8 % (ref 43–80)
O2 CONTENT: 16.5 ML/DL
O2 SATURATION: 96.4 % (ref 92–98.5)
O2HB: 95.2 % (ref 94–97)
OPERATOR ID: 420
PATIENT TEMP: 37 C
PCO2: 37 MMHG (ref 35–45)
PDW BLD-RTO: 14.3 FL (ref 11.5–15)
PEEP/CPAP: 9 CMH2O
PFO2: 1.52 MMHG/%
PH BLOOD GAS: 7.47 (ref 7.35–7.45)
PHOSPHORUS: 3.1 MG/DL (ref 2.5–4.5)
PLATELET # BLD: 147 E9/L (ref 130–450)
PMV BLD AUTO: 10.1 FL (ref 7–12)
PO2: 83.4 MMHG (ref 75–100)
POTASSIUM SERPL-SCNC: 4.6 MMOL/L (ref 3.5–5)
PS: 20 CMH20
RBC # BLD: 3.87 E12/L (ref 3.8–5.8)
RI(T): 304 %
RR MECHANICAL: 26 B/MIN
SEDIMENTATION RATE, ERYTHROCYTE: 50 MM/HR (ref 0–15)
SODIUM BLD-SCNC: 142 MMOL/L (ref 132–146)
SOURCE, BLOOD GAS: ABNORMAL
THB: 12.3 G/DL (ref 11.5–16.5)
TIME ANALYZED: 825
TOTAL PROTEIN: 5.2 G/DL (ref 6.4–8.3)
WBC # BLD: 12.3 E9/L (ref 4.5–11.5)

## 2021-09-06 PROCEDURE — C9113 INJ PANTOPRAZOLE SODIUM, VIA: HCPCS | Performed by: STUDENT IN AN ORGANIZED HEALTH CARE EDUCATION/TRAINING PROGRAM

## 2021-09-06 PROCEDURE — 94640 AIRWAY INHALATION TREATMENT: CPT

## 2021-09-06 PROCEDURE — 94003 VENT MGMT INPAT SUBQ DAY: CPT

## 2021-09-06 PROCEDURE — 6370000000 HC RX 637 (ALT 250 FOR IP): Performed by: INTERNAL MEDICINE

## 2021-09-06 PROCEDURE — 6360000002 HC RX W HCPCS: Performed by: INTERNAL MEDICINE

## 2021-09-06 PROCEDURE — 80053 COMPREHEN METABOLIC PANEL: CPT

## 2021-09-06 PROCEDURE — 71045 X-RAY EXAM CHEST 1 VIEW: CPT

## 2021-09-06 PROCEDURE — 2500000003 HC RX 250 WO HCPCS: Performed by: INTERNAL MEDICINE

## 2021-09-06 PROCEDURE — 2000000000 HC ICU R&B

## 2021-09-06 PROCEDURE — 83735 ASSAY OF MAGNESIUM: CPT

## 2021-09-06 PROCEDURE — 2580000003 HC RX 258: Performed by: FAMILY MEDICINE

## 2021-09-06 PROCEDURE — 85651 RBC SED RATE NONAUTOMATED: CPT

## 2021-09-06 PROCEDURE — 2580000003 HC RX 258: Performed by: INTERNAL MEDICINE

## 2021-09-06 PROCEDURE — 82962 GLUCOSE BLOOD TEST: CPT

## 2021-09-06 PROCEDURE — 6360000002 HC RX W HCPCS: Performed by: FAMILY MEDICINE

## 2021-09-06 PROCEDURE — 82805 BLOOD GASES W/O2 SATURATION: CPT

## 2021-09-06 PROCEDURE — 86140 C-REACTIVE PROTEIN: CPT

## 2021-09-06 PROCEDURE — 6360000002 HC RX W HCPCS: Performed by: SPECIALIST

## 2021-09-06 PROCEDURE — 36592 COLLECT BLOOD FROM PICC: CPT

## 2021-09-06 PROCEDURE — 85025 COMPLETE CBC W/AUTO DIFF WBC: CPT

## 2021-09-06 PROCEDURE — 6360000002 HC RX W HCPCS: Performed by: STUDENT IN AN ORGANIZED HEALTH CARE EDUCATION/TRAINING PROGRAM

## 2021-09-06 PROCEDURE — 36415 COLL VENOUS BLD VENIPUNCTURE: CPT

## 2021-09-06 PROCEDURE — 2580000003 HC RX 258: Performed by: STUDENT IN AN ORGANIZED HEALTH CARE EDUCATION/TRAINING PROGRAM

## 2021-09-06 PROCEDURE — 99233 SBSQ HOSP IP/OBS HIGH 50: CPT | Performed by: FAMILY MEDICINE

## 2021-09-06 PROCEDURE — 84100 ASSAY OF PHOSPHORUS: CPT

## 2021-09-06 PROCEDURE — 37799 UNLISTED PX VASCULAR SURGERY: CPT

## 2021-09-06 RX ORDER — LISINOPRIL 5 MG/1
5 TABLET ORAL DAILY
Status: DISCONTINUED | OUTPATIENT
Start: 2021-09-06 | End: 2021-09-07

## 2021-09-06 RX ADMIN — MINERAL OIL AND PETROLATUM: 150; 830 OINTMENT OPHTHALMIC at 15:16

## 2021-09-06 RX ADMIN — ENOXAPARIN SODIUM 40 MG: 40 INJECTION SUBCUTANEOUS at 09:05

## 2021-09-06 RX ADMIN — CISATRACURIUM BESYLATE 5 MCG/KG/MIN: 10 INJECTION INTRAVENOUS at 08:52

## 2021-09-06 RX ADMIN — LISINOPRIL 5 MG: 5 TABLET ORAL at 15:21

## 2021-09-06 RX ADMIN — PROPOFOL 30 MCG/KG/MIN: 10 INJECTION, EMULSION INTRAVENOUS at 15:56

## 2021-09-06 RX ADMIN — LABETALOL HYDROCHLORIDE 10 MG: 5 INJECTION INTRAVENOUS at 05:15

## 2021-09-06 RX ADMIN — PROPOFOL 30 MCG/KG/MIN: 10 INJECTION, EMULSION INTRAVENOUS at 23:54

## 2021-09-06 RX ADMIN — IPRATROPIUM BROMIDE AND ALBUTEROL SULFATE 1 AMPULE: .5; 3 SOLUTION RESPIRATORY (INHALATION) at 20:49

## 2021-09-06 RX ADMIN — PROPOFOL 20 MCG/KG/MIN: 10 INJECTION, EMULSION INTRAVENOUS at 09:35

## 2021-09-06 RX ADMIN — IPRATROPIUM BROMIDE AND ALBUTEROL SULFATE 1 AMPULE: .5; 3 SOLUTION RESPIRATORY (INHALATION) at 03:41

## 2021-09-06 RX ADMIN — MINERAL OIL AND PETROLATUM: 150; 830 OINTMENT OPHTHALMIC at 03:00

## 2021-09-06 RX ADMIN — LABETALOL HYDROCHLORIDE 10 MG: 5 INJECTION INTRAVENOUS at 10:51

## 2021-09-06 RX ADMIN — Medication 200 MCG/HR: at 19:41

## 2021-09-06 RX ADMIN — PANTOPRAZOLE SODIUM 40 MG: 40 INJECTION, POWDER, FOR SOLUTION INTRAVENOUS at 09:05

## 2021-09-06 RX ADMIN — CISATRACURIUM BESYLATE 5 MCG/KG/MIN: 10 INJECTION INTRAVENOUS at 03:30

## 2021-09-06 RX ADMIN — CHLORHEXIDINE GLUCONATE 0.12% ORAL RINSE 15 ML: 1.2 LIQUID ORAL at 08:12

## 2021-09-06 RX ADMIN — OXYCODONE HYDROCHLORIDE AND ACETAMINOPHEN 1000 MG: 500 TABLET ORAL at 09:05

## 2021-09-06 RX ADMIN — IPRATROPIUM BROMIDE AND ALBUTEROL SULFATE 1 AMPULE: .5; 3 SOLUTION RESPIRATORY (INHALATION) at 10:09

## 2021-09-06 RX ADMIN — ENOXAPARIN SODIUM 40 MG: 40 INJECTION SUBCUTANEOUS at 20:12

## 2021-09-06 RX ADMIN — Medication 2000 MG: at 03:52

## 2021-09-06 RX ADMIN — CISATRACURIUM BESYLATE 5 MCG/KG/MIN: 10 INJECTION INTRAVENOUS at 00:14

## 2021-09-06 RX ADMIN — PROPOFOL 30 MCG/KG/MIN: 10 INJECTION, EMULSION INTRAVENOUS at 19:41

## 2021-09-06 RX ADMIN — Medication 9 MG/HR: at 12:04

## 2021-09-06 RX ADMIN — IPRATROPIUM BROMIDE AND ALBUTEROL SULFATE 1 AMPULE: .5; 3 SOLUTION RESPIRATORY (INHALATION) at 16:26

## 2021-09-06 RX ADMIN — SODIUM CHLORIDE SOLN NEBU 3% 4 ML: 3 NEBU SOLN at 20:49

## 2021-09-06 RX ADMIN — Medication 200 MCG/HR: at 00:13

## 2021-09-06 RX ADMIN — INSULIN GLARGINE 25 UNITS: 100 INJECTION, SOLUTION SUBCUTANEOUS at 20:34

## 2021-09-06 RX ADMIN — SODIUM CHLORIDE, PRESERVATIVE FREE 10 ML: 5 INJECTION INTRAVENOUS at 11:45

## 2021-09-06 RX ADMIN — INSULIN LISPRO 6 UNITS: 100 INJECTION, SOLUTION INTRAVENOUS; SUBCUTANEOUS at 17:59

## 2021-09-06 RX ADMIN — Medication 2000 UNITS: at 09:06

## 2021-09-06 RX ADMIN — IPRATROPIUM BROMIDE AND ALBUTEROL SULFATE 1 AMPULE: .5; 3 SOLUTION RESPIRATORY (INHALATION) at 14:22

## 2021-09-06 RX ADMIN — ZINC SULFATE 220 MG (50 MG) CAPSULE 50 MG: CAPSULE at 09:05

## 2021-09-06 RX ADMIN — PHENYTOIN SODIUM 300 MG: 50 INJECTION INTRAMUSCULAR; INTRAVENOUS at 21:51

## 2021-09-06 RX ADMIN — MINERAL OIL AND PETROLATUM: 150; 830 OINTMENT OPHTHALMIC at 20:16

## 2021-09-06 RX ADMIN — Medication 8 MG/HR: at 23:55

## 2021-09-06 RX ADMIN — INSULIN GLARGINE 25 UNITS: 100 INJECTION, SOLUTION SUBCUTANEOUS at 08:51

## 2021-09-06 RX ADMIN — CHLORHEXIDINE GLUCONATE 0.12% ORAL RINSE 15 ML: 1.2 LIQUID ORAL at 20:16

## 2021-09-06 RX ADMIN — Medication 9 MG/HR: at 03:32

## 2021-09-06 RX ADMIN — SODIUM CHLORIDE SOLN NEBU 3% 4 ML: 3 NEBU SOLN at 10:09

## 2021-09-06 RX ADMIN — INSULIN LISPRO 6 UNITS: 100 INJECTION, SOLUTION INTRAVENOUS; SUBCUTANEOUS at 11:50

## 2021-09-06 RX ADMIN — DEXAMETHASONE SODIUM PHOSPHATE 10 MG: 4 INJECTION, SOLUTION INTRA-ARTICULAR; INTRALESIONAL; INTRAMUSCULAR; INTRAVENOUS; SOFT TISSUE at 09:06

## 2021-09-06 RX ADMIN — SODIUM CHLORIDE, PRESERVATIVE FREE 10 ML: 5 INJECTION INTRAVENOUS at 10:52

## 2021-09-06 RX ADMIN — INSULIN LISPRO 3 UNITS: 100 INJECTION, SOLUTION INTRAVENOUS; SUBCUTANEOUS at 00:13

## 2021-09-06 RX ADMIN — Medication 10 ML: at 09:06

## 2021-09-06 RX ADMIN — CISATRACURIUM BESYLATE 5 MCG/KG/MIN: 10 INJECTION INTRAVENOUS at 19:42

## 2021-09-06 RX ADMIN — PROPOFOL 20 MCG/KG/MIN: 10 INJECTION, EMULSION INTRAVENOUS at 03:29

## 2021-09-06 RX ADMIN — Medication 2000 MG: at 11:45

## 2021-09-06 RX ADMIN — CISATRACURIUM BESYLATE 5 MCG/KG/MIN: 10 INJECTION INTRAVENOUS at 14:11

## 2021-09-06 RX ADMIN — Medication 10 ML: at 20:10

## 2021-09-06 RX ADMIN — MINERAL OIL AND PETROLATUM: 150; 830 OINTMENT OPHTHALMIC at 09:07

## 2021-09-06 RX ADMIN — ATORVASTATIN CALCIUM 40 MG: 40 TABLET, FILM COATED ORAL at 20:11

## 2021-09-06 RX ADMIN — INSULIN LISPRO 3 UNITS: 100 INJECTION, SOLUTION INTRAVENOUS; SUBCUTANEOUS at 05:48

## 2021-09-06 RX ADMIN — AMLODIPINE BESYLATE 10 MG: 10 TABLET ORAL at 10:48

## 2021-09-06 RX ADMIN — Medication 2000 MG: at 20:10

## 2021-09-06 RX ADMIN — PROPOFOL 20 MCG/KG/MIN: 10 INJECTION, EMULSION INTRAVENOUS at 00:14

## 2021-09-06 RX ADMIN — PHENYTOIN SODIUM 300 MG: 50 INJECTION INTRAMUSCULAR; INTRAVENOUS at 09:18

## 2021-09-06 RX ADMIN — Medication 200 MCG/HR: at 12:22

## 2021-09-06 ASSESSMENT — PULMONARY FUNCTION TESTS
PIF_VALUE: 31
PIF_VALUE: 32
PIF_VALUE: 31
PIF_VALUE: 32
PIF_VALUE: 31
PIF_VALUE: 32
PIF_VALUE: 31

## 2021-09-06 ASSESSMENT — PAIN SCALES - GENERAL
PAINLEVEL_OUTOF10: 0
PAINLEVEL_OUTOF10: 0

## 2021-09-06 NOTE — PROGRESS NOTES
Critical Care Team - Daily Progress Note      Date and time: 2021 9:19 AM  Patient's name:  Virgin Gilford  Medical Record Number: [id number]  Patient's account/billing number: [de-identified]  Patient's YOB: 1965  Age: 60 y.o. Date of Admission: 2021  9:35 AM  Length of stay during current admission: 16      Primary Care Physician: Loco Salazar,   ICU Attending Physician: Dr. Enmanuel Montes Status: Full Code    Reason for ICU admission: Respiratory failure due to COVID-19      SUBJECTIVE:     OVERNIGHT EVENTS:           : tolerating longer periods supine, thick dark brown secretions     : secretions out of NG, michelle prone     : Decreased urine output and increased creatinine today, continues with thick secretions     : Cr and Uop improved     9/3: P/F improved and has remained supine     : thick secretions     : desat with turning, 1500 loose stool from [facility]    : Patient trying low tidal volumes overnight occasionally, otherwise no other acute events. OBJECTIVE:     VITAL SIGNS:  BP (!) 178/82   Pulse 95   Temp 99.7 °F (37.6 °C) (Bladder)   Resp 25   Ht 6' (1.829 m)   Wt (!) 314 lb 9.5 oz (142.7 kg)   SpO2 93%   BMI 42.67 kg/m²   Tmax over 24 hours:  Temp (24hrs), Av.4 °F (37.4 °C), Min:99.1 °F (37.3 °C), Max:99.7 °F (37.6 °C)      Patient Vitals for the past 6 hrs:   BP Temp Temp src Pulse Resp SpO2   21 0500 (!) 178/82 -- -- 95 25 93 %   21 0400 (!) 175/79 99.7 °F (37.6 °C) Bladder 97 25 93 %   21 0344 -- -- -- 97 24 98 %         Intake/Output Summary (Last 24 hours) at 2021 0919  Last data filed at 2021 0500  Gross per 24 hour   Intake 3462 ml   Output 3350 ml   Net 112 ml     Wt Readings from Last 2 Encounters:   21 (!) 314 lb 9.5 oz (142.7 kg)   21 (!) 320 lb (145.2 kg)     Body mass index is 42.67 kg/m².         PHYSICAL EXAMINATION:    General appearance - ill-appearing  Mental status -intubated sedated  Eyes - pupils equal and reactive, extraocular eye movements intact  Ears - bilateral TM's and external ear canals normal  Nose - normal and patent, no erythema, discharge or polyps  Mouth - mucous membranes moist, pharynx normal without lesions  Neck - supple, no significant adenopathy  Chest -intubated sedated, coarse breath sounds, crackles noted bilaterally diminished  Heart - normal rate, regular rhythm, normal S1, S2, no murmurs, rubs, clicks or gallops  Abdomen - soft, nontender, nondistended, no masses or organomegaly  Neurological -intubated and sedated  Extremities - peripheral pulses normal, no pedal edema, no clubbing or cyanosis  Skin -patient did have skin breakdown to the face as well as blistering on the hands     Any additional physical findings:    MEDICATIONS:    Scheduled Meds:   insulin glargine  25 Units SubCUTAneous BID    ascorbic acid  1,000 mg Oral Daily    ceFAZolin  2,000 mg IntraVENous Q8H    sodium chloride (Inhalant)  4 mL Nebulization Q12H    dexamethasone  10 mg IntraVENous Daily    enoxaparin  40 mg SubCUTAneous BID    insulin lispro  0-18 Units SubCUTAneous Q6H    phenytoin  300 mg IntraVENous Q12H    chlorhexidine  15 mL Mouth/Throat BID    Refresh Lacri-Lube   Ophthalmic Q6H    ipratropium-albuterol  1 ampule Inhalation Q4H    sodium chloride flush  10 mL IntraVENous BID    amLODIPine  10 mg Oral Daily    atorvastatin  40 mg Oral Daily    Vitamin D  2,000 Units Oral Daily    zinc sulfate  50 mg Oral Daily    pantoprazole  40 mg IntraVENous Daily     Continuous Infusions:   fentaNYL 5 mcg/ml in 0.9%  ml infusion 200 mcg/hr (09/06/21 0013)    cisatracurium (NIMBEX) infusion 5 mcg/kg/min (09/06/21 0852)    propofol 20 mcg/kg/min (09/06/21 0329)    midazolam 9 mg/hr (09/06/21 0332)    sodium chloride      dextrose       PRN Meds:   labetalol, 10 mg, Q6H PRN  sodium chloride (Inhalant), 4 mL, PRN  sodium chloride flush, 5-40 mL, PRN  sodium 209*   CALCIUM 8.2*  --  8.4*  --  8.8   PROT 4.8*   < > 5.4*   < > 5.2*   LABALBU 2.8*   < > 2.7*   < > 2.9*   BILITOT 0.3   < > 0.2   < > 0.3   ALKPHOS 87   < > 92   < > 98   AST 31   < > 47*   < > 63*   ALT 27   < > 27   < > 34    < > = values in this interval not displayed. Magnesium:   Lab Results   Component Value Date    MG 1.8 09/06/2021     Phosphorus:   Lab Results   Component Value Date    PHOS 3.1 09/06/2021     Ionized Calcium: No results found for: CAION     Urinalysis:     Troponin: No results for input(s): TROPONINI in the last 72 hours. Microbiology:    Cultures during this admission:     Blood cultures:                 [] None drawn      [] Negative             []  Positive (Details:  )  Urine Culture:                   [] None drawn      [] Negative             []  Positive (Details:  )  Sputum Culture:               [] None drawn       [] Negative             []  Positive (Details:  )   Endotracheal aspirate:     [] None drawn       [] Negative             []  Positive (Details:  )     Other pertinent Labs:       Radiology/Imaging:     Chest Xray (9/6/2021):    ASSESSMENT:     Patient Active Problem List    Diagnosis Date Noted    Elevated LFTs     Busch catheter problem ([facility] [address] 08/26/2021    Sepsis ([facility] [address] 08/26/2021    Thrombocytopenia ([facility] [address] 08/26/2021    Hyperlipidemia 08/22/2021    Acute hypoxemic respiratory failure due to COVID-19 ([facility] [address] 08/21/2021    Type 2 diabetes mellitus without complication, without long-term current use of insulin ([facility] [address] 08/10/2021    History of seizures 08/10/2021     SYSTEMS ASSESSMENT    Neuro     Intubation, sedated, paralyzed  Propofol  Versed  Nimbex  Fentanyl  Continue to monitor      Respiratory     Acute hypoxic respiratory failure requiring mechanical ventilation  Severe COVID-19 pneumonia s/p toe C, remdesivir  Pneumonia VAP-MSSA, E. coli  Ancef-day 5  Trach/PEG when oxygen garments improve  Decadron 10 mg  Wean oxygen as tolerated.  Keep O2 sat 90-92%    Cardiovascular     Hypertension  Amlodipine-10 mg  Lipitor 40 mg    Gastrointestinal     GI prophylaxis-Protonix  Bowel regimen  Continue monitor    Renal     OSCAR-improved  Diuretic on hold  Free water flushes 100 cc every 3 hours  Avoid nephrotoxins  Continue to monitor     Infectious Disease     Pneumonia VAP-positive for MSSA, E. coli  Ancef  Infectious disease following    Hematology/Oncology     Anemia  Hemoglobin stable  Continue to monitor    Endocrine     Hyperglycemia  Lantus 25 units  High-dose sliding scale    Social/Spiritual/DNR/Other     Full code  vitamin regimen  DVT prophylaxis Lovenox 40 every 12    Plan-bedside ultrasound left-sided chest for pulmonary effusion, if required patient received pigtail cath for drainage    The patient was seen and evaluated by myself and Rober Katz MD PGY-2  9/6/2021 9:19 AM      Attending Physician Attestation: Dr. Remonia Meigs    Thank you very much for allowing me to see this patient in consultation and follow up. I personally saw, examined and provided care for the patient. Radiographs, labs and medication list were reviewed by me independently. I spoke with bedside nursing, respiratory therapists and consultants. Critical care services and times documented are independent of procedures and multidisciplinary rounds with Residents. Additionally comprehensive, multidisciplinary rounds were conducted with the MICU team. The case was discussed in detail and plans for care were established. Review of Residents documentation was conducted and revisions were made as appropriate. I agree with the the above documented information.      Physical Examination:  Vitals:   Vitals:    09/06/21 1400 09/06/21 1423 09/06/21 1500 09/06/21 1521   BP: (!) 158/85  (!) 178/79 (!) 177/75   Pulse: 85 91 94    Resp: 26 26 26    Temp:       TempSrc:       SpO2: 94% 95% 94%    Weight:       Height:          General: No Acute Distress  HEENT: normocephalic, atruamatic  CVS: RRR, S1, S2, No S3 or S4  Respiratory: decreased breath sounds at lung bases, no focal wheezes noted  Extremities: no clubbing/cyanosis/edema    ASSESSMENT:  · Severe Covid pneumonia causing acute respiratory failure CRP has come down nicely. Completed Remdesivir. Received Tocilizumab  · VAP versus tracheobronchitis. Cultures growing MSSA and E. coli. Improving  · Leukocytosis associated to the above. · Lactic Acidosis   · Left Pleural Effusion     In addition the following applies:    Check: N/A  Medication Alterations: N/A  Procedures: possible left chest tube placement based upon left chest wall US  Imaging: reviewed, Left Pleural US  New Consultations: N/A  VENT: [facility]PC (DAY 15) 26/20/55/9  Ppeak: 31  Pplateau: 28  Compliance: 25    Access: Right IJ, Right Radial Arterial Line   Consults: ID, GS, Nephrology   Drips: Fentanyl, Versed, Propofol, Nimbex   Nutrition: Tube Feeds  ABX: Cefazolin     - addition of thiamine, vitamin C, PO ZINC  - check D-dimer  - check Fibrinogen if platelets < 086N    Thank you for allowing me to participate in the care of this patient. Care reviewed with nursing staff, medical and surgical specialty care, primary care and the patient's family as available. Restraints are ordered when the patient can do harm to him/herself by pulling out devices. Critical Care Time: > 35 minutes excluding procedures    Darryl Gilford, M.D.     Darryl Gilford, MD  9/6/2021  3:54 PM

## 2021-09-06 NOTE — PROGRESS NOTES
[facility]  [facility]  Progress Note      Chief Complaint   Patient presents with    Shortness of Breath     covid, per family SaO2 75% RA, hx of DM and panic attack        SUBJECTIVE:  8/21/21  Prone. FiO2 60% and PEEP of 16    8/22/2021  Patient is tolerating medications. No reported adverse drug reactions. No nausea, vomiting, diarrhea. Afebrile BiPAP 100% FiO2 with breathing at 44 times a minute and probably is going to decompensate  8/23/2021  Not doing well intubated on a rotating bed  FiO2 70% and PEEP 16  8/24/2021  Prone 50% FiO2-PEEP of 10  Issues with the urinary Busch last night this was changed  Paralyzed and on Versed  8/25/2021  Paralyzed and sedated with Versed no pressors  Prone intubated on 50% FiO2, PEEP of 10  Tolerating medications    8/26/21  Paralyzed and sedated with Versed no pressors  Prone intubated on 70% FiO2, PEEP of 10  Does not tolerate supine position    8/27/2021  Afebrile  Still prone on FiO2 of 100%  Paralyzed and sedated    8/28/2021  The patient is still in the ICU. He is pronated. FiO2 100%. Is still sedated and paralyzed. Fair amount of thick, brown secretions    8/29/2021  The patient is still on a RotoProne bed. You are still intubated, sedated and paralyzed. O2 is being weaned down. FiO2 55%. PEEP 12.    8/30/2021  Patient remains intubated, sedated and paralyzed. He is still on a RotoProne bed. Supine now. 8/31/2021  He is still on a RotoProne bed. Still having fair amount of secretions from the nostrils. Minimal from the ET tube, however. Tube feeding seems to be coming out of his nose. 9/1/2021  He is supinated but still on a RotoProne bed. He still has fair amount of secretions from the ET tube. Tolerating antibiotic    9/2/2021. He is still on a RotoProne bed. He is pronated. Less secretions from the ET tube and nostrils. FiO2 50%. PEEP 14.    9/3/2021. He is off the RotoProne bed. Tolerating supination.   Tolerating weaning. PEEP was brought down to 12. FiO2 still at 50%. Tolerating antibiotics. 2021. He is still in the ICU. He is still on a ventilator. He is still paralyzed. 2021. He had an episode of acute desaturation and had to be bagged. He is now back on the ventilator. He is still sedated and paralyzed. 2021. The patient still in the ICU. He is still requiring paralytics. Secretions are becoming thinner. No fever. Review of systems:  As stated above in the chief complaint, otherwise negative.     Medications:  Scheduled Meds:   insulin glargine  25 Units SubCUTAneous BID    ascorbic acid  1,000 mg Oral Daily    ceFAZolin  2,000 mg IntraVENous Q8H    sodium chloride (Inhalant)  4 mL Nebulization Q12H    dexamethasone  10 mg IntraVENous Daily    enoxaparin  40 mg SubCUTAneous BID    insulin lispro  0-18 Units SubCUTAneous Q6H    phenytoin  300 mg IntraVENous Q12H    chlorhexidine  15 mL Mouth/Throat BID    Refresh Lacri-Lube   Ophthalmic Q6H    ipratropium-albuterol  1 ampule Inhalation Q4H    sodium chloride flush  10 mL IntraVENous BID    amLODIPine  10 mg Oral Daily    atorvastatin  40 mg Oral Daily    Vitamin D  2,000 Units Oral Daily    zinc sulfate  50 mg Oral Daily    pantoprazole  40 mg IntraVENous Daily     Continuous Infusions:   fentaNYL 5 mcg/ml in 0.9%  ml infusion 200 mcg/hr (21 0013)    cisatracurium (NIMBEX) infusion 5 mcg/kg/min (21 0846)    propofol 20 mcg/kg/min (21 0329)    midazolam 9 mg/hr (21 033)    sodium chloride      dextrose       PRN Meds:labetalol, sodium chloride (Inhalant), sodium chloride flush, sodium chloride, albuterol, benzonatate, glucose, dextrose, glucagon (rDNA), dextrose, sodium chloride    OBJECTIVE:  BP (!) 178/82   Pulse 95   Temp 99.7 °F (37.6 °C) (Bladder)   Resp 25   Ht 6' (1.829 m)   Wt (!) 314 lb 9.5 oz (142.7 kg)   SpO2 93%   BMI 42.67 kg/m²   Temp  Av.4 °F (37.4 °C)  Min: 99.1 °F (37.3 °C)  Max: 99.7 °F (37.6 °C)  Constitutional: The patient is intubated, sedated, paralyzed. He is supine. Skin: Warm and dry. No rashes were noted. HEENT: Round and nonreactive pupils. No thrush. ET tube. NG tube. Decubitus lesions on cheeks, hands and lower extremities noted. Neck: Supple. Chest: Coarse breath sounds bilaterally. No crackles  Cardiovascular: Heart sounds rhythmic and regular. Tachycardic. Abdomen: Round, soft and benign to palpation. Genitourinary: Busch catheter  Extremities: Moderate edema. Blistering of hands and feet. Some are unroofed. Lines: Right IJ triple-lumen catheter 8/30/2021 (changed over a guidewire).   Busch changed 3/24/2021    Laboratory and Tests Review:  Lab Results   Component Value Date    WBC 12.3 (H) 09/06/2021    WBC 10.3 09/05/2021    WBC 14.1 (H) 09/04/2021    HGB 11.4 (L) 09/06/2021    HCT 36.5 (L) 09/06/2021    MCV 94.3 09/06/2021     09/06/2021     Lab Results   Component Value Date    NEUTROABS 9.82 (H) 09/06/2021    NEUTROABS 8.22 (H) 09/05/2021    NEUTROABS 12.15 (H) 09/04/2021     No results found for: [facility]  Lab Results   Component Value Date    ALT 34 09/06/2021    AST 63 (H) 09/06/2021    ALKPHOS 98 09/06/2021    BILITOT 0.3 09/06/2021     Lab Results   Component Value Date     09/06/2021    K 4.6 09/06/2021    K 3.7 08/21/2021     09/06/2021    CO2 29 09/06/2021    BUN 23 09/06/2021    CREATININE 0.4 09/06/2021    CREATININE 0.5 09/05/2021    CREATININE 0.6 09/04/2021    GFRAA >60 09/06/2021    LABGLOM >60 09/06/2021    GLUCOSE 209 09/06/2021    PROT 5.2 09/06/2021    LABALBU 2.9 09/06/2021    CALCIUM 8.8 09/06/2021    BILITOT 0.3 09/06/2021    ALKPHOS 98 09/06/2021    AST 63 09/06/2021    ALT 34 09/06/2021     Lab Results   Component Value Date    CRP 12.0 (H) 09/06/2021    CRP 9.9 (H) 09/05/2021    CRP 8.8 (H) 09/04/2021     Lab Results   Component Value Date    SEDRATE 41 (H) 09/05/2021    SEDRATE 22 (H) 09/04/2021    SEDRATE 30 (H) 09/03/2021     Radiology:      Microbiology:   Streptococcus pneumoniae/Legionella urine Ag: negative  Nares screen MRSA: Negative  Urine culture 8/24/2021: Negative  Blood cultures 8/21/2021: Negative x2  Respiratory culture 8/27/2021: OP terrell reduced  Respiratory culture 8/30/2021: E. coli, MSSA     ASSESSMENT:  · Severe Covid pneumonia causing acute respiratory failure CRP has come down nicely. Completed Remdesivir. Received Tocilizumab  · VAP versus tracheobronchitis. Cultures growing MSSA and E. coli. Improving  · Leukocytosis associated to the above. Improving    PLAN:  · Continue Cefazolin, day 6 of 10  · Supportive care by critical care    Spoke with nursing.     Kyra Taylor MD  9:00 AM   9/6/2021

## 2021-09-06 NOTE — PROGRESS NOTES
[facility] MD Dilcia Diana MD Wynona Glory, MD Estill Sora, MD Stacie Boyers, DEUCE Massey, KALA  Progress Note    9/6/2021    SUBJECTIVE:   9/3: Remains critically ill, though clinically improving. Pronator bed discontinued. Breathing comfortably on ventilator via ETT, FiO2 50% PEEP 10. Remains unresponsive to verbal and tactile stimuli hemodynamically stable. Tube feeding free water flushes running without complication  9/4: Remains critically ill, though also still clinically improving. Vent settings improving. 9/5: Seen this morning. FiO2 60% PEEP 10. Vent settings stable. Hemodynamically stable. No new developments. 9/6: Hemodynamically stable. Remains intubated via ETT. Vent setting stable.   No new event    PROBLEM LIST:    Principal Problem:    Acute hypoxemic respiratory failure due to COVID-19 [facility])  Active Problems:    Type 2 diabetes mellitus without complication, without long-term current use of insulin ([facility])    History of seizures    Hyperlipidemia    Busch catheter problem ([facility])    Sepsis ([facility])    Thrombocytopenia ([facility])    Elevated LFTs  Resolved Problems:    OSCAR (acute kidney injury) ([facility])    Lactic acidosis         DIET:    ADULT TUBE FEEDING; Orogastric; Diabetic; Continuous; 20; Yes; 20; Q 4 hours; 45; 30; Q 6 hours     MEDS (scheduled):    insulin glargine  25 Units SubCUTAneous BID    ascorbic acid  1,000 mg Oral Daily    ceFAZolin  2,000 mg IntraVENous Q8H    sodium chloride (Inhalant)  4 mL Nebulization Q12H    dexamethasone  10 mg IntraVENous Daily    enoxaparin  40 mg SubCUTAneous BID    insulin lispro  0-18 Units SubCUTAneous Q6H    phenytoin  300 mg IntraVENous Q12H    chlorhexidine  15 mL Mouth/Throat BID    Refresh Lacri-Lube   Ophthalmic Q6H    ipratropium-albuterol  1 ampule Inhalation Q4H    sodium chloride flush  10 mL IntraVENous BID    amLODIPine  10 mg Oral Daily    atorvastatin  40 mg Oral Daily    Vitamin D  2,000 Units Oral Daily    zinc sulfate  50 mg Oral Daily    pantoprazole  40 mg IntraVENous Daily       MEDS (infusions):   fentaNYL 5 mcg/ml in 0.9%  ml infusion 200 mcg/hr (09/06/21 1222)    cisatracurium (NIMBEX) infusion 5 mcg/kg/min (09/06/21 1992)    propofol 20 mcg/kg/min (09/06/21 0935)    midazolam 9 mg/hr (09/06/21 1204)    sodium chloride      dextrose         MEDS (prn):  labetalol, sodium chloride (Inhalant), sodium chloride flush, sodium chloride, albuterol, benzonatate, glucose, dextrose, glucagon (rDNA), dextrose, sodium chloride    PHYSICAL EXAM:     Patient Vitals for the past 24 hrs:   BP Temp Temp src Pulse Resp SpO2   09/06/21 1300 (!) 166/81 99.5 °F (37.5 °C) Bladder 87 11 93 %   09/06/21 1204 (!) 170/84 -- -- -- -- --   09/06/21 1200 (!) 182/88 99.5 °F (37.5 °C) Bladder 83 26 93 %   09/06/21 1130 -- -- -- -- 26 --   09/06/21 1100 (!) 185/97 99.5 °F (37.5 °C) Bladder 87 26 95 %   09/06/21 1030 -- -- -- -- 26 --   09/06/21 1009 -- -- -- 72 26 96 %   09/06/21 1000 100/64 -- -- 73 25 96 %   09/06/21 0900 102/64 -- -- 73 25 96 %   09/06/21 0800 109/67 99.5 °F (37.5 °C) Bladder 75 26 96 %   09/06/21 0700 (!) 142/84 -- -- 77 25 96 %   09/06/21 0600 (!) 164/76 -- -- 85 25 93 %   09/06/21 0500 (!) 178/82 -- -- 95 25 93 %   09/06/21 0400 (!) 175/79 99.7 °F (37.6 °C) Bladder 97 25 93 %   09/06/21 0344 -- -- -- 97 24 98 %   09/06/21 0300 (!) 179/85 -- -- 98 25 94 %   09/06/21 0200 (!) 147/71 -- -- 93 25 94 %   09/06/21 0100 (!) 171/81 -- -- 91 26 94 %   09/06/21 0000 (!) 144/81 99.1 °F (37.3 °C) Axillary 89 26 95 %   09/05/21 2357 -- -- -- 89 25 95 %   09/05/21 2300 (!) 170/82 -- -- 99 25 95 %   09/05/21 2200 (!) 171/78 -- -- 100 25 94 %   09/05/21 2100 (!) 167/85 -- -- 100 26 95 %   09/05/21 2045 -- -- -- 99 25 95 %   09/05/21 2000 (!) 175/84 99.1 °F (37.3 °C) Bladder 100 25 95 %   09/05/21 1800 (!) 169/82 -- -- 100 25 94 %   09/05/21 1700 (!) 177/83 -- -- 102 25 93 % 09/05/21 1630 (!) 172/87 -- -- 102 26 94 %   09/05/21 1600 (!) 176/83 -- -- 99 25 94 %   09/05/21 1530 (!) 146/83 99.5 °F (37.5 °C) Bladder 90 26 93 %   09/05/21 1500 125/68 -- -- 84 25 97 %   09/05/21 1430 (!) 84/49 -- -- 74 25 96 %   09/05/21 1400 122/80 -- -- 85 25 96 %   @      Intake/Output Summary (Last 24 hours) at 9/6/2021 1355  Last data filed at 9/6/2021 1300  Gross per 24 hour   Intake 3662 ml   Output 4075 ml   Net -413 ml         Wt Readings from Last 3 Encounters:   09/05/21 (!) 314 lb 9.5 oz (142.7 kg)   08/17/21 (!) 320 lb (145.2 kg)   08/10/21 (!) 321 lb 9.6 oz (145.9 kg)       Constitutional: Appears critically ill, though in no apparent distress  HEENT: NC/AT, EOMI, sclera and conjunctiva are clear and anicteric, mucus membranes moist  Neck: Trachea midline, no JVD  Cardiovascular: S1, S2 regular rhythm, no murmur,or rub  Respiratory:  No crackles, no wheeze  Gastrointestinal:  Soft, nontender, nondistended, NABS  Ext: no edema, feet warm  Skin: dry, no rash  Neuro: awake, alert, interactive      DATA:    Recent Labs     09/04/21  0600 09/05/21  0555 09/06/21  0515   WBC 14.1* 10.3 12.3*   HGB 10.9* 10.8* 11.4*   HCT 34.5* 34.7* 36.5*   MCV 94.3 94.6 94.3    124* 147     Recent Labs     09/04/21  0600 09/05/21  0555 09/06/21  0515    144 142   K 4.7 4.2 4.6   * 109* 104   CO2 27 26 29   MG 2.0 1.8 1.8   PHOS 3.1 2.7 3.1   BUN 35* 32* 23*   CREATININE 0.6* 0.5* 0.4*   ALT 27 27 34   AST 31 47* 63*   BILITOT 0.3 0.2 0.3   ALKPHOS 87 92 98       Lab Results   Component Value Date    LABPROT 0.2 09/01/2021    LABPROT 0.2 09/01/2021       ASSESSMENT / RECOMMENDATIONS:    60 y.o. gentleman admitted 8/21 with aggressive dyspnea, diagnosed with CO VID-19 pneumonia, subsequent suffered respiratory failure followed by multiorgan system failure. Has developed superimposed bacterial pneumonia. Current intubated via ETT, ventilated, prone    1.   Acute kidney injury, prerenal azotemia due to intravascular volume contraction despite massive third spacing in the setting of attempts to diurese    2.   Hypertension, BP rather elevated, though has had a number of particularly low values    With cessation of diuretic therapy, creatinine has improved back to his baseline  Renal function improving  BP rather elevated, on amlodipine 5 mg daily    Continue to hold diuretic therapy  Add lisinopril 5 mg qd  Continue tube feed   Free water flushes 100 cc every 3 hours for now, adjust as warranted  Follow labs, UO  Avoid nephrotoxins as able  Continue intensive supportive care      Electronically signed by Ricky Harris MD on 9/6/2021

## 2021-09-06 NOTE — FLOWSHEET NOTE
Intensive Care Daily Quality Rounding Checklist        ICU Team Members: Dr. Sesar Jensen, Dr. Rosalee Alejandro (resident), Warren Baldwin nurse, bedside nurse, respiratory therapist     ICU Day #: NUMBER: 17     Intubation Date: August 23,2021     Ventilator Day #: NUMBER: 15     Central Line Insertion Date: August 30,2021                                                    Day #: NUMBER: 7      Arterial Line Insertion Date:                              Day #:     Temporary Hemodialysis Catheter Insertion Date:  N/A                             Day # N/A     DVT Prophylaxis: Lovenox    GI Prophylaxis: Protonix     Busch Catheter Insertion Date: August 26  (changed)                                     Day #: 12                                Continued need (if yes, reason documented and discussed with physician): yes, strict I&O in critical patient     Skin Issues/ Wounds and ordered treatment discussed on rounds: yes, wound care consulted     Goals/ Plans for the Day: vent management, monitor labs and replace as needed, plan is for trach and peg, bedside US of chest for possible thorocentesis

## 2021-09-06 NOTE — PROGRESS NOTES
[facility] Progress Note    Admitting Date and Time: 8/21/2021  9:35 AM  Admit Dx: Acute hypoxemic respiratory failure due to COVID-19 (HCC) [U07.1, J96.01]  Pneumonia due to COVID-19 virus [U07.1, J12.82]    Subjective:  Patient is being followed for Acute hypoxemic respiratory failure due to COVID-19 ([address] [U07.1, J96.01]  Pneumonia due to COVID-19 virus [U07.1, J12.82]     Pt intubated and sedated. Per RN: has periods of desaturation. Urine output good. ROS: denies fever, chills, cp, sob, n/v, HA unless stated above.  insulin glargine  25 Units SubCUTAneous BID    ascorbic acid  1,000 mg Oral Daily    ceFAZolin  2,000 mg IntraVENous Q8H    sodium chloride (Inhalant)  4 mL Nebulization Q12H    dexamethasone  10 mg IntraVENous Daily    enoxaparin  40 mg SubCUTAneous BID    insulin lispro  0-18 Units SubCUTAneous Q6H    phenytoin  300 mg IntraVENous Q12H    chlorhexidine  15 mL Mouth/Throat BID    Refresh Lacri-Lube   Ophthalmic Q6H    ipratropium-albuterol  1 ampule Inhalation Q4H    sodium chloride flush  10 mL IntraVENous BID    amLODIPine  10 mg Oral Daily    atorvastatin  40 mg Oral Daily    Vitamin D  2,000 Units Oral Daily    zinc sulfate  50 mg Oral Daily    pantoprazole  40 mg IntraVENous Daily     labetalol, 10 mg, Q6H PRN  sodium chloride (Inhalant), 4 mL, PRN  sodium chloride flush, 5-40 mL, PRN  sodium chloride, 25 mL, PRN  albuterol, 2.5 mg, Q6H PRN  benzonatate, 200 mg, TID PRN  glucose, 15 g, PRN  dextrose, 12.5 g, PRN  glucagon (rDNA), 1 mg, PRN  dextrose, 100 mL/hr, PRN  sodium chloride, 30 mL, PRN         Objective:    BP (!) 178/82   Pulse 95   Temp 99.7 °F (37.6 °C) (Bladder)   Resp 25   Ht 6' (1.829 m)   Wt (!) 314 lb 9.5 oz (142.7 kg)   SpO2 93%   BMI 42.67 kg/m²     General Appearance: intubated and sedated  Skin: warm and dry.   Old bruises  Head: normocephalic and atraumatic  Eyes: per Nurse assessment  Neck: neck supple and non tender silhouettes the heart border and diaphragm appears unchanged. There are signs of patchy parenchymal density within the lungs but these appear stable. There is blunting of the right costophrenic angle that may represent a tiny effusion. No pneumothorax is observed. 1. Stable chest 2. The position alignment of the tubes and catheters are within normal range 3. Bilateral parenchymal densities suggest atelectasis and/or consolidation, stable     Assessment:    Principal Problem:    Acute hypoxemic respiratory failure due to COVID-19 [facility])  Active Problems:    Type 2 diabetes mellitus without complication, without long-term current use of insulin (HCC)    History of seizures    Hyperlipidemia    Busch catheter problem (HCC)    Sepsis (HCC)    Thrombocytopenia (HCC)    Elevated LFTs  Resolved Problems:    OSCAR (acute kidney injury) ([facility]    Lactic acidosis    Plan:  1. Acute respiratory failure with hypoxia - continue current support. Wean as tolerated. 2.  COVID 19 pneumonia - completed Remdesivir; received Tocilizumab. Continue Decadron, Vitamin C, Vitamin D and Zinc.  3.  T2DM - on insulin. Receiving tube feedings. Glucose POCT. Trend labs and treat accordingly. 4. VAP vs. Tracheobronchitis - continue Cefazolin, day 6.    5.  HTN - continue meds. Monitor vitals and treat accordingly. 6.  HLP - continue meds. 7.  Seizure Disorder - continue meds. 8.  Elevated D dimer - continue Lovenox. NOTE: This report was transcribed using voice recognition software. Every effort was made to ensure accuracy; however, inadvertent computerized transcription errors may be present.     Electronically signed by Jhonny Bravo MD on 9/6/2021 at 9:06 AM

## 2021-09-07 ENCOUNTER — ANESTHESIA EVENT (OUTPATIENT)
Dept: OPERATING ROOM | Age: 56
DRG: 005 | End: 2021-09-07
Payer: COMMERCIAL

## 2021-09-07 ENCOUNTER — APPOINTMENT (OUTPATIENT)
Dept: GENERAL RADIOLOGY | Age: 56
DRG: 005 | End: 2021-09-07
Payer: COMMERCIAL

## 2021-09-07 ENCOUNTER — APPOINTMENT (OUTPATIENT)
Dept: ULTRASOUND IMAGING | Age: 56
DRG: 005 | End: 2021-09-07
Payer: COMMERCIAL

## 2021-09-07 LAB
AADO2: 162.2 MMHG
ALBUMIN SERPL-MCNC: 2.8 G/DL (ref 3.5–5.2)
ALP BLD-CCNC: 109 U/L (ref 40–129)
ALT SERPL-CCNC: 35 U/L (ref 0–40)
ANION GAP SERPL CALCULATED.3IONS-SCNC: 8 MMOL/L (ref 7–16)
AST SERPL-CCNC: 55 U/L (ref 0–39)
B.E.: 1.3 MMOL/L (ref -3–3)
BASOPHILS ABSOLUTE: 0.06 E9/L (ref 0–0.2)
BASOPHILS RELATIVE PERCENT: 0.4 % (ref 0–2)
BILIRUB SERPL-MCNC: 0.3 MG/DL (ref 0–1.2)
BUN BLDV-MCNC: 21 MG/DL (ref 6–20)
C-REACTIVE PROTEIN: 16.2 MG/DL (ref 0–0.4)
CALCIUM SERPL-MCNC: 9.1 MG/DL (ref 8.6–10.2)
CHLORIDE BLD-SCNC: 102 MMOL/L (ref 98–107)
CO2: 28 MMOL/L (ref 22–29)
COHB: 1.3 % (ref 0–1.5)
CREAT SERPL-MCNC: 0.5 MG/DL (ref 0.7–1.2)
CRITICAL: ABNORMAL
DATE ANALYZED: ABNORMAL
DATE OF COLLECTION: ABNORMAL
EOSINOPHILS ABSOLUTE: 0.54 E9/L (ref 0.05–0.5)
EOSINOPHILS RELATIVE PERCENT: 3.4 % (ref 0–6)
FIO2: 40 %
GFR AFRICAN AMERICAN: >60
GFR NON-AFRICAN AMERICAN: >60 ML/MIN/1.73
GLUCOSE BLD-MCNC: 199 MG/DL (ref 74–99)
HCO3: 25.5 MMOL/L (ref 22–26)
HCT VFR BLD CALC: 36.6 % (ref 37–54)
HEMOGLOBIN: 11.7 G/DL (ref 12.5–16.5)
HHB: 5.7 % (ref 0–5)
IMMATURE GRANULOCYTES #: 0.45 E9/L
IMMATURE GRANULOCYTES %: 2.8 % (ref 0–5)
LAB: ABNORMAL
LYMPHOCYTES ABSOLUTE: 1.25 E9/L (ref 1.5–4)
LYMPHOCYTES RELATIVE PERCENT: 7.8 % (ref 20–42)
Lab: ABNORMAL
MAGNESIUM: 2 MG/DL (ref 1.6–2.6)
MCH RBC QN AUTO: 29.8 PG (ref 26–35)
MCHC RBC AUTO-ENTMCNC: 32 % (ref 32–34.5)
MCV RBC AUTO: 93.1 FL (ref 80–99.9)
METER GLUCOSE: 141 MG/DL (ref 74–99)
METER GLUCOSE: 154 MG/DL (ref 74–99)
METER GLUCOSE: 169 MG/DL (ref 74–99)
METER GLUCOSE: 181 MG/DL (ref 74–99)
METER GLUCOSE: 191 MG/DL (ref 74–99)
METHB: 0.3 % (ref 0–1.5)
MODE: ABNORMAL
MONOCYTES ABSOLUTE: 1.62 E9/L (ref 0.1–0.95)
MONOCYTES RELATIVE PERCENT: 10.1 % (ref 2–12)
NEUTROPHILS ABSOLUTE: 12.15 E9/L (ref 1.8–7.3)
NEUTROPHILS RELATIVE PERCENT: 75.5 % (ref 43–80)
O2 CONTENT: 16.6 ML/DL
O2 SATURATION: 94.2 % (ref 92–98.5)
O2HB: 92.7 % (ref 94–97)
OPERATOR ID: 1687
PATIENT TEMP: 37 C
PCO2: 38.8 MMHG (ref 35–45)
PDW BLD-RTO: 14.5 FL (ref 11.5–15)
PEEP/CPAP: 9 CMH2O
PFO2: 1.71 MMHG/%
PH BLOOD GAS: 7.43 (ref 7.35–7.45)
PHOSPHORUS: 3.6 MG/DL (ref 2.5–4.5)
PLATELET # BLD: 169 E9/L (ref 130–450)
PMV BLD AUTO: 10.1 FL (ref 7–12)
PO2: 68.4 MMHG (ref 75–100)
POTASSIUM SERPL-SCNC: 4.4 MMOL/L (ref 3.5–5)
PS: 20 CMH20
RBC # BLD: 3.93 E12/L (ref 3.8–5.8)
RBC # BLD: NORMAL 10*6/UL
RI(T): 237 %
RR MECHANICAL: 26 B/MIN
SEDIMENTATION RATE, ERYTHROCYTE: 68 MM/HR (ref 0–15)
SODIUM BLD-SCNC: 138 MMOL/L (ref 132–146)
SOURCE, BLOOD GAS: ABNORMAL
THB: 12.7 G/DL (ref 11.5–16.5)
TIME ANALYZED: 558
TOTAL PROTEIN: 6 G/DL (ref 6.4–8.3)
WBC # BLD: 16.1 E9/L (ref 4.5–11.5)

## 2021-09-07 PROCEDURE — 2500000003 HC RX 250 WO HCPCS: Performed by: INTERNAL MEDICINE

## 2021-09-07 PROCEDURE — 6370000000 HC RX 637 (ALT 250 FOR IP): Performed by: INTERNAL MEDICINE

## 2021-09-07 PROCEDURE — 85025 COMPLETE CBC W/AUTO DIFF WBC: CPT

## 2021-09-07 PROCEDURE — 86140 C-REACTIVE PROTEIN: CPT

## 2021-09-07 PROCEDURE — 93005 ELECTROCARDIOGRAM TRACING: CPT | Performed by: INTERNAL MEDICINE

## 2021-09-07 PROCEDURE — 82805 BLOOD GASES W/O2 SATURATION: CPT

## 2021-09-07 PROCEDURE — 6360000002 HC RX W HCPCS: Performed by: FAMILY MEDICINE

## 2021-09-07 PROCEDURE — 84100 ASSAY OF PHOSPHORUS: CPT

## 2021-09-07 PROCEDURE — 2580000003 HC RX 258: Performed by: FAMILY MEDICINE

## 2021-09-07 PROCEDURE — 83735 ASSAY OF MAGNESIUM: CPT

## 2021-09-07 PROCEDURE — 99233 SBSQ HOSP IP/OBS HIGH 50: CPT | Performed by: FAMILY MEDICINE

## 2021-09-07 PROCEDURE — 94003 VENT MGMT INPAT SUBQ DAY: CPT

## 2021-09-07 PROCEDURE — 71045 X-RAY EXAM CHEST 1 VIEW: CPT

## 2021-09-07 PROCEDURE — 6360000002 HC RX W HCPCS: Performed by: INTERNAL MEDICINE

## 2021-09-07 PROCEDURE — 6360000002 HC RX W HCPCS: Performed by: STUDENT IN AN ORGANIZED HEALTH CARE EDUCATION/TRAINING PROGRAM

## 2021-09-07 PROCEDURE — 94640 AIRWAY INHALATION TREATMENT: CPT

## 2021-09-07 PROCEDURE — 85651 RBC SED RATE NONAUTOMATED: CPT

## 2021-09-07 PROCEDURE — 37799 UNLISTED PX VASCULAR SURGERY: CPT

## 2021-09-07 PROCEDURE — 6360000002 HC RX W HCPCS: Performed by: SPECIALIST

## 2021-09-07 PROCEDURE — 2580000003 HC RX 258: Performed by: STUDENT IN AN ORGANIZED HEALTH CARE EDUCATION/TRAINING PROGRAM

## 2021-09-07 PROCEDURE — 80053 COMPREHEN METABOLIC PANEL: CPT

## 2021-09-07 PROCEDURE — C9113 INJ PANTOPRAZOLE SODIUM, VIA: HCPCS | Performed by: STUDENT IN AN ORGANIZED HEALTH CARE EDUCATION/TRAINING PROGRAM

## 2021-09-07 PROCEDURE — 36415 COLL VENOUS BLD VENIPUNCTURE: CPT

## 2021-09-07 PROCEDURE — 2000000000 HC ICU R&B

## 2021-09-07 PROCEDURE — 76604 US EXAM CHEST: CPT

## 2021-09-07 PROCEDURE — 82962 GLUCOSE BLOOD TEST: CPT

## 2021-09-07 PROCEDURE — 2580000003 HC RX 258: Performed by: INTERNAL MEDICINE

## 2021-09-07 RX ORDER — LISINOPRIL 5 MG/1
5 TABLET ORAL ONCE
Status: COMPLETED | OUTPATIENT
Start: 2021-09-07 | End: 2021-09-07

## 2021-09-07 RX ORDER — LISINOPRIL 10 MG/1
10 TABLET ORAL DAILY
Status: DISCONTINUED | OUTPATIENT
Start: 2021-09-08 | End: 2021-09-20 | Stop reason: HOSPADM

## 2021-09-07 RX ADMIN — Medication 200 MCG/HR: at 08:40

## 2021-09-07 RX ADMIN — MINERAL OIL AND PETROLATUM: 150; 830 OINTMENT OPHTHALMIC at 02:33

## 2021-09-07 RX ADMIN — PROPOFOL 30 MCG/KG/MIN: 10 INJECTION, EMULSION INTRAVENOUS at 22:52

## 2021-09-07 RX ADMIN — SODIUM CHLORIDE SOLN NEBU 3% 4 ML: 3 NEBU SOLN at 09:55

## 2021-09-07 RX ADMIN — LISINOPRIL 5 MG: 5 TABLET ORAL at 12:13

## 2021-09-07 RX ADMIN — Medication 2000 MG: at 20:27

## 2021-09-07 RX ADMIN — MINERAL OIL AND PETROLATUM: 150; 830 OINTMENT OPHTHALMIC at 14:54

## 2021-09-07 RX ADMIN — DEXAMETHASONE SODIUM PHOSPHATE 10 MG: 4 INJECTION, SOLUTION INTRA-ARTICULAR; INTRALESIONAL; INTRAMUSCULAR; INTRAVENOUS; SOFT TISSUE at 08:01

## 2021-09-07 RX ADMIN — ZINC SULFATE 220 MG (50 MG) CAPSULE 50 MG: CAPSULE at 08:02

## 2021-09-07 RX ADMIN — PROPOFOL 30 MCG/KG/MIN: 10 INJECTION, EMULSION INTRAVENOUS at 14:51

## 2021-09-07 RX ADMIN — MINERAL OIL AND PETROLATUM: 150; 830 OINTMENT OPHTHALMIC at 20:29

## 2021-09-07 RX ADMIN — INSULIN GLARGINE 25 UNITS: 100 INJECTION, SOLUTION SUBCUTANEOUS at 08:19

## 2021-09-07 RX ADMIN — PROPOFOL 25 MCG/KG/MIN: 10 INJECTION, EMULSION INTRAVENOUS at 02:39

## 2021-09-07 RX ADMIN — SODIUM CHLORIDE SOLN NEBU 3% 4 ML: 3 NEBU SOLN at 21:51

## 2021-09-07 RX ADMIN — ENOXAPARIN SODIUM 40 MG: 40 INJECTION SUBCUTANEOUS at 08:01

## 2021-09-07 RX ADMIN — IPRATROPIUM BROMIDE AND ALBUTEROL SULFATE 1 AMPULE: .5; 3 SOLUTION RESPIRATORY (INHALATION) at 04:27

## 2021-09-07 RX ADMIN — IPRATROPIUM BROMIDE AND ALBUTEROL SULFATE 1 AMPULE: .5; 3 SOLUTION RESPIRATORY (INHALATION) at 16:55

## 2021-09-07 RX ADMIN — Medication 2000 UNITS: at 08:02

## 2021-09-07 RX ADMIN — PANTOPRAZOLE SODIUM 40 MG: 40 INJECTION, POWDER, FOR SOLUTION INTRAVENOUS at 08:02

## 2021-09-07 RX ADMIN — CISATRACURIUM BESYLATE 5 MCG/KG/MIN: 10 INJECTION INTRAVENOUS at 17:05

## 2021-09-07 RX ADMIN — CISATRACURIUM BESYLATE 5 MCG/KG/MIN: 10 INJECTION INTRAVENOUS at 00:27

## 2021-09-07 RX ADMIN — ENOXAPARIN SODIUM 40 MG: 40 INJECTION SUBCUTANEOUS at 20:29

## 2021-09-07 RX ADMIN — Medication 2000 MG: at 11:22

## 2021-09-07 RX ADMIN — Medication 200 MCG/HR: at 20:41

## 2021-09-07 RX ADMIN — Medication 2000 MG: at 05:59

## 2021-09-07 RX ADMIN — INSULIN LISPRO 3 UNITS: 100 INJECTION, SOLUTION INTRAVENOUS; SUBCUTANEOUS at 00:15

## 2021-09-07 RX ADMIN — PROPOFOL 30 MCG/KG/MIN: 10 INJECTION, EMULSION INTRAVENOUS at 18:45

## 2021-09-07 RX ADMIN — OXYCODONE HYDROCHLORIDE AND ACETAMINOPHEN 1000 MG: 500 TABLET ORAL at 08:01

## 2021-09-07 RX ADMIN — CISATRACURIUM BESYLATE 5.5 MCG/KG/MIN: 10 INJECTION INTRAVENOUS at 20:45

## 2021-09-07 RX ADMIN — Medication 10 ML: at 20:29

## 2021-09-07 RX ADMIN — Medication 10 ML: at 08:02

## 2021-09-07 RX ADMIN — ATORVASTATIN CALCIUM 40 MG: 40 TABLET, FILM COATED ORAL at 20:30

## 2021-09-07 RX ADMIN — CISATRACURIUM BESYLATE 5 MCG/KG/MIN: 10 INJECTION INTRAVENOUS at 05:59

## 2021-09-07 RX ADMIN — INSULIN LISPRO 3 UNITS: 100 INJECTION, SOLUTION INTRAVENOUS; SUBCUTANEOUS at 17:27

## 2021-09-07 RX ADMIN — PHENYTOIN SODIUM 300 MG: 50 INJECTION INTRAMUSCULAR; INTRAVENOUS at 09:50

## 2021-09-07 RX ADMIN — Medication 200 MCG/HR: at 02:21

## 2021-09-07 RX ADMIN — INSULIN LISPRO 3 UNITS: 100 INJECTION, SOLUTION INTRAVENOUS; SUBCUTANEOUS at 11:32

## 2021-09-07 RX ADMIN — IPRATROPIUM BROMIDE AND ALBUTEROL SULFATE 1 AMPULE: .5; 3 SOLUTION RESPIRATORY (INHALATION) at 13:05

## 2021-09-07 RX ADMIN — IPRATROPIUM BROMIDE AND ALBUTEROL SULFATE 1 AMPULE: .5; 3 SOLUTION RESPIRATORY (INHALATION) at 21:51

## 2021-09-07 RX ADMIN — CHLORHEXIDINE GLUCONATE 0.12% ORAL RINSE 15 ML: 1.2 LIQUID ORAL at 20:29

## 2021-09-07 RX ADMIN — MINERAL OIL AND PETROLATUM: 150; 830 OINTMENT OPHTHALMIC at 08:02

## 2021-09-07 RX ADMIN — LISINOPRIL 5 MG: 5 TABLET ORAL at 08:01

## 2021-09-07 RX ADMIN — INSULIN LISPRO 3 UNITS: 100 INJECTION, SOLUTION INTRAVENOUS; SUBCUTANEOUS at 06:23

## 2021-09-07 RX ADMIN — IPRATROPIUM BROMIDE AND ALBUTEROL SULFATE 1 AMPULE: .5; 3 SOLUTION RESPIRATORY (INHALATION) at 09:55

## 2021-09-07 RX ADMIN — IPRATROPIUM BROMIDE AND ALBUTEROL SULFATE 1 AMPULE: .5; 3 SOLUTION RESPIRATORY (INHALATION) at 00:00

## 2021-09-07 RX ADMIN — CHLORHEXIDINE GLUCONATE 0.12% ORAL RINSE 15 ML: 1.2 LIQUID ORAL at 08:02

## 2021-09-07 RX ADMIN — Medication 200 MCG/HR: at 14:51

## 2021-09-07 RX ADMIN — AMLODIPINE BESYLATE 10 MG: 10 TABLET ORAL at 08:01

## 2021-09-07 RX ADMIN — Medication 8 MG/HR: at 23:46

## 2021-09-07 RX ADMIN — LABETALOL HYDROCHLORIDE 10 MG: 5 INJECTION INTRAVENOUS at 20:16

## 2021-09-07 RX ADMIN — INSULIN GLARGINE 25 UNITS: 100 INJECTION, SOLUTION SUBCUTANEOUS at 22:00

## 2021-09-07 RX ADMIN — CISATRACURIUM BESYLATE 5 MCG/KG/MIN: 10 INJECTION INTRAVENOUS at 11:40

## 2021-09-07 RX ADMIN — PROPOFOL 30 MCG/KG/MIN: 10 INJECTION, EMULSION INTRAVENOUS at 10:48

## 2021-09-07 RX ADMIN — PHENYTOIN SODIUM 300 MG: 50 INJECTION INTRAMUSCULAR; INTRAVENOUS at 22:00

## 2021-09-07 RX ADMIN — LABETALOL HYDROCHLORIDE 10 MG: 5 INJECTION INTRAVENOUS at 10:29

## 2021-09-07 ASSESSMENT — PULMONARY FUNCTION TESTS
PIF_VALUE: 31
PIF_VALUE: 31
PIF_VALUE: 32
PIF_VALUE: 31
PIF_VALUE: 32
PIF_VALUE: 31

## 2021-09-07 ASSESSMENT — PAIN SCALES - GENERAL
PAINLEVEL_OUTOF10: 0

## 2021-09-07 NOTE — PROGRESS NOTES
[facility]  [facility]  Progress Note      Chief Complaint   Patient presents with    Shortness of Breath     covid, per family SaO2 75% RA, hx of DM and panic attack        SUBJECTIVE:  8/21/21  Prone. FiO2 60% and PEEP of 16    8/22/2021  Patient is tolerating medications. No reported adverse drug reactions. No nausea, vomiting, diarrhea. Afebrile BiPAP 100% FiO2 with breathing at 44 times a minute and probably is going to decompensate  8/23/2021  Not doing well intubated on a rotating bed  FiO2 70% and PEEP 16  8/24/2021  Prone 50% FiO2-PEEP of 10  Issues with the urinary Busch last night this was changed  Paralyzed and on Versed  8/25/2021  Paralyzed and sedated with Versed no pressors  Prone intubated on 50% FiO2, PEEP of 10  Tolerating medications    8/26/21  Paralyzed and sedated with Versed no pressors  Prone intubated on 70% FiO2, PEEP of 10  Does not tolerate supine position    8/27/2021  Afebrile  Still prone on FiO2 of 100%  Paralyzed and sedated    8/28/2021  The patient is still in the ICU. He is pronated. FiO2 100%. Is still sedated and paralyzed. Fair amount of thick, brown secretions    8/29/2021  The patient is still on a RotoProne bed. You are still intubated, sedated and paralyzed. O2 is being weaned down. FiO2 55%. PEEP 12.    8/30/2021  Patient remains intubated, sedated and paralyzed. He is still on a RotoProne bed. Supine now. 8/31/2021  He is still on a RotoProne bed. Still having fair amount of secretions from the nostrils. Minimal from the ET tube, however. Tube feeding seems to be coming out of his nose. 9/1/2021  He is supinated but still on a RotoProne bed. He still has fair amount of secretions from the ET tube. Tolerating antibiotic    9/2/2021. He is still on a RotoProne bed. He is pronated. Less secretions from the ET tube and nostrils. FiO2 50%. PEEP 14.    9/3/2021. He is off the RotoProne bed. Tolerating supination.   Tolerating weaning. PEEP was brought down to 12. FiO2 still at 50%. Tolerating antibiotics. 9/4/2021. He is still in the ICU. He is still on a ventilator. He is still paralyzed. 9/5/2021. He had an episode of acute desaturation and had to be bagged. He is now back on the ventilator. He is still sedated and paralyzed. 9/6/2021. The patient still in the ICU. He is still requiring paralytics. Secretions are becoming thinner. No fever. 9/7/2021. The patient still on a ventilator and paralyzed. No new problems reported. Review of systems:  As stated above in the chief complaint, otherwise negative.     Medications:  Scheduled Meds:   [START ON 9/8/2021] lisinopril  10 mg Oral Daily    lisinopril  5 mg Oral Once    insulin glargine  25 Units SubCUTAneous BID    ascorbic acid  1,000 mg Oral Daily    ceFAZolin  2,000 mg IntraVENous Q8H    sodium chloride (Inhalant)  4 mL Nebulization Q12H    dexamethasone  10 mg IntraVENous Daily    enoxaparin  40 mg SubCUTAneous BID    insulin lispro  0-18 Units SubCUTAneous Q6H    phenytoin  300 mg IntraVENous Q12H    chlorhexidine  15 mL Mouth/Throat BID    Refresh Lacri-Lube   Ophthalmic Q6H    ipratropium-albuterol  1 ampule Inhalation Q4H    sodium chloride flush  10 mL IntraVENous BID    amLODIPine  10 mg Oral Daily    atorvastatin  40 mg Oral Daily    Vitamin D  2,000 Units Oral Daily    zinc sulfate  50 mg Oral Daily    pantoprazole  40 mg IntraVENous Daily     Continuous Infusions:   fentaNYL 5 mcg/ml in 0.9%  ml infusion 200 mcg/hr (09/07/21 0840)    cisatracurium (NIMBEX) infusion 5 mcg/kg/min (09/07/21 0559)    propofol 30 mcg/kg/min (09/07/21 1048)    midazolam 8 mg/hr (09/06/21 7355)    sodium chloride      dextrose       PRN Meds:labetalol, sodium chloride (Inhalant), sodium chloride flush, sodium chloride, albuterol, benzonatate, glucose, dextrose, glucagon (rDNA), dextrose, sodium chloride    OBJECTIVE:  BP (!) 163/80 Pulse 86   Temp 99.3 °F (37.4 °C)   Resp 26   Ht 6' (1.829 m)   Wt (!) 315 lb 7.7 oz (143.1 kg)   SpO2 94%   BMI 42.79 kg/m²   Temp  Av.6 °F (37.6 °C)  Min: 99.3 °F (37.4 °C)  Max: 99.9 °F (37.7 °C)  Constitutional: The patient is intubated, sedated, paralyzed. He is supine. Skin: Warm and dry. No rashes were noted. HEENT: Round and nonreactive pupils. No thrush. ET tube. NG tube. Decubitus lesions on cheeks, hands and lower extremities noted. Neck: Supple. Chest: Coarse breath sounds bilaterally. No crackles  Cardiovascular: Heart sounds rhythmic and regular. Tachycardic. Abdomen: Round, soft and benign to palpation. Genitourinary: Busch catheter  Extremities: Moderate edema. Blistering of hands and feet. Some are unroofed. Lines: Right IJ triple-lumen catheter 2021 (changed over a guidewire).   Busch changed 3/24/2021    Laboratory and Tests Review:  Lab Results   Component Value Date    WBC 16.1 (H) 2021    WBC 12.3 (H) 2021    WBC 10.3 2021    HGB 11.7 (L) 2021    HCT 36.6 (L) 2021    MCV 93.1 2021     2021     Lab Results   Component Value Date    NEUTROABS 12.15 (H) 2021    NEUTROABS 9.82 (H) 2021    NEUTROABS 8.22 (H) 2021     No results found for: [facility]  Lab Results   Component Value Date    ALT 35 2021    AST 55 (H) 2021    ALKPHOS 109 2021    BILITOT 0.3 2021     Lab Results   Component Value Date     2021    K 4.4 2021    K 3.7 2021     2021    CO2 28 2021    BUN 21 2021    CREATININE 0.5 2021    CREATININE 0.4 2021    CREATININE 0.5 2021    GFRAA >60 2021    LABGLOM >60 2021    GLUCOSE 199 2021    PROT 6.0 2021    LABALBU 2.8 2021    CALCIUM 9.1 2021    BILITOT 0.3 2021    ALKPHOS 109 2021    AST 55 2021    ALT 35 2021     Lab Results   Component Value Date

## 2021-09-07 NOTE — PROGRESS NOTES
GENERAL SURGERY  DAILY PROGRESS NOTE  9/7/2021    Subjective:  Patient intubated since 8/23 for respiratory failure secondary to COVID. No acute events overnight. Objective:  BP (!) 167/73   Pulse 109   Temp 99.4 °F (37.4 °C) (Bladder)   Resp 25   Ht 6' (1.829 m)   Wt (!) 315 lb 7.7 oz (143.1 kg)   SpO2 92%   BMI 42.79 kg/m²      AC ventilation with PEEP of 9 and FiO2 of 40%. Assessment/Plan:  60 y.o. male with prolonged intubation and concern for malnutrition    Plan for Trach/PEG 9/8. Patient findings and plan discussed with Dr. La Espinal.     Electronically signed by Yvonne Ramesh MD on 9/7/2021 at 7:22 AM

## 2021-09-07 NOTE — ANESTHESIA PRE PROCEDURE
Department of Anesthesiology  Preprocedure Note       Name:  Michelle Russell   Age:  60 y.o.  :  1965                                          MRN:  [id number]         Date:  2021      Surgeon: Melvina Thornton):  MD Jewell Mak MD    Procedure: Procedure(s):  TRACHEOTOMY  EGD ESOPHAGOGASTRODUODENOSCOPY PEG TUBE INSERTION   +++COVID+++  ++DROPLET PLUS++   +++STAFF FROM 4+++    Medications prior to admission:   Prior to Admission medications    Medication Sig Start Date End Date Taking? Authorizing Provider   CLARITIN-D 12 HOUR 5-120 MG per extended release tablet Take 1 tablet by mouth 2 times daily For congestion relief as needed.  21  Yes Pedro Owens MD   amLODIPine (NORVASC) 10 MG tablet Take 1 tablet by mouth daily 8/10/21  Yes [address], DO   atorvastatin (LIPITOR) 40 MG tablet Take 1 tablet by mouth daily 8/10/21  Yes Tiarra Kimble DO   benazepril (LOTENSIN) 40 MG tablet Take 1 tablet by mouth daily 8/10/21  Yes Tiarra Kimble DO   SITagliptin (JANUVIA) 100 MG tablet Take 1 tablet by mouth daily 8/10/21  Yes Tiarra Kimble DO   metFORMIN (GLUCOPHAGE) 1000 MG tablet Take 1 tablet by mouth 2 times daily (with meals) 8/10/21  Yes Tiarra Kimble DO   phenytoin (DILANTIN) 100 MG ER capsule Take 2 tabs in the am and 3 tabs in pm 8/10/21  Yes Tiarra Kimble DO   terazosin (HYTRIN) 10 MG capsule Take 1 capsule by mouth nightly 8/10/21  Yes Tiarra Kimble DO   ONETOUCH ULTRA strip Use daily 21   Tiarra Kimble DO   [facility] PLUS [id number]) MISC Use daily 21   Tiarra Kimble DO       Current medications:    Current Facility-Administered Medications   Medication Dose Route Frequency Provider Last Rate Last Admin    [START ON 2021] lisinopril (PRINIVIL;ZESTRIL) tablet 10 mg  10 mg Oral Daily Marja Epley, MD        labetalol (NORMODYNE;TRANDATE) injection 10 mg  10 mg IntraVENous Q6H PRN Tosha Richey, DO   10 mg at 09/07/21 1029    insulin glargine (LANTUS) injection vial 25 Units  25 Units SubCUTAneous BID Pricila Lamar, DO   25 Units at 09/07/21 3889    ascorbic acid (VITAMIN C) tablet 1,000 mg  1,000 mg Oral Daily Dimas Chaudhari, DO   1,000 mg at 09/07/21 0801    ceFAZolin (ANCEF) 2000 mg in sterile water 20 mL IV syringe  2,000 mg IntraVENous Q8H Radames Cesar MD   2,000 mg at 09/07/21 1122    sodium chloride (Inhalant) 3 % nebulizer solution 4 mL  4 mL Nebulization Q12H Saulo Chaudhari, DO   4 mL at 09/07/21 0955    dexamethasone (DECADRON) injection 10 mg  10 mg IntraVENous Daily Dimas Rahmancristinajere Navaon, DO   10 mg at 09/07/21 0801    enoxaparin (LOVENOX) injection 40 mg  40 mg SubCUTAneous BID Dimas Rahmancristinajere Navaon, DO   40 mg at 09/07/21 0801    fentaNYL 5 mcg/ml in 0.9%  ml infusion  12.5-200 mcg/hr IntraVENous Continuous Eva Lopez MD 40 mL/hr at 09/07/21 0840 200 mcg/hr at 09/07/21 0840    insulin lispro (HUMALOG) injection vial 0-18 Units  0-18 Units SubCUTAneous Q6H Dimas Rumcristinajere Navaon, DO   3 Units at 09/07/21 1132    cisatracurium besylate (NIMBEX) 200 mg in sodium chloride 0.9 % 100 mL infusion  0.5-10 mcg/kg/min IntraVENous Continuous Saulo Chaudhari, DO 20.6 mL/hr at 09/07/21 1140 5 mcg/kg/min at 09/07/21 1140    sodium chloride (Inhalant) 3 % nebulizer solution 4 mL  4 mL Nebulization PRN Eva Lopez MD   4 mL at 08/30/21 1446    phenytoin (DILANTIN) 300 mg in sodium chloride 0.9 % 100 mL IVPB (maintenance dose)  300 mg IntraVENous Q12H Shani Goss, DO   Stopped at 09/07/21 1042    propofol injection  5-50 mcg/kg/min IntraVENous Titrated Eva Lopez MD 24.7 mL/hr at 09/07/21 1048 30 mcg/kg/min at 09/07/21 1048    midazolam (VERSED) 1 mg/mL in D5W infusion  1-10 mg/hr IntraVENous Continuous Eva Lopez MD 8 mL/hr at 09/06/21 2355 8 mg/hr at 09/06/21 2355    chlorhexidine (PERIDEX) 0.12 % solution 15 mL  15 mL Mouth/Throat BID Eva Lopez MD   15 mL at 09/07/21 0802    Refresh Lacri-Lube ointment OINT   Ophthalmic Q6H Leonidas Tatum MD   Given at 09/07/21 0802    sodium chloride flush 0.9 % injection 5-40 mL  5-40 mL IntraVENous PRN Gregory Steinberg, DO   10 mL at 09/06/21 1145    0.9 % sodium chloride infusion  25 mL IntraVENous PRN Gregory Steinberg DO        ipratropium-albuterol (DUONEB) nebulizer solution 1 ampule  1 ampule Inhalation Q4H Leonidas Tatum MD   1 ampule at 09/07/21 1305    albuterol (PROVENTIL) nebulizer solution 2.5 mg  2.5 mg Nebulization Q6H PRN Leonidas Tatum MD   2.5 mg at 08/27/21 1605    sodium chloride flush 0.9 % injection 10 mL  10 mL IntraVENous BID Gregory Steinberg, DO   10 mL at 09/07/21 0802    amLODIPine (NORVASC) tablet 10 mg  10 mg Oral Daily Marjan Crawfordon, DO   10 mg at 09/07/21 0801    atorvastatin (LIPITOR) tablet 40 mg  40 mg Oral Daily Marjan Crawfordon, DO   40 mg at 09/06/21 2011    benzonatate (TESSALON) capsule 200 mg  200 mg Oral TID PRN Binu Dwyer DO        vitamin D (CHOLECALCIFEROL) tablet 2,000 Units  2,000 Units Oral Daily Binu Dwyer DO   2,000 Units at 09/07/21 0802    zinc sulfate (ZINCATE) capsule 50 mg  50 mg Oral Daily Marjan Crawfordon, DO   50 mg at 09/07/21 0802    glucose (GLUTOSE) 40 % oral gel 15 g  15 g Oral PRN Marjan Crawfordon, DO        dextrose 50 % IV solution  12.5 g IntraVENous PRN Marjan Crawfordon, DO        glucagon (rDNA) injection 1 mg  1 mg IntraMUSCular PRN Marjan Crawfordon, DO        dextrose 5 % solution  100 mL/hr IntraVENous PRN Marjan Crawfordon, DO        pantoprazole (PROTONIX) injection 40 mg  40 mg IntraVENous Daily Gregory Steinberg, DO   40 mg at 09/07/21 0802    0.9 % sodium chloride bolus  30 mL IntraVENous PRN Binu Dwyer DO           Allergies:  No Known Allergies    Problem List:    Patient Active Problem List   Diagnosis Code    Type 2 diabetes mellitus without complication, without long-term current use of insulin (HCC) E11.9    History of seizures Z87.898    Acute hypoxemic respiratory failure due to COVID-19 ([facility]) U07.1, J96.01    Hyperlipidemia E78.5    Busch catheter problem ([facility] [address] T83. 9XXA    Sepsis ([facility] [address] A41.9    Thrombocyt[facility] D69.6    Elevated LFTs R79.89       Past Medical History:        Diagnosis Date    HIGH CHOLESTEROL     Hypertension     Seizures ([facility]        Past Surgical History:        Procedure Laterality Date    APPENDECTOMY         Social History:    Social History     Tobacco Use    Smoking status: Never Smoker    Smokeless tobacco: Never Used   Substance Use Topics    Alcohol use: Not on file                                Counseling given: Not Answered      Vital Signs (Current):   Vitals:    09/07/21 1215 09/07/21 1300 09/07/21 1306 09/07/21 1400   BP: (!) 165/72 (!) 183/79  (!) 163/70   Pulse:  95 92 93   Resp:  26 25 26   Temp:       TempSrc:       SpO2:  95% 94% 94%   Weight:       Height:                                                  BP Readings from Last 3 Encounters:   09/07/21 (!) 163/70   08/17/21 136/81   08/10/21 (!) 155/87       NPO Status:                            Informed RN to keep NPO after 2359, 9/7/2021                                                      BMI:   Wt Readings from Last 3 Encounters:   09/07/21 (!) 315 lb 7.7 oz (143.1 kg)   08/17/21 (!) 320 lb (145.2 kg)   08/10/21 (!) 321 lb 9.6 oz (145.9 kg)     Body mass index is 42.79 kg/m².     CBC:   Lab Results   Component Value Date    WBC 16.1 09/07/2021    RBC 3.93 09/07/2021    HGB 11.7 09/07/2021    HCT 36.6 09/07/2021    MCV 93.1 09/07/2021    RDW 14.5 09/07/2021     09/07/2021       CMP:   Lab Results   Component Value Date     09/07/2021    K 4.4 09/07/2021    K 3.7 08/21/2021     09/07/2021    CO2 28 09/07/2021    BUN 21 09/07/2021    CREATININE 0.5 09/07/2021    GFRAA >60 09/07/2021    LABGLOM >60 09/07/2021    GLUCOSE 199 09/07/2021    PROT 6.0 09/07/2021    CALCIUM 9.1 09/07/2021    BILITOT 0.3 09/07/2021    ALKPHOS 109 09/07/2021    AST 55 09/07/2021    ALT 35 09/07/2021       POC Tests: No results for input(s): POCGLU, POCNA, POCK, POCCL, POCBUN, POCHEMO, POCHCT in the last 72 hours. Coags: No results found for: PROTIME, INR, APTT    HCG (If Applicable): No results found for: PREGTESTUR, PREGSERUM, HCG, HCGQUANT     ABGs:   Lab Results   Component Value Date    TKX7WVH 25.5 08/26/2021        Type & Screen (If Applicable):  No results found for: LABABO, LABRH    Drug/Infectious Status (If Applicable):  No results found for: HIV, HEPCAB    COVID-19 Screening (If Applicable):   Lab Results   Component Value Date    COVID19 DETECTED 08/18/2021     US chest 9/7/2021  FINDINGS:   Five ultrasound images of left pleural space shows trace left pleural   effusion. Anesthesia Evaluation  Patient summary reviewed  Airway: Mallampati: Unable to assess / NA       Comment: Pt is intubated     Dental:          Pulmonary:                             ROS comment: 1. Acute hypoxic respiratory failure  2. Severe covid-19 pneumonia  3. Bacterial pneumonia with MSSA and Ecoli   4. Left pleural effusion    Sedated, ventilated and paralyzed currently  Vent settings 40% Pi 20, RR 26, PEEP 9   Cardiovascular:    (+) hypertension:, hyperlipidemia      ECG reviewed  Rhythm: regular  Rate: normal                    Neuro/Psych:   (+) seizures (hx of):,             GI/Hepatic/Renal:   (+) liver disease (elevated LFTs):, morbid obesity          Endo/Other:    (+) DiabetesType II DM, , .                 Abdominal:   (+) obese,           Vascular: Other Findings:           Anesthesia Plan      general     ASA 4     (3 lumen CVC R IJ, currently running:  Propofol  Versed  Nimbex  Fentanyl)  Induction: intravenous. MIPS: Postoperative ventilation. Anesthetic plan and risks discussed with spouse. Plan discussed with CRNA.               Surjit Brock RN   9/7/2021      Patient to be reevaluated by staff anesthesiologist on day of procedure. DOS STAFF ADDENDUM:    Pt seen and examined, chart reviewed (including anesthesia, drug and allergy history). Anesthetic plan, risks, benefits, alternatives, and personnel involved discussed with wife. His wife verbalized an understanding and agrees to proceed. Plan discussed with care team members and agreed upon.     Олег Glover MD  Staff Anesthesiologist  9:29 PM

## 2021-09-07 NOTE — PLAN OF CARE
Problem: Airway Clearance - Ineffective  Goal: Achieve or maintain patent airway  Outcome: Met This Shift     Problem: Gas Exchange - Impaired  Goal: Absence of hypoxia  Outcome: Met This Shift     Problem: Isolation Precautions - Risk of Spread of Infection  Goal: Prevent transmission of infection  Outcome: Met This Shift     Problem: Risk for Fluid Volume Deficit  Goal: Maintain normal heart rhythm  Outcome: Met This Shift     Problem: Skin Integrity:  Goal: Will show no infection signs and symptoms  Description: Will show no infection signs and symptoms  Outcome: Met This Shift

## 2021-09-07 NOTE — PROGRESS NOTES
without mass   Pulmonary/Chest: per nursing assessment  Cardiovascular: normal rate, normal S1 and S2 and no carotid bruits  Abdomen: per nursing assessment. Fecal monitoring system with stool  Extremities: no cyanosis, no clubbing and2+ edema  Neurologic: intubated and sedated    Recent Labs     09/05/21  0555 09/06/21  0515 09/07/21  0538    142 138   K 4.2 4.6 4.4   * 104 102   CO2 26 29 28   BUN 32* 23* 21*   CREATININE 0.5* 0.4* 0.5*   GLUCOSE 216* 209* 199*   CALCIUM 8.4* 8.8 9.1       Recent Labs     09/05/21  0555 09/06/21  0515 09/07/21  0538   WBC 10.3 12.3* 16.1*   RBC 3.67* 3.87 3.93   HGB 10.8* 11.4* 11.7*   HCT 34.7* 36.5* 36.6*   MCV 94.6 94.3 93.1   MCH 29.4 29.5 29.8   MCHC 31.1* 31.2* 32.0   RDW 14.3 14.3 14.5   * 147 169   MPV 10.4 10.1 10.1     Radiology: XR CHEST PORTABLE    Result Date: 9/6/2021  EXAMINATION: ONE XRAY VIEW OF THE CHEST 9/6/2021 6:57 am COMPARISON: 09/04/2021 HISTORY: ORDERING SYSTEM PROVIDED HISTORY: Acute resp failure, COVID TECHNOLOGIST PROVIDED HISTORY: Reason for exam:->Acute resp failure, COVID FINDINGS: Support tubes and line are stable. Stable cardiomediastinal silhouette. Stable large left pleural effusion with left mid to lower lung airspace opacity. Stable patchy right lung airspace opacity. No pneumothorax. No acute osseous abnormality. Stable large left pleural effusion, with left mid to lower lung airspace opacity. Stable patchy right lung airspace opacity. Assessment:    Principal Problem:    Acute hypoxemic respiratory failure due to COVID-19 [facility])  Active Problems:    Type 2 diabetes mellitus without complication, without long-term current use of insulin (HCC)    History of seizures    Hyperlipidemia    Busch catheter problem (HCC)    Sepsis (HCC)    Thrombocytopenia (HCC)    Elevated LFTs  Resolved Problems:    OSCAR (acute kidney injury) ([address]    Lactic acidosis    Plan:  1.    Acute respiratory failure with hypoxia - continue current support. Wean as tolerated. 2.  COVID 19 pneumonia - completed Remdesivir; received Tocilizumab. Continue Decadron, Vitamin C, Vitamin D and Zinc.  3.  T2DM - on insulin. Receiving tube feedings. Glucose POCT. Trend labs and treat accordingly. 4. VAP vs. Tracheobronchitis - continue Cefazolin, day 7.    5. HTN - continue meds. Monitor vitals and treat accordingly. 6.  HLP - continue meds. 7.  Seizure Disorder - continue meds. 8.  Elevated D dimer - continue Lovenox. NOTE: This report was transcribed using voice recognition software. Every effort was made to ensure accuracy; however, inadvertent computerized transcription errors may be present.     Electronically signed by Kati Richards MD on 9/7/2021 at 8:13 AM

## 2021-09-07 NOTE — PROGRESS NOTES
Pulmonary Subsequent Hospital F/U note    Patient is being followed for: acute hypoxic respiratory failure, severe covid-19 pneumonia     Interval HPI:  Remains intubated and sedated  Vent settings 40% Pi 20, RR 26, PEEP 9      ROS:  Unable to obtain     Exam:  /65   Pulse 110   Temp 99.7 °F (37.6 °C) (Bladder)   Resp 25   Ht 6' (1.829 m)   Wt (!) 314 lb 9.5 oz (142.7 kg)   SpO2 91%   BMI 42.67 kg/m²    Due to the current efforts to prevent transmission of COVID-19 and also the need to preserve PPE for other caregivers, a face-to-face encounter with the patient was not performed. That being said, all relevant records and diagnostic tests were reviewed, including laboratory results and imaging. Please reference any relevant documentation elsewhere. Care will be coordinated with the primary service. Data:    Oximetry:  SpO2 Readings from Last 1 Encounters:   09/06/21 91%       Imaging personally reviewed by myself:  CXR     Impression   Stable large left pleural effusion, with left mid to lower lung airspace   opacity.       Stable patchy right lung airspace opacity. Respiratory culture MSSA, Ecoli     Pertinent labs reviewed and noted:  Lab Results   Component Value Date    WBC 12.3 09/06/2021    HGB 11.4 09/06/2021    HCT 36.5 09/06/2021    MCV 94.3 09/06/2021    MCH 29.5 09/06/2021    MCHC 31.2 09/06/2021    RDW 14.3 09/06/2021     09/06/2021    MPV 10.1 09/06/2021     Lab Results   Component Value Date     09/06/2021    K 4.6 09/06/2021    K 3.7 08/21/2021     09/06/2021    CO2 29 09/06/2021    BUN 23 09/06/2021    CREATININE 0.4 09/06/2021    LABALBU 2.9 09/06/2021    CALCIUM 8.8 09/06/2021    GFRAA >60 09/06/2021    LABGLOM >60 09/06/2021     No results found for: PROTIME, INR    Assessment:  1. Acute hypoxic respiratory failure  2. Severe covid-19 pneumonia  3. Bacterial pneumonia with MSSA and Ecoli   4. Left pleural effusion    Plan:  1.  Wean vent as able but there

## 2021-09-07 NOTE — PROGRESS NOTES
Pulmonary Subsequent Hospital F/U note    Patient is being followed for: acute hypoxic respiratory failure, severe covid-19 pneumonia     Interval HPI:  Remains intubated and sedated  Vent settings 40% Pi 20, RR 26, PEEP 9      ROS:  Unable to obtain     Exam:  BP (!) 177/78   Pulse 101   Temp 99.3 °F (37.4 °C)   Resp 26   Ht 6' (1.829 m)   Wt (!) 315 lb 7.7 oz (143.1 kg)   SpO2 92%   BMI 42.79 kg/m²    Due to the current efforts to prevent transmission of COVID-19 and also the need to preserve PPE for other caregivers, a face-to-face encounter with the patient was not performed. That being said, all relevant records and diagnostic tests were reviewed, including laboratory results and imaging. Please reference any relevant documentation elsewhere. Care will be coordinated with the primary service. Data:    Oximetry:  SpO2 Readings from Last 1 Encounters:   09/07/21 92%       Imaging personally reviewed by myself:  CXR     Impression   Stable large left pleural effusion, with left mid to lower lung airspace   opacity.       Stable patchy right lung airspace opacity. Respiratory culture MSSA, Ecoli     Pertinent labs reviewed and noted:  Lab Results   Component Value Date    WBC 16.1 09/07/2021    HGB 11.7 09/07/2021    HCT 36.6 09/07/2021    MCV 93.1 09/07/2021    MCH 29.8 09/07/2021    MCHC 32.0 09/07/2021    RDW 14.5 09/07/2021     09/07/2021    MPV 10.1 09/07/2021     Lab Results   Component Value Date     09/07/2021    K 4.4 09/07/2021    K 3.7 08/21/2021     09/07/2021    CO2 28 09/07/2021    BUN 21 09/07/2021    CREATININE 0.5 09/07/2021    LABALBU 2.8 09/07/2021    CALCIUM 9.1 09/07/2021    GFRAA >60 09/07/2021    LABGLOM >60 09/07/2021     Assessment:  1. Acute hypoxic respiratory failure  2. Severe covid-19 pneumonia  3. Bacterial pneumonia with MSSA and Ecoli   4. Left pleural effusion    Plan:  1. Trache PEG tomorrow 9/8/21  2. Completed Remdesivir and Tocilizumab  3. Antibiotics as per ID  4. US L chest - will discuss with Dr. Claude Pro   5. Continue decadron  6.  DVT prophylaxis      Electronically signed by Justin Sosa MD on 9/7/2021 at 10:55 AM

## 2021-09-07 NOTE — PROGRESS NOTES
9/7/2021  11:04 AM      Comprehensive Nutrition Assessment    Type and Reason for Visit:  Reassess    Nutrition Recommendations/Plan: When able to use PEG for TF recommend (once propofol weaned off):    TF RECOMMENDATION: Diabetic TF (Glucerna 1.5) to goal rate 60 ml/hr and Protein Modular once daily  This will provide: 1440 ml/d, 2260 angel, 145 g protein, 1093 ml free water  This regimen will meet: 90% calorie needs, 100% protein needs    While pt is on propofol recommend: Diabetic TF to goal 45 ml/hr and Protein Modular twice daily  This will provide: 1080 ml, 1820 angel (2364 angel w/propofol), 141 g pro, 1000 ml total free water    Nutrition Assessment:  Pt remaining intubated/sedated/paralyzed. Plan for trach/PEG 9/8. Will provide TF rec to meet pt needs via PEG once propofol weaned off. Malnutrition Assessment:  Malnutrition Status: At risk for malnutrition (Comment)    Context:  Acute Illness     Findings of the 6 clinical characteristics of malnutrition:  Energy Intake:  Mild decrease in energy intake (Comment)  Weight Loss:  Unable to assess     Body Fat Loss:  Unable to assess (Covid Isolation)     Muscle Mass Loss:  Unable to assess (Covid Isolation)    Fluid Accumulation:  No significant fluid accumulation     Strength:  Not Performed    Estimated Daily Nutrient Needs:  Energy (kcal):  ; Weight Used for Energy Requirements:  Admission     Protein (g):  125-160 (1.5-1.8 g/kg);  Weight Used for Protein Requirements:  Ideal        Fluid (ml/day):  per Critical Care; Method Used for Fluid Requirements:  Other (Comment)      Nutrition Related Findings:  NGT to TF at goal rate, diarrhea(FMS), paralyzed/intubated, +3 edema, +I/O 5.8L, +BS, pressor ()      Wounds:  Pressure Injury, Stage II, Multiple, Deep Tissue Injury (5 DTI face, forehead, cheek and PI on LE-per wound care)       Current Nutrition Therapies:    Current Tube Feeding (TF) Orders:  · Feeding Route: Nasogastric  · Formula: Diabetic  · Schedule: Continuous (goal = 45 ml/hr =1080 ml/d)  · Additives/Modulars:  (none)  · Water Flushes: 30 ml Q6 hr = 120 ml/d  · Current TF & Flush Orders Provides: at goal rate currently  · Goal TF & Flush Orders Provides: 1080 ml/d, 1620 angel (2164 angel  total w/propofol), 89 g pro, 1000 ml total free water    Additional Calorie Sources:   propofol = 544 angel    Anthropometric Measures:  · Height: 6' (182.9 cm)  · Current Body Weight: 315 lb (142.9 kg) (9/7)   · Admission Body Weight: 302 lb (137 kg) (8/21 bedscale - first measured wt this admit)    · Usual Body Weight:  (No recent actual wt hx available)     · Ideal Body Weight: 178 lbs; % Ideal Body Weight 177 %   · BMI: 42.7  · BMI Categories: Obese Class 3 (BMI 40.0 or greater)       Nutrition Diagnosis:   · Inadequate oral intake related to impaired respiratory function as evidenced by NPO or clear liquid status due to medical condition, intubation, nutrition support - enteral nutrition      Nutrition Interventions:   Food and/or Nutrient Delivery:  Continue NPO, Continue Current Tube Feeding (Recommend protein Modular added to current TF order, when able to use new PEG)  Nutrition Education/Counseling:  No recommendation at this time   Coordination of Nutrition Care:  Continue to monitor while inpatient    Goals:  Pt michelle EN at goal rate       Nutrition Monitoring and Evaluation:   Behavioral-Environmental Outcomes:  None Identified   Food/Nutrient Intake Outcomes:  Enteral Nutrition Intake/Tolerance  Physical Signs/Symptoms Outcomes:  GI Status, Biochemical Data, Fluid Status or Edema, Nutrition Focused Physical Findings, Skin, Weight     Discharge Planning:    Enteral Nutrition     Electronically signed by Idalia Briggs RD, CNSC, LD on 9/7/21 at 11:04 AM EDT    Contact: [phone]

## 2021-09-07 NOTE — PATIENT CARE CONFERENCE
Intensive Care Daily Quality Rounding Checklist        ICU Team Members: Dr. Brent Han, Dr. Anat Tineo (resident), Jamari Fabian nurse, bedside nurse, respiratory therapist     ICU Day #: NUMBER: 18     Intubation Date: August 23,2021     Ventilator Day #: NUMBER: 16     Central Line Insertion Date: August 30,2021                                                    Day #: NUMBER: 8      Arterial Line Insertion Date: N/A                             Day #: N/A     Temporary Hemodialysis Catheter Insertion Date:  N/A                             Day # N/A     DVT Prophylaxis: Lovenox BID    GI Prophylaxis: Protonix     Busch Catheter Insertion Date: August 26  (changed)                                     Day #: 13                                Continued need (if yes, reason documented and discussed with physician): yes, strict I&O in critical patient     Skin Issues/ Wounds and ordered treatment discussed on rounds: yes, wound care following     Goals/ Plans for the Day: wean vent as able, monitor labs and replace as needed, plan is for trach and peg once peep less than 8

## 2021-09-07 NOTE — CARE COORDINATION
Social work / Discharge Planning:       [address]. Discussed with [facility] is following. Plan is for trache and peg tomorrow.    Electronically signed by MADDIE Carney on 9/7/2021 at 11:18 AM

## 2021-09-07 NOTE — PROGRESS NOTES
Critical Care Team - Daily Progress Note      Date and time: 2021 8:54 AM  Patient's name:  Richardson Dewey Record Number: [id number]  Patient's account/billing number: [de-identified]  Patient's YOB: 1965  Age: 60 y.o. Date of Admission: 2021  9:35 AM  Length of stay during current admission: 17      Primary Care Physician: Loco 5, DO  ICU Attending Physician: Dr. Den Glaser Status: Full Code    Reason for ICU admission: Respiratory failure due to COVID-19      SUBJECTIVE:     OVERNIGHT EVENTS:           : tolerating longer periods supine, thick dark brown secretions     : secretions out of NG, michelle prone     : Decreased urine output and increased creatinine today, continues with thick secretions     : Cr and Uop improved     9/3: P/F improved and has remained supine     : thick secretions     : desat with turning, 1500 loose stool from [facility]    : Patient trying low tidal volumes overnight occasionally, otherwise no other acute events.     : No acute events overnight      OBJECTIVE:     VITAL SIGNS:  BP (!) 162/74   Pulse 115   Temp 99.3 °F (37.4 °C)   Resp 26   Ht 6' (1.829 m)   Wt (!) 315 lb 7.7 oz (143.1 kg)   SpO2 94%   BMI 42.79 kg/m²   Tmax over 24 hours:  Temp (24hrs), Av.6 °F (37.6 °C), Min:99.3 °F (37.4 °C), Max:99.9 °F (37.7 °C)      Patient Vitals for the past 6 hrs:   BP Temp Temp src Pulse Resp SpO2 Weight   21 0800 (!) 162/74 99.3 °F (37.4 °C) -- 115 26 94 % --   21 0600 (!) 167/73 -- -- 109 25 92 % (!) 315 lb 7.7 oz (143.1 kg)   21 0530 -- -- -- -- 25 91 % --   21 0500 (!) 171/72 -- -- 109 26 91 % --   21 0424 -- -- -- 109 26 91 % --   21 0400 (!) 176/69 99.4 °F (37.4 °C) Bladder 110 25 91 % --   21 0300 (!) 172/71 99.5 °F (37.5 °C) Bladder 106 26 92 % --         Intake/Output Summary (Last 24 hours) at 2021 0854  Last data filed at 2021 0800  Gross per 24 hour   Intake 4620 ml   Output 3455 ml   Net 1165 ml     Wt Readings from Last 2 Encounters:   09/07/21 (!) 315 lb 7.7 oz (143.1 kg)   08/17/21 (!) 320 lb (145.2 kg)     Body mass index is 42.79 kg/m².         PHYSICAL EXAMINATION:    General appearance - ill-appearing  Mental status -intubated sedated  Eyes - pupils equal and reactive, extraocular eye movements intact  Ears - bilateral TM's and external ear canals normal  Nose - normal and patent, no erythema, discharge or polyps  Mouth - mucous membranes moist, pharynx normal without lesions  Neck - supple, no significant adenopathy  Chest -intubated sedated, coarse breath sounds, crackles noted bilaterally diminished  Heart - normal rate, regular rhythm, normal S1, S2, no murmurs, rubs, clicks or gallops  Abdomen - soft, nontender, nondistended, no masses or organomegaly  Neurological -intubated and sedated  Extremities - peripheral pulses normal, no pedal edema, no clubbing or cyanosis  Skin -patient did have skin breakdown to the face as well as blistering on the hands     Any additional physical findings:    MEDICATIONS:    Scheduled Meds:   lisinopril  5 mg Oral Daily    insulin glargine  25 Units SubCUTAneous BID    ascorbic acid  1,000 mg Oral Daily    ceFAZolin  2,000 mg IntraVENous Q8H    sodium chloride (Inhalant)  4 mL Nebulization Q12H    dexamethasone  10 mg IntraVENous Daily    enoxaparin  40 mg SubCUTAneous BID    insulin lispro  0-18 Units SubCUTAneous Q6H    phenytoin  300 mg IntraVENous Q12H    chlorhexidine  15 mL Mouth/Throat BID    Refresh Lacri-Lube   Ophthalmic Q6H    ipratropium-albuterol  1 ampule Inhalation Q4H    sodium chloride flush  10 mL IntraVENous BID    amLODIPine  10 mg Oral Daily    atorvastatin  40 mg Oral Daily    Vitamin D  2,000 Units Oral Daily    zinc sulfate  50 mg Oral Daily    pantoprazole  40 mg IntraVENous Daily     Continuous Infusions:   phenylephrine (BRUNA-SYNEPHRINE) 50mg/250mL infusion Stopped (09/06/21 5656)  fentaNYL 5 mcg/ml in 0.9%  ml infusion 200 mcg/hr (09/07/21 0840)    cisatracurium (NIMBEX) infusion 5 mcg/kg/min (09/07/21 0559)    propofol 25 mcg/kg/min (09/07/21 0239)    midazolam 8 mg/hr (09/06/21 8569)    sodium chloride      dextrose       PRN Meds:   labetalol, 10 mg, Q6H PRN  sodium chloride (Inhalant), 4 mL, PRN  sodium chloride flush, 5-40 mL, PRN  sodium chloride, 25 mL, PRN  albuterol, 2.5 mg, Q6H PRN  benzonatate, 200 mg, TID PRN  glucose, 15 g, PRN  dextrose, 12.5 g, PRN  glucagon (rDNA), 1 mg, PRN  dextrose, 100 mL/hr, PRN  sodium chloride, 30 mL, PRN          VENT SETTINGS (Comprehensive) (if applicable):  Vent Information  $Ventilation: $Subsequent Day  Skin Assessment: Clean, dry, & intact  Equipment ID: [id number]  Equipment Changed: Suction catheter  Vent Type: [id number]  Vent Mode: AC/PC  Vt Ordered: 0 mL  Pressure Ordered: 20  Rate Set: 26 bmp  Peak Flow: 0 L/min  Pressure Support: 0 cmH20  FiO2 : 40 %  SpO2: 94 %  SpO2/FiO2 ratio: 235  Sensitivity: 3  PEEP/CPAP: 9  I Time/ I Time %: 0.75 s  Humidification Source: Heated wire  Humidification Temp: 37  Humidification Temp Measured: 37  Circuit Condensation: Drained  Mask Type: Full face mask  Mask Size: Medium  Additional Respiratory  Assessments  Pulse: 115  Resp: 26  SpO2: 94 %  Position: Semi-Sellers's  Humidification Source: Heated wire  Humidification Temp: 37  Circuit Condensation: Drained  Oral Care: Mouth moisturizer, Mouth suctioned, Mouth swabbed, Mouthwash with chlorhexidine, Suction toothette  Subglottic Suction Done?: Yes  Airway Type: ET  Airway Size: 8  Cuff Pressure (cm H2O): 29 cm H2O    ABGs:     Laboratory findings:    Complete Blood Count:   Recent Labs     09/05/21  0555 09/06/21  0515 09/07/21  0538   WBC 10.3 12.3* 16.1*   HGB 10.8* 11.4* 11.7*   HCT 34.7* 36.5* 36.6*   * 147 169        Last 3 Blood Glucose:   Recent Labs     09/05/21  0555 09/06/21  0515 09/07/21  0538   GLUCOSE 216* 209* 199*        PT/INR:  No results found for: PROTIME, INR  PTT:  No results found for: APTT, PTT    Comprehensive Metabolic Profile:   Recent Labs     09/05/21  0555 09/05/21  0555 09/06/21  0515 09/06/21  0515 09/07/21  0538     --  142  --  138   K 4.2  --  4.6  --  4.4   *  --  104  --  102   CO2 26  --  29  --  28   BUN 32*  --  23*  --  21*   CREATININE 0.5*  --  0.4*  --  0.5*   GLUCOSE 216*  --  209*  --  199*   CALCIUM 8.4*  --  8.8  --  9.1   PROT 5.4*   < > 5.2*   < > 6.0*   LABALBU 2.7*   < > 2.9*   < > 2.8*   BILITOT 0.2   < > 0.3   < > 0.3   ALKPHOS 92   < > 98   < > 109   AST 47*   < > 63*   < > 55*   ALT 27   < > 34   < > 35    < > = values in this interval not displayed. Magnesium:   Lab Results   Component Value Date    MG 2.0 09/07/2021     Phosphorus:   Lab Results   Component Value Date    PHOS 3.6 09/07/2021     Ionized Calcium: No results found for: CANDICE     Urinalysis:     Troponin: No results for input(s): TROPONINI in the last 72 hours. ASSESSMENT:     Patient Active Problem List    Diagnosis Date Noted    Elevated LFTs     Busch catheter problem ([facility] [address] 08/26/2021    Sepsis ([facility] [address] 08/26/2021    Thrombocytopenia ([facility] [address] 08/26/2021    Hyperlipidemia 08/22/2021    Acute hypoxemic respiratory failure due to COVID-19 [facility]) 08/21/2021    Type 2 diabetes mellitus without complication, without long-term current use of insulin ([facility] [address] 08/10/2021    History of seizures 08/10/2021     SYSTEMS ASSESSMENT    Neuro     Intubation, sedated, paralyzed  Propofol  Versed  Nimbex  Fentanyl  Continue to monitor      Respiratory     Acute hypoxic respiratory failure requiring mechanical ventilation  Severe COVID-19 pneumonia s/p toe C, remdesivir  Pneumonia VAP-MSSA, E. coli  Ancef-day 5  Trach/PEG when oxygen garments improve  Decadron 10 mg  Plan for trach/PEG tomorrow  Ultrasound for evaluation of pleural effusion today  Wean oxygen as tolerated.  Keep O2 sat 90-92%    Cardiovascular Distress  HEENT: normocephalic, atruamatic  CVS: RRR, S1, S2, No S3 or S4  Respiratory: decreased breath sounds at lung bases, no focal wheezes noted  Extremities: no clubbing/cyanosis/edema     ASSESSMENT:  · Severe Covid pneumonia causing acute respiratory failure CRP has come down nicely.  Completed Remdesivir.  Received Tocilizumab  · VAP versus tracheobronchitis.  Cultures growing MSSA and E. coli.  Improving  · Leukocytosis associated to the above.    · Lactic Acidosis   · Left Pleural Effusion      In addition the following applies:     Check: N/A  Medication Alterations: lisinopril to 10 mg PO q day   Procedures: left pleural US noted not enough fluid to tap for thoracentesis/chest tube   Imaging: reviewed, Left Pleural US  New Consultations: N/A  VENT: ACPC (DAY 16) 26/20/40/9  Ppeak: 31  Pplateau: 28  Compliance: 24     Access: Right IJ, Right Radial Arterial Line   Consults: ID, GS, Nephrology   Drips: Fentanyl, Versed, Propofol, Nimbex   Nutrition: Tube Feeds  ABX: Cefazolin      - addition of thiamine, vitamin C, PO ZINC  - check D-dimer  - check Fibrinogen if platelets < 391M    - await Trach/PEG placement     Thank you for allowing me to participate in the care of this patient. Care reviewed with nursing staff, medical and surgical specialty care, primary care and the patient's family as available. Restraints are ordered when the patient can do harm to him/herself by pulling out devices. Critical Care Time: > 35 minutes excluding procedures    AMIRAH Watkins MD  9/7/2021  2:10 PM

## 2021-09-08 ENCOUNTER — APPOINTMENT (OUTPATIENT)
Dept: GENERAL RADIOLOGY | Age: 56
DRG: 005 | End: 2021-09-08
Payer: COMMERCIAL

## 2021-09-08 ENCOUNTER — ANESTHESIA (OUTPATIENT)
Dept: OPERATING ROOM | Age: 56
DRG: 005 | End: 2021-09-08
Payer: COMMERCIAL

## 2021-09-08 LAB
AADO2: 143.8 MMHG
ALBUMIN SERPL-MCNC: 2.5 G/DL (ref 3.5–5.2)
ALP BLD-CCNC: 96 U/L (ref 40–129)
ALT SERPL-CCNC: 28 U/L (ref 0–40)
ANION GAP SERPL CALCULATED.3IONS-SCNC: 8 MMOL/L (ref 7–16)
AST SERPL-CCNC: 48 U/L (ref 0–39)
B.E.: 1.8 MMOL/L (ref -3–3)
BASOPHILS ABSOLUTE: 0 E9/L (ref 0–0.2)
BASOPHILS RELATIVE PERCENT: 0 % (ref 0–2)
BILIRUB SERPL-MCNC: 0.3 MG/DL (ref 0–1.2)
BUN BLDV-MCNC: 17 MG/DL (ref 6–20)
C-REACTIVE PROTEIN: 22 MG/DL (ref 0–0.4)
CALCIUM SERPL-MCNC: 8.6 MG/DL (ref 8.6–10.2)
CHLORIDE BLD-SCNC: 102 MMOL/L (ref 98–107)
CO2: 27 MMOL/L (ref 22–29)
COHB: 1.4 % (ref 0–1.5)
CREAT SERPL-MCNC: 0.5 MG/DL (ref 0.7–1.2)
CRITICAL: ABNORMAL
DATE ANALYZED: ABNORMAL
DATE OF COLLECTION: ABNORMAL
EKG ATRIAL RATE: 84 BPM
EKG P AXIS: 47 DEGREES
EKG P-R INTERVAL: 158 MS
EKG Q-T INTERVAL: 392 MS
EKG QRS DURATION: 98 MS
EKG QTC CALCULATION (BAZETT): 463 MS
EKG T AXIS: 41 DEGREES
EKG VENTRICULAR RATE: 84 BPM
EOSINOPHILS ABSOLUTE: 0.56 E9/L (ref 0.05–0.5)
EOSINOPHILS RELATIVE PERCENT: 3.5 % (ref 0–6)
FIO2: 40 %
GFR AFRICAN AMERICAN: >60
GFR NON-AFRICAN AMERICAN: >60 ML/MIN/1.73
GLUCOSE BLD-MCNC: 126 MG/DL (ref 74–99)
HCO3: 25.2 MMOL/L (ref 22–26)
HCT VFR BLD CALC: 34.4 % (ref 37–54)
HEMOGLOBIN: 11.1 G/DL (ref 12.5–16.5)
HHB: 2.7 % (ref 0–5)
LAB: ABNORMAL
LYMPHOCYTES ABSOLUTE: 0.97 E9/L (ref 1.5–4)
LYMPHOCYTES RELATIVE PERCENT: 6.2 % (ref 20–42)
Lab: ABNORMAL
MAGNESIUM: 1.8 MG/DL (ref 1.6–2.6)
MCH RBC QN AUTO: 29.7 PG (ref 26–35)
MCHC RBC AUTO-ENTMCNC: 32.3 % (ref 32–34.5)
MCV RBC AUTO: 92 FL (ref 80–99.9)
METAMYELOCYTES RELATIVE PERCENT: 0.9 % (ref 0–1)
METER GLUCOSE: 119 MG/DL (ref 74–99)
METER GLUCOSE: 139 MG/DL (ref 74–99)
METER GLUCOSE: 141 MG/DL (ref 74–99)
METER GLUCOSE: 153 MG/DL (ref 74–99)
METER GLUCOSE: 178 MG/DL (ref 74–99)
METHB: 0.1 % (ref 0–1.5)
MODE: ABNORMAL
MONOCYTES ABSOLUTE: 0.8 E9/L (ref 0.1–0.95)
MONOCYTES RELATIVE PERCENT: 5.3 % (ref 2–12)
NEUTROPHILS ABSOLUTE: 13.69 E9/L (ref 1.8–7.3)
NEUTROPHILS RELATIVE PERCENT: 84.1 % (ref 43–80)
NUCLEATED RED BLOOD CELLS: 0 /100 WBC
O2 CONTENT: 16.7 ML/DL
O2 SATURATION: 97.3 % (ref 92–98.5)
O2HB: 95.8 % (ref 94–97)
OPERATOR ID: ABNORMAL
PATIENT TEMP: 37 C
PCO2: 35.4 MMHG (ref 35–45)
PDW BLD-RTO: 14.4 FL (ref 11.5–15)
PEEP/CPAP: 9 CMH2O
PFO2: 2.27 MMHG/%
PH BLOOD GAS: 7.47 (ref 7.35–7.45)
PHENYTOIN DOSE AMOUNT: ABNORMAL
PHENYTOIN LEVEL: 6.3 UG/ML (ref 10–20)
PHOSPHORUS: 4 MG/DL (ref 2.5–4.5)
PIP: 20 CMH2O
PLATELET # BLD: 179 E9/L (ref 130–450)
PMV BLD AUTO: 10.2 FL (ref 7–12)
PO2: 90.7 MMHG (ref 75–100)
POTASSIUM SERPL-SCNC: 4.2 MMOL/L (ref 3.5–5)
RBC # BLD: 3.74 E12/L (ref 3.8–5.8)
RI(T): 159 %
RR MECHANICAL: 26 B/MIN
SEDIMENTATION RATE, ERYTHROCYTE: 75 MM/HR (ref 0–15)
SODIUM BLD-SCNC: 137 MMOL/L (ref 132–146)
SOURCE, BLOOD GAS: ABNORMAL
THB: 12.3 G/DL (ref 11.5–16.5)
TIME ANALYZED: 611
TOTAL PROTEIN: 5.7 G/DL (ref 6.4–8.3)
WBC # BLD: 16.1 E9/L (ref 4.5–11.5)

## 2021-09-08 PROCEDURE — 36415 COLL VENOUS BLD VENIPUNCTURE: CPT

## 2021-09-08 PROCEDURE — 2000000000 HC ICU R&B

## 2021-09-08 PROCEDURE — 05H933Z INSERTION OF INFUSION DEVICE INTO RIGHT BRACHIAL VEIN, PERCUTANEOUS APPROACH: ICD-10-PCS | Performed by: FAMILY MEDICINE

## 2021-09-08 PROCEDURE — 2500000003 HC RX 250 WO HCPCS: Performed by: INTERNAL MEDICINE

## 2021-09-08 PROCEDURE — 87102 FUNGUS ISOLATION CULTURE: CPT

## 2021-09-08 PROCEDURE — 6370000000 HC RX 637 (ALT 250 FOR IP): Performed by: INTERNAL MEDICINE

## 2021-09-08 PROCEDURE — 71045 X-RAY EXAM CHEST 1 VIEW: CPT

## 2021-09-08 PROCEDURE — 87186 SC STD MICRODIL/AGAR DIL: CPT

## 2021-09-08 PROCEDURE — 2580000003 HC RX 258: Performed by: STUDENT IN AN ORGANIZED HEALTH CARE EDUCATION/TRAINING PROGRAM

## 2021-09-08 PROCEDURE — 94667 MNPJ CHEST WALL 1ST: CPT

## 2021-09-08 PROCEDURE — C1751 CATH, INF, PER/CENT/MIDLINE: HCPCS

## 2021-09-08 PROCEDURE — 85025 COMPLETE CBC W/AUTO DIFF WBC: CPT

## 2021-09-08 PROCEDURE — 2709999900 HC NON-CHARGEABLE SUPPLY: Performed by: INTERNAL MEDICINE

## 2021-09-08 PROCEDURE — 6360000002 HC RX W HCPCS: Performed by: SPECIALIST

## 2021-09-08 PROCEDURE — 2580000003 HC RX 258: Performed by: FAMILY MEDICINE

## 2021-09-08 PROCEDURE — 6360000002 HC RX W HCPCS: Performed by: STUDENT IN AN ORGANIZED HEALTH CARE EDUCATION/TRAINING PROGRAM

## 2021-09-08 PROCEDURE — 87015 SPECIMEN INFECT AGNT CONCNTJ: CPT

## 2021-09-08 PROCEDURE — 87205 SMEAR GRAM STAIN: CPT

## 2021-09-08 PROCEDURE — 76937 US GUIDE VASCULAR ACCESS: CPT

## 2021-09-08 PROCEDURE — 87077 CULTURE AEROBIC IDENTIFY: CPT

## 2021-09-08 PROCEDURE — 6360000002 HC RX W HCPCS: Performed by: INTERNAL MEDICINE

## 2021-09-08 PROCEDURE — 80185 ASSAY OF PHENYTOIN TOTAL: CPT

## 2021-09-08 PROCEDURE — 82962 GLUCOSE BLOOD TEST: CPT

## 2021-09-08 PROCEDURE — 82805 BLOOD GASES W/O2 SATURATION: CPT

## 2021-09-08 PROCEDURE — 99233 SBSQ HOSP IP/OBS HIGH 50: CPT | Performed by: FAMILY MEDICINE

## 2021-09-08 PROCEDURE — 36600 WITHDRAWAL OF ARTERIAL BLOOD: CPT

## 2021-09-08 PROCEDURE — 94003 VENT MGMT INPAT SUBQ DAY: CPT

## 2021-09-08 PROCEDURE — 2580000003 HC RX 258: Performed by: INTERNAL MEDICINE

## 2021-09-08 PROCEDURE — 94669 MECHANICAL CHEST WALL OSCILL: CPT

## 2021-09-08 PROCEDURE — 83735 ASSAY OF MAGNESIUM: CPT

## 2021-09-08 PROCEDURE — 89051 BODY FLUID CELL COUNT: CPT

## 2021-09-08 PROCEDURE — 36569 INSJ PICC 5 YR+ W/O IMAGING: CPT

## 2021-09-08 PROCEDURE — 3E1F88Z IRRIGATION OF RESPIRATORY TRACT USING IRRIGATING SUBSTANCE, VIA NATURAL OR ARTIFICIAL OPENING ENDOSCOPIC: ICD-10-PCS | Performed by: INTERNAL MEDICINE

## 2021-09-08 PROCEDURE — 84100 ASSAY OF PHOSPHORUS: CPT

## 2021-09-08 PROCEDURE — 93010 ELECTROCARDIOGRAM REPORT: CPT | Performed by: INTERNAL MEDICINE

## 2021-09-08 PROCEDURE — 87281 PNEUMOCYSTIS CARINII AG IF: CPT

## 2021-09-08 PROCEDURE — 87116 MYCOBACTERIA CULTURE: CPT

## 2021-09-08 PROCEDURE — 86140 C-REACTIVE PROTEIN: CPT

## 2021-09-08 PROCEDURE — 6360000002 HC RX W HCPCS: Performed by: FAMILY MEDICINE

## 2021-09-08 PROCEDURE — 87070 CULTURE OTHR SPECIMN AEROBIC: CPT

## 2021-09-08 PROCEDURE — 87206 SMEAR FLUORESCENT/ACID STAI: CPT

## 2021-09-08 PROCEDURE — C9113 INJ PANTOPRAZOLE SODIUM, VIA: HCPCS | Performed by: STUDENT IN AN ORGANIZED HEALTH CARE EDUCATION/TRAINING PROGRAM

## 2021-09-08 PROCEDURE — 80053 COMPREHEN METABOLIC PANEL: CPT

## 2021-09-08 PROCEDURE — 3609027000 HC BRONCHOSCOPY: Performed by: INTERNAL MEDICINE

## 2021-09-08 PROCEDURE — 6360000002 HC RX W HCPCS

## 2021-09-08 PROCEDURE — 87305 ASPERGILLUS AG IA: CPT

## 2021-09-08 PROCEDURE — 2500000003 HC RX 250 WO HCPCS: Performed by: STUDENT IN AN ORGANIZED HEALTH CARE EDUCATION/TRAINING PROGRAM

## 2021-09-08 PROCEDURE — 94640 AIRWAY INHALATION TREATMENT: CPT

## 2021-09-08 PROCEDURE — 36592 COLLECT BLOOD FROM PICC: CPT

## 2021-09-08 PROCEDURE — 85651 RBC SED RATE NONAUTOMATED: CPT

## 2021-09-08 RX ORDER — HYDRALAZINE HYDROCHLORIDE 20 MG/ML
20 INJECTION INTRAMUSCULAR; INTRAVENOUS EVERY 6 HOURS PRN
Status: DISCONTINUED | OUTPATIENT
Start: 2021-09-08 | End: 2021-09-20 | Stop reason: HOSPADM

## 2021-09-08 RX ORDER — LABETALOL HYDROCHLORIDE 5 MG/ML
20 INJECTION, SOLUTION INTRAVENOUS EVERY 6 HOURS PRN
Status: DISCONTINUED | OUTPATIENT
Start: 2021-09-08 | End: 2021-09-20 | Stop reason: HOSPADM

## 2021-09-08 RX ORDER — HYDRALAZINE HYDROCHLORIDE 20 MG/ML
INJECTION INTRAMUSCULAR; INTRAVENOUS
Status: COMPLETED
Start: 2021-09-08 | End: 2021-09-08

## 2021-09-08 RX ORDER — HEPARIN SODIUM (PORCINE) LOCK FLUSH IV SOLN 100 UNIT/ML 100 UNIT/ML
3 SOLUTION INTRAVENOUS PRN
Status: DISCONTINUED | OUTPATIENT
Start: 2021-09-08 | End: 2021-09-09

## 2021-09-08 RX ORDER — MAGNESIUM SULFATE IN WATER 40 MG/ML
2000 INJECTION, SOLUTION INTRAVENOUS ONCE
Status: COMPLETED | OUTPATIENT
Start: 2021-09-08 | End: 2021-09-08

## 2021-09-08 RX ORDER — HEPARIN SODIUM (PORCINE) LOCK FLUSH IV SOLN 100 UNIT/ML 100 UNIT/ML
3 SOLUTION INTRAVENOUS EVERY 12 HOURS SCHEDULED
Status: DISCONTINUED | OUTPATIENT
Start: 2021-09-08 | End: 2021-09-09

## 2021-09-08 RX ORDER — SODIUM CHLORIDE 0.9 % (FLUSH) 0.9 %
5-40 SYRINGE (ML) INJECTION EVERY 12 HOURS SCHEDULED
Status: DISCONTINUED | OUTPATIENT
Start: 2021-09-08 | End: 2021-09-09

## 2021-09-08 RX ORDER — SODIUM CHLORIDE 0.9 % (FLUSH) 0.9 %
5-40 SYRINGE (ML) INJECTION PRN
Status: DISCONTINUED | OUTPATIENT
Start: 2021-09-08 | End: 2021-09-09

## 2021-09-08 RX ORDER — LIDOCAINE HYDROCHLORIDE 10 MG/ML
5 INJECTION, SOLUTION EPIDURAL; INFILTRATION; INTRACAUDAL; PERINEURAL ONCE
Status: COMPLETED | OUTPATIENT
Start: 2021-09-08 | End: 2021-09-08

## 2021-09-08 RX ORDER — SODIUM CHLORIDE 9 MG/ML
25 INJECTION, SOLUTION INTRAVENOUS PRN
Status: DISCONTINUED | OUTPATIENT
Start: 2021-09-08 | End: 2021-09-09

## 2021-09-08 RX ORDER — HYDRALAZINE HYDROCHLORIDE 20 MG/ML
10 INJECTION INTRAMUSCULAR; INTRAVENOUS ONCE
Status: COMPLETED | OUTPATIENT
Start: 2021-09-08 | End: 2021-09-08

## 2021-09-08 RX ADMIN — Medication 200 MCG/HR: at 22:16

## 2021-09-08 RX ADMIN — HYDRALAZINE HYDROCHLORIDE 10 MG: 20 INJECTION INTRAMUSCULAR; INTRAVENOUS at 16:41

## 2021-09-08 RX ADMIN — ENOXAPARIN SODIUM 40 MG: 40 INJECTION SUBCUTANEOUS at 21:30

## 2021-09-08 RX ADMIN — MINERAL OIL AND PETROLATUM: 150; 830 OINTMENT OPHTHALMIC at 21:08

## 2021-09-08 RX ADMIN — SODIUM CHLORIDE SOLN NEBU 3% 4 ML: 3 NEBU SOLN at 08:07

## 2021-09-08 RX ADMIN — Medication 2000 MG: at 04:26

## 2021-09-08 RX ADMIN — IPRATROPIUM BROMIDE AND ALBUTEROL SULFATE 1 AMPULE: .5; 3 SOLUTION RESPIRATORY (INHALATION) at 22:39

## 2021-09-08 RX ADMIN — PROPOFOL 25 MCG/KG/MIN: 10 INJECTION, EMULSION INTRAVENOUS at 02:56

## 2021-09-08 RX ADMIN — CISATRACURIUM BESYLATE 5.5 MCG/KG/MIN: 10 INJECTION INTRAVENOUS at 12:19

## 2021-09-08 RX ADMIN — INSULIN GLARGINE 25 UNITS: 100 INJECTION, SOLUTION SUBCUTANEOUS at 21:42

## 2021-09-08 RX ADMIN — IPRATROPIUM BROMIDE AND ALBUTEROL SULFATE 1 AMPULE: .5; 3 SOLUTION RESPIRATORY (INHALATION) at 08:07

## 2021-09-08 RX ADMIN — MAGNESIUM SULFATE HEPTAHYDRATE 2000 MG: 40 INJECTION, SOLUTION INTRAVENOUS at 08:51

## 2021-09-08 RX ADMIN — MINERAL OIL AND PETROLATUM: 150; 830 OINTMENT OPHTHALMIC at 07:50

## 2021-09-08 RX ADMIN — IPRATROPIUM BROMIDE AND ALBUTEROL SULFATE 1 AMPULE: .5; 3 SOLUTION RESPIRATORY (INHALATION) at 03:52

## 2021-09-08 RX ADMIN — PROPOFOL 25 MCG/KG/MIN: 10 INJECTION, EMULSION INTRAVENOUS at 12:18

## 2021-09-08 RX ADMIN — Medication 2000 MG: at 20:59

## 2021-09-08 RX ADMIN — CISATRACURIUM BESYLATE 5.5 MCG/KG/MIN: 10 INJECTION INTRAVENOUS at 02:07

## 2021-09-08 RX ADMIN — INSULIN LISPRO 3 UNITS: 100 INJECTION, SOLUTION INTRAVENOUS; SUBCUTANEOUS at 00:22

## 2021-09-08 RX ADMIN — AMLODIPINE BESYLATE 10 MG: 10 TABLET ORAL at 07:49

## 2021-09-08 RX ADMIN — DEXAMETHASONE SODIUM PHOSPHATE 10 MG: 4 INJECTION, SOLUTION INTRA-ARTICULAR; INTRALESIONAL; INTRAMUSCULAR; INTRAVENOUS; SOFT TISSUE at 07:49

## 2021-09-08 RX ADMIN — MINERAL OIL AND PETROLATUM: 150; 830 OINTMENT OPHTHALMIC at 03:00

## 2021-09-08 RX ADMIN — LABETALOL HYDROCHLORIDE 10 MG: 5 INJECTION INTRAVENOUS at 09:42

## 2021-09-08 RX ADMIN — Medication 200 MCG/HR: at 02:45

## 2021-09-08 RX ADMIN — LABETALOL HYDROCHLORIDE 10 MG: 5 INJECTION INTRAVENOUS at 04:26

## 2021-09-08 RX ADMIN — LIDOCAINE HYDROCHLORIDE 1 ML: 10 INJECTION, SOLUTION EPIDURAL; INFILTRATION; INTRACAUDAL; PERINEURAL at 17:33

## 2021-09-08 RX ADMIN — Medication 300 UNITS: at 21:30

## 2021-09-08 RX ADMIN — PANTOPRAZOLE SODIUM 40 MG: 40 INJECTION, POWDER, FOR SOLUTION INTRAVENOUS at 07:49

## 2021-09-08 RX ADMIN — CHLORHEXIDINE GLUCONATE 0.12% ORAL RINSE 15 ML: 1.2 LIQUID ORAL at 21:08

## 2021-09-08 RX ADMIN — LABETALOL HYDROCHLORIDE 20 MG: 5 INJECTION INTRAVENOUS at 18:06

## 2021-09-08 RX ADMIN — PROPOFOL 25 MCG/KG/MIN: 10 INJECTION, EMULSION INTRAVENOUS at 08:15

## 2021-09-08 RX ADMIN — Medication 2000 MG: at 12:06

## 2021-09-08 RX ADMIN — CHLORHEXIDINE GLUCONATE 0.12% ORAL RINSE 15 ML: 1.2 LIQUID ORAL at 07:49

## 2021-09-08 RX ADMIN — Medication 200 MCG/HR: at 09:47

## 2021-09-08 RX ADMIN — MINERAL OIL AND PETROLATUM: 150; 830 OINTMENT OPHTHALMIC at 14:34

## 2021-09-08 RX ADMIN — IPRATROPIUM BROMIDE AND ALBUTEROL SULFATE 1 AMPULE: .5; 3 SOLUTION RESPIRATORY (INHALATION) at 13:39

## 2021-09-08 RX ADMIN — HYDRALAZINE HYDROCHLORIDE 10 MG: 20 INJECTION, SOLUTION INTRAMUSCULAR; INTRAVENOUS at 16:41

## 2021-09-08 RX ADMIN — IPRATROPIUM BROMIDE AND ALBUTEROL SULFATE 1 AMPULE: .5; 3 SOLUTION RESPIRATORY (INHALATION) at 19:47

## 2021-09-08 RX ADMIN — CISATRACURIUM BESYLATE 5.5 MCG/KG/MIN: 10 INJECTION INTRAVENOUS at 07:27

## 2021-09-08 RX ADMIN — PROPOFOL 25 MCG/KG/MIN: 10 INJECTION, EMULSION INTRAVENOUS at 03:42

## 2021-09-08 RX ADMIN — Medication 8 MG/HR: at 12:35

## 2021-09-08 RX ADMIN — SODIUM CHLORIDE SOLN NEBU 3% 4 ML: 3 NEBU SOLN at 19:46

## 2021-09-08 RX ADMIN — Medication 200 MCG/HR: at 15:37

## 2021-09-08 RX ADMIN — PHENYTOIN SODIUM 300 MG: 50 INJECTION INTRAMUSCULAR; INTRAVENOUS at 10:45

## 2021-09-08 RX ADMIN — SODIUM CHLORIDE, PRESERVATIVE FREE 10 ML: 5 INJECTION INTRAVENOUS at 21:11

## 2021-09-08 RX ADMIN — IPRATROPIUM BROMIDE AND ALBUTEROL SULFATE 1 AMPULE: .5; 3 SOLUTION RESPIRATORY (INHALATION) at 16:01

## 2021-09-08 RX ADMIN — Medication 10 ML: at 07:49

## 2021-09-08 RX ADMIN — IPRATROPIUM BROMIDE AND ALBUTEROL SULFATE 1 AMPULE: .5; 3 SOLUTION RESPIRATORY (INHALATION) at 00:30

## 2021-09-08 RX ADMIN — ATORVASTATIN CALCIUM 40 MG: 40 TABLET, FILM COATED ORAL at 21:45

## 2021-09-08 RX ADMIN — Medication 10 ML: at 21:08

## 2021-09-08 RX ADMIN — CISATRACURIUM BESYLATE 6 MCG/KG/MIN: 10 INJECTION INTRAVENOUS at 16:50

## 2021-09-08 RX ADMIN — PROPOFOL 20 MCG/KG/MIN: 10 INJECTION, EMULSION INTRAVENOUS at 20:49

## 2021-09-08 RX ADMIN — LISINOPRIL 10 MG: 10 TABLET ORAL at 07:49

## 2021-09-08 RX ADMIN — PHENYTOIN SODIUM 350 MG: 50 INJECTION INTRAMUSCULAR; INTRAVENOUS at 21:08

## 2021-09-08 RX ADMIN — CISATRACURIUM BESYLATE 6 MCG/KG/MIN: 10 INJECTION INTRAVENOUS at 20:50

## 2021-09-08 ASSESSMENT — PULMONARY FUNCTION TESTS
PIF_VALUE: 31
PIF_VALUE: 29
PIF_VALUE: 27
PIF_VALUE: 31
PIF_VALUE: 27
PIF_VALUE: 31
PIF_VALUE: 29
PIF_VALUE: 30
PIF_VALUE: 31
PIF_VALUE: 32
PIF_VALUE: 31
PIF_VALUE: 31
PIF_VALUE: 45
PIF_VALUE: 29
PIF_VALUE: 32
PIF_VALUE: 29
PIF_VALUE: 31
PIF_VALUE: 31
PIF_VALUE: 30
PIF_VALUE: 29
PIF_VALUE: 30
PIF_VALUE: 31
PIF_VALUE: 45
PIF_VALUE: 31
PIF_VALUE: 30
PIF_VALUE: 31
PIF_VALUE: 28
PIF_VALUE: 31
PIF_VALUE: 31
PIF_VALUE: 29
PIF_VALUE: 31
PIF_VALUE: 29
PIF_VALUE: 32

## 2021-09-08 ASSESSMENT — PAIN SCALES - GENERAL
PAINLEVEL_OUTOF10: 0

## 2021-09-08 NOTE — CARE COORDINATION
[facility] continues to follow. They can accept if patient remains stable s/p trach placement tomorrow. No precert will be required prior to discharge, managed medicaid waiver in place.

## 2021-09-08 NOTE — PROGRESS NOTES
non tender without mass   Pulmonary/Chest: per nursing assessment  Cardiovascular:  Per nursing assessment  Abdomen: soft, non-distended, normal bowel sounds  Extremities: unchanged  Neurologic: unchanged    Recent Labs     09/06/21  0515 09/07/21  0538 09/08/21  0545    138 137   K 4.6 4.4 4.2    102 102   CO2 29 28 27   BUN 23* 21* 17   CREATININE 0.4* 0.5* 0.5*   GLUCOSE 209* 199* 126*   CALCIUM 8.8 9.1 8.6       Recent Labs     09/06/21  0515 09/07/21  0538 09/08/21  0545   WBC 12.3* 16.1* 16.1*   RBC 3.87 3.93 3.74*   HGB 11.4* 11.7* 11.1*   HCT 36.5* 36.6* 34.4*   MCV 94.3 93.1 92.0   MCH 29.5 29.8 29.7   MCHC 31.2* 32.0 32.3   RDW 14.3 14.5 14.4    169 179   MPV 10.1 10.1 10.2     Radiology:   XR CHEST PORTABLE    Result Date: 9/8/2021  1. Patchy multifocal airspace disease persists throughout the right lung 2. Chronic complete opacification of left hemithorax. XR CHEST PORTABLE    Result Date: 9/7/2021  EXAMINATION: ONE XRAY VIEW OF THE CHEST 9/7/2021 5:19 pm COMPARISON: September 6, 2021 HISTORY: ORDERING SYSTEM PROVIDED HISTORY: poss pneumo TECHNOLOGIST PROVIDED HISTORY: Reason for exam:->poss pneumo FINDINGS: Endotracheal tube is 3.2 cm above the donna. NG tube courses below the level of the diaphragm. Complete opacification of left hemithorax. Interstitial and hazy opacities throughout right lung notable in right lung base. Right-sided central venous catheter is present with distal tip not clearly visualized. 1. Endotracheal tube is 3.2 cm above the donna 2. Complete opacification of left hemithorax with new opacities in previously aerated left upper lung field. US CHEST INCLUDING MEDIASTINUM    Result Date: 9/7/2021  EXAMINATION: ULTRASOUND OF THE CHEST 9/7/2021 1:26 pm COMPARISON: None.  HISTORY: ORDERING SYSTEM PROVIDED HISTORY: left side TECHNOLOGIST PROVIDED HISTORY: Reason for exam:->left side What reading provider will be dictating this exam?->CRC FINDINGS: Five ultrasound images of left pleural space shows trace left pleural effusion. Trace left pleural effusion demonstrated. Assessment:    Principal Problem:    Acute hypoxemic respiratory failure due to COVID-19 [facility])  Active Problems:    Type 2 diabetes mellitus without complication, without long-term current use of insulin (HCC)    History of seizures    Hyperlipidemia    Busch catheter problem (HCC)    Sepsis (HCC)    Thrombocytopenia (HCC)    Elevated LFTs  Resolved Problems:    OSCAR (acute kidney injury) ([facility] [address]    Lactic acidosis    Plan:  1. Acute respiratory failure with hypoxia - continue current support.  For tracheostomy today. 2.  COVID 19 pneumonia - completed Remdesivir; received Tocilizumab.  Continue Decadron, Vitamin C, Vitamin D and Zinc.  3.  T2DM - on insulin.  Receiving tube feedings.  Glucose POCT.  Trend labs and treat accordingly. 4.  VAP vs. Tracheobronchitis - continue Cefazolin, day 8/10.    5.  HTN - continue meds.  Monitor vitals and treat accordingly. 6.  HLP - continue meds. 7.  Seizure Disorder - continue meds. 8.  Elevated D dimer - continue Lovenox. NOTE: This report was transcribed using voice recognition software. Every effort was made to ensure accuracy; however, inadvertent computerized transcription errors may be present.     Electronically signed by Allie Mancera MD on 9/8/2021 at 7:58 AM

## 2021-09-08 NOTE — PROGRESS NOTES
Called wife Efrem Jones, updated her on patient and let her know patient is scheduled for trach/peg at  today.

## 2021-09-08 NOTE — PROGRESS NOTES
-- -- 99 26 93 %   09/08/21 0808 -- -- -- -- 25 93 %   09/08/21 0807 -- -- -- 97 26 93 %         Intake/Output Summary (Last 24 hours) at 9/8/2021 1401  Last data filed at 9/8/2021 1200  Gross per 24 hour   Intake 3702 ml   Output 4550 ml   Net -848 ml     Wt Readings from Last 2 Encounters:   09/08/21 (!) 319 lb 10.7 oz (145 kg)   08/17/21 (!) 320 lb (145.2 kg)     Body mass index is 43.35 kg/m².         PHYSICAL EXAMINATION:    General appearance - ill-appearing  Mental status -intubated sedated  Eyes - pupils equal and reactive, extraocular eye movements intact  Ears - bilateral TM's and external ear canals normal  Nose - normal and patent, no erythema, discharge or polyps  Mouth - mucous membranes moist, pharynx normal without lesions  Neck - supple, no significant adenopathy  Chest -intubated sedated, breath sounds improving slight rhonchi noted  heart - normal rate, regular rhythm, normal S1, S2, no murmurs, rubs, clicks or gallops  Abdomen - soft, nontender, nondistended, no masses or organomegaly  Neurological -intubated and sedated  Extremities - peripheral pulses normal, no pedal edema, no clubbing or cyanosis  Skin -patient did have skin breakdown to the face as well as blistering on the hands     Any additional physical findings:    MEDICATIONS:    Scheduled Meds:   phenytoin  350 mg IntraVENous Q12H    lisinopril  10 mg Oral Daily    insulin glargine  25 Units SubCUTAneous BID    ascorbic acid  1,000 mg Oral Daily    ceFAZolin  2,000 mg IntraVENous Q8H    sodium chloride (Inhalant)  4 mL Nebulization Q12H    dexamethasone  10 mg IntraVENous Daily    enoxaparin  40 mg SubCUTAneous BID    insulin lispro  0-18 Units SubCUTAneous Q6H    chlorhexidine  15 mL Mouth/Throat BID    Refresh Lacri-Lube   Ophthalmic Q6H    ipratropium-albuterol  1 ampule Inhalation Q4H    sodium chloride flush  10 mL IntraVENous BID    amLODIPine  10 mg Oral Daily    atorvastatin  40 mg Oral Daily    Vitamin D  2,000 Units Oral Daily    zinc sulfate  50 mg Oral Daily    pantoprazole  40 mg IntraVENous Daily     Continuous Infusions:   fentaNYL 5 mcg/ml in 0.9%  ml infusion 200 mcg/hr (09/08/21 0947)    cisatracurium (NIMBEX) infusion 5.5 mcg/kg/min (09/08/21 1219)    propofol 25 mcg/kg/min (09/08/21 1218)    midazolam 8 mg/hr (09/08/21 1235)    sodium chloride      dextrose       PRN Meds:   labetalol, 10 mg, Q6H PRN  sodium chloride (Inhalant), 4 mL, PRN  sodium chloride flush, 5-40 mL, PRN  sodium chloride, 25 mL, PRN  albuterol, 2.5 mg, Q6H PRN  benzonatate, 200 mg, TID PRN  glucose, 15 g, PRN  dextrose, 12.5 g, PRN  glucagon (rDNA), 1 mg, PRN  dextrose, 100 mL/hr, PRN  sodium chloride, 30 mL, PRN          VENT SETTINGS (Comprehensive) (if applicable):  Vent Information  $Ventilation: $Subsequent Day  Skin Assessment: Clean, dry, & intact  Equipment ID: [id number]  Equipment Changed: Suction catheter  Vent Type: [id number]  Vent Mode: AC/PC  Vt Ordered: 0 mL  Pressure Ordered: 20  Rate Set: 26 bmp  Peak Flow: 0 L/min  Pressure Support: 0 cmH20  FiO2 : 40 %  SpO2: 94 %  SpO2/FiO2 ratio: 235  Sensitivity: 3  PEEP/CPAP: 9  I Time/ I Time %: 0 s  Humidification Source: Heated wire  Humidification Temp: 37  Humidification Temp Measured: 37  Circuit Condensation: Drained  Mask Type: Full face mask  Mask Size: Medium  Additional Respiratory  Assessments  Pulse: 79  Resp: 25  SpO2: 94 %  Position: Semi-Sellers's  Humidification Source: Heated wire  Humidification Temp: 37  Circuit Condensation: Drained  Oral Care: Mouthwash with chlorhexidine, Mouth moisturizer, Mouth suctioned, Suction toothette  Subglottic Suction Done?: Yes  Airway Type: ET  Airway Size: 8  Cuff Pressure (cm H2O): 29 cm H2O    ABGs:     Laboratory findings:    Complete Blood Count:   Recent Labs     09/06/21  0515 09/07/21  0538 09/08/21  0545   WBC 12.3* 16.1* 16.1*   HGB 11.4* 11.7* 11.1*   HCT 36.5* 36.6* 34.4*    169 179        Last 3 Blood Glucose: Recent Labs     09/06/21  0515 09/07/21  0538 09/08/21  0545   GLUCOSE 209* 199* 126*        PT/INR:  No results found for: PROTIME, INR  PTT:  No results found for: APTT, PTT    Comprehensive Metabolic Profile:   Recent Labs     09/06/21  0515 09/06/21  0515 09/07/21  0538 09/07/21  0538 09/08/21  0545     --  138  --  137   K 4.6  --  4.4  --  4.2     --  102  --  102   CO2 29  --  28  --  27   BUN 23*  --  21*  --  17   CREATININE 0.4*  --  0.5*  --  0.5*   GLUCOSE 209*  --  199*  --  126*   CALCIUM 8.8  --  9.1  --  8.6   PROT 5.2*   < > 6.0*   < > 5.7*   LABALBU 2.9*   < > 2.8*   < > 2.5*   BILITOT 0.3   < > 0.3   < > 0.3   ALKPHOS 98   < > 109   < > 96   AST 63*   < > 55*   < > 48*   ALT 34   < > 35   < > 28    < > = values in this interval not displayed. Magnesium:   Lab Results   Component Value Date    MG 1.8 09/08/2021     Phosphorus:   Lab Results   Component Value Date    PHOS 4.0 09/08/2021     Ionized Calcium: No results found for: CAION     Urinalysis:     Troponin: No results for input(s): TROPONINI in the last 72 hours.       ASSESSMENT:     Patient Active Problem List    Diagnosis Date Noted    Elevated LFTs     Busch catheter problem ([facility] [address] 08/26/2021    Sepsis ([facility] [address] 08/26/2021    Thrombocytopenia ([facility] [address] 08/26/2021    Hyperlipidemia 08/22/2021    Acute hypoxemic respiratory failure due to COVID-19 [facility]) 08/21/2021    Type 2 diabetes mellitus without complication, without long-term current use of insulin ([facility] [address] 08/10/2021    History of seizures 08/10/2021     SYSTEMS ASSESSMENT    Neuro     Intubation, sedated, paralyzed  Propofol  Versed  Nimbex  Fentanyl  Continue to monitor      Respiratory     Acute hypoxic respiratory failure requiring mechanical ventilation  Severe COVID-19 pneumonia s/p toe C, remdesivir  Pneumonia VAP-MSSA, E. coli  Ancef-day 5  Trach/PEG when oxygen garments improve  Decadron 10 mg  Plan for trach/PEG tomorrow  Ultrasound for evaluation of pleural effusion today  Bronchoscopy 9/8/2021 due to white out left side chest on x-ray. Wean oxygen as tolerated. Keep O2 sat 90-92%    Cardiovascular     Hypertension  Amlodipine-10 mg  Lisinopril-10 mg  Lipitor 40 mg    Gastrointestinal     GI prophylaxis-Protonix  Bowel regimen  Continue monitor    Renal     OSCAR-improved  Diuretic on hold  Free water flushes 100 cc every 3 hours  Avoid nephrotoxins  Continue to monitor     Infectious Disease     Pneumonia VAP-positive for MSSA, E. coli  Ancef  lactic acidosis  Infectious disease following    Hematology/Oncology     Anemia  Hemoglobin stable  Continue to monitor    Endocrine     Hyperglycemia  Lantus 25 units  High-dose sliding scale    Social/Spiritual/DNR/Other     Full code  vitamin regimen  DVT prophylaxis Lovenox 40 every 12    Plan-plan for bronc today, trach and PEG tomorrow    The patient was seen and evaluated by myself and Priyanka Gibbs MD PGY-2  9/8/2021 2:01 PM      Attending Physician Attestation: Dr. Miles Samuels    Thank you very much for allowing me to see this patient in consultation and follow up. I personally saw, examined and provided care for the patient. Radiographs, labs and medication list were reviewed by me independently. I spoke with bedside nursing, respiratory therapists and consultants. Critical care services and times documented are independent of procedures and multidisciplinary rounds with Residents. Additionally comprehensive, multidisciplinary rounds were conducted with the MICU team. The case was discussed in detail and plans for care were established. Review of Residents documentation was conducted and revisions were made as appropriate. I agree with the the above documented information.      Physical Examination:  Vitals:   Vitals:    09/08/21 1600 09/08/21 1601 09/08/21 1606 09/08/21 1612   BP: (!) 202/94      Pulse: 104  93 100   Resp: 22 9 22 21   Temp: 99.5 °F (37.5 °C)      TempSrc: Bladder      SpO2: 100% 99% 100% 96%   Weight:       Height:          General: No Acute Distress  HEENT: normocephalic, atruamatic  CVS: RRR, S1, S2, No S3 or S4  Respiratory: decreased breath sounds at lung bases, no focal wheezes noted  Extremities: no clubbing/cyanosis/edema     ASSESSMENT:  · Severe Covid pneumonia causing acute respiratory failure CRP has come down nicely.  Completed Remdesivir.  Received Tocilizumab  · VAP versus tracheobronchitis.  Cultures growing MSSA and E. coli.  Improving  · Leukocytosis associated to the above.    · Lactic Acidosis   · Left Pleural Effusion/Atelectasis - s/p bronchoscopy with evaluation of copious mucous plugging 9/8/2021      In addition the following applies:     Check: N/A  Medication Alterations: lisinopril to 10 mg PO q day   Procedures: bronchoscopy 9/8/2021, trach/PEG 9/9/2021   - left pleural US noted not enough fluid to tap for thoracentesis/chest tube   Imaging: reviewed, Left Pleural US  New Consultations: N/A  VENT: ACPC (DAY 17) 26/20/40/9  Ppeak: 31  Pplateau: 28  TZOEALYOMB: 35     Access: Right IJ, Right Radial Arterial Line   Consults: ID, GS, Nephrology   Drips: Fentanyl, Versed, Propofol, Nimbex   Nutrition: Tube Feeds  ABX: Cefazolin      - addition of thiamine, vitamin C, PO ZINC  - check D-dimer  - check Fibrinogen if platelets < 775M     - await Trach/PEG placement 9/9/2021 as discussed with general surgery   - bronchoscopy 9/8/2021 noted copious thick white mucous plugging bibasilar regions L > R    Thank you for allowing me to participate in the care of this patient. Care reviewed with nursing staff, medical and surgical specialty care, primary care and the patient's family as available. Restraints are ordered when the patient can do harm to him/herself by pulling out devices. Critical Care Time: > 35 minutes excluding procedures    Néstor Brady M.D.     Néstor Brady MD  9/8/2021  4:40 PM

## 2021-09-08 NOTE — PROGRESS NOTES
Pulmonary Subsequent Hospital F/U note    Patient is being followed for: acute hypoxic respiratory failure, severe covid-19 pneumonia     Interval HPI:  Remains intubated and sedated  He is also paralyzed   Vent settings 40% Pi 20, RR 26, PEEP 9      ROS:  Unable to obtain     Exam:  BP (!) 170/80   Pulse 89   Temp 99 °F (37.2 °C) (Bladder)   Resp 26   Ht 6' (1.829 m)   Wt (!) 319 lb 10.7 oz (145 kg)   SpO2 94%   BMI 43.35 kg/m²    Due to the current efforts to prevent transmission of COVID-19 and also the need to preserve PPE for other caregivers, a face-to-face encounter with the patient was not performed. That being said, all relevant records and diagnostic tests were reviewed, including laboratory results and imaging. Please reference any relevant documentation elsewhere. Care will be coordinated with the primary service. Data:    Oximetry:  SpO2 Readings from Last 1 Encounters:   09/08/21 94%       Imaging personally reviewed by myself:  CXR     Impression   1. Patchy multifocal airspace disease persists throughout the right lung. 2. Chronic complete opacification of left hemithorax.           Respiratory culture MSSA, Ecoli     Pertinent labs reviewed and noted:  Lab Results   Component Value Date    WBC 16.1 09/08/2021    HGB 11.1 09/08/2021    HCT 34.4 09/08/2021    MCV 92.0 09/08/2021    MCH 29.7 09/08/2021    MCHC 32.3 09/08/2021    RDW 14.4 09/08/2021     09/08/2021    MPV 10.2 09/08/2021     Lab Results   Component Value Date     09/08/2021    K 4.2 09/08/2021    K 3.7 08/21/2021     09/08/2021    CO2 27 09/08/2021    BUN 17 09/08/2021    CREATININE 0.5 09/08/2021    LABALBU 2.5 09/08/2021    CALCIUM 8.6 09/08/2021    GFRAA >60 09/08/2021    LABGLOM >60 09/08/2021     Assessment:  1. Acute hypoxic respiratory failure  2. Severe covid-19 pneumonia  3. Bacterial pneumonia with MSSA and Ecoli   4. Left pleural effusion    Plan:  1. Trache PEG today 9/8/21  2.  Completed Remdesivir and Tocilizumab  3. Antibiotics as per ID  4. Will add metaneb and chest physio if able  - likely will require a bronchoscopy. Keep left side up. 5. Continue decadron  6. DVT prophylaxis   7.  Would stop the paralytic      Electronically signed by Yvette Velasco MD on 9/8/2021 at 12:13 PM

## 2021-09-08 NOTE — PROGRESS NOTES
[facility]  [facility]  Progress Note      Chief Complaint   Patient presents with    Shortness of Breath     covid, per family SaO2 75% RA, hx of DM and panic attack        SUBJECTIVE:  8/21/21  Prone. FiO2 60% and PEEP of 16    8/22/2021  Patient is tolerating medications. No reported adverse drug reactions. No nausea, vomiting, diarrhea. Afebrile BiPAP 100% FiO2 with breathing at 44 times a minute and probably is going to decompensate  8/23/2021  Not doing well intubated on a rotating bed  FiO2 70% and PEEP 16  8/24/2021  Prone 50% FiO2-PEEP of 10  Issues with the urinary Busch last night this was changed  Paralyzed and on Versed  8/25/2021  Paralyzed and sedated with Versed no pressors  Prone intubated on 50% FiO2, PEEP of 10  Tolerating medications    8/26/21  Paralyzed and sedated with Versed no pressors  Prone intubated on 70% FiO2, PEEP of 10  Does not tolerate supine position    8/27/2021  Afebrile  Still prone on FiO2 of 100%  Paralyzed and sedated    8/28/2021  The patient is still in the ICU. He is pronated. FiO2 100%. Is still sedated and paralyzed. Fair amount of thick, brown secretions    8/29/2021  The patient is still on a RotoProne bed. You are still intubated, sedated and paralyzed. O2 is being weaned down. FiO2 55%. PEEP 12.    8/30/2021  Patient remains intubated, sedated and paralyzed. He is still on a RotoProne bed. Supine now. 8/31/2021  He is still on a RotoProne bed. Still having fair amount of secretions from the nostrils. Minimal from the ET tube, however. Tube feeding seems to be coming out of his nose. 9/1/2021  He is supinated but still on a RotoProne bed. He still has fair amount of secretions from the ET tube. Tolerating antibiotic    9/2/2021. He is still on a RotoProne bed. He is pronated. Less secretions from the ET tube and nostrils. FiO2 50%. PEEP 14.    9/3/2021. He is off the RotoProne bed. Tolerating supination.   Tolerating weaning. PEEP was brought down to 12. FiO2 still at 50%. Tolerating antibiotics. 9/4/2021. He is still in the ICU. He is still on a ventilator. He is still paralyzed. 9/5/2021. He had an episode of acute desaturation and had to be bagged. He is now back on the ventilator. He is still sedated and paralyzed. 9/6/2021. The patient still in the ICU. He is still requiring paralytics. Secretions are becoming thinner. No fever. 9/7/2021. The patient still on a ventilator and paralyzed. No new problems reported. 9/8/2021. Patient is still on the ventilator, paralyzed. No fever. Tolerating medications. Review of systems:  As stated above in the chief complaint, otherwise negative.     Medications:  Scheduled Meds:   magnesium sulfate  2,000 mg IntraVENous Once    lisinopril  10 mg Oral Daily    insulin glargine  25 Units SubCUTAneous BID    ascorbic acid  1,000 mg Oral Daily    ceFAZolin  2,000 mg IntraVENous Q8H    sodium chloride (Inhalant)  4 mL Nebulization Q12H    dexamethasone  10 mg IntraVENous Daily    enoxaparin  40 mg SubCUTAneous BID    insulin lispro  0-18 Units SubCUTAneous Q6H    phenytoin  300 mg IntraVENous Q12H    chlorhexidine  15 mL Mouth/Throat BID    Refresh Lacri-Lube   Ophthalmic Q6H    ipratropium-albuterol  1 ampule Inhalation Q4H    sodium chloride flush  10 mL IntraVENous BID    amLODIPine  10 mg Oral Daily    atorvastatin  40 mg Oral Daily    Vitamin D  2,000 Units Oral Daily    zinc sulfate  50 mg Oral Daily    pantoprazole  40 mg IntraVENous Daily     Continuous Infusions:   fentaNYL 5 mcg/ml in 0.9%  ml infusion 200 mcg/hr (09/08/21 0245)    cisatracurium (NIMBEX) infusion 5.5 mcg/kg/min (09/08/21 0727)    propofol 25 mcg/kg/min (09/08/21 0815)    midazolam 8 mg/hr (09/07/21 0099)    sodium chloride      dextrose       PRN Meds:labetalol, sodium chloride (Inhalant), sodium chloride flush, sodium chloride, albuterol, benzonatate, glucose, dextrose, glucagon (rDNA), dextrose, sodium chloride    OBJECTIVE:  BP (!) 180/86   Pulse 99   Temp 99.9 °F (37.7 °C) (Bladder)   Resp 26   Ht 6' (1.829 m)   Wt (!) 319 lb 10.7 oz (145 kg)   SpO2 93%   BMI 43.35 kg/m²   Temp  Av.3 °F (37.4 °C)  Min: 99 °F (37.2 °C)  Max: 99.9 °F (37.7 °C)  Constitutional: The patient is intubated, sedated, paralyzed. He is supine. FiO2 40%. PEEP 9. No changes. Skin: Warm and dry. No rashes were noted. HEENT: Round and nonreactive pupils. No thrush. ET tube. NG tube. Decubitus lesions on cheeks, hands and lower extremities noted. Neck: Supple. Chest: Coarse breath sounds bilaterally. No crackles  Cardiovascular: Heart sounds rhythmic and regular. Tachycardic. Abdomen: Round, soft and benign to palpation. Genitourinary: Busch catheter  Extremities: Moderate edema. Blistering of hands and feet. Some are unroofed. Lines: Right IJ triple-lumen catheter 2021 (changed over a guidewire).   Busch changed 3/24/2021    Laboratory and Tests Review:  Lab Results   Component Value Date    WBC 16.1 (H) 2021    WBC 16.1 (H) 2021    WBC 12.3 (H) 2021    HGB 11.1 (L) 2021    HCT 34.4 (L) 2021    MCV 92.0 2021     2021     Lab Results   Component Value Date    NEUTROABS 13.69 (H) 2021    NEUTROABS 12.15 (H) 2021    NEUTROABS 9.82 (H) 2021     No results found for: [facility]  Lab Results   Component Value Date    ALT 28 2021    AST 48 (H) 2021    ALKPHOS 96 2021    BILITOT 0.3 2021     Lab Results   Component Value Date     2021    K 4.2 2021    K 3.7 2021     2021    CO2 27 2021    BUN 17 2021    CREATININE 0.5 2021    CREATININE 0.5 2021    CREATININE 0.4 2021    GFRAA >60 2021    LABGLOM >60 2021    GLUCOSE 126 2021    PROT 5.7 2021    LABALBU 2.5 2021    CALCIUM 8.6 09/08/2021    BILITOT 0.3 09/08/2021    ALKPHOS 96 09/08/2021    AST 48 09/08/2021    ALT 28 09/08/2021     Lab Results   Component Value Date    CRP 22.0 (H) 09/08/2021    CRP 16.2 (H) 09/07/2021    CRP 12.0 (H) 09/06/2021     Lab Results   Component Value Date    SEDRATE 75 (H) 09/08/2021    SEDRATE 68 (H) 09/07/2021    SEDRATE 50 (H) 09/06/2021     Radiology:      Microbiology:   Streptococcus pneumoniae/Legionella urine Ag: negative  Nares screen MRSA: Negative  Urine culture 8/24/2021: Negative  Blood cultures 8/21/2021: Negative x2  Respiratory culture 8/27/2021: OP terrell reduced  Respiratory culture 8/30/2021: E. coli, MSSA     ASSESSMENT:  · Severe Covid pneumonia causing acute respiratory failure CRP has come down nicely. Completed Remdesivir. Received Tocilizumab  · VAP versus tracheobronchitis. Cultures growing MSSA and E. coli.   Improving  · Leukocytosis associated to the above, stable    PLAN:  · Continue Cefazolin, day 8 of 10  · Supportive care by critical care  · Watch WBCs  · Please change lines  · We will follow with you    Spoke with ICU team    Abner Wang MD  9:41 AM   9/8/2021

## 2021-09-08 NOTE — OP NOTE
[address] [place] [address]    Pulmonary/critical care procedure note    Flexible Fiberoptic Bronchoscopy NOTE    Patient: Michelle Russell    MRN: [id number]  : 1965    Date: 2021  Time: 4:15 PM     Primary Care Physician:      Tiarra Mello [address], DO     [phone]     [phone]    Laboratory Work:  No results found for: INR, PROTIME  Lab Results   Component Value Date    WBC 16.1 (H) 2021    HGB 11.1 (L) 2021    HCT 34.4 (L) 2021    MCV 92.0 2021     2021    LYMPHOPCT 6.2 (L) 2021    RBC 3.74 (L) 2021    MCH 29.7 2021    MCHC 32.3 2021    RDW 14.4 2021    NEUTOPHILPCT 84.1 (H) 2021    MONOPCT 5.3 2021    BASOPCT 0.0 2021    NEUTROABS 13.69 (H) 2021    LYMPHSABS 0.97 (L) 2021    MONOSABS 0.80 2021    EOSABS 0.56 (H) 2021    BASOSABS 0.00 2021     Lab Results   Component Value Date     2021    K 4.2 2021    K 3.7 2021     2021    CO2 27 2021    BUN 17 2021    CREATININE 0.5 2021    GLUCOSE 126 2021    CALCIUM 8.6 2021      Attending Physician: Dr. Andres Arellano    Assistant(s): Endoscopy     Pre-Operative Medications: Nimbex, Fentanyl, Propofol Drips while intubated on ACVC mechanical ventilation     Intra-Operative Medications: NONE    Anesthesia: Deep Sedation with Nimbex, Fentanyl, Propofol Drips while intubated on ACVC mechanical ventilation     Pre-Procedure Diagnosis: Left Lung Atelectasis     Operation/Procedure: Flexible Fiberoptic Bronchoscopy, Central Airway Lung Washes     Post-Procedure Diagnosis: Mucous Plugging, Entire Left Lung and Right Lung Base    Consent: Consent was obtained prior to procedure. Indications, risks, benefits were explained at length.     Indications: Pneumonia, Mucous Plugging    Purpose: Diagnostic, Therapeutic    Procedure Summary:   A time out was performed to confirm both patient and procedure. I had worn a gown with hat, mask, gloves prior to initiation of procedure. The patient was pre-medicated with Nimbex, Fentanyl, Propofol Drips while intubated on ACVC mechanical ventilation. Intra-operatively, no additional medications were given during procedure. Bronchoscope was introduced via adapter at end of ET tube. The trachea was inspected and found to be normal.  The main donna was sharp.     - The right bronchial tree was inspected and the following were noted:   [a] Right Upper Lobe contained three segments [apical segmental bronchus, posterior segmental bronchus, and anterior segmental bronchus] and was found to be erythematous with copious thick white mucous plugging noted. [b] Bronchus Intermedius was examined and found to be erythematous with copious thick white mucous plugging noted. [c] Right Middle Lobe had 2 segments [lateral segmental bronchus and medial segmental bronchus] and was found to be erythematous with copious thick white mucous plugging noted. [d] Right Lower Lobe including apical segment and basal segmental branches [anterior basal branch, lateral basal branch, and posterior basal branch] was also inspected to the sub-segmental levels and was found to be erythematous with copious thick white mucous plugging noted. [e] The right bronchial tree was found to be without stenosis, circumferential narrowing, intrinsic compressing mass, extrinsic compressing mass, obstruction in the area of the RUL, RML, RLL.     - The left bronchial tree was inspected and the following were noted:   [a] Left Upper Lobe including the apical posterior segmental bronchus and the anterior segmental bronchus were inspected and found to be erythematous with copious thick white mucous plugging noted. [b] Lingula including the superior, inferior bronchopulmonary segments were inspected and found to be erythematous with copious thick white mucous plugging noted.    [c] Left Lower Lobe with superior segment was inspected to sub-segmental level [anterior basal segment, lateral basal segment, and posterior basal segment] and found to be erythematous with copious thick white mucous plugging noted. [d] The left bronchial tree was found to be without stenosis, circumferential narrowing, intrinsic compressing mass, extrinsic compressing mass, obstruction in the area of the DARY, LINGULA, LLL. Procedures Completed: Flexible Fiberoptic Bronchoscopy, Central Airway Lung Washes     Patient tolerated the post-procedure recovery. During the endoscopic procedure there were no/some tendencies towards: desaturations, hypotension, hypertension, bradycardia, tachycardia, dysrhythmia.     Subsequent chest x-ray was orderd post endoscopic procedure     Estimated Blood Loss: NONE    Complications: NONE    Tolerance: Excellent     Fanny Ellison MD on 9/8/2021 at 4:15 PM

## 2021-09-08 NOTE — CARE COORDINATION
9/8/2021  Social Work Discharge Planning:COVID POS . [facility] is following. Completed remdesivir. Trache/PEG today.  Electronically signed by MADDIE Gonzales on 9/8/2021 at 9:11 AM

## 2021-09-09 ENCOUNTER — APPOINTMENT (OUTPATIENT)
Dept: GENERAL RADIOLOGY | Age: 56
DRG: 005 | End: 2021-09-09
Payer: COMMERCIAL

## 2021-09-09 VITALS
RESPIRATION RATE: 22 BRPM | SYSTOLIC BLOOD PRESSURE: 91 MMHG | DIASTOLIC BLOOD PRESSURE: 54 MMHG | OXYGEN SATURATION: 100 %

## 2021-09-09 LAB
AADO2: 153.2 MMHG
ALBUMIN SERPL-MCNC: 2.6 G/DL (ref 3.5–5.2)
ALP BLD-CCNC: 91 U/L (ref 40–129)
ALT SERPL-CCNC: 26 U/L (ref 0–40)
ANION GAP SERPL CALCULATED.3IONS-SCNC: 9 MMOL/L (ref 7–16)
APPEARANCE FLUID: ABNORMAL
AST SERPL-CCNC: 33 U/L (ref 0–39)
B.E.: 1.5 MMOL/L (ref -3–3)
BASO FLUID: 0 %
BASOPHILS ABSOLUTE: 0.07 E9/L (ref 0–0.2)
BASOPHILS RELATIVE PERCENT: 0.4 % (ref 0–2)
BILIRUB SERPL-MCNC: 0.3 MG/DL (ref 0–1.2)
BUN BLDV-MCNC: 14 MG/DL (ref 6–20)
C-REACTIVE PROTEIN: 27.4 MG/DL (ref 0–0.4)
CALCIUM SERPL-MCNC: 8.7 MG/DL (ref 8.6–10.2)
CELL COUNT FLUID TYPE: ABNORMAL
CHLORIDE BLD-SCNC: 100 MMOL/L (ref 98–107)
CO2: 28 MMOL/L (ref 22–29)
COHB: 1.3 % (ref 0–1.5)
COLOR FLUID: ABNORMAL
CREAT SERPL-MCNC: 0.4 MG/DL (ref 0.7–1.2)
CRITICAL: ABNORMAL
DATE ANALYZED: ABNORMAL
DATE OF COLLECTION: ABNORMAL
EOSINOPHIL FLUID: 0 %
EOSINOPHILS ABSOLUTE: 0.41 E9/L (ref 0.05–0.5)
EOSINOPHILS RELATIVE PERCENT: 2.3 % (ref 0–6)
FIO2: 40 %
GFR AFRICAN AMERICAN: >60
GFR NON-AFRICAN AMERICAN: >60 ML/MIN/1.73
GLUCOSE BLD-MCNC: 130 MG/DL (ref 74–99)
HCO3: 25.6 MMOL/L (ref 22–26)
HCT VFR BLD CALC: 37.3 % (ref 37–54)
HEMOGLOBIN: 11.8 G/DL (ref 12.5–16.5)
HHB: 4.8 % (ref 0–5)
IMMATURE GRANULOCYTES #: 0.7 E9/L
IMMATURE GRANULOCYTES %: 3.9 % (ref 0–5)
LAB: ABNORMAL
LYMPHOCYTES ABSOLUTE: 1.12 E9/L (ref 1.5–4)
LYMPHOCYTES RELATIVE PERCENT: 6.3 % (ref 20–42)
LYMPHOCYTES, BODY FLUID: 3 %
Lab: ABNORMAL
MAGNESIUM: 1.9 MG/DL (ref 1.6–2.6)
MCH RBC QN AUTO: 29.4 PG (ref 26–35)
MCHC RBC AUTO-ENTMCNC: 31.6 % (ref 32–34.5)
MCV RBC AUTO: 93 FL (ref 80–99.9)
METER GLUCOSE: 131 MG/DL (ref 74–99)
METER GLUCOSE: 134 MG/DL (ref 74–99)
METER GLUCOSE: 160 MG/DL (ref 74–99)
METER GLUCOSE: 161 MG/DL (ref 74–99)
METHB: 0 % (ref 0–1.5)
MODE: AC
MONOCYTE, FLUID: 6 %
MONOCYTES ABSOLUTE: 1.41 E9/L (ref 0.1–0.95)
MONOCYTES RELATIVE PERCENT: 7.9 % (ref 2–12)
NEUTROPHIL, FLUID: 91 %
NEUTROPHILS ABSOLUTE: 14.03 E9/L (ref 1.8–7.3)
NEUTROPHILS RELATIVE PERCENT: 79.2 % (ref 43–80)
NUCLEATED CELLS FLUID: ABNORMAL /UL
O2 CONTENT: 17.1 ML/DL
O2 SATURATION: 95.1 % (ref 92–98.5)
O2HB: 93.9 % (ref 94–97)
OPERATOR ID: 101
PATIENT TEMP: 37 C
PCO2: 38.8 MMHG (ref 35–45)
PDW BLD-RTO: 14.6 FL (ref 11.5–15)
PEEP/CPAP: 8 CMH2O
PFO2: 1.94 MMHG/%
PH BLOOD GAS: 7.44 (ref 7.35–7.45)
PHOSPHORUS: 3.4 MG/DL (ref 2.5–4.5)
PLATELET # BLD: 206 E9/L (ref 130–450)
PMV BLD AUTO: 9.9 FL (ref 7–12)
PNEUMOCYSTIS DFA: NORMAL
PO2: 77.4 MMHG (ref 75–100)
POTASSIUM SERPL-SCNC: 4.1 MMOL/L (ref 3.5–5)
RBC # BLD: 4.01 E12/L (ref 3.8–5.8)
RBC FLUID: ABNORMAL /UL
RI(T): 198 %
RR MECHANICAL: 22 B/MIN
SEDIMENTATION RATE, ERYTHROCYTE: 111 MM/HR (ref 0–15)
SODIUM BLD-SCNC: 137 MMOL/L (ref 132–146)
SOURCE, BLOOD GAS: ABNORMAL
THB: 12.9 G/DL (ref 11.5–16.5)
TIME ANALYZED: 707
TOTAL PROTEIN: 6 G/DL (ref 6.4–8.3)
VT MECHANICAL: 460 ML
WBC # BLD: 17.7 E9/L (ref 4.5–11.5)

## 2021-09-09 PROCEDURE — 2720000001 HC MISC SURG SUPPLY STERILE $51-500: Performed by: SURGERY

## 2021-09-09 PROCEDURE — 99233 SBSQ HOSP IP/OBS HIGH 50: CPT | Performed by: FAMILY MEDICINE

## 2021-09-09 PROCEDURE — 5A1955Z RESPIRATORY VENTILATION, GREATER THAN 96 CONSECUTIVE HOURS: ICD-10-PCS | Performed by: INTERNAL MEDICINE

## 2021-09-09 PROCEDURE — 0DH63UZ INSERTION OF FEEDING DEVICE INTO STOMACH, PERCUTANEOUS APPROACH: ICD-10-PCS | Performed by: SURGERY

## 2021-09-09 PROCEDURE — 6370000000 HC RX 637 (ALT 250 FOR IP): Performed by: SURGERY

## 2021-09-09 PROCEDURE — 6370000000 HC RX 637 (ALT 250 FOR IP): Performed by: INTERNAL MEDICINE

## 2021-09-09 PROCEDURE — 83735 ASSAY OF MAGNESIUM: CPT

## 2021-09-09 PROCEDURE — 85651 RBC SED RATE NONAUTOMATED: CPT

## 2021-09-09 PROCEDURE — 2500000003 HC RX 250 WO HCPCS: Performed by: INTERNAL MEDICINE

## 2021-09-09 PROCEDURE — 2580000003 HC RX 258: Performed by: STUDENT IN AN ORGANIZED HEALTH CARE EDUCATION/TRAINING PROGRAM

## 2021-09-09 PROCEDURE — 2500000003 HC RX 250 WO HCPCS: Performed by: SURGERY

## 2021-09-09 PROCEDURE — 82962 GLUCOSE BLOOD TEST: CPT

## 2021-09-09 PROCEDURE — 2580000003 HC RX 258: Performed by: SURGERY

## 2021-09-09 PROCEDURE — 6360000002 HC RX W HCPCS: Performed by: SURGERY

## 2021-09-09 PROCEDURE — 2580000003 HC RX 258: Performed by: INTERNAL MEDICINE

## 2021-09-09 PROCEDURE — 6360000002 HC RX W HCPCS: Performed by: STUDENT IN AN ORGANIZED HEALTH CARE EDUCATION/TRAINING PROGRAM

## 2021-09-09 PROCEDURE — 0BH18EZ INSERTION OF ENDOTRACHEAL AIRWAY INTO TRACHEA, VIA NATURAL OR ARTIFICIAL OPENING ENDOSCOPIC: ICD-10-PCS | Performed by: INTERNAL MEDICINE

## 2021-09-09 PROCEDURE — 6370000000 HC RX 637 (ALT 250 FOR IP)

## 2021-09-09 PROCEDURE — 6360000002 HC RX W HCPCS: Performed by: SPECIALIST

## 2021-09-09 PROCEDURE — 3700000001 HC ADD 15 MINUTES (ANESTHESIA): Performed by: SURGERY

## 2021-09-09 PROCEDURE — 6360000002 HC RX W HCPCS: Performed by: INTERNAL MEDICINE

## 2021-09-09 PROCEDURE — 36415 COLL VENOUS BLD VENIPUNCTURE: CPT

## 2021-09-09 PROCEDURE — 36592 COLLECT BLOOD FROM PICC: CPT

## 2021-09-09 PROCEDURE — 36600 WITHDRAWAL OF ARTERIAL BLOOD: CPT

## 2021-09-09 PROCEDURE — 2000000000 HC ICU R&B

## 2021-09-09 PROCEDURE — 0B113F4 BYPASS TRACHEA TO CUTANEOUS WITH TRACHEOSTOMY DEVICE, PERCUTANEOUS APPROACH: ICD-10-PCS | Performed by: SURGERY

## 2021-09-09 PROCEDURE — 2709999900 HC NON-CHARGEABLE SUPPLY: Performed by: SURGERY

## 2021-09-09 PROCEDURE — 84100 ASSAY OF PHOSPHORUS: CPT

## 2021-09-09 PROCEDURE — 80053 COMPREHEN METABOLIC PANEL: CPT

## 2021-09-09 PROCEDURE — 94640 AIRWAY INHALATION TREATMENT: CPT

## 2021-09-09 PROCEDURE — 6360000002 HC RX W HCPCS: Performed by: NURSE ANESTHETIST, CERTIFIED REGISTERED

## 2021-09-09 PROCEDURE — 3600000012 HC SURGERY LEVEL 2 ADDTL 15MIN: Performed by: SURGERY

## 2021-09-09 PROCEDURE — 85025 COMPLETE CBC W/AUTO DIFF WBC: CPT

## 2021-09-09 PROCEDURE — 82805 BLOOD GASES W/O2 SATURATION: CPT

## 2021-09-09 PROCEDURE — C9113 INJ PANTOPRAZOLE SODIUM, VIA: HCPCS | Performed by: STUDENT IN AN ORGANIZED HEALTH CARE EDUCATION/TRAINING PROGRAM

## 2021-09-09 PROCEDURE — 3600000002 HC SURGERY LEVEL 2 BASE: Performed by: SURGERY

## 2021-09-09 PROCEDURE — 86140 C-REACTIVE PROTEIN: CPT

## 2021-09-09 PROCEDURE — 3700000000 HC ANESTHESIA ATTENDED CARE: Performed by: SURGERY

## 2021-09-09 PROCEDURE — 2580000003 HC RX 258: Performed by: NURSE ANESTHETIST, CERTIFIED REGISTERED

## 2021-09-09 PROCEDURE — 71045 X-RAY EXAM CHEST 1 VIEW: CPT

## 2021-09-09 PROCEDURE — 94003 VENT MGMT INPAT SUBQ DAY: CPT

## 2021-09-09 RX ORDER — FENTANYL CITRATE 50 UG/ML
INJECTION, SOLUTION INTRAMUSCULAR; INTRAVENOUS PRN
Status: DISCONTINUED | OUTPATIENT
Start: 2021-09-09 | End: 2021-09-09 | Stop reason: SDUPTHER

## 2021-09-09 RX ORDER — SODIUM CHLORIDE 9 MG/ML
25 INJECTION, SOLUTION INTRAVENOUS PRN
Status: DISCONTINUED | OUTPATIENT
Start: 2021-09-09 | End: 2021-09-11 | Stop reason: SDUPTHER

## 2021-09-09 RX ORDER — ACETAMINOPHEN 160 MG/5ML
SOLUTION ORAL
Status: COMPLETED
Start: 2021-09-09 | End: 2021-09-09

## 2021-09-09 RX ORDER — SODIUM CHLORIDE 9 MG/ML
INJECTION, SOLUTION INTRAVENOUS CONTINUOUS PRN
Status: DISCONTINUED | OUTPATIENT
Start: 2021-09-09 | End: 2021-09-09 | Stop reason: SDUPTHER

## 2021-09-09 RX ORDER — LIDOCAINE HYDROCHLORIDE 10 MG/ML
INJECTION, SOLUTION EPIDURAL; INFILTRATION; INTRACAUDAL; PERINEURAL PRN
Status: DISCONTINUED | OUTPATIENT
Start: 2021-09-09 | End: 2021-09-09 | Stop reason: ALTCHOICE

## 2021-09-09 RX ORDER — SODIUM CHLORIDE 0.9 % (FLUSH) 0.9 %
5-40 SYRINGE (ML) INJECTION EVERY 12 HOURS SCHEDULED
Status: DISCONTINUED | OUTPATIENT
Start: 2021-09-09 | End: 2021-09-11 | Stop reason: SDUPTHER

## 2021-09-09 RX ORDER — SODIUM CHLORIDE 0.9 % (FLUSH) 0.9 %
5-40 SYRINGE (ML) INJECTION PRN
Status: DISCONTINUED | OUTPATIENT
Start: 2021-09-09 | End: 2021-09-11 | Stop reason: SDUPTHER

## 2021-09-09 RX ORDER — MIDAZOLAM HYDROCHLORIDE 1 MG/ML
INJECTION INTRAMUSCULAR; INTRAVENOUS PRN
Status: DISCONTINUED | OUTPATIENT
Start: 2021-09-09 | End: 2021-09-09 | Stop reason: SDUPTHER

## 2021-09-09 RX ORDER — ACETAMINOPHEN 160 MG/5ML
650 SOLUTION ORAL EVERY 4 HOURS PRN
Status: DISCONTINUED | OUTPATIENT
Start: 2021-09-09 | End: 2021-09-20 | Stop reason: HOSPADM

## 2021-09-09 RX ORDER — LIDOCAINE HYDROCHLORIDE 10 MG/ML
5 INJECTION, SOLUTION EPIDURAL; INFILTRATION; INTRACAUDAL; PERINEURAL ONCE
Status: DISCONTINUED | OUTPATIENT
Start: 2021-09-09 | End: 2021-09-11 | Stop reason: SDUPTHER

## 2021-09-09 RX ORDER — HEPARIN SODIUM (PORCINE) LOCK FLUSH IV SOLN 100 UNIT/ML 100 UNIT/ML
3 SOLUTION INTRAVENOUS PRN
Status: DISCONTINUED | OUTPATIENT
Start: 2021-09-09 | End: 2021-09-11 | Stop reason: SDUPTHER

## 2021-09-09 RX ORDER — HEPARIN SODIUM (PORCINE) LOCK FLUSH IV SOLN 100 UNIT/ML 100 UNIT/ML
3 SOLUTION INTRAVENOUS EVERY 12 HOURS SCHEDULED
Status: DISCONTINUED | OUTPATIENT
Start: 2021-09-09 | End: 2021-09-11 | Stop reason: SDUPTHER

## 2021-09-09 RX ORDER — MAGNESIUM SULFATE IN WATER 40 MG/ML
2000 INJECTION, SOLUTION INTRAVENOUS ONCE
Status: COMPLETED | OUTPATIENT
Start: 2021-09-09 | End: 2021-09-09

## 2021-09-09 RX ADMIN — ENOXAPARIN SODIUM 40 MG: 40 INJECTION SUBCUTANEOUS at 20:57

## 2021-09-09 RX ADMIN — ACETAMINOPHEN 650 MG: 650 SOLUTION ORAL at 22:00

## 2021-09-09 RX ADMIN — LISINOPRIL 10 MG: 10 TABLET ORAL at 08:34

## 2021-09-09 RX ADMIN — MINERAL OIL AND PETROLATUM: 150; 830 OINTMENT OPHTHALMIC at 08:43

## 2021-09-09 RX ADMIN — INSULIN LISPRO 3 UNITS: 100 INJECTION, SOLUTION INTRAVENOUS; SUBCUTANEOUS at 00:14

## 2021-09-09 RX ADMIN — PHENYLEPHRINE HYDROCHLORIDE 100 MCG: 10 INJECTION INTRAVENOUS at 16:10

## 2021-09-09 RX ADMIN — PROPOFOL 20 MCG/KG/MIN: 10 INJECTION, EMULSION INTRAVENOUS at 06:52

## 2021-09-09 RX ADMIN — ATORVASTATIN CALCIUM 40 MG: 40 TABLET, FILM COATED ORAL at 20:56

## 2021-09-09 RX ADMIN — PROPOFOL 20 MCG/KG/MIN: 10 INJECTION, EMULSION INTRAVENOUS at 11:42

## 2021-09-09 RX ADMIN — Medication 10 ML: at 08:34

## 2021-09-09 RX ADMIN — INSULIN LISPRO 3 UNITS: 100 INJECTION, SOLUTION INTRAVENOUS; SUBCUTANEOUS at 12:13

## 2021-09-09 RX ADMIN — PHENYLEPHRINE HYDROCHLORIDE 200 MCG: 10 INJECTION INTRAVENOUS at 16:05

## 2021-09-09 RX ADMIN — CISATRACURIUM BESYLATE 6 MCG/KG/MIN: 10 INJECTION INTRAVENOUS at 01:10

## 2021-09-09 RX ADMIN — CHLORHEXIDINE GLUCONATE 0.12% ORAL RINSE 15 ML: 1.2 LIQUID ORAL at 08:34

## 2021-09-09 RX ADMIN — Medication 200 MCG/HR: at 06:00

## 2021-09-09 RX ADMIN — LABETALOL HYDROCHLORIDE 20 MG: 5 INJECTION INTRAVENOUS at 17:44

## 2021-09-09 RX ADMIN — PHENYTOIN SODIUM 350 MG: 50 INJECTION INTRAMUSCULAR; INTRAVENOUS at 22:23

## 2021-09-09 RX ADMIN — SODIUM CHLORIDE, PRESERVATIVE FREE 10 ML: 5 INJECTION INTRAVENOUS at 21:01

## 2021-09-09 RX ADMIN — PHENYLEPHRINE HYDROCHLORIDE 200 MCG: 10 INJECTION INTRAVENOUS at 16:00

## 2021-09-09 RX ADMIN — Medication 8 MG/HR: at 01:32

## 2021-09-09 RX ADMIN — FENTANYL CITRATE 50 MCG: 50 INJECTION, SOLUTION INTRAMUSCULAR; INTRAVENOUS at 15:48

## 2021-09-09 RX ADMIN — PHENYLEPHRINE HYDROCHLORIDE 200 MCG: 10 INJECTION INTRAVENOUS at 16:21

## 2021-09-09 RX ADMIN — DEXAMETHASONE SODIUM PHOSPHATE 10 MG: 4 INJECTION, SOLUTION INTRA-ARTICULAR; INTRALESIONAL; INTRAMUSCULAR; INTRAVENOUS; SOFT TISSUE at 08:34

## 2021-09-09 RX ADMIN — CISATRACURIUM BESYLATE 6 MCG/KG/MIN: 10 INJECTION INTRAVENOUS at 06:46

## 2021-09-09 RX ADMIN — PHENYLEPHRINE HYDROCHLORIDE 200 MCG: 10 INJECTION INTRAVENOUS at 16:13

## 2021-09-09 RX ADMIN — SODIUM CHLORIDE, PRESERVATIVE FREE 10 ML: 5 INJECTION INTRAVENOUS at 08:34

## 2021-09-09 RX ADMIN — MIDAZOLAM 1 MG: 1 INJECTION INTRAMUSCULAR; INTRAVENOUS at 15:46

## 2021-09-09 RX ADMIN — Medication 200 MCG/HR: at 11:42

## 2021-09-09 RX ADMIN — CISATRACURIUM BESYLATE 6 MCG/KG/MIN: 10 INJECTION INTRAVENOUS at 14:10

## 2021-09-09 RX ADMIN — IPRATROPIUM BROMIDE AND ALBUTEROL SULFATE 1 AMPULE: .5; 3 SOLUTION RESPIRATORY (INHALATION) at 12:56

## 2021-09-09 RX ADMIN — AMLODIPINE BESYLATE 10 MG: 10 TABLET ORAL at 08:34

## 2021-09-09 RX ADMIN — Medication 8 MG/HR: at 14:50

## 2021-09-09 RX ADMIN — IPRATROPIUM BROMIDE AND ALBUTEROL SULFATE 1 AMPULE: .5; 3 SOLUTION RESPIRATORY (INHALATION) at 16:54

## 2021-09-09 RX ADMIN — INSULIN GLARGINE 25 UNITS: 100 INJECTION, SOLUTION SUBCUTANEOUS at 20:59

## 2021-09-09 RX ADMIN — SODIUM CHLORIDE, PRESERVATIVE FREE 300 UNITS: 5 INJECTION INTRAVENOUS at 21:03

## 2021-09-09 RX ADMIN — Medication 2000 MG: at 12:01

## 2021-09-09 RX ADMIN — IPRATROPIUM BROMIDE AND ALBUTEROL SULFATE 1 AMPULE: .5; 3 SOLUTION RESPIRATORY (INHALATION) at 03:43

## 2021-09-09 RX ADMIN — IPRATROPIUM BROMIDE AND ALBUTEROL SULFATE 1 AMPULE: .5; 3 SOLUTION RESPIRATORY (INHALATION) at 20:46

## 2021-09-09 RX ADMIN — SODIUM CHLORIDE: 9 INJECTION, SOLUTION INTRAVENOUS at 15:45

## 2021-09-09 RX ADMIN — Medication 200 MCG/HR: at 05:03

## 2021-09-09 RX ADMIN — IPRATROPIUM BROMIDE AND ALBUTEROL SULFATE 1 AMPULE: .5; 3 SOLUTION RESPIRATORY (INHALATION) at 09:01

## 2021-09-09 RX ADMIN — Medication 100 MCG/HR: at 23:29

## 2021-09-09 RX ADMIN — MINERAL OIL AND PETROLATUM: 150; 830 OINTMENT OPHTHALMIC at 03:30

## 2021-09-09 RX ADMIN — SODIUM CHLORIDE SOLN NEBU 3% 4 ML: 3 NEBU SOLN at 20:46

## 2021-09-09 RX ADMIN — PANTOPRAZOLE SODIUM 40 MG: 40 INJECTION, POWDER, FOR SOLUTION INTRAVENOUS at 08:34

## 2021-09-09 RX ADMIN — CISATRACURIUM BESYLATE 6 MCG/KG/MIN: 10 INJECTION INTRAVENOUS at 10:00

## 2021-09-09 RX ADMIN — CHLORHEXIDINE GLUCONATE 0.12% ORAL RINSE 15 ML: 1.2 LIQUID ORAL at 21:01

## 2021-09-09 RX ADMIN — Medication 2000 MG: at 04:22

## 2021-09-09 RX ADMIN — PHENYLEPHRINE HYDROCHLORIDE 100 MCG: 10 INJECTION INTRAVENOUS at 16:17

## 2021-09-09 RX ADMIN — SODIUM CHLORIDE SOLN NEBU 3% 4 ML: 3 NEBU SOLN at 09:00

## 2021-09-09 RX ADMIN — Medication 2000 MG: at 20:09

## 2021-09-09 RX ADMIN — PHENYTOIN SODIUM 350 MG: 50 INJECTION INTRAMUSCULAR; INTRAVENOUS at 12:20

## 2021-09-09 RX ADMIN — PHENYLEPHRINE HYDROCHLORIDE 200 MCG: 10 INJECTION INTRAVENOUS at 16:27

## 2021-09-09 RX ADMIN — PROPOFOL 20 MCG/KG/MIN: 10 INJECTION, EMULSION INTRAVENOUS at 01:52

## 2021-09-09 RX ADMIN — MAGNESIUM SULFATE HEPTAHYDRATE 2000 MG: 40 INJECTION, SOLUTION INTRAVENOUS at 09:57

## 2021-09-09 ASSESSMENT — PULMONARY FUNCTION TESTS
PIF_VALUE: 33
PIF_VALUE: 35
PIF_VALUE: 34
PIF_VALUE: 30
PIF_VALUE: 35
PIF_VALUE: 27
PIF_VALUE: 35
PIF_VALUE: 34
PIF_VALUE: 33
PIF_VALUE: 27
PIF_VALUE: 43
PIF_VALUE: 29
PIF_VALUE: 1
PIF_VALUE: 1
PIF_VALUE: 27
PIF_VALUE: 1
PIF_VALUE: 27
PIF_VALUE: 29
PIF_VALUE: 21
PIF_VALUE: 35
PIF_VALUE: 35
PIF_VALUE: 34
PIF_VALUE: 32
PIF_VALUE: 35
PIF_VALUE: 28
PIF_VALUE: 34
PIF_VALUE: 1
PIF_VALUE: 32
PIF_VALUE: 34
PIF_VALUE: 1
PIF_VALUE: 29
PIF_VALUE: 30
PIF_VALUE: 30
PIF_VALUE: 47
PIF_VALUE: 32
PIF_VALUE: 32
PIF_VALUE: 34
PIF_VALUE: 28
PIF_VALUE: 34
PIF_VALUE: 1
PIF_VALUE: 36
PIF_VALUE: 32
PIF_VALUE: 34
PIF_VALUE: 33
PIF_VALUE: 33
PIF_VALUE: 32
PIF_VALUE: 35
PIF_VALUE: 34
PIF_VALUE: 35
PIF_VALUE: 34
PIF_VALUE: 1
PIF_VALUE: 1
PIF_VALUE: 11
PIF_VALUE: 35
PIF_VALUE: 31
PIF_VALUE: 34
PIF_VALUE: 34
PIF_VALUE: 41
PIF_VALUE: 30
PIF_VALUE: 33
PIF_VALUE: 35
PIF_VALUE: 33
PIF_VALUE: 34
PIF_VALUE: 34
PIF_VALUE: 35
PIF_VALUE: 32
PIF_VALUE: 31
PIF_VALUE: 29
PIF_VALUE: 1
PIF_VALUE: 35
PIF_VALUE: 34
PIF_VALUE: 1
PIF_VALUE: 38
PIF_VALUE: 36

## 2021-09-09 ASSESSMENT — PAIN SCALES - GENERAL
PAINLEVEL_OUTOF10: 0
PAINLEVEL_OUTOF10: 3

## 2021-09-09 NOTE — OP NOTE
Operative Note      Patient: Geoff Markham  YOB: 1965  MRN: [id number]    Date of Procedure: 9/9/2021    Pre-Op Diagnosis: Respiratory failure, unable to obtain adequate oral intake    Post-Op Diagnosis: Same       Procedure(s):  TRACHEOTOMY  EGD ESOPHAGOGASTRODUODENOSCOPY PEG TUBE INSERTION    Surgeon(s):  MD Elizabeth Carmona MD    Assistant:   Resident: Mine Collins MD    Anesthesia: Monitor Anesthesia Care    Estimated Blood Loss (mL): Minimal    Complications: None    Specimens:   * No specimens in log *    Implants:  * No implants in log *      Drains:   NG/OG/NJ/NE Tube Nasogastric Left nostril (Active)   Surrounding Skin Intact 09/08/21 2155   Securement device Yes 09/09/21 0800   Status Clamped 09/09/21 0800   Placement Verified by Gastric Contents;by External Catheter Length 09/09/21 0800   NG/OG/NJ/NE External Measurement (cm) 65 cm 09/09/21 0800   Drainage Appearance Bile;Clear 09/09/21 0800   Tube Feeding Diabetic 09/09/21 0800   Tube Feeding Status Stopped 09/09/21 0800   Rate/Schedule 45 mL/hr 09/09/21 0800   Tube Feeding Supplement Amount (mL) 0 09/09/21 0600   Tube Feeding Intake (mL) 124 ml 09/08/21 2155   Free Water Flush (mL) 30 mL 09/09/21 0800   Free Water Rate 100cc q3h 09/08/21 0800   Residual Volume (ml) 100 ml 09/08/21 0800   Output (mL) 100 ml 09/02/21 0500       Gastrostomy/Enterostomy/Jejunostomy Percutaneous Endoscopic Gastrostomy (PEG) LUQ 1 (Active)       Urethral Catheter Temperature probe (Active)   $ Urethral catheter insertion $ Not inserted for procedure 08/26/21 0200   Catheter Indications Need for fluid volume management of the critically ill patient in a critical care setting 09/09/21 0800   Site Assessment No urethral drainage 09/09/21 0800   Urine Color Yellow 09/09/21 0800   Urine Appearance Clear 09/09/21 0800   Output (mL) 175 mL 09/09/21 1400       Fecal Management System (Active)   Stool Appearance Watery; Loose 09/09/21 0800   Stool Color Brown 09/09/21 0800   Stool Amount Medium 09/09/21 0600   Fecal Management Tube Output 200 ml 09/09/21 1400       [REMOVED] Urethral Catheter Temperature probe (Removed)   $ Urethral catheter insertion $ Not inserted for procedure 08/21/21 1800   Catheter Indications Need for fluid volume management of the critically ill patient in a critical care setting 08/23/21 0800   Site Assessment [place] 08/23/21 0800   Urine Color Yellow 08/24/21 1200   Urine Appearance Cloudy 08/24/21 1200   Output (mL) 200 mL 08/24/21 0413       [REMOVED] Urethral Catheter Temperature probe 16 fr (Removed)   $ Urethral catheter insertion $ Not inserted for procedure 08/24/21 1303   Catheter Indications Need for fluid volume management of the critically ill patient in a critical care setting 08/25/21 2000   Site Assessment [place] 08/24/21 1303   Urine Color Yellow 08/25/21 2000   Urine Appearance Cloudy 08/25/21 2000   Output (mL) 550 mL 08/26/21 0200       Findings: Uncomplicated tracheostomy and PEG tube placement. PEG tube 2 cm at the skin    Detailed Description of Procedure: The patient was brought to the operating room and positioned supine on the OR table. Sequential compression devices were placed on the patient's lower extremities and functioning. Preoperative antibiotics were administered. Anesthesia was obtained without complication as per the anesthesia record. Immediately prior to the procedure a time-out was called and the surgical checklist was reviewed and agreed upon by all present. The patient's neck was then extended by placing a towel roll underneath the upper back. The anterior neck was then prepped with chlorhexidine and draped. The bronchoscope was passed down the endotracheal tube to localize the skin incision. The endotracheal tube was then slowly withdrawn to just below the vocal cords. The skin and subcutaneous tissue were then infiltrated with 1% lidocaine. The skin was then incised with a scalpel. The subcutaneous tissue was then bluntly dissected down to the pretracheal plane. A Seldinger technique was then used to place a guide wire into the trachea using bronchoscopic visualization. A tracheotomy was then created with dilators. A #8 Shiley cuffed, percutaneous tracheostomy tube was then inserted into the trachea. The inner cannula was inserted, and the cuff was inflated. The ventilator circuit was connected to the tracheostomy. Capnography and bronchoscopy were then used to confirm proper position of the tube. The endotracheal tube was then completely removed. A therapeutic bronchoscopy was then performed to clear the tracheobronchial tree of blood and mucus. The tracheostomy was then secured to the neck with interrupted 3-0 Nylon sutures and tracheostomy ties. A bite block was inserted between the incisors. A lubricated adult gastroscope was then passed through the oral cavity into the oropharynx. The upper esophagus was then visualized and gently intubated. The scope was advanced down the esophagus into the stomach. The stomach was then maximally insufflated. The upper abdomen was then fully exposed. An insertion site was then chosen in the epigastrium using transillumination and direct point palpation. The skin was then prepped with chlorhexidine and draped. A scalpel was then used to make a small transverse incision. An angiocatheter was inserted through this into the stomach under direct gastroscopic visualization. A snare was then passed through the gastroscope. A guide wire was then passed through the angiocatheter, secured with the snare, and brought out through the mouth. A 20-Polish gastrostomy tube was then secured to the guide wire and pulled in antegrade fashion into the stomach and up through the abdominal wall. The gastroscope was then reinserted and passed down into the stomach, confirming position of the bolster against the gastric wall.  The gastrostomy tube was then secured with a silicone bolster, 2 cm at the skin. The tube was then cut to desired length, fitted with the tube clamp and connector, and placed to gravity drainage. Needle, sponge, and instrument counts were reported as correct x2. Dr. Lico Oliveira was present and scrubbed throughout the case. The patient tolerated the procedure well without complications. He was transferred to the recovery area in good condition.     Electronically signed by Antonio Jones MD on 9/9/2021 at 4:26 PM

## 2021-09-09 NOTE — PROGRESS NOTES
[facility]  [facility]  Progress Note      Chief Complaint   Patient presents with    Shortness of Breath     covid, per family SaO2 75% RA, hx of DM and panic attack        SUBJECTIVE:  8/21/21  Prone. FiO2 60% and PEEP of 16    8/22/2021  Patient is tolerating medications. No reported adverse drug reactions. No nausea, vomiting, diarrhea. Afebrile BiPAP 100% FiO2 with breathing at 44 times a minute and probably is going to decompensate  8/23/2021  Not doing well intubated on a rotating bed  FiO2 70% and PEEP 16  8/24/2021  Prone 50% FiO2-PEEP of 10  Issues with the urinary Busch last night this was changed  Paralyzed and on Versed  8/25/2021  Paralyzed and sedated with Versed no pressors  Prone intubated on 50% FiO2, PEEP of 10  Tolerating medications    8/26/21  Paralyzed and sedated with Versed no pressors  Prone intubated on 70% FiO2, PEEP of 10  Does not tolerate supine position    8/27/2021  Afebrile  Still prone on FiO2 of 100%  Paralyzed and sedated    8/28/2021  The patient is still in the ICU. He is pronated. FiO2 100%. Is still sedated and paralyzed. Fair amount of thick, brown secretions    8/29/2021  The patient is still on a RotoProne bed. You are still intubated, sedated and paralyzed. O2 is being weaned down. FiO2 55%. PEEP 12.    8/30/2021  Patient remains intubated, sedated and paralyzed. He is still on a RotoProne bed. Supine now. 8/31/2021  He is still on a RotoProne bed. Still having fair amount of secretions from the nostrils. Minimal from the ET tube, however. Tube feeding seems to be coming out of his nose. 9/1/2021  He is supinated but still on a RotoProne bed. He still has fair amount of secretions from the ET tube. Tolerating antibiotic    9/2/2021. He is still on a RotoProne bed. He is pronated. Less secretions from the ET tube and nostrils. FiO2 50%. PEEP 14.    9/3/2021. He is off the RotoProne bed. Tolerating supination.   Tolerating weaning. PEEP was brought down to 12. FiO2 still at 50%. Tolerating antibiotics. 9/4/2021. He is still in the ICU. He is still on a ventilator. He is still paralyzed. 9/5/2021. He had an episode of acute desaturation and had to be bagged. He is now back on the ventilator. He is still sedated and paralyzed. 9/6/2021. The patient still in the ICU. He is still requiring paralytics. Secretions are becoming thinner. No fever. 9/7/2021. The patient still on a ventilator and paralyzed. No new problems reported. 9/8/2021. Patient is still on the ventilator, paralyzed. No fever. Tolerating medications. 9/9/2021. Patient had a bronchoscopy yesterday a fair amount of thick secretions were obtained. Cultures were sent. He is still on a ventilator, sedated and paralyzed. Review of systems:  As stated above in the chief complaint, otherwise negative.     Medications:  Scheduled Meds:   magnesium sulfate  2,000 mg IntraVENous Once    phenytoin  350 mg IntraVENous Q12H    sodium chloride flush  5-40 mL IntraVENous 2 times per day    heparin flush  3 mL IntraVENous 2 times per day    lisinopril  10 mg Oral Daily    insulin glargine  25 Units SubCUTAneous BID    ascorbic acid  1,000 mg Oral Daily    ceFAZolin  2,000 mg IntraVENous Q8H    sodium chloride (Inhalant)  4 mL Nebulization Q12H    dexamethasone  10 mg IntraVENous Daily    enoxaparin  40 mg SubCUTAneous BID    insulin lispro  0-18 Units SubCUTAneous Q6H    chlorhexidine  15 mL Mouth/Throat BID    Refresh Lacri-Lube   Ophthalmic Q6H    ipratropium-albuterol  1 ampule Inhalation Q4H    sodium chloride flush  10 mL IntraVENous BID    amLODIPine  10 mg Oral Daily    atorvastatin  40 mg Oral Daily    Vitamin D  2,000 Units Oral Daily    zinc sulfate  50 mg Oral Daily    pantoprazole  40 mg IntraVENous Daily     Continuous Infusions:   sodium chloride      fentaNYL 5 mcg/ml in 0.9%  ml infusion 200 mcg/hr (09/09/21 0600)    cisatracurium (NIMBEX) infusion 6 mcg/kg/min (21 5222)    propofol 20 mcg/kg/min (21 8472)    midazolam 8 mg/hr (21 0132)    sodium chloride      dextrose       PRN Meds:sodium chloride flush, sodium chloride, heparin flush, labetalol, hydrALAZINE, sodium chloride (Inhalant), sodium chloride flush, sodium chloride, albuterol, benzonatate, glucose, dextrose, glucagon (rDNA), dextrose, sodium chloride    OBJECTIVE:  BP (!) 159/76   Pulse 109   Temp 99.3 °F (37.4 °C) (Bladder)   Resp 22   Ht 6' (1.829 m)   Wt (!) 313 lb 7.9 oz (142.2 kg)   SpO2 95%   BMI 42.52 kg/m²   Temp  Av.3 °F (37.4 °C)  Min: 99 °F (37.2 °C)  Max: 99.9 °F (37.7 °C)  Constitutional: The patient is intubated, sedated, paralyzed. He is supine. FiO2 40%. PEEP 8. Skin: Warm and dry. No rashes were noted. HEENT: Round and nonreactive pupils. No thrush. ET tube. NG tube. Decubitus lesions on cheeks, hands and lower extremities noted. Neck: Supple. Chest: Coarse breath sounds bilaterally. No crackles  Cardiovascular: Heart sounds rhythmic and regular. Tachycardic. Abdomen: Round, soft and benign to palpation. Genitourinary: Busch catheter  Extremities: Moderate edema. Blistering of hands and feet. Some are unroofed. Lines: Right PICC 2021. Right IJ TLC 2021 (changed over guidewire).   Busch changed 3/24/2021    Laboratory and Tests Review:  Lab Results   Component Value Date    WBC 17.7 (H) 2021    WBC 16.1 (H) 2021    WBC 16.1 (H) 2021    HGB 11.8 (L) 2021    HCT 37.3 2021    MCV 93.0 2021     2021     Lab Results   Component Value Date    NEUTROABS 14.03 (H) 2021    NEUTROABS 13.69 (H) 2021    NEUTROABS 12.15 (H) 2021     No results found for: [facility]  Lab Results   Component Value Date    ALT 26 2021    AST 33 2021    ALKPHOS 91 2021    BILITOT 0.3 2021     Lab Results   Component Value Date  09/09/2021    K 4.1 09/09/2021    K 3.7 08/21/2021     09/09/2021    CO2 28 09/09/2021    BUN 14 09/09/2021    CREATININE 0.4 09/09/2021    CREATININE 0.5 09/08/2021    CREATININE 0.5 09/07/2021    GFRAA >60 09/09/2021    LABGLOM >60 09/09/2021    GLUCOSE 130 09/09/2021    PROT 6.0 09/09/2021    LABALBU 2.6 09/09/2021    CALCIUM 8.7 09/09/2021    BILITOT 0.3 09/09/2021    ALKPHOS 91 09/09/2021    AST 33 09/09/2021    ALT 26 09/09/2021     Lab Results   Component Value Date    CRP 22.0 (H) 09/08/2021    CRP 16.2 (H) 09/07/2021    CRP 12.0 (H) 09/06/2021     Lab Results   Component Value Date    SEDRATE 75 (H) 09/08/2021    SEDRATE 68 (H) 09/07/2021    SEDRATE 50 (H) 09/06/2021     Radiology:      Microbiology:   Streptococcus pneumoniae/Legionella urine Ag: negative  Nares screen MRSA: Negative  Urine culture 8/24/2021: Negative  Blood cultures 8/21/2021: Negative x2  Respiratory culture 8/27/2021: OP terrell reduced  Respiratory culture 8/30/2021: E. coli, MSSA   BAL 9/8/2021: OP terrell reduced. PjP negative    ASSESSMENT:  · Severe Covid pneumonia causing acute respiratory failure CRP has come down nicely. Completed Remdesivir. Received Tocilizumab  · VAP versus tracheobronchitis. Cultures growing MSSA and E. coli. Improving  · Status post bronchoscopy 9/8/2021  · Leukocytosis associated to the above, stable    PLAN:  · Continue Cefazolin, day 9 of 10  · PEG and tracheostomy today  · Check BAL cultures  · Please change lines  · We will follow with you    Discussed with pulmonary and critical care.     Josie Harper MD  9:32 AM   9/9/2021

## 2021-09-09 NOTE — PROGRESS NOTES
Critical Care Team - Daily Progress Note      Date and time: 2021 9:35 AM  Patient's name:  Juan Luis Markham  Medical Record Number: [id number]  Patient's account/billing number: [de-identified]  Patient's YOB: 1965  Age: 60 y.o. Date of Admission: 2021  9:35 AM  Length of stay during current admission: 19      Primary Care Physician: Loco Salazar,   ICU Attending Physician: Dr. Elizabeth Awan Status: Full Code    Reason for ICU admission: Respiratory failure due to COVID-19      SUBJECTIVE:     OVERNIGHT EVENTS:           : tolerating longer periods supine, thick dark brown secretions     : secretions out of NG, michelle prone     : Decreased urine output and increased creatinine today, continues with thick secretions     : Cr and Uop improved     9/3: P/F improved and has remained supine     : thick secretions     : desat with turning, 1500 loose stool from [facility]    : Patient trying low tidal volumes overnight occasionally, otherwise no other acute events. : No acute events overnight    : Did receive labetalol 2 times overnight for elevated blood pressures otherwise unremarkable good urine output.   Will need essentially exchanged today, bronchoscopy for retained left lung and bibasilar mucopurulent secretions       OBJECTIVE:     VITAL SIGNS:  BP (!) 159/76   Pulse 109   Temp 99.3 °F (37.4 °C) (Bladder)   Resp 22   Ht 6' (1.829 m)   Wt (!) 313 lb 7.9 oz (142.2 kg)   SpO2 95%   BMI 42.52 kg/m²   Tmax over 24 hours:  Temp (24hrs), Av.3 °F (37.4 °C), Min:99 °F (37.2 °C), Max:99.9 °F (37.7 °C)      Patient Vitals for the past 6 hrs:   BP Temp Temp src Pulse Resp SpO2   21 0900 (!) 159/76 -- -- 109 22 95 %   21 0800 (!) 170/78 99.3 °F (37.4 °C) Bladder 109 22 95 %   21 0600 (!) 167/87 -- -- 107 22 97 %   21 0500 (!) 161/78 -- -- 106 22 --   21 0400 (!) 151/76 99 °F (37.2 °C) Bladder 101 22 96 %   21 0343 -- -- -- 93 -- 100 %         Intake/Output Summary (Last 24 hours) at 9/9/2021 0935  Last data filed at 9/9/2021 0830  Gross per 24 hour   Intake 1253 ml   Output 3575 ml   Net -2322 ml     Wt Readings from Last 2 Encounters:   09/09/21 (!) 313 lb 7.9 oz (142.2 kg)   08/17/21 (!) 320 lb (145.2 kg)     Body mass index is 42.52 kg/m².         PHYSICAL EXAMINATION:    General appearance - ill-appearing  Mental status -intubated sedated  Eyes - pupils equal and reactive, extraocular eye movements intact  Ears - bilateral TM's and external ear canals normal  Nose - normal and patent, no erythema, discharge or polyps  Mouth - mucous membranes moist, pharynx normal without lesions  Neck - supple, no significant adenopathy  Chest -intubated sedated, breath sounds improving slight rhonchi noted  heart - normal rate, regular rhythm, normal S1, S2, no murmurs, rubs, clicks or gallops  Abdomen - soft, nontender, nondistended, no masses or organomegaly  Neurological -intubated and sedated  Extremities - peripheral pulses normal, no pedal edema, no clubbing or cyanosis  Skin -patient did have skin breakdown to the face as well as blistering on the hands     Any additional physical findings:    MEDICATIONS:    Scheduled Meds:   magnesium sulfate  2,000 mg IntraVENous Once    phenytoin  350 mg IntraVENous Q12H    sodium chloride flush  5-40 mL IntraVENous 2 times per day    heparin flush  3 mL IntraVENous 2 times per day    lisinopril  10 mg Oral Daily    insulin glargine  25 Units SubCUTAneous BID    ascorbic acid  1,000 mg Oral Daily    ceFAZolin  2,000 mg IntraVENous Q8H    sodium chloride (Inhalant)  4 mL Nebulization Q12H    dexamethasone  10 mg IntraVENous Daily    enoxaparin  40 mg SubCUTAneous BID    insulin lispro  0-18 Units SubCUTAneous Q6H    chlorhexidine  15 mL Mouth/Throat BID    Refresh Lacri-Lube   Ophthalmic Q6H    ipratropium-albuterol  1 ampule Inhalation Q4H    sodium chloride flush  10 mL IntraVENous BID    amLODIPine  10 mg Oral Daily    atorvastatin  40 mg Oral Daily    Vitamin D  2,000 Units Oral Daily    zinc sulfate  50 mg Oral Daily    pantoprazole  40 mg IntraVENous Daily     Continuous Infusions:   sodium chloride      fentaNYL 5 mcg/ml in 0.9%  ml infusion 200 mcg/hr (09/09/21 0600)    cisatracurium (NIMBEX) infusion 6 mcg/kg/min (09/09/21 0646)    propofol 20 mcg/kg/min (09/09/21 6120)    midazolam 8 mg/hr (09/09/21 0132)    sodium chloride      dextrose       PRN Meds:   sodium chloride flush, 5-40 mL, PRN  sodium chloride, 25 mL, PRN  heparin flush, 3 mL, PRN  labetalol, 20 mg, Q6H PRN  hydrALAZINE, 20 mg, Q6H PRN  sodium chloride (Inhalant), 4 mL, PRN  sodium chloride flush, 5-40 mL, PRN  sodium chloride, 25 mL, PRN  albuterol, 2.5 mg, Q6H PRN  benzonatate, 200 mg, TID PRN  glucose, 15 g, PRN  dextrose, 12.5 g, PRN  glucagon (rDNA), 1 mg, PRN  dextrose, 100 mL/hr, PRN  sodium chloride, 30 mL, PRN          VENT SETTINGS (Comprehensive) (if applicable):  Vent Information  $Ventilation: $Subsequent Day  Skin Assessment: Clean, dry, & intact  Equipment ID: [id number]  Equipment Changed: Suction catheter  Vent Type: [id number]  Vent Mode: AC/VC  Vt Ordered: 460 mL  Pressure Ordered: 20  Rate Set: 22 bmp  Peak Flow: 50 L/min  Pressure Support: 0 cmH20  FiO2 : 40 %  SpO2: 95 %  SpO2/FiO2 ratio: 237.5  Sensitivity: 3  PEEP/CPAP: 8  I Time/ I Time %: 0 s  Humidification Source: Heated wire  Humidification Temp: 37  Humidification Temp Measured: 37  Circuit Condensation: Drained  Mask Type: Full face mask  Mask Size: Medium  Additional Respiratory  Assessments  Pulse: 109  Resp: 22  SpO2: 95 %  Position: Semi-Sellers's  Humidification Source: Heated wire  Humidification Temp: 37  Circuit Condensation: Drained  Oral Care: Mouth swabbed, Mouth moisturizer, Mouth suctioned, Suction toothette  Subglottic Suction Done?: Yes  Airway Type: ET  Airway Size: 8  Cuff Pressure (cm H2O): 29 cm H2O    ABGs:     Laboratory findings:    Complete Blood Count:   Recent Labs     09/07/21  0538 09/08/21  0545 09/09/21  0625   WBC 16.1* 16.1* 17.7*   HGB 11.7* 11.1* 11.8*   HCT 36.6* 34.4* 37.3    179 206        Last 3 Blood Glucose:   Recent Labs     09/07/21  0538 09/08/21  0545 09/09/21  0625   GLUCOSE 199* 126* 130*        PT/INR:  No results found for: PROTIME, INR  PTT:  No results found for: APTT, PTT    Comprehensive Metabolic Profile:   Recent Labs     09/07/21  0538 09/07/21  0538 09/08/21  0545 09/08/21  0545 09/09/21  0625     --  137  --  137   K 4.4  --  4.2  --  4.1     --  102  --  100   CO2 28  --  27  --  28   BUN 21*  --  17  --  14   CREATININE 0.5*  --  0.5*  --  0.4*   GLUCOSE 199*  --  126*  --  130*   CALCIUM 9.1  --  8.6  --  8.7   PROT 6.0*   < > 5.7*   < > 6.0*   LABALBU 2.8*   < > 2.5*   < > 2.6*   BILITOT 0.3   < > 0.3   < > 0.3   ALKPHOS 109   < > 96   < > 91   AST 55*   < > 48*   < > 33   ALT 35   < > 28   < > 26    < > = values in this interval not displayed. Magnesium:   Lab Results   Component Value Date    MG 1.9 09/09/2021     Phosphorus:   Lab Results   Component Value Date    PHOS 3.4 09/09/2021     Ionized Calcium: No results found for: CAION     Urinalysis:     Troponin: No results for input(s): TROPONINI in the last 72 hours.       ASSESSMENT:     Patient Active Problem List    Diagnosis Date Noted    Elevated LFTs     Busch catheter problem ([facility] [address] 08/26/2021    Sepsis ([facility] [facility] 08/26/2021    Thrombocytopenia ([facility] [facility] 08/26/2021    Hyperlipidemia 08/22/2021    Acute hypoxemic respiratory failure due to COVID-19 [facility]) 08/21/2021    Type 2 diabetes mellitus without complication, without long-term current use of insulin ([facility] [address] [facility] 08/10/2021    History of seizures 08/10/2021     SYSTEMS ASSESSMENT    Neuro     Intubation, sedated, paralyzed  Propofol  Versed  Nimbex  Fentanyl  Continue to monitor      Respiratory     Acute hypoxic respiratory failure requiring mechanical ventilation  Severe COVID-19 pneumonia s/p toe C, remdesivir  Pneumonia VAP-MSSA, E. coli  Ancef-day 5  Trach/PEG when oxygen garments improve  Decadron 10 mg  Plan for trach/PEG tomorrow  Ultrasound for evaluation of pleural effusion today  Bronchoscopy 9/8/2021 due to white out left side chest on x-ray. Wean oxygen as tolerated. Keep O2 sat 90-92%    Cardiovascular     Hypertension  Amlodipine-10 mg  Lisinopril-10 mg  Lipitor 40 mg    Gastrointestinal     GI prophylaxis-Protonix  Bowel regimen  Continue monitor    Renal     OSCAR-improved  Diuretic on hold  Free water flushes 100 cc every 3 hours  Avoid nephrotoxins  Continue to monitor     Infectious Disease     Pneumonia VAP-positive for MSSA, E. coli  Ancef  lactic acidosis  Infectious disease following    Hematology/Oncology     Anemia  Hemoglobin stable  Continue to monitor    Endocrine     Hyperglycemia  Lantus 25 units  High-dose sliding scale    Social/Spiritual/DNR/Other     Full code  vitamin regimen  DVT prophylaxis Lovenox 40 every 12    Plan-plan for bronc today, trach and PEG tomorrow    The patient was seen and evaluated by myself and Dino Dickerson MD PGY-2  9/9/2021 9:35 AM      Attending Physician Attestation: Dr. Emre Varela    Thank you very much for allowing me to see this patient in consultation and follow up. I personally saw, examined and provided care for the patient. Radiographs, labs and medication list were reviewed by me independently. I spoke with bedside nursing, respiratory therapists and consultants. Critical care services and times documented are independent of procedures and multidisciplinary rounds with Residents. Additionally comprehensive, multidisciplinary rounds were conducted with the MICU team. The case was discussed in detail and plans for care were established. Review of Residents documentation was conducted and revisions were made as appropriate. I agree with the the above documented information. Physical Examination:  Vitals:   Vitals:    09/09/21 0500 09/09/21 0600 09/09/21 0800 09/09/21 0900   BP: (!) 161/78 (!) 167/87 (!) 170/78 (!) 159/76   Pulse: 106 107 109 109   Resp: 22 22 22 22   Temp:   99.3 °F (37.4 °C)    TempSrc:   Bladder    SpO2:  97% 95% 95%   Weight:       Height:          General: No Acute Distress  HEENT: normocephalic, atruamatic  CVS: RRR, S1, S2, No S3 or S4  Respiratory: decreased breath sounds at lung bases, no focal wheezes noted  Extremities: no clubbing/cyanosis/edema     ASSESSMENT:  · Severe Covid pneumonia causing acute respiratory failure CRP has come down nicely.  Completed Remdesivir.  Received Tocilizumab  · VAP versus tracheobronchitis.  Cultures growing MSSA and E. coli.  Improving  · Leukocytosis associated to the above.    · Lactic Acidosis   · Left Pleural Effusion/Atelectasis - s/p bronchoscopy with evaluation of copious mucous plugging 9/8/2021      In addition the following applies:     Check: N/A  Medication Alterations: lisinopril to 10 mg PO q day   Procedures: bronchoscopy 9/8/2021, trach/PEG 9/9/2021   - left pleural US noted not enough fluid to tap for thoracentesis/chest tube   Imaging: reviewed, Left Pleural US  New Consultations: N/A  VENT: ACVC (DAY 18) 22/460/40/8  Ppeak: 31  Pplateau: 28  [id number]: 55     Access: Right IJ, Right Radial Arterial Line   Consults: ID, GS, Nephrology   Drips: Fentanyl, Versed, Propofol, Nimbex   Nutrition: Tube Feeds  ABX: Cefazolin      - addition of thiamine, vitamin C, PO ZINC  - check D-dimer  - check Fibrinogen if platelets < 442J     - await Trach/PEG placement 9/9/2021 as discussed with general surgery   - bronchoscopy 9/8/2021 noted copious thick white mucous plugging bibasilar regions L > R    Thank you for allowing me to participate in the care of this patient. Care reviewed with nursing staff, medical and surgical specialty care, primary care and the patient's family as available.  Restraints are ordered when the patient can do harm to him/herself by pulling out devices. Critical Care Time: > 35 minutes excluding procedures    Morris Silveira M.D.     Morris Silveira MD  9/9/2021  9:35 AM

## 2021-09-09 NOTE — CARE COORDINATION
Social Work / Discharge Planning : POSITIVE COVID: Patient plan is [facility] when ready. Patient is scheduled today for Trache and Peg. SW spoke to [address] [place] [address] at [facility] and they are FULL. [address] [place] [address] will know more tomorrow in regards to bed availability either here and [facility]. Await available bed [facility] at time ofbdischarge. No pre-cert needed due to waiver. SW to follow. Electronically signed by MADDIE Gaitan on 9/9/21 at 12:28 PM EDT

## 2021-09-09 NOTE — PROGRESS NOTES
Patient resting this morning. He is scheduled for trach/PEG today. No overnight events. Consent form is signed and in the chart.     Electronically signed by Janak Yeh DO on 9/9/2021 at 6:39 AM

## 2021-09-09 NOTE — PROGRESS NOTES
Kait Kendall, clinical manager, notified to call PPS to place a PICC line on this patient so that TLC can be removed. Vipul Oh RN aware of this plan.

## 2021-09-09 NOTE — PROGRESS NOTES
Pulmonary Subsequent Hospital F/U note    Patient is being followed for: acute hypoxic respiratory failure, severe covid-19 pneumonia     Interval HPI:  Remains intubated and sedated  He is also paralyzed   Vent settings 40% Pi 20, RR 26, PEEP 8  S/p bronchoscopy yesterday 9/8 with mucus plugging of the L bronchial tree and RLL      ROS:  Unable to obtain     Exam:  /62   Pulse 110   Temp 99.1 °F (37.3 °C) (Bladder)   Resp 22   Ht 6' (1.829 m)   Wt (!) 313 lb 7.9 oz (142.2 kg)   SpO2 96%   BMI 42.52 kg/m²    Due to the current efforts to prevent transmission of COVID-19 and also the need to preserve PPE for other caregivers, a face-to-face encounter with the patient was not performed. That being said, all relevant records and diagnostic tests were reviewed, including laboratory results and imaging. Please reference any relevant documentation elsewhere. Care will be coordinated with the primary service. Data:    Oximetry:  SpO2 Readings from Last 1 Encounters:   09/09/21 96%       Imaging personally reviewed by myself:  CXR  FINDINGS:   Significant bilateral infiltrates worse in the left lower lobe are unchanged. There may be some pleural thickening on the left.  Lines/tubes are   appropriate.           Impression   No significant change       Respiratory culture MSSA, Ecoli     crp 27    Pertinent labs reviewed and noted:  Lab Results   Component Value Date    WBC 17.7 09/09/2021    HGB 11.8 09/09/2021    HCT 37.3 09/09/2021    MCV 93.0 09/09/2021    MCH 29.4 09/09/2021    MCHC 31.6 09/09/2021    RDW 14.6 09/09/2021     09/09/2021    MPV 9.9 09/09/2021     Lab Results   Component Value Date     09/09/2021    K 4.1 09/09/2021    K 3.7 08/21/2021     09/09/2021    CO2 28 09/09/2021    BUN 14 09/09/2021    CREATININE 0.4 09/09/2021    LABALBU 2.6 09/09/2021    CALCIUM 8.7 09/09/2021    GFRAA >60 09/09/2021    LABGLOM >60 09/09/2021     Assessment:  1.  Acute hypoxic respiratory failure  2. Severe covid-19 pneumonia  3. Bacterial pneumonia with MSSA and Ecoli   4. Mucus plugging of bronchi s/p bronchoscopy 9/8/21    Plan:  1. Trache PEG today 9/9/21  2. Completed Remdesivir and Tocilizumab  3. Antibiotics as per ID  4. Chest physio/bronchodilators, s/p bronch  5. Continue decadron, stop over the next several days   6. DVT prophylaxis   7.  Would stop the paralytic      Electronically signed by Trudy Lee MD on 9/9/2021 at 12:36 PM

## 2021-09-09 NOTE — DISCHARGE INSTR - COC
Continuity of Care Form    Patient Name: Polina Vo   :  1965  MRN:  [id number]    Admit date:  2021  Discharge date:  ***    Code Status Order: Full Code   Advance Directives:      Admitting Physician:  Susan De Los Santos DO  PCP: Jennifer Waddell DO    Discharging Nurse: [facility] Unit/Room#: 0211/0211-A  Discharging Unit Phone Number: ***    Emergency Contact:   Extended Emergency Contact Information  Primary Emergency Contact: Nola Stover  Address: [place] [address] Phone: [phone]  Mobile Phone: [phone]  Relation: Spouse  Secondary Emergency Contact: Neftali [address] Phone: [phone]  Relation: Child    Past Surgical History:  Past Surgical History:   Procedure Laterality Date    APPENDECTOMY      BRONCHOSCOPY N/A 2021    BRONCHOSCOPY performed by Izzy Fournier MD at [address] [place] [address]  2021            Immunization History: There is no immunization history on file for this patient. Active Problems:  Patient Active Problem List   Diagnosis Code    Type 2 diabetes mellitus without complication, without long-term current use of insulin ([facility] [address] E11.9    History of seizures Z87.898    Acute hypoxemic respiratory failure due to COVID-19 (HCC) U07.1, J96.01    Hyperlipidemia E78.5    Busch catheter problem ([facility] [address] T83. 9XXA    Sepsis ([facility] [address] A41. 9    Thrombocytopenia ([facility]) D69.6    Elevated LFTs R79.89       Isolation/Infection:   Isolation            Droplet Plus          Patient Infection Status       Infection Onset Added Last Indicated Last Indicated By Review Planned Expiration Resolved Resolved By    COVID-19 21 COVID-19 08/27/21 09/15/21      Resolved    COVID-19 Rule Out 21 COVID-19 (Ordered)   21 Rule-Out Test Resulted            Nurse Assessment:  Last Vital Signs: /62   Pulse 110   Temp 99.1 °F (37.3 °C) (Bladder)   Resp 22   Ht 6' (1.829 m)   Wt (!) 313 lb 7.9 oz (142.2 kg)   SpO2 96%   BMI 42.52 kg/m²     Last documented pain score (0-10 scale): Pain Level: 0  Last Weight:   Wt Readings from Last 1 Encounters:   09/09/21 (!) 313 lb 7.9 oz (142.2 kg)     Mental Status:  {IP PT MENTAL STATUS:64700}    IV Access:  { SHRADDHA IV ACCESS:549107563}    Nursing Mobility/ADLs:  Walking   {CHP DME RKPN:490064342}  Transfer  {P DME ZMSM:739840108}  Bathing  {CHP DME BGJR:398721852}  Dressing  {CHP DME KWUE:786133699}  Toileting  {CHP DME CIFV:340645424}  Feeding  {P DME WTHR:845587349}  Med Admin  {[facility] DME SPNB:917240338}  Med Delivery   { SHRADDHA MED Delivery:835080954}    Wound Care Documentation and Therapy:  Wound 09/03/21 Face Left (Active)   Wound Image   09/03/21 1647   Wound Etiology Traumatic 09/08/21 0800   Dressing Status Other (Comment) 09/08/21 0800   Dressing/Treatment Open to air 09/09/21 0800   Wound Length (cm) 3 cm 09/03/21 1647   Wound Width (cm) 7 cm 09/03/21 1647   Wound Surface Area (cm^2) 21 cm^2 09/03/21 1647   Wound Assessment Purple/maroon;Pink/red;Ruptured blister 09/09/21 0800   Drainage Amount None 09/09/21 0800   Rayne-wound Assessment Fragile 09/09/21 0800   Number of days: 5       Wound 09/03/21 Face Right (Active)   Wound Image   09/03/21 1647   Wound Etiology Traumatic 09/08/21 0800   Dressing Status Other (Comment) 09/08/21 0800   Dressing/Treatment Open to air 09/09/21 0800   Wound Length (cm) 4 cm 09/03/21 1647   Wound Width (cm) 2.4 cm 09/03/21 1647   Wound Surface Area (cm^2) 9.6 cm^2 09/03/21 1647   Wound Assessment Purple/maroon;Ruptured blister;Pink/red 09/09/21 0800   Drainage Amount None 09/09/21 0800   Odor None 09/09/21 0800   Rayne-wound Assessment Fragile 09/09/21 0800   Number of days: 5       Wound 09/03/21 Face forehead (Active)   Wound Image   09/03/21 1647   Wound Etiology Traumatic 09/08/21 0800   Dressing Status Other (Comment) 09/05/21 9572 Dressing/Treatment Open to air 09/09/21 0800   Wound Length (cm) 1 cm 09/03/21 1647   Wound Width (cm) 4 cm 09/03/21 1647   Wound Surface Area (cm^2) 4 cm^2 09/03/21 1647   Wound Assessment Purple/maroon;Ruptured blister;Pink/red 09/09/21 0800   Drainage Amount None 09/09/21 0800   Odor None 09/09/21 0800   Rayne-wound Assessment Fragile 09/09/21 0800   Number of days: 5       Wound 09/03/21 Thigh Right (Active)   Wound Image   09/03/21 1647   Wound Etiology Traumatic 09/08/21 0800   Dressing Status Other (Comment) 09/05/21 0738   Dressing/Treatment Open to air 09/09/21 0800   Wound Length (cm) 6 cm 09/03/21 1647   Wound Width (cm) 4 cm 09/03/21 1647   Wound Depth (cm) 0.1 cm 09/03/21 1647   Wound Surface Area (cm^2) 24 cm^2 09/03/21 1647   Wound Volume (cm^3) 2.4 cm^3 09/03/21 1647   Wound Assessment Ruptured blister; Purple/maroon;Pink/red 09/09/21 0800   Drainage Amount None 09/09/21 0800   Odor None 09/09/21 0800   Rayne-wound Assessment Fragile 09/09/21 0800   Number of days: 5       Wound 09/03/21 Leg Left (Active)   Wound Image   09/03/21 1647   Wound Etiology Traumatic 09/08/21 0800   Dressing Status Other (Comment) 09/05/21 0738   Dressing/Treatment Open to air 09/09/21 0800   Wound Length (cm) 2 cm 09/03/21 1647   Wound Width (cm) 3 cm 09/03/21 1647   Wound Surface Area (cm^2) 6 cm^2 09/03/21 1647   Wound Assessment Purple/maroon;Ruptured blister;Pink/red 09/09/21 0800   Drainage Amount None 09/09/21 0800   Odor None 09/09/21 0800   Rayne-wound Assessment Fragile 09/09/21 0800   Number of days: 5       Wound 09/03/21 Heel Left (Active)   Wound Image   09/03/21 1647   Wound Etiology Deep tissue/Injury 09/08/21 0800   Dressing Status Other (Comment) 09/08/21 0800   Dressing/Treatment Open to air 09/09/21 0800   Wound Length (cm) 0.3 cm 09/03/21 1647   Wound Width (cm) 0.3 cm 09/03/21 1647   Wound Surface Area (cm^2) 0.09 cm^2 09/03/21 1647   Wound Assessment Purple/maroon 09/09/21 0800   Drainage Amount None 09/09/21 0800   Odor None 09/09/21 0800   Number of days: 5       Wound 09/03/21 Abdomen Left (Active)   Wound Image   09/03/21 1647   Wound Etiology Deep tissue/Injury 09/08/21 0800   Dressing Status Other (Comment) 09/08/21 0800   Dressing/Treatment Open to air 09/09/21 0800   Wound Length (cm) 7 cm 09/03/21 1647   Wound Width (cm) 2 cm 09/03/21 1647   Wound Surface Area (cm^2) 14 cm^2 09/03/21 1647   Wound Assessment Pink/red;Purple/maroon 09/09/21 0800   Drainage Amount None 09/09/21 0800   Odor None 09/09/21 0800   Rayne-wound Assessment Edematous 09/09/21 0800   Number of days: 5        Elimination:  Continence: Bowel: {YES / DV:67729}  Bladder: {YES / BM:07075}  Urinary Catheter: {Urinary Catheter:950580824}   Colostomy/Ileostomy/Ileal Conduit: {YES / EO:61287}  Fecal Management System-Stool Appearance: Watery, Loose  Fecal Management System-Stool Color: Brown  Fecal Management System-Stool Amount: Medium    Date of Last BM: ***    Intake/Output Summary (Last 24 hours) at 9/9/2021 1221  Last data filed at 9/9/2021 1200  Gross per 24 hour   Intake 1253 ml   Output 3500 ml   Net -2247 ml     I/O last 3 completed shifts:   In: 6412 [I.V.:999; NG/GT:324]  Out: 4575 [Urine:4475; Stool:100]    Safety Concerns:     [address] Safety Concerns:673879785}    Impairments/Disabilities:      [address] Impairments/Disabilities:879471498}    Nutrition Therapy:  Current Nutrition Therapy:   [address] Diet List:348847660}    Routes of Feeding: {CHP DME Other Feedings:302644677}  Liquids: {Slp liquid thickness:35361}  Daily Fluid Restriction: {CHP DME Yes amt example:896099865}  Last Modified Barium Swallow with Video (Video Swallowing Test): {Done Not Done VDKX:945979220}    Treatments at the Time of Hospital Discharge:   Respiratory Treatments: ***  Oxygen Therapy:  {Therapy; copd oxygen:34196}  Ventilator:    { CC Vent ZSYP:140368951}    Rehab Therapies: Physical Therapy, Occupational Therapy and Speech/Language Therapy  Weight Bearing Status/Restrictions: [address] CC Weight Bearin}  Other Medical Equipment (for information only, NOT a DME order):  {EQUIPMENT:475213202}  Other Treatments: ***    Patient's personal belongings (please select all that are sent with patient):  {CHP DME Belongings:612467147}    RN SIGNATURE:  {Esignature:418514170}    CASE MANAGEMENT/SOCIAL WORK SECTION    Inpatient Status Date: ***    Readmission Risk Assessment Score:  Readmission Risk              Risk of Unplanned Readmission:  24           Discharging to Facility/ Agency   Name: [address] [place] [address] [facility], [place] [address]  GLXAU:[phone]  Fax:[phone]    Dialysis Facility (if applicable)   Name:  Address:  Dialysis Schedule:  Phone:  Fax:    / signature: {Esignature:[id number]} Electronically signed by MADDIE Jaime on 21 at 12:24 PM EDT      PHYSICIAN SECTION    Prognosis: Good    Condition at Discharge: Stable    Rehab Potential (if transferring to Rehab): Good    Recommended Labs or Other Treatments After Discharge: n/a    Physician Certification: I certify the above information and transfer of Wanda Bright  is necessary for the continuing treatment of the diagnosis listed and that he requires LTAC for less 30 days.      Update Admission H&P: No change in H&P    PHYSICIAN SIGNATURE:  Electronically signed by Jeyson Meier MD on 21 at 11:01 AM EDT

## 2021-09-09 NOTE — PLAN OF CARE
Problem: Gas Exchange - Impaired  Goal: Absence of hypoxia  Outcome: Met This Shift     Problem: Breathing Pattern - Ineffective  Goal: Ability to achieve and maintain a regular respiratory rate  Outcome: Met This Shift     Problem: Risk for Fluid Volume Deficit  Goal: Maintain normal serum potassium, sodium, calcium, phosphorus, and pH  Outcome: Met This Shift     Problem: Non-Violent Restraints  Goal: No harm/injury to patient while restraints in use  Outcome: Met This Shift     Problem: Non-Violent Restraints  Goal: Patient's dignity will be maintained  Outcome: Met This Shift     Problem: Skin Integrity:  Goal: Absence of new skin breakdown  Description: Absence of new skin breakdown  Outcome: Ongoing

## 2021-09-09 NOTE — PROGRESS NOTES
[facility] Progress Note    Admitting Date and Time: 8/21/2021  9:35 AM  Admit Dx: Acute hypoxemic respiratory failure due to COVID-19 (HCC) [U07.1, J96.01]  Pneumonia due to COVID-19 virus [U07.1, J12.82]    Subjective:  Patient is being followed for Acute hypoxemic respiratory failure due to COVID-19 ([address] [U07.1, J96.01]  Pneumonia due to COVID-19 virus [U07.1, J12.82]     Pt intubated and sedated. O2 sat:  95% on MCV. Fever:  99.3    Per RN:      ROS: denies cp, n/v, HA unless stated above.       magnesium sulfate  2,000 mg IntraVENous Once    phenytoin  350 mg IntraVENous Q12H    sodium chloride flush  5-40 mL IntraVENous 2 times per day    heparin flush  3 mL IntraVENous 2 times per day    lisinopril  10 mg Oral Daily    insulin glargine  25 Units SubCUTAneous BID    ascorbic acid  1,000 mg Oral Daily    ceFAZolin  2,000 mg IntraVENous Q8H    sodium chloride (Inhalant)  4 mL Nebulization Q12H    dexamethasone  10 mg IntraVENous Daily    enoxaparin  40 mg SubCUTAneous BID    insulin lispro  0-18 Units SubCUTAneous Q6H    chlorhexidine  15 mL Mouth/Throat BID    Refresh Lacri-Lube   Ophthalmic Q6H    ipratropium-albuterol  1 ampule Inhalation Q4H    sodium chloride flush  10 mL IntraVENous BID    amLODIPine  10 mg Oral Daily    atorvastatin  40 mg Oral Daily    Vitamin D  2,000 Units Oral Daily    zinc sulfate  50 mg Oral Daily    pantoprazole  40 mg IntraVENous Daily     sodium chloride flush, 5-40 mL, PRN  sodium chloride, 25 mL, PRN  heparin flush, 3 mL, PRN  labetalol, 20 mg, Q6H PRN  hydrALAZINE, 20 mg, Q6H PRN  sodium chloride (Inhalant), 4 mL, PRN  sodium chloride flush, 5-40 mL, PRN  sodium chloride, 25 mL, PRN  albuterol, 2.5 mg, Q6H PRN  benzonatate, 200 mg, TID PRN  glucose, 15 g, PRN  dextrose, 12.5 g, PRN  glucagon (rDNA), 1 mg, PRN  dextrose, 100 mL/hr, PRN  sodium chloride, 30 mL, PRN    Objective:    BP (!) 159/76   Pulse 109   Temp 99.3 °F (37.4 °C) (Bladder)   Resp 22   Ht 6' (1.829 m)   Wt (!) 313 lb 7.9 oz (142.2 kg)   SpO2 95%   BMI 42.52 kg/m²     General Appearance: intubated and sedated  Skin: warm and dry. Old facial bruises  Head: normocephalic and atraumatic  Eyes:closed  Neck: neck supple    Pulmonary/Chest: per nursing assessment  Cardiovascular:per nursing assessment  Abdomen: good stool formation  Extremities: unchanged  Neurologic: unchanged    Recent Labs     09/07/21  0538 09/08/21  0545 09/09/21  0625    137 137   K 4.4 4.2 4.1    102 100   CO2 28 27 28   BUN 21* 17 14   CREATININE 0.5* 0.5* 0.4*   GLUCOSE 199* 126* 130*   CALCIUM 9.1 8.6 8.7       Recent Labs     09/07/21  0538 09/08/21  0545 09/09/21  0625   WBC 16.1* 16.1* 17.7*   RBC 3.93 3.74* 4.01   HGB 11.7* 11.1* 11.8*   HCT 36.6* 34.4* 37.3   MCV 93.1 92.0 93.0   MCH 29.8 29.7 29.4   MCHC 32.0 32.3 31.6*   RDW 14.5 14.4 14.6    179 206   MPV 10.1 10.2 9.9     Radiology:  XR CHEST PORTABLE    Result Date: 9/9/2021  EXAMINATION: ONE XRAY VIEW OF THE CHEST 9/9/2021 8:52 am COMPARISON: 09/08/2021 HISTORY: ORDERING SYSTEM PROVIDED HISTORY: covid TECHNOLOGIST PROVIDED HISTORY: Reason for exam:->covid FINDINGS: Significant bilateral infiltrates worse in the left lower lobe are unchanged. There may be some pleural thickening on the left. Lines/tubes are appropriate. No significant change     XR CHEST PORTABLE    Result Date: 9/8/2021  EXAMINATION: ONE XRAY VIEW OF THE CHEST 9/8/2021 5:35 pm COMPARISON: Same day HISTORY: ORDERING SYSTEM PROVIDED HISTORY: picc tip placement TECHNOLOGIST PROVIDED HISTORY: Reason for exam:->picc tip placement FINDINGS: PICC line extends into the right neck above the margin of the exam such that tip positioning is unknown. Support devices otherwise remain.   There is bilateral pulmonary opacification worse on the left and also at least moderate sized left and small right pleural effusions on limited evaluation due to technique and present with distal tip not clearly visualized. 1. Endotracheal tube is 3.2 cm above the donna 2. Complete opacification of left hemithorax with new opacities in previously aerated left upper lung field. US CHEST INCLUDING MEDIASTINUM    Result Date: 9/7/2021  EXAMINATION: ULTRASOUND OF THE CHEST 9/7/2021 1:26 pm COMPARISON: None. HISTORY: ORDERING SYSTEM PROVIDED HISTORY: left side TECHNOLOGIST PROVIDED HISTORY: Reason for exam:->left side What reading provider will be dictating this exam?->CRC FINDINGS: Five ultrasound images of left pleural space shows trace left pleural effusion. Trace left pleural effusion demonstrated. Assessment:    Principal Problem:    Acute hypoxemic respiratory failure due to COVID-19 [facility])  Active Problems:    Type 2 diabetes mellitus without complication, without long-term current use of insulin (HCC)    History of seizures    Hyperlipidemia    Busch catheter problem (HCC)    Sepsis (HCC)    Thrombocytopenia (HCC)    Elevated LFTs  Resolved Problems:    OSCAR (acute kidney injury) ([facility] [address]    Lactic acidosis      Plan:  1. Acute respiratory failure with hypoxia - continue current support.  Did not get tracheostomy yesterday; for tracheostomy today. 2.  COVID 19 pneumonia - completed Remdesivir; received Tocilizumab.  Continue Decadron, Vitamin C, Vitamin D and Zinc.  3.  T2DM - on insulin.  Receiving tube feedings.  Glucose POCT.  Trend labs and treat accordingly. 4.  VAP vs. Tracheobronchitis - continue Cefazolin, day 9/10.    5.  HTN - continue meds.  Monitor vitals and treat accordingly. 6.  HLP - continue meds. 7.  Seizure Disorder - continue meds. 8.  Elevated D dimer - resume Lovenox after procedure when appropriate. NOTE: This report was transcribed using voice recognition software. Every effort was made to ensure accuracy; however, inadvertent computerized transcription errors may be present.     Electronically signed by Jhonny Bravo MD on 9/9/2021 at 9:19 AM

## 2021-09-09 NOTE — PLAN OF CARE
Intensive Care Daily Quality Rounding Checklist      ICU Team Members: Dr. Julio Vergara, clinical pharmacist, pharmacy students, bedside RN,     ICU Day #:  Number 20    Intubation Date:  Aug 23    Ventilator Day #:  Number 18    Central Line Insertion Date:  Aug 30        Day #:  Number 10     Arterial Line Insertion Date: N/A      Day #:     Temporary Hemodialysis Catheter Insertion Date:       Day #  N/A    DVT Prophylaxis: Lovenox    GI Prophylaxis: Protonix    Busch Catheter Insertion Date:  Aug 26       Day #: Number 15      Continued need (if yes, reason documented and discussed with physician):    Skin Issues/ Wounds and ordered treatment discussed on rounds: }    Goals/ Plans for the Day: wean vent, wean sedation, trach and peg 9/9, for PICC line replacement today, remove central line after.

## 2021-09-09 NOTE — PROGRESS NOTES
Came to place a PICC line on this patient. He is going to surgery in 30 minutes. Floor staff will call IV team when he returns to his room.

## 2021-09-10 ENCOUNTER — APPOINTMENT (OUTPATIENT)
Dept: ULTRASOUND IMAGING | Age: 56
DRG: 005 | End: 2021-09-10
Payer: COMMERCIAL

## 2021-09-10 ENCOUNTER — APPOINTMENT (OUTPATIENT)
Dept: GENERAL RADIOLOGY | Age: 56
DRG: 005 | End: 2021-09-10
Payer: COMMERCIAL

## 2021-09-10 LAB
AADO2: 198 MMHG
ALBUMIN SERPL-MCNC: 2.4 G/DL (ref 3.5–5.2)
ALP BLD-CCNC: 88 U/L (ref 40–129)
ALT SERPL-CCNC: 26 U/L (ref 0–40)
ANION GAP SERPL CALCULATED.3IONS-SCNC: 10 MMOL/L (ref 7–16)
AST SERPL-CCNC: 40 U/L (ref 0–39)
B.E.: 1.9 MMOL/L (ref -3–3)
BASOPHILS ABSOLUTE: 0.06 E9/L (ref 0–0.2)
BASOPHILS RELATIVE PERCENT: 0.3 % (ref 0–2)
BILIRUB SERPL-MCNC: 0.5 MG/DL (ref 0–1.2)
BUN BLDV-MCNC: 12 MG/DL (ref 6–20)
C-REACTIVE PROTEIN: 28.4 MG/DL (ref 0–0.4)
CALCIUM SERPL-MCNC: 8.4 MG/DL (ref 8.6–10.2)
CHLORIDE BLD-SCNC: 101 MMOL/L (ref 98–107)
CO2: 25 MMOL/L (ref 22–29)
COHB: 1.4 % (ref 0–1.5)
CREAT SERPL-MCNC: 0.4 MG/DL (ref 0.7–1.2)
CRITICAL: ABNORMAL
DATE ANALYZED: ABNORMAL
DATE OF COLLECTION: ABNORMAL
EOSINOPHILS ABSOLUTE: 0.16 E9/L (ref 0.05–0.5)
EOSINOPHILS RELATIVE PERCENT: 0.9 % (ref 0–6)
FIO2: 45 %
GFR AFRICAN AMERICAN: >60
GFR NON-AFRICAN AMERICAN: >60 ML/MIN/1.73
GLUCOSE BLD-MCNC: 156 MG/DL (ref 74–99)
HCO3: 24 MMOL/L (ref 22–26)
HCT VFR BLD CALC: 35.2 % (ref 37–54)
HEMOGLOBIN: 11.2 G/DL (ref 12.5–16.5)
HHB: 3.9 % (ref 0–5)
IMMATURE GRANULOCYTES #: 0.4 E9/L
IMMATURE GRANULOCYTES %: 2.2 % (ref 0–5)
LAB: ABNORMAL
LYMPHOCYTES ABSOLUTE: 0.87 E9/L (ref 1.5–4)
LYMPHOCYTES RELATIVE PERCENT: 4.9 % (ref 20–42)
Lab: ABNORMAL
MAGNESIUM: 1.9 MG/DL (ref 1.6–2.6)
MCH RBC QN AUTO: 29.6 PG (ref 26–35)
MCHC RBC AUTO-ENTMCNC: 31.8 % (ref 32–34.5)
MCV RBC AUTO: 93.1 FL (ref 80–99.9)
METER GLUCOSE: 154 MG/DL (ref 74–99)
METER GLUCOSE: 157 MG/DL (ref 74–99)
METER GLUCOSE: 182 MG/DL (ref 74–99)
METER GLUCOSE: 190 MG/DL (ref 74–99)
METER GLUCOSE: 195 MG/DL (ref 74–99)
METHB: 0.2 % (ref 0–1.5)
MODE: AC
MONOCYTES ABSOLUTE: 1.2 E9/L (ref 0.1–0.95)
MONOCYTES RELATIVE PERCENT: 6.7 % (ref 2–12)
NEUTROPHILS ABSOLUTE: 15.2 E9/L (ref 1.8–7.3)
NEUTROPHILS RELATIVE PERCENT: 85 % (ref 43–80)
O2 CONTENT: 19.5 ML/DL
O2 SATURATION: 96 % (ref 92–98.5)
O2HB: 94.5 % (ref 94–97)
OPERATOR ID: ABNORMAL
PATIENT TEMP: 37 C
PCO2: 30.9 MMHG (ref 35–45)
PDW BLD-RTO: 14.6 FL (ref 11.5–15)
PEEP/CPAP: 8 CMH2O
PFO2: 1.7 MMHG/%
PH BLOOD GAS: 7.51 (ref 7.35–7.45)
PHOSPHORUS: 2.2 MG/DL (ref 2.5–4.5)
PLATELET # BLD: 211 E9/L (ref 130–450)
PMV BLD AUTO: 10 FL (ref 7–12)
PO2: 76.5 MMHG (ref 75–100)
POTASSIUM SERPL-SCNC: 3.3 MMOL/L (ref 3.5–5)
RBC # BLD: 3.78 E12/L (ref 3.8–5.8)
RI(T): 259 %
RR MECHANICAL: 22 B/MIN
SEDIMENTATION RATE, ERYTHROCYTE: 110 MM/HR (ref 0–15)
SODIUM BLD-SCNC: 136 MMOL/L (ref 132–146)
SOURCE, BLOOD GAS: ABNORMAL
THB: 14.7 G/DL (ref 11.5–16.5)
TIME ANALYZED: 621
TOTAL PROTEIN: 6 G/DL (ref 6.4–8.3)
VT MECHANICAL: 460 ML
WBC # BLD: 17.9 E9/L (ref 4.5–11.5)

## 2021-09-10 PROCEDURE — 6360000002 HC RX W HCPCS: Performed by: SURGERY

## 2021-09-10 PROCEDURE — 2000000000 HC ICU R&B

## 2021-09-10 PROCEDURE — 2580000003 HC RX 258: Performed by: SURGERY

## 2021-09-10 PROCEDURE — 6370000000 HC RX 637 (ALT 250 FOR IP): Performed by: INTERNAL MEDICINE

## 2021-09-10 PROCEDURE — 94640 AIRWAY INHALATION TREATMENT: CPT

## 2021-09-10 PROCEDURE — 6360000002 HC RX W HCPCS: Performed by: SPECIALIST

## 2021-09-10 PROCEDURE — 6370000000 HC RX 637 (ALT 250 FOR IP): Performed by: SURGERY

## 2021-09-10 PROCEDURE — 2500000003 HC RX 250 WO HCPCS: Performed by: SURGERY

## 2021-09-10 PROCEDURE — 83735 ASSAY OF MAGNESIUM: CPT

## 2021-09-10 PROCEDURE — 36592 COLLECT BLOOD FROM PICC: CPT

## 2021-09-10 PROCEDURE — 85651 RBC SED RATE NONAUTOMATED: CPT

## 2021-09-10 PROCEDURE — 6360000002 HC RX W HCPCS: Performed by: INTERNAL MEDICINE

## 2021-09-10 PROCEDURE — 36415 COLL VENOUS BLD VENIPUNCTURE: CPT

## 2021-09-10 PROCEDURE — 86140 C-REACTIVE PROTEIN: CPT

## 2021-09-10 PROCEDURE — 84100 ASSAY OF PHOSPHORUS: CPT

## 2021-09-10 PROCEDURE — 85025 COMPLETE CBC W/AUTO DIFF WBC: CPT

## 2021-09-10 PROCEDURE — 2580000003 HC RX 258: Performed by: INTERNAL MEDICINE

## 2021-09-10 PROCEDURE — 93970 EXTREMITY STUDY: CPT

## 2021-09-10 PROCEDURE — 36600 WITHDRAWAL OF ARTERIAL BLOOD: CPT

## 2021-09-10 PROCEDURE — 71045 X-RAY EXAM CHEST 1 VIEW: CPT

## 2021-09-10 PROCEDURE — 82962 GLUCOSE BLOOD TEST: CPT

## 2021-09-10 PROCEDURE — 94003 VENT MGMT INPAT SUBQ DAY: CPT

## 2021-09-10 PROCEDURE — 2500000003 HC RX 250 WO HCPCS: Performed by: INTERNAL MEDICINE

## 2021-09-10 PROCEDURE — 80053 COMPREHEN METABOLIC PANEL: CPT

## 2021-09-10 PROCEDURE — 82805 BLOOD GASES W/O2 SATURATION: CPT

## 2021-09-10 PROCEDURE — C9113 INJ PANTOPRAZOLE SODIUM, VIA: HCPCS | Performed by: SURGERY

## 2021-09-10 RX ORDER — TOBRAMYCIN INHALATION SOLUTION 300 MG/5ML
300 INHALANT RESPIRATORY (INHALATION) 2 TIMES DAILY
Status: DISCONTINUED | OUTPATIENT
Start: 2021-09-10 | End: 2021-09-10 | Stop reason: SDUPTHER

## 2021-09-10 RX ORDER — OXYCODONE HYDROCHLORIDE 5 MG/1
10 TABLET ORAL EVERY 8 HOURS
Status: DISCONTINUED | OUTPATIENT
Start: 2021-09-10 | End: 2021-09-13

## 2021-09-10 RX ORDER — POTASSIUM CHLORIDE 29.8 MG/ML
40 INJECTION INTRAVENOUS ONCE
Status: COMPLETED | OUTPATIENT
Start: 2021-09-10 | End: 2021-09-10

## 2021-09-10 RX ORDER — DIAZEPAM 5 MG/1
5 TABLET ORAL EVERY 8 HOURS
Status: DISCONTINUED | OUTPATIENT
Start: 2021-09-10 | End: 2021-09-13

## 2021-09-10 RX ORDER — MAGNESIUM SULFATE IN WATER 40 MG/ML
2000 INJECTION, SOLUTION INTRAVENOUS ONCE
Status: COMPLETED | OUTPATIENT
Start: 2021-09-10 | End: 2021-09-10

## 2021-09-10 RX ORDER — TOBRAMYCIN SULFATE 40 MG/ML
300 INJECTION, SOLUTION INTRAMUSCULAR; INTRAVENOUS EVERY 12 HOURS
Status: DISCONTINUED | OUTPATIENT
Start: 2021-09-10 | End: 2021-09-20 | Stop reason: HOSPADM

## 2021-09-10 RX ADMIN — SODIUM CHLORIDE, PRESERVATIVE FREE 300 UNITS: 5 INJECTION INTRAVENOUS at 20:15

## 2021-09-10 RX ADMIN — Medication 2000 MG: at 04:10

## 2021-09-10 RX ADMIN — LABETALOL HYDROCHLORIDE 20 MG: 5 INJECTION INTRAVENOUS at 10:15

## 2021-09-10 RX ADMIN — LABETALOL HYDROCHLORIDE 20 MG: 5 INJECTION INTRAVENOUS at 23:10

## 2021-09-10 RX ADMIN — ACETAMINOPHEN 650 MG: 650 SOLUTION ORAL at 17:19

## 2021-09-10 RX ADMIN — ENOXAPARIN SODIUM 135 MG: 150 INJECTION SUBCUTANEOUS at 20:15

## 2021-09-10 RX ADMIN — DIAZEPAM 5 MG: 5 TABLET ORAL at 20:15

## 2021-09-10 RX ADMIN — IPRATROPIUM BROMIDE AND ALBUTEROL SULFATE 1 AMPULE: .5; 3 SOLUTION RESPIRATORY (INHALATION) at 15:47

## 2021-09-10 RX ADMIN — IPRATROPIUM BROMIDE AND ALBUTEROL SULFATE 1 AMPULE: .5; 3 SOLUTION RESPIRATORY (INHALATION) at 02:39

## 2021-09-10 RX ADMIN — ACETAMINOPHEN 650 MG: 650 SOLUTION ORAL at 23:10

## 2021-09-10 RX ADMIN — POTASSIUM PHOSPHATE, MONOBASIC AND POTASSIUM PHOSPHATE, DIBASIC 30 MMOL: 224; 236 INJECTION, SOLUTION, CONCENTRATE INTRAVENOUS at 09:37

## 2021-09-10 RX ADMIN — TOBRAMYCIN SULFATE 300 MG: 40 INJECTION, SOLUTION INTRAMUSCULAR; INTRAVENOUS at 20:36

## 2021-09-10 RX ADMIN — LISINOPRIL 10 MG: 10 TABLET ORAL at 08:44

## 2021-09-10 RX ADMIN — INSULIN LISPRO 3 UNITS: 100 INJECTION, SOLUTION INTRAVENOUS; SUBCUTANEOUS at 07:45

## 2021-09-10 RX ADMIN — OXYCODONE HYDROCHLORIDE AND ACETAMINOPHEN 1000 MG: 500 TABLET ORAL at 08:44

## 2021-09-10 RX ADMIN — ENOXAPARIN SODIUM 90 MG: 100 INJECTION SUBCUTANEOUS at 12:03

## 2021-09-10 RX ADMIN — SODIUM CHLORIDE, PRESERVATIVE FREE 10 ML: 5 INJECTION INTRAVENOUS at 08:43

## 2021-09-10 RX ADMIN — CHLORHEXIDINE GLUCONATE 0.12% ORAL RINSE 15 ML: 1.2 LIQUID ORAL at 20:16

## 2021-09-10 RX ADMIN — PHENYTOIN SODIUM 350 MG: 50 INJECTION INTRAMUSCULAR; INTRAVENOUS at 10:18

## 2021-09-10 RX ADMIN — Medication 2000 UNITS: at 08:44

## 2021-09-10 RX ADMIN — OXYCODONE 10 MG: 5 TABLET ORAL at 12:04

## 2021-09-10 RX ADMIN — SODIUM CHLORIDE, PRESERVATIVE FREE 300 UNITS: 5 INJECTION INTRAVENOUS at 08:43

## 2021-09-10 RX ADMIN — SODIUM CHLORIDE SOLN NEBU 3% 4 ML: 3 NEBU SOLN at 09:03

## 2021-09-10 RX ADMIN — INSULIN GLARGINE 25 UNITS: 100 INJECTION, SOLUTION SUBCUTANEOUS at 08:41

## 2021-09-10 RX ADMIN — ACETAMINOPHEN 650 MG: 650 SOLUTION ORAL at 10:26

## 2021-09-10 RX ADMIN — Medication 75 MCG/HR: at 16:10

## 2021-09-10 RX ADMIN — CHLORHEXIDINE GLUCONATE 0.12% ORAL RINSE 15 ML: 1.2 LIQUID ORAL at 08:43

## 2021-09-10 RX ADMIN — INSULIN LISPRO 3 UNITS: 100 INJECTION, SOLUTION INTRAVENOUS; SUBCUTANEOUS at 23:21

## 2021-09-10 RX ADMIN — ENOXAPARIN SODIUM 40 MG: 40 INJECTION SUBCUTANEOUS at 08:43

## 2021-09-10 RX ADMIN — DEXAMETHASONE SODIUM PHOSPHATE 10 MG: 4 INJECTION, SOLUTION INTRA-ARTICULAR; INTRALESIONAL; INTRAMUSCULAR; INTRAVENOUS; SOFT TISSUE at 08:44

## 2021-09-10 RX ADMIN — PANTOPRAZOLE SODIUM 40 MG: 40 INJECTION, POWDER, FOR SOLUTION INTRAVENOUS at 08:43

## 2021-09-10 RX ADMIN — IPRATROPIUM BROMIDE AND ALBUTEROL SULFATE 1 AMPULE: .5; 3 SOLUTION RESPIRATORY (INHALATION) at 20:00

## 2021-09-10 RX ADMIN — IPRATROPIUM BROMIDE AND ALBUTEROL SULFATE 1 AMPULE: .5; 3 SOLUTION RESPIRATORY (INHALATION) at 09:02

## 2021-09-10 RX ADMIN — LABETALOL HYDROCHLORIDE 20 MG: 5 INJECTION INTRAVENOUS at 17:14

## 2021-09-10 RX ADMIN — IPRATROPIUM BROMIDE AND ALBUTEROL SULFATE 1 AMPULE: .5; 3 SOLUTION RESPIRATORY (INHALATION) at 13:20

## 2021-09-10 RX ADMIN — Medication 2000 MG: at 12:03

## 2021-09-10 RX ADMIN — ATORVASTATIN CALCIUM 40 MG: 40 TABLET, FILM COATED ORAL at 20:15

## 2021-09-10 RX ADMIN — ACETAMINOPHEN 650 MG: 650 SOLUTION ORAL at 05:16

## 2021-09-10 RX ADMIN — AMLODIPINE BESYLATE 10 MG: 10 TABLET ORAL at 08:42

## 2021-09-10 RX ADMIN — PHENYTOIN SODIUM 350 MG: 50 INJECTION INTRAMUSCULAR; INTRAVENOUS at 22:16

## 2021-09-10 RX ADMIN — INSULIN LISPRO 3 UNITS: 100 INJECTION, SOLUTION INTRAVENOUS; SUBCUTANEOUS at 17:22

## 2021-09-10 RX ADMIN — INSULIN LISPRO 3 UNITS: 100 INJECTION, SOLUTION INTRAVENOUS; SUBCUTANEOUS at 00:43

## 2021-09-10 RX ADMIN — POTASSIUM CHLORIDE 40 MEQ: 29.8 INJECTION, SOLUTION INTRAVENOUS at 09:36

## 2021-09-10 RX ADMIN — OXYCODONE 10 MG: 5 TABLET ORAL at 20:15

## 2021-09-10 RX ADMIN — ZINC SULFATE 220 MG (50 MG) CAPSULE 50 MG: CAPSULE at 08:42

## 2021-09-10 RX ADMIN — INSULIN LISPRO 3 UNITS: 100 INJECTION, SOLUTION INTRAVENOUS; SUBCUTANEOUS at 12:39

## 2021-09-10 RX ADMIN — MAGNESIUM SULFATE HEPTAHYDRATE 2000 MG: 40 INJECTION, SOLUTION INTRAVENOUS at 09:37

## 2021-09-10 RX ADMIN — SODIUM CHLORIDE SOLN NEBU 3% 4 ML: 3 NEBU SOLN at 20:01

## 2021-09-10 RX ADMIN — DIAZEPAM 5 MG: 5 TABLET ORAL at 12:04

## 2021-09-10 RX ADMIN — Medication 3 MG/HR: at 12:39

## 2021-09-10 RX ADMIN — INSULIN GLARGINE 25 UNITS: 100 INJECTION, SOLUTION SUBCUTANEOUS at 20:32

## 2021-09-10 ASSESSMENT — PULMONARY FUNCTION TESTS
PIF_VALUE: 31
PIF_VALUE: 29
PIF_VALUE: 32
PIF_VALUE: 27
PIF_VALUE: 26
PIF_VALUE: 29
PIF_VALUE: 44
PIF_VALUE: 33
PIF_VALUE: 38
PIF_VALUE: 30
PIF_VALUE: 28
PIF_VALUE: 28
PIF_VALUE: 37
PIF_VALUE: 29
PIF_VALUE: 30
PIF_VALUE: 30
PIF_VALUE: 32
PIF_VALUE: 28
PIF_VALUE: 34
PIF_VALUE: 31
PIF_VALUE: 33
PIF_VALUE: 28
PIF_VALUE: 30
PIF_VALUE: 28
PIF_VALUE: 33
PIF_VALUE: 29

## 2021-09-10 ASSESSMENT — PAIN SCALES - GENERAL
PAINLEVEL_OUTOF10: 0

## 2021-09-10 NOTE — PLAN OF CARE
Intensive Care Daily Quality Rounding Checklist      ICU Team Members: Dr. Andres Arellano, clinical pharmacist, pharmacy students, bedside RN, ,Norma(resident)    ICU Day #:  Number 21    Intubation Date:  Aug 23    Ventilator Day #:  Number 19    Central Line Insertion Date:  Aug 30        Day #:  Number 11     Arterial Line Insertion Date: N/A      Day #:     Temporary Hemodialysis Catheter Insertion Date:       Day #  N/A    DVT Prophylaxis: Lovenox    GI Prophylaxis: Protonix    Busch Catheter Insertion Date:  Aug 26       Day #: Number 16      Continued need (if yes, reason documented and discussed with physician):    Skin Issues/ Wounds and ordered treatment discussed on rounds: }    Goals/ Plans for the Day: wean vent, wean sedation,labs

## 2021-09-10 NOTE — PROGRESS NOTES
[facility] Progress Note    Admitting Date and Time: 8/21/2021  9:35 AM  Admit Dx: Acute hypoxemic respiratory failure due to COVID-19 (HCC) [U07.1, J96.01]  Pneumonia due to COVID-19 virus [U07.1, J12.82]    Subjective:  Patient is being followed for Acute hypoxemic respiratory failure due to COVID-19 ([address] [U07.1, J96.01]  Pneumonia due to COVID-19 virus [U07.1, J12.82]     Pt awake. Follows with eyes. Does not follow commands    O2 sat:  94% on AC 22/460/40%/+8     Fever:  100.9    Per RN:  Still requiring sedation. Not following commands. Continue to monitor and perform assessments    ROS: denies cp, n/v, HA unless stated above.       potassium phosphate IVPB  30 mmol IntraVENous Once    potassium chloride  40 mEq IntraVENous Once    magnesium sulfate  2,000 mg IntraVENous Once    lidocaine PF  5 mL IntraDERmal Once    sodium chloride flush  5-40 mL IntraVENous 2 times per day    heparin flush  3 mL IntraVENous 2 times per day    phenytoin  350 mg IntraVENous Q12H    lisinopril  10 mg Oral Daily    insulin glargine  25 Units SubCUTAneous BID    ascorbic acid  1,000 mg Oral Daily    ceFAZolin  2,000 mg IntraVENous Q8H    sodium chloride (Inhalant)  4 mL Nebulization Q12H    dexamethasone  10 mg IntraVENous Daily    enoxaparin  40 mg SubCUTAneous BID    insulin lispro  0-18 Units SubCUTAneous Q6H    chlorhexidine  15 mL Mouth/Throat BID    ipratropium-albuterol  1 ampule Inhalation Q4H    amLODIPine  10 mg Oral Daily    atorvastatin  40 mg Oral Daily    Vitamin D  2,000 Units Oral Daily    zinc sulfate  50 mg Oral Daily    pantoprazole  40 mg IntraVENous Daily     sodium chloride flush, 5-40 mL, PRN  sodium chloride, 25 mL, PRN  heparin flush, 3 mL, PRN  acetaminophen, 650 mg, Q4H PRN  labetalol, 20 mg, Q6H PRN  hydrALAZINE, 20 mg, Q6H PRN  sodium chloride (Inhalant), 4 mL, PRN  albuterol, 2.5 mg, Q6H PRN  benzonatate, 200 mg, TID PRN  glucose, 15 g, PRN  dextrose, 12.5 g, PRN  glucagon (rDNA), 1 mg, PRN  dextrose, 100 mL/hr, PRN  sodium chloride, 30 mL, PRN      Objective:    BP (!) 172/81   Pulse 109   Temp (!) 46.9 °F (8.3 °C) (Bladder)   Temp 100.9  Resp (!) 48   Ht 6' (1.829 m)   Wt (!) 313 lb 7.9 oz (142.2 kg)   SpO2 94%   BMI 42.52 kg/m²     General Appearance: awake not following commands  Skin: warm and dry. Unchanged facial bruising  Head: normocephalic and atraumatic  Eyes: pupils equal, round, and reactive to light, extraocular eye movements intact, conjunctivae normal  Neck: neck supple and non tender without mass   Pulmonary/Chest: crackles bilaterally- no wheezes, no respiratory distress. Able to lie supine  Cardiovascular: normal rate, normal S1 and S2 and no carotid bruits  Abdomen: soft, non-tender, non-distended, normal bowel sounds, no masses or organomegaly  Extremities: no cyanosis, no clubbing and 1+/2+ edema  Neurologic: not following commands; following with eyes    Recent Labs     09/08/21  0545 09/09/21  0625 09/10/21  0548    137 136   K 4.2 4.1 3.3*    100 101   CO2 27 28 25   BUN 17 14 12   CREATININE 0.5* 0.4* 0.4*   GLUCOSE 126* 130* 156*   CALCIUM 8.6 8.7 8.4*       Recent Labs     09/08/21  0545 09/09/21  0625 09/10/21  0548   WBC 16.1* 17.7* 17.9*   RBC 3.74* 4.01 3.78*   HGB 11.1* 11.8* 11.2*   HCT 34.4* 37.3 35.2*   MCV 92.0 93.0 93.1   MCH 29.7 29.4 29.6   MCHC 32.3 31.6* 31.8*   RDW 14.4 14.6 14.6    206 211   MPV 10.2 9.9 10.0     Radiology:   XR CHEST PORTABLE    Result Date: 9/10/2021  EXAMINATION: ONE XRAY VIEW OF THE CHEST 9/10/2021 7:16 am COMPARISON: 09/09/2021 HISTORY: ORDERING SYSTEM PROVIDED HISTORY: Acute resp failure, COVID TECHNOLOGIST PROVIDED HISTORY: Reason for exam:->Acute resp failure, COVID FINDINGS: Interval placement of tracheostomy tube terminating above the donna. Nasogastric tube is been removed. Right IJ catheter remains in place.  Cardiac silhouette is prominent but unchanged from previous. No pneumothorax. Multifocal bilateral lung parenchymal infiltrates appear slightly worsened overall and remain greater towards the left. No other interval change. Interval placement of tracheostomy tube. Bilateral parenchymal infiltrates appear subtly worsened. Continued follow-up recommended. XR CHEST PORTABLE    Result Date: 9/9/2021  EXAMINATION: ONE XRAY VIEW OF THE CHEST 9/9/2021 8:52 am COMPARISON: 09/08/2021 HISTORY: ORDERING SYSTEM PROVIDED HISTORY: covid TECHNOLOGIST PROVIDED HISTORY: Reason for exam:->covid FINDINGS: Significant bilateral infiltrates worse in the left lower lobe are unchanged. There may be some pleural thickening on the left. Lines/tubes are appropriate. No significant change     XR CHEST PORTABLE    Result Date: 9/8/2021  EXAMINATION: ONE XRAY VIEW OF THE CHEST 9/8/2021 5:35 pm COMPARISON: Same day HISTORY: ORDERING SYSTEM PROVIDED HISTORY: picc tip placement TECHNOLOGIST PROVIDED HISTORY: Reason for exam:->picc tip placement FINDINGS: PICC line extends into the right neck above the margin of the exam such that tip positioning is unknown. Support devices otherwise remain. There is bilateral pulmonary opacification worse on the left and also at least moderate sized left and small right pleural effusions on limited evaluation due to technique and body habitus. 1.  PICC line extends into the right neck with tip not seen, repositioning recommended 2. More conspicuous pulmonary opacities may be due to technical differences or worsening     XR CHEST PORTABLE    Result Date: 9/8/2021  EXAMINATION: ONE XRAY VIEW OF THE CHEST 9/8/2021 4:17 pm COMPARISON: 6:06 a.m. HISTORY: ORDERING SYSTEM PROVIDED HISTORY: post bronch TECHNOLOGIST PROVIDED HISTORY: Reason for exam:->post bronch FINDINGS: Bilateral airspace opacities with improved aeration of the left lung compared to prior study. No pneumothorax. The cardiomediastinal silhouette is without acute process.  The osseous structures are without acute process. Stable positioning of lines and tubes. Bilateral airspace opacities with improved aeration of the left lung compared to prior study. Assessment:    Principal Problem:    Acute hypoxemic respiratory failure due to COVID-19 [facility])  Active Problems:    Type 2 diabetes mellitus without complication, without long-term current use of insulin (HCC)    History of seizures    Hyperlipidemia    Busch catheter problem (HCC)    Sepsis (HCC)    Thrombocytopenia (HCC)    Elevated LFTs  Resolved Problems:    OSCAR (acute kidney injury) ([address]    Lactic acidosis      Plan:  1. Acute hypoxic respiratory failure, most likely due to viral pneumonia, O2 sat was below 90% on room air, requiring mechanical ventilation. Successful tracheostomy placement. 2.  Acute VAP with MSSA/E.coli - awaiting results of the BAL cultures. Continue IV Ancef 10/10. Further recommendations forthcoming from ID.  3. Severe COVID 19 infection - completed Remdesivir IV and Tocilizumab x 1. Decadron 10 mg IV daily. To taper over the next several days. May need LTAC for his respiratory needs. PT/OT. Patient with Marginal improvement. Unclear of the extent of his Neurologic injury. To determine over the next few days. 4.  Hypokalemia - K+ 3.3. Replete as per protocol. Trend labs and treat accordingly. Overall some progress made but patient's prognosis is guarded. NOTE: This report was transcribed using voice recognition software. Every effort was made to ensure accuracy; however, inadvertent computerized transcription errors may be present.     Electronically signed by Jessica Upton MD on 9/10/2021 at 9:37 AM

## 2021-09-10 NOTE — ANESTHESIA POSTPROCEDURE EVALUATION
Department of Anesthesiology  Postprocedure Note    Patient: Jun Bustillo  MRN: [id number]  YOB: 1965  Date of evaluation: 9/9/2021  Time:  9:22 PM     Procedure Summary     Date: 09/09/21 Room / Location: [facility] 08 / 106 [facility]    Anesthesia Start: 7223 Anesthesia Stop: 1652    Procedures:       TRACHEOTOMY (N/A Neck)      EGD ESOPHAGOGASTRODUODENOSCOPY PEG TUBE INSERTION (N/A ) Diagnosis: (/)    Surgeons: Viviana Meade MD Responsible Provider: Odalis Zimmerman MD    Anesthesia Type: general ASA Status: 4          Anesthesia Type: general    Sher Phase I:      Sher Phase II:      Last vitals: Reviewed and per EMR flowsheets.        Anesthesia Post Evaluation    Patient location during evaluation: ICU  Patient participation: complete - patient cannot participate  Level of consciousness: sedated and ventilated  Airway patency: patent  Nausea & Vomiting: no vomiting and no nausea  Complications: no  Cardiovascular status: blood pressure returned to baseline  Respiratory status: intubated and ventilator  Hydration status: euvolemic

## 2021-09-10 NOTE — PROGRESS NOTES
Pulmonary Subsequent Hospital F/U note    Patient is being followed for: acute hypoxic respiratory failure, severe covid-19 pneumonia     Interval HPI:  Remains intubated and sedated  Paralytic has been turned off   Vent settings 40% Pi 20, RR 26, PEEP 8  S/p bronchoscopy 9/8 with mucus plugging of the L bronchial tree and RLL  S/p trache and PEG placement 9/9/21      ROS:  Unable to obtain     Exam:  BP (!) 165/78   Pulse 96   Temp (!) 46.9 °F (8.3 °C) (Bladder)   Resp (!) 34   Ht 6' (1.829 m)   Wt (!) 313 lb 7.9 oz (142.2 kg)   SpO2 96%   BMI 42.52 kg/m²    Due to the current efforts to prevent transmission of COVID-19 and also the need to preserve PPE for other caregivers, a face-to-face encounter with the patient was not performed. That being said, all relevant records and diagnostic tests were reviewed, including laboratory results and imaging. Please reference any relevant documentation elsewhere. Care will be coordinated with the primary service. Data:    Oximetry:  SpO2 Readings from Last 1 Encounters:   09/10/21 96%       Imaging personally reviewed by myself:  CXR  FINDINGS:   Significant bilateral infiltrates worse in the left lower lobe are unchanged. There may be some pleural thickening on the left.  Lines/tubes are   appropriate.           Impression   No significant change       DVT     Occlusive deep vein thrombosis involving the left brachial veins.  Thrombus   is also noted in the cephalic and basilic veins in the left arm.       No evidence of deep vein thrombosis in the right upper extremity.        Respiratory culture MSSA, Ecoli     crp 27    Pertinent labs reviewed and noted:  Lab Results   Component Value Date    WBC 17.9 09/10/2021    HGB 11.2 09/10/2021    HCT 35.2 09/10/2021    MCV 93.1 09/10/2021    MCH 29.6 09/10/2021    MCHC 31.8 09/10/2021    RDW 14.6 09/10/2021     09/10/2021    MPV 10.0 09/10/2021     Lab Results   Component Value Date     09/10/2021    K 3.3 09/10/2021    K 3.7 08/21/2021     09/10/2021    CO2 25 09/10/2021    BUN 12 09/10/2021    CREATININE 0.4 09/10/2021    LABALBU 2.4 09/10/2021    CALCIUM 8.4 09/10/2021    GFRAA >60 09/10/2021    LABGLOM >60 09/10/2021     Assessment:  1. Acute hypoxic respiratory failure  2. Severe covid-19 pneumonia  3. Bacterial pneumonia with MSSA and Ecoli   4. Mucus plugging of bronchi s/p bronchoscopy 9/8/21  5. DVT LUE    Plan:  1. Trache PEG yesterday 9/9/21  2. Completed Remdesivir and Tocilizumab  3. Antibiotics as per ID  4. Chest physio/bronchodilators, s/p bronch  5. Continue decadron, stop over the next several days   6. Full dose anticoagulation  7.  Off the paralytic      Electronically signed by Cristofer Adams MD on 9/10/2021 at 11:41 AM

## 2021-09-10 NOTE — PLAN OF CARE
Problem: Airway Clearance - Ineffective  Goal: Achieve or maintain patent airway  Outcome: Met This Shift     Problem: Gas Exchange - Impaired  Goal: Absence of hypoxia  9/10/2021 0713 by Ericka Alejandra RN  Outcome: Met This Shift  9/9/2021 1826 by Sharmila Segundo RN  Outcome: Met This Shift  Goal: Promote optimal lung function  Outcome: Met This Shift     Problem: Breathing Pattern - Ineffective  Goal: Ability to achieve and maintain a regular respiratory rate  9/10/2021 0713 by Ericka Alejandra RN  Outcome: Met This Shift  9/9/2021 1826 by Sharmila Segundo RN  Outcome: Met This Shift     Problem:  Body Temperature -  Risk of, Imbalanced  Goal: Complications related to the disease process, condition or treatment will be avoided or minimized  Outcome: Met This Shift     Problem: Isolation Precautions - Risk of Spread of Infection  Goal: Prevent transmission of infection  Outcome: Met This Shift     Problem: Nutrition Deficits  Goal: Optimize nutritional status  Outcome: Met This Shift     Problem: Risk for Fluid Volume Deficit  Goal: Maintain normal heart rhythm  Outcome: Met This Shift  Goal: Maintain absence of muscle cramping  Outcome: Met This Shift  Goal: Maintain normal serum potassium, sodium, calcium, phosphorus, and pH  9/10/2021 0713 by Ericka Alejandra RN  Outcome: Met This Shift  9/9/2021 1826 by Sharmila Segundo RN  Outcome: Met This Shift     Problem: Loneliness or Risk for Loneliness  Goal: Demonstrate positive use of time alone when socialization is not possible  Outcome: Met This Shift     Problem: Fatigue  Goal: Verbalize increase energy and improved vitality  Outcome: Met This Shift     Problem: Skin Integrity:  Goal: Will show no infection signs and symptoms  Description: Will show no infection signs and symptoms  Outcome: Met This Shift  Goal: Absence of new skin breakdown  Description: Absence of new skin breakdown  9/10/2021 0713 by Ericka Alejandra RN  Outcome: Met This Shift  9/9/2021 1826 by Tobias Bull RN  Outcome: Ongoing     Problem: Non-Violent Restraints  Goal: Removal from restraints as soon as assessed to be safe  Outcome: Met This Shift  Goal: No harm/injury to patient while restraints in use  9/10/2021 0713 by Paxton Partida RN  Outcome: Met This Shift  9/9/2021 1826 by Tobias Bull RN  Outcome: Met This Shift  Goal: Patient's dignity will be maintained  9/10/2021 0713 by Paxton Partida RN  Outcome: Met This Shift  9/9/2021 1826 by Tobias Bull RN  Outcome: Met This Shift

## 2021-09-10 NOTE — PROGRESS NOTES
[facility]  [facility]  Progress Note      Chief Complaint   Patient presents with    Shortness of Breath     covid, per family SaO2 75% RA, hx of DM and panic attack        SUBJECTIVE:  8/21/21  Prone. FiO2 60% and PEEP of 16    8/22/2021  Patient is tolerating medications. No reported adverse drug reactions. No nausea, vomiting, diarrhea. Afebrile BiPAP 100% FiO2 with breathing at 44 times a minute and probably is going to decompensate  8/23/2021  Not doing well intubated on a rotating bed  FiO2 70% and PEEP 16  8/24/2021  Prone 50% FiO2-PEEP of 10  Issues with the urinary Busch last night this was changed  Paralyzed and on Versed  8/25/2021  Paralyzed and sedated with Versed no pressors  Prone intubated on 50% FiO2, PEEP of 10  Tolerating medications    8/26/21  Paralyzed and sedated with Versed no pressors  Prone intubated on 70% FiO2, PEEP of 10  Does not tolerate supine position    8/27/2021  Afebrile  Still prone on FiO2 of 100%  Paralyzed and sedated    8/28/2021  The patient is still in the ICU. He is pronated. FiO2 100%. Is still sedated and paralyzed. Fair amount of thick, brown secretions    8/29/2021  The patient is still on a RotoProne bed. You are still intubated, sedated and paralyzed. O2 is being weaned down. FiO2 55%. PEEP 12.    8/30/2021  Patient remains intubated, sedated and paralyzed. He is still on a RotoProne bed. Supine now. 8/31/2021  He is still on a RotoProne bed. Still having fair amount of secretions from the nostrils. Minimal from the ET tube, however. Tube feeding seems to be coming out of his nose. 9/1/2021  He is supinated but still on a RotoProne bed. He still has fair amount of secretions from the ET tube. Tolerating antibiotic    9/2/2021. He is still on a RotoProne bed. He is pronated. Less secretions from the ET tube and nostrils. FiO2 50%. PEEP 14.    9/3/2021. He is off the RotoProne bed. Tolerating supination.   Tolerating zinc sulfate  50 mg Oral Daily    pantoprazole  40 mg IntraVENous Daily     Continuous Infusions:   sodium chloride      fentaNYL 5 mcg/ml in 0.9%  ml infusion 75 mcg/hr (09/10/21 0755)    propofol Stopped (21 1713)    midazolam 3 mg/hr (09/10/21 0520)    dextrose       PRN Meds:sodium chloride flush, sodium chloride, heparin flush, acetaminophen, labetalol, hydrALAZINE, sodium chloride (Inhalant), albuterol, benzonatate, glucose, dextrose, glucagon (rDNA), dextrose, sodium chloride    OBJECTIVE:  BP (!) 166/71   Pulse 112   Temp (!) 46.9 °F (8.3 °C) (Bladder)   Resp 30   Ht 6' (1.829 m)   Wt (!) 313 lb 7.9 oz (142.2 kg)   SpO2 93%   BMI 42.52 kg/m²   Temp  Av.7 °F (34.8 °C)  Min: 46.9 °F (8.3 °C)  Max: 101.7 °F (38.7 °C)  Constitutional: The patient is lying in bed in the ICU. He is more awake and alert. Shakes and nods head. FiO2 45%. PEEP 8. Skin: Warm and dry. No rashes were noted. HEENT: Round and nonreactive pupils. Decubitus lesions on cheeks, hands and lower extremities noted. Neck: Supple. Tracheostomy. PEG. Chest: Coarse breath sounds bilaterally. No crackles  Cardiovascular: Heart sounds rhythmic and regular. Tachycardic. Abdomen: Round, soft and benign to palpation. Genitourinary: Busch catheter  Extremities: Moderate edema. Blistering of hands and feet. Some are unroofed. Lines: Right PICC 2021. Right IJ TLC 2021 (changed over guidewire).   Busch changed 3/24/2021    Laboratory and Tests Review:  Lab Results   Component Value Date    WBC 17.9 (H) 09/10/2021    WBC 17.7 (H) 2021    WBC 16.1 (H) 2021    HGB 11.2 (L) 09/10/2021    HCT 35.2 (L) 09/10/2021    MCV 93.1 09/10/2021     09/10/2021     Lab Results   Component Value Date    NEUTROABS 15.20 (H) 09/10/2021    NEUTROABS 14.03 (H) 2021    NEUTROABS 13.69 (H) 2021     No results found for: [facility]  Lab Results   Component Value Date    ALT 26 09/10/2021    AST 40 (H) 09/10/2021    ALKPHOS 88 09/10/2021    BILITOT 0.5 09/10/2021     Lab Results   Component Value Date     09/10/2021    K 3.3 09/10/2021    K 3.7 08/21/2021     09/10/2021    CO2 25 09/10/2021    BUN 12 09/10/2021    CREATININE 0.4 09/10/2021    CREATININE 0.4 09/09/2021    CREATININE 0.5 09/08/2021    GFRAA >60 09/10/2021    LABGLOM >60 09/10/2021    GLUCOSE 156 09/10/2021    PROT 6.0 09/10/2021    LABALBU 2.4 09/10/2021    CALCIUM 8.4 09/10/2021    BILITOT 0.5 09/10/2021    ALKPHOS 88 09/10/2021    AST 40 09/10/2021    ALT 26 09/10/2021     Lab Results   Component Value Date    CRP 27.4 (H) 09/09/2021    CRP 22.0 (H) 09/08/2021    CRP 16.2 (H) 09/07/2021     Lab Results   Component Value Date    SEDRATE 111 (H) 09/09/2021    SEDRATE 75 (H) 09/08/2021    SEDRATE 68 (H) 09/07/2021     Radiology:      Microbiology:   Streptococcus pneumoniae/Legionella urine Ag: negative  Nares screen MRSA: Negative  Urine culture 8/24/2021: Negative  Blood cultures 8/21/2021: Negative x2  Respiratory culture 8/27/2021: OP terrell reduced  Respiratory culture 8/30/2021: E. coli, MSSA   BAL 9/8/2021: OP terrell reduced. GNR, GNR, MSSA. PjP negative    ASSESSMENT:  · Severe Covid pneumonia causing acute respiratory failure CRP has come down nicely. Completed Remdesivir. Received Tocilizumab  · VAP versus tracheobronchitis. Cultures growing MSSA and E. coli. Improving. Treated with 10 days of Cefazolin  · Status post bronchoscopy 9/8/2021. Cultures growing rare MSSA, GNR in light GNR. This represents colonization rather than infection. Possible tracheobronchitis  · Leukocytosis associated to the above, stable    PLAN:  · Stop Cefazolin  · Start inhaled Tobramycin for possible tracheobronchitis  · PEG and tracheostomy today  · Check final BAL cultures  · We will follow with you    Spoke with nursing.     Lashell Shaw MD  9:07 AM   9/10/2021

## 2021-09-10 NOTE — PROGRESS NOTES
Critical Care Team - Daily Progress Note      Date and time: 9/10/2021 1:32 PM  Patient's name:  Bernard Travis  Medical Record Number: [id number]  Patient's account/billing number: [de-identified]  Patient's YOB: 1965  Age: 60 y.o. Date of Admission: 2021  9:35 AM  Length of stay during current admission: 20      Primary Care Physician: Loco Salazar, DO  ICU Attending Physician: Dr. Rodriguez Hallmark Status: Full Code    Reason for ICU admission: Respiratory failure due to COVID-19      SUBJECTIVE:     OVERNIGHT EVENTS:           : tolerating longer periods supine, thick dark brown secretions     : secretions out of NG, michelle prone     : Decreased urine output and increased creatinine today, continues with thick secretions     : Cr and Uop improved     9/3: P/F improved and has remained supine     : thick secretions     : desat with turning, 1500 loose stool from [facility]    : Patient trying low tidal volumes overnight occasionally, otherwise no other acute events. : No acute events overnight    : Did receive labetalol 2 times overnight for elevated blood pressures otherwise unremarkable good urine output. Will need essentially exchanged today, bronchoscopy for retained left lung and bibasilar mucopurulent secretions     9/10-no acute events overnight did receive trach and PEG yesterday, will wean sedation as able. , upper extremity DVT noted      OBJECTIVE:     VITAL SIGNS:  BP (!) 146/61   Pulse 98   Temp 100.8 °F (38.2 °C) (Bladder)   Resp 28   Ht 6' (1.829 m)   Wt (!) 313 lb 7.9 oz (142.2 kg)   SpO2 94%   BMI 42.52 kg/m²   Tmax over 24 hours:  Temp (24hrs), Av.5 °F (35.3 °C), Min:46.9 °F (8.3 °C), Max:101.7 °F (38.7 °C)      Patient Vitals for the past 6 hrs:   BP Temp Temp src Pulse Resp SpO2   09/10/21 1310 (!) 146/61 100.8 °F (38.2 °C) Bladder 98 28 94 %   09/10/21 1210 (!) 155/75 -- -- 94 30 96 %   09/10/21 1200 (!) 175/81 100.9 °F (38.3 °C) Bladder 98 30 96 %   09/10/21 1105 (!) 165/78 -- -- 97 -- 96 %   09/10/21 1050 (!) 165/78 -- -- 96 (!) 34 96 %   09/10/21 1010 (!) 193/86 -- -- 109 (!) 32 95 %   09/10/21 0906 -- -- -- 109 (!) 48 94 %   09/10/21 0904 -- -- -- -- (!) 54 95 %   09/10/21 0902 -- -- -- -- (!) 31 95 %   09/10/21 0900 (!) 172/81 -- -- 110 (!) 32 94 %   09/10/21 0800 (!) 166/71 (!) 46.9 °F (8.3 °C) Bladder 112 30 93 %         Intake/Output Summary (Last 24 hours) at 9/10/2021 1332  Last data filed at 9/10/2021 1200  Gross per 24 hour   Intake 2474 ml   Output 3275 ml   Net -801 ml     Wt Readings from Last 2 Encounters:   09/09/21 (!) 313 lb 7.9 oz (142.2 kg)   08/17/21 (!) 320 lb (145.2 kg)     Body mass index is 42.52 kg/m².         PHYSICAL EXAMINATION:    General appearance - ill-appearing, trach present on ventilator  Mental status -intubated sedated  Eyes - pupils equal and reactive, extraocular eye movements intact  Ears - bilateral TM's and external ear canals normal  Nose - normal and patent, no erythema, discharge or polyps  Mouth - mucous membranes moist, pharynx normal without lesions  Neck - supple, no significant adenopathy  Chest -intubated sedated, breath sounds improving slight rhonchi noted  heart - normal rate, regular rhythm, normal S1, S2, no murmurs, rubs, clicks or gallops  Abdomen - soft, nontender, nondistended, no masses or organomegaly  Neurological -intubated and sedated  Extremities - peripheral pulses normal, no pedal edema, no clubbing or cyanosis  Skin -patient did have skin breakdown to the face as well as blistering on the hands     Any additional physical findings:    MEDICATIONS:    Scheduled Meds:   potassium phosphate IVPB  30 mmol IntraVENous Once    oxyCODONE  10 mg Oral Q8H    diazePAM  5 mg Oral Q8H    enoxaparin  1 mg/kg SubCUTAneous BID    tobramycin  300 mg Inhalation Q12H    lidocaine PF  5 mL IntraDERmal Once    sodium chloride flush  5-40 mL IntraVENous 2 times per day    heparin flush  3 mL Semi-Sellers's  Humidification Source: Heated wire  Humidification Temp: 37  Circuit Condensation: Drained  Oral Care: Mouth swabbed, Mouth moisturizer, Mouth suctioned, Suction toothette  Subglottic Suction Done?: No  Airway Type: Trachial  Airway Size: 78  Cuff Pressure (cm H2O): 29 cm H2O    ABGs:     Laboratory findings:    Complete Blood Count:   Recent Labs     09/08/21  0545 09/09/21  0625 09/10/21  0548   WBC 16.1* 17.7* 17.9*   HGB 11.1* 11.8* 11.2*   HCT 34.4* 37.3 35.2*    206 211        Last 3 Blood Glucose:   Recent Labs     09/08/21  0545 09/09/21  0625 09/10/21  0548   GLUCOSE 126* 130* 156*        PT/INR:  No results found for: PROTIME, INR  PTT:  No results found for: APTT, PTT    Comprehensive Metabolic Profile:   Recent Labs     09/08/21  0545 09/08/21  0545 09/09/21  0625 09/09/21  0625 09/10/21  0548     --  137  --  136   K 4.2  --  4.1  --  3.3*     --  100  --  101   CO2 27  --  28  --  25   BUN 17  --  14  --  12   CREATININE 0.5*  --  0.4*  --  0.4*   GLUCOSE 126*  --  130*  --  156*   CALCIUM 8.6  --  8.7  --  8.4*   PROT 5.7*   < > 6.0*   < > 6.0*   LABALBU 2.5*   < > 2.6*   < > 2.4*   BILITOT 0.3   < > 0.3   < > 0.5   ALKPHOS 96   < > 91   < > 88   AST 48*   < > 33   < > 40*   ALT 28   < > 26   < > 26    < > = values in this interval not displayed. Magnesium:   Lab Results   Component Value Date    MG 1.9 09/10/2021     Phosphorus:   Lab Results   Component Value Date    PHOS 2.2 09/10/2021     Ionized Calcium: No results found for: CAION     Urinalysis:     Troponin: No results for input(s): TROPONINI in the last 72 hours.       ASSESSMENT:     Patient Active Problem List    Diagnosis Date Noted    Elevated LFTs     Busch catheter problem ([facility] [address] 08/26/2021    Sepsis ([facility] [address] 08/26/2021    Thrombocytopenia ([facility] [address] 08/26/2021    Hyperlipidemia 08/22/2021    Acute hypoxemic respiratory failure due to COVID-19 [facility]) 08/21/2021    Type 2 diabetes mellitus without complication, without long-term current use of insulin ([facility] [address] 08/10/2021    History of seizures 08/10/2021     SYSTEMS ASSESSMENT    Neuro     Intubation, sedated, paralyzed  Propofol  Versed  Nimbex  Fentanyl  Continue to monitor      Respiratory     Acute hypoxic respiratory failure requiring mechanical ventilation  Severe COVID-19 pneumonia s/p toe C, remdesivir  Pneumonia VAP-MSSA, E. coli  Ancef-day 5  Trach/PEG when oxygen garments improve  Decadron 10 mg  Plan for trach/PEG tomorrow  Ultrasound for evaluation of pleural effusion today  Bronchoscopy 9/8/2021 due to white out left side chest on x-ray. Wean oxygen as tolerated. Keep O2 sat 90-92%    Cardiovascular     Hypertension  Amlodipine-10 mg  Lisinopril-10 mg  Lipitor 40 mg    Gastrointestinal     GI prophylaxis-Protonix  Bowel regimen  Continue monitor    Renal     OSCAR-improved  Diuretic on hold  Free water flushes 100 cc every 3 hours  Avoid nephrotoxins  Continue to monitor     Infectious Disease     Pneumonia VAP-positive for MSSA, E. coli  Ancef  lactic acidosis  Infectious disease following    Hematology/Oncology   DVT left upper arm-Lovenox increased to 1 mg/KG twice daily  Anemia  Hemoglobin stable  Continue to monitor    Endocrine     Hyperglycemia  Lantus 25 units  High-dose sliding scale    Social/Spiritual/DNR/Other     Full code  vitamin regimen  DVT prophylaxis Lovenox 40 every 12    Plan-wean sedation and ventilation as able    The patient was seen and evaluated by myself and Fredi Urbano MD PGY-2  9/10/2021 1:32 PM    Attending Physician Attestation: Dr. Barbara Robison    Thank you very much for allowing me to see this patient in consultation and follow up. I personally saw, examined and provided care for the patient. Radiographs, labs and medication list were reviewed by me independently. I spoke with bedside nursing, respiratory therapists and consultants.  Critical care services and times documented are independent of procedures and multidisciplinary rounds with Residents. Additionally comprehensive, multidisciplinary rounds were conducted with the MICU team. The case was discussed in detail and plans for care were established. Review of Residents documentation was conducted and revisions were made as appropriate. I agree with the the above documented information.      Physical Examination:  Vitals:   Vitals:    09/10/21 1310 09/10/21 1355 09/10/21 1406 09/10/21 1410   BP: (!) 146/61 133/64  (!) 140/61   Pulse: 98 95  91   Resp: 28 28  28   Temp: 100.8 °F (38.2 °C)   100.8 °F (38.2 °C)   TempSrc: Bladder   Bladder   SpO2: 94% 94%  93%   Weight:       Height:   6' (1.829 m)       General: No Acute Distress  HEENT: normocephalic, atraumatic  - trach intact with no focal erythema, erosion, ulceration or bleed noted  CVS: RRR, S1, S2, No S3 or S4  Respiratory: decreased breath sounds at lung bases, no focal wheezes noted  Extremities: no clubbing/cyanosis/edema     ASSESSMENT:  · Severe Covid pneumonia causing acute respiratory failure CRP has come down nicely.  Completed Remdesivir.  Received Tocilizumab  · VAP versus tracheobronchitis.  Cultures growing MSSA and E. coli.  Improving  · Leukocytosis associated to the above.    · Lactic Acidosis   · Left Pleural Effusion/Atelectasis - s/p bronchoscopy with evaluation of copious mucous plugging 9/8/2021   · Upper Extremity DVT     In addition the following applies:     Check: N/A  Medication Alterations: OXY IR and valium PO TID to wean off versed and fentanyl, lovenox BID  Procedures: bronchoscopy 9/8/2021, trach/PEG 9/9/2021   - left pleural US noted not enough fluid to tap for thoracentesis/chest tube   Imaging: reviewed, Left Pleural US  New Consultations: N/A  VENT: ACVC (DAY 19) 22/460/40/8  Ppeak: 32  Pplateau: 30  [id number]: 76     Access: Right IJ, Right Radial Arterial Line   Consults: ID, GS, Nephrology   Drips: Fentanyl, Versed   Nutrition: Tube Feeds  ABX: Cefazolin      - addition of thiamine, vitamin C, PO ZINC  - check D-dimer  - check Fibrinogen if platelets < 256S     - bronchoscopy 9/8/2021 noted copious thick white mucous plugging bibasilar regions L > R  - await possible transfer to [facility] in next few days for trach management      Thank you for allowing me to participate in the care of this patient. Care reviewed with nursing staff, medical and surgical specialty care, primary care and the patient's family as available. Restraints are ordered when the patient can do harm to him/herself by pulling out devices. Critical Care Time: > 35 minutes excluding procedures    Promise Barroso M.D.     Promise Barroso MD  9/10/2021  3:01 PM

## 2021-09-10 NOTE — PROGRESS NOTES
Pt. Assessed for picc line left arm. . Rt. Arm attempted yesterday per staff info. The basilic and brachial veins of the left upper arm will not compress. Questionable thrombus. . suggest ultrasound left upper extremity. RN present in room  During assessment.

## 2021-09-10 NOTE — CARE COORDINATION
Continue ICU. Trach and Peg completed on 9/9. Off paralytic, weaning versed. Possible LTAC next 24 hrs-james    Spoke with pt's wife, Jayden Schilling, and she is receptive to either [facility] or [facility] pending bed availability.  Plan for discharge tomorrow 9/11-james

## 2021-09-10 NOTE — PROGRESS NOTES
GENERAL SURGERY  DAILY PROGRESS NOTE  9/10/2021    Subjective:  Patient underwent tracheostomy and PEG tube placement yesterday 9/9. He is resting comfortably in his bed. PEG is at 3 cm. Objective:  BP (!) 191/76   Pulse 110   Temp 101.7 °F (38.7 °C) (Bladder)   Resp (!) 36   Ht 6' (1.829 m)   Wt (!) 313 lb 7.9 oz (142.2 kg)   SpO2 93%   BMI 42.52 kg/m²     General appearance: alert, cooperative and in no acute distress. Eyes: grossly normal  Lungs: tracheostomy present  Heart: regular rate  Abdomen: soft, non-tender, non distended, PEG at 3 cm  Skin: No skin abnormalities  Neurologic: Grossly normal  Musculoskeletal: No clubbing cyanosis or edema    Assessment/Plan:  60 y.o. male s/p tracheostomy and PEG 9/9/21. [id number] Marcela Gallo for tube feeds  Needs abdominal binder now that he is off sedation  Remove sutures from tracheostomy 9/16  No plans for surgical intervention at this time. Please contact general surgery with any further questions or concerns. Patient findings and plan discussed with Dr. David Fish. Electronically signed by Cameron Dillard MD on 9/10/2021 at 6:35 AM     ATTENDING PHYSICIAN PROGRESS NOTE      I have examined the patient, reviewed the record, and discussed the case with the Resident. I have reviewed all relevant labs and imaging data. Please refer to the resident's note. I agree with the assessment and plan with the following corrections/ additions. The following summarizes my clinical findings and independent assessment.      Pastor Albarran MD

## 2021-09-11 ENCOUNTER — APPOINTMENT (OUTPATIENT)
Dept: GENERAL RADIOLOGY | Age: 56
DRG: 005 | End: 2021-09-11
Payer: COMMERCIAL

## 2021-09-11 LAB
AADO2: 184 MMHG
ALBUMIN SERPL-MCNC: 2.2 G/DL (ref 3.5–5.2)
ALP BLD-CCNC: 93 U/L (ref 40–129)
ALT SERPL-CCNC: 44 U/L (ref 0–40)
ANION GAP SERPL CALCULATED.3IONS-SCNC: 8 MMOL/L (ref 7–16)
ASPERGILLUS GALACTO AG: NEGATIVE
ASPERGILLUS GALACTO INDEX: 0.41
AST SERPL-CCNC: 77 U/L (ref 0–39)
B.E.: 1.1 MMOL/L (ref -3–3)
BASOPHILS ABSOLUTE: 0.04 E9/L (ref 0–0.2)
BASOPHILS RELATIVE PERCENT: 0.2 % (ref 0–2)
BILIRUB SERPL-MCNC: 0.3 MG/DL (ref 0–1.2)
BUN BLDV-MCNC: 14 MG/DL (ref 6–20)
C-REACTIVE PROTEIN: 33 MG/DL (ref 0–0.4)
CALCIUM SERPL-MCNC: 8.4 MG/DL (ref 8.6–10.2)
CHLORIDE BLD-SCNC: 105 MMOL/L (ref 98–107)
CO2: 26 MMOL/L (ref 22–29)
COHB: 1 % (ref 0–1.5)
CREAT SERPL-MCNC: 0.4 MG/DL (ref 0.7–1.2)
CRITICAL: ABNORMAL
CULTURE, RESPIRATORY: ABNORMAL
DATE ANALYZED: ABNORMAL
DATE OF COLLECTION: ABNORMAL
EOSINOPHILS ABSOLUTE: 0.08 E9/L (ref 0.05–0.5)
EOSINOPHILS RELATIVE PERCENT: 0.4 % (ref 0–6)
FIO2: 45 %
GFR AFRICAN AMERICAN: >60
GFR NON-AFRICAN AMERICAN: >60 ML/MIN/1.73
GLUCOSE BLD-MCNC: 183 MG/DL (ref 74–99)
HCO3: 24.2 MMOL/L (ref 22–26)
HCT VFR BLD CALC: 32.6 % (ref 37–54)
HEMOGLOBIN: 10.5 G/DL (ref 12.5–16.5)
HHB: 3.3 % (ref 0–5)
IMMATURE GRANULOCYTES #: 0.25 E9/L
IMMATURE GRANULOCYTES %: 1.2 % (ref 0–5)
LAB: ABNORMAL
LYMPHOCYTES ABSOLUTE: 0.69 E9/L (ref 1.5–4)
LYMPHOCYTES RELATIVE PERCENT: 3.3 % (ref 20–42)
Lab: ABNORMAL
MAGNESIUM: 1.9 MG/DL (ref 1.6–2.6)
MCH RBC QN AUTO: 29.5 PG (ref 26–35)
MCHC RBC AUTO-ENTMCNC: 32.2 % (ref 32–34.5)
MCV RBC AUTO: 91.6 FL (ref 80–99.9)
METER GLUCOSE: 178 MG/DL (ref 74–99)
METER GLUCOSE: 194 MG/DL (ref 74–99)
METER GLUCOSE: 202 MG/DL (ref 74–99)
METHB: 0.2 % (ref 0–1.5)
MODE: ABNORMAL
MONOCYTES ABSOLUTE: 1.03 E9/L (ref 0.1–0.95)
MONOCYTES RELATIVE PERCENT: 4.9 % (ref 2–12)
NEUTROPHILS ABSOLUTE: 19.13 E9/L (ref 1.8–7.3)
NEUTROPHILS RELATIVE PERCENT: 90 % (ref 43–80)
O2 CONTENT: 15.5 ML/DL
O2 SATURATION: 96.7 % (ref 92–98.5)
O2HB: 95.5 % (ref 94–97)
OPERATOR ID: 7296
ORGANISM: ABNORMAL
PATIENT TEMP: 37 C
PCO2: 33.4 MMHG (ref 35–45)
PDW BLD-RTO: 14.8 FL (ref 11.5–15)
PEEP/CPAP: 8 CMH2O
PFO2: 1.95 MMHG/%
PH BLOOD GAS: 7.48 (ref 7.35–7.45)
PHOSPHORUS: 2.9 MG/DL (ref 2.5–4.5)
PLATELET # BLD: 203 E9/L (ref 130–450)
PMV BLD AUTO: 9.6 FL (ref 7–12)
PO2: 87.6 MMHG (ref 75–100)
POTASSIUM SERPL-SCNC: 3.8 MMOL/L (ref 3.5–5)
RBC # BLD: 3.56 E12/L (ref 3.8–5.8)
RI(T): 210 %
RR MECHANICAL: 22 B/MIN
SEDIMENTATION RATE, ERYTHROCYTE: 122 MM/HR (ref 0–15)
SMEAR, RESPIRATORY: ABNORMAL
SODIUM BLD-SCNC: 139 MMOL/L (ref 132–146)
SOURCE, BLOOD GAS: ABNORMAL
THB: 11.5 G/DL (ref 11.5–16.5)
TIME ANALYZED: 609
TOTAL PROTEIN: 6.1 G/DL (ref 6.4–8.3)
VT MECHANICAL: 460 ML
WBC # BLD: 21.2 E9/L (ref 4.5–11.5)

## 2021-09-11 PROCEDURE — 36415 COLL VENOUS BLD VENIPUNCTURE: CPT

## 2021-09-11 PROCEDURE — 94640 AIRWAY INHALATION TREATMENT: CPT

## 2021-09-11 PROCEDURE — 2580000003 HC RX 258: Performed by: SPECIALIST

## 2021-09-11 PROCEDURE — 6370000000 HC RX 637 (ALT 250 FOR IP): Performed by: INTERNAL MEDICINE

## 2021-09-11 PROCEDURE — C9113 INJ PANTOPRAZOLE SODIUM, VIA: HCPCS | Performed by: SURGERY

## 2021-09-11 PROCEDURE — 80053 COMPREHEN METABOLIC PANEL: CPT

## 2021-09-11 PROCEDURE — 6360000002 HC RX W HCPCS: Performed by: SURGERY

## 2021-09-11 PROCEDURE — 2500000003 HC RX 250 WO HCPCS: Performed by: SURGERY

## 2021-09-11 PROCEDURE — 2000000000 HC ICU R&B

## 2021-09-11 PROCEDURE — 6370000000 HC RX 637 (ALT 250 FOR IP): Performed by: SURGERY

## 2021-09-11 PROCEDURE — 2580000003 HC RX 258: Performed by: SURGERY

## 2021-09-11 PROCEDURE — 84100 ASSAY OF PHOSPHORUS: CPT

## 2021-09-11 PROCEDURE — 99233 SBSQ HOSP IP/OBS HIGH 50: CPT | Performed by: STUDENT IN AN ORGANIZED HEALTH CARE EDUCATION/TRAINING PROGRAM

## 2021-09-11 PROCEDURE — C1751 CATH, INF, PER/CENT/MIDLINE: HCPCS

## 2021-09-11 PROCEDURE — 86140 C-REACTIVE PROTEIN: CPT

## 2021-09-11 PROCEDURE — 36556 INSERT NON-TUNNEL CV CATH: CPT

## 2021-09-11 PROCEDURE — 2500000003 HC RX 250 WO HCPCS: Performed by: INTERNAL MEDICINE

## 2021-09-11 PROCEDURE — 87070 CULTURE OTHR SPECIMN AEROBIC: CPT

## 2021-09-11 PROCEDURE — 71045 X-RAY EXAM CHEST 1 VIEW: CPT

## 2021-09-11 PROCEDURE — 94003 VENT MGMT INPAT SUBQ DAY: CPT

## 2021-09-11 PROCEDURE — 6360000002 HC RX W HCPCS: Performed by: STUDENT IN AN ORGANIZED HEALTH CARE EDUCATION/TRAINING PROGRAM

## 2021-09-11 PROCEDURE — 6360000002 HC RX W HCPCS: Performed by: SPECIALIST

## 2021-09-11 PROCEDURE — 85651 RBC SED RATE NONAUTOMATED: CPT

## 2021-09-11 PROCEDURE — 85025 COMPLETE CBC W/AUTO DIFF WBC: CPT

## 2021-09-11 PROCEDURE — 82962 GLUCOSE BLOOD TEST: CPT

## 2021-09-11 PROCEDURE — 87040 BLOOD CULTURE FOR BACTERIA: CPT

## 2021-09-11 PROCEDURE — 36592 COLLECT BLOOD FROM PICC: CPT

## 2021-09-11 PROCEDURE — 82805 BLOOD GASES W/O2 SATURATION: CPT

## 2021-09-11 PROCEDURE — 36600 WITHDRAWAL OF ARTERIAL BLOOD: CPT

## 2021-09-11 PROCEDURE — 02HV33Z INSERTION OF INFUSION DEVICE INTO SUPERIOR VENA CAVA, PERCUTANEOUS APPROACH: ICD-10-PCS | Performed by: INTERNAL MEDICINE

## 2021-09-11 PROCEDURE — 2580000003 HC RX 258: Performed by: INTERNAL MEDICINE

## 2021-09-11 PROCEDURE — 6360000002 HC RX W HCPCS: Performed by: INTERNAL MEDICINE

## 2021-09-11 PROCEDURE — 83735 ASSAY OF MAGNESIUM: CPT

## 2021-09-11 RX ORDER — PHENYTOIN SODIUM 50 MG/ML
INJECTION, SOLUTION INTRAMUSCULAR; INTRAVENOUS
Status: DISPENSED
Start: 2021-09-11 | End: 2021-09-11

## 2021-09-11 RX ORDER — LIDOCAINE HYDROCHLORIDE 10 MG/ML
5 INJECTION, SOLUTION EPIDURAL; INFILTRATION; INTRACAUDAL; PERINEURAL ONCE
Status: DISCONTINUED | OUTPATIENT
Start: 2021-09-11 | End: 2021-09-13

## 2021-09-11 RX ORDER — HEPARIN SODIUM (PORCINE) LOCK FLUSH IV SOLN 100 UNIT/ML 100 UNIT/ML
3 SOLUTION INTRAVENOUS PRN
Status: DISCONTINUED | OUTPATIENT
Start: 2021-09-11 | End: 2021-09-11

## 2021-09-11 RX ORDER — MIDAZOLAM HYDROCHLORIDE 2 MG/2ML
4 INJECTION, SOLUTION INTRAMUSCULAR; INTRAVENOUS ONCE
Status: COMPLETED | OUTPATIENT
Start: 2021-09-11 | End: 2021-09-11

## 2021-09-11 RX ORDER — SODIUM CHLORIDE 0.9 % (FLUSH) 0.9 %
5-40 SYRINGE (ML) INJECTION PRN
Status: DISCONTINUED | OUTPATIENT
Start: 2021-09-11 | End: 2021-09-20

## 2021-09-11 RX ORDER — SODIUM CHLORIDE 0.9 % (FLUSH) 0.9 %
5-40 SYRINGE (ML) INJECTION EVERY 12 HOURS SCHEDULED
Status: DISCONTINUED | OUTPATIENT
Start: 2021-09-11 | End: 2021-09-20 | Stop reason: HOSPADM

## 2021-09-11 RX ORDER — HEPARIN SODIUM (PORCINE) LOCK FLUSH IV SOLN 100 UNIT/ML 100 UNIT/ML
3 SOLUTION INTRAVENOUS EVERY 12 HOURS SCHEDULED
Status: DISCONTINUED | OUTPATIENT
Start: 2021-09-11 | End: 2021-09-20 | Stop reason: HOSPADM

## 2021-09-11 RX ORDER — QUETIAPINE FUMARATE 25 MG/1
25 TABLET, FILM COATED ORAL 2 TIMES DAILY
Status: DISCONTINUED | OUTPATIENT
Start: 2021-09-11 | End: 2021-09-12

## 2021-09-11 RX ORDER — SODIUM CHLORIDE 9 MG/ML
25 INJECTION, SOLUTION INTRAVENOUS PRN
Status: DISCONTINUED | OUTPATIENT
Start: 2021-09-11 | End: 2021-09-20

## 2021-09-11 RX ORDER — FENTANYL CITRATE 50 UG/ML
100 INJECTION, SOLUTION INTRAMUSCULAR; INTRAVENOUS ONCE
Status: DISCONTINUED | OUTPATIENT
Start: 2021-09-11 | End: 2021-09-13

## 2021-09-11 RX ADMIN — SODIUM CHLORIDE SOLN NEBU 3% 4 ML: 3 NEBU SOLN at 21:48

## 2021-09-11 RX ADMIN — IPRATROPIUM BROMIDE AND ALBUTEROL SULFATE 1 AMPULE: .5; 3 SOLUTION RESPIRATORY (INHALATION) at 00:20

## 2021-09-11 RX ADMIN — PANTOPRAZOLE SODIUM 40 MG: 40 INJECTION, POWDER, FOR SOLUTION INTRAVENOUS at 09:10

## 2021-09-11 RX ADMIN — CHLORHEXIDINE GLUCONATE 0.12% ORAL RINSE 15 ML: 1.2 LIQUID ORAL at 09:09

## 2021-09-11 RX ADMIN — SODIUM CHLORIDE, PRESERVATIVE FREE 10 ML: 5 INJECTION INTRAVENOUS at 09:10

## 2021-09-11 RX ADMIN — OXYCODONE 10 MG: 5 TABLET ORAL at 11:35

## 2021-09-11 RX ADMIN — Medication 200 MCG/HR: at 17:37

## 2021-09-11 RX ADMIN — MIDAZOLAM HYDROCHLORIDE 4 MG: 1 INJECTION, SOLUTION INTRAMUSCULAR; INTRAVENOUS at 11:25

## 2021-09-11 RX ADMIN — DIAZEPAM 5 MG: 5 TABLET ORAL at 04:03

## 2021-09-11 RX ADMIN — DEXAMETHASONE SODIUM PHOSPHATE 10 MG: 4 INJECTION, SOLUTION INTRA-ARTICULAR; INTRALESIONAL; INTRAMUSCULAR; INTRAVENOUS; SOFT TISSUE at 09:10

## 2021-09-11 RX ADMIN — PIPERACILLIN AND TAZOBACTAM 3375 MG: 3; .375 INJECTION, POWDER, LYOPHILIZED, FOR SOLUTION INTRAVENOUS at 20:25

## 2021-09-11 RX ADMIN — IPRATROPIUM BROMIDE AND ALBUTEROL SULFATE 1 AMPULE: .5; 3 SOLUTION RESPIRATORY (INHALATION) at 21:48

## 2021-09-11 RX ADMIN — OXYCODONE 10 MG: 5 TABLET ORAL at 17:35

## 2021-09-11 RX ADMIN — INSULIN LISPRO 3 UNITS: 100 INJECTION, SOLUTION INTRAVENOUS; SUBCUTANEOUS at 17:44

## 2021-09-11 RX ADMIN — AMLODIPINE BESYLATE 10 MG: 10 TABLET ORAL at 09:09

## 2021-09-11 RX ADMIN — ENOXAPARIN SODIUM 135 MG: 150 INJECTION SUBCUTANEOUS at 20:04

## 2021-09-11 RX ADMIN — DIAZEPAM 5 MG: 5 TABLET ORAL at 11:06

## 2021-09-11 RX ADMIN — MIDAZOLAM HYDROCHLORIDE 4 MG: 1 INJECTION, SOLUTION INTRAMUSCULAR; INTRAVENOUS at 11:28

## 2021-09-11 RX ADMIN — PHENYTOIN SODIUM 350 MG: 50 INJECTION INTRAMUSCULAR; INTRAVENOUS at 11:00

## 2021-09-11 RX ADMIN — Medication 125 MCG/HR: at 04:23

## 2021-09-11 RX ADMIN — ATORVASTATIN CALCIUM 40 MG: 40 TABLET, FILM COATED ORAL at 20:05

## 2021-09-11 RX ADMIN — Medication 300 UNITS: at 20:21

## 2021-09-11 RX ADMIN — Medication 5 MG/HR: at 06:03

## 2021-09-11 RX ADMIN — ACETAMINOPHEN 650 MG: 650 SOLUTION ORAL at 16:57

## 2021-09-11 RX ADMIN — PIPERACILLIN AND TAZOBACTAM 3375 MG: 3; .375 INJECTION, POWDER, LYOPHILIZED, FOR SOLUTION INTRAVENOUS at 13:54

## 2021-09-11 RX ADMIN — DEXMEDETOMIDINE HYDROCHLORIDE 0.6 MCG/KG/HR: 100 INJECTION, SOLUTION INTRAVENOUS at 16:56

## 2021-09-11 RX ADMIN — TOBRAMYCIN SULFATE 300 MG: 40 INJECTION, SOLUTION INTRAMUSCULAR; INTRAVENOUS at 21:48

## 2021-09-11 RX ADMIN — SODIUM CHLORIDE, PRESERVATIVE FREE 300 UNITS: 5 INJECTION INTRAVENOUS at 09:11

## 2021-09-11 RX ADMIN — IPRATROPIUM BROMIDE AND ALBUTEROL SULFATE 1 AMPULE: .5; 3 SOLUTION RESPIRATORY (INHALATION) at 04:25

## 2021-09-11 RX ADMIN — Medication 7 MG/HR: at 14:32

## 2021-09-11 RX ADMIN — SODIUM CHLORIDE SOLN NEBU 3% 4 ML: 3 NEBU SOLN at 08:35

## 2021-09-11 RX ADMIN — Medication 2000 UNITS: at 09:07

## 2021-09-11 RX ADMIN — TOBRAMYCIN SULFATE 300 MG: 40 INJECTION, SOLUTION INTRAMUSCULAR; INTRAVENOUS at 08:36

## 2021-09-11 RX ADMIN — ZINC SULFATE 220 MG (50 MG) CAPSULE 50 MG: CAPSULE at 09:09

## 2021-09-11 RX ADMIN — QUETIAPINE FUMARATE 25 MG: 25 TABLET ORAL at 20:05

## 2021-09-11 RX ADMIN — QUETIAPINE FUMARATE 25 MG: 25 TABLET ORAL at 11:06

## 2021-09-11 RX ADMIN — Medication 150 MCG/HR: at 11:39

## 2021-09-11 RX ADMIN — INSULIN GLARGINE 25 UNITS: 100 INJECTION, SOLUTION SUBCUTANEOUS at 20:06

## 2021-09-11 RX ADMIN — ENOXAPARIN SODIUM 135 MG: 150 INJECTION SUBCUTANEOUS at 09:09

## 2021-09-11 RX ADMIN — SODIUM CHLORIDE, PRESERVATIVE FREE 10 ML: 5 INJECTION INTRAVENOUS at 09:00

## 2021-09-11 RX ADMIN — IPRATROPIUM BROMIDE AND ALBUTEROL SULFATE 1 AMPULE: .5; 3 SOLUTION RESPIRATORY (INHALATION) at 16:06

## 2021-09-11 RX ADMIN — INSULIN LISPRO 3 UNITS: 100 INJECTION, SOLUTION INTRAVENOUS; SUBCUTANEOUS at 06:31

## 2021-09-11 RX ADMIN — ACETAMINOPHEN 650 MG: 650 SOLUTION ORAL at 11:08

## 2021-09-11 RX ADMIN — OXYCODONE 10 MG: 5 TABLET ORAL at 20:05

## 2021-09-11 RX ADMIN — OXYCODONE HYDROCHLORIDE AND ACETAMINOPHEN 1000 MG: 500 TABLET ORAL at 09:09

## 2021-09-11 RX ADMIN — ACETAMINOPHEN 650 MG: 650 SOLUTION ORAL at 05:33

## 2021-09-11 RX ADMIN — IPRATROPIUM BROMIDE AND ALBUTEROL SULFATE 1 AMPULE: .5; 3 SOLUTION RESPIRATORY (INHALATION) at 08:35

## 2021-09-11 RX ADMIN — LISINOPRIL 10 MG: 10 TABLET ORAL at 09:09

## 2021-09-11 RX ADMIN — LABETALOL HYDROCHLORIDE 20 MG: 5 INJECTION INTRAVENOUS at 09:47

## 2021-09-11 RX ADMIN — INSULIN LISPRO 6 UNITS: 100 INJECTION, SOLUTION INTRAVENOUS; SUBCUTANEOUS at 12:28

## 2021-09-11 RX ADMIN — DIAZEPAM 5 MG: 5 TABLET ORAL at 20:05

## 2021-09-11 RX ADMIN — IPRATROPIUM BROMIDE AND ALBUTEROL SULFATE 1 AMPULE: .5; 3 SOLUTION RESPIRATORY (INHALATION) at 13:18

## 2021-09-11 RX ADMIN — CHLORHEXIDINE GLUCONATE 0.12% ORAL RINSE 15 ML: 1.2 LIQUID ORAL at 20:20

## 2021-09-11 RX ADMIN — OXYCODONE 10 MG: 5 TABLET ORAL at 04:03

## 2021-09-11 RX ADMIN — DIAZEPAM 5 MG: 5 TABLET ORAL at 17:35

## 2021-09-11 RX ADMIN — INSULIN GLARGINE 25 UNITS: 100 INJECTION, SOLUTION SUBCUTANEOUS at 09:11

## 2021-09-11 RX ADMIN — SODIUM CHLORIDE, PRESERVATIVE FREE 10 ML: 5 INJECTION INTRAVENOUS at 20:21

## 2021-09-11 ASSESSMENT — PAIN SCALES - GENERAL
PAINLEVEL_OUTOF10: 0

## 2021-09-11 ASSESSMENT — PULMONARY FUNCTION TESTS
PIF_VALUE: 31
PIF_VALUE: 30
PIF_VALUE: 27
PIF_VALUE: 30
PIF_VALUE: 23
PIF_VALUE: 30
PIF_VALUE: 31
PIF_VALUE: 28
PIF_VALUE: 28
PIF_VALUE: 40
PIF_VALUE: 27
PIF_VALUE: 30
PIF_VALUE: 30
PIF_VALUE: 20
PIF_VALUE: 30
PIF_VALUE: 43
PIF_VALUE: 34
PIF_VALUE: 46
PIF_VALUE: 28
PIF_VALUE: 32
PIF_VALUE: 30
PIF_VALUE: 26
PIF_VALUE: 34

## 2021-09-11 ASSESSMENT — PAIN DESCRIPTION - LOCATION: LOCATION: OTHER (COMMENT)

## 2021-09-11 ASSESSMENT — PAIN DESCRIPTION - DESCRIPTORS: DESCRIPTORS: OTHER (COMMENT)

## 2021-09-11 ASSESSMENT — PAIN SCALES - WONG BAKER: WONGBAKER_NUMERICALRESPONSE: 8

## 2021-09-11 NOTE — PROGRESS NOTES
Attempted to release soft wrist restraints when patient pulls at trach. Soft wrist restraints reapplied.

## 2021-09-11 NOTE — PROGRESS NOTES
Pulmonary Subsequent Hospital F/U note    Patient is being followed for: acute hypoxic respiratory failure, severe covid-19 pneumonia     Interval HPI:  Remains intubated and sedated on versed and fentanyl  Vent settings 40% Pi 20, RR 26, PEEP 8  S/p bronchoscopy 9/8 with mucus plugging of the L bronchial tree and RLL - cultures positive E.coli, enterobacter, MSSA   S/p trache and PEG placement 9/9/21  +DVT  Having fevers     ROS:  Unable to obtain     Exam:  BP (!) 195/81   Pulse 81   Temp 101.7 °F (38.7 °C) (Bladder)   Resp (!) 45   Ht 6' (1.829 m)   Wt (!) 313 lb 7.9 oz (142.2 kg)   SpO2 94%   BMI 42.52 kg/m²    Due to the current efforts to prevent transmission of COVID-19 and also the need to preserve PPE for other caregivers, a face-to-face encounter with the patient was not performed. That being said, all relevant records and diagnostic tests were reviewed, including laboratory results and imaging. Please reference any relevant documentation elsewhere. Care will be coordinated with the primary service. Data:    Oximetry:  SpO2 Readings from Last 1 Encounters:   09/11/21 94%       Imaging personally reviewed by myself:  CXR     Since the prior study, there has been insertion of a left sided central line. The tip projects to the left of the trachea.  Correlate with blood gas.  As   the tip does not clearly project in the region of the SVC, it is not   definitively venous by radiograph.       Bilateral airspace disease, increased on the right compared to prior         DVT     Occlusive deep vein thrombosis involving the left brachial veins.  Thrombus   is also noted in the cephalic and basilic veins in the left arm.       No evidence of deep vein thrombosis in the right upper extremity.        Respiratory culture MSSA, Ecoli, enterobacter    crp 33  D dimer 755    Pertinent labs reviewed and noted:  Lab Results   Component Value Date    WBC 21.2 09/11/2021    HGB 10.5 09/11/2021    HCT 32.6 09/11/2021    MCV 91.6 09/11/2021    MCH 29.5 09/11/2021    MCHC 32.2 09/11/2021    RDW 14.8 09/11/2021     09/11/2021    MPV 9.6 09/11/2021     Lab Results   Component Value Date     09/11/2021    K 3.8 09/11/2021    K 3.7 08/21/2021     09/11/2021    CO2 26 09/11/2021    BUN 14 09/11/2021    CREATININE 0.4 09/11/2021    LABALBU 2.2 09/11/2021    CALCIUM 8.4 09/11/2021    GFRAA >60 09/11/2021    LABGLOM >60 09/11/2021     Assessment:  1. Acute hypoxic respiratory failure  2. Severe covid-19 pneumonia  3. Bacterial pneumonia with MSSA, enterobacter, and Ecoli   4. Mucus plugging of bronchi s/p bronchoscopy 9/8/21  5. DVT LUE  6. Fevers/sepsis     Plan:  1. Trache PEG  9/9/21  2. Completed Remdesivir and Tocilizumab  3. Antibiotics as per ID - starting on Zosyn and remains on inhaled Tobramycin  4. Chest physio/bronchodilators, s/p bronch  5. Continue decadron, stop over the next several days   6. Full dose anticoagulation  7.  Blood cultures sent     Electronically signed by Ida Capps MD on 9/11/2021 at 2:01 PM

## 2021-09-11 NOTE — PROGRESS NOTES
[facility] Roberto A. Dalphine Paget, MD Aldean Pauling, MD Pio Argueta, MD Kiki Weiner, DEUCE Massey, KALA  Progress Note    9/11/2021    SUBJECTIVE:   9/3: Remains critically ill, though clinically improving. Pronator bed discontinued. Breathing comfortably on ventilator via ETT, FiO2 50% PEEP 10. Remains unresponsive to verbal and tactile stimuli hemodynamically stable. Tube feeding free water flushes running without complication  9/4: Remains critically ill, though also still clinically improving. Vent settings improving. 9/5: Seen this morning. FiO2 60% PEEP 10. Vent settings stable. Hemodynamically stable. No new developments. 9/6: Hemodynamically stable. Remains intubated via ETT. Vent setting stable.   No new event  9/11: Patient overall stable, still on mechanical ventilation as before via tracheostomy at this point, case discussed with the ICU nurse, other consultants notes were reviewed    PROBLEM LIST:    Principal Problem:    Acute hypoxemic respiratory failure due to COVID-19 [facility])  Active Problems:    Type 2 diabetes mellitus without complication, without long-term current use of insulin ([facility] [address]    History of seizures    Hyperlipidemia    Busch catheter problem (H[facility])    Sepsis ([facility] [address]    Thrombocytopenia ([facility])    Elevated LFTs  Resolved Problems:    OSCAR (acute kidney injury) ([facility] [address]    Lactic acidosis         DIET:    Diet NPO  ADULT TUBE FEEDING; PEG; Diabetic; Continuous; 20; Yes; 20; Q 4 hours; 45; 30; Q 6 hours     MEDS (scheduled):    phenytoin        lidocaine PF  5 mL IntraDERmal Once    sodium chloride flush  5-40 mL IntraVENous 2 times per day    heparin flush  3 mL IntraVENous 2 times per day    QUEtiapine  25 mg Oral BID    fentanNYL  100 mcg IntraVENous Once    piperacillin-tazobactam  3,375 mg IntraVENous Q8H    oxyCODONE  10 mg Oral Q8H    diazePAM  5 mg Oral Q8H    enoxaparin  1 mg/kg SubCUTAneous BID    tobramycin 300 mg Inhalation Q12H    phenytoin  350 mg IntraVENous Q12H    lisinopril  10 mg Oral Daily    insulin glargine  25 Units SubCUTAneous BID    ascorbic acid  1,000 mg Oral Daily    sodium chloride (Inhalant)  4 mL Nebulization Q12H    dexamethasone  10 mg IntraVENous Daily    insulin lispro  0-18 Units SubCUTAneous Q6H    chlorhexidine  15 mL Mouth/Throat BID    ipratropium-albuterol  1 ampule Inhalation Q4H    amLODIPine  10 mg Oral Daily    atorvastatin  40 mg Oral Daily    Vitamin D  2,000 Units Oral Daily    zinc sulfate  50 mg Oral Daily    pantoprazole  40 mg IntraVENous Daily       MEDS (infusions):   sodium chloride      fentaNYL 5 mcg/ml in 0.9%  ml infusion 150 mcg/hr (09/11/21 1139)    propofol Stopped (09/09/21 1713)    midazolam 7 mg/hr (09/11/21 0944)    dextrose         MEDS (prn):  sodium chloride flush, sodium chloride, heparin flush, acetaminophen, labetalol, hydrALAZINE, sodium chloride (Inhalant), albuterol, benzonatate, glucose, dextrose, glucagon (rDNA), dextrose, sodium chloride    PHYSICAL EXAM:     Patient Vitals for the past 24 hrs:   BP Temp Temp src Pulse Resp SpO2 Height   09/11/21 1318 -- -- -- 81 (!) 45 94 % --   09/11/21 0909 (!) 195/81 -- -- -- -- -- --   09/11/21 0840 -- -- -- 103 (!) 60 95 % --   09/11/21 0838 -- -- -- -- (!) 76 95 % --   09/11/21 0837 -- -- -- -- (!) 32 95 % --   09/11/21 0836 -- -- -- -- (!) 43 94 % --   09/11/21 0600 (!) 108/54 -- -- 87 26 96 % --   09/11/21 0500 120/62 -- -- 88 24 95 % --   09/11/21 0425 -- -- -- -- 24 94 % --   09/11/21 0424 -- -- -- 84 28 94 % --   09/11/21 0400 (!) 141/65 101.7 °F (38.7 °C) Bladder 90 23 99 % --   09/11/21 0300 (!) 118/56 -- -- 88 (!) 32 94 % --   09/11/21 0200 (!) 151/66 -- -- 93 (!) 31 94 % --   09/11/21 0100 136/72 -- -- 102 (!) 39 93 % --   09/11/21 0044 -- -- -- -- (!) 31 94 % --   09/11/21 0030 -- -- -- 100 27 94 % --   09/11/21 0020 -- -- -- -- 26 93 % --   09/11/21 0000 (!) 165/75 102.6 °F (39.2 °C) Bladder 109 (!) 38 94 % --   09/10/21 2300 (!) 191/83 -- -- 107 (!) 46 92 % --   09/10/21 2200 (!) 141/72 102 °F (38.9 °C) Bladder 105 (!) 34 94 % --   09/10/21 2100 (!) 161/80 -- -- 96 25 95 % --   09/10/21 2036 -- -- -- -- (!) 36 96 % --   09/10/21 2001 -- -- -- -- (!) 36 93 % --   09/10/21 2000 (!) 166/80 100 °F (37.8 °C) Bladder 101 29 94 % --   09/10/21 1955 -- -- -- 98 27 93 % --   09/10/21 1910 (!) 145/77 -- -- 100 30 93 % --   09/10/21 1845 (!) 171/80 -- -- 103 28 95 % --   09/10/21 1800 (!) 178/83 -- -- 102 28 95 % --   09/10/21 1710 (!) 184/86 -- -- 111 30 92 % --   09/10/21 1600 (!) 172/75 100.8 °F (38.2 °C) Bladder 106 (!) 32 93 % --   09/10/21 1550 -- -- -- 102 (!) 32 91 % --   09/10/21 1505 133/61 -- -- 98 28 92 % --   09/10/21 1410 (!) 140/61 100.8 °F (38.2 °C) Bladder 91 28 93 % --   09/10/21 1406 -- -- -- -- -- -- 6' (1.829 m)   09/10/21 1355 133/64 -- -- 95 28 94 % --   @      Intake/Output Summary (Last 24 hours) at 9/11/2021 1346  Last data filed at 9/11/2021 0603  Gross per 24 hour   Intake 1935.7 ml   Output 1010 ml   Net 925.7 ml         Wt Readings from Last 3 Encounters:   09/09/21 (!) 313 lb 7.9 oz (142.2 kg)   08/17/21 (!) 320 lb (145.2 kg)   08/10/21 (!) 321 lb 9.6 oz (145.9 kg)       Constitutional: Appears critically ill, though in no apparent distress  HEENT: NC/AT, EOMI, sclera and conjunctiva are clear and anicteric, mucus membranes moist  Neck: Trachea midline, no JVD  Cardiovascular: S1, S2 regular rhythm, no murmur,or rub  Respiratory:  No crackles, no wheeze  Gastrointestinal:  Soft, nontender, nondistended, NABS  Ext: no edema, feet warm  Skin: dry, no rash  Neuro: awake, alert, interactive      DATA:    Recent Labs     09/09/21  0625 09/10/21  0548 09/11/21  0420   WBC 17.7* 17.9* 21.2*   HGB 11.8* 11.2* 10.5*   HCT 37.3 35.2* 32.6*   MCV 93.0 93.1 91.6    211 203     Recent Labs     09/09/21  0625 09/10/21  0548 09/11/21  0420    136 139   K 4.1 3.3* 3.8

## 2021-09-11 NOTE — PROGRESS NOTES
weaning. PEEP was brought down to 12. FiO2 still at 50%. Tolerating antibiotics. 9/4/2021. He is still in the ICU. He is still on a ventilator. He is still paralyzed. 9/5/2021. He had an episode of acute desaturation and had to be bagged. He is now back on the ventilator. He is still sedated and paralyzed. 9/6/2021. The patient still in the ICU. He is still requiring paralytics. Secretions are becoming thinner. No fever. 9/7/2021. The patient still on a ventilator and paralyzed. No new problems reported. 9/8/2021. Patient is still on the ventilator, paralyzed. No fever. Tolerating medications. 9/9/2021. Patient had a bronchoscopy yesterday a fair amount of thick secretions were obtained. Cultures were sent. He is still on a ventilator, sedated and paralyzed. 9/10/2021. The patient is still in the ICU. He is still on a ventilator. He had a tracheostomy and PEG. Denies dyspnea or pain. 9/11/2021. Patient is now having fevers. He is still on a ventilator. Cooling blanket. Review of systems:  As stated above in the chief complaint, otherwise negative.     Medications:  Scheduled Meds:   phenytoin        lidocaine PF  5 mL IntraDERmal Once    sodium chloride flush  5-40 mL IntraVENous 2 times per day    heparin flush  3 mL IntraVENous 2 times per day    QUEtiapine  25 mg Oral BID    fentanNYL  100 mcg IntraVENous Once    piperacillin-tazobactam  3,375 mg IntraVENous Q8H    oxyCODONE  10 mg Oral Q8H    diazePAM  5 mg Oral Q8H    enoxaparin  1 mg/kg SubCUTAneous BID    tobramycin  300 mg Inhalation Q12H    phenytoin  350 mg IntraVENous Q12H    lisinopril  10 mg Oral Daily    insulin glargine  25 Units SubCUTAneous BID    ascorbic acid  1,000 mg Oral Daily    sodium chloride (Inhalant)  4 mL Nebulization Q12H    dexamethasone  10 mg IntraVENous Daily    insulin lispro  0-18 Units SubCUTAneous Q6H    chlorhexidine  15 mL Mouth/Throat BID    ipratropium-albuterol  1 ampule Inhalation Q4H    amLODIPine  10 mg Oral Daily    atorvastatin  40 mg Oral Daily    Vitamin D  2,000 Units Oral Daily    zinc sulfate  50 mg Oral Daily    pantoprazole  40 mg IntraVENous Daily     Continuous Infusions:   sodium chloride      fentaNYL 5 mcg/ml in 0.9%  ml infusion 150 mcg/hr (21 1139)    propofol Stopped (21 1713)    midazolam 7 mg/hr (21 0944)    dextrose       PRN Meds:sodium chloride flush, sodium chloride, heparin flush, acetaminophen, labetalol, hydrALAZINE, sodium chloride (Inhalant), albuterol, benzonatate, glucose, dextrose, glucagon (rDNA), dextrose, sodium chloride    OBJECTIVE:  BP (!) 195/81   Pulse 103   Temp 101.7 °F (38.7 °C) (Bladder)   Resp (!) 60   Ht 6' (1.829 m)   Wt (!) 313 lb 7.9 oz (142.2 kg)   SpO2 95%   BMI 42.52 kg/m²   Temp  Av.2 °F (38.4 °C)  Min: 100 °F (37.8 °C)  Max: 102.6 °F (39.2 °C)  Constitutional: The patient is lying in bed in the ICU. He is awake and alert. Shakes and nods head. FiO2 45%. PEEP 8. No changes. Skin: Warm and dry. No rashes were noted. HEENT: Round and nonreactive pupils. Decubitus lesions on cheeks, hands and lower extremities noted. Neck: Supple. Tracheostomy. PEG. Chest: Coarse breath sounds bilaterally. No crackles  Cardiovascular: Heart sounds rhythmic and regular. Tachycardic. Abdomen: Round, soft and benign to palpation. Genitourinary: Busch catheter  Extremities: Moderate edema. Blistering of hands and feet. Some are unroofed. Lines: Right PICC 2021. Right IJ TLC 2021 (changed over guidewire).   Busch changed 3/24/2021    Laboratory and Tests Review:  Lab Results   Component Value Date    WBC 21.2 (H) 2021    WBC 17.9 (H) 09/10/2021    WBC 17.7 (H) 2021    HGB 10.5 (L) 2021    HCT 32.6 (L) 2021    MCV 91.6 2021     2021     Lab Results   Component Value Date    NEUTROABS 19.13 (H) 2021 NEUTROABS 15.20 (H) 09/10/2021    NEUTROABS 14.03 (H) 09/09/2021     No results found for: CRPHS  Lab Results   Component Value Date    ALT 44 (H) 09/11/2021    AST 77 (H) 09/11/2021    ALKPHOS 93 09/11/2021    BILITOT 0.3 09/11/2021     Lab Results   Component Value Date     09/11/2021    K 3.8 09/11/2021    K 3.7 08/21/2021     09/11/2021    CO2 26 09/11/2021    BUN 14 09/11/2021    CREATININE 0.4 09/11/2021    CREATININE 0.4 09/10/2021    CREATININE 0.4 09/09/2021    GFRAA >60 09/11/2021    LABGLOM >60 09/11/2021    GLUCOSE 183 09/11/2021    PROT 6.1 09/11/2021    LABALBU 2.2 09/11/2021    CALCIUM 8.4 09/11/2021    BILITOT 0.3 09/11/2021    ALKPHOS 93 09/11/2021    AST 77 09/11/2021    ALT 44 09/11/2021     Lab Results   Component Value Date    CRP 33.0 (H) 09/11/2021    CRP 28.4 (H) 09/10/2021    CRP 27.4 (H) 09/09/2021     Lab Results   Component Value Date    SEDRATE 122 (H) 09/11/2021    SEDRATE 110 (H) 09/10/2021    SEDRATE 111 (H) 09/09/2021     Radiology:      Microbiology:   Streptococcus pneumoniae/Legionella urine Ag: negative  Nares screen MRSA: Negative  Urine culture 8/24/2021: Negative  Blood cultures 8/21/2021: Negative x2  Respiratory culture 8/27/2021: OP terrell reduced  Respiratory culture 8/30/2021: E. coli, MSSA   BAL 9/8/2021: OP terrell reduced. E. coli, Enterobacter cloacae, MSSA. PjP negative    ASSESSMENT:  · Severe Covid pneumonia causing acute respiratory failure CRP has come down nicely. Completed Remdesivir. Received Tocilizumab  · VAP versus tracheobronchitis. Cultures growing MSSA and E. coli. Improving. Treated with 10 days of Cefazolin  · Status post bronchoscopy 9/8/2021. Cultures growing rare MSSA, GNR in light GNR. This represents colonization rather than infection.   Possible tracheobronchitis  · Leukocytosis associated to the above, stable  · Status post tracheostomy and PEG 9/10/2021    PLAN:  · Continue inhaled Tobramycin for possible tracheobronchitis  · Check final BAL cultures  · Repeat blood cultures x2  · We will follow with you  · Lines need to be changed to a new site. Culture tips  · Start Zosyn  · The patient is 1 month out from initial symptoms. Isolation can be discontinued.   Terminally clean the room    Discussed with ICU team.    Eliza Chapman MD  12:03 PM   9/11/2021

## 2021-09-11 NOTE — PLAN OF CARE
Problem: Airway Clearance - Ineffective  Goal: Achieve or maintain patent airway  Outcome: Met This Shift     Problem: Gas Exchange - Impaired  Goal: Absence of hypoxia  Outcome: Met This Shift  Goal: Promote optimal lung function  Outcome: Met This Shift     Problem: Breathing Pattern - Ineffective  Goal: Ability to achieve and maintain a regular respiratory rate  Outcome: Met This Shift     Problem: Body Temperature -  Risk of, Imbalanced  Goal: Complications related to the disease process, condition or treatment will be avoided or minimized  Outcome: Met This Shift     Problem: Isolation Precautions - Risk of Spread of Infection  Goal: Prevent transmission of infection  Outcome: Met This Shift     Problem: Nutrition Deficits  Goal: Optimize nutritional status  Outcome: Met This Shift     Problem: Risk for Fluid Volume Deficit  Goal: Maintain normal heart rhythm  Outcome: Met This Shift  Goal: Maintain absence of muscle cramping  Outcome: Met This Shift  Goal: Maintain normal serum potassium, sodium, calcium, phosphorus, and pH  Outcome: Met This Shift     Problem: Loneliness or Risk for Loneliness  Goal: Demonstrate positive use of time alone when socialization is not possible  Outcome: Met This Shift     Problem: Fatigue  Goal: Verbalize increase energy and improved vitality  Outcome: Met This Shift     Problem: Skin Integrity:  Goal: Will show no infection signs and symptoms  Description: Will show no infection signs and symptoms  Outcome: Met This Shift  Goal: Absence of new skin breakdown  Description: Absence of new skin breakdown  Outcome: Met This Shift     Problem: Inadequate oral food/beverage intake (NI-2.1)  Goal: Food and/or Nutrient Delivery  Description: Individualized approach for food/nutrient provision.   9/10/2021 1413 by Verónica Nick RD, CNSC, LD  Outcome: Met This Shift

## 2021-09-11 NOTE — PROGRESS NOTES
Critical Care Team - Daily Progress Note      Date and time: 2021 5:34 AM  Patient's name:  Richardson Dewey Record Number: [id number]  Patient's account/billing number: [de-identified]  Patient's YOB: 1965  Age: 60 y.o. Date of Admission: 2021  9:35 AM  Length of stay during current admission: 21      Primary Care Physician: Loco 5, DO  ICU Attending Physician: Dr. Getachew Mckinnon Status: Full Code    Reason for ICU admission: Respiratory failure due to COVID-19      SUBJECTIVE:     OVERNIGHT EVENTS:           : tolerating longer periods supine, thick dark brown secretions     : secretions out of NG, michelle prone     : Decreased urine output and increased creatinine today, continues with thick secretions     : Cr and Uop improved     9/3: P/F improved and has remained supine     : thick secretions     : desat with turning, 1500 loose stool from [facility]    : Patient trying low tidal volumes overnight occasionally, otherwise no other acute events. : No acute events overnight    : Did receive labetalol 2 times overnight for elevated blood pressures otherwise unremarkable good urine output. Will need essentially exchanged today, bronchoscopy for retained left lung and bibasilar mucopurulent secretions     9/10-no acute events overnight did receive trach and PEG yesterday, will wean sedation as able. , upper extremity DVT noted    : No acute events overnight, sedation was increased overnight due to increased respirations, will slowly come down on that according to nursing staff.       OBJECTIVE:     VITAL SIGNS:  /62   Pulse 88   Temp 101.7 °F (38.7 °C) (Bladder)   Resp 24   Ht 6' (1.829 m)   Wt (!) 313 lb 7.9 oz (142.2 kg)   SpO2 95%   BMI 42.52 kg/m²   Tmax over 24 hours:  Temp (24hrs), Av.1 °F (38.4 °C), Min:100 °F (37.8 °C), Max:102.6 °F (39.2 °C)      Patient Vitals for the past 6 hrs:   BP Temp Temp src Pulse Resp SpO2 09/11/21 0500 120/62 -- -- 88 24 95 %   09/11/21 0425 -- -- -- -- 24 94 %   09/11/21 0424 -- -- -- 84 28 94 %   09/11/21 0400 (!) 141/65 101.7 °F (38.7 °C) Bladder 90 23 99 %   09/11/21 0300 (!) 118/56 -- -- 88 (!) 32 94 %   09/11/21 0200 (!) 151/66 -- -- 93 (!) 31 94 %   09/11/21 0100 136/72 -- -- 102 (!) 39 93 %   09/11/21 0044 -- -- -- -- (!) 31 94 %   09/11/21 0030 -- -- -- 100 27 94 %   09/11/21 0020 -- -- -- -- 26 93 %   09/11/21 0000 (!) 165/75 102.6 °F (39.2 °C) Bladder 109 (!) 38 94 %         Intake/Output Summary (Last 24 hours) at 9/11/2021 0534  Last data filed at 9/10/2021 2200  Gross per 24 hour   Intake 1709.7 ml   Output 1950 ml   Net -240.3 ml     Wt Readings from Last 2 Encounters:   09/09/21 (!) 313 lb 7.9 oz (142.2 kg)   08/17/21 (!) 320 lb (145.2 kg)     Body mass index is 42.52 kg/m².         PHYSICAL EXAMINATION:    General appearance - ill-appearing, trach present on ventilator  Mental status -intubated sedated  Eyes - pupils equal and reactive, extraocular eye movements intact  Ears - bilateral TM's and external ear canals normal  Nose - normal and patent, no erythema, discharge or polyps  Mouth - mucous membranes moist, pharynx normal without lesions  Neck - supple, no significant adenopathy  Chest -intubated sedated, breath sounds improving slight rhonchi noted  heart - normal rate, regular rhythm, normal S1, S2, no murmurs, rubs, clicks or gallops  Abdomen - soft, nontender, nondistended, no masses or organomegaly  Neurological -intubated and sedated  Extremities - peripheral pulses normal, no pedal edema, no clubbing or cyanosis  Skin -patient did have skin breakdown to the face as well as blistering on the hands     Any additional physical findings:    MEDICATIONS:    Scheduled Meds:   oxyCODONE  10 mg Oral Q8H    diazePAM  5 mg Oral Q8H    enoxaparin  1 mg/kg SubCUTAneous BID    tobramycin  300 mg Inhalation Q12H    lidocaine PF  5 mL IntraDERmal Once    sodium chloride flush Assessments  Pulse: 88  Resp: 24  SpO2: 95 %  Position: Semi-Sellers's  Humidification Source: Heated wire  Humidification Temp: 37  Circuit Condensation: Drained  Oral Care: Mouth swabbed, Mouth moisturizer, Mouth suctioned, Suction toothette  Subglottic Suction Done?: No  Airway Type: Trachial  Airway Size: 78  Cuff Pressure (cm H2O): 29 cm H2O    ABGs:     Laboratory findings:    Complete Blood Count:   Recent Labs     09/09/21  0625 09/10/21  0548 09/11/21  0420   WBC 17.7* 17.9* 21.2*   HGB 11.8* 11.2* 10.5*   HCT 37.3 35.2* 32.6*    211 203        Last 3 Blood Glucose:   Recent Labs     09/09/21  0625 09/10/21  0548 09/11/21  0420   GLUCOSE 130* 156* 183*        PT/INR:  No results found for: PROTIME, INR  PTT:  No results found for: APTT, PTT    Comprehensive Metabolic Profile:   Recent Labs     09/09/21  0625 09/09/21  0625 09/10/21  0548 09/10/21  0548 09/11/21  0420     --  136  --  139   K 4.1  --  3.3*  --  3.8     --  101  --  105   CO2 28  --  25  --  26   BUN 14  --  12  --  14   CREATININE 0.4*  --  0.4*  --  0.4*   GLUCOSE 130*  --  156*  --  183*   CALCIUM 8.7  --  8.4*  --  8.4*   PROT 6.0*   < > 6.0*   < > 6.1*   LABALBU 2.6*   < > 2.4*   < > 2.2*   BILITOT 0.3   < > 0.5   < > 0.3   ALKPHOS 91   < > 88   < > 93   AST 33   < > 40*   < > 77*   ALT 26   < > 26   < > 44*    < > = values in this interval not displayed. Magnesium:   Lab Results   Component Value Date    MG 1.9 09/11/2021     Phosphorus:   Lab Results   Component Value Date    PHOS 2.9 09/11/2021     Ionized Calcium: No results found for: CAION     Urinalysis:     Troponin: No results for input(s): TROPONINI in the last 72 hours.       ASSESSMENT:     Patient Active Problem List    Diagnosis Date Noted    Elevated LFTs     [facility] 08/26/2021    Sepsis ([facility] 08/26/2021    Thrombocytopenia ([facility] 08/26/2021    Hyperlipidemia 08/22/2021    Acute hypoxemic respiratory failure due to COVID-19 ([facility] 08/21/2021    Type 2 diabetes mellitus without complication, without long-term current use of insulin ([facility] 08/10/2021    History of seizures 08/10/2021     SYSTEMS ASSESSMENT    Neuro     Intubation, sedated, paralyzed  Propofol  Versed  Nimbex  Fentanyl  Continue to monitor      Respiratory     Acute hypoxic respiratory failure requiring mechanical ventilation  Severe COVID-19 pneumonia s/p toe C, remdesivir  Pneumonia VAP-MSSA, E. coli  Ancef-day 5  Trach/PEG when oxygen garments improve  Decadron 10 mg  Plan for trach/PEG tomorrow  Ultrasound for evaluation of pleural effusion today  Bronchoscopy 9/8/2021 due to white out left side chest on x-ray. Wean oxygen as tolerated. Keep O2 sat 90-92%    Cardiovascular     Hypertension  Amlodipine-10 mg  Lisinopril-10 mg  Lipitor 40 mg    Gastrointestinal     GI prophylaxis-Protonix  Bowel regimen  Continue monitor    Renal     OSCAR-improved  Diuretic on hold  Free water flushes 100 cc every 3 hours  Avoid nephrotoxins  Continue to monitor     Infectious Disease     Pneumonia VAP-positive for MSSA, E. coli  Ancef  lactic acidosis  Fever today, new central in place a left IJ will remove previous old central line in right IJ, will send old central line for cultures, will also get new set of blood cultures as well. Infectious disease following    Hematology/Oncology   DVT left upper arm-Lovenox increased to 1 mg/KG twice daily  Anemia  Hemoglobin stable  Continue to monitor    Endocrine     Hyperglycemia  Lantus 25 units  High-dose sliding scale    Social/Spiritual/DNR/Other     Full code  vitamin regimen  DVT prophylaxis Lovenox 40 every 12    Plan-wean sedation and ventilation as able, new central line placed in left IJ due to PICC line unable to be placed yesterday.     The patient was seen and evaluated by myself and Fredi Urbano MD PGY-2  9/11/2021 5:34 AM    Attending Physician Attestation: Dr. Barbara Robison    Thank you very much for allowing me to see this patient in consultation and follow up. I personally saw, examined and provided care for the patient. Radiographs, labs and medication list were reviewed by me independently. I spoke with bedside nursing, respiratory therapists and consultants. Critical care services and times documented are independent of procedures and multidisciplinary rounds with Residents. Additionally comprehensive, multidisciplinary rounds were conducted with the MICU team. The case was discussed in detail and plans for care were established. Review of Residents documentation was conducted and revisions were made as appropriate. I agree with the the above documented information.      Physical Examination:  Vitals:   Vitals:    09/11/21 1200 09/11/21 1300 09/11/21 1318 09/11/21 1400   BP: (!) 153/76 (!) 97/56  119/60   Pulse: 81 85 81 85   Resp: 29 (!) 44 (!) 45 (!) 42   Temp:       TempSrc:       SpO2: (!) 89% (!) 88% 94% 92%   Weight:       Height:          General: No Acute Distress  HEENT: normocephalic, atraumatic  - trach intact with no focal erythema, erosion, ulceration or bleed noted  CVS: RRR, S1, S2, No S3 or S4  Respiratory: decreased breath sounds at lung bases, no focal wheezes noted  Extremities: no clubbing/cyanosis/edema     ASSESSMENT:  · Severe Covid pneumonia causing acute respiratory failure CRP has come down nicely.  Completed Remdesivir.  Received Tocilizumab  · VAP versus tracheobronchitis.  Cultures growing MSSA and E. coli.  Improving  · Leukocytosis associated to the above.    · Lactic Acidosis   · Left Pleural Effusion/Atelectasis - s/p bronchoscopy with evaluation of copious mucous plugging 9/8/2021   · Upper Extremity DVT     In addition the following applies:     Check: N/A  Medication Alterations: OXY IR and valium PO TID to wean off versed and fentanyl, lovenox BID  Procedures: bronchoscopy 9/8/2021, trach/PEG 9/9/2021   - left pleural US noted not enough fluid to tap for thoracentesis/chest tube   Imaging: reviewed, Left Pleural US  New Consultations: N/A  VENT: ACVC (DAY 21) 22/460/40/8  Ppeak: 30  Pplateau: 24  DHKCTXSDTX: 76     Access: Right IJ  Consults: ID, GS, Nephrology   Drips: Fentanyl, Versed   Nutrition: Tube Feeds  ABX: N/A  Completed ABX: Cefazolin      - bronchoscopy 9/8/2021 noted copious thick white mucous plugging bibasilar regions L > R  - await possible transfer to [facility] in next few days for trach management   - Left IJ TLC, send right IJ tip for culture  - blood culture x 2  - seroquel  - future US upper arms B/L then if clot gone then OK for PICC but on lovenox BID until that time  - patient likely will not be at [facility] this weekend given need for sedation drips     Thank you for allowing me to participate in the care of this patient. Care reviewed with nursing staff, medical and surgical specialty care, primary care and the patient's family as available. Restraints are ordered when the patient can do harm to him/herself by pulling out devices. Critical Care Time: > 35 minutes excluding procedures    Sydni Casillas M.D.     Sydni Casillas MD  9/11/2021  2:26 PM

## 2021-09-11 NOTE — PROGRESS NOTES
[facility] Progress Note    Admitting Date and Time: 8/21/2021  9:35 AM  Admit Dx: Acute hypoxemic respiratory failure due to COVID-19 (HCC) [U07.1, J96.01]  Pneumonia due to COVID-19 virus [U07.1, J12.82]    Subjective:  Patient is being followed for Acute hypoxemic respiratory failure due to COVID-19 ([address] [U07.1, J96.01]  Pneumonia due to COVID-19 virus [U07.1, J12.82]     Per RN: Patient remains intubated, requesting isolation, febrile.      ROS: Unobtainable     phenytoin        lidocaine PF  5 mL IntraDERmal Once    sodium chloride flush  5-40 mL IntraVENous 2 times per day    heparin flush  3 mL IntraVENous 2 times per day    QUEtiapine  25 mg Oral BID    fentanNYL  100 mcg IntraVENous Once    piperacillin-tazobactam  3,375 mg IntraVENous Q8H    oxyCODONE  10 mg Oral Q8H    diazePAM  5 mg Oral Q8H    enoxaparin  1 mg/kg SubCUTAneous BID    tobramycin  300 mg Inhalation Q12H    phenytoin  350 mg IntraVENous Q12H    lisinopril  10 mg Oral Daily    insulin glargine  25 Units SubCUTAneous BID    ascorbic acid  1,000 mg Oral Daily    sodium chloride (Inhalant)  4 mL Nebulization Q12H    dexamethasone  10 mg IntraVENous Daily    insulin lispro  0-18 Units SubCUTAneous Q6H    chlorhexidine  15 mL Mouth/Throat BID    ipratropium-albuterol  1 ampule Inhalation Q4H    amLODIPine  10 mg Oral Daily    atorvastatin  40 mg Oral Daily    Vitamin D  2,000 Units Oral Daily    zinc sulfate  50 mg Oral Daily    pantoprazole  40 mg IntraVENous Daily     sodium chloride flush, 5-40 mL, PRN  sodium chloride, 25 mL, PRN  acetaminophen, 650 mg, Q4H PRN  labetalol, 20 mg, Q6H PRN  hydrALAZINE, 20 mg, Q6H PRN  sodium chloride (Inhalant), 4 mL, PRN  albuterol, 2.5 mg, Q6H PRN  benzonatate, 200 mg, TID PRN  glucose, 15 g, PRN  dextrose, 12.5 g, PRN  glucagon (rDNA), 1 mg, PRN  dextrose, 100 mL/hr, PRN  sodium chloride, 30 mL, PRN         Objective:    BP (!) 156/64   Pulse 95   Temp 101.6 °F (38.7 °C)   Resp (!) 48   Ht 6' (1.829 m)   Wt (!) 313 lb 7.9 oz (142.2 kg)   SpO2 (!) 87%   BMI 42.52 kg/m²     General Appearance: Intubated and sedated  Skin: warm and dry  Head: normocephalic and atraumatic, on vent through trach, right IJ TLC removed, left IJ TLC in situ  Eyes: pupils equal, round, and reactive to light, extraocular eye movements intact, conjunctivae normal  Neck: neck supple and non tender without mass   Pulmonary/Chest: clear to auscultation diminished bilaterally- no wheezes, rales or rhonchi, decreased air entry  Cardiovascular: normal rate, normal S1 and S2 and no carotid bruits  Abdomen: soft, non-tender, non-distended, normal bowel sounds, no masses or organomegaly  Extremities: no cyanosis, no clubbing and + edema  Neurologic: Intubated and sedated        Recent Labs     09/09/21  0625 09/10/21  0548 09/11/21  0420    136 139   K 4.1 3.3* 3.8    101 105   CO2 28 25 26   BUN 14 12 14   CREATININE 0.4* 0.4* 0.4*   GLUCOSE 130* 156* 183*   CALCIUM 8.7 8.4* 8.4*       Recent Labs     09/09/21  0625 09/10/21  0548 09/11/21  0420   WBC 17.7* 17.9* 21.2*   RBC 4.01 3.78* 3.56*   HGB 11.8* 11.2* 10.5*   HCT 37.3 35.2* 32.6*   MCV 93.0 93.1 91.6   MCH 29.4 29.6 29.5   MCHC 31.6* 31.8* 32.2   RDW 14.6 14.6 14.8    211 203   MPV 9.9 10.0 9.6       Micro:  No components found for: [facility])    Radiology:   XR CHEST PORTABLE    Result Date: 9/11/2021  EXAMINATION: ONE XRAY VIEW OF THE CHEST 9/11/2021 12:25 pm COMPARISON: 09/10/2021 HISTORY: ORDERING SYSTEM PROVIDED HISTORY: left IJ TLC placement TECHNOLOGIST PROVIDED HISTORY: Reason for exam:->left IJ TLC placement FINDINGS: Since the prior study, there has been insertion of a left sided central line. The tip projects to the left of the trachea. The tip does not cross midline and project in the region of the SVC. No definite pneumothorax Tip of tracheostomy tube is unchanged.   Right-sided central line is unchanged Heterogeneous opacity is seen throughout the lungs bilaterally, increased on the right compared to prior     Since the prior study, there has been insertion of a left sided central line. The tip projects to the left of the trachea. Correlate with blood gas. As the tip does not clearly project in the region of the SVC, it is not definitively venous by radiograph. Bilateral airspace disease, increased on the right compared to prior     XR CHEST PORTABLE    Result Date: 9/10/2021  EXAMINATION: ONE XRAY VIEW OF THE CHEST 9/10/2021 7:16 am COMPARISON: 09/09/2021 HISTORY: ORDERING SYSTEM PROVIDED HISTORY: Acute resp failure, COVID TECHNOLOGIST PROVIDED HISTORY: Reason for exam:->Acute resp failure, COVID FINDINGS: Interval placement of tracheostomy tube terminating above the donna. Nasogastric tube is been removed. Right IJ catheter remains in place. Cardiac silhouette is prominent but unchanged from previous. No pneumothorax. Multifocal bilateral lung parenchymal infiltrates appear slightly worsened overall and remain greater towards the left. No other interval change. Interval placement of tracheostomy tube. Bilateral parenchymal infiltrates appear subtly worsened. Continued follow-up recommended. US DUP UPPER EXTREMITIES BILATERAL VENOUS    Result Date: 9/10/2021  EXAMINATION: DUPLEX ULTRASOUND OF THE BILATERAL UPPER EXTREMITIES FOR DVT, 9/10/2021 9:37 am TECHNIQUE: Duplex ultrasound using B-mode/gray scaled imaging and Doppler spectral analysis and color flow was obtained of the deep venous structures of the upper extremity. COMPARISON: None. HISTORY: ORDERING SYSTEM PROVIDED HISTORY: r/o dvt TECHNOLOGIST PROVIDED HISTORY: Reason for exam:->r/o dvt What reading provider will be dictating this exam?->CRC FINDINGS: There is deep vein thrombosis involving the left brachial and basilic veins throughout the arm. Thrombus is also noted within the left cephalic vein.  There is normal flow and compressibility made to ensure accuracy; however, inadvertent computerized transcription errors may be present.   Electronically signed by Dhruv Yo MD on 9/11/2021 at 6:20 PM

## 2021-09-11 NOTE — PROCEDURES
Arterial Line exchange placement Procedure Note        Performed by myself Yoli Montiel PGY 2 MD  Time 132            Indication: arterial blood gases, mechanical ventilation, severe hypotension and cardiovascular instability    Consent: For previous arrival and already obtained    Nitin's Test: Ultrasound showed patent radial and ulnar arteries    Procedure: The skin over the right radial artery was prepped with betadine and draped in a sterile fashion. Local anesthesia was not indicated. A 20 gauge arterial line catheter was then inserted, using technique of guidewire exchange, into the vessel. The transducer set was then attached and securely fastened to the skin with sutures. Waveforms on the monitor were observed and found to be adequate. The patient had good distal perfusion after the procedure. The site was then dressed in a sterile fashion. The patient tolerated the procedure well.      Complications: None    Roby Choi DO
Central Line Placement Procedure Note    Indication: vascular access and need for frequent blood draws    Consent: The patient provided verbal consent for this procedure. Procedure: The patient was positioned appropriately and the skin over the right internal jugular vein was prepped with betadine and draped in a sterile fashion. Local anesthesia was not performed due to the emergent nature of this procedure. A large bore needle was used to identify the vein. A guide wire was then inserted into the vein through the needle. A triple lumen catheter was then inserted into the vessel over the guide wire using the Seldinger technique. All ports showed good, free flowing blood return and were flushed with saline solution. The catheter was then securely fastened to the skin with suture at 16 cm. Two sutures were placed into the kit included tube clamp, proximal eyelets and a suture end from each of the securing sutures was extended around the catheter and tied to the proximal eyelets as an added measure to prevent dislodgement. An antibiotic disk was placed and the site was then covered with a sterile dressing. A post procedure X-ray was ordered and is still pending at this time. The patient tolerated the procedure well.     Complications: None
Intubation Procedure Note    Indication: Respiratory failure and impending respiratory failure    Consent: The patient provided verbal consent for this procedure. Medications Used: etomidate intravenously, midazolam intravenously and rocuronium intravenously    Procedure: The patient was placed in the appropriate position. Cricoid pressure was not required. Intubation was performed by glidescope an 8.0 cuffed endotracheal tube. The cuff was then inflated and the tube was secured appropriately at a distance of 27 to the dental ridge. Initial confirmation of placement included bilateral breath sounds, an end tidal CO2 detector, absence of sounds over the stomach, tube fogging and adequate chest rise. A chest x-ray to verify correct placement of the tube has been ordered but is still pending. The patient tolerated the procedure well.      Complications: None
PICC  Catheter insertion date: 9/8/2021     Product Number:  [id number] [address]   Lot No: [id number]   Gauge:    Lumen: DOUBLE   R Brachial    Vein Diameter : 0.50cm   Mid upper arm circumference:    Catheter Length :    Internal Length:    External Catheter Length:    Ultrasound Used:yes  CXR obtained shows line going up in neck, line subsequently pulled  : BOUBACAR Soni RN
PROCEDURE  9/11/21       Time: 1130    CENTRAL LINE INSERTION  Risks, benefits and alternatives (for applicable procedures below) described. Performed By: Ethel Rojas MD.    Indication: central venous monitoring and centrally administered medications. Informed consent: Consent unable to be obtained due to the emergent nature of this procedure. .  Procedure: After routine sterile preparation, local anesthesia obtained by infiltration using 1% Lidocaine without epinephrine. A left 3-Lumen 7F Central Venous Catheter was placed by internal jugular vein approach and secured by standard fashion. Ultrasound Guidance:   used. Number of Attempts: 1  Post-procedure Findings: A post procedural chest x-ray  was ordered and showed good line position. Patient tolerated the procedure well. Attending Physician Attestation: Dr. Last Varghese    Thank you very much for allowing me to see this patient in consultation and follow up. I personally saw, examined and provided care for the patient. Radiographs, labs and medication list were reviewed by me independently. I spoke with bedside nursing, respiratory therapists and consultants. Critical care services and times documented are independent of procedures and multidisciplinary rounds with Residents. Additionally comprehensive, multidisciplinary rounds were conducted with the MICU team. The case was discussed in detail and plans for care were established. Review of Residents documentation was conducted and revisions were made as appropriate. I agree with the the above documented information. I was personally present during the completion of procedure.     Last Varghese  9/11/2021  1:15 PM
Procedure: Right radial arterial line placement. Indications: Continuous monitoring of blood pressure in a patient with hypotension +/- shock, on Levophed. Anesthesia: Local infiltration of 1% lidocaine. Consent:  Informed written obtained. Technique:  Procedure was done using strict aseptic technique. Right radial site was cleaned with chloraprep and draped. Radial artery was identified, then Lidocaine 1% was infiltrated locally. Radial arterial line was inserted, a good blood flow was obtained, after which guidewire was inserted all the way with no resistance. Then the canula was inserted and needle with guidewire was withdrawn. Pulsatile bright red blood flow was observed. The canula was connected to BP monitoring apparatus and a good waveform was noted. Then the canula was secured with 2 stay sutures of 3-0 silk after Lidocaine infiltration, following which dressing was applied. Number of sticks: 1. Number of Kits used: 1. Complications: No immediate complication. Estimated blood loss: About 1 ml. Comment: Patient tolerated the procedure well.      Jama Shelley MD
Right radial arterial lab draw, ultrasound-guided                   Indication: arterial blood gases    Consent: The patient provided verbal consent for this procedure. Nitin's Test: Normal    Procedure: The skin over the right radial artery was prepped with betadine and draped in a sterile fashion. Right radial artery identified on the ultrasound. 21-gauge butterfly needle with 20 cc syringe utilized. Butterfly needle inserted into radial artery, blood obtained. Pressure held over arterial puncture site until bleeding stopped and area dressed with Band-Aid. The patient tolerated the procedure well.      Complications: None
covered with a sterile dressing. A post procedure X-ray was ordered and is still pending at this time. The patient tolerated the procedure well.     Complications: None      Feliz Clancy DO

## 2021-09-12 ENCOUNTER — APPOINTMENT (OUTPATIENT)
Dept: GENERAL RADIOLOGY | Age: 56
DRG: 005 | End: 2021-09-12
Payer: COMMERCIAL

## 2021-09-12 LAB
AADO2: 207.9 MMHG
ALBUMIN SERPL-MCNC: 2.1 G/DL (ref 3.5–5.2)
ALP BLD-CCNC: 119 U/L (ref 40–129)
ALT SERPL-CCNC: 105 U/L (ref 0–40)
ANION GAP SERPL CALCULATED.3IONS-SCNC: 10 MMOL/L (ref 7–16)
AST SERPL-CCNC: 176 U/L (ref 0–39)
B.E.: 1.4 MMOL/L (ref -3–3)
BASOPHILS ABSOLUTE: 0 E9/L (ref 0–0.2)
BASOPHILS RELATIVE PERCENT: 0 % (ref 0–2)
BILIRUB SERPL-MCNC: 0.3 MG/DL (ref 0–1.2)
BUN BLDV-MCNC: 17 MG/DL (ref 6–20)
C-REACTIVE PROTEIN: 42 MG/DL (ref 0–0.4)
CALCIUM SERPL-MCNC: 8.7 MG/DL (ref 8.6–10.2)
CHLORIDE BLD-SCNC: 103 MMOL/L (ref 98–107)
CO2: 24 MMOL/L (ref 22–29)
COHB: 1.4 % (ref 0–1.5)
CREAT SERPL-MCNC: 0.4 MG/DL (ref 0.7–1.2)
CRITICAL: ABNORMAL
DATE ANALYZED: ABNORMAL
DATE OF COLLECTION: ABNORMAL
EOSINOPHILS ABSOLUTE: 0.24 E9/L (ref 0.05–0.5)
EOSINOPHILS RELATIVE PERCENT: 1 % (ref 0–6)
FIO2: 45 %
GFR AFRICAN AMERICAN: >60
GFR NON-AFRICAN AMERICAN: >60 ML/MIN/1.73
GLUCOSE BLD-MCNC: 157 MG/DL (ref 74–99)
HCO3: 25.6 MMOL/L (ref 22–26)
HCT VFR BLD CALC: 33.4 % (ref 37–54)
HEMOGLOBIN: 10.9 G/DL (ref 12.5–16.5)
HHB: 9.8 % (ref 0–5)
LAB: ABNORMAL
LYMPHOCYTES ABSOLUTE: 0.98 E9/L (ref 1.5–4)
LYMPHOCYTES RELATIVE PERCENT: 4 % (ref 20–42)
Lab: ABNORMAL
MAGNESIUM: 1.9 MG/DL (ref 1.6–2.6)
MCH RBC QN AUTO: 30.1 PG (ref 26–35)
MCHC RBC AUTO-ENTMCNC: 32.6 % (ref 32–34.5)
MCV RBC AUTO: 92.3 FL (ref 80–99.9)
METER GLUCOSE: 125 MG/DL (ref 74–99)
METER GLUCOSE: 131 MG/DL (ref 74–99)
METER GLUCOSE: 164 MG/DL (ref 74–99)
METER GLUCOSE: 178 MG/DL (ref 74–99)
METHB: 0 % (ref 0–1.5)
MODE: AC
MONOCYTES ABSOLUTE: 0.98 E9/L (ref 0.1–0.95)
MONOCYTES RELATIVE PERCENT: 4 % (ref 2–12)
NEUTROPHILS ABSOLUTE: 22.2 E9/L (ref 1.8–7.3)
NEUTROPHILS RELATIVE PERCENT: 91 % (ref 43–80)
O2 CONTENT: 15.4 ML/DL
O2 SATURATION: 90.1 % (ref 92–98.5)
O2HB: 88.8 % (ref 94–97)
OPERATOR ID: 2485
PATIENT TEMP: 37 C
PCO2: 39.2 MMHG (ref 35–45)
PDW BLD-RTO: 14.6 FL (ref 11.5–15)
PEEP/CPAP: 8 CMH2O
PFO2: 1.27 MMHG/%
PH BLOOD GAS: 7.43 (ref 7.35–7.45)
PHOSPHORUS: 2.8 MG/DL (ref 2.5–4.5)
PLATELET # BLD: 212 E9/L (ref 130–450)
PMV BLD AUTO: 9.7 FL (ref 7–12)
PO2: 57.1 MMHG (ref 75–100)
POTASSIUM SERPL-SCNC: 3.9 MMOL/L (ref 3.5–5)
RBC # BLD: 3.62 E12/L (ref 3.8–5.8)
RBC # BLD: NORMAL 10*6/UL
RI(T): 364 %
RR MECHANICAL: 22 B/MIN
SEDIMENTATION RATE, ERYTHROCYTE: 122 MM/HR (ref 0–15)
SODIUM BLD-SCNC: 137 MMOL/L (ref 132–146)
SOURCE, BLOOD GAS: ABNORMAL
THB: 12.3 G/DL (ref 11.5–16.5)
TIME ANALYZED: 533
TOTAL PROTEIN: 6.6 G/DL (ref 6.4–8.3)
VT MECHANICAL: 460 ML
WBC # BLD: 24.4 E9/L (ref 4.5–11.5)

## 2021-09-12 PROCEDURE — 86140 C-REACTIVE PROTEIN: CPT

## 2021-09-12 PROCEDURE — 6360000002 HC RX W HCPCS: Performed by: SURGERY

## 2021-09-12 PROCEDURE — 82805 BLOOD GASES W/O2 SATURATION: CPT

## 2021-09-12 PROCEDURE — 2000000000 HC ICU R&B

## 2021-09-12 PROCEDURE — 6370000000 HC RX 637 (ALT 250 FOR IP): Performed by: SURGERY

## 2021-09-12 PROCEDURE — 85651 RBC SED RATE NONAUTOMATED: CPT

## 2021-09-12 PROCEDURE — 6360000002 HC RX W HCPCS: Performed by: SPECIALIST

## 2021-09-12 PROCEDURE — 99233 SBSQ HOSP IP/OBS HIGH 50: CPT | Performed by: STUDENT IN AN ORGANIZED HEALTH CARE EDUCATION/TRAINING PROGRAM

## 2021-09-12 PROCEDURE — 94003 VENT MGMT INPAT SUBQ DAY: CPT

## 2021-09-12 PROCEDURE — 2500000003 HC RX 250 WO HCPCS: Performed by: SURGERY

## 2021-09-12 PROCEDURE — 85025 COMPLETE CBC W/AUTO DIFF WBC: CPT

## 2021-09-12 PROCEDURE — 6370000000 HC RX 637 (ALT 250 FOR IP): Performed by: INTERNAL MEDICINE

## 2021-09-12 PROCEDURE — 36592 COLLECT BLOOD FROM PICC: CPT

## 2021-09-12 PROCEDURE — 94640 AIRWAY INHALATION TREATMENT: CPT

## 2021-09-12 PROCEDURE — 84100 ASSAY OF PHOSPHORUS: CPT

## 2021-09-12 PROCEDURE — P9047 ALBUMIN (HUMAN), 25%, 50ML: HCPCS | Performed by: INTERNAL MEDICINE

## 2021-09-12 PROCEDURE — 2580000003 HC RX 258: Performed by: INTERNAL MEDICINE

## 2021-09-12 PROCEDURE — 6360000002 HC RX W HCPCS: Performed by: INTERNAL MEDICINE

## 2021-09-12 PROCEDURE — 2580000003 HC RX 258: Performed by: SPECIALIST

## 2021-09-12 PROCEDURE — 2500000003 HC RX 250 WO HCPCS: Performed by: INTERNAL MEDICINE

## 2021-09-12 PROCEDURE — 2580000003 HC RX 258: Performed by: SURGERY

## 2021-09-12 PROCEDURE — 36600 WITHDRAWAL OF ARTERIAL BLOOD: CPT

## 2021-09-12 PROCEDURE — 71045 X-RAY EXAM CHEST 1 VIEW: CPT

## 2021-09-12 PROCEDURE — 82962 GLUCOSE BLOOD TEST: CPT

## 2021-09-12 PROCEDURE — 36415 COLL VENOUS BLD VENIPUNCTURE: CPT

## 2021-09-12 PROCEDURE — 80053 COMPREHEN METABOLIC PANEL: CPT

## 2021-09-12 PROCEDURE — 6360000002 HC RX W HCPCS

## 2021-09-12 PROCEDURE — 83735 ASSAY OF MAGNESIUM: CPT

## 2021-09-12 PROCEDURE — C9113 INJ PANTOPRAZOLE SODIUM, VIA: HCPCS | Performed by: SURGERY

## 2021-09-12 RX ORDER — PHENYTOIN SODIUM 50 MG/ML
INJECTION, SOLUTION INTRAMUSCULAR; INTRAVENOUS
Status: DISPENSED
Start: 2021-09-12 | End: 2021-09-12

## 2021-09-12 RX ORDER — PHENYTOIN SODIUM 50 MG/ML
INJECTION, SOLUTION INTRAMUSCULAR; INTRAVENOUS
Status: COMPLETED
Start: 2021-09-12 | End: 2021-09-12

## 2021-09-12 RX ORDER — BUMETANIDE 0.25 MG/ML
1 INJECTION, SOLUTION INTRAMUSCULAR; INTRAVENOUS 2 TIMES DAILY
Status: DISCONTINUED | OUTPATIENT
Start: 2021-09-12 | End: 2021-09-12

## 2021-09-12 RX ORDER — DEXAMETHASONE SODIUM PHOSPHATE 4 MG/ML
6 INJECTION, SOLUTION INTRA-ARTICULAR; INTRALESIONAL; INTRAMUSCULAR; INTRAVENOUS; SOFT TISSUE DAILY
Status: DISCONTINUED | OUTPATIENT
Start: 2021-09-13 | End: 2021-09-15

## 2021-09-12 RX ORDER — ALBUMIN (HUMAN) 12.5 G/50ML
25 SOLUTION INTRAVENOUS 3 TIMES DAILY
Status: COMPLETED | OUTPATIENT
Start: 2021-09-12 | End: 2021-09-14

## 2021-09-12 RX ORDER — QUETIAPINE FUMARATE 25 MG/1
50 TABLET, FILM COATED ORAL 2 TIMES DAILY
Status: DISCONTINUED | OUTPATIENT
Start: 2021-09-12 | End: 2021-09-13

## 2021-09-12 RX ORDER — BUMETANIDE 0.25 MG/ML
1 INJECTION, SOLUTION INTRAMUSCULAR; INTRAVENOUS 3 TIMES DAILY
Status: COMPLETED | OUTPATIENT
Start: 2021-09-12 | End: 2021-09-14

## 2021-09-12 RX ADMIN — IPRATROPIUM BROMIDE AND ALBUTEROL SULFATE 1 AMPULE: .5; 3 SOLUTION RESPIRATORY (INHALATION) at 11:57

## 2021-09-12 RX ADMIN — PHENYTOIN SODIUM 350 MG: 50 INJECTION INTRAMUSCULAR; INTRAVENOUS at 22:24

## 2021-09-12 RX ADMIN — INSULIN GLARGINE 25 UNITS: 100 INJECTION, SOLUTION SUBCUTANEOUS at 20:36

## 2021-09-12 RX ADMIN — IPRATROPIUM BROMIDE AND ALBUTEROL SULFATE 1 AMPULE: .5; 3 SOLUTION RESPIRATORY (INHALATION) at 15:34

## 2021-09-12 RX ADMIN — INSULIN LISPRO 3 UNITS: 100 INJECTION, SOLUTION INTRAVENOUS; SUBCUTANEOUS at 05:31

## 2021-09-12 RX ADMIN — SODIUM CHLORIDE, PRESERVATIVE FREE 10 ML: 5 INJECTION INTRAVENOUS at 20:25

## 2021-09-12 RX ADMIN — IPRATROPIUM BROMIDE AND ALBUTEROL SULFATE 1 AMPULE: .5; 3 SOLUTION RESPIRATORY (INHALATION) at 03:28

## 2021-09-12 RX ADMIN — Medication 200 MCG/HR: at 00:36

## 2021-09-12 RX ADMIN — ENOXAPARIN SODIUM 135 MG: 150 INJECTION SUBCUTANEOUS at 09:28

## 2021-09-12 RX ADMIN — Medication 2000 UNITS: at 09:28

## 2021-09-12 RX ADMIN — OXYCODONE HYDROCHLORIDE AND ACETAMINOPHEN 1000 MG: 500 TABLET ORAL at 09:28

## 2021-09-12 RX ADMIN — PIPERACILLIN AND TAZOBACTAM 3375 MG: 3; .375 INJECTION, POWDER, LYOPHILIZED, FOR SOLUTION INTRAVENOUS at 12:45

## 2021-09-12 RX ADMIN — BUMETANIDE 1 MG: 0.25 INJECTION, SOLUTION INTRAMUSCULAR; INTRAVENOUS at 13:48

## 2021-09-12 RX ADMIN — IPRATROPIUM BROMIDE AND ALBUTEROL SULFATE 1 AMPULE: .5; 3 SOLUTION RESPIRATORY (INHALATION) at 19:57

## 2021-09-12 RX ADMIN — DIAZEPAM 5 MG: 5 TABLET ORAL at 20:36

## 2021-09-12 RX ADMIN — TOBRAMYCIN SULFATE 300 MG: 40 INJECTION, SOLUTION INTRAMUSCULAR; INTRAVENOUS at 19:57

## 2021-09-12 RX ADMIN — OXYCODONE 10 MG: 5 TABLET ORAL at 20:30

## 2021-09-12 RX ADMIN — LABETALOL HYDROCHLORIDE 20 MG: 5 INJECTION INTRAVENOUS at 05:57

## 2021-09-12 RX ADMIN — PANTOPRAZOLE SODIUM 40 MG: 40 INJECTION, POWDER, FOR SOLUTION INTRAVENOUS at 09:28

## 2021-09-12 RX ADMIN — ATORVASTATIN CALCIUM 40 MG: 40 TABLET, FILM COATED ORAL at 20:36

## 2021-09-12 RX ADMIN — AMLODIPINE BESYLATE 10 MG: 10 TABLET ORAL at 09:28

## 2021-09-12 RX ADMIN — ENOXAPARIN SODIUM 135 MG: 150 INJECTION SUBCUTANEOUS at 21:50

## 2021-09-12 RX ADMIN — OXYCODONE 10 MG: 5 TABLET ORAL at 12:16

## 2021-09-12 RX ADMIN — OXYCODONE 10 MG: 5 TABLET ORAL at 03:47

## 2021-09-12 RX ADMIN — PIPERACILLIN AND TAZOBACTAM 3375 MG: 3; .375 INJECTION, POWDER, LYOPHILIZED, FOR SOLUTION INTRAVENOUS at 20:37

## 2021-09-12 RX ADMIN — PHENYTOIN SODIUM 350 MG: 50 INJECTION INTRAMUSCULAR; INTRAVENOUS at 10:35

## 2021-09-12 RX ADMIN — ZINC SULFATE 220 MG (50 MG) CAPSULE 50 MG: CAPSULE at 09:29

## 2021-09-12 RX ADMIN — ALBUMIN (HUMAN) 25 G: 0.25 INJECTION, SOLUTION INTRAVENOUS at 21:01

## 2021-09-12 RX ADMIN — Medication 4 MG/HR: at 03:44

## 2021-09-12 RX ADMIN — PHENYTOIN SODIUM 350 MG: 50 INJECTION INTRAMUSCULAR; INTRAVENOUS at 00:27

## 2021-09-12 RX ADMIN — SODIUM CHLORIDE SOLN NEBU 3% 4 ML: 3 NEBU SOLN at 19:57

## 2021-09-12 RX ADMIN — ALBUMIN (HUMAN) 25 G: 0.25 INJECTION, SOLUTION INTRAVENOUS at 13:48

## 2021-09-12 RX ADMIN — CHLORHEXIDINE GLUCONATE 0.12% ORAL RINSE 15 ML: 1.2 LIQUID ORAL at 09:29

## 2021-09-12 RX ADMIN — SODIUM CHLORIDE SOLN NEBU 3% 4 ML: 3 NEBU SOLN at 08:12

## 2021-09-12 RX ADMIN — QUETIAPINE FUMARATE 50 MG: 25 TABLET ORAL at 20:36

## 2021-09-12 RX ADMIN — TOBRAMYCIN SULFATE 300 MG: 40 INJECTION, SOLUTION INTRAMUSCULAR; INTRAVENOUS at 08:12

## 2021-09-12 RX ADMIN — PIPERACILLIN AND TAZOBACTAM 3375 MG: 3; .375 INJECTION, POWDER, LYOPHILIZED, FOR SOLUTION INTRAVENOUS at 03:47

## 2021-09-12 RX ADMIN — QUETIAPINE FUMARATE 25 MG: 25 TABLET ORAL at 09:28

## 2021-09-12 RX ADMIN — CHLORHEXIDINE GLUCONATE 0.12% ORAL RINSE 15 ML: 1.2 LIQUID ORAL at 20:25

## 2021-09-12 RX ADMIN — INSULIN GLARGINE 25 UNITS: 100 INJECTION, SOLUTION SUBCUTANEOUS at 09:57

## 2021-09-12 RX ADMIN — IPRATROPIUM BROMIDE AND ALBUTEROL SULFATE 1 AMPULE: .5; 3 SOLUTION RESPIRATORY (INHALATION) at 08:12

## 2021-09-12 RX ADMIN — LISINOPRIL 10 MG: 10 TABLET ORAL at 09:28

## 2021-09-12 RX ADMIN — DEXAMETHASONE SODIUM PHOSPHATE 10 MG: 4 INJECTION, SOLUTION INTRA-ARTICULAR; INTRALESIONAL; INTRAMUSCULAR; INTRAVENOUS; SOFT TISSUE at 09:27

## 2021-09-12 RX ADMIN — DIAZEPAM 5 MG: 5 TABLET ORAL at 03:47

## 2021-09-12 RX ADMIN — INSULIN LISPRO 3 UNITS: 100 INJECTION, SOLUTION INTRAVENOUS; SUBCUTANEOUS at 00:16

## 2021-09-12 RX ADMIN — Medication 175 MCG/HR: at 06:41

## 2021-09-12 RX ADMIN — SODIUM CHLORIDE, PRESERVATIVE FREE 10 ML: 5 INJECTION INTRAVENOUS at 09:28

## 2021-09-12 RX ADMIN — BUMETANIDE 1 MG: 0.25 INJECTION, SOLUTION INTRAMUSCULAR; INTRAVENOUS at 20:25

## 2021-09-12 RX ADMIN — DIAZEPAM 5 MG: 5 TABLET ORAL at 12:16

## 2021-09-12 RX ADMIN — Medication 125 MCG/HR: at 14:19

## 2021-09-12 ASSESSMENT — PULMONARY FUNCTION TESTS
PIF_VALUE: 27
PIF_VALUE: 20
PIF_VALUE: 29
PIF_VALUE: 33
PIF_VALUE: 34
PIF_VALUE: 31
PIF_VALUE: 26
PIF_VALUE: 45
PIF_VALUE: 39
PIF_VALUE: 31
PIF_VALUE: 33
PIF_VALUE: 36
PIF_VALUE: 32
PIF_VALUE: 22
PIF_VALUE: 31
PIF_VALUE: 32
PIF_VALUE: 38
PIF_VALUE: 33
PIF_VALUE: 30
PIF_VALUE: 34
PIF_VALUE: 33
PIF_VALUE: 37
PIF_VALUE: 30
PIF_VALUE: 37
PIF_VALUE: 32
PIF_VALUE: 42
PIF_VALUE: 30
PIF_VALUE: 32
PIF_VALUE: 28

## 2021-09-12 ASSESSMENT — PAIN SCALES - GENERAL
PAINLEVEL_OUTOF10: 0

## 2021-09-12 NOTE — PROGRESS NOTES
[facility]  [facility]  Progress Note      Chief Complaint   Patient presents with    Shortness of Breath     covid, per family SaO2 75% RA, hx of DM and panic attack        SUBJECTIVE:  8/21/21  Prone. FiO2 60% and PEEP of 16    8/22/2021  Patient is tolerating medications. No reported adverse drug reactions. No nausea, vomiting, diarrhea. Afebrile BiPAP 100% FiO2 with breathing at 44 times a minute and probably is going to decompensate  8/23/2021  Not doing well intubated on a rotating bed  FiO2 70% and PEEP 16  8/24/2021  Prone 50% FiO2-PEEP of 10  Issues with the urinary Busch last night this was changed  Paralyzed and on Versed  8/25/2021  Paralyzed and sedated with Versed no pressors  Prone intubated on 50% FiO2, PEEP of 10  Tolerating medications    8/26/21  Paralyzed and sedated with Versed no pressors  Prone intubated on 70% FiO2, PEEP of 10  Does not tolerate supine position    8/27/2021  Afebrile  Still prone on FiO2 of 100%  Paralyzed and sedated    8/28/2021  The patient is still in the ICU. He is pronated. FiO2 100%. Is still sedated and paralyzed. Fair amount of thick, brown secretions    8/29/2021  The patient is still on a RotoProne bed. You are still intubated, sedated and paralyzed. O2 is being weaned down. FiO2 55%. PEEP 12.    8/30/2021  Patient remains intubated, sedated and paralyzed. He is still on a RotoProne bed. Supine now. 8/31/2021  He is still on a RotoProne bed. Still having fair amount of secretions from the nostrils. Minimal from the ET tube, however. Tube feeding seems to be coming out of his nose. 9/1/2021  He is supinated but still on a RotoProne bed. He still has fair amount of secretions from the ET tube. Tolerating antibiotic    9/2/2021. He is still on a RotoProne bed. He is pronated. Less secretions from the ET tube and nostrils. FiO2 50%. PEEP 14.    9/3/2021. He is off the RotoProne bed. Tolerating supination.   Tolerating weaning. PEEP was brought down to 12. FiO2 still at 50%. Tolerating antibiotics. 9/4/2021. He is still in the ICU. He is still on a ventilator. He is still paralyzed. 9/5/2021. He had an episode of acute desaturation and had to be bagged. He is now back on the ventilator. He is still sedated and paralyzed. 9/6/2021. The patient still in the ICU. He is still requiring paralytics. Secretions are becoming thinner. No fever. 9/7/2021. The patient still on a ventilator and paralyzed. No new problems reported. 9/8/2021. Patient is still on the ventilator, paralyzed. No fever. Tolerating medications. 9/9/2021. Patient had a bronchoscopy yesterday a fair amount of thick secretions were obtained. Cultures were sent. He is still on a ventilator, sedated and paralyzed. 9/10/2021. The patient is still in the ICU. He is still on a ventilator. He had a tracheostomy and PEG. Denies dyspnea or pain. 9/11/2021. Patient is now having fevers. He is still on a ventilator. Cooling blanket. 9/12/2021. Lines were changed. Still having some fevers but they seem to be trending downwards. Denies any pain. Denies dyspnea. Review of systems:  As stated above in the chief complaint, otherwise negative.     Medications:  Scheduled Meds:   lidocaine PF  5 mL IntraDERmal Once    sodium chloride flush  5-40 mL IntraVENous 2 times per day    heparin flush  3 mL IntraVENous 2 times per day    QUEtiapine  25 mg Oral BID    fentanNYL  100 mcg IntraVENous Once    piperacillin-tazobactam  3,375 mg IntraVENous Q8H    oxyCODONE  10 mg Oral Q8H    diazePAM  5 mg Oral Q8H    enoxaparin  1 mg/kg SubCUTAneous BID    tobramycin  300 mg Inhalation Q12H    phenytoin  350 mg IntraVENous Q12H    lisinopril  10 mg Oral Daily    insulin glargine  25 Units SubCUTAneous BID    ascorbic acid  1,000 mg Oral Daily    sodium chloride (Inhalant)  4 mL Nebulization Q12H    dexamethasone  10 mg IntraVENous Daily    insulin lispro  0-18 Units SubCUTAneous Q6H    chlorhexidine  15 mL Mouth/Throat BID    ipratropium-albuterol  1 ampule Inhalation Q4H    amLODIPine  10 mg Oral Daily    atorvastatin  40 mg Oral Daily    Vitamin D  2,000 Units Oral Daily    zinc sulfate  50 mg Oral Daily    pantoprazole  40 mg IntraVENous Daily     Continuous Infusions:   sodium chloride      dexmedetomidine (PRECEDEX) IV infusion Stopped (21 1830)    fentaNYL 5 mcg/ml in 0.9%  ml infusion 175 mcg/hr (21 0641)    propofol Stopped (21 1713)    midazolam 4 mg/hr (21 0344)    dextrose       PRN Meds:sodium chloride flush, sodium chloride, acetaminophen, labetalol, hydrALAZINE, sodium chloride (Inhalant), albuterol, benzonatate, glucose, dextrose, glucagon (rDNA), dextrose, sodium chloride    OBJECTIVE:  BP (!) 136/100   Pulse 93   Temp 99.5 °F (37.5 °C) (Rectal)   Resp 26   Ht 6' (1.829 m)   Wt (!) 313 lb 7.9 oz (142.2 kg)   SpO2 96%   BMI 42.52 kg/m²   Temp  Av.2 °F (37.9 °C)  Min: 99.1 °F (37.3 °C)  Max: 101.6 °F (38.7 °C)  Constitutional: The patient is lying in bed in the ICU. He is awake and alert. Mouthing words. FiO2 45%. PEEP 8. No changes. Skin: Warm and dry. No rashes were noted. HEENT: Round and nonreactive pupils. Decubitus lesions on cheeks, hands and lower extremities noted. Neck: Supple. Tracheostomy. PEG. Chest: Coarse breath sounds bilaterally. No crackles  Cardiovascular: Heart sounds rhythmic and regular. Tachycardic. Abdomen: Round, soft and benign to palpation. Genitourinary: Busch catheter  Extremities: Moderate edema. Most blisters are unroofed. Lines: Left IJ TLC 2021.   Busch changed 3/24/2021    Laboratory and Tests Review:  Lab Results   Component Value Date    WBC 24.4 (H) 2021    WBC 21.2 (H) 2021    WBC 17.9 (H) 09/10/2021    HGB 10.9 (L) 2021    HCT 33.4 (L) 2021    MCV 92.3 2021     09/12/2021     Lab Results   Component Value Date    NEUTROABS 22.20 (H) 09/12/2021    NEUTROABS 19.13 (H) 09/11/2021    NEUTROABS 15.20 (H) 09/10/2021     No results found for: [facility]S  Lab Results   Component Value Date     (H) 09/12/2021     (H) 09/12/2021    ALKPHOS 119 09/12/2021    BILITOT 0.3 09/12/2021     Lab Results   Component Value Date     09/12/2021    K 3.9 09/12/2021    K 3.7 08/21/2021     09/12/2021    CO2 24 09/12/2021    BUN 17 09/12/2021    CREATININE 0.4 09/12/2021    CREATININE 0.4 09/11/2021    CREATININE 0.4 09/10/2021    GFRAA >60 09/12/2021    LABGLOM >60 09/12/2021    GLUCOSE 157 09/12/2021    PROT 6.6 09/12/2021    LABALBU 2.1 09/12/2021    CALCIUM 8.7 09/12/2021    BILITOT 0.3 09/12/2021    ALKPHOS 119 09/12/2021     09/12/2021     09/12/2021     Lab Results   Component Value Date    CRP 42.0 (H) 09/12/2021    CRP 33.0 (H) 09/11/2021    CRP 28.4 (H) 09/10/2021     Lab Results   Component Value Date    SEDRATE 122 (H) 09/11/2021    SEDRATE 110 (H) 09/10/2021    SEDRATE 111 (H) 09/09/2021     Radiology:      Microbiology:   Streptococcus pneumoniae/Legionella urine Ag: negative  Nares screen MRSA: Negative  Urine culture 8/24/2021: Negative  Blood cultures 8/21/2021: Negative x2  Respiratory culture 8/27/2021: OP terrell reduced  Respiratory culture 8/30/2021: E. coli, MSSA   BAL 9/8/2021: OP terrell reduced. E. coli, Enterobacter cloacae, MSSA. PjP negative  TLC tip culture 9/11/2021: Pending    ASSESSMENT:  · Severe Covid pneumonia causing acute respiratory failure CRP has come down nicely. Completed Remdesivir. Received Tocilizumab  · VAP versus tracheobronchitis. Cultures growing MSSA and E. coli. Improving. Treated with 10 days of Cefazolin  · Status post bronchoscopy 9/8/2021. Cultures growing rare MSSA, GNR in light GNR. This represents colonization rather than infection.   Possible tracheobronchitis  · Leukocytosis associated to the above, stable  · Status post tracheostomy and PEG 9/10/2021   · Fevers probably secondary to line infection    PLAN:  · Continue inhaled Tobramycin for possible tracheobronchitis  · Continue Zosyn  · Check catheter tip culture  · We will follow with you  · The patient is 1 month out from initial symptoms. Isolation can be discontinued.   Terminally clean the room    Magnolia Crawford MD  8:56 AM   9/12/2021

## 2021-09-12 NOTE — PROGRESS NOTES
[facility] Progress Note    Admitting Date and Time: 8/21/2021  9:35 AM  Admit Dx: Acute hypoxemic respiratory failure due to COVID-19 (HCC) [U07.1, J96.01]  Pneumonia due to COVID-19 virus [U07.1, J12.82]    Subjective:  Patient is being followed for Acute hypoxemic respiratory failure due to COVID-19 ([address] [U07.1, J96.01]  Pneumonia due to COVID-19 virus [U07.1, J12.82]     Per RN: Patient remains intubated, on isolation, febrile. Patient became bradycardic and hypotensive as attempt to wean sedation using Precedex, Precedex was discontinued, patient now again on Versed and fentanyl.   ROS: Unobtainable     phenytoin        bumetanide  1 mg IntraVENous TID    albumin human  25 g IntraVENous TID    QUEtiapine  50 mg Oral BID    [START ON 9/13/2021] dexamethasone  6 mg IntraVENous Daily    lidocaine PF  5 mL IntraDERmal Once    sodium chloride flush  5-40 mL IntraVENous 2 times per day    heparin flush  3 mL IntraVENous 2 times per day    fentanNYL  100 mcg IntraVENous Once    piperacillin-tazobactam  3,375 mg IntraVENous Q8H    oxyCODONE  10 mg Oral Q8H    diazePAM  5 mg Oral Q8H    enoxaparin  1 mg/kg SubCUTAneous BID    tobramycin  300 mg Inhalation Q12H    phenytoin  350 mg IntraVENous Q12H    lisinopril  10 mg Oral Daily    insulin glargine  25 Units SubCUTAneous BID    ascorbic acid  1,000 mg Oral Daily    sodium chloride (Inhalant)  4 mL Nebulization Q12H    insulin lispro  0-18 Units SubCUTAneous Q6H    chlorhexidine  15 mL Mouth/Throat BID    ipratropium-albuterol  1 ampule Inhalation Q4H    amLODIPine  10 mg Oral Daily    atorvastatin  40 mg Oral Daily    Vitamin D  2,000 Units Oral Daily    zinc sulfate  50 mg Oral Daily    pantoprazole  40 mg IntraVENous Daily     sodium chloride flush, 5-40 mL, PRN  sodium chloride, 25 mL, PRN  acetaminophen, 650 mg, Q4H PRN  labetalol, 20 mg, Q6H PRN  hydrALAZINE, 20 mg, Q6H PRN  sodium chloride (Inhalant), 4 mL, PRN  albuterol, 2.5 mg, Q6H PRN  benzonatate, 200 mg, TID PRN  glucose, 15 g, PRN  dextrose, 12.5 g, PRN  glucagon (rDNA), 1 mg, PRN  dextrose, 100 mL/hr, PRN  sodium chloride, 30 mL, PRN         Objective:    /64   Pulse 84   Temp 100.4 °F (38 °C) (Rectal)   Resp 22   Ht 6' (1.829 m)   Wt (!) 313 lb 7.9 oz (142.2 kg)   SpO2 97%   BMI 42.52 kg/m²     General Appearance: Intubated and sedated  Skin: warm and dry  Head: normocephalic and atraumatic, on vent through trach, right IJ TLC removed, left IJ TLC in situ  Eyes: pupils equal, round, and reactive to light, extraocular eye movements intact, conjunctivae normal  Neck: neck supple and non tender without mass   Pulmonary/Chest: clear to auscultation diminished bilaterally- no wheezes, rales or rhonchi, decreased air entry  Cardiovascular: normal rate, normal S1 and S2 and no carotid bruits  Abdomen: soft, non-tender, non-distended, normal bowel sounds, no masses or organomegaly  Extremities: no cyanosis, no clubbing and + edema  Neurologic: Intubated and sedated        Recent Labs     09/10/21  0548 09/11/21 0420 09/12/21  0510    139 137   K 3.3* 3.8 3.9    105 103   CO2 25 26 24   BUN 12 14 17   CREATININE 0.4* 0.4* 0.4*   GLUCOSE 156* 183* 157*   CALCIUM 8.4* 8.4* 8.7       Recent Labs     09/10/21  0548 09/11/21 0420 09/12/21  0510   WBC 17.9* 21.2* 24.4*   RBC 3.78* 3.56* 3.62*   HGB 11.2* 10.5* 10.9*   HCT 35.2* 32.6* 33.4*   MCV 93.1 91.6 92.3   MCH 29.6 29.5 30.1   MCHC 31.8* 32.2 32.6   RDW 14.6 14.8 14.6    203 212   MPV 10.0 9.6 9.7       Micro:  No components found for: [facility])    Radiology:   XR CHEST PORTABLE    Result Date: 9/11/2021  EXAMINATION: ONE XRAY VIEW OF THE CHEST 9/11/2021 12:25 pm COMPARISON: 09/10/2021 HISTORY: ORDERING SYSTEM PROVIDED HISTORY: left IJ TLC placement TECHNOLOGIST PROVIDED HISTORY: Reason for exam:->left IJ TLC placement FINDINGS: Since the prior study, there has been insertion of a left sided central line. The tip projects to the left of the trachea. The tip does not cross midline and project in the region of the SVC. No definite pneumothorax Tip of tracheostomy tube is unchanged. Right-sided central line is unchanged Heterogeneous opacity is seen throughout the lungs bilaterally, increased on the right compared to prior     Since the prior study, there has been insertion of a left sided central line. The tip projects to the left of the trachea. Correlate with blood gas. As the tip does not clearly project in the region of the SVC, it is not definitively venous by radiograph. Bilateral airspace disease, increased on the right compared to prior     XR CHEST PORTABLE    Result Date: 9/10/2021  EXAMINATION: ONE XRAY VIEW OF THE CHEST 9/10/2021 7:16 am COMPARISON: 09/09/2021 HISTORY: ORDERING SYSTEM PROVIDED HISTORY: Acute resp failure, COVID TECHNOLOGIST PROVIDED HISTORY: Reason for exam:->Acute resp failure, COVID FINDINGS: Interval placement of tracheostomy tube terminating above the donna. Nasogastric tube is been removed. Right IJ catheter remains in place. Cardiac silhouette is prominent but unchanged from previous. No pneumothorax. Multifocal bilateral lung parenchymal infiltrates appear slightly worsened overall and remain greater towards the left. No other interval change. Interval placement of tracheostomy tube. Bilateral parenchymal infiltrates appear subtly worsened. Continued follow-up recommended. US DUP UPPER EXTREMITIES BILATERAL VENOUS    Result Date: 9/10/2021  EXAMINATION: DUPLEX ULTRASOUND OF THE BILATERAL UPPER EXTREMITIES FOR DVT, 9/10/2021 9:37 am TECHNIQUE: Duplex ultrasound using B-mode/gray scaled imaging and Doppler spectral analysis and color flow was obtained of the deep venous structures of the upper extremity. COMPARISON: None.  HISTORY: ORDERING SYSTEM PROVIDED HISTORY: r/o dvt TECHNOLOGIST PROVIDED HISTORY: Reason for exam:->r/o dvt What reading provider will be dictating this exam?->CRC FINDINGS: There is deep vein thrombosis involving the left brachial and basilic veins throughout the arm. Thrombus is also noted within the left cephalic vein. There is normal flow and compressibility of the visualized venous structures of the right upper extremity. There is no evidence of echogenic thrombus. The veins of the right upper extremity demonstrate good compressibility with normal color flow study and spectral analysis. Occlusive deep vein thrombosis involving the left brachial veins. Thrombus is also noted in the cephalic and basilic veins in the left arm. No evidence of deep vein thrombosis in the right upper extremity. The findings were sent to the Radiology Results [address] at 10:40 am on 9/10/2021to be communicated to a licensed caregiver. Assessment:    Principal Problem:    Acute hypoxemic respiratory failure due to COVID-19 [facility])  Active Problems:    Type 2 diabetes mellitus without complication, without long-term current use of insulin (HCC)    History of seizures    Hyperlipidemia    Busch catheter problem (HCC)    Sepsis ([facility])    Thrombocytopenia ([facility])    Elevated LFTs  Resolved Problems:    OSCAR (acute kidney injury) ([facility] [address]    Lactic acidosis      Plan:  1. Acute hypoxic respiratory failure secondary to severe COVID pneumonia, suspected ventilator associated pneumonia versus tracheobronchitis, with left pleural effusion ( not tapable) and atelectasis- remains intubated and sedated, completed remdesivir, s/p Tocilizumab, s/p cefazolin cultures growing MSSA and E. coli, leukocytosis, lactic acidosis, s/p bronchoscopy with copious mucus plugging 9/8/2021, right IJ tip culture pending, infectious disease on board, on inhaled tobramycin for tracheobronchitis, final BAL cultures pending, repeat blood cultures pending, started on Zosyn.   2.  OSCAR -prerenal, nephrology on board, held diuresis, lisinopril added, on tube feeds, avoid nephrotoxins, on water flushes. 3 Hypertension -poorly controlled. Plan to send to [facility] select specialty, once afebrile and sedation requirement decreases. NOTE: This report was transcribed using voice recognition software. Every effort was made to ensure accuracy; however, inadvertent computerized transcription errors may be present.   Electronically signed by Maria Alejandra Florentino MD on 9/12/2021 at 4:31 PM

## 2021-09-12 NOTE — PROGRESS NOTES
Pulmonary Subsequent Hospital F/U note    Patient is being followed for: acute hypoxic respiratory failure, severe covid-19 pneumonia     Interval HPI:  Remains intubated and sedated on versed and fentanyl  Vent settings 45% 460cc, 22,8  S/p bronchoscopy 9/8 with mucus plugging of the L bronchial tree and RLL - cultures positive E.coli, enterobacter, MSSA   S/p trache and PEG placement 9/9/21  +DVT  Having fevers Tm 101.6    ROS:  Unable to obtain     Exam:  BP (!) 103/52   Pulse 88   Temp 100.4 °F (38 °C) (Rectal)   Resp 27   Ht 6' (1.829 m)   Wt (!) 313 lb 7.9 oz (142.2 kg)   SpO2 97%   BMI 42.52 kg/m²    Due to the current efforts to prevent transmission of COVID-19 and also the need to preserve PPE for other caregivers, a face-to-face encounter with the patient was not performed. That being said, all relevant records and diagnostic tests were reviewed, including laboratory results and imaging. Please reference any relevant documentation elsewhere. Care will be coordinated with the primary service. Data:    Oximetry:  SpO2 Readings from Last 1 Encounters:   09/12/21 97%       Imaging personally reviewed by myself:  CXR     Impression   1. The position and alignment of the tubes and catheters are within normal   range   2. No signs of pneumothorax   3. Cardiomediastinal interstitial prominence concerning for congestive heart   failure, stable   4. There are bilateral patchy parenchymal densities within the lungs   concerning for pneumonia and or atelectasis this appears unchanged         DVT     Occlusive deep vein thrombosis involving the left brachial veins.  Thrombus   is also noted in the cephalic and basilic veins in the left arm.       No evidence of deep vein thrombosis in the right upper extremity.        Respiratory culture MSSA, Ecoli, enterobacter    crp 33  D dimer 755    Pertinent labs reviewed and noted:  Lab Results   Component Value Date    WBC 24.4 09/12/2021    HGB 10.9 09/12/2021 HCT 33.4 09/12/2021    MCV 92.3 09/12/2021    MCH 30.1 09/12/2021    MCHC 32.6 09/12/2021    RDW 14.6 09/12/2021     09/12/2021    MPV 9.7 09/12/2021     Lab Results   Component Value Date     09/12/2021    K 3.9 09/12/2021    K 3.7 08/21/2021     09/12/2021    CO2 24 09/12/2021    BUN 17 09/12/2021    CREATININE 0.4 09/12/2021    LABALBU 2.1 09/12/2021    CALCIUM 8.7 09/12/2021    GFRAA >60 09/12/2021    LABGLOM >60 09/12/2021     Assessment:  1. Acute hypoxic respiratory failure  2. Severe covid-19 pneumonia  3. Bacterial pneumonia with MSSA, enterobacter, and Ecoli   4. Mucus plugging of bronchi s/p bronchoscopy 9/8/21  5. DVT LUE  6. Fevers/sepsis     Plan:  1. Trache PEG  9/9/21  2. Completed Remdesivir and Tocilizumab  3. Antibiotics as per ID - started onZosyn and remains on inhaled Tobramycin  4. Chest physio/bronchodilators, s/p bronch  5. Wean dexamethasone to 6mg   6. Full dose anticoagulation  7.  Follow culture results      Electronically signed by Mary Dunham MD on 9/12/2021 at 2:22 PM

## 2021-09-12 NOTE — PATIENT CARE CONFERENCE
Intensive Care Daily Quality Rounding Checklist        ICU Team Members: Dr. Shakeel Le, charge nurse, bedside nurse     ICU Day #:  Number 23     Intubation Date:  Aug 23     Ventilator Day #: [address] 21     Central Line Insertion Date:  Sept 6                                                    Day #:  Number 2      Arterial Line Insertion Date: N/A                             Day #:      Temporary Hemodialysis Catheter Insertion Date:                              Day #  N/A     DVT Prophylaxis: Lovenox    GI Prophylaxis: Protonix     Busch Catheter Insertion Date:  Aug 26                                        Day #: Number 18                             Continued need (if yes, reason documented and discussed with physician):     Skin Issues/ Wounds and ordered treatment discussed on rounds: }     Goals/ Plans for the Day: wean vent, wean sedation as able, daily labs.  LTAC soon

## 2021-09-12 NOTE — PLAN OF CARE
Problem: Airway Clearance - Ineffective  Goal: Achieve or maintain patent airway  Outcome: Met This Shift     Problem: Gas Exchange - Impaired  Goal: Absence of hypoxia  Outcome: Met This Shift  Goal: Promote optimal lung function  Outcome: Met This Shift     Problem: Breathing Pattern - Ineffective  Goal: Ability to achieve and maintain a regular respiratory rate  Outcome: Met This Shift     Problem:  Body Temperature -  Risk of, Imbalanced  Goal: Complications related to the disease process, condition or treatment will be avoided or minimized  Outcome: Met This Shift     Problem: Isolation Precautions - Risk of Spread of Infection  Goal: Prevent transmission of infection  Outcome: Met This Shift     Problem: Non-Violent Restraints  Goal: No harm/injury to patient while restraints in use  Outcome: Met This Shift  Goal: Patient's dignity will be maintained  Outcome: Met This Shift     Problem: Skin Integrity:  Goal: Will show no infection signs and symptoms  Description: Will show no infection signs and symptoms  Outcome: Not Met This Shift  Goal: Absence of new skin breakdown  Description: Absence of new skin breakdown  Outcome: Not Met This Shift     Problem: Non-Violent Restraints  Goal: Removal from restraints as soon as assessed to be safe  Outcome: Not Met This Shift

## 2021-09-12 NOTE — PROGRESS NOTES
Critical Care Team - Daily Progress Note      Date and time: 2021 10:34 AM  Patient's name:  Dona Avery  Medical Record Number: [id number]  Patient's account/billing number: [de-identified]  Patient's YOB: 1965  Age: 60 y.o. Date of Admission: 2021  9:35 AM  Length of stay during current admission: 22      Primary Care Physician: Loco Salazar, DO  ICU Attending Physician: Dr. Grant Francisco Status: Full Code    Reason for ICU admission: Respiratory failure due to COVID-19      SUBJECTIVE:     OVERNIGHT EVENTS:           : tolerating longer periods supine, thick dark brown secretions     : secretions out of NG, michelle prone     : Decreased urine output and increased creatinine today, continues with thick secretions     : Cr and Uop improved     9/3: P/F improved and has remained supine     : thick secretions     : desat with turning, 1500 loose stool from [facility]    : Patient trying low tidal volumes overnight occasionally, otherwise no other acute events. : No acute events overnight    : Did receive labetalol 2 times overnight for elevated blood pressures otherwise unremarkable good urine output. Will need essentially exchanged today, bronchoscopy for retained left lung and bibasilar mucopurulent secretions     9/10-no acute events overnight did receive trach and PEG yesterday, will wean sedation as able. , upper extremity DVT noted    : No acute events overnight, sedation was increased overnight due to increased respirations, will slowly come down on that according to nursing staff.    : bumex TID, seroquel to 50 mg PO BID    OBJECTIVE:     VITAL SIGNS:  BP (!) 136/100   Pulse 93   Temp 99.5 °F (37.5 °C) (Rectal)   Resp 26   Ht 6' (1.829 m)   Wt (!) 313 lb 7.9 oz (142.2 kg)   SpO2 96%   BMI 42.52 kg/m²   Tmax over 24 hours:  Temp (24hrs), Av.2 °F (37.9 °C), Min:99.1 °F (37.3 °C), Max:101.6 °F (38.7 °C)      Patient Vitals for the past 6 hrs:   BP Pulse Resp SpO2   09/12/21 0818 -- 93 26 96 %   09/12/21 0817 -- -- 27 96 %   09/12/21 0815 -- -- 30 95 %   09/12/21 0812 -- -- (!) 33 96 %   09/12/21 0700 (!) 136/100 84 28 97 %   09/12/21 0600 (!) 170/80 97 (!) 33 93 %   09/12/21 0500 (!) 153/61 102 (!) 32 94 %         Intake/Output Summary (Last 24 hours) at 9/12/2021 1034  Last data filed at 9/12/2021 0600  Gross per 24 hour   Intake 2267 ml   Output 1110 ml   Net 1157 ml     Wt Readings from Last 2 Encounters:   09/09/21 (!) 313 lb 7.9 oz (142.2 kg)   08/17/21 (!) 320 lb (145.2 kg)     Body mass index is 42.52 kg/m².         PHYSICAL EXAMINATION:    General appearance - ill-appearing, trach present on ventilator  Mental status -intubated sedated  Eyes - pupils equal and reactive, extraocular eye movements intact  Ears - bilateral TM's and external ear canals normal  Nose - normal and patent, no erythema, discharge or polyps  Mouth - mucous membranes moist, pharynx normal without lesions  Neck - supple, no significant adenopathy  Chest -intubated sedated, breath sounds improving slight rhonchi noted  heart - normal rate, regular rhythm, normal S1, S2, no murmurs, rubs, clicks or gallops  Abdomen - soft, nontender, nondistended, no masses or organomegaly  Neurological -intubated and sedated  Extremities - peripheral pulses normal, no pedal edema, no clubbing or cyanosis  Skin -patient did have skin breakdown to the face as well as blistering on the hands     Any additional physical findings:    MEDICATIONS:    Scheduled Meds:   phenytoin        lidocaine PF  5 mL IntraDERmal Once    sodium chloride flush  5-40 mL IntraVENous 2 times per day    heparin flush  3 mL IntraVENous 2 times per day    QUEtiapine  25 mg Oral BID    fentanNYL  100 mcg IntraVENous Once    piperacillin-tazobactam  3,375 mg IntraVENous Q8H    oxyCODONE  10 mg Oral Q8H    diazePAM  5 mg Oral Q8H    enoxaparin  1 mg/kg SubCUTAneous BID    tobramycin  300 mg Inhalation Q12H  phenytoin  350 mg IntraVENous Q12H    lisinopril  10 mg Oral Daily    insulin glargine  25 Units SubCUTAneous BID    ascorbic acid  1,000 mg Oral Daily    sodium chloride (Inhalant)  4 mL Nebulization Q12H    dexamethasone  10 mg IntraVENous Daily    insulin lispro  0-18 Units SubCUTAneous Q6H    chlorhexidine  15 mL Mouth/Throat BID    ipratropium-albuterol  1 ampule Inhalation Q4H    amLODIPine  10 mg Oral Daily    atorvastatin  40 mg Oral Daily    Vitamin D  2,000 Units Oral Daily    zinc sulfate  50 mg Oral Daily    pantoprazole  40 mg IntraVENous Daily     Continuous Infusions:   sodium chloride      dexmedetomidine (PRECEDEX) IV infusion Stopped (09/11/21 1830)    fentaNYL 5 mcg/ml in 0.9%  ml infusion 175 mcg/hr (09/12/21 0641)    propofol Stopped (09/09/21 1713)    midazolam 4 mg/hr (09/12/21 0344)    dextrose       PRN Meds:   sodium chloride flush, 5-40 mL, PRN  sodium chloride, 25 mL, PRN  acetaminophen, 650 mg, Q4H PRN  labetalol, 20 mg, Q6H PRN  hydrALAZINE, 20 mg, Q6H PRN  sodium chloride (Inhalant), 4 mL, PRN  albuterol, 2.5 mg, Q6H PRN  benzonatate, 200 mg, TID PRN  glucose, 15 g, PRN  dextrose, 12.5 g, PRN  glucagon (rDNA), 1 mg, PRN  dextrose, 100 mL/hr, PRN  sodium chloride, 30 mL, PRN          VENT SETTINGS (Comprehensive) (if applicable):  Vent Information  $Ventilation: $Subsequent Day  Skin Assessment: Clean, dry, & intact  Equipment ID: [id number]  Equipment Changed: (S) Humidification  Vent Type: 980  Vent Mode: AC/VC+  Vt Ordered: 460 mL  Pressure Ordered: 20  Rate Set: 22 bmp  Peak Flow: 0 L/min  Pressure Support: 0 cmH20  FiO2 : 45 %  SpO2: 96 %  SpO2/FiO2 ratio: 213.33  Sensitivity: 3  PEEP/CPAP: 8  I Time/ I Time %: 0 s  Humidification Source: Heated wire  Humidification Temp: 37  Humidification Temp Measured: 37.4  Circuit Condensation: Drained  Mask Type: Full face mask  Mask Size: Medium  Additional Respiratory  Assessments  Pulse: 93  Resp: 26  SpO2: 96 %  Position: Semi-Sellers's  Humidification Source: Heated wire  Humidification Temp: 37  Circuit Condensation: Drained  Oral Care: Mouth swabbed, Mouth moisturizer, Mouth suctioned, Suction toothette  Subglottic Suction Done?: No  Airway Type: Trachial  Airway Size: 78  Cuff Pressure (cm H2O): 29 cm H2O    ABGs:     Laboratory findings:    Complete Blood Count:   Recent Labs     09/10/21  0548 09/11/21  0420 09/12/21  0510   WBC 17.9* 21.2* 24.4*   HGB 11.2* 10.5* 10.9*   HCT 35.2* 32.6* 33.4*    203 212        Last 3 Blood Glucose:   Recent Labs     09/10/21  0548 09/11/21  0420 09/12/21  0510   GLUCOSE 156* 183* 157*        PT/INR:  No results found for: PROTIME, INR  PTT:  No results found for: APTT, PTT    Comprehensive Metabolic Profile:   Recent Labs     09/10/21  0548 09/10/21  0548 09/11/21  0420 09/11/21  0420 09/12/21  0510     --  139  --  137   K 3.3*  --  3.8  --  3.9     --  105  --  103   CO2 25  --  26  --  24   BUN 12  --  14  --  17   CREATININE 0.4*  --  0.4*  --  0.4*   GLUCOSE 156*  --  183*  --  157*   CALCIUM 8.4*  --  8.4*  --  8.7   PROT 6.0*   < > 6.1*   < > 6.6   LABALBU 2.4*   < > 2.2*   < > 2.1*   BILITOT 0.5   < > 0.3   < > 0.3   ALKPHOS 88   < > 93   < > 119   AST 40*   < > 77*   < > 176*   ALT 26   < > 44*   < > 105*    < > = values in this interval not displayed. Magnesium:   Lab Results   Component Value Date    MG 1.9 09/12/2021     Phosphorus:   Lab Results   Component Value Date    PHOS 2.8 09/12/2021     Ionized Calcium: No results found for: CAION     Urinalysis:     Troponin: No results for input(s): TROPONINI in the last 72 hours.       ASSESSMENT:     Patient Active Problem List    Diagnosis Date Noted    Elevated LFTs     Busch catheter problem ([facility] [address] 08/26/2021    Sepsis ([facility] [address] 08/26/2021    Thrombocytopenia [facility] 08/26/2021    Hyperlipidemia 08/22/2021    Acute hypoxemic respiratory failure due to COVID-19 [facility]) 08/21/2021    Type 2 diabetes and follow up. I personally saw, examined and provided care for the patient. Radiographs, labs and medication list were reviewed by me independently. I spoke with bedside nursing, respiratory therapists and consultants. Critical care services and times documented are independent of procedures and multidisciplinary rounds with Residents. Additionally comprehensive, multidisciplinary rounds were conducted with the MICU team. The case was discussed in detail and plans for care were established. Review of Residents documentation was conducted and revisions were made as appropriate. I agree with the the above documented information.      Physical Examination:  Vitals:   Vitals:    09/12/21 0812 09/12/21 0815 09/12/21 0817 09/12/21 0818   BP:       Pulse:    93   Resp: (!) 33 30 27 26   Temp:       TempSrc:       SpO2: 96% 95% 96% 96%   Weight:       Height:          General: No Acute Distress  HEENT: normocephalic, atraumatic  - trach intact with no focal erythema, erosion, ulceration or bleed noted  CVS: RRR, S1, S2, No S3 or S4  Respiratory: decreased breath sounds at lung bases, no focal wheezes noted  Extremities: no clubbing/cyanosis/edema     ASSESSMENT:  · Severe Covid pneumonia causing acute respiratory failure CRP has come down nicely.  Completed Remdesivir.  Received Tocilizumab  · VAP versus tracheobronchitis.  Cultures growing MSSA and E. coli.  Improving  · Leukocytosis associated to the above.    · Lactic Acidosis   · Left Pleural Effusion/Atelectasis - s/p bronchoscopy with evaluation of copious mucous plugging 9/8/2021   · Upper Extremity DVT     In addition the following applies:     Check: N/A  Medication Alterations: OXY IR and valium PO TID to wean off versed and fentanyl, lovenox BID  Procedures: bronchoscopy 9/8/2021, trach/PEG 9/9/2021   - left pleural US noted not enough fluid to tap for thoracentesis/chest tube   Imaging: reviewed, Left Pleural US  New Consultations: N/A  VENT: ACVC (DAY 21) 22/460/45/8  Ppeak: 29  Pplateau: 26  TKBCVVTEOT: 26     Access:Left IJ  Consults: ID, GS, Nephrology, Pulmonary    Drips: Fentanyl, Versed   Nutrition: Tube Feeds  ABX: N/A  Completed ABX: Cefazolin      - bronchoscopy 9/8/2021 noted copious thick white mucous plugging bibasilar regions L > R  - await possible transfer to [facility] in next few days for trach management   - Left IJ TLC, send right IJ tip for culture  - blood culture x 2  - seroquel to 50 mg PO BID  - future US upper arms B/L then if clot gone then OK for PICC but on lovenox BID until that time  - patient likely will not be at [facility] this weekend given need for sedation drips     Thank you for allowing me to participate in the care of this patient. Care reviewed with nursing staff, medical and surgical specialty care, primary care and the patient's family as available. Restraints are ordered when the patient can do harm to him/herself by pulling out devices. Critical Care Time: > 35 minutes excluding procedures    AMIRAH Rankin MD  9/12/2021  10:34 AM

## 2021-09-13 LAB
ALBUMIN SERPL-MCNC: 2.6 G/DL (ref 3.5–5.2)
ALP BLD-CCNC: 131 U/L (ref 40–129)
ALT SERPL-CCNC: 141 U/L (ref 0–40)
ANION GAP SERPL CALCULATED.3IONS-SCNC: 11 MMOL/L (ref 7–16)
AST SERPL-CCNC: 246 U/L (ref 0–39)
BASOPHILS ABSOLUTE: 0.03 E9/L (ref 0–0.2)
BASOPHILS RELATIVE PERCENT: 0.2 % (ref 0–2)
BILIRUB SERPL-MCNC: 0.4 MG/DL (ref 0–1.2)
BUN BLDV-MCNC: 15 MG/DL (ref 6–20)
C-REACTIVE PROTEIN: 34.5 MG/DL (ref 0–0.4)
CALCIUM SERPL-MCNC: 8.4 MG/DL (ref 8.6–10.2)
CHLORIDE BLD-SCNC: 102 MMOL/L (ref 98–107)
CO2: 25 MMOL/L (ref 22–29)
CREAT SERPL-MCNC: 0.5 MG/DL (ref 0.7–1.2)
CULTURE CATHETER TIP: NORMAL
EOSINOPHILS ABSOLUTE: 0.26 E9/L (ref 0.05–0.5)
EOSINOPHILS RELATIVE PERCENT: 2 % (ref 0–6)
GFR AFRICAN AMERICAN: >60
GFR NON-AFRICAN AMERICAN: >60 ML/MIN/1.73
GLUCOSE BLD-MCNC: 169 MG/DL (ref 74–99)
HCT VFR BLD CALC: 28.2 % (ref 37–54)
HEMOGLOBIN: 8.9 G/DL (ref 12.5–16.5)
IMMATURE GRANULOCYTES #: 0.16 E9/L
IMMATURE GRANULOCYTES %: 1.2 % (ref 0–5)
LYMPHOCYTES ABSOLUTE: 0.8 E9/L (ref 1.5–4)
LYMPHOCYTES RELATIVE PERCENT: 6.2 % (ref 20–42)
MAGNESIUM: 1.7 MG/DL (ref 1.6–2.6)
MCH RBC QN AUTO: 29.2 PG (ref 26–35)
MCHC RBC AUTO-ENTMCNC: 31.6 % (ref 32–34.5)
MCV RBC AUTO: 92.5 FL (ref 80–99.9)
METER GLUCOSE: 137 MG/DL (ref 74–99)
METER GLUCOSE: 205 MG/DL (ref 74–99)
METER GLUCOSE: 212 MG/DL (ref 74–99)
METER GLUCOSE: 239 MG/DL (ref 74–99)
MONOCYTES ABSOLUTE: 0.62 E9/L (ref 0.1–0.95)
MONOCYTES RELATIVE PERCENT: 4.8 % (ref 2–12)
NEUTROPHILS ABSOLUTE: 11.05 E9/L (ref 1.8–7.3)
NEUTROPHILS RELATIVE PERCENT: 85.6 % (ref 43–80)
PDW BLD-RTO: 14.7 FL (ref 11.5–15)
PHOSPHORUS: 2.7 MG/DL (ref 2.5–4.5)
PLATELET # BLD: 196 E9/L (ref 130–450)
PMV BLD AUTO: 9.9 FL (ref 7–12)
POTASSIUM SERPL-SCNC: 3.5 MMOL/L (ref 3.5–5)
RBC # BLD: 3.05 E12/L (ref 3.8–5.8)
SEDIMENTATION RATE, ERYTHROCYTE: 132 MM/HR (ref 0–15)
SODIUM BLD-SCNC: 138 MMOL/L (ref 132–146)
TOTAL PROTEIN: 6.3 G/DL (ref 6.4–8.3)
WBC # BLD: 12.9 E9/L (ref 4.5–11.5)

## 2021-09-13 PROCEDURE — 99233 SBSQ HOSP IP/OBS HIGH 50: CPT | Performed by: STUDENT IN AN ORGANIZED HEALTH CARE EDUCATION/TRAINING PROGRAM

## 2021-09-13 PROCEDURE — 36592 COLLECT BLOOD FROM PICC: CPT

## 2021-09-13 PROCEDURE — 82962 GLUCOSE BLOOD TEST: CPT

## 2021-09-13 PROCEDURE — 6370000000 HC RX 637 (ALT 250 FOR IP): Performed by: SURGERY

## 2021-09-13 PROCEDURE — 2500000003 HC RX 250 WO HCPCS: Performed by: SURGERY

## 2021-09-13 PROCEDURE — 6360000002 HC RX W HCPCS: Performed by: SURGERY

## 2021-09-13 PROCEDURE — 84100 ASSAY OF PHOSPHORUS: CPT

## 2021-09-13 PROCEDURE — 51702 INSERT TEMP BLADDER CATH: CPT

## 2021-09-13 PROCEDURE — 2000000000 HC ICU R&B

## 2021-09-13 PROCEDURE — 83735 ASSAY OF MAGNESIUM: CPT

## 2021-09-13 PROCEDURE — 6370000000 HC RX 637 (ALT 250 FOR IP): Performed by: STUDENT IN AN ORGANIZED HEALTH CARE EDUCATION/TRAINING PROGRAM

## 2021-09-13 PROCEDURE — 85025 COMPLETE CBC W/AUTO DIFF WBC: CPT

## 2021-09-13 PROCEDURE — 6360000002 HC RX W HCPCS: Performed by: INTERNAL MEDICINE

## 2021-09-13 PROCEDURE — 80053 COMPREHEN METABOLIC PANEL: CPT

## 2021-09-13 PROCEDURE — 2580000003 HC RX 258: Performed by: INTERNAL MEDICINE

## 2021-09-13 PROCEDURE — 94003 VENT MGMT INPAT SUBQ DAY: CPT

## 2021-09-13 PROCEDURE — 6370000000 HC RX 637 (ALT 250 FOR IP): Performed by: INTERNAL MEDICINE

## 2021-09-13 PROCEDURE — C9113 INJ PANTOPRAZOLE SODIUM, VIA: HCPCS | Performed by: SURGERY

## 2021-09-13 PROCEDURE — 94640 AIRWAY INHALATION TREATMENT: CPT

## 2021-09-13 PROCEDURE — 2500000003 HC RX 250 WO HCPCS: Performed by: INTERNAL MEDICINE

## 2021-09-13 PROCEDURE — P9047 ALBUMIN (HUMAN), 25%, 50ML: HCPCS | Performed by: INTERNAL MEDICINE

## 2021-09-13 PROCEDURE — 2580000003 HC RX 258: Performed by: SURGERY

## 2021-09-13 PROCEDURE — 6360000002 HC RX W HCPCS: Performed by: SPECIALIST

## 2021-09-13 PROCEDURE — 36415 COLL VENOUS BLD VENIPUNCTURE: CPT

## 2021-09-13 PROCEDURE — 2580000003 HC RX 258: Performed by: SPECIALIST

## 2021-09-13 PROCEDURE — 85651 RBC SED RATE NONAUTOMATED: CPT

## 2021-09-13 PROCEDURE — 86140 C-REACTIVE PROTEIN: CPT

## 2021-09-13 RX ORDER — WARFARIN SODIUM 5 MG/1
5 TABLET ORAL
Status: COMPLETED | OUTPATIENT
Start: 2021-09-13 | End: 2021-09-13

## 2021-09-13 RX ORDER — FENTANYL CITRATE 50 UG/ML
25 INJECTION, SOLUTION INTRAMUSCULAR; INTRAVENOUS
Status: DISCONTINUED | OUTPATIENT
Start: 2021-09-13 | End: 2021-09-20 | Stop reason: HOSPADM

## 2021-09-13 RX ORDER — LANSOPRAZOLE
30 KIT
Status: DISCONTINUED | OUTPATIENT
Start: 2021-09-14 | End: 2021-09-20 | Stop reason: HOSPADM

## 2021-09-13 RX ORDER — QUETIAPINE FUMARATE 25 MG/1
50 TABLET, FILM COATED ORAL 3 TIMES DAILY
Status: DISCONTINUED | OUTPATIENT
Start: 2021-09-13 | End: 2021-09-20 | Stop reason: HOSPADM

## 2021-09-13 RX ORDER — DIAZEPAM 5 MG/1
5 TABLET ORAL EVERY 6 HOURS
Status: DISCONTINUED | OUTPATIENT
Start: 2021-09-13 | End: 2021-09-20 | Stop reason: HOSPADM

## 2021-09-13 RX ORDER — PHENYTOIN 125 MG/5ML
200 SUSPENSION ORAL 3 TIMES DAILY
Status: DISCONTINUED | OUTPATIENT
Start: 2021-09-13 | End: 2021-09-20 | Stop reason: HOSPADM

## 2021-09-13 RX ORDER — PHENYTOIN SODIUM 50 MG/ML
INJECTION, SOLUTION INTRAMUSCULAR; INTRAVENOUS
Status: DISPENSED
Start: 2021-09-13 | End: 2021-09-13

## 2021-09-13 RX ORDER — OXYCODONE HYDROCHLORIDE 5 MG/1
10 TABLET ORAL EVERY 4 HOURS
Status: DISCONTINUED | OUTPATIENT
Start: 2021-09-13 | End: 2021-09-19

## 2021-09-13 RX ADMIN — DIAZEPAM 5 MG: 5 TABLET ORAL at 10:57

## 2021-09-13 RX ADMIN — LISINOPRIL 10 MG: 10 TABLET ORAL at 08:12

## 2021-09-13 RX ADMIN — INSULIN GLARGINE 25 UNITS: 100 INJECTION, SOLUTION SUBCUTANEOUS at 20:52

## 2021-09-13 RX ADMIN — QUETIAPINE FUMARATE 50 MG: 25 TABLET ORAL at 20:53

## 2021-09-13 RX ADMIN — CHLORHEXIDINE GLUCONATE 0.12% ORAL RINSE 15 ML: 1.2 LIQUID ORAL at 08:13

## 2021-09-13 RX ADMIN — ZINC SULFATE 220 MG (50 MG) CAPSULE 50 MG: CAPSULE at 08:12

## 2021-09-13 RX ADMIN — ALBUMIN (HUMAN) 25 G: 0.25 INJECTION, SOLUTION INTRAVENOUS at 20:53

## 2021-09-13 RX ADMIN — IPRATROPIUM BROMIDE AND ALBUTEROL SULFATE 1 AMPULE: .5; 3 SOLUTION RESPIRATORY (INHALATION) at 03:30

## 2021-09-13 RX ADMIN — BUMETANIDE 1 MG: 0.25 INJECTION, SOLUTION INTRAMUSCULAR; INTRAVENOUS at 20:53

## 2021-09-13 RX ADMIN — INSULIN LISPRO 6 UNITS: 100 INJECTION, SOLUTION INTRAVENOUS; SUBCUTANEOUS at 06:22

## 2021-09-13 RX ADMIN — TOBRAMYCIN SULFATE 300 MG: 40 INJECTION, SOLUTION INTRAMUSCULAR; INTRAVENOUS at 20:22

## 2021-09-13 RX ADMIN — PHENYTOIN 200 MG: 125 SUSPENSION ORAL at 09:52

## 2021-09-13 RX ADMIN — PIPERACILLIN AND TAZOBACTAM 3375 MG: 3; .375 INJECTION, POWDER, LYOPHILIZED, FOR SOLUTION INTRAVENOUS at 12:27

## 2021-09-13 RX ADMIN — ENOXAPARIN SODIUM 135 MG: 150 INJECTION SUBCUTANEOUS at 20:53

## 2021-09-13 RX ADMIN — DIAZEPAM 5 MG: 5 TABLET ORAL at 22:05

## 2021-09-13 RX ADMIN — IPRATROPIUM BROMIDE AND ALBUTEROL SULFATE 1 AMPULE: .5; 3 SOLUTION RESPIRATORY (INHALATION) at 00:16

## 2021-09-13 RX ADMIN — ATORVASTATIN CALCIUM 40 MG: 40 TABLET, FILM COATED ORAL at 20:53

## 2021-09-13 RX ADMIN — IPRATROPIUM BROMIDE AND ALBUTEROL SULFATE 1 AMPULE: .5; 3 SOLUTION RESPIRATORY (INHALATION) at 14:50

## 2021-09-13 RX ADMIN — BUMETANIDE 1 MG: 0.25 INJECTION, SOLUTION INTRAMUSCULAR; INTRAVENOUS at 08:12

## 2021-09-13 RX ADMIN — POTASSIUM BICARBONATE 40 MEQ: 782 TABLET, EFFERVESCENT ORAL at 15:06

## 2021-09-13 RX ADMIN — SODIUM CHLORIDE, PRESERVATIVE FREE 10 ML: 5 INJECTION INTRAVENOUS at 08:13

## 2021-09-13 RX ADMIN — HYDRALAZINE HYDROCHLORIDE 20 MG: 20 INJECTION INTRAMUSCULAR; INTRAVENOUS at 12:23

## 2021-09-13 RX ADMIN — SODIUM CHLORIDE SOLN NEBU 3% 4 ML: 3 NEBU SOLN at 08:35

## 2021-09-13 RX ADMIN — OXYCODONE 10 MG: 5 TABLET ORAL at 04:29

## 2021-09-13 RX ADMIN — ALBUMIN (HUMAN) 25 G: 0.25 INJECTION, SOLUTION INTRAVENOUS at 08:14

## 2021-09-13 RX ADMIN — PIPERACILLIN AND TAZOBACTAM 3375 MG: 3; .375 INJECTION, POWDER, LYOPHILIZED, FOR SOLUTION INTRAVENOUS at 04:29

## 2021-09-13 RX ADMIN — PHENYTOIN 200 MG: 125 SUSPENSION ORAL at 13:46

## 2021-09-13 RX ADMIN — ENOXAPARIN SODIUM 135 MG: 150 INJECTION SUBCUTANEOUS at 08:13

## 2021-09-13 RX ADMIN — LABETALOL HYDROCHLORIDE 20 MG: 5 INJECTION INTRAVENOUS at 09:05

## 2021-09-13 RX ADMIN — INSULIN LISPRO 6 UNITS: 100 INJECTION, SOLUTION INTRAVENOUS; SUBCUTANEOUS at 17:54

## 2021-09-13 RX ADMIN — IPRATROPIUM BROMIDE AND ALBUTEROL SULFATE 1 AMPULE: .5; 3 SOLUTION RESPIRATORY (INHALATION) at 20:21

## 2021-09-13 RX ADMIN — OXYCODONE HYDROCHLORIDE AND ACETAMINOPHEN 1000 MG: 500 TABLET ORAL at 08:12

## 2021-09-13 RX ADMIN — OXYCODONE 10 MG: 5 TABLET ORAL at 22:04

## 2021-09-13 RX ADMIN — PHENYTOIN 200 MG: 125 SUSPENSION ORAL at 21:30

## 2021-09-13 RX ADMIN — QUETIAPINE FUMARATE 50 MG: 25 TABLET ORAL at 08:12

## 2021-09-13 RX ADMIN — OXYCODONE 10 MG: 5 TABLET ORAL at 13:36

## 2021-09-13 RX ADMIN — Medication 75 MCG/HR: at 02:18

## 2021-09-13 RX ADMIN — Medication 2000 UNITS: at 08:12

## 2021-09-13 RX ADMIN — PIPERACILLIN AND TAZOBACTAM 3375 MG: 3; .375 INJECTION, POWDER, LYOPHILIZED, FOR SOLUTION INTRAVENOUS at 20:54

## 2021-09-13 RX ADMIN — DEXAMETHASONE SODIUM PHOSPHATE 6 MG: 4 INJECTION, SOLUTION INTRA-ARTICULAR; INTRALESIONAL; INTRAMUSCULAR; INTRAVENOUS; SOFT TISSUE at 08:12

## 2021-09-13 RX ADMIN — OXYCODONE 10 MG: 5 TABLET ORAL at 10:04

## 2021-09-13 RX ADMIN — QUETIAPINE FUMARATE 50 MG: 25 TABLET ORAL at 15:05

## 2021-09-13 RX ADMIN — IPRATROPIUM BROMIDE AND ALBUTEROL SULFATE 1 AMPULE: .5; 3 SOLUTION RESPIRATORY (INHALATION) at 08:35

## 2021-09-13 RX ADMIN — BUMETANIDE 1 MG: 0.25 INJECTION, SOLUTION INTRAMUSCULAR; INTRAVENOUS at 13:26

## 2021-09-13 RX ADMIN — SODIUM CHLORIDE SOLN NEBU 3% 4 ML: 3 NEBU SOLN at 20:21

## 2021-09-13 RX ADMIN — LABETALOL HYDROCHLORIDE 20 MG: 5 INJECTION INTRAVENOUS at 15:02

## 2021-09-13 RX ADMIN — AMLODIPINE BESYLATE 10 MG: 10 TABLET ORAL at 08:12

## 2021-09-13 RX ADMIN — POTASSIUM BICARBONATE 40 MEQ: 782 TABLET, EFFERVESCENT ORAL at 10:04

## 2021-09-13 RX ADMIN — TOBRAMYCIN SULFATE 300 MG: 40 INJECTION, SOLUTION INTRAMUSCULAR; INTRAVENOUS at 08:34

## 2021-09-13 RX ADMIN — DIAZEPAM 5 MG: 5 TABLET ORAL at 04:29

## 2021-09-13 RX ADMIN — INSULIN LISPRO 6 UNITS: 100 INJECTION, SOLUTION INTRAVENOUS; SUBCUTANEOUS at 12:28

## 2021-09-13 RX ADMIN — PANTOPRAZOLE SODIUM 40 MG: 40 INJECTION, POWDER, FOR SOLUTION INTRAVENOUS at 08:12

## 2021-09-13 RX ADMIN — SODIUM CHLORIDE, PRESERVATIVE FREE 10 ML: 5 INJECTION INTRAVENOUS at 20:53

## 2021-09-13 RX ADMIN — OXYCODONE 10 MG: 5 TABLET ORAL at 17:30

## 2021-09-13 RX ADMIN — WARFARIN SODIUM 5 MG: 5 TABLET ORAL at 17:30

## 2021-09-13 RX ADMIN — DIAZEPAM 5 MG: 5 TABLET ORAL at 16:41

## 2021-09-13 RX ADMIN — CHLORHEXIDINE GLUCONATE 0.12% ORAL RINSE 15 ML: 1.2 LIQUID ORAL at 21:48

## 2021-09-13 RX ADMIN — IPRATROPIUM BROMIDE AND ALBUTEROL SULFATE 1 AMPULE: .5; 3 SOLUTION RESPIRATORY (INHALATION) at 11:43

## 2021-09-13 RX ADMIN — INSULIN GLARGINE 25 UNITS: 100 INJECTION, SOLUTION SUBCUTANEOUS at 08:10

## 2021-09-13 RX ADMIN — ALBUMIN (HUMAN) 25 G: 0.25 INJECTION, SOLUTION INTRAVENOUS at 13:26

## 2021-09-13 ASSESSMENT — PULMONARY FUNCTION TESTS
PIF_VALUE: 26
PIF_VALUE: 26
PIF_VALUE: 29
PIF_VALUE: 30
PIF_VALUE: 37
PIF_VALUE: 24
PIF_VALUE: 26
PIF_VALUE: 25
PIF_VALUE: 26
PIF_VALUE: 25
PIF_VALUE: 24
PIF_VALUE: 27
PIF_VALUE: 29
PIF_VALUE: 27
PIF_VALUE: 25
PIF_VALUE: 29
PIF_VALUE: 25
PIF_VALUE: 26
PIF_VALUE: 29
PIF_VALUE: 32
PIF_VALUE: 24
PIF_VALUE: 28
PIF_VALUE: 27
PIF_VALUE: 28
PIF_VALUE: 26
PIF_VALUE: 29
PIF_VALUE: 27
PIF_VALUE: 27
PIF_VALUE: 32
PIF_VALUE: 27

## 2021-09-13 ASSESSMENT — PAIN SCALES - GENERAL
PAINLEVEL_OUTOF10: 0
PAINLEVEL_OUTOF10: 4
PAINLEVEL_OUTOF10: 0

## 2021-09-13 NOTE — PLAN OF CARE
Problem: Airway Clearance - Ineffective  Goal: Achieve or maintain patent airway  Outcome: Met This Shift     Problem: Gas Exchange - Impaired  Goal: Absence of hypoxia  Outcome: Met This Shift  Goal: Promote optimal lung function  Outcome: Met This Shift     Problem: Breathing Pattern - Ineffective  Goal: Ability to achieve and maintain a regular respiratory rate  Outcome: Met This Shift     Problem: Isolation Precautions - Risk of Spread of Infection  Goal: Prevent transmission of infection  Outcome: Met This Shift     Problem: Nutrition Deficits  Goal: Optimize nutritional status  Outcome: Met This Shift     Problem: Risk for Fluid Volume Deficit  Goal: Maintain normal heart rhythm  Outcome: Met This Shift     Problem: Non-Violent Restraints  Goal: No harm/injury to patient while restraints in use  Outcome: Met This Shift  Goal: Patient's dignity will be maintained  Outcome: Met This Shift     Problem:  Body Temperature -  Risk of, Imbalanced  Goal: Complications related to the disease process, condition or treatment will be avoided or minimized  Outcome: Not Met This Shift     Problem: Non-Violent Restraints  Goal: Removal from restraints as soon as assessed to be safe  Outcome: Not Met This Shift

## 2021-09-13 NOTE — PLAN OF CARE
Problem: Non-Violent Restraints  Goal: Removal from restraints as soon as assessed to be safe  9/13/2021 1041 by Gilford Bolds, RN  Outcome: Completed  9/13/2021 0357 by Daphne Fu RN  Outcome: Not Met This Shift  Goal: No harm/injury to patient while restraints in use  9/13/2021 1041 by Gilford Bolds, RN  Outcome: Completed  9/13/2021 0357 by Daphne Fu RN  Outcome: Met This Shift  Goal: Patient's dignity will be maintained  9/13/2021 1041 by Gilford Bolds, RN  Outcome: Completed  9/13/2021 0357 by Daphne Fu RN  Outcome: Met This Shift

## 2021-09-13 NOTE — PROGRESS NOTES
Critical Care Team - Daily Progress Note      Date and time: 9/13/2021 11:24 AM  Patient's name:  Richardson Dewey Record Number: [id number]  Patient's account/billing number: [de-identified]  Patient's YOB: 1965  Age: 60 y.o. Date of Admission: 8/21/2021  9:35 AM  Length of stay during current admission: 23      Primary Care Physician: Loco 5, DO  ICU Attending Physician: Dr. Tidwell Jobs    Code Status: Full Code    Reason for ICU admission: Respiratory failure due to COVID-19      SUBJECTIVE:     OVERNIGHT EVENTS:           8/30: tolerating longer periods supine, thick dark brown secretions     8/31: secretions out of NG, michelle prone     9/1: Decreased urine output and increased creatinine today, continues with thick secretions     9/2: Cr and Uop improved     9/3: P/F improved and has remained supine     9/4: thick secretions     9/5: desat with turning, 1500 loose stool from [facility]    9/6: Patient trying low tidal volumes overnight occasionally, otherwise no other acute events. 9/7: No acute events overnight    9/8: Did receive labetalol 2 times overnight for elevated blood pressures otherwise unremarkable good urine output. Will need essentially exchanged today, bronchoscopy for retained left lung and bibasilar mucopurulent secretions     9/10-no acute events overnight did receive trach and PEG yesterday, will wean sedation as able. , upper extremity DVT noted    9/11: No acute events overnight, sedation was increased overnight due to increased respirations, will slowly come down on that according to nursing staff.    9/12: bumex TID, seroquel to 50 mg PO BID    9/13-patient is improving, is able to follow commands at this time, is able to be transferred to [facility] once  sedation is weaned off, will do that today.     OBJECTIVE:     VITAL SIGNS:  BP (!) 175/89   Pulse 95   Temp 99.7 °F (37.6 °C) (Bladder)   Resp (!) 34   Ht 6' (1.829 m)   Wt (!) 313 lb 7.9 oz (142.2 kg) SpO2 94%   BMI 42.52 kg/m²   Tmax over 24 hours:  Temp (24hrs), Av.8 °F (37.7 °C), Min:99.3 °F (37.4 °C), Max:100.4 °F (38 °C)      Patient Vitals for the past 6 hrs:   BP Temp Temp src Pulse Resp SpO2   21 1000 (!) 175/89 -- -- 95 (!) 34 94 %   21 0900 (!) 185/91 -- -- 96 (!) 42 93 %   21 0845 -- -- -- 90 (!) 40 93 %   21 0836 -- -- -- -- 30 100 %   21 0835 -- -- -- -- (!) 48 99 %   21 0834 -- -- -- -- 29 91 %   21 0800 (!) 174/92 99.7 °F (37.6 °C) Bladder 106 (!) 39 92 %   21 0735 (!) 174/92 -- -- 111 -- --   21 0600 123/67 -- -- 107 20 97 %         Intake/Output Summary (Last 24 hours) at 2021 1124  Last data filed at 2021 0600  Gross per 24 hour   Intake 2440 ml   Output 2600 ml   Net -160 ml     Wt Readings from Last 2 Encounters:   21 (!) 313 lb 7.9 oz (142.2 kg)   21 (!) 320 lb (145.2 kg)     Body mass index is 42.52 kg/m².         PHYSICAL EXAMINATION:    General appearance -no acute distress, trach present on ventilator  Mental status -patient is able to follow commands and tell the date as well as his birthday is coming up  Eyes - pupils equal and reactive, extraocular eye movements intact  Mouth - mucous membranes moist, pharynx normal without lesions  Neck - supple, no significant adenopathy  Chest -intubated sedated, breath sounds improving slight rhonchi noted  heart - normal rate, regular rhythm, normal S1, S2, no murmurs, rubs, clicks or gallops  Abdomen - soft, nontender, nondistended, no masses or organomegaly  Neurological -followed commands, no focal neurological deficits  Extremities - peripheral pulses normal, no pedal edema, no clubbing or cyanosis  Skin -patient did have skin breakdown to the face as well as blistering on the hands     Any additional physical findings:    MEDICATIONS:    Scheduled Meds:   phenytoin        potassium bicarb-citric acid  40 mEq Oral Q6H    oxyCODONE  10 mg Oral Q4H    diazePAM  5 mg Oral Q6H    phenytoin  200 mg Per G Tube TID    bumetanide  1 mg IntraVENous TID    albumin human  25 g IntraVENous TID    QUEtiapine  50 mg Oral BID    dexamethasone  6 mg IntraVENous Daily    sodium chloride flush  5-40 mL IntraVENous 2 times per day    heparin flush  3 mL IntraVENous 2 times per day    piperacillin-tazobactam  3,375 mg IntraVENous Q8H    enoxaparin  1 mg/kg SubCUTAneous BID    tobramycin  300 mg Inhalation Q12H    lisinopril  10 mg Oral Daily    insulin glargine  25 Units SubCUTAneous BID    ascorbic acid  1,000 mg Oral Daily    sodium chloride (Inhalant)  4 mL Nebulization Q12H    insulin lispro  0-18 Units SubCUTAneous Q6H    chlorhexidine  15 mL Mouth/Throat BID    ipratropium-albuterol  1 ampule Inhalation Q4H    amLODIPine  10 mg Oral Daily    atorvastatin  40 mg Oral Daily    Vitamin D  2,000 Units Oral Daily    zinc sulfate  50 mg Oral Daily    pantoprazole  40 mg IntraVENous Daily     Continuous Infusions:   sodium chloride      fentaNYL 5 mcg/ml in 0.9%  ml infusion 50 mcg/hr (09/13/21 0402)    midazolam 1 mg/hr (09/12/21 1613)    dextrose       PRN Meds:   sodium chloride flush, 5-40 mL, PRN  sodium chloride, 25 mL, PRN  acetaminophen, 650 mg, Q4H PRN  labetalol, 20 mg, Q6H PRN  hydrALAZINE, 20 mg, Q6H PRN  sodium chloride (Inhalant), 4 mL, PRN  albuterol, 2.5 mg, Q6H PRN  benzonatate, 200 mg, TID PRN  glucose, 15 g, PRN  dextrose, 12.5 g, PRN  glucagon (rDNA), 1 mg, PRN  dextrose, 100 mL/hr, PRN  sodium chloride, 30 mL, PRN          VENT SETTINGS (Comprehensive) (if applicable):  Vent Information  $Ventilation: $Subsequent Day  Skin Assessment: Clean, dry, & intact  Equipment ID: [id number]  Equipment Changed: (S) Humidification  Vent Type: 980  Vent Mode: AC/VC+  Vt Ordered: 460 mL  Pressure Ordered: 20  Rate Set: 22 bmp  Peak Flow: 0 L/min  Pressure Support: 0 cmH20  FiO2 : 45 %  SpO2: 94 %  SpO2/FiO2 ratio: 208.89  Sensitivity: 3  PEEP/CPAP: 8  I Time/ I Time %: 0 s  Humidification Source: Heated wire  Humidification Temp: 37  Humidification Temp Measured: 37  Circuit Condensation: Drained  Mask Type: Full face mask  Mask Size: Medium  Additional Respiratory  Assessments  Pulse: 95  Resp: (!) 34  SpO2: 94 %  Position: Semi-Sellers's  Humidification Source: Heated wire  Humidification Temp: 37  Circuit Condensation: Drained  Oral Care: Mouth swabbed, Mouth moisturizer, Mouth suctioned, Suction toothette  Subglottic Suction Done?: No  Airway Type: Trachial  Airway Size: 78  Cuff Pressure (cm H2O): 29 cm H2O    ABGs:     Laboratory findings:    Complete Blood Count:   Recent Labs     09/11/21 0420 09/12/21  0510 09/13/21  0535   WBC 21.2* 24.4* 12.9*   HGB 10.5* 10.9* 8.9*   HCT 32.6* 33.4* 28.2*    212 196        Last 3 Blood Glucose:   Recent Labs     09/11/21 0420 09/12/21 0510 09/13/21  0535   GLUCOSE 183* 157* 169*        PT/INR:  No results found for: PROTIME, INR  PTT:  No results found for: APTT, PTT    Comprehensive Metabolic Profile:   Recent Labs     09/11/21 0420 09/11/21 0420 09/12/21 0510 09/12/21  0510 09/13/21  0535     --  137  --  138   K 3.8  --  3.9  --  3.5     --  103  --  102   CO2 26  --  24  --  25   BUN 14  --  17  --  15   CREATININE 0.4*  --  0.4*  --  0.5*   GLUCOSE 183*  --  157*  --  169*   CALCIUM 8.4*  --  8.7  --  8.4*   PROT 6.1*   < > 6.6   < > 6.3*   LABALBU 2.2*   < > 2.1*   < > 2.6*   BILITOT 0.3   < > 0.3   < > 0.4   ALKPHOS 93   < > 119   < > 131*   AST 77*   < > 176*   < > 246*   ALT 44*   < > 105*   < > 141*    < > = values in this interval not displayed. Magnesium:   Lab Results   Component Value Date    MG 1.7 09/13/2021     Phosphorus:   Lab Results   Component Value Date    PHOS 2.7 09/13/2021     Ionized Calcium: No results found for: CAION     Urinalysis:     Troponin: No results for input(s): TROPONINI in the last 72 hours.       ASSESSMENT:     Patient Active Problem List    Diagnosis Date Noted    Elevated LFTs     Busch catheter problem ([facility] [address] 08/26/2021    Sepsis ([facility] [address] 08/26/2021    Thrombocytopenia ([facility] [address] 08/26/2021    Hyperlipidemia 08/22/2021    Acute hypoxemic respiratory failure due to COVID-19 [facility]) 08/21/2021    Type 2 diabetes mellitus without complication, without long-term current use of insulin ([facility] [address] 08/10/2021    History of seizures 08/10/2021     SYSTEMS ASSESSMENT    Neuro     Following commands at this time  Patient is able to be weaned at this time  Oral Valium every 6 hours, OxyIR 10 mg every 4 hours  Seroquel 50 mg twice daily  Continue to monitor      Respiratory     Acute hypoxic respiratory failure requiring mechanical ventilation  Severe COVID-19 pneumonia s/p toe C, remdesivir  Pneumonia VAP-MSSA, E. coli  Ancef-day 5  Trach/PEG    Bronchoscopy 9/8/2021 due to white out left side chest on x-ray. DuoNebs every 4 hours  6 mg Decadron daily  Wean oxygen as tolerated.  Keep O2 sat 90-92%    Cardiovascular     Hypertension  Amlodipine-10 mg  Lisinopril-10 mg  Lipitor 40 mg    Gastrointestinal     GI prophylaxis-Protonix  Bowel regimen  Continue monitor    Renal     OSCAR-improved  Avoid nephrotoxins  Continue to monitor     Infectious Disease     Pneumonia VAP-positive for MSSA, E. coli  Ancef  lactic acidosis  Inhaled tobramycin for possible tracheobronchitis  Follow cultures of previous central line tip and new blood cultures from 9/11  Infectious disease following  Zosyn    Hematology/Oncology   DVT left upper arm-we will transition to warfarin due to high bmi  Anemia  Hemoglobin stable  Continue to monitor    Endocrine     Hyperglycemia  Lantus 25 units  Sliding scale high-dose insulin  High-dose sliding scale    Social/Spiritual/DNR/Other     Full code  vitamin regimen  DVT prophylaxis Lovenox 40 every 12    Plan-we will wean patient sedation, safe to go to LTAC when this is achieved    The patient was seen and evaluated by myself and Dejuan Reyes MD PGY-2  9/13/2021 11:24 AM      Critical Care Attending Addendum:    Patient seen and examined with the house staff. X-rays personally reviewed through the PACS. Family is updated at the bedside as available. Additional findings listed below as necessary. Additional comments:  1. Acute hypoxic respiratory failure from COVID 19 pneumonia is improving and are weaning sedation and will drop PEEP to 6.  2. DVT to start warfarin. 3. Agitation sedation being weaned. 4. Low grade fever is not due to COVID but due to superimposed bacterial infection. 5. Once off sedation, will transfer to [facility].

## 2021-09-13 NOTE — PROGRESS NOTES
[facility] Progress Note    Admitting Date and Time: 8/21/2021  9:35 AM  Admit Dx: Acute hypoxemic respiratory failure due to COVID-19 (HCC) [U07.1, J96.01]  Pneumonia due to COVID-19 virus [U07.1, J12.82]    Subjective:  Patient is being followed for Acute hypoxemic respiratory failure due to COVID-19 ([address] [U07.1, J96.01]  Pneumonia due to COVID-19 virus [U07.1, J12.82]     Per RN: Patient remains intubated, on isolation, febrile. Patient still agitated, continues to be on  Versed and fentanyl, also received Seroquel, oxycodone , Valium.   ROS: Unobtainable     phenytoin        potassium bicarb-citric acid  40 mEq Oral Q6H    oxyCODONE  10 mg Oral Q4H    diazePAM  5 mg Oral Q6H    phenytoin  200 mg Per G Tube TID    warfarin  5 mg Oral Once    [START ON 9/14/2021] warfarin (COUMADIN) daily dosing (placeholder)   Other Daily    bumetanide  1 mg IntraVENous TID    albumin human  25 g IntraVENous TID    QUEtiapine  50 mg Oral BID    dexamethasone  6 mg IntraVENous Daily    sodium chloride flush  5-40 mL IntraVENous 2 times per day    heparin flush  3 mL IntraVENous 2 times per day    piperacillin-tazobactam  3,375 mg IntraVENous Q8H    enoxaparin  1 mg/kg SubCUTAneous BID    tobramycin  300 mg Inhalation Q12H    lisinopril  10 mg Oral Daily    insulin glargine  25 Units SubCUTAneous BID    ascorbic acid  1,000 mg Oral Daily    sodium chloride (Inhalant)  4 mL Nebulization Q12H    insulin lispro  0-18 Units SubCUTAneous Q6H    chlorhexidine  15 mL Mouth/Throat BID    ipratropium-albuterol  1 ampule Inhalation Q4H    amLODIPine  10 mg Oral Daily    atorvastatin  40 mg Oral Daily    Vitamin D  2,000 Units Oral Daily    zinc sulfate  50 mg Oral Daily    pantoprazole  40 mg IntraVENous Daily     sodium chloride flush, 5-40 mL, PRN  sodium chloride, 25 mL, PRN  acetaminophen, 650 mg, Q4H PRN  labetalol, 20 mg, Q6H PRN  hydrALAZINE, 20 mg, Q6H PRN  sodium chloride (Inhalant), 4 mL, PRN  albuterol, 2.5 mg, Q6H PRN  benzonatate, 200 mg, TID PRN  glucose, 15 g, PRN  dextrose, 12.5 g, PRN  glucagon (rDNA), 1 mg, PRN  dextrose, 100 mL/hr, PRN  sodium chloride, 30 mL, PRN         Objective:    BP (!) 173/79   Pulse 97   Temp 99.7 °F (37.6 °C) (Bladder)   Resp (!) 40   Ht 6' (1.829 m)   Wt (!) 313 lb 7.9 oz (142.2 kg)   SpO2 92%   BMI 42.52 kg/m²     General Appearance: Intubated and sedated  Skin: warm and dry  Head: normocephalic and atraumatic, on vent through trach, right IJ TLC removed, left IJ TLC in situ  Eyes: pupils equal, round, and reactive to light, extraocular eye movements intact, conjunctivae normal  Neck: neck supple and non tender without mass   Pulmonary/Chest: clear to auscultation diminished bilaterally- no wheezes, rales or rhonchi, decreased air entry  Cardiovascular: normal rate, normal S1 and S2 and no carotid bruits  Abdomen: soft, non-tender, non-distended, normal bowel sounds, no masses or organomegaly  Extremities: no cyanosis, no clubbing and + edema  Neurologic: Intubated and sedated        Recent Labs     09/11/21 0420 09/12/21  0510 09/13/21  0535    137 138   K 3.8 3.9 3.5    103 102   CO2 26 24 25   BUN 14 17 15   CREATININE 0.4* 0.4* 0.5*   GLUCOSE 183* 157* 169*   CALCIUM 8.4* 8.7 8.4*       Recent Labs     09/11/21 0420 09/12/21  0510 09/13/21  0535   WBC 21.2* 24.4* 12.9*   RBC 3.56* 3.62* 3.05*   HGB 10.5* 10.9* 8.9*   HCT 32.6* 33.4* 28.2*   MCV 91.6 92.3 92.5   MCH 29.5 30.1 29.2   MCHC 32.2 32.6 31.6*   RDW 14.8 14.6 14.7    212 196   MPV 9.6 9.7 9.9       Micro:  No components found for: [facility])    Radiology:   XR CHEST PORTABLE    Result Date: 9/11/2021  EXAMINATION: ONE XRAY VIEW OF THE CHEST 9/11/2021 12:25 pm COMPARISON: 09/10/2021 HISTORY: ORDERING SYSTEM PROVIDED HISTORY: left IJ TLC placement TECHNOLOGIST PROVIDED HISTORY: Reason for exam:->left IJ TLC placement FINDINGS: Since the prior study, there has been insertion of provider will be dictating this exam?->CRC FINDINGS: There is deep vein thrombosis involving the left brachial and basilic veins throughout the arm. Thrombus is also noted within the left cephalic vein. There is normal flow and compressibility of the visualized venous structures of the right upper extremity. There is no evidence of echogenic thrombus. The veins of the right upper extremity demonstrate good compressibility with normal color flow study and spectral analysis. Occlusive deep vein thrombosis involving the left brachial veins. Thrombus is also noted in the cephalic and basilic veins in the left arm. No evidence of deep vein thrombosis in the right upper extremity. The findings were sent to the Radiology Results [address] at 10:40 am on 9/10/2021to be communicated to a licensed caregiver. Assessment:    Principal Problem:    Acute hypoxemic respiratory failure due to COVID-19 [facility])  Active Problems:    Type 2 diabetes mellitus without complication, without long-term current use of insulin (HCC)    History of seizures    Hyperlipidemia    Busch catheter problem (HCC)    Sepsis ([facility])    Thrombocytopenia ([facility])    Elevated LFTs  Resolved Problems:    OSCAR (acute kidney injury) ([facility] [address]    Lactic acidosis      Plan:  1. Acute hypoxic respiratory failure secondary to severe COVID pneumonia, suspected ventilator associated pneumonia versus tracheobronchitis, with left pleural effusion ( not tapable) and atelectasis- remains intubated and sedated, completed remdesivir, s/p Tocilizumab, s/p cefazolin cultures growing MSSA and E. coli, leukocytosis, lactic acidosis, s/p bronchoscopy with copious mucus plugging 9/8/2021, right IJ tip culture pending, infectious disease on board, on inhaled tobramycin for tracheobronchitis, final BAL cultures pending, repeat blood cultures pending, started on Zosyn.   2.  OSCAR -prerenal, nephrology on board, held diuresis, lisinopril added, on tube feeds, avoid nephrotoxins, on water flushes. 3 Hypertension -poorly controlled. Plan to send to [facility] select specialty, once afebrile and sedation requirement decreases. NOTE: This report was transcribed using voice recognition software. Every effort was made to ensure accuracy; however, inadvertent computerized transcription errors may be present.   Electronically signed by Oscar Briones MD on 9/13/2021 at 11:46 AM

## 2021-09-13 NOTE — PROGRESS NOTES
Pulmonary Subsequent Hospital F/U note    Patient is being followed for: acute hypoxic respiratory failure, severe covid-19 pneumonia     Interval HPI:  Remains intubated and sedated on versed and fentanyl although is awake and nursing indicates that he does get very agitated   Vent settings 45% 460cc, 22,8  S/p bronchoscopy 9/8 with mucus plugging of the L bronchial tree and RLL - cultures positive E.coli, enterobacter, MSSA   S/p trache and PEG placement 9/9/21  +DVT  Low grade temps 100.2    ROS:  Unable to obtain     Exam:  BP (!) 189/74   Pulse 117   Temp 99.9 °F (37.7 °C) (Bladder)   Resp (!) 36   Ht 6' (1.829 m)   Wt (!) 313 lb 7.9 oz (142.2 kg)   SpO2 (!) 89%   BMI 42.52 kg/m²    Due to the current efforts to prevent transmission of COVID-19 and also the need to preserve PPE for other caregivers, a face-to-face encounter with the patient was not performed. That being said, all relevant records and diagnostic tests were reviewed, including laboratory results and imaging. Please reference any relevant documentation elsewhere. Care will be coordinated with the primary service. Data:    Oximetry:  SpO2 Readings from Last 1 Encounters:   09/13/21 (!) 89%       Imaging personally reviewed by myself:  CXR     Impression   1. The position and alignment of the tubes and catheters are within normal   range   2. No signs of pneumothorax   3. Cardiomediastinal interstitial prominence concerning for congestive heart   failure, stable   4. There are bilateral patchy parenchymal densities within the lungs   concerning for pneumonia and or atelectasis this appears unchanged         DVT     Occlusive deep vein thrombosis involving the left brachial veins.  Thrombus   is also noted in the cephalic and basilic veins in the left arm.       No evidence of deep vein thrombosis in the right upper extremity.        Respiratory culture MSSA, Ecoli, enterobacter    crp 34  D dimer 755    Pertinent labs reviewed and noted:  Lab Results   Component Value Date    WBC 12.9 09/13/2021    HGB 8.9 09/13/2021    HCT 28.2 09/13/2021    MCV 92.5 09/13/2021    MCH 29.2 09/13/2021    MCHC 31.6 09/13/2021    RDW 14.7 09/13/2021     09/13/2021    MPV 9.9 09/13/2021     Lab Results   Component Value Date     09/13/2021    K 3.5 09/13/2021    K 3.7 08/21/2021     09/13/2021    CO2 25 09/13/2021    BUN 15 09/13/2021    CREATININE 0.5 09/13/2021    LABALBU 2.6 09/13/2021    CALCIUM 8.4 09/13/2021    GFRAA >60 09/13/2021    LABGLOM >60 09/13/2021     Assessment:  1. Acute hypoxic respiratory failure  2. Severe covid-19 pneumonia  3. Bacterial pneumonia with MSSA, enterobacter, and Ecoli   4. Mucus plugging of bronchi s/p bronchoscopy 9/8/21  5. DVT LUE  6. Fevers/sepsis     Plan:  1. Trache PEG  9/9/21. Consider PSV trials to see if he is more comfortable   2. Completed Remdesivir and Tocilizumab  3. Antibiotics as per ID - Zosyn and remains on inhaled Tobramycin  4. Chest physio/bronchodilators, s/p bronch  5. Dexamethasone wean  6. Full dose anticoagulation  7.  Must be stable off IV sedation for 24 hours prior to transfer to [facility]      Electronically signed by Joshua Del Angel MD on 9/13/2021 at 3:28 PM

## 2021-09-13 NOTE — PROGRESS NOTES
[facility]  [facility]  Progress Note      Chief Complaint   Patient presents with    Shortness of Breath     covid, per family SaO2 75% RA, hx of DM and panic attack        SUBJECTIVE:  8/21/21  Prone. FiO2 60% and PEEP of 16    8/22/2021  Patient is tolerating medications. No reported adverse drug reactions. No nausea, vomiting, diarrhea. Afebrile BiPAP 100% FiO2 with breathing at 44 times a minute and probably is going to decompensate  8/23/2021  Not doing well intubated on a rotating bed  FiO2 70% and PEEP 16  8/24/2021  Prone 50% FiO2-PEEP of 10  Issues with the urinary Busch last night this was changed  Paralyzed and on Versed  8/25/2021  Paralyzed and sedated with Versed no pressors  Prone intubated on 50% FiO2, PEEP of 10  Tolerating medications    8/26/21  Paralyzed and sedated with Versed no pressors  Prone intubated on 70% FiO2, PEEP of 10  Does not tolerate supine position    8/27/2021  Afebrile  Still prone on FiO2 of 100%  Paralyzed and sedated    8/28/2021  The patient is still in the ICU. He is pronated. FiO2 100%. Is still sedated and paralyzed. Fair amount of thick, brown secretions    8/29/2021  The patient is still on a RotoProne bed. You are still intubated, sedated and paralyzed. O2 is being weaned down. FiO2 55%. PEEP 12.    8/30/2021  Patient remains intubated, sedated and paralyzed. He is still on a RotoProne bed. Supine now. 8/31/2021  He is still on a RotoProne bed. Still having fair amount of secretions from the nostrils. Minimal from the ET tube, however. Tube feeding seems to be coming out of his nose. 9/1/2021  He is supinated but still on a RotoProne bed. He still has fair amount of secretions from the ET tube. Tolerating antibiotic    9/2/2021. He is still on a RotoProne bed. He is pronated. Less secretions from the ET tube and nostrils. FiO2 50%. PEEP 14.    9/3/2021. He is off the RotoProne bed. Tolerating supination.   Tolerating stable  · Status post tracheostomy and PEG 9/10/2021   · Fevers probably secondary to line infection. Lines were changed. Tip cultures were negative. Temperatures are trending downwards    PLAN:  · Continue inhaled Tobramycin for possible tracheobronchitis  · Continue Zosyn until 9/25/2021  · Check catheter tip culture  · We will follow with you  · Isolation can be discontinued.   Terminally clean the room    Spoke with Juni Godoy MD  11:38 AM   9/13/2021

## 2021-09-14 LAB
ALBUMIN SERPL-MCNC: 3 G/DL (ref 3.5–5.2)
ALP BLD-CCNC: 123 U/L (ref 40–129)
ALT SERPL-CCNC: 145 U/L (ref 0–40)
ANION GAP SERPL CALCULATED.3IONS-SCNC: 9 MMOL/L (ref 7–16)
AST SERPL-CCNC: 211 U/L (ref 0–39)
BASOPHILS ABSOLUTE: 0.03 E9/L (ref 0–0.2)
BASOPHILS RELATIVE PERCENT: 0.2 % (ref 0–2)
BILIRUB SERPL-MCNC: 0.6 MG/DL (ref 0–1.2)
BUN BLDV-MCNC: 12 MG/DL (ref 6–20)
C-REACTIVE PROTEIN: 26.7 MG/DL (ref 0–0.4)
CALCIUM SERPL-MCNC: 8.9 MG/DL (ref 8.6–10.2)
CHLORIDE BLD-SCNC: 99 MMOL/L (ref 98–107)
CO2: 30 MMOL/L (ref 22–29)
CREAT SERPL-MCNC: 0.4 MG/DL (ref 0.7–1.2)
EOSINOPHILS ABSOLUTE: 0.27 E9/L (ref 0.05–0.5)
EOSINOPHILS RELATIVE PERCENT: 2 % (ref 0–6)
GFR AFRICAN AMERICAN: >60
GFR NON-AFRICAN AMERICAN: >60 ML/MIN/1.73
GLUCOSE BLD-MCNC: 181 MG/DL (ref 74–99)
HCT VFR BLD CALC: 28.3 % (ref 37–54)
HEMOGLOBIN: 9.2 G/DL (ref 12.5–16.5)
IMMATURE GRANULOCYTES #: 0.21 E9/L
IMMATURE GRANULOCYTES %: 1.6 % (ref 0–5)
INR BLD: 1.3
LYMPHOCYTES ABSOLUTE: 0.82 E9/L (ref 1.5–4)
LYMPHOCYTES RELATIVE PERCENT: 6.1 % (ref 20–42)
MAGNESIUM: 1.5 MG/DL (ref 1.6–2.6)
MCH RBC QN AUTO: 29.7 PG (ref 26–35)
MCHC RBC AUTO-ENTMCNC: 32.5 % (ref 32–34.5)
MCV RBC AUTO: 91.3 FL (ref 80–99.9)
METER GLUCOSE: 148 MG/DL (ref 74–99)
METER GLUCOSE: 160 MG/DL (ref 74–99)
METER GLUCOSE: 176 MG/DL (ref 74–99)
METER GLUCOSE: 233 MG/DL (ref 74–99)
MONOCYTES ABSOLUTE: 0.78 E9/L (ref 0.1–0.95)
MONOCYTES RELATIVE PERCENT: 5.8 % (ref 2–12)
NEUTROPHILS ABSOLUTE: 11.43 E9/L (ref 1.8–7.3)
NEUTROPHILS RELATIVE PERCENT: 84.3 % (ref 43–80)
PDW BLD-RTO: 14.5 FL (ref 11.5–15)
PHOSPHORUS: 2.1 MG/DL (ref 2.5–4.5)
PLATELET # BLD: 229 E9/L (ref 130–450)
PMV BLD AUTO: 9.5 FL (ref 7–12)
POTASSIUM SERPL-SCNC: 3.4 MMOL/L (ref 3.5–5)
PROTHROMBIN TIME: 14 SEC (ref 9.3–12.4)
RBC # BLD: 3.1 E12/L (ref 3.8–5.8)
SARS-COV-2, NAAT: DETECTED
SEDIMENTATION RATE, ERYTHROCYTE: 125 MM/HR (ref 0–15)
SODIUM BLD-SCNC: 138 MMOL/L (ref 132–146)
TOTAL PROTEIN: 6.6 G/DL (ref 6.4–8.3)
WBC # BLD: 13.5 E9/L (ref 4.5–11.5)

## 2021-09-14 PROCEDURE — 94640 AIRWAY INHALATION TREATMENT: CPT

## 2021-09-14 PROCEDURE — 6360000002 HC RX W HCPCS: Performed by: INTERNAL MEDICINE

## 2021-09-14 PROCEDURE — 80053 COMPREHEN METABOLIC PANEL: CPT

## 2021-09-14 PROCEDURE — 99233 SBSQ HOSP IP/OBS HIGH 50: CPT | Performed by: STUDENT IN AN ORGANIZED HEALTH CARE EDUCATION/TRAINING PROGRAM

## 2021-09-14 PROCEDURE — 6370000000 HC RX 637 (ALT 250 FOR IP): Performed by: SURGERY

## 2021-09-14 PROCEDURE — 6370000000 HC RX 637 (ALT 250 FOR IP): Performed by: STUDENT IN AN ORGANIZED HEALTH CARE EDUCATION/TRAINING PROGRAM

## 2021-09-14 PROCEDURE — 83735 ASSAY OF MAGNESIUM: CPT

## 2021-09-14 PROCEDURE — 2580000003 HC RX 258: Performed by: INTERNAL MEDICINE

## 2021-09-14 PROCEDURE — P9047 ALBUMIN (HUMAN), 25%, 50ML: HCPCS | Performed by: INTERNAL MEDICINE

## 2021-09-14 PROCEDURE — 85651 RBC SED RATE NONAUTOMATED: CPT

## 2021-09-14 PROCEDURE — 36592 COLLECT BLOOD FROM PICC: CPT

## 2021-09-14 PROCEDURE — 2580000003 HC RX 258: Performed by: SURGERY

## 2021-09-14 PROCEDURE — 94003 VENT MGMT INPAT SUBQ DAY: CPT

## 2021-09-14 PROCEDURE — 6360000002 HC RX W HCPCS: Performed by: STUDENT IN AN ORGANIZED HEALTH CARE EDUCATION/TRAINING PROGRAM

## 2021-09-14 PROCEDURE — 85610 PROTHROMBIN TIME: CPT

## 2021-09-14 PROCEDURE — 2500000003 HC RX 250 WO HCPCS: Performed by: INTERNAL MEDICINE

## 2021-09-14 PROCEDURE — 86140 C-REACTIVE PROTEIN: CPT

## 2021-09-14 PROCEDURE — 87635 SARS-COV-2 COVID-19 AMP PRB: CPT

## 2021-09-14 PROCEDURE — 36415 COLL VENOUS BLD VENIPUNCTURE: CPT

## 2021-09-14 PROCEDURE — 82962 GLUCOSE BLOOD TEST: CPT

## 2021-09-14 PROCEDURE — 2580000003 HC RX 258: Performed by: SPECIALIST

## 2021-09-14 PROCEDURE — 6360000002 HC RX W HCPCS: Performed by: SPECIALIST

## 2021-09-14 PROCEDURE — 85025 COMPLETE CBC W/AUTO DIFF WBC: CPT

## 2021-09-14 PROCEDURE — 2500000003 HC RX 250 WO HCPCS: Performed by: SURGERY

## 2021-09-14 PROCEDURE — 84100 ASSAY OF PHOSPHORUS: CPT

## 2021-09-14 PROCEDURE — 6370000000 HC RX 637 (ALT 250 FOR IP): Performed by: INTERNAL MEDICINE

## 2021-09-14 PROCEDURE — 2000000000 HC ICU R&B

## 2021-09-14 RX ORDER — MAGNESIUM SULFATE IN WATER 40 MG/ML
2000 INJECTION, SOLUTION INTRAVENOUS ONCE
Status: COMPLETED | OUTPATIENT
Start: 2021-09-14 | End: 2021-09-14

## 2021-09-14 RX ORDER — WARFARIN SODIUM 5 MG/1
5 TABLET ORAL
Status: COMPLETED | OUTPATIENT
Start: 2021-09-14 | End: 2021-09-14

## 2021-09-14 RX ORDER — POTASSIUM CHLORIDE 29.8 MG/ML
40 INJECTION INTRAVENOUS
Status: DISPENSED | OUTPATIENT
Start: 2021-09-14 | End: 2021-09-14

## 2021-09-14 RX ORDER — POTASSIUM CHLORIDE 29.8 MG/ML
40 INJECTION INTRAVENOUS ONCE
Status: COMPLETED | OUTPATIENT
Start: 2021-09-14 | End: 2021-09-14

## 2021-09-14 RX ADMIN — LABETALOL HYDROCHLORIDE 20 MG: 5 INJECTION INTRAVENOUS at 05:57

## 2021-09-14 RX ADMIN — PHENYTOIN 200 MG: 125 SUSPENSION ORAL at 15:00

## 2021-09-14 RX ADMIN — INSULIN LISPRO 3 UNITS: 100 INJECTION, SOLUTION INTRAVENOUS; SUBCUTANEOUS at 23:33

## 2021-09-14 RX ADMIN — DIAZEPAM 5 MG: 5 TABLET ORAL at 04:48

## 2021-09-14 RX ADMIN — MAGNESIUM SULFATE HEPTAHYDRATE 2000 MG: 40 INJECTION, SOLUTION INTRAVENOUS at 12:01

## 2021-09-14 RX ADMIN — DIAZEPAM 5 MG: 5 TABLET ORAL at 11:54

## 2021-09-14 RX ADMIN — INSULIN GLARGINE 25 UNITS: 100 INJECTION, SOLUTION SUBCUTANEOUS at 21:30

## 2021-09-14 RX ADMIN — QUETIAPINE FUMARATE 50 MG: 25 TABLET ORAL at 23:30

## 2021-09-14 RX ADMIN — POTASSIUM PHOSPHATE, MONOBASIC AND POTASSIUM PHOSPHATE, DIBASIC 30 MMOL: 224; 236 INJECTION, SOLUTION, CONCENTRATE INTRAVENOUS at 08:59

## 2021-09-14 RX ADMIN — IPRATROPIUM BROMIDE AND ALBUTEROL SULFATE 1 AMPULE: .5; 3 SOLUTION RESPIRATORY (INHALATION) at 09:00

## 2021-09-14 RX ADMIN — OXYCODONE 10 MG: 5 TABLET ORAL at 16:00

## 2021-09-14 RX ADMIN — QUETIAPINE FUMARATE 50 MG: 25 TABLET ORAL at 15:00

## 2021-09-14 RX ADMIN — OXYCODONE 10 MG: 5 TABLET ORAL at 09:08

## 2021-09-14 RX ADMIN — FENTANYL CITRATE 25 MCG: 0.05 INJECTION, SOLUTION INTRAMUSCULAR; INTRAVENOUS at 11:38

## 2021-09-14 RX ADMIN — INSULIN LISPRO 3 UNITS: 100 INJECTION, SOLUTION INTRAVENOUS; SUBCUTANEOUS at 18:34

## 2021-09-14 RX ADMIN — PIPERACILLIN AND TAZOBACTAM 3375 MG: 3; .375 INJECTION, POWDER, LYOPHILIZED, FOR SOLUTION INTRAVENOUS at 21:00

## 2021-09-14 RX ADMIN — INSULIN LISPRO 6 UNITS: 100 INJECTION, SOLUTION INTRAVENOUS; SUBCUTANEOUS at 11:51

## 2021-09-14 RX ADMIN — Medication 300 UNITS: at 23:58

## 2021-09-14 RX ADMIN — SODIUM CHLORIDE, PRESERVATIVE FREE 10 ML: 5 INJECTION INTRAVENOUS at 08:37

## 2021-09-14 RX ADMIN — ATORVASTATIN CALCIUM 40 MG: 40 TABLET, FILM COATED ORAL at 23:30

## 2021-09-14 RX ADMIN — IPRATROPIUM BROMIDE AND ALBUTEROL SULFATE 1 AMPULE: .5; 3 SOLUTION RESPIRATORY (INHALATION) at 04:42

## 2021-09-14 RX ADMIN — IPRATROPIUM BROMIDE AND ALBUTEROL SULFATE 1 AMPULE: .5; 3 SOLUTION RESPIRATORY (INHALATION) at 00:00

## 2021-09-14 RX ADMIN — INSULIN GLARGINE 25 UNITS: 100 INJECTION, SOLUTION SUBCUTANEOUS at 08:53

## 2021-09-14 RX ADMIN — ALBUMIN (HUMAN) 25 G: 0.25 INJECTION, SOLUTION INTRAVENOUS at 08:37

## 2021-09-14 RX ADMIN — ENOXAPARIN SODIUM 135 MG: 150 INJECTION SUBCUTANEOUS at 21:30

## 2021-09-14 RX ADMIN — DIAZEPAM 5 MG: 5 TABLET ORAL at 23:29

## 2021-09-14 RX ADMIN — LANSOPRAZOLE 30 MG: KIT at 05:49

## 2021-09-14 RX ADMIN — OXYCODONE 10 MG: 5 TABLET ORAL at 05:49

## 2021-09-14 RX ADMIN — IPRATROPIUM BROMIDE AND ALBUTEROL SULFATE 1 AMPULE: .5; 3 SOLUTION RESPIRATORY (INHALATION) at 15:37

## 2021-09-14 RX ADMIN — DEXAMETHASONE SODIUM PHOSPHATE 6 MG: 4 INJECTION, SOLUTION INTRA-ARTICULAR; INTRALESIONAL; INTRAMUSCULAR; INTRAVENOUS; SOFT TISSUE at 08:51

## 2021-09-14 RX ADMIN — OXYCODONE 10 MG: 5 TABLET ORAL at 23:30

## 2021-09-14 RX ADMIN — QUETIAPINE FUMARATE 50 MG: 25 TABLET ORAL at 08:49

## 2021-09-14 RX ADMIN — WARFARIN SODIUM 5 MG: 5 TABLET ORAL at 18:35

## 2021-09-14 RX ADMIN — AMLODIPINE BESYLATE 10 MG: 10 TABLET ORAL at 08:49

## 2021-09-14 RX ADMIN — OXYCODONE 10 MG: 5 TABLET ORAL at 02:08

## 2021-09-14 RX ADMIN — SODIUM CHLORIDE, PRESERVATIVE FREE 10 ML: 5 INJECTION INTRAVENOUS at 23:58

## 2021-09-14 RX ADMIN — OXYCODONE HYDROCHLORIDE AND ACETAMINOPHEN 1000 MG: 500 TABLET ORAL at 08:49

## 2021-09-14 RX ADMIN — SODIUM CHLORIDE SOLN NEBU 3% 4 ML: 3 NEBU SOLN at 20:19

## 2021-09-14 RX ADMIN — PIPERACILLIN AND TAZOBACTAM 3375 MG: 3; .375 INJECTION, POWDER, LYOPHILIZED, FOR SOLUTION INTRAVENOUS at 04:48

## 2021-09-14 RX ADMIN — MAGNESIUM SULFATE HEPTAHYDRATE 2000 MG: 40 INJECTION, SOLUTION INTRAVENOUS at 08:35

## 2021-09-14 RX ADMIN — POTASSIUM CHLORIDE 40 MEQ: 29.8 INJECTION, SOLUTION INTRAVENOUS at 08:35

## 2021-09-14 RX ADMIN — SODIUM CHLORIDE SOLN NEBU 3% 4 ML: 3 NEBU SOLN at 09:00

## 2021-09-14 RX ADMIN — ENOXAPARIN SODIUM 135 MG: 150 INJECTION SUBCUTANEOUS at 08:52

## 2021-09-14 RX ADMIN — ZINC SULFATE 220 MG (50 MG) CAPSULE 50 MG: CAPSULE at 08:51

## 2021-09-14 RX ADMIN — CHLORHEXIDINE GLUCONATE 0.12% ORAL RINSE 15 ML: 1.2 LIQUID ORAL at 08:51

## 2021-09-14 RX ADMIN — CHLORHEXIDINE GLUCONATE 0.12% ORAL RINSE 15 ML: 1.2 LIQUID ORAL at 21:00

## 2021-09-14 RX ADMIN — Medication 2000 UNITS: at 08:49

## 2021-09-14 RX ADMIN — PHENYTOIN 200 MG: 125 SUSPENSION ORAL at 08:36

## 2021-09-14 RX ADMIN — BUMETANIDE 1 MG: 0.25 INJECTION, SOLUTION INTRAMUSCULAR; INTRAVENOUS at 08:51

## 2021-09-14 RX ADMIN — LISINOPRIL 10 MG: 10 TABLET ORAL at 08:50

## 2021-09-14 RX ADMIN — DIAZEPAM 5 MG: 5 TABLET ORAL at 16:22

## 2021-09-14 RX ADMIN — PIPERACILLIN AND TAZOBACTAM 3375 MG: 3; .375 INJECTION, POWDER, LYOPHILIZED, FOR SOLUTION INTRAVENOUS at 12:03

## 2021-09-14 RX ADMIN — IPRATROPIUM BROMIDE AND ALBUTEROL SULFATE 1 AMPULE: .5; 3 SOLUTION RESPIRATORY (INHALATION) at 13:56

## 2021-09-14 RX ADMIN — INSULIN LISPRO 3 UNITS: 100 INJECTION, SOLUTION INTRAVENOUS; SUBCUTANEOUS at 00:32

## 2021-09-14 RX ADMIN — TOBRAMYCIN SULFATE 300 MG: 40 INJECTION, SOLUTION INTRAMUSCULAR; INTRAVENOUS at 09:00

## 2021-09-14 RX ADMIN — IPRATROPIUM BROMIDE AND ALBUTEROL SULFATE 1 AMPULE: .5; 3 SOLUTION RESPIRATORY (INHALATION) at 20:18

## 2021-09-14 RX ADMIN — TOBRAMYCIN SULFATE 300 MG: 40 INJECTION, SOLUTION INTRAMUSCULAR; INTRAVENOUS at 20:20

## 2021-09-14 RX ADMIN — POTASSIUM CHLORIDE 40 MEQ: 29.8 INJECTION, SOLUTION INTRAVENOUS at 10:00

## 2021-09-14 ASSESSMENT — PULMONARY FUNCTION TESTS
PIF_VALUE: 39
PIF_VALUE: 38
PIF_VALUE: 35
PIF_VALUE: 26
PIF_VALUE: 26
PIF_VALUE: 18
PIF_VALUE: 38
PIF_VALUE: 24
PIF_VALUE: 26
PIF_VALUE: 26
PIF_VALUE: 32
PIF_VALUE: 24
PIF_VALUE: 18
PIF_VALUE: 39
PIF_VALUE: 47
PIF_VALUE: 38
PIF_VALUE: 26
PIF_VALUE: 29
PIF_VALUE: 21
PIF_VALUE: 16
PIF_VALUE: 39
PIF_VALUE: 40
PIF_VALUE: 43
PIF_VALUE: 24
PIF_VALUE: 35
PIF_VALUE: 25
PIF_VALUE: 25
PIF_VALUE: 26
PIF_VALUE: 34
PIF_VALUE: 28
PIF_VALUE: 31

## 2021-09-14 ASSESSMENT — PAIN SCALES - GENERAL
PAINLEVEL_OUTOF10: 0

## 2021-09-14 NOTE — PATIENT CARE CONFERENCE
Intensive Care Daily Quality Rounding Checklist        ICU Team Members:  Dr. Bertha Hurtado, Dr. Rufina Vanegas (resident), clinical pharmacist, pharmacist students, bedside nurse, charge nurse, respiratory therapy     ICU Day #:  Number 25     Intubation Date:  Aug 23     Ventilator Day #: Geoff Forteajith (trach placed 9/9)     Central Line Insertion Date:  Sept 6                                                    Day #: Jairo Bell      Arterial Line Insertion Date: N/A                             Day #: N/A     Temporary Hemodialysis Catheter Insertion Date: N/A                             Day #:  N/A     DVT Prophylaxis: Lovenox    GI Prophylaxis: Protonix     Busch Catheter Insertion Date: Sept 15                                        Day #: Number 2                             Continued need (if yes, reason documented and discussed with physician): need for accurate I+O's     Skin Issues/ Wounds and ordered treatment discussed on rounds: has numerous healing ulcers from time proned     Goals/ Plans for the Day: Daily labs, wean vent as able, update family on progress

## 2021-09-14 NOTE — PROGRESS NOTES
[facility]  [facility]  Progress Note      Chief Complaint   Patient presents with    Shortness of Breath     covid, per family SaO2 75% RA, hx of DM and panic attack        SUBJECTIVE:  8/21/21  Prone. FiO2 60% and PEEP of 16    8/22/2021  Patient is tolerating medications. No reported adverse drug reactions. No nausea, vomiting, diarrhea. Afebrile BiPAP 100% FiO2 with breathing at 44 times a minute and probably is going to decompensate  8/23/2021  Not doing well intubated on a rotating bed  FiO2 70% and PEEP 16  8/24/2021  Prone 50% FiO2-PEEP of 10  Issues with the urinary Busch last night this was changed  Paralyzed and on Versed  8/25/2021  Paralyzed and sedated with Versed no pressors  Prone intubated on 50% FiO2, PEEP of 10  Tolerating medications    8/26/21  Paralyzed and sedated with Versed no pressors  Prone intubated on 70% FiO2, PEEP of 10  Does not tolerate supine position    8/27/2021  Afebrile  Still prone on FiO2 of 100%  Paralyzed and sedated    8/28/2021  The patient is still in the ICU. He is pronated. FiO2 100%. Is still sedated and paralyzed. Fair amount of thick, brown secretions    8/29/2021  The patient is still on a RotoProne bed. You are still intubated, sedated and paralyzed. O2 is being weaned down. FiO2 55%. PEEP 12.    8/30/2021  Patient remains intubated, sedated and paralyzed. He is still on a RotoProne bed. Supine now. 8/31/2021  He is still on a RotoProne bed. Still having fair amount of secretions from the nostrils. Minimal from the ET tube, however. Tube feeding seems to be coming out of his nose. 9/1/2021  He is supinated but still on a RotoProne bed. He still has fair amount of secretions from the ET tube. Tolerating antibiotic    9/2/2021. He is still on a RotoProne bed. He is pronated. Less secretions from the ET tube and nostrils. FiO2 50%. PEEP 14.    9/3/2021. He is off the RotoProne bed. Tolerating supination.   Tolerating weaning. PEEP was brought down to 12. FiO2 still at 50%. Tolerating antibiotics. 9/4/2021. He is still in the ICU. He is still on a ventilator. He is still paralyzed. 9/5/2021. He had an episode of acute desaturation and had to be bagged. He is now back on the ventilator. He is still sedated and paralyzed. 9/6/2021. The patient still in the ICU. He is still requiring paralytics. Secretions are becoming thinner. No fever. 9/7/2021. The patient still on a ventilator and paralyzed. No new problems reported. 9/8/2021. Patient is still on the ventilator, paralyzed. No fever. Tolerating medications. 9/9/2021. Patient had a bronchoscopy yesterday a fair amount of thick secretions were obtained. Cultures were sent. He is still on a ventilator, sedated and paralyzed. 9/10/2021. The patient is still in the ICU. He is still on a ventilator. He had a tracheostomy and PEG. Denies dyspnea or pain. 9/11/2021. Patient is now having fevers. He is still on a ventilator. Cooling blanket. 9/12/2021. Lines were changed. Still having some fevers but they seem to be trending downwards. Denies any pain. Denies dyspnea. 9/13/2021. The patient is still in the ICU. He is quite restless and becomes agitated. No fever. Tolerating antibiotics. 9/14/2021. The patient remains in the ICU. Still awaiting for a bed [facility]. No fevers. Tolerating antibiotics. Review of systems:  As stated above in the chief complaint, otherwise negative.     Medications:  Scheduled Meds:   magnesium sulfate  2,000 mg IntraVENous Once    magnesium sulfate  2,000 mg IntraVENous Once    potassium phosphate IVPB  30 mmol IntraVENous Once    potassium chloride  40 mEq IntraVENous Q2H    oxyCODONE  10 mg Oral Q4H    diazePAM  5 mg Oral Q6H    phenytoin  200 mg Per G Tube TID    warfarin (COUMADIN) daily dosing (placeholder)   Other Daily    lansoprazole  30 mg Per G Tube QAM AC    QUEtiapine 50 mg Oral TID    dexamethasone  6 mg IntraVENous Daily    sodium chloride flush  5-40 mL IntraVENous 2 times per day    heparin flush  3 mL IntraVENous 2 times per day    piperacillin-tazobactam  3,375 mg IntraVENous Q8H    enoxaparin  1 mg/kg SubCUTAneous BID    tobramycin  300 mg Inhalation Q12H    lisinopril  10 mg Oral Daily    insulin glargine  25 Units SubCUTAneous BID    ascorbic acid  1,000 mg Oral Daily    sodium chloride (Inhalant)  4 mL Nebulization Q12H    insulin lispro  0-18 Units SubCUTAneous Q6H    chlorhexidine  15 mL Mouth/Throat BID    ipratropium-albuterol  1 ampule Inhalation Q4H    amLODIPine  10 mg Oral Daily    atorvastatin  40 mg Oral Daily    Vitamin D  2,000 Units Oral Daily    zinc sulfate  50 mg Oral Daily     Continuous Infusions:   sodium chloride      fentaNYL 5 mcg/ml in 0.9%  ml infusion Stopped (21 1355)    midazolam Stopped (21 1243)    dextrose       PRN Meds:fentanNYL, sodium chloride flush, sodium chloride, acetaminophen, labetalol, hydrALAZINE, sodium chloride (Inhalant), albuterol, benzonatate, glucose, dextrose, glucagon (rDNA), dextrose, sodium chloride    OBJECTIVE:  BP (!) 178/74   Pulse 92   Temp 99.2 °F (37.3 °C) (Bladder)   Resp (!) 39   Ht 6' (1.829 m)   Wt (!) 317 lb 10.9 oz (144.1 kg)   SpO2 91%   BMI 43.09 kg/m²   Temp  Av.4 °F (37.4 °C)  Min: 99 °F (37.2 °C)  Max: 99.9 °F (37.7 °C)  Constitutional: The patient is lying in bed in the ICU. He is awake and alert. The patient becomes agitated when the examiner goes into the room. FiO2 45%. PEEP 6. No changes. Skin: Warm and dry. No rashes were noted. HEENT: Round and nonreactive pupils. Decubitus lesions on cheeks, hands and lower extremities noted. Neck: Supple. Tracheostomy. PEG. Chest: Coarse breath sounds bilaterally. No crackles  Cardiovascular: Heart sounds rhythmic and regular. Tachycardic.   Abdomen: Round, soft and benign to palpation. Genitourinary: Busch catheter  Extremities: Moderate edema. Lines: Left IJ TLC 9/11/2021. Busch changed 3/24/2021    Laboratory and Tests Review:  Lab Results   Component Value Date    WBC 13.5 (H) 09/14/2021    WBC 12.9 (H) 09/13/2021    WBC 24.4 (H) 09/12/2021    HGB 9.2 (L) 09/14/2021    HCT 28.3 (L) 09/14/2021    MCV 91.3 09/14/2021     09/14/2021     Lab Results   Component Value Date    NEUTROABS 11.43 (H) 09/14/2021    NEUTROABS 11.05 (H) 09/13/2021    NEUTROABS 22.20 (H) 09/12/2021     No results found for: [facility]  Lab Results   Component Value Date     (H) 09/14/2021     (H) 09/14/2021    ALKPHOS 123 09/14/2021    BILITOT 0.6 09/14/2021     Lab Results   Component Value Date     09/14/2021    K 3.4 09/14/2021    K 3.7 08/21/2021    CL 99 09/14/2021    CO2 30 09/14/2021    BUN 12 09/14/2021    CREATININE 0.4 09/14/2021    CREATININE 0.5 09/13/2021    CREATININE 0.4 09/12/2021    GFRAA >60 09/14/2021    LABGLOM >60 09/14/2021    GLUCOSE 181 09/14/2021    PROT 6.6 09/14/2021    LABALBU 3.0 09/14/2021    CALCIUM 8.9 09/14/2021    BILITOT 0.6 09/14/2021    ALKPHOS 123 09/14/2021     09/14/2021     09/14/2021     Lab Results   Component Value Date    CRP 26.7 (H) 09/14/2021    CRP 34.5 (H) 09/13/2021    CRP 42.0 (H) 09/12/2021     Lab Results   Component Value Date    SEDRATE 132 (H) 09/13/2021    SEDRATE 122 (H) 09/12/2021    SEDRATE 122 (H) 09/11/2021     Radiology:      Microbiology:   Streptococcus pneumoniae/Legionella urine Ag: negative  Nares screen MRSA: Negative  Urine culture 8/24/2021: Negative  Blood cultures 8/21/2021: Negative x2  Respiratory culture 8/27/2021: OP terrell reduced  Respiratory culture 8/30/2021: E. coli, MSSA   BAL 9/8/2021: OP terrell reduced. E. coli, Enterobacter cloacae, MSSA. PjP negative  TLC tip culture 9/11/2021: Negative    ASSESSMENT:  · Severe Covid pneumonia causing acute respiratory failure CRP has come down nicely. Completed Remdesivir. Received Tocilizumab  · VAP versus tracheobronchitis. Cultures growing MSSA and E. coli. Improving. Treated with 10 days of Cefazolin  · Status post bronchoscopy 9/8/2021. Cultures growing rare MSSA,, Enterobacter cloacae. Possible tracheobronchitis  · Leukocytosis associated to the above, stable  · Status post tracheostomy and PEG 9/10/2021   · Fevers probably secondary to line infection. Lines were changed. Tip cultures were negative. Temperatures are trending downwards    PLAN:  · Continue Zosyn and Tobramycin by inhalation until 9/25/2021  · Check catheter tip culture  · We will follow with you  · Isolation can be discontinued.   Terminally clean the room  · Transfer to [facility] with nursing    Salvador Fisher MD  9:47 AM   9/14/2021

## 2021-09-14 NOTE — PROGRESS NOTES
findings:    MEDICATIONS:    Scheduled Meds:   magnesium sulfate  2,000 mg IntraVENous Once    magnesium sulfate  2,000 mg IntraVENous Once    potassium phosphate IVPB  30 mmol IntraVENous Once    potassium chloride  40 mEq IntraVENous Q2H    oxyCODONE  10 mg Oral Q4H    diazePAM  5 mg Oral Q6H    phenytoin  200 mg Per G Tube TID    warfarin (COUMADIN) daily dosing (placeholder)   Other Daily    lansoprazole  30 mg Per G Tube QAM AC    QUEtiapine  50 mg Oral TID    dexamethasone  6 mg IntraVENous Daily    sodium chloride flush  5-40 mL IntraVENous 2 times per day    heparin flush  3 mL IntraVENous 2 times per day    piperacillin-tazobactam  3,375 mg IntraVENous Q8H    enoxaparin  1 mg/kg SubCUTAneous BID    tobramycin  300 mg Inhalation Q12H    lisinopril  10 mg Oral Daily    insulin glargine  25 Units SubCUTAneous BID    ascorbic acid  1,000 mg Oral Daily    sodium chloride (Inhalant)  4 mL Nebulization Q12H    insulin lispro  0-18 Units SubCUTAneous Q6H    chlorhexidine  15 mL Mouth/Throat BID    ipratropium-albuterol  1 ampule Inhalation Q4H    amLODIPine  10 mg Oral Daily    atorvastatin  40 mg Oral Daily    Vitamin D  2,000 Units Oral Daily    zinc sulfate  50 mg Oral Daily     Continuous Infusions:   sodium chloride      fentaNYL 5 mcg/ml in 0.9%  ml infusion Stopped (09/13/21 1355)    midazolam Stopped (09/13/21 1243)    dextrose       PRN Meds:   fentanNYL, 25 mcg, Q3H PRN  sodium chloride flush, 5-40 mL, PRN  sodium chloride, 25 mL, PRN  acetaminophen, 650 mg, Q4H PRN  labetalol, 20 mg, Q6H PRN  hydrALAZINE, 20 mg, Q6H PRN  sodium chloride (Inhalant), 4 mL, PRN  albuterol, 2.5 mg, Q6H PRN  benzonatate, 200 mg, TID PRN  glucose, 15 g, PRN  dextrose, 12.5 g, PRN  glucagon (rDNA), 1 mg, PRN  dextrose, 100 mL/hr, PRN  sodium chloride, 30 mL, PRN          VENT SETTINGS (Comprehensive) (if applicable):  Vent Information  $Ventilation: $Subsequent Day  Skin Assessment: Clean, dry, & intact  Equipment ID: [id number]  Equipment Changed: (S) Humidification  Vent Type: [id number]  Vent Mode: PS  Vt Ordered: 0 mL  Pressure Ordered: 20  Rate Set: 22 bmp  Peak Flow: 0 L/min  Pressure Support: 10 cmH20  FiO2 : 40 %  SpO2: 91 %  SpO2/FiO2 ratio: 227.5  Sensitivity: 3  PEEP/CPAP: 6  I Time/ I Time %: 0.8 s  Humidification Source: Heated wire  Humidification Temp: 37  Humidification Temp Measured: 37  Circuit Condensation: Drained  Mask Type: Full face mask  Mask Size: Medium  Additional Respiratory  Assessments  Pulse: 92  Resp: (!) 39  SpO2: 91 %  Position: Semi-Sellers's  Humidification Source: Heated wire  Humidification Temp: 37  Circuit Condensation: Drained  Oral Care: Mouth swabbed, Mouth moisturizer, Mouth suctioned, Suction toothette  Subglottic Suction Done?: No  Airway Type: Trachial  Airway Size: 78  Cuff Pressure (cm H2O): 29 cm H2O    ABGs:     Laboratory findings:    Complete Blood Count:   Recent Labs     09/12/21 0510 09/13/21  0535 09/14/21  0535   WBC 24.4* 12.9* 13.5*   HGB 10.9* 8.9* 9.2*   HCT 33.4* 28.2* 28.3*    196 229        Last 3 Blood Glucose:   Recent Labs     09/12/21 0510 09/13/21  0535 09/14/21  0535   GLUCOSE 157* 169* 181*        PT/INR:    Lab Results   Component Value Date    PROTIME 14.0 09/14/2021    INR 1.3 09/14/2021     PTT:  No results found for: APTT, PTT    Comprehensive Metabolic Profile:   Recent Labs     09/12/21 0510 09/12/21 0510 09/13/21  0535 09/13/21  0535 09/14/21  0535     --  138  --  138   K 3.9  --  3.5  --  3.4*     --  102  --  99   CO2 24  --  25  --  30*   BUN 17  --  15  --  12   CREATININE 0.4*  --  0.5*  --  0.4*   GLUCOSE 157*  --  169*  --  181*   CALCIUM 8.7  --  8.4*  --  8.9   PROT 6.6   < > 6.3*   < > 6.6   LABALBU 2.1*   < > 2.6*   < > 3.0*   BILITOT 0.3   < > 0.4   < > 0.6   ALKPHOS 119   < > 131*   < > 123   *   < > 246*   < > 211*   *   < > 141*   < > 145*    < > = values in this interval not displayed. Magnesium:   Lab Results   Component Value Date    MG 1.5 09/14/2021     Phosphorus:   Lab Results   Component Value Date    PHOS 2.1 09/14/2021     Ionized Calcium: No results found for: CANDICE     Urinalysis:     Troponin: No results for input(s): TROPONINI in the last 72 hours. ASSESSMENT:     Patient Active Problem List    Diagnosis Date Noted    Elevated LFTs     Busch catheter problem ([facility] [address] 08/26/2021    Sepsis ([facility] [address] 08/26/2021    Thrombocytopenia ([facility] [address] 08/26/2021    Hyperlipidemia 08/22/2021    Acute hypoxemic respiratory failure due to COVID-19 [facility]) 08/21/2021    Type 2 diabetes mellitus without complication, without long-term current use of insulin ([facility] [address] 08/10/2021    History of seizures 08/10/2021     SYSTEMS ASSESSMENT    Neuro     Following commands at this time  Patient is able to be weaned at this time  Oral Valium every 6 hours, OxyIR 10 mg every 4 hours  Seroquel 50 mg three times daily  Continue to monitor      Respiratory     Acute hypoxic respiratory failure requiring mechanical ventilation  Severe COVID-19 pneumonia s/p toe C, remdesivir  Pneumonia VAP-MSSA, E. coli  On pip/ tazo  Trach/PEG    Bronchoscopy 9/8/2021 due to white out left side chest on x-ray. DuoNebs every 4 hours  6 mg Decadron daily  Wean oxygen as tolerated.  Keep O2 sat 90-92%    Cardiovascular     Hypertension  Amlodipine-10 mg  Lisinopril-10 mg  Lipitor 40 mg    Gastrointestinal     GI prophylaxis-Protonix  Bowel regimen  Continue monitor    Renal     OSCAR-improved  Hypokalemia-potassium replaced  Hypomagnesemia-magnesium replaced  Avoid nephrotoxins  Continue to monitor     Infectious Disease     Pneumonia VAP-positive for MSSA, E. coli  Zosyn  lactic acidosis  Inhaled tobramycin for possible tracheobronchitis  Follow cultures of previous central line tip and new blood cultures from 9/11  Infectious disease following  Zosyn    Hematology/Oncology   DVT left upper arm-we will transition to warfarin due to high bmi  Anemia  Hemoglobin stable  Continue to monitor    Endocrine     Hyperglycemia  Lantus 25 units  High-dose sliding scale    Social/Spiritual/DNR/Other     Full code  vitamin regimen  DVT prophylaxis Lovenox     Plan-to be transitioned out of ICU to [facility]    The patient was seen and evaluated by myself and Charbel Hooks MD PGY-2  9/14/2021 9:59 AM    Critical Care Attending Addendum:    Patient seen and examined with the house staff. X-rays personally reviewed through the PACS. Family is updated at the bedside as available. Additional findings listed below as necessary. Additional comments:  1. Acute respiratory failure with hypoxia transitioned off PCV to PS then volume cycled ventilation and comfortable breathing in mid 30s with abg pending. 2. Pneumonia on Zosyn  3. Agitation off iv sedation and doing acceptably on oral meds including Seroquel now tid. 4. DVT on enoxaparin and warfarin. 5. Should be okay to [facility] soon.     >30 min CCT

## 2021-09-14 NOTE — CARE COORDINATION
Continue ICU. Respirations increased to 50's, pt placed back on AC.  Plan remains select [facility] when stable-mjo

## 2021-09-14 NOTE — PLAN OF CARE
Problem: Airway Clearance - Ineffective  Goal: Achieve or maintain patent airway  Outcome: Met This Shift     Problem: Gas Exchange - Impaired  Goal: Absence of hypoxia  Outcome: Met This Shift  Goal: Promote optimal lung function  Outcome: Met This Shift     Problem: Breathing Pattern - Ineffective  Goal: Ability to achieve and maintain a regular respiratory rate  Outcome: Met This Shift     Problem: Skin Integrity:  Goal: Will show no infection signs and symptoms  Description: Will show no infection signs and symptoms  Outcome: Met This Shift  Goal: Absence of new skin breakdown  Description: Absence of new skin breakdown  Outcome: Met This Shift

## 2021-09-14 NOTE — PROGRESS NOTES
[facility] Progress Note    Admitting Date and Time: 8/21/2021  9:35 AM  Admit Dx: Acute hypoxemic respiratory failure due to COVID-19 (HCC) [U07.1, J96.01]  Pneumonia due to COVID-19 virus [U07.1, J12.82]    Subjective:  Patient is being followed for Acute hypoxemic respiratory failure due to COVID-19 ([address] [U07.1, J96.01]  Pneumonia due to COVID-19 virus [U07.1, J12.82]     Per RN: Patient remains intubated, on isolation, febrile. Patient still agitated and tachypneic in the 50s, continues to be on  Versed and fentanyl, also received Seroquel, oxycodone , Valium.   ROS: Unobtainable     warfarin  5 mg Oral Once    oxyCODONE  10 mg Oral Q4H    diazePAM  5 mg Oral Q6H    phenytoin  200 mg Per G Tube TID    warfarin (COUMADIN) daily dosing (placeholder)   Other Daily    lansoprazole  30 mg Per G Tube QAM AC    QUEtiapine  50 mg Oral TID    dexamethasone  6 mg IntraVENous Daily    sodium chloride flush  5-40 mL IntraVENous 2 times per day    heparin flush  3 mL IntraVENous 2 times per day    piperacillin-tazobactam  3,375 mg IntraVENous Q8H    enoxaparin  1 mg/kg SubCUTAneous BID    tobramycin  300 mg Inhalation Q12H    lisinopril  10 mg Oral Daily    insulin glargine  25 Units SubCUTAneous BID    ascorbic acid  1,000 mg Oral Daily    sodium chloride (Inhalant)  4 mL Nebulization Q12H    insulin lispro  0-18 Units SubCUTAneous Q6H    chlorhexidine  15 mL Mouth/Throat BID    ipratropium-albuterol  1 ampule Inhalation Q4H    amLODIPine  10 mg Oral Daily    atorvastatin  40 mg Oral Daily    Vitamin D  2,000 Units Oral Daily    zinc sulfate  50 mg Oral Daily     fentanNYL, 25 mcg, Q3H PRN  sodium chloride flush, 5-40 mL, PRN  sodium chloride, 25 mL, PRN  acetaminophen, 650 mg, Q4H PRN  labetalol, 20 mg, Q6H PRN  hydrALAZINE, 20 mg, Q6H PRN  sodium chloride (Inhalant), 4 mL, PRN  albuterol, 2.5 mg, Q6H PRN  benzonatate, 200 mg, TID PRN  glucose, 15 g, PRN  dextrose, 12.5 g, PRN  glucagon (rDNA), 1 mg, PRN  dextrose, 100 mL/hr, PRN  sodium chloride, 30 mL, PRN         Objective:    BP (!) 150/70   Pulse 101   Temp 100.5 °F (38.1 °C) (Bladder) Comment: cooling blanket on  Resp 26   Ht 6' (1.829 m)   Wt (!) 317 lb 10.9 oz (144.1 kg)   SpO2 94%   BMI 43.09 kg/m²     General Appearance: Intubated and sedated  Skin: warm and dry  Head: normocephalic and atraumatic, on vent through trach, right IJ TLC removed, left IJ TLC in situ  Eyes: pupils equal, round, and reactive to light, extraocular eye movements intact, conjunctivae normal  Neck: neck supple and non tender without mass   Pulmonary/Chest: clear to auscultation diminished bilaterally- no wheezes, rales or rhonchi, decreased air entry  Cardiovascular: normal rate, normal S1 and S2 and no carotid bruits  Abdomen: soft, non-tender, non-distended, normal bowel sounds, no masses or organomegaly  Extremities: no cyanosis, no clubbing and + edema  Neurologic: Intubated and sedated        Recent Labs     09/12/21  0510 09/13/21  0535 09/14/21  0535    138 138   K 3.9 3.5 3.4*    102 99   CO2 24 25 30*   BUN 17 15 12   CREATININE 0.4* 0.5* 0.4*   GLUCOSE 157* 169* 181*   CALCIUM 8.7 8.4* 8.9       Recent Labs     09/12/21  0510 09/13/21  0535 09/14/21  0535   WBC 24.4* 12.9* 13.5*   RBC 3.62* 3.05* 3.10*   HGB 10.9* 8.9* 9.2*   HCT 33.4* 28.2* 28.3*   MCV 92.3 92.5 91.3   MCH 30.1 29.2 29.7   MCHC 32.6 31.6* 32.5   RDW 14.6 14.7 14.5    196 229   MPV 9.7 9.9 9.5       Micro:  No components found for: [facility])    Radiology:   XR CHEST PORTABLE    Result Date: 9/11/2021  EXAMINATION: ONE XRAY VIEW OF THE CHEST 9/11/2021 12:25 pm COMPARISON: 09/10/2021 HISTORY: ORDERING SYSTEM PROVIDED HISTORY: left IJ TLC placement TECHNOLOGIST PROVIDED HISTORY: Reason for exam:->left IJ TLC placement FINDINGS: Since the prior study, there has been insertion of a left sided central line. The tip projects to the left of the trachea.   The tip does not cross midline and project in the region of the SVC. No definite pneumothorax Tip of tracheostomy tube is unchanged. Right-sided central line is unchanged Heterogeneous opacity is seen throughout the lungs bilaterally, increased on the right compared to prior     Since the prior study, there has been insertion of a left sided central line. The tip projects to the left of the trachea. Correlate with blood gas. As the tip does not clearly project in the region of the SVC, it is not definitively venous by radiograph. Bilateral airspace disease, increased on the right compared to prior     XR CHEST PORTABLE    Result Date: 9/10/2021  EXAMINATION: ONE XRAY VIEW OF THE CHEST 9/10/2021 7:16 am COMPARISON: 09/09/2021 HISTORY: ORDERING SYSTEM PROVIDED HISTORY: Acute resp failure, COVID TECHNOLOGIST PROVIDED HISTORY: Reason for exam:->Acute resp failure, COVID FINDINGS: Interval placement of tracheostomy tube terminating above the donna. Nasogastric tube is been removed. Right IJ catheter remains in place. Cardiac silhouette is prominent but unchanged from previous. No pneumothorax. Multifocal bilateral lung parenchymal infiltrates appear slightly worsened overall and remain greater towards the left. No other interval change. Interval placement of tracheostomy tube. Bilateral parenchymal infiltrates appear subtly worsened. Continued follow-up recommended. US DUP UPPER EXTREMITIES BILATERAL VENOUS    Result Date: 9/10/2021  EXAMINATION: DUPLEX ULTRASOUND OF THE BILATERAL UPPER EXTREMITIES FOR DVT, 9/10/2021 9:37 am TECHNIQUE: Duplex ultrasound using B-mode/gray scaled imaging and Doppler spectral analysis and color flow was obtained of the deep venous structures of the upper extremity. COMPARISON: None.  HISTORY: ORDERING SYSTEM PROVIDED HISTORY: r/o dvt TECHNOLOGIST PROVIDED HISTORY: Reason for exam:->r/o dvt What reading provider will be dictating this exam?->CRC FINDINGS: There is deep vein thrombosis involving the left brachial and basilic veins throughout the arm. Thrombus is also noted within the left cephalic vein. There is normal flow and compressibility of the visualized venous structures of the right upper extremity. There is no evidence of echogenic thrombus. The veins of the right upper extremity demonstrate good compressibility with normal color flow study and spectral analysis. Occlusive deep vein thrombosis involving the left brachial veins. Thrombus is also noted in the cephalic and basilic veins in the left arm. No evidence of deep vein thrombosis in the right upper extremity. The findings were sent to the Radiology Results [address] at 10:40 am on 9/10/2021to be communicated to a licensed caregiver. Assessment:    Principal Problem:    Acute hypoxemic respiratory failure due to COVID-19 [facility])  Active Problems:    Type 2 diabetes mellitus without complication, without long-term current use of insulin (HCC)    History of seizures    Hyperlipidemia    Busch catheter problem (HCC)    Sepsis (HCC)    Thrombocytopenia ([facility])    Elevated LFTs  Resolved Problems:    OSCAR (acute kidney injury) ([facility] [address]    Lactic acidosis      Plan:  1. Acute hypoxic respiratory failure secondary to severe COVID pneumonia, suspected ventilator associated pneumonia versus tracheobronchitis, with left pleural effusion ( not tapable) and atelectasis- remains intubated and sedated, completed remdesivir, s/p Tocilizumab, s/p cefazolin cultures growing MSSA and E. coli, leukocytosis, lactic acidosis, s/p bronchoscopy with copious mucus plugging 9/8/2021, right IJ tip culture pending, infectious disease on board, on inhaled tobramycin for tracheobronchitis, final BAL cultures pending, repeat blood cultures pending, started on Zosyn. 2.  OSCAR -prerenal, nephrology on board, held diuresis, lisinopril added, on tube feeds, avoid nephrotoxins, on water flushes. 3 Hypertension - poorly controlled.     Plan to send to [facility] select specialty, once afebrile and sedation requirement decreases, less tachypneic. NOTE: This report was transcribed using voice recognition software. Every effort was made to ensure accuracy; however, inadvertent computerized transcription errors may be present.   Electronically signed by Chanelle Cardoso MD on 9/14/2021 at 2:35 PM

## 2021-09-14 NOTE — PROGRESS NOTES
Pulmonary Subsequent Hospital F/U note    Patient is being followed for: acute hypoxic respiratory failure, severe covid-19 pneumonia     Interval HPI:     Patient is awake and following commands. Tolerated pressure support for short time today. T-max of 100.5. ROS:  Unable to obtain     Exam:  BP (!) 161/84   Pulse 100   Temp 100.2 °F (37.9 °C) (Bladder)   Resp (!) 36   Ht 6' (1.829 m)   Wt (!) 317 lb 10.9 oz (144.1 kg)   SpO2 92%   BMI 43.09 kg/m²    Due to the current efforts to prevent transmission of COVID-19 and also the need to preserve PPE for other caregivers, a face-to-face encounter with the patient was not performed. That being said, all relevant records and diagnostic tests were reviewed, including laboratory results and imaging. Please reference any relevant documentation elsewhere. Care will be coordinated with the primary service. Data:    Oximetry:  SpO2 Readings from Last 1 Encounters:   09/14/21 92%       Imaging personally reviewed by myself:  CXR    Impression   1. The position and alignment of the tubes and catheters are within normal   range   2. No signs of pneumothorax   3. Cardiomediastinal interstitial prominence concerning for congestive heart   failure, stable   4. There are bilateral patchy parenchymal densities within the lungs   concerning for pneumonia and or atelectasis this appears unchanged             DVT     Occlusive deep vein thrombosis involving the left brachial veins.  Thrombus   is also noted in the cephalic and basilic veins in the left arm.       No evidence of deep vein thrombosis in the right upper extremity.        Respiratory culture MSSA, Ecoli, enterobacter    crp 34  D dimer 755    Pertinent labs reviewed and noted:  Lab Results   Component Value Date    WBC 13.5 09/14/2021    HGB 9.2 09/14/2021    HCT 28.3 09/14/2021    MCV 91.3 09/14/2021    MCH 29.7 09/14/2021    MCHC 32.5 09/14/2021    RDW 14.5 09/14/2021     09/14/2021    MPV 9.5 09/14/2021     Lab Results   Component Value Date     09/14/2021    K 3.4 09/14/2021    K 3.7 08/21/2021    CL 99 09/14/2021    CO2 30 09/14/2021    BUN 12 09/14/2021    CREATININE 0.4 09/14/2021    LABALBU 3.0 09/14/2021    CALCIUM 8.9 09/14/2021    GFRAA >60 09/14/2021    LABGLOM >60 09/14/2021     Assessment:  1. Acute hypoxic respiratory failure  2. Severe covid-19 pneumonia  3. Bacterial pneumonia with MSSA, enterobacter, and Ecoli   4. Mucus plugging of bronchi s/p bronchoscopy 9/8/21  5. DVT LUE  6. Fevers/sepsis     Plan:  1. Trache PEG  9/9/21. Patient tolerated pressure support trial for short time today. Will resume tomorrow as tolerated  2. Completed Remdesivir and Tocilizumab  3. Antibiotics as per ID - Zosyn and remains on inhaled Tobramycin till 9/25/2021 per ID  4. Chest physio/bronchodilators, s/p bronch  5. Dexamethasone wean  6. Full dose anticoagulation  7. Patient is of IV sedation. If remains stable overnight possible transfer to select specialty hospital tomorrow. Discussed with Dr. Kong Puri and MICU team.  Their input deeply appreciated.      Electronically signed by Leonidas Tatum MD on 9/14/2021 at 5:29 PM

## 2021-09-14 NOTE — PROGRESS NOTES
RR in 50's, diaphoretic, pulse ox 81. Patient repositioned. Gave 100% FIO2, suctioned via trach. Pulse ox continues to be low. Respiratory notified. To place patient back on [facility].

## 2021-09-14 NOTE — PLAN OF CARE
Problem: Airway Clearance - Ineffective  Goal: Achieve or maintain patent airway  9/14/2021 1216 by Faustina Noel RN  Outcome: Met This Shift  9/14/2021 0637 by Mikal Kussmaul, RN  Outcome: Met This Shift     Problem: Gas Exchange - Impaired  Goal: Absence of hypoxia  9/14/2021 1216 by Faustina Noel RN  Outcome: Ongoing  9/14/2021 0637 by Mikal Kussmaul, RN  Outcome: Met This Shift  Goal: Promote optimal lung function  9/14/2021 1216 by Faustina Noel RN  Outcome: Ongoing  9/14/2021 0637 by Mikal Kussmaul, RN  Outcome: Met This Shift     Problem: Breathing Pattern - Ineffective  Goal: Ability to achieve and maintain a regular respiratory rate  9/14/2021 1216 by Faustina Noel RN  Outcome: Ongoing  9/14/2021 0637 by Mikal Kussmaul, RN  Outcome: Met This Shift     Problem: Body Temperature -  Risk of, Imbalanced  Goal: Complications related to the disease process, condition or treatment will be avoided or minimized  Outcome: Ongoing     Problem: Isolation Precautions - Risk of Spread of Infection  Goal: Prevent transmission of infection  Outcome: Met This Shift     Problem: Nutrition Deficits  Goal: Optimize nutritional status  Outcome: Met This Shift     Problem: Risk for Fluid Volume Deficit  Goal: Maintain normal heart rhythm  Outcome: Ongoing  Goal: Maintain absence of muscle cramping  Outcome: Met This Shift  Goal: Maintain normal serum potassium, sodium, calcium, phosphorus, and pH  Outcome: Ongoing     Problem: Loneliness or Risk for Loneliness  Goal: Demonstrate positive use of time alone when socialization is not possible  Outcome: Ongoing     Problem: Fatigue  Goal: Verbalize increase energy and improved vitality  Outcome: Ongoing     Problem: Skin Integrity:  Goal: Will show no infection signs and symptoms  Description: Will show no infection signs and symptoms  9/14/2021 1216 by Faustina Noel RN  Outcome: Met This Shift  9/14/2021 0637 by Mikal Kussmaul, RN  Outcome:  Met This Shift  Goal: Absence of new skin breakdown  Description: Absence of new skin breakdown  9/14/2021 1216 by Michele Crouch RN  Outcome: Met This Shift  9/14/2021 0637 by Randal Calderon RN  Outcome: Met This Shift

## 2021-09-15 ENCOUNTER — APPOINTMENT (OUTPATIENT)
Dept: GENERAL RADIOLOGY | Age: 56
DRG: 005 | End: 2021-09-15
Payer: COMMERCIAL

## 2021-09-15 LAB
ALBUMIN SERPL-MCNC: 2.9 G/DL (ref 3.5–5.2)
ALP BLD-CCNC: 122 U/L (ref 40–129)
ALT SERPL-CCNC: 151 U/L (ref 0–40)
ANION GAP SERPL CALCULATED.3IONS-SCNC: 9 MMOL/L (ref 7–16)
AST SERPL-CCNC: 201 U/L (ref 0–39)
BASOPHILS ABSOLUTE: 0.03 E9/L (ref 0–0.2)
BASOPHILS RELATIVE PERCENT: 0.2 % (ref 0–2)
BILIRUB SERPL-MCNC: 0.6 MG/DL (ref 0–1.2)
BUN BLDV-MCNC: 11 MG/DL (ref 6–20)
C-REACTIVE PROTEIN: 25.5 MG/DL (ref 0–0.4)
CALCIUM SERPL-MCNC: 8.7 MG/DL (ref 8.6–10.2)
CHLORIDE BLD-SCNC: 97 MMOL/L (ref 98–107)
CO2: 29 MMOL/L (ref 22–29)
CREAT SERPL-MCNC: 0.4 MG/DL (ref 0.7–1.2)
EOSINOPHILS ABSOLUTE: 0.27 E9/L (ref 0.05–0.5)
EOSINOPHILS RELATIVE PERCENT: 1.7 % (ref 0–6)
GFR AFRICAN AMERICAN: >60
GFR NON-AFRICAN AMERICAN: >60 ML/MIN/1.73
GLUCOSE BLD-MCNC: 198 MG/DL (ref 74–99)
HCT VFR BLD CALC: 28.4 % (ref 37–54)
HEMOGLOBIN: 9.2 G/DL (ref 12.5–16.5)
IMMATURE GRANULOCYTES #: 0.38 E9/L
IMMATURE GRANULOCYTES %: 2.4 % (ref 0–5)
INR BLD: 1.5
LACTIC ACID: 1.1 MMOL/L (ref 0.5–2.2)
LYMPHOCYTES ABSOLUTE: 0.83 E9/L (ref 1.5–4)
LYMPHOCYTES RELATIVE PERCENT: 5.3 % (ref 20–42)
MAGNESIUM: 1.8 MG/DL (ref 1.6–2.6)
MCH RBC QN AUTO: 29.6 PG (ref 26–35)
MCHC RBC AUTO-ENTMCNC: 32.4 % (ref 32–34.5)
MCV RBC AUTO: 91.3 FL (ref 80–99.9)
METER GLUCOSE: 184 MG/DL (ref 74–99)
METER GLUCOSE: 206 MG/DL (ref 74–99)
METER GLUCOSE: 222 MG/DL (ref 74–99)
MONOCYTES ABSOLUTE: 0.77 E9/L (ref 0.1–0.95)
MONOCYTES RELATIVE PERCENT: 4.9 % (ref 2–12)
NEUTROPHILS ABSOLUTE: 13.38 E9/L (ref 1.8–7.3)
NEUTROPHILS RELATIVE PERCENT: 85.5 % (ref 43–80)
PDW BLD-RTO: 14.6 FL (ref 11.5–15)
PHOSPHORUS: 2.8 MG/DL (ref 2.5–4.5)
PLATELET # BLD: 250 E9/L (ref 130–450)
PMV BLD AUTO: 9.6 FL (ref 7–12)
POTASSIUM SERPL-SCNC: 3.8 MMOL/L (ref 3.5–5)
PROTHROMBIN TIME: 16.9 SEC (ref 9.3–12.4)
RBC # BLD: 3.11 E12/L (ref 3.8–5.8)
SEDIMENTATION RATE, ERYTHROCYTE: 120 MM/HR (ref 0–15)
SODIUM BLD-SCNC: 135 MMOL/L (ref 132–146)
TOTAL PROTEIN: 6.5 G/DL (ref 6.4–8.3)
WBC # BLD: 15.7 E9/L (ref 4.5–11.5)

## 2021-09-15 PROCEDURE — 84100 ASSAY OF PHOSPHORUS: CPT

## 2021-09-15 PROCEDURE — 85610 PROTHROMBIN TIME: CPT

## 2021-09-15 PROCEDURE — 71045 X-RAY EXAM CHEST 1 VIEW: CPT

## 2021-09-15 PROCEDURE — 86140 C-REACTIVE PROTEIN: CPT

## 2021-09-15 PROCEDURE — 2500000003 HC RX 250 WO HCPCS: Performed by: SURGERY

## 2021-09-15 PROCEDURE — 87150 DNA/RNA AMPLIFIED PROBE: CPT

## 2021-09-15 PROCEDURE — 6360000002 HC RX W HCPCS: Performed by: STUDENT IN AN ORGANIZED HEALTH CARE EDUCATION/TRAINING PROGRAM

## 2021-09-15 PROCEDURE — 87040 BLOOD CULTURE FOR BACTERIA: CPT

## 2021-09-15 PROCEDURE — 6370000000 HC RX 637 (ALT 250 FOR IP): Performed by: SURGERY

## 2021-09-15 PROCEDURE — 6360000002 HC RX W HCPCS: Performed by: SPECIALIST

## 2021-09-15 PROCEDURE — 85651 RBC SED RATE NONAUTOMATED: CPT

## 2021-09-15 PROCEDURE — 94640 AIRWAY INHALATION TREATMENT: CPT

## 2021-09-15 PROCEDURE — 2580000003 HC RX 258: Performed by: INTERNAL MEDICINE

## 2021-09-15 PROCEDURE — 99233 SBSQ HOSP IP/OBS HIGH 50: CPT | Performed by: STUDENT IN AN ORGANIZED HEALTH CARE EDUCATION/TRAINING PROGRAM

## 2021-09-15 PROCEDURE — 6360000002 HC RX W HCPCS: Performed by: INTERNAL MEDICINE

## 2021-09-15 PROCEDURE — 94003 VENT MGMT INPAT SUBQ DAY: CPT

## 2021-09-15 PROCEDURE — 82962 GLUCOSE BLOOD TEST: CPT

## 2021-09-15 PROCEDURE — 87070 CULTURE OTHR SPECIMN AEROBIC: CPT

## 2021-09-15 PROCEDURE — 85025 COMPLETE CBC W/AUTO DIFF WBC: CPT

## 2021-09-15 PROCEDURE — 36415 COLL VENOUS BLD VENIPUNCTURE: CPT

## 2021-09-15 PROCEDURE — 80053 COMPREHEN METABOLIC PANEL: CPT

## 2021-09-15 PROCEDURE — 36592 COLLECT BLOOD FROM PICC: CPT

## 2021-09-15 PROCEDURE — 2580000003 HC RX 258: Performed by: SPECIALIST

## 2021-09-15 PROCEDURE — 83735 ASSAY OF MAGNESIUM: CPT

## 2021-09-15 PROCEDURE — 87186 SC STD MICRODIL/AGAR DIL: CPT

## 2021-09-15 PROCEDURE — 2580000003 HC RX 258: Performed by: SURGERY

## 2021-09-15 PROCEDURE — 83605 ASSAY OF LACTIC ACID: CPT

## 2021-09-15 PROCEDURE — 2000000000 HC ICU R&B

## 2021-09-15 PROCEDURE — 6370000000 HC RX 637 (ALT 250 FOR IP): Performed by: INTERNAL MEDICINE

## 2021-09-15 PROCEDURE — 87077 CULTURE AEROBIC IDENTIFY: CPT

## 2021-09-15 PROCEDURE — 6370000000 HC RX 637 (ALT 250 FOR IP): Performed by: STUDENT IN AN ORGANIZED HEALTH CARE EDUCATION/TRAINING PROGRAM

## 2021-09-15 RX ORDER — POTASSIUM CHLORIDE 29.8 MG/ML
20 INJECTION INTRAVENOUS ONCE
Status: COMPLETED | OUTPATIENT
Start: 2021-09-15 | End: 2021-09-15

## 2021-09-15 RX ORDER — WARFARIN SODIUM 5 MG/1
5 TABLET ORAL
Status: COMPLETED | OUTPATIENT
Start: 2021-09-15 | End: 2021-09-15

## 2021-09-15 RX ORDER — POTASSIUM CHLORIDE 29.8 MG/ML
40 INJECTION INTRAVENOUS ONCE
Status: DISCONTINUED | OUTPATIENT
Start: 2021-09-15 | End: 2021-09-15

## 2021-09-15 RX ORDER — MAGNESIUM SULFATE IN WATER 40 MG/ML
2000 INJECTION, SOLUTION INTRAVENOUS ONCE
Status: COMPLETED | OUTPATIENT
Start: 2021-09-15 | End: 2021-09-15

## 2021-09-15 RX ADMIN — Medication 2000 UNITS: at 07:42

## 2021-09-15 RX ADMIN — POTASSIUM CHLORIDE 20 MEQ: 29.8 INJECTION, SOLUTION INTRAVENOUS at 09:19

## 2021-09-15 RX ADMIN — IPRATROPIUM BROMIDE AND ALBUTEROL SULFATE 1 AMPULE: .5; 3 SOLUTION RESPIRATORY (INHALATION) at 04:52

## 2021-09-15 RX ADMIN — TOBRAMYCIN SULFATE 300 MG: 40 INJECTION, SOLUTION INTRAMUSCULAR; INTRAVENOUS at 20:06

## 2021-09-15 RX ADMIN — INSULIN GLARGINE 25 UNITS: 100 INJECTION, SOLUTION SUBCUTANEOUS at 20:19

## 2021-09-15 RX ADMIN — CHLORHEXIDINE GLUCONATE 0.12% ORAL RINSE 15 ML: 1.2 LIQUID ORAL at 20:16

## 2021-09-15 RX ADMIN — INSULIN LISPRO 3 UNITS: 100 INJECTION, SOLUTION INTRAVENOUS; SUBCUTANEOUS at 06:32

## 2021-09-15 RX ADMIN — VANCOMYCIN HYDROCHLORIDE 2500 MG: 5 INJECTION, POWDER, LYOPHILIZED, FOR SOLUTION INTRAVENOUS at 14:58

## 2021-09-15 RX ADMIN — INSULIN LISPRO 6 UNITS: 100 INJECTION, SOLUTION INTRAVENOUS; SUBCUTANEOUS at 17:55

## 2021-09-15 RX ADMIN — IPRATROPIUM BROMIDE AND ALBUTEROL SULFATE 1 AMPULE: .5; 3 SOLUTION RESPIRATORY (INHALATION) at 06:57

## 2021-09-15 RX ADMIN — OXYCODONE 10 MG: 5 TABLET ORAL at 23:59

## 2021-09-15 RX ADMIN — DEXAMETHASONE SODIUM PHOSPHATE 6 MG: 4 INJECTION, SOLUTION INTRA-ARTICULAR; INTRALESIONAL; INTRAMUSCULAR; INTRAVENOUS; SOFT TISSUE at 07:42

## 2021-09-15 RX ADMIN — OXYCODONE 10 MG: 5 TABLET ORAL at 03:50

## 2021-09-15 RX ADMIN — FENTANYL CITRATE 25 MCG: 0.05 INJECTION, SOLUTION INTRAMUSCULAR; INTRAVENOUS at 21:48

## 2021-09-15 RX ADMIN — MAGNESIUM SULFATE HEPTAHYDRATE 2000 MG: 40 INJECTION, SOLUTION INTRAVENOUS at 09:19

## 2021-09-15 RX ADMIN — SODIUM CHLORIDE, PRESERVATIVE FREE 10 ML: 5 INJECTION INTRAVENOUS at 20:19

## 2021-09-15 RX ADMIN — OXYCODONE 10 MG: 5 TABLET ORAL at 12:07

## 2021-09-15 RX ADMIN — SODIUM CHLORIDE SOLN NEBU 3% 4 ML: 3 NEBU SOLN at 20:06

## 2021-09-15 RX ADMIN — POTASSIUM CHLORIDE 20 MEQ: 29.8 INJECTION, SOLUTION INTRAVENOUS at 07:48

## 2021-09-15 RX ADMIN — PHENYTOIN 200 MG: 125 SUSPENSION ORAL at 20:45

## 2021-09-15 RX ADMIN — ZINC SULFATE 220 MG (50 MG) CAPSULE 50 MG: CAPSULE at 07:41

## 2021-09-15 RX ADMIN — OXYCODONE 10 MG: 5 TABLET ORAL at 07:41

## 2021-09-15 RX ADMIN — SODIUM CHLORIDE SOLN NEBU 3% 4 ML: 3 NEBU SOLN at 06:57

## 2021-09-15 RX ADMIN — OXYCODONE HYDROCHLORIDE AND ACETAMINOPHEN 1000 MG: 500 TABLET ORAL at 07:41

## 2021-09-15 RX ADMIN — QUETIAPINE FUMARATE 50 MG: 25 TABLET ORAL at 20:16

## 2021-09-15 RX ADMIN — BENZONATATE 200 MG: 100 CAPSULE ORAL at 07:40

## 2021-09-15 RX ADMIN — DIAZEPAM 5 MG: 5 TABLET ORAL at 22:35

## 2021-09-15 RX ADMIN — PHENYTOIN 200 MG: 125 SUSPENSION ORAL at 07:40

## 2021-09-15 RX ADMIN — OXYCODONE 10 MG: 5 TABLET ORAL at 20:16

## 2021-09-15 RX ADMIN — ENOXAPARIN SODIUM 135 MG: 150 INJECTION SUBCUTANEOUS at 07:40

## 2021-09-15 RX ADMIN — ACETAMINOPHEN 650 MG: 650 SOLUTION ORAL at 12:30

## 2021-09-15 RX ADMIN — Medication 300 UNITS: at 07:42

## 2021-09-15 RX ADMIN — ATORVASTATIN CALCIUM 40 MG: 40 TABLET, FILM COATED ORAL at 20:16

## 2021-09-15 RX ADMIN — DIAZEPAM 5 MG: 5 TABLET ORAL at 17:55

## 2021-09-15 RX ADMIN — PIPERACILLIN AND TAZOBACTAM 3375 MG: 3; .375 INJECTION, POWDER, LYOPHILIZED, FOR SOLUTION INTRAVENOUS at 04:03

## 2021-09-15 RX ADMIN — IPRATROPIUM BROMIDE AND ALBUTEROL SULFATE 1 AMPULE: .5; 3 SOLUTION RESPIRATORY (INHALATION) at 20:06

## 2021-09-15 RX ADMIN — PIPERACILLIN AND TAZOBACTAM 3375 MG: 3; .375 INJECTION, POWDER, LYOPHILIZED, FOR SOLUTION INTRAVENOUS at 20:17

## 2021-09-15 RX ADMIN — FENTANYL CITRATE 25 MCG: 0.05 INJECTION, SOLUTION INTRAMUSCULAR; INTRAVENOUS at 07:42

## 2021-09-15 RX ADMIN — IPRATROPIUM BROMIDE AND ALBUTEROL SULFATE 1 AMPULE: .5; 3 SOLUTION RESPIRATORY (INHALATION) at 00:29

## 2021-09-15 RX ADMIN — LABETALOL HYDROCHLORIDE 20 MG: 5 INJECTION INTRAVENOUS at 04:52

## 2021-09-15 RX ADMIN — WARFARIN SODIUM 5 MG: 5 TABLET ORAL at 17:56

## 2021-09-15 RX ADMIN — LISINOPRIL 10 MG: 10 TABLET ORAL at 07:41

## 2021-09-15 RX ADMIN — ENOXAPARIN SODIUM 135 MG: 150 INJECTION SUBCUTANEOUS at 20:16

## 2021-09-15 RX ADMIN — PHENYTOIN 200 MG: 125 SUSPENSION ORAL at 14:58

## 2021-09-15 RX ADMIN — IPRATROPIUM BROMIDE AND ALBUTEROL SULFATE 1 AMPULE: .5; 3 SOLUTION RESPIRATORY (INHALATION) at 23:37

## 2021-09-15 RX ADMIN — TOBRAMYCIN SULFATE 300 MG: 40 INJECTION, SOLUTION INTRAMUSCULAR; INTRAVENOUS at 06:57

## 2021-09-15 RX ADMIN — ACETAMINOPHEN 650 MG: 650 SOLUTION ORAL at 20:44

## 2021-09-15 RX ADMIN — DIAZEPAM 5 MG: 5 TABLET ORAL at 12:07

## 2021-09-15 RX ADMIN — CHLORHEXIDINE GLUCONATE 0.12% ORAL RINSE 15 ML: 1.2 LIQUID ORAL at 07:40

## 2021-09-15 RX ADMIN — LABETALOL HYDROCHLORIDE 20 MG: 5 INJECTION INTRAVENOUS at 10:05

## 2021-09-15 RX ADMIN — INSULIN LISPRO 3 UNITS: 100 INJECTION, SOLUTION INTRAVENOUS; SUBCUTANEOUS at 23:54

## 2021-09-15 RX ADMIN — PHENYTOIN 200 MG: 125 SUSPENSION ORAL at 00:00

## 2021-09-15 RX ADMIN — AMLODIPINE BESYLATE 10 MG: 10 TABLET ORAL at 07:42

## 2021-09-15 RX ADMIN — ATORVASTATIN CALCIUM 40 MG: 40 TABLET, FILM COATED ORAL at 07:41

## 2021-09-15 RX ADMIN — QUETIAPINE FUMARATE 50 MG: 25 TABLET ORAL at 07:41

## 2021-09-15 RX ADMIN — PIPERACILLIN AND TAZOBACTAM 3375 MG: 3; .375 INJECTION, POWDER, LYOPHILIZED, FOR SOLUTION INTRAVENOUS at 12:07

## 2021-09-15 RX ADMIN — SODIUM CHLORIDE, PRESERVATIVE FREE 10 ML: 5 INJECTION INTRAVENOUS at 07:46

## 2021-09-15 RX ADMIN — DIAZEPAM 5 MG: 5 TABLET ORAL at 05:47

## 2021-09-15 RX ADMIN — LANSOPRAZOLE 30 MG: KIT at 05:47

## 2021-09-15 RX ADMIN — INSULIN GLARGINE 25 UNITS: 100 INJECTION, SOLUTION SUBCUTANEOUS at 09:20

## 2021-09-15 RX ADMIN — QUETIAPINE FUMARATE 50 MG: 25 TABLET ORAL at 14:56

## 2021-09-15 RX ADMIN — ACETAMINOPHEN 650 MG: 650 SOLUTION ORAL at 04:59

## 2021-09-15 RX ADMIN — IPRATROPIUM BROMIDE AND ALBUTEROL SULFATE 1 AMPULE: .5; 3 SOLUTION RESPIRATORY (INHALATION) at 14:09

## 2021-09-15 RX ADMIN — IPRATROPIUM BROMIDE AND ALBUTEROL SULFATE 1 AMPULE: .5; 3 SOLUTION RESPIRATORY (INHALATION) at 15:58

## 2021-09-15 RX ADMIN — INSULIN LISPRO 6 UNITS: 100 INJECTION, SOLUTION INTRAVENOUS; SUBCUTANEOUS at 12:54

## 2021-09-15 RX ADMIN — OXYCODONE 10 MG: 5 TABLET ORAL at 17:56

## 2021-09-15 ASSESSMENT — PULMONARY FUNCTION TESTS
PIF_VALUE: 37
PIF_VALUE: 50
PIF_VALUE: 40
PIF_VALUE: 39
PIF_VALUE: 40
PIF_VALUE: 28
PIF_VALUE: 38
PIF_VALUE: 35
PIF_VALUE: 31
PIF_VALUE: 32
PIF_VALUE: 18
PIF_VALUE: 39
PIF_VALUE: 36
PIF_VALUE: 27
PIF_VALUE: 39
PIF_VALUE: 33
PIF_VALUE: 42
PIF_VALUE: 31
PIF_VALUE: 43
PIF_VALUE: 35
PIF_VALUE: 40
PIF_VALUE: 36
PIF_VALUE: 40
PIF_VALUE: 37
PIF_VALUE: 32
PIF_VALUE: 34
PIF_VALUE: 35
PIF_VALUE: 36
PIF_VALUE: 30

## 2021-09-15 ASSESSMENT — PAIN SCALES - GENERAL
PAINLEVEL_OUTOF10: 0
PAINLEVEL_OUTOF10: 0
PAINLEVEL_OUTOF10: 6
PAINLEVEL_OUTOF10: 5
PAINLEVEL_OUTOF10: 0
PAINLEVEL_OUTOF10: 4

## 2021-09-15 ASSESSMENT — PAIN SCALES - WONG BAKER: WONGBAKER_NUMERICALRESPONSE: 8

## 2021-09-15 NOTE — PROGRESS NOTES
[facility]  [facility]  Progress Note      Chief Complaint   Patient presents with    Shortness of Breath     covid, per family SaO2 75% RA, hx of DM and panic attack        SUBJECTIVE:  8/21/21  Prone. FiO2 60% and PEEP of 16    8/22/2021  Patient is tolerating medications. No reported adverse drug reactions. No nausea, vomiting, diarrhea. Afebrile BiPAP 100% FiO2 with breathing at 44 times a minute and probably is going to decompensate  8/23/2021  Not doing well intubated on a rotating bed  FiO2 70% and PEEP 16  8/24/2021  Prone 50% FiO2-PEEP of 10  Issues with the urinary Busch last night this was changed  Paralyzed and on Versed  8/25/2021  Paralyzed and sedated with Versed no pressors  Prone intubated on 50% FiO2, PEEP of 10  Tolerating medications    8/26/21  Paralyzed and sedated with Versed no pressors  Prone intubated on 70% FiO2, PEEP of 10  Does not tolerate supine position    8/27/2021  Afebrile  Still prone on FiO2 of 100%  Paralyzed and sedated    8/28/2021  The patient is still in the ICU. He is pronated. FiO2 100%. Is still sedated and paralyzed. Fair amount of thick, brown secretions    8/29/2021  The patient is still on a RotoProne bed. You are still intubated, sedated and paralyzed. O2 is being weaned down. FiO2 55%. PEEP 12.    8/30/2021  Patient remains intubated, sedated and paralyzed. He is still on a RotoProne bed. Supine now. 8/31/2021  He is still on a RotoProne bed. Still having fair amount of secretions from the nostrils. Minimal from the ET tube, however. Tube feeding seems to be coming out of his nose. 9/1/2021  He is supinated but still on a RotoProne bed. He still has fair amount of secretions from the ET tube. Tolerating antibiotic    9/2/2021. He is still on a RotoProne bed. He is pronated. Less secretions from the ET tube and nostrils. FiO2 50%. PEEP 14.    9/3/2021. He is off the RotoProne bed. Tolerating supination.   Tolerating Per G Tube TID    warfarin (COUMADIN) daily dosing (placeholder)   Other Daily    lansoprazole  30 mg Per G Tube QAM AC    QUEtiapine  50 mg Oral TID    sodium chloride flush  5-40 mL IntraVENous 2 times per day    heparin flush  3 mL IntraVENous 2 times per day    piperacillin-tazobactam  3,375 mg IntraVENous Q8H    enoxaparin  1 mg/kg SubCUTAneous BID    tobramycin  300 mg Inhalation Q12H    lisinopril  10 mg Oral Daily    insulin glargine  25 Units SubCUTAneous BID    ascorbic acid  1,000 mg Oral Daily    sodium chloride (Inhalant)  4 mL Nebulization Q12H    insulin lispro  0-18 Units SubCUTAneous Q6H    chlorhexidine  15 mL Mouth/Throat BID    ipratropium-albuterol  1 ampule Inhalation Q4H    amLODIPine  10 mg Oral Daily    atorvastatin  40 mg Oral Daily    Vitamin D  2,000 Units Oral Daily    zinc sulfate  50 mg Oral Daily     Continuous Infusions:   sodium chloride      dextrose       PRN Meds:fentanNYL, sodium chloride flush, sodium chloride, acetaminophen, labetalol, hydrALAZINE, sodium chloride (Inhalant), albuterol, benzonatate, glucose, dextrose, glucagon (rDNA), dextrose, sodium chloride    OBJECTIVE:  BP (!) 198/91   Pulse 113   Temp 102.7 °F (39.3 °C) (Bladder)   Resp (!) 42   Ht 6' (1.829 m)   Wt (!) 317 lb 10.9 oz (144.1 kg)   SpO2 (!) 89%   BMI 43.09 kg/m²   Temp  Av.7 °F (38.2 °C)  Min: 99.7 °F (37.6 °C)  Max: 102.7 °F (39.3 °C)     Constitutional: The patient is lying in bed in the ICU. He is awake and alert. He is less agitated today. FiO2 45%. PEEP 6. No changes. Skin: Warm and dry. No rashes were noted. HEENT: Round and nonreactive pupils. Decubitus lesions on cheeks, hands and lower extremities noted. Neck: Supple. Tracheostomy. PEG. Chest: Coarse breath sounds bilaterally. No crackles  Cardiovascular: Heart sounds rhythmic and regular. Tachycardic. Abdomen: Round, soft and benign to palpation. Genitourinary:  Busch catheter  Extremities: Moderate edema. Lines: Left IJ TLC 9/11/2021. Busch changed 3/24/2021    Laboratory and Tests Review:  Lab Results   Component Value Date    WBC 15.7 (H) 09/15/2021    WBC 13.5 (H) 09/14/2021    WBC 12.9 (H) 09/13/2021    HGB 9.2 (L) 09/15/2021    HCT 28.4 (L) 09/15/2021    MCV 91.3 09/15/2021     09/15/2021     Lab Results   Component Value Date    NEUTROABS 13.38 (H) 09/15/2021    NEUTROABS 11.43 (H) 09/14/2021    NEUTROABS 11.05 (H) 09/13/2021     No results found for: CRP  Lab Results   Component Value Date     (H) 09/15/2021     (H) 09/15/2021    ALKPHOS 122 09/15/2021    BILITOT 0.6 09/15/2021     Lab Results   Component Value Date     09/15/2021    K 3.8 09/15/2021    K 3.7 08/21/2021    CL 97 09/15/2021    CO2 29 09/15/2021    BUN 11 09/15/2021    CREATININE 0.4 09/15/2021    CREATININE 0.4 09/14/2021    CREATININE 0.5 09/13/2021    GFRAA >60 09/15/2021    LABGLOM >60 09/15/2021    GLUCOSE 198 09/15/2021    PROT 6.5 09/15/2021    LABALBU 2.9 09/15/2021    CALCIUM 8.7 09/15/2021    BILITOT 0.6 09/15/2021    ALKPHOS 122 09/15/2021     09/15/2021     09/15/2021     Lab Results   Component Value Date    CRP 25.5 (H) 09/15/2021    CRP 26.7 (H) 09/14/2021    CRP 34.5 (H) 09/13/2021     Lab Results   Component Value Date    SEDRATE 120 (H) 09/15/2021    SEDRATE 125 (H) 09/14/2021    SEDRATE 132 (H) 09/13/2021     Radiology:      Microbiology:   Streptococcus pneumoniae/Legionella urine Ag: negative  Nares screen MRSA: Negative  Urine culture 8/24/2021: Negative  Blood cultures 8/21/2021: Negative x2  Respiratory culture 8/27/2021: OP terrell reduced  Respiratory culture 8/30/2021: E. coli, MSSA   BAL 9/8/2021: OP terrell reduced. E. coli, Enterobacter cloacae, MSSA. PjP negative  TLC tip culture 9/11/2021: Negative    ASSESSMENT:  · Severe Covid pneumonia causing acute respiratory failure CRP has come down nicely. Completed Remdesivir.   Received Tocilizumab  · VAP versus tracheobronchitis. Cultures growing MSSA and E. coli. Improving. Treated with 10 days of Cefazolin  · Status post bronchoscopy 9/8/2021. Cultures growing rare MSSA,, Enterobacter cloacae. Possible tracheobronchitis  · Leukocytosis associated to the above, stable  · Status post tracheostomy and PEG 9/10/2021   · Fevers probably secondary to line infection. Lines were changed. Tip cultures were negative. Temperatures were trending downward. Now they have come up again. There is no eosinophilia or rash to suspect drug-induced fever  · Possible tracheitis    PLAN:  · Continue Zosyn and Tobramycin by inhalation until 9/25/2021  · Repeat blood cultures x2  · We will give him a single dose of Vancomycin and check a random level in a.m. · Check catheter tip culture  · We will follow with you  · Isolation can be discontinued.   Terminally clean the room  · Transfer to [facility] with Isabel Varela MD  12:43 PM   9/15/2021

## 2021-09-15 NOTE — PROGRESS NOTES
[facility] Progress Note    Admitting Date and Time: 8/21/2021  9:35 AM  Admit Dx: Acute hypoxemic respiratory failure due to COVID-19 (HCC) [U07.1, J96.01]  Pneumonia due to COVID-19 virus [U07.1, J12.82]    Subjective:  Patient is being followed for Acute hypoxemic respiratory failure due to COVID-19 ([address] [U07.1, J96.01]  Pneumonia due to COVID-19 virus [U07.1, J12.82]     Per RN: Patient remains intubated, on isolation, febrile. Patient still agitated and tachypneic in the 50s, continues to be on  Versed and fentanyl, also received Seroquel, oxycodone , Valium.   ROS: Unobtainable     warfarin  5 mg Oral Once    [START ON 9/16/2021] dexamethasone  6 mg Oral Daily    vancomycin  2,500 mg IntraVENous Once    oxyCODONE  10 mg Oral Q4H    diazePAM  5 mg Oral Q6H    phenytoin  200 mg Per G Tube TID    warfarin (COUMADIN) daily dosing (placeholder)   Other Daily    lansoprazole  30 mg Per G Tube QAM AC    QUEtiapine  50 mg Oral TID    sodium chloride flush  5-40 mL IntraVENous 2 times per day    heparin flush  3 mL IntraVENous 2 times per day    piperacillin-tazobactam  3,375 mg IntraVENous Q8H    enoxaparin  1 mg/kg SubCUTAneous BID    tobramycin  300 mg Inhalation Q12H    lisinopril  10 mg Oral Daily    insulin glargine  25 Units SubCUTAneous BID    ascorbic acid  1,000 mg Oral Daily    sodium chloride (Inhalant)  4 mL Nebulization Q12H    insulin lispro  0-18 Units SubCUTAneous Q6H    chlorhexidine  15 mL Mouth/Throat BID    ipratropium-albuterol  1 ampule Inhalation Q4H    amLODIPine  10 mg Oral Daily    atorvastatin  40 mg Oral Daily    Vitamin D  2,000 Units Oral Daily    zinc sulfate  50 mg Oral Daily     fentanNYL, 25 mcg, Q3H PRN  sodium chloride flush, 5-40 mL, PRN  sodium chloride, 25 mL, PRN  acetaminophen, 650 mg, Q4H PRN  labetalol, 20 mg, Q6H PRN  hydrALAZINE, 20 mg, Q6H PRN  sodium chloride (Inhalant), 4 mL, PRN  albuterol, 2.5 mg, Q6H PRN  benzonatate, 200 the trachea. The tip does not cross midline and project in the region of the SVC. No definite pneumothorax Tip of tracheostomy tube is unchanged. Right-sided central line is unchanged Heterogeneous opacity is seen throughout the lungs bilaterally, increased on the right compared to prior     Since the prior study, there has been insertion of a left sided central line. The tip projects to the left of the trachea. Correlate with blood gas. As the tip does not clearly project in the region of the SVC, it is not definitively venous by radiograph. Bilateral airspace disease, increased on the right compared to prior     XR CHEST PORTABLE    Result Date: 9/10/2021  EXAMINATION: ONE XRAY VIEW OF THE CHEST 9/10/2021 7:16 am COMPARISON: 09/09/2021 HISTORY: ORDERING SYSTEM PROVIDED HISTORY: Acute resp failure, COVID TECHNOLOGIST PROVIDED HISTORY: Reason for exam:->Acute resp failure, COVID FINDINGS: Interval placement of tracheostomy tube terminating above the donna. Nasogastric tube is been removed. Right IJ catheter remains in place. Cardiac silhouette is prominent but unchanged from previous. No pneumothorax. Multifocal bilateral lung parenchymal infiltrates appear slightly worsened overall and remain greater towards the left. No other interval change. Interval placement of tracheostomy tube. Bilateral parenchymal infiltrates appear subtly worsened. Continued follow-up recommended. US DUP UPPER EXTREMITIES BILATERAL VENOUS    Result Date: 9/10/2021  EXAMINATION: DUPLEX ULTRASOUND OF THE BILATERAL UPPER EXTREMITIES FOR DVT, 9/10/2021 9:37 am TECHNIQUE: Duplex ultrasound using B-mode/gray scaled imaging and Doppler spectral analysis and color flow was obtained of the deep venous structures of the upper extremity. COMPARISON: None.  HISTORY: ORDERING SYSTEM PROVIDED HISTORY: r/o dvt TECHNOLOGIST PROVIDED HISTORY: Reason for exam:->r/o dvt What reading provider will be dictating this exam?->CRC FINDINGS: There is deep vein thrombosis involving the left brachial and basilic veins throughout the arm. Thrombus is also noted within the left cephalic vein. There is normal flow and compressibility of the visualized venous structures of the right upper extremity. There is no evidence of echogenic thrombus. The veins of the right upper extremity demonstrate good compressibility with normal color flow study and spectral analysis. Occlusive deep vein thrombosis involving the left brachial veins. Thrombus is also noted in the cephalic and basilic veins in the left arm. No evidence of deep vein thrombosis in the right upper extremity. The findings were sent to the Radiology Results [address] at 10:40 am on 9/10/2021to be communicated to a licensed caregiver. Assessment:    Principal Problem:    Acute hypoxemic respiratory failure due to COVID-19 [facility])  Active Problems:    Type 2 diabetes mellitus without complication, without long-term current use of insulin (HCC)    History of seizures    Hyperlipidemia    Busch catheter problem (HCC)    Sepsis (HCC)    Thrombocytopenia ([facility])    Elevated LFTs  Resolved Problems:    OSCAR (acute kidney injury) ([address]    Lactic acidosis    Plan:  1. Acute hypoxic respiratory failure secondary to severe COVID pneumonia, suspected ventilator associated pneumonia versus tracheobronchitis, with left pleural effusion ( not tapable) and atelectasis- remains intubated and sedated, completed remdesivir, s/p Tocilizumab, s/p cefazolin cultures growing MSSA and E. coli, leukocytosis, lactic acidosis, s/p bronchoscopy with copious mucus plugging 9/8/2021, right IJ tip culture pending, infectious disease on board, on inhaled tobramycin for tracheobronchitis, final BAL cultures pending, repeat blood cultures pending, started on Zosyn. 2.  OSCAR -prerenal, nephrology on board, held diuresis, lisinopril added, on tube feeds, avoid nephrotoxins, on water flushes.   3 Hypertension - poorly controlled. Plan to send to [facility] select specialty, once afebrile and sedation requirement decreases, less tachypneic. NOTE: This report was transcribed using voice recognition software. Every effort was made to ensure accuracy; however, inadvertent computerized transcription errors may be present.   Electronically signed by Maria Alejandra Florentino MD on 9/15/2021 at 2:31 PM

## 2021-09-15 NOTE — PROGRESS NOTES
Pulmonary Subsequent Hospital F/U note    Patient is being followed for: acute hypoxic respiratory failure, severe covid-19 pneumonia     Interval HPI:     Patient is awake and following commands. Tolerated pressure support for short time today. T-max of 100.5. ROS:  Unable to obtain     Exam:  BP (!) 170/79   Pulse 87   Temp 101.4 °F (38.6 °C) (Bladder)   Resp (!) 38   Ht 6' (1.829 m)   Wt (!) 317 lb 10.9 oz (144.1 kg)   SpO2 (!) 89%   BMI 43.09 kg/m²    GENERAL: Patient awakes opens eyes nods yes and no  HEENT: Atraumatic. Normocephalic. Extraocular muscles are intact. Pupils are equal, round and reactive to light and accommodation. Sclera is nonicteric. Has few scabs on the skin from the time of his proning  NECK: Supple. No JVD. No adenopathy. No bruits. CARDIOVASCULAR: Regular rate and rhythm. No murmur, gallop or thrills. LUNGS: Has bilateral scattered rhonchi  ABDOMEN: Soft, nontender. No distention or organomegaly. EXTREMITIES: No clubbing, no cellulitis. Positive peripheral pulses, equal bilaterally. Data:    Oximetry:  SpO2 Readings from Last 1 Encounters:   09/15/21 (!) 89%       Imaging personally reviewed by myself:  CXR 9/15    Narrative   EXAMINATION:   ONE XRAY VIEW OF THE CHEST       9/15/2021 10:43 am       COMPARISON:   09/12/2021       HISTORY:   ORDERING SYSTEM PROVIDED HISTORY: fever   TECHNOLOGIST PROVIDED HISTORY:   Reason for exam:->fever       FINDINGS:   Bilateral pulmonary infiltrates are unchanged.  There are no obvious   effusions.  Tracheostomy tube and left-sided central line are appropriate.           Impression   No interval change             DVT     Occlusive deep vein thrombosis involving the left brachial veins.  Thrombus   is also noted in the cephalic and basilic veins in the left arm.       No evidence of deep vein thrombosis in the right upper extremity.        Respiratory culture MSSA, Ecoli, enterobacter    crp 25.5      Pertinent labs reviewed and noted:  Lab Results   Component Value Date    WBC 15.7 09/15/2021    HGB 9.2 09/15/2021    HCT 28.4 09/15/2021    MCV 91.3 09/15/2021    MCH 29.6 09/15/2021    MCHC 32.4 09/15/2021    RDW 14.6 09/15/2021     09/15/2021    MPV 9.6 09/15/2021     Lab Results   Component Value Date     09/15/2021    K 3.8 09/15/2021    K 3.7 08/21/2021    CL 97 09/15/2021    CO2 29 09/15/2021    BUN 11 09/15/2021    CREATININE 0.4 09/15/2021    LABALBU 2.9 09/15/2021    CALCIUM 8.7 09/15/2021    GFRAA >60 09/15/2021    LABGLOM >60 09/15/2021     Assessment:  1. Acute hypoxic respiratory failure  2. Severe covid-19 pneumonia  3. Bacterial pneumonia with MSSA, enterobacter, and Ecoli   4. Mucus plugging of bronchi s/p bronchoscopy 9/8/21  5. DVT LUE  6. Fevers/sepsis     Plan:  1. Trache PEG  9/9/21. Patient on full vent support. Did not tolerate any weaning trials today. 2. Completed Remdesivir and Tocilizumab  3. Antibiotics as per ID - Zosyn and remains on inhaled Tobramycin till 9/25/2021 per ID  4. Chest physio/bronchodilators, s/p bronch  5. Dexamethasone wean  6. Full dose anticoagulation  7. Patient is not ready yet for transfer to select specialty hospital    Discussed with Dr. Tariq Horta and MICU team.  Their input deeply appreciated.      Electronically signed by Esme Bautista MD on 9/15/2021 at 11:50 AM

## 2021-09-15 NOTE — PROGRESS NOTES
9/15/2021  12:38 PM      Comprehensive Nutrition Assessment    Type and Reason for Visit:  Reassess    Nutrition Recommendations/Plan: Recommend change TF to 2 Tal TF (Two Tal HN) to allow recommended TF hold 1 hr before and after Dilantin (TID) and meet pt needs, without propofol calories as well. TF RECOMMENDATION:  Change to 2 Tal TF (Two Tal HN) to goal rate 60 ml/hr x 18 hr/d (Hold 1 hr before and after Dilantin)  and Protein Modular twice daily  This will provide (at goal) 1080 ml/d, 2360 tal, 143 g pro, 756 ml free water    Nutrition Assessment:  Pt remains off propofol and on TF via PEG. Intubated/trach. Will provide updated recommendation to meet pt needs via EN, without propofol calories. Also note, pt now on Dilantin via PEG TID and it is recommended to hold TF 1 hr before/after Dilantin. Recommend change to 2 tal/ml TF to allow needs met while holding TF for medication to avoid DNI. Malnutrition Assessment:  Malnutrition Status: At risk for malnutrition (Comment)    Context:  Acute Illness     Findings of the 6 clinical characteristics of malnutrition:  Energy Intake:  Mild decrease in energy intake (Comment)  Weight Loss:  Unable to assess     Body Fat Loss:  Unable to assess (Covid Isolation)     Muscle Mass Loss:  Unable to assess (Covid Isolation)    Fluid Accumulation:  No significant fluid accumulation     Strength:  Not Performed    Estimated Daily Nutrient Needs:  Energy (kcal):   (PSE x 80%); Weight Used for Energy Requirements:  Admission     Protein (g):  125-160 (1.5-1.8 g/kg);  Weight Used for Protein Requirements:  Ideal        Fluid (ml/day):  per Critical Care; Method Used for Fluid Requirements:  Other (Comment)      Nutrition Related Findings:  alert/follows commands, trach/vent, diarrhea (FMS), +3 edema, elev LFT, +BS,      Wounds:  Pressure Injury, Stage II, Multiple, Deep Tissue Injury (5 DTI face, forehead, cheek and PI on LE-per wound care)       Current Nutrition Therapies:    Current Tube Feeding (TF) Orders:  · Feeding Route: PEG  · Formula: Diabetic  · Schedule: Continuous (goal rate =45 ml/hr = 1080 ml/d)  · Additives/Modulars:  (none)  · Water Flushes: 30 ml Q6 hrs = 120 ml/d  · Current TF & Flush Orders Provides: at goal  · Goal TF & Flush Orders Provides: 1080 ml/d, 1620 angel, 89 g pro, 1000 ml total free water      Anthropometric Measures:  · Height: 6' (182.9 cm)  · Current Body Weight: 317 lb (143.8 kg) (9/14 bedscale)   · Admission Body Weight: 302 lb (137 kg) (8/21 bedscale - first measured wt this admit)    · Usual Body Weight:  (No recent actual wt hx available)     · Ideal Body Weight: 178 lbs; % Ideal Body Weight 178.1 %   · BMI: 43  · BMI Categories: Obese Class 3 (BMI 40.0 or greater)       Nutrition Diagnosis:   · Inadequate oral intake related to impaired respiratory function as evidenced by NPO or clear liquid status due to medical condition, intubation, nutrition support - enteral nutrition      Nutrition Interventions:   Food and/or Nutrient Delivery:  Modify Tube Feeding (Recommend change to 2 angel TF and hold 1 hr before and after each Dilantin via PEG (TID))  Nutrition Education/Counseling:  No recommendation at this time   Coordination of Nutrition Care:  Continue to monitor while inpatient    Goals:  Pt michelle EN at goal rate       Nutrition Monitoring and Evaluation:   Behavioral-Environmental Outcomes:  None Identified   Food/Nutrient Intake Outcomes:  Enteral Nutrition Intake/Tolerance  Physical Signs/Symptoms Outcomes:  GI Status, Biochemical Data, Fluid Status or Edema, Nutrition Focused Physical Findings, Hemodynamic Status, Skin, Weight     Discharge Planning:    Enteral Nutrition     Electronically signed by Kit Apley, RD, CNSC, LD on 9/15/21 at 12:38 PM EDT    Contact: [phone]

## 2021-09-15 NOTE — PROGRESS NOTES
09/15/21 1816   Vent Information   Equipment -67   Vent Type 980   Vent Mode AC/VC   Vt Ordered 480 mL   Rate Set 12 bmp   Peak Flow 60 L/min   Pressure Support 0 cmH20   FiO2  45 %   SpO2 92 %   SpO2/FiO2 ratio 204.44   Sensitivity 3   PEEP/CPAP 8   I Time/ I Time % 0 s   Humidification Source Heated wire   Humidification Temp 37   Vent Patient Data   High Peep/I Pressure 0   Peak Inspiratory Pressure 35 cmH2O   Mean Airway Pressure 21 cmH20   Rate Measured 37 br/min   Vt Exhaled 445 mL   Minute Volume 17.8 Liters   I:E Ratio 1.20:1   Spontaneous Breathing Trial (SBT) RT Doc   Pulse 104   Additional Respiratory  Assessments   Resp (!) 35   Alarm Settings   High Pressure Alarm 63 cmH2O

## 2021-09-15 NOTE — PROGRESS NOTES
09/15/21 0839   Vent Information   Vent Type 980   Vent Mode AC/VC   Vt Ordered 500 mL   Rate Set 12 bmp   Peak Flow 65 L/min   Pressure Support 0 cmH20   FiO2  45 %   SpO2 (!) 89 %   SpO2/FiO2 ratio 197.78   Sensitivity 2   PEEP/CPAP 6   I Time/ I Time % 0 s   Humidification Source Heated wire   Humidification Temp 37   Vent Patient Data   High Peep/I Pressure 0   Peak Inspiratory Pressure 35 cmH2O   Mean Airway Pressure 18 cmH20   Rate Measured 39 br/min   Vt Exhaled 540 mL   Minute Volume 20.9 Liters   I:E Ratio 1.20:1   Spontaneous Breathing Trial (SBT) RT Doc   Pulse 90   Additional Respiratory  Assessments   Resp (!) 38   Alarm Settings   High Pressure Alarm 55 cmH2O

## 2021-09-15 NOTE — PROGRESS NOTES
did come back positive again. Markedly febrile. OBJECTIVE:     VITAL SIGNS:  BP (!) 178/84   Pulse 98   Temp 101 °F (38.3 °C) (Bladder)   Resp (!) 42   Ht 6' (1.829 m)   Wt (!) 317 lb 10.9 oz (144.1 kg)   SpO2 (!) 89%   BMI 43.09 kg/m²   Tmax over 24 hours:  Temp (24hrs), Av.4 °F (38 °C), Min:99.7 °F (37.6 °C), Max:101 °F (38.3 °C)      Patient Vitals for the past 6 hrs:   BP Temp Temp src Pulse Resp SpO2   09/15/21 0930 (!) 178/84 101 °F (38.3 °C) Bladder 98 (!) 42 --   09/15/21 0843 -- -- -- 90 -- --   09/15/21 0839 -- -- -- 90 (!) 38 (!) 89 %   09/15/21 0800 (!) 162/72 100.8 °F (38.2 °C) Bladder 92 (!) 38 (!) 88 %   09/15/21 0700 (!) 150/74 -- -- 95 (!) 33 91 %   09/15/21 0600 (!) 165/86 -- -- 85 26 94 %   09/15/21 0500 (!) 198/85 99.8 °F (37.7 °C) Bladder 84 (!) 36 90 %   09/15/21 0451 -- -- -- 104 -- 90 %         Intake/Output Summary (Last 24 hours) at 9/15/2021 1006  Last data filed at 9/15/2021 0800  Gross per 24 hour   Intake 1320 ml   Output 4175 ml   Net -2855 ml     Wt Readings from Last 2 Encounters:   21 (!) 317 lb 10.9 oz (144.1 kg)   21 (!) 320 lb (145.2 kg)     Body mass index is 43.09 kg/m².         PHYSICAL EXAMINATION:    General appearance -no acute distress, trach present on ventilator  Mental status -patient is able to follow commands and tell the date as well as his birthday is coming up  Eyes - pupils equal and reactive, extraocular eye movements intact  Mouth - mucous membranes moist, pharynx normal without lesions  Neck - supple, no significant adenopathy  Chest -tracheostomy present, decreased breath sounds no evidence of crackles rhonchi noted bilaterally  heart - normal rate, regular rhythm, normal S1, S2, no murmurs, rubs, clicks or gallops  Abdomen - soft, nontender, nondistended, no masses or organomegaly  Neurological -following commands, no focal neurological deficits, is able to answer questions  Extremities - peripheral pulses normal, no pedal edema, no clubbing or cyanosis  Skin -patient did have skin breakdown to the face as well as blistering on the hands     Any additional physical findings:    MEDICATIONS:    Scheduled Meds:   magnesium sulfate  2,000 mg IntraVENous Once    oxyCODONE  10 mg Oral Q4H    diazePAM  5 mg Oral Q6H    phenytoin  200 mg Per G Tube TID    warfarin (COUMADIN) daily dosing (placeholder)   Other Daily    lansoprazole  30 mg Per G Tube QAM AC    QUEtiapine  50 mg Oral TID    dexamethasone  6 mg IntraVENous Daily    sodium chloride flush  5-40 mL IntraVENous 2 times per day    heparin flush  3 mL IntraVENous 2 times per day    piperacillin-tazobactam  3,375 mg IntraVENous Q8H    enoxaparin  1 mg/kg SubCUTAneous BID    tobramycin  300 mg Inhalation Q12H    lisinopril  10 mg Oral Daily    insulin glargine  25 Units SubCUTAneous BID    ascorbic acid  1,000 mg Oral Daily    sodium chloride (Inhalant)  4 mL Nebulization Q12H    insulin lispro  0-18 Units SubCUTAneous Q6H    chlorhexidine  15 mL Mouth/Throat BID    ipratropium-albuterol  1 ampule Inhalation Q4H    amLODIPine  10 mg Oral Daily    atorvastatin  40 mg Oral Daily    Vitamin D  2,000 Units Oral Daily    zinc sulfate  50 mg Oral Daily     Continuous Infusions:   sodium chloride      dextrose       PRN Meds:   fentanNYL, 25 mcg, Q3H PRN  sodium chloride flush, 5-40 mL, PRN  sodium chloride, 25 mL, PRN  acetaminophen, 650 mg, Q4H PRN  labetalol, 20 mg, Q6H PRN  hydrALAZINE, 20 mg, Q6H PRN  sodium chloride (Inhalant), 4 mL, PRN  albuterol, 2.5 mg, Q6H PRN  benzonatate, 200 mg, TID PRN  glucose, 15 g, PRN  dextrose, 12.5 g, PRN  glucagon (rDNA), 1 mg, PRN  dextrose, 100 mL/hr, PRN  sodium chloride, 30 mL, PRN          VENT SETTINGS (Comprehensive) (if applicable):  Vent Information  $Ventilation: $Subsequent Day  Skin Assessment: Clean, dry, & intact  Equipment ID: [id number]  Equipment Changed: (S) Humidification  Vent Type: [id number]  Vent Mode: AC/VC  Vt Ordered: 500 mL  Pressure Ordered: 20  Rate Set: 12 bmp  Peak Flow: 65 L/min  Pressure Support: 0 cmH20  FiO2 : 45 %  SpO2: (!) 89 %  SpO2/FiO2 ratio: 197.78  Sensitivity: 2  PEEP/CPAP: 6  I Time/ I Time %: 0 s  Humidification Source: Heated wire  Humidification Temp: 37  Humidification Temp Measured: 37  Circuit Condensation: Drained  Mask Type: Full face mask  Mask Size: Medium  Additional Respiratory  Assessments  Pulse: 98  Resp: (!) 42  SpO2: (!) 89 %  Position: Semi-Sellers's  Humidification Source: Heated wire  Humidification Temp: 37  Circuit Condensation: Drained  Oral Care: Teeth brushed, Mouthwash, Mouth suctioned  Subglottic Suction Done?: No  Airway Type: Trachial  Airway Size: 78  Cuff Pressure (cm H2O): 29 cm H2O    ABGs:     Laboratory findings:    Complete Blood Count:   Recent Labs     09/13/21 0535 09/14/21  0535 09/15/21  0450   WBC 12.9* 13.5* 15.7*   HGB 8.9* 9.2* 9.2*   HCT 28.2* 28.3* 28.4*    229 250        Last 3 Blood Glucose:   Recent Labs     09/13/21 0535 09/14/21  0535 09/15/21  0450   GLUCOSE 169* 181* 198*        PT/INR:    Lab Results   Component Value Date    PROTIME 16.9 09/15/2021    INR 1.5 09/15/2021     PTT:  No results found for: APTT, PTT    Comprehensive Metabolic Profile:   Recent Labs     09/13/21 0535 09/13/21 0535 09/14/21 0535 09/14/21  0535 09/15/21  0450     --  138  --  135   K 3.5  --  3.4*  --  3.8     --  99  --  97*   CO2 25  --  30*  --  29   BUN 15  --  12  --  11   CREATININE 0.5*  --  0.4*  --  0.4*   GLUCOSE 169*  --  181*  --  198*   CALCIUM 8.4*  --  8.9  --  8.7   PROT 6.3*   < > 6.6   < > 6.5   LABALBU 2.6*   < > 3.0*   < > 2.9*   BILITOT 0.4   < > 0.6   < > 0.6   ALKPHOS 131*   < > 123   < > 122   *   < > 211*   < > 201*   *   < > 145*   < > 151*    < > = values in this interval not displayed.       Magnesium:   Lab Results   Component Value Date    MG 1.8 09/15/2021     Phosphorus:   Lab Results   Component Value Date    PHOS 2.8 09/15/2021     Ionized Calcium: No results found for: CANDICE     Urinalysis:     Troponin: No results for input(s): TROPONINI in the last 72 hours. ASSESSMENT:     Patient Active Problem List    Diagnosis Date Noted    Elevated LFTs     Busch catheter problem ([facility] 08/26/2021    Sepsis ([facility] [address] 08/26/2021    Thrombocytopenia ([facility] [address] 08/26/2021    Hyperlipidemia 08/22/2021    Acute hypoxemic respiratory failure due to COVID-19 [facility]) 08/21/2021    Type 2 diabetes mellitus without complication, without long-term current use of insulin ([facility] [address] 08/10/2021    History of seizures 08/10/2021     SYSTEMS ASSESSMENT    Neuro     Following commands at this time  Patient is able to be weaned at this time  Oral Valium every 6 hours, OxyIR 10 mg every 4 hours  Seroquel 50 mg three times daily  Continue to monitor      Respiratory     Acute hypoxic respiratory failure requiring mechanical ventilation  Severe COVID-19 pneumonia s/p toe C, remdesivir  Pneumonia VAP-MSSA, E. coli  On pip/ tazo  Trach/PEG    Bronchoscopy 9/8/2021 due to white out left side chest on x-ray. DuoNebs every 4 hours  6 mg Decadron daily  Wean oxygen as tolerated.  Keep O2 sat 90-92%    Cardiovascular     Hypertension  Amlodipine-10 mg  Lisinopril-10 mg  Lipitor 40 mg    Gastrointestinal     GI prophylaxis-Protonix  Bowel regimen  Continue monitor    Renal     OSCAR-improved  Hypokalemia-potassium replaced  Hypomagnesemia-magnesium replaced  Avoid nephrotoxins  Continue to monitor     Infectious Disease     Pneumonia VAP-positive for MSSA, E. coli  Zosyn  lactic acidosis  Inhaled tobramycin for possible tracheobronchitis  Follow cultures of previous central line tip and new blood cultures from 9/11  Infectious disease following  Zosyn    Hematology/Oncology     DVT left upper arm-we will transition to warfarin due to high bmi  Anemia  Hemoglobin stable  Continue to monitor    Endocrine     Hyperglycemia  Lantus 25 units  High-dose sliding scale    Social/Spiritual/DNR/Other     Full code  vitamin regimen  DVT prophylaxis Lovenox     The patient was seen and evaluated by myself and Susie Arnett MD PGY-2  9/15/2021 10:05 AM    Critical Care Attending Addendum:    Patient seen and examined with the house staff. X-rays personally reviewed through the PACS. Family is updated at the bedside as available. Additional findings listed below as necessary. Additional comments:  1. Acute hypoxic respiratory failure   2. COVID 19  3. Marked fever  4. He remains awake and alert but tachypneic. 5. Fever being treated for bronchitis and now line sepsis with now add. Of iv vanco one dose. 6. Anxiety/ tachypnea being treated with Oxy IR, Seroquel and Valium.   7. DVT warfarin added due to high bmi.    >30 min CCT  D/W Dr. Lenwood Merlin

## 2021-09-15 NOTE — PATIENT CARE CONFERENCE
Intensive Care Daily Quality Rounding Checklist        ICU Team Members:  Dr. Atif Jimenez, Dr. Lyudmila Andrade (resident), clinical pharmacist, pharmacist students, bedside nurse, charge nurse, respiratory therapy     ICU Day #:  Number 26     Intubation Date:  Aug 23     Ventilator Day #:  Number 23 (trach placed 9/9)     Central Line Insertion Date:  Sept 11                                                    Day #: [place] Lock      Arterial Line Insertion Date: N/A                             Day #: N/A     Temporary Hemodialysis Catheter Insertion Date: N/A                             Day #:  N/A     DVT Prophylaxis: Lovenox    GI Prophylaxis: Protonix     Busch Catheter Insertion Date: Sept 13                                        Day #: Number 3                             Continued need (if yes, reason documented and discussed with physician): need for accurate I+O's     Skin Issues/ Wounds and ordered treatment discussed on rounds: has numerous healing ulcers from time proned     Goals/ Plans for the Day: Monitor labs and vitals.

## 2021-09-16 LAB
ALBUMIN SERPL-MCNC: 2.6 G/DL (ref 3.5–5.2)
ALP BLD-CCNC: 127 U/L (ref 40–129)
ALT SERPL-CCNC: 124 U/L (ref 0–40)
ANION GAP SERPL CALCULATED.3IONS-SCNC: 7 MMOL/L (ref 7–16)
AST SERPL-CCNC: 121 U/L (ref 0–39)
BASOPHILS ABSOLUTE: 0.06 E9/L (ref 0–0.2)
BASOPHILS RELATIVE PERCENT: 0.3 % (ref 0–2)
BILIRUB SERPL-MCNC: 0.5 MG/DL (ref 0–1.2)
BLOOD CULTURE, ROUTINE: NORMAL
BUN BLDV-MCNC: 14 MG/DL (ref 6–20)
C-REACTIVE PROTEIN: 29.4 MG/DL (ref 0–0.4)
CALCIUM SERPL-MCNC: 8.3 MG/DL (ref 8.6–10.2)
CHLORIDE BLD-SCNC: 99 MMOL/L (ref 98–107)
CO2: 29 MMOL/L (ref 22–29)
CREAT SERPL-MCNC: 0.5 MG/DL (ref 0.7–1.2)
CULTURE, BLOOD 2: NORMAL
EOSINOPHILS ABSOLUTE: 0.49 E9/L (ref 0.05–0.5)
EOSINOPHILS RELATIVE PERCENT: 2.7 % (ref 0–6)
GFR AFRICAN AMERICAN: >60
GFR NON-AFRICAN AMERICAN: >60 ML/MIN/1.73
GLUCOSE BLD-MCNC: 158 MG/DL (ref 74–99)
HCT VFR BLD CALC: 27 % (ref 37–54)
HEMOGLOBIN: 8.7 G/DL (ref 12.5–16.5)
IMMATURE GRANULOCYTES #: 0.87 E9/L
IMMATURE GRANULOCYTES %: 4.8 % (ref 0–5)
INR BLD: 1.8
LYMPHOCYTES ABSOLUTE: 1.37 E9/L (ref 1.5–4)
LYMPHOCYTES RELATIVE PERCENT: 7.5 % (ref 20–42)
MAGNESIUM: 2.1 MG/DL (ref 1.6–2.6)
MCH RBC QN AUTO: 29.5 PG (ref 26–35)
MCHC RBC AUTO-ENTMCNC: 32.2 % (ref 32–34.5)
MCV RBC AUTO: 91.5 FL (ref 80–99.9)
METER GLUCOSE: 157 MG/DL (ref 74–99)
METER GLUCOSE: 172 MG/DL (ref 74–99)
METER GLUCOSE: 174 MG/DL (ref 74–99)
METER GLUCOSE: 184 MG/DL (ref 74–99)
METER GLUCOSE: 192 MG/DL (ref 74–99)
MONOCYTES ABSOLUTE: 1.07 E9/L (ref 0.1–0.95)
MONOCYTES RELATIVE PERCENT: 5.9 % (ref 2–12)
NEUTROPHILS ABSOLUTE: 14.35 E9/L (ref 1.8–7.3)
NEUTROPHILS RELATIVE PERCENT: 78.8 % (ref 43–80)
PDW BLD-RTO: 14.7 FL (ref 11.5–15)
PHOSPHORUS: 2.9 MG/DL (ref 2.5–4.5)
PLATELET # BLD: 296 E9/L (ref 130–450)
PMV BLD AUTO: 9.5 FL (ref 7–12)
POTASSIUM SERPL-SCNC: 4.2 MMOL/L (ref 3.5–5)
PROTHROMBIN TIME: 19.7 SEC (ref 9.3–12.4)
RBC # BLD: 2.95 E12/L (ref 3.8–5.8)
SEDIMENTATION RATE, ERYTHROCYTE: 125 MM/HR (ref 0–15)
SODIUM BLD-SCNC: 135 MMOL/L (ref 132–146)
TOTAL PROTEIN: 6.3 G/DL (ref 6.4–8.3)
VANCOMYCIN RANDOM: 11.5 MCG/ML (ref 5–40)
WBC # BLD: 18.2 E9/L (ref 4.5–11.5)

## 2021-09-16 PROCEDURE — 85610 PROTHROMBIN TIME: CPT

## 2021-09-16 PROCEDURE — 85025 COMPLETE CBC W/AUTO DIFF WBC: CPT

## 2021-09-16 PROCEDURE — 6370000000 HC RX 637 (ALT 250 FOR IP): Performed by: SPECIALIST

## 2021-09-16 PROCEDURE — 80202 ASSAY OF VANCOMYCIN: CPT

## 2021-09-16 PROCEDURE — 83735 ASSAY OF MAGNESIUM: CPT

## 2021-09-16 PROCEDURE — 2000000000 HC ICU R&B

## 2021-09-16 PROCEDURE — 6370000000 HC RX 637 (ALT 250 FOR IP): Performed by: SURGERY

## 2021-09-16 PROCEDURE — 6360000002 HC RX W HCPCS: Performed by: INTERNAL MEDICINE

## 2021-09-16 PROCEDURE — 86140 C-REACTIVE PROTEIN: CPT

## 2021-09-16 PROCEDURE — 94003 VENT MGMT INPAT SUBQ DAY: CPT

## 2021-09-16 PROCEDURE — 6360000002 HC RX W HCPCS: Performed by: SPECIALIST

## 2021-09-16 PROCEDURE — 2500000003 HC RX 250 WO HCPCS: Performed by: SURGERY

## 2021-09-16 PROCEDURE — 99233 SBSQ HOSP IP/OBS HIGH 50: CPT | Performed by: FAMILY MEDICINE

## 2021-09-16 PROCEDURE — 6370000000 HC RX 637 (ALT 250 FOR IP): Performed by: INTERNAL MEDICINE

## 2021-09-16 PROCEDURE — 2580000003 HC RX 258: Performed by: SURGERY

## 2021-09-16 PROCEDURE — 85651 RBC SED RATE NONAUTOMATED: CPT

## 2021-09-16 PROCEDURE — 84100 ASSAY OF PHOSPHORUS: CPT

## 2021-09-16 PROCEDURE — 80053 COMPREHEN METABOLIC PANEL: CPT

## 2021-09-16 PROCEDURE — 6370000000 HC RX 637 (ALT 250 FOR IP): Performed by: STUDENT IN AN ORGANIZED HEALTH CARE EDUCATION/TRAINING PROGRAM

## 2021-09-16 PROCEDURE — 2580000003 HC RX 258: Performed by: INTERNAL MEDICINE

## 2021-09-16 PROCEDURE — 82962 GLUCOSE BLOOD TEST: CPT

## 2021-09-16 PROCEDURE — 36592 COLLECT BLOOD FROM PICC: CPT

## 2021-09-16 PROCEDURE — 2580000003 HC RX 258: Performed by: SPECIALIST

## 2021-09-16 PROCEDURE — 36415 COLL VENOUS BLD VENIPUNCTURE: CPT

## 2021-09-16 PROCEDURE — 94640 AIRWAY INHALATION TREATMENT: CPT

## 2021-09-16 RX ORDER — WARFARIN SODIUM 2.5 MG/1
2.5 TABLET ORAL DAILY
Status: DISCONTINUED | OUTPATIENT
Start: 2021-09-16 | End: 2021-09-18

## 2021-09-16 RX ORDER — WARFARIN SODIUM 5 MG/1
5 TABLET ORAL DAILY
Status: DISCONTINUED | OUTPATIENT
Start: 2021-09-16 | End: 2021-09-16

## 2021-09-16 RX ORDER — FLUCONAZOLE 100 MG/1
400 TABLET ORAL DAILY
Status: DISCONTINUED | OUTPATIENT
Start: 2021-09-16 | End: 2021-09-19

## 2021-09-16 RX ADMIN — QUETIAPINE FUMARATE 50 MG: 25 TABLET ORAL at 14:59

## 2021-09-16 RX ADMIN — ATORVASTATIN CALCIUM 40 MG: 40 TABLET, FILM COATED ORAL at 20:02

## 2021-09-16 RX ADMIN — OXYCODONE 10 MG: 5 TABLET ORAL at 16:37

## 2021-09-16 RX ADMIN — WARFARIN SODIUM 2.5 MG: 2.5 TABLET ORAL at 18:06

## 2021-09-16 RX ADMIN — DEXAMETHASONE 6 MG: 4 TABLET ORAL at 08:13

## 2021-09-16 RX ADMIN — AMLODIPINE BESYLATE 10 MG: 10 TABLET ORAL at 08:13

## 2021-09-16 RX ADMIN — IPRATROPIUM BROMIDE AND ALBUTEROL SULFATE 1 AMPULE: .5; 3 SOLUTION RESPIRATORY (INHALATION) at 19:56

## 2021-09-16 RX ADMIN — SODIUM CHLORIDE SOLN NEBU 3% 4 ML: 3 NEBU SOLN at 08:28

## 2021-09-16 RX ADMIN — PIPERACILLIN AND TAZOBACTAM 3375 MG: 3; .375 INJECTION, POWDER, LYOPHILIZED, FOR SOLUTION INTRAVENOUS at 11:54

## 2021-09-16 RX ADMIN — INSULIN GLARGINE 25 UNITS: 100 INJECTION, SOLUTION SUBCUTANEOUS at 21:29

## 2021-09-16 RX ADMIN — LANSOPRAZOLE 30 MG: KIT at 05:39

## 2021-09-16 RX ADMIN — DIAZEPAM 5 MG: 5 TABLET ORAL at 04:23

## 2021-09-16 RX ADMIN — LABETALOL HYDROCHLORIDE 20 MG: 5 INJECTION INTRAVENOUS at 11:27

## 2021-09-16 RX ADMIN — INSULIN LISPRO 3 UNITS: 100 INJECTION, SOLUTION INTRAVENOUS; SUBCUTANEOUS at 18:11

## 2021-09-16 RX ADMIN — INSULIN LISPRO 3 UNITS: 100 INJECTION, SOLUTION INTRAVENOUS; SUBCUTANEOUS at 05:38

## 2021-09-16 RX ADMIN — SODIUM CHLORIDE, PRESERVATIVE FREE 10 ML: 5 INJECTION INTRAVENOUS at 21:41

## 2021-09-16 RX ADMIN — VANCOMYCIN HYDROCHLORIDE 1750 MG: 5 INJECTION, POWDER, LYOPHILIZED, FOR SOLUTION INTRAVENOUS at 11:55

## 2021-09-16 RX ADMIN — FENTANYL CITRATE 25 MCG: 0.05 INJECTION, SOLUTION INTRAMUSCULAR; INTRAVENOUS at 01:31

## 2021-09-16 RX ADMIN — PIPERACILLIN AND TAZOBACTAM 3375 MG: 3; .375 INJECTION, POWDER, LYOPHILIZED, FOR SOLUTION INTRAVENOUS at 04:23

## 2021-09-16 RX ADMIN — CHLORHEXIDINE GLUCONATE 0.12% ORAL RINSE 15 ML: 1.2 LIQUID ORAL at 21:00

## 2021-09-16 RX ADMIN — ACETAMINOPHEN 650 MG: 650 SOLUTION ORAL at 21:26

## 2021-09-16 RX ADMIN — QUETIAPINE FUMARATE 50 MG: 25 TABLET ORAL at 08:12

## 2021-09-16 RX ADMIN — OXYCODONE 10 MG: 5 TABLET ORAL at 04:22

## 2021-09-16 RX ADMIN — DIAZEPAM 5 MG: 5 TABLET ORAL at 16:37

## 2021-09-16 RX ADMIN — ENOXAPARIN SODIUM 135 MG: 150 INJECTION SUBCUTANEOUS at 08:11

## 2021-09-16 RX ADMIN — Medication 300 UNITS: at 08:15

## 2021-09-16 RX ADMIN — IPRATROPIUM BROMIDE AND ALBUTEROL SULFATE 1 AMPULE: .5; 3 SOLUTION RESPIRATORY (INHALATION) at 08:23

## 2021-09-16 RX ADMIN — Medication 2000 UNITS: at 08:11

## 2021-09-16 RX ADMIN — IPRATROPIUM BROMIDE AND ALBUTEROL SULFATE 1 AMPULE: .5; 3 SOLUTION RESPIRATORY (INHALATION) at 13:53

## 2021-09-16 RX ADMIN — IPRATROPIUM BROMIDE AND ALBUTEROL SULFATE 1 AMPULE: .5; 3 SOLUTION RESPIRATORY (INHALATION) at 03:24

## 2021-09-16 RX ADMIN — OXYCODONE 10 MG: 5 TABLET ORAL at 19:55

## 2021-09-16 RX ADMIN — OXYCODONE 10 MG: 5 TABLET ORAL at 08:13

## 2021-09-16 RX ADMIN — PHENYTOIN 200 MG: 125 SUSPENSION ORAL at 21:00

## 2021-09-16 RX ADMIN — QUETIAPINE FUMARATE 50 MG: 25 TABLET ORAL at 21:40

## 2021-09-16 RX ADMIN — OXYCODONE HYDROCHLORIDE AND ACETAMINOPHEN 1000 MG: 500 TABLET ORAL at 08:14

## 2021-09-16 RX ADMIN — ENOXAPARIN SODIUM 135 MG: 150 INJECTION SUBCUTANEOUS at 21:26

## 2021-09-16 RX ADMIN — OXYCODONE 10 MG: 5 TABLET ORAL at 11:40

## 2021-09-16 RX ADMIN — PIPERACILLIN AND TAZOBACTAM 3375 MG: 3; .375 INJECTION, POWDER, LYOPHILIZED, FOR SOLUTION INTRAVENOUS at 20:02

## 2021-09-16 RX ADMIN — FENTANYL CITRATE 25 MCG: 0.05 INJECTION, SOLUTION INTRAMUSCULAR; INTRAVENOUS at 19:58

## 2021-09-16 RX ADMIN — INSULIN LISPRO 3 UNITS: 100 INJECTION, SOLUTION INTRAVENOUS; SUBCUTANEOUS at 11:41

## 2021-09-16 RX ADMIN — ZINC SULFATE 220 MG (50 MG) CAPSULE 50 MG: CAPSULE at 08:13

## 2021-09-16 RX ADMIN — SODIUM CHLORIDE, PRESERVATIVE FREE 10 ML: 5 INJECTION INTRAVENOUS at 21:30

## 2021-09-16 RX ADMIN — DIAZEPAM 5 MG: 5 TABLET ORAL at 23:30

## 2021-09-16 RX ADMIN — INSULIN LISPRO 3 UNITS: 100 INJECTION, SOLUTION INTRAVENOUS; SUBCUTANEOUS at 23:33

## 2021-09-16 RX ADMIN — SODIUM CHLORIDE SOLN NEBU 3% 4 ML: 3 NEBU SOLN at 19:56

## 2021-09-16 RX ADMIN — BENZONATATE 200 MG: 100 CAPSULE ORAL at 08:11

## 2021-09-16 RX ADMIN — PHENYTOIN 200 MG: 125 SUSPENSION ORAL at 08:12

## 2021-09-16 RX ADMIN — OXYCODONE 10 MG: 5 TABLET ORAL at 23:30

## 2021-09-16 RX ADMIN — CHLORHEXIDINE GLUCONATE 0.12% ORAL RINSE 15 ML: 1.2 LIQUID ORAL at 08:12

## 2021-09-16 RX ADMIN — TOBRAMYCIN SULFATE 300 MG: 40 INJECTION, SOLUTION INTRAMUSCULAR; INTRAVENOUS at 20:00

## 2021-09-16 RX ADMIN — FLUCONAZOLE 400 MG: 100 TABLET ORAL at 11:39

## 2021-09-16 RX ADMIN — Medication 300 UNITS: at 21:27

## 2021-09-16 RX ADMIN — TOBRAMYCIN SULFATE 300 MG: 40 INJECTION, SOLUTION INTRAMUSCULAR; INTRAVENOUS at 08:28

## 2021-09-16 RX ADMIN — IPRATROPIUM BROMIDE AND ALBUTEROL SULFATE 1 AMPULE: .5; 3 SOLUTION RESPIRATORY (INHALATION) at 16:19

## 2021-09-16 RX ADMIN — INSULIN GLARGINE 25 UNITS: 100 INJECTION, SOLUTION SUBCUTANEOUS at 09:07

## 2021-09-16 RX ADMIN — SODIUM CHLORIDE, PRESERVATIVE FREE 10 ML: 5 INJECTION INTRAVENOUS at 08:12

## 2021-09-16 RX ADMIN — LISINOPRIL 10 MG: 10 TABLET ORAL at 08:13

## 2021-09-16 RX ADMIN — PHENYTOIN 200 MG: 125 SUSPENSION ORAL at 15:00

## 2021-09-16 RX ADMIN — DIAZEPAM 5 MG: 5 TABLET ORAL at 11:40

## 2021-09-16 ASSESSMENT — PAIN SCALES - GENERAL
PAINLEVEL_OUTOF10: 10
PAINLEVEL_OUTOF10: 10
PAINLEVEL_OUTOF10: 0
PAINLEVEL_OUTOF10: 10
PAINLEVEL_OUTOF10: 1
PAINLEVEL_OUTOF10: 0
PAINLEVEL_OUTOF10: 4

## 2021-09-16 ASSESSMENT — PULMONARY FUNCTION TESTS
PIF_VALUE: 43
PIF_VALUE: 36
PIF_VALUE: 28
PIF_VALUE: 31
PIF_VALUE: 34
PIF_VALUE: 34
PIF_VALUE: 32
PIF_VALUE: 31
PIF_VALUE: 27
PIF_VALUE: 28
PIF_VALUE: 32
PIF_VALUE: 35
PIF_VALUE: 36
PIF_VALUE: 30
PIF_VALUE: 31
PIF_VALUE: 30
PIF_VALUE: 32
PIF_VALUE: 35
PIF_VALUE: 30
PIF_VALUE: 41
PIF_VALUE: 30
PIF_VALUE: 35
PIF_VALUE: 28
PIF_VALUE: 29

## 2021-09-16 NOTE — PROGRESS NOTES
The combination of fluconazole and quetiapine may increase the QTc interval.  Please monitor the patient's QTc interval.  Miki Ruth [facility] 9/16/2021 11:07 AM

## 2021-09-16 NOTE — PROGRESS NOTES
[facility] Progress Note    Admitting Date and Time: 8/21/2021  9:35 AM  Admit Dx: Acute hypoxemic respiratory failure due to COVID-19 (HCC) [U07.1, J96.01]  Pneumonia due to COVID-19 virus [U07.1, J12.82]    Subjective:  Patient is being followed for Acute hypoxemic respiratory failure due to COVID-19 ([address] [U07.1, J96.01]  Pneumonia due to COVID-19 virus [U07.1, J12.82]     Pt with tracheostomy. Responding to verbal stimuli. Reports no concerns. Per RN:     ROS: denies fever, chills, cp, sob, n/v, HA unless stated above.       dexamethasone  6 mg Oral Daily    oxyCODONE  10 mg Oral Q4H    diazePAM  5 mg Oral Q6H    phenytoin  200 mg Per G Tube TID    warfarin (COUMADIN) daily dosing (placeholder)   Other Daily    lansoprazole  30 mg Per G Tube QAM AC    QUEtiapine  50 mg Oral TID    sodium chloride flush  5-40 mL IntraVENous 2 times per day    heparin flush  3 mL IntraVENous 2 times per day    piperacillin-tazobactam  3,375 mg IntraVENous Q8H    enoxaparin  1 mg/kg SubCUTAneous BID    tobramycin  300 mg Inhalation Q12H    lisinopril  10 mg Oral Daily    insulin glargine  25 Units SubCUTAneous BID    ascorbic acid  1,000 mg Oral Daily    sodium chloride (Inhalant)  4 mL Nebulization Q12H    insulin lispro  0-18 Units SubCUTAneous Q6H    chlorhexidine  15 mL Mouth/Throat BID    ipratropium-albuterol  1 ampule Inhalation Q4H    amLODIPine  10 mg Oral Daily    atorvastatin  40 mg Oral Daily    Vitamin D  2,000 Units Oral Daily    zinc sulfate  50 mg Oral Daily     fentanNYL, 25 mcg, Q3H PRN  sodium chloride flush, 5-40 mL, PRN  sodium chloride, 25 mL, PRN  acetaminophen, 650 mg, Q4H PRN  labetalol, 20 mg, Q6H PRN  hydrALAZINE, 20 mg, Q6H PRN  sodium chloride (Inhalant), 4 mL, PRN  albuterol, 2.5 mg, Q6H PRN  benzonatate, 200 mg, TID PRN  glucose, 15 g, PRN  dextrose, 12.5 g, PRN  glucagon (rDNA), 1 mg, PRN  dextrose, 100 mL/hr, PRN  sodium chloride, 30 mL, PRN Objective:    BP (!) 168/77   Pulse 99   Temp 100.4 °F (38 °C) (Bladder)   Resp 29   Ht 6' (1.829 m)   Wt (!) 317 lb 10.9 oz (144.1 kg)   SpO2 94%   BMI 43.09 kg/m²     General Appearance: alert and responsive;in no acute distress  Skin: warm and dry  Head: normocephalic and atraumatic  Eyes: pupils equal, round, and reactive to light, extraocular eye movements intact, conjunctivae normal  Neck: neck supple and non tender without mass   Pulmonary/Chest: rhonchi noted  Cardiovascular: normal rate, normal S1 and S2 and no carotid bruits  Abdomen: soft, non-tender, non-distended, normal bowel sounds, no masses or organomegaly  Extremities: no cyanosis, no clubbing and no edema  Neurologic: no cranial nerve deficit and speech normal        Recent Labs     09/14/21  0535 09/15/21  0450 09/16/21  0525    135 135   K 3.4* 3.8 4.2   CL 99 97* 99   CO2 30* 29 29   BUN 12 11 14   CREATININE 0.4* 0.4* 0.5*   GLUCOSE 181* 198* 158*   CALCIUM 8.9 8.7 8.3*       Recent Labs     09/14/21  0535 09/15/21  0450 09/16/21  0525   WBC 13.5* 15.7* 18.2*   RBC 3.10* 3.11* 2.95*   HGB 9.2* 9.2* 8.7*   HCT 28.3* 28.4* 27.0*   MCV 91.3 91.3 91.5   MCH 29.7 29.6 29.5   MCHC 32.5 32.4 32.2   RDW 14.5 14.6 14.7    250 296   MPV 9.5 9.6 9.5       Radiology:   XR CHEST PORTABLE    Result Date: 9/15/2021  EXAMINATION: ONE XRAY VIEW OF THE CHEST 9/15/2021 10:43 am COMPARISON: 09/12/2021 HISTORY: ORDERING SYSTEM PROVIDED HISTORY: fever TECHNOLOGIST PROVIDED HISTORY: Reason for exam:->fever FINDINGS: Bilateral pulmonary infiltrates are unchanged. There are no obvious effusions. Tracheostomy tube and left-sided central line are appropriate.      No interval change     Assessment:    Principal Problem:    Acute hypoxemic respiratory failure due to COVID-19 [facility])  Active Problems:    Type 2 diabetes mellitus without complication, without long-term current use of insulin ([facility] [address]    History of seizures    Hyperlipidemia    Narayan catheter problem (HCC)    Sepsis ([facility] [address]    Thrombocytopenia (HCC)    Elevated LFTs  Resolved Problems:    OSCAR (acute kidney injury) ([facility] [address]    Lactic acidosis    Plan:  1. Acute hypoxic respiratory failure, most likely due to viral pneumonia, O2 sat was below 90% on room air, requiring mechanical ventilation. Successful tracheostomy placement. Going to [employer]. Making progress. Low grade temp today. Needs further treatment and assessment for temp. Repeat blood cultures and tracheostomy cultures pending. 2.  Acute VAP with MSSA/E.coli - on Zosyn and Vancomycin and Tobramycin nebs. To continue same until 9/25/21. Await recent cultures results from 9/15/21. 3. Severe COVID 19 infection - completed Remdesivir IV and Tocilizumab x 1. Decadron 6 mg orally daily. 4.  Hypokalemia - K+ 4.5. Replete as per protocol. Trend labs and treat accordingly. 5.  HTN - still trending high. Needs to continue meds with adjustments as needed. 6.  OSCAR, resolved. Creatinine 0.5 today. Avoid nephrotoxic agents. 7.  Hyperglycemia - trend and treat accordingly. Was on Januvia and Metformin prior to admission. On tube feedings at 20 ml/hr -> 45 ml/hr goal rate. On Lantus and high dose sliding scale. NOTE: This report was transcribed using voice recognition software. Every effort was made to ensure accuracy; however, inadvertent computerized transcription errors may be present.     Electronically signed by Umu Leal MD on 9/16/2021 at 8:55 AM

## 2021-09-16 NOTE — PROGRESS NOTES
Jordan Andres M.D.,[facility]  Amina Torrez D.O., F.A.C.O.I., Rox Giles M.D. Mike Santacruz M.D. Ramandeep Leiva D.O. Daily Pulmonary Progress Note    Patient:  Tera Calhoun 60 y.o. male MRN: [id number]     Date of Service: 9/16/2021      Synopsis     We are following patient for acute hypoxic respiratory failure, COVID-19 pneumonia      Code status: Full code      Subjective      Patient was seen and examined. He has been removed out of isolation. Fevers have been trending down. No longer tachypneic. Opens eyes and able to follow commands.       Review of Systems:  Unable to obtain    24-hour events:    As above  Objective   Vitals: BP (!) 111/59   Pulse 107   Temp 100.4 °F (38 °C) (Bladder)   Resp (!) 34   Ht 6' (1.829 m)   Wt (!) 317 lb 10.9 oz (144.1 kg)   SpO2 93%   BMI 43.09 kg/m²     I/O:    Intake/Output Summary (Last 24 hours) at 9/16/2021 1620  Last data filed at 9/16/2021 1400  Gross per 24 hour   Intake 1578 ml   Output 1270 ml   Net 308 ml       Vent Information  $Ventilation: $Subsequent Day  Skin Assessment: Clean, dry, & intact  Equipment ID: [id number]  Equipment Changed: (S) Humidification  Vent Type: 980  Vent Mode: AC/VC  Vt Ordered: 480 mL  Pressure Ordered: 20  Rate Set: 12 bmp  Peak Flow: 60 L/min  Pressure Support: 0 cmH20  FiO2 : 40 %  SpO2: 93 %  SpO2/FiO2 ratio: 232.5  Sensitivity: 3  PEEP/CPAP: 5  I Time/ I Time %: 0 s  Humidification Source: Heated wire  Humidification Temp: 37  Humidification Temp Measured: 36.9  Circuit Condensation: Drained  Mask Type: Full face mask  Mask Size: Medium       IPAP: 16 cmH20  CPAP/EPAP: 10 cmH2O     CURRENT MEDS :  Scheduled Meds:   fluconazole  400 mg Oral Daily    vancomycin  1,750 mg IntraVENous Q24H    warfarin  2.5 mg Oral Daily    dexamethasone  6 mg Oral Daily    oxyCODONE  10 mg Oral Q4H    diazePAM  5 mg Oral Q6H    phenytoin  200 mg Per G Tube TID    lansoprazole  30 mg Per G Tube QAM AC    QUEtiapine  50 mg Oral TID    sodium chloride flush  5-40 mL IntraVENous 2 times per day    heparin flush  3 mL IntraVENous 2 times per day    piperacillin-tazobactam  3,375 mg IntraVENous Q8H    enoxaparin  1 mg/kg SubCUTAneous BID    tobramycin  300 mg Inhalation Q12H    lisinopril  10 mg Oral Daily    insulin glargine  25 Units SubCUTAneous BID    ascorbic acid  1,000 mg Oral Daily    sodium chloride (Inhalant)  4 mL Nebulization Q12H    insulin lispro  0-18 Units SubCUTAneous Q6H    chlorhexidine  15 mL Mouth/Throat BID    ipratropium-albuterol  1 ampule Inhalation Q4H    amLODIPine  10 mg Oral Daily    atorvastatin  40 mg Oral Daily    Vitamin D  2,000 Units Oral Daily    zinc sulfate  50 mg Oral Daily       Physical Exam:  General Appearance: appears comfortable in no acute distress. HEENT: Normocephalic atraumatic without obvious abnormality   Neck: Lips, mucosa, and tongue normal.  Supple, symmetrical, trachea midline, no adenopathy;thyroid:  no enlargement/tenderness/nodules or JVD. Trachea midline  Lung: Breath sounds CTA. Respirations   unlabored. Symmetrical expansion. Heart: RRR, normal S1, S2. No MRG  Abdomen: Soft, NT, ND. BS present x 4 quadrants. No bruit or organomegaly. Extremities: Pedal pulses 2+ symmetric b/l. Extremities normal, no cyanosis, clubbing, or edema.    Neurologic: Opens eyes able to follow commands  Pertinent/ New Labs and Imaging Studies     Imaging Personally Reviewed:  No imaging today    ECHO      Labs:  Lab Results   Component Value Date    WBC 18.2 09/16/2021    HGB 8.7 09/16/2021    HCT 27.0 09/16/2021    MCV 91.5 09/16/2021    MCH 29.5 09/16/2021    MCHC 32.2 09/16/2021    RDW 14.7 09/16/2021     09/16/2021    MPV 9.5 09/16/2021     Lab Results   Component Value Date     09/16/2021    K 4.2 09/16/2021    K 3.7 08/21/2021    CL 99 09/16/2021    CO2 29 09/16/2021    BUN 14 09/16/2021    CREATININE 0.5 09/16/2021    LABALBU 2.6 09/16/2021 Denies heat intolerance and cold intolerance  Hematopoietic/Lymphatic: Denies bleeding problems and blood transfusions    24-hour events:      Objective   Vitals: BP (!) 111/59   Pulse 107   Temp 100.4 °F (38 °C) (Bladder)   Resp (!) 34   Ht 6' (1.829 m)   Wt (!) 317 lb 10.9 oz (144.1 kg)   SpO2 93%   BMI 43.09 kg/m²     I/O:    Intake/Output Summary (Last 24 hours) at 9/16/2021 1622  Last data filed at 9/16/2021 1400  Gross per 24 hour   Intake 1578 ml   Output 1270 ml   Net 308 ml       Vent Information  $Ventilation: $Subsequent Day  Skin Assessment: Clean, dry, & intact  Equipment ID: [id number]  Equipment Changed: (S) Humidification  Vent Type: 980  Vent Mode: AC/VC  Vt Ordered: 480 mL  Pressure Ordered: 20  Rate Set: 12 bmp  Peak Flow: 60 L/min  Pressure Support: 0 cmH20  FiO2 : 40 %  SpO2: 93 %  SpO2/FiO2 ratio: 232.5  Sensitivity: 3  PEEP/CPAP: 5  I Time/ I Time %: 0 s  Humidification Source: Heated wire  Humidification Temp: 37  Humidification Temp Measured: 36.9  Circuit Condensation: Drained  Mask Type: Full face mask  Mask Size: Medium       IPAP: 16 cmH20  CPAP/EPAP: 10 cmH2O     CURRENT MEDS :  Scheduled Meds:   fluconazole  400 mg Oral Daily    vancomycin  1,750 mg IntraVENous Q24H    warfarin  2.5 mg Oral Daily    dexamethasone  6 mg Oral Daily    oxyCODONE  10 mg Oral Q4H    diazePAM  5 mg Oral Q6H    phenytoin  200 mg Per G Tube TID    lansoprazole  30 mg Per G Tube QAM AC    QUEtiapine  50 mg Oral TID    sodium chloride flush  5-40 mL IntraVENous 2 times per day    heparin flush  3 mL IntraVENous 2 times per day    piperacillin-tazobactam  3,375 mg IntraVENous Q8H    enoxaparin  1 mg/kg SubCUTAneous BID    tobramycin  300 mg Inhalation Q12H    lisinopril  10 mg Oral Daily    insulin glargine  25 Units SubCUTAneous BID    ascorbic acid  1,000 mg Oral Daily    sodium chloride (Inhalant)  4 mL Nebulization Q12H    insulin lispro  0-18 Units SubCUTAneous Q6H    chlorhexidine  15 mL Mouth/Throat BID    ipratropium-albuterol  1 ampule Inhalation Q4H    amLODIPine  10 mg Oral Daily    atorvastatin  40 mg Oral Daily    Vitamin D  2,000 Units Oral Daily    zinc sulfate  50 mg Oral Daily       Physical Exam:  General Appearance: appears comfortable in no acute distress. HEENT: Normocephalic atraumatic without obvious abnormality , extraocular muscles are intact pupils are equal react light accommodation has 2 scabs on his cheeks from prone positioning. Neck: Lips, mucosa, and tongue normal.  Supple, symmetrical, trachea midline, no adenopathy;thyroid:  no enlargement/tenderness/nodules or JVD. Lung: Breath sounds CTA. Respirations   unlabored. Symmetrical expansion. Heart: RRR, normal S1, S2. No MRG  Abdomen: Soft, NT, ND. BS present x 4 quadrants. No bruit or organomegaly. Extremities: Pedal pulses 2+ symmetric b/l. Extremities normal, no cyanosis, clubbing, or edema. Musculokeletal: No joint swelling, no muscle tenderness. ROM normal in all joints of extremities. Neurologic: Mental status: Alert and Oriented X3 . Pertinent/ New Labs and Imaging Studies     Imaging Personally Reviewed:      ECHO      Labs:  Lab Results   Component Value Date    WBC 18.2 09/16/2021    HGB 8.7 09/16/2021    HCT 27.0 09/16/2021    MCV 91.5 09/16/2021    MCH 29.5 09/16/2021    MCHC 32.2 09/16/2021    RDW 14.7 09/16/2021     09/16/2021    MPV 9.5 09/16/2021     Lab Results   Component Value Date     09/16/2021    K 4.2 09/16/2021    K 3.7 08/21/2021    CL 99 09/16/2021    CO2 29 09/16/2021    BUN 14 09/16/2021    CREATININE 0.5 09/16/2021    LABALBU 2.6 09/16/2021    CALCIUM 8.3 09/16/2021    GFRAA >60 09/16/2021    LABGLOM >60 09/16/2021     Lab Results   Component Value Date    PROTIME 19.7 09/16/2021    INR 1.8 09/16/2021     No results for input(s): PROBNP in the last 72 hours. No results for input(s): PROCAL in the last 72 hours.   This SmartLink has not been configured with any valid records. Micro:  No results for input(s): CULTRESP in the last 72 hours. No results for input(s): LABGRAM in the last 72 hours. No results for input(s): LEGUR in the last 72 hours. No results for input(s): STREPNEUMAGU in the last 72 hours. No results for input(s): LP1UAG in the last 72 hours. Assessment:      1. Acute hypoxic respiratory failure  2. Severe covid-19 pneumonia  3. Bacterial pneumonia with MSSA, enterobacter, and Ecoli   4. Mucus plugging of bronchi s/p bronchoscopy 9/8/21  5. DVT LUE  6. Fevers/sepsis       Plan:     1. Trache PEG  9/9/21 Currently on full vent support. FiO2 down to 40% and PEEP of 5. Trials of weaning as tolerated. 2. Completed Remdesivir and Tocilizumab  3. Antibiotics as per ID - Zosyn and remains on inhaled Tobramycin till 9/25/2021 per ID, Diflucan was added today. 4. Chest physio/bronchodilators, s/p bronch  5. Dexamethasone wean  6.  Full dose anticoagulation    Hopefully can be transferred to select LTAC soon    Electronically signed by Ethel Brower MD on 9/16/2021 at 4:22 PM

## 2021-09-16 NOTE — PROGRESS NOTES
Critical Care Team - Daily Progress Note      Date and time: 9/16/2021 12:53 PM  Patient's name:  Tera Calhoun  Medical Record Number: [id number]  Patient's account/billing number: [de-identified]  Patient's YOB: 1965  Age: 60 y.o. Date of Admission: 8/21/2021  9:35 AM  Length of stay during current admission: 26      Primary Care Physician: Loco Salazar, DO  ICU Attending Physician: Dr. Lila Briones    Code Status: Full Code    Reason for ICU admission: Respiratory failure due to COVID-19      SUBJECTIVE:     OVERNIGHT EVENTS:           8/30: tolerating longer periods supine, thick dark brown secretions     8/31: secretions out of NG, michelle prone     9/1: Decreased urine output and increased creatinine today, continues with thick secretions     9/2: Cr and Uop improved     9/3: P/F improved and has remained supine     9/4: thick secretions     9/5: desat with turning, 1500 loose stool from [facility]    9/6: Patient trying low tidal volumes overnight occasionally, otherwise no other acute events. 9/7: No acute events overnight    9/8: Did receive labetalol 2 times overnight for elevated blood pressures otherwise unremarkable good urine output. Will need essentially exchanged today, bronchoscopy for retained left lung and bibasilar mucopurulent secretions     9/10-no acute events overnight did receive trach and PEG yesterday, will wean sedation as able. , upper extremity DVT noted    9/11: No acute events overnight, sedation was increased overnight due to increased respirations, will slowly come down on that according to nursing staff.    9/12: bumex TID, seroquel to 50 mg PO BID    9/13-patient is improving, is able to follow commands at this time, is able to be transferred to [facility] once  sedation is weaned off, will do that today. 9/14, patient able to be weaned off IV sedation, and doing well.     9/15: Patient is off IV sedation, tachypneic respirations high 30s to low 40s, repeat Covid test did come back positive again. Markedly febrile. : Patient febrile overnight, agitation continues however he is tolerating ventilator better respirations were down in the 20s instead of the 40s yesterday. OBJECTIVE:     VITAL SIGNS:  BP (!) 178/83   Pulse 112   Temp 100.4 °F (38 °C) (Bladder)   Resp (!) 34   Ht 6' (1.829 m)   Wt (!) 317 lb 10.9 oz (144.1 kg)   SpO2 95%   BMI 43.09 kg/m²   Tmax over 24 hours:  Temp (24hrs), Av.6 °F (38.1 °C), Min:99.9 °F (37.7 °C), Max:101.8 °F (38.8 °C)      Patient Vitals for the past 6 hrs:   BP Temp Temp src Pulse Resp SpO2   21 1115 (!) 178/83 -- -- -- -- --   21 1100 (!) 168/79 -- -- 112 (!) 34 95 %   21 1000 (!) 159/68 -- -- 117 28 96 %   21 0900 (!) 162/76 -- -- 98 24 94 %   21 0830 -- -- -- -- 29 94 %   21 0826 -- -- -- -- 27 94 %   21 0820 -- -- -- 99 30 93 %   21 0800 (!) 166/78 100.4 °F (38 °C) Bladder 103 25 95 %   21 0700 (!) 155/74 -- -- 107 (!) 32 96 %         Intake/Output Summary (Last 24 hours) at 2021 1253  Last data filed at 2021 0800  Gross per 24 hour   Intake 1399 ml   Output 1220 ml   Net 179 ml     Wt Readings from Last 2 Encounters:   21 (!) 317 lb 10.9 oz (144.1 kg)   21 (!) 320 lb (145.2 kg)     Body mass index is 43.09 kg/m².         PHYSICAL EXAMINATION:    General appearance -no acute distress, trach present on ventilator  Mental status -patient is able to follow commands and tell the date as well as his birthday is coming up  Eyes - pupils equal and reactive, extraocular eye movements intact  Mouth - mucous membranes moist, pharynx normal without lesions  Neck - supple, no significant adenopathy  Chest -tracheostomy present, decreased breath sounds no evidence of crackles rhonchi noted bilaterally  heart - normal rate, regular rhythm, normal S1, S2, no murmurs, rubs, clicks or gallops  Abdomen - soft, nontender, nondistended, no masses or organomegaly  Neurological -following commands, no focal neurological deficits, is able to answer questions  Extremities - peripheral pulses normal, no pedal edema, no clubbing or cyanosis  Skin -patient did have skin breakdown to the face as well as blistering on the hands     Any additional physical findings:    MEDICATIONS:    Scheduled Meds:   fluconazole  400 mg Oral Daily    vancomycin  1,750 mg IntraVENous Q24H    warfarin  2.5 mg Oral Daily    dexamethasone  6 mg Oral Daily    oxyCODONE  10 mg Oral Q4H    diazePAM  5 mg Oral Q6H    phenytoin  200 mg Per G Tube TID    lansoprazole  30 mg Per G Tube QAM AC    QUEtiapine  50 mg Oral TID    sodium chloride flush  5-40 mL IntraVENous 2 times per day    heparin flush  3 mL IntraVENous 2 times per day    piperacillin-tazobactam  3,375 mg IntraVENous Q8H    enoxaparin  1 mg/kg SubCUTAneous BID    tobramycin  300 mg Inhalation Q12H    lisinopril  10 mg Oral Daily    insulin glargine  25 Units SubCUTAneous BID    ascorbic acid  1,000 mg Oral Daily    sodium chloride (Inhalant)  4 mL Nebulization Q12H    insulin lispro  0-18 Units SubCUTAneous Q6H    chlorhexidine  15 mL Mouth/Throat BID    ipratropium-albuterol  1 ampule Inhalation Q4H    amLODIPine  10 mg Oral Daily    atorvastatin  40 mg Oral Daily    Vitamin D  2,000 Units Oral Daily    zinc sulfate  50 mg Oral Daily     Continuous Infusions:   sodium chloride      dextrose       PRN Meds:   fentanNYL, 25 mcg, Q3H PRN  sodium chloride flush, 5-40 mL, PRN  sodium chloride, 25 mL, PRN  acetaminophen, 650 mg, Q4H PRN  labetalol, 20 mg, Q6H PRN  hydrALAZINE, 20 mg, Q6H PRN  sodium chloride (Inhalant), 4 mL, PRN  albuterol, 2.5 mg, Q6H PRN  benzonatate, 200 mg, TID PRN  glucose, 15 g, PRN  dextrose, 12.5 g, PRN  glucagon (rDNA), 1 mg, PRN  dextrose, 100 mL/hr, PRN  sodium chloride, 30 mL, PRN          VENT SETTINGS (Comprehensive) (if applicable):  Vent Information  $Ventilation: $Subsequent Day  Skin Assessment: Clean, dry, & intact  Equipment ID: [id number]  Equipment Changed: (S) Humidification  Vent Type: 980  Vent Mode: AC/VC  Vt Ordered: 480 mL  Pressure Ordered: 20  Rate Set: 12 bmp  Peak Flow: 60 L/min  Pressure Support: 0 cmH20  FiO2 : 45 %  SpO2: 95 %  SpO2/FiO2 ratio: 211.11  Sensitivity: 3  PEEP/CPAP: 6  I Time/ I Time %: 0 s  Humidification Source: Heated wire  Humidification Temp: 37  Humidification Temp Measured: 36.9  Circuit Condensation: Drained  Mask Type: Full face mask  Mask Size: Medium  Additional Respiratory  Assessments  Pulse: 112  Resp: (!) 34  SpO2: 95 %  Position: Semi-Sellers's  Humidification Source: Heated wire  Humidification Temp: 37  Circuit Condensation: Drained  Oral Care: Mouth suctioned  Subglottic Suction Done?: No  Airway Type: Trachial  Airway Size: 78  Cuff Pressure (cm H2O): 29 cm H2O    ABGs:     Laboratory findings:    Complete Blood Count:   Recent Labs     09/14/21  0535 09/15/21  0450 09/16/21  0525   WBC 13.5* 15.7* 18.2*   HGB 9.2* 9.2* 8.7*   HCT 28.3* 28.4* 27.0*    250 296        Last 3 Blood Glucose:   Recent Labs     09/14/21  0535 09/15/21  0450 09/16/21  0525   GLUCOSE 181* 198* 158*        PT/INR:    Lab Results   Component Value Date    PROTIME 19.7 09/16/2021    INR 1.8 09/16/2021     PTT:  No results found for: APTT, PTT    Comprehensive Metabolic Profile:   Recent Labs     09/14/21  0535 09/14/21  0535 09/15/21  0450 09/15/21  0450 09/16/21  0525     --  135  --  135   K 3.4*  --  3.8  --  4.2   CL 99  --  97*  --  99   CO2 30*  --  29  --  29   BUN 12  --  11  --  14   CREATININE 0.4*  --  0.4*  --  0.5*   GLUCOSE 181*  --  198*  --  158*   CALCIUM 8.9  --  8.7  --  8.3*   PROT 6.6   < > 6.5   < > 6.3*   LABALBU 3.0*   < > 2.9*   < > 2.6*   BILITOT 0.6   < > 0.6   < > 0.5   ALKPHOS 123   < > 122   < > 127   *   < > 201*   < > 121*   *   < > 151*   < > 124*    < > = values in this interval not displayed.       Magnesium: Lab Results   Component Value Date    MG 2.1 09/16/2021     Phosphorus:   Lab Results   Component Value Date    PHOS 2.9 09/16/2021     Ionized Calcium: No results found for: CANDICE     Urinalysis:     Troponin: No results for input(s): TROPONINI in the last 72 hours. ASSESSMENT:     Patient Active Problem List    Diagnosis Date Noted    Elevated LFTs     Busch catheter problem ([facility] [address] 08/26/2021    Sepsis ([facility] [address] 08/26/2021    Thrombocytopenia ([facility] [address] 08/26/2021    Hyperlipidemia 08/22/2021    Acute hypoxemic respiratory failure due to COVID-19 [facility]) 08/21/2021    Type 2 diabetes mellitus without complication, without long-term current use of insulin ([facility] [address] 08/10/2021    History of seizures 08/10/2021     SYSTEMS ASSESSMENT    Neuro     Following commands at this time  Patient is able to be weaned at this time  Oral Valium every 6 hours, OxyIR 10 mg every 4 hours  Seroquel 50 mg three times daily  Continue to monitor      Respiratory     Acute hypoxic respiratory failure requiring mechanical ventilation  Severe COVID-19 pneumonia s/p toe C, remdesivir  Pneumonia VAP-MSSA, E. coli  On pip/ tazo  Trach/PEG    Bronchoscopy 9/8/2021 due to white out left side chest on x-ray. DuoNebs every 4 hours  6 mg Decadron daily  Wean oxygen as tolerated.  Keep O2 sat 90-92%    Cardiovascular     Hypertension  Amlodipine-10 mg  Lisinopril-10 mg  Lipitor 40 mg    Gastrointestinal     GI prophylaxis-Protonix  Bowel regimen  Continue monitor    Renal     OSCAR-improved  Hypokalemia-potassium replaced  Hypomagnesemia-magnesium replaced  Avoid nephrotoxins  Continue to monitor     Infectious Disease     Pneumonia VAP-positive for MSSA, E. coli  lactic acidosis-resolved  Inhaled tobramycin for possible tracheobronchitis  Follow cultures of previous central line tip and new blood cultures from 9/11  Infectious disease following  Zosyn, vancomycin, fluconazole due to fevers, could be due to line infection versus tracheostomy site culture pending at this time, tip of central line was negative at this time. Tracheostomy site culture    Hematology/Oncology     DVT left upper arm-we will transition to warfarin due to high bmi  Anemia  Hemoglobin stable  Continue to monitor    Endocrine     Hyperglycemia  Lantus 25 units  High-dose sliding scale    Social/Spiritual/DNR/Other     Full code  vitamin regimen  DVT prophylaxis Lovenox     The patient was seen and evaluated by myself and Sarai Robin MD PGY-2  9/16/2021 12:53 PM    Critical Care Attending Addendum:    Patient seen and examined with the house staff. X-rays personally reviewed through the PACS. Family is updated at the bedside as available. Additional findings listed below as necessary. Additional comments:  1. Acute respiratory failure improved down to 40% +5. [facility] for [facility] soon. 2. Fevers doing better. Continue current vanco/ Zosyn. 3. DM controlled. 4. DW Dr. Ira Babin.   5.

## 2021-09-16 NOTE — PATIENT CARE CONFERENCE
Intensive Care Daily Quality Rounding Checklist        ICU Team Members:  Dr. Bertha Hurtado,, clinical pharmacist bedside nurse, charge nurse, respiratory therapy     ICU Day #:  Number 27     Intubation Date:  Aug 23     Ventilator Day #:  Number 24 (trach placed 9/9)     Central Line Insertion Date:  Sept 11                                                    Day #:  Number 6      Arterial Line Insertion Date: N/A                             Day #: N/A     Temporary Hemodialysis Catheter Insertion Date: N/A                             Day #:  N/A     DVT Prophylaxis: Lovenox    GI Prophylaxis: Protonix     Busch Catheter Insertion Date: Sept 13                                        Day #: Number 4                             Continued need (if yes, reason documented and discussed with physician): need for accurate I+O's     Skin Issues/ Wounds and ordered treatment discussed on rounds: has numerous healing ulcers from time proned     Goals/ Plans for the Day: labs, vent management

## 2021-09-16 NOTE — PROGRESS NOTES
[facility]  [facility]  Progress Note      Chief Complaint   Patient presents with    Shortness of Breath     covid, per family SaO2 75% RA, hx of DM and panic attack        SUBJECTIVE:  8/21/21  Prone. FiO2 60% and PEEP of 16    8/22/2021  Patient is tolerating medications. No reported adverse drug reactions. No nausea, vomiting, diarrhea. Afebrile BiPAP 100% FiO2 with breathing at 44 times a minute and probably is going to decompensate  8/23/2021  Not doing well intubated on a rotating bed  FiO2 70% and PEEP 16  8/24/2021  Prone 50% FiO2-PEEP of 10  Issues with the urinary Busch last night this was changed  Paralyzed and on Versed  8/25/2021  Paralyzed and sedated with Versed no pressors  Prone intubated on 50% FiO2, PEEP of 10  Tolerating medications    8/26/21  Paralyzed and sedated with Versed no pressors  Prone intubated on 70% FiO2, PEEP of 10  Does not tolerate supine position    8/27/2021  Afebrile  Still prone on FiO2 of 100%  Paralyzed and sedated    8/28/2021  The patient is still in the ICU. He is pronated. FiO2 100%. Is still sedated and paralyzed. Fair amount of thick, brown secretions    8/29/2021  The patient is still on a RotoProne bed. You are still intubated, sedated and paralyzed. O2 is being weaned down. FiO2 55%. PEEP 12.    8/30/2021  Patient remains intubated, sedated and paralyzed. He is still on a RotoProne bed. Supine now. 8/31/2021  He is still on a RotoProne bed. Still having fair amount of secretions from the nostrils. Minimal from the ET tube, however. Tube feeding seems to be coming out of his nose. 9/1/2021  He is supinated but still on a RotoProne bed. He still has fair amount of secretions from the ET tube. Tolerating antibiotic    9/2/2021. He is still on a RotoProne bed. He is pronated. Less secretions from the ET tube and nostrils. FiO2 50%. PEEP 14.    9/3/2021. He is off the RotoProne bed. Tolerating supination.   Tolerating Oral Daily    dexamethasone  6 mg Oral Daily    oxyCODONE  10 mg Oral Q4H    diazePAM  5 mg Oral Q6H    phenytoin  200 mg Per G Tube TID    lansoprazole  30 mg Per G Tube QAM AC    QUEtiapine  50 mg Oral TID    sodium chloride flush  5-40 mL IntraVENous 2 times per day    heparin flush  3 mL IntraVENous 2 times per day    piperacillin-tazobactam  3,375 mg IntraVENous Q8H    enoxaparin  1 mg/kg SubCUTAneous BID    tobramycin  300 mg Inhalation Q12H    lisinopril  10 mg Oral Daily    insulin glargine  25 Units SubCUTAneous BID    ascorbic acid  1,000 mg Oral Daily    sodium chloride (Inhalant)  4 mL Nebulization Q12H    insulin lispro  0-18 Units SubCUTAneous Q6H    chlorhexidine  15 mL Mouth/Throat BID    ipratropium-albuterol  1 ampule Inhalation Q4H    amLODIPine  10 mg Oral Daily    atorvastatin  40 mg Oral Daily    Vitamin D  2,000 Units Oral Daily    zinc sulfate  50 mg Oral Daily     Continuous Infusions:   sodium chloride      dextrose       PRN Meds:fentanNYL, sodium chloride flush, sodium chloride, acetaminophen, labetalol, hydrALAZINE, sodium chloride (Inhalant), albuterol, benzonatate, glucose, dextrose, glucagon (rDNA), dextrose, sodium chloride    OBJECTIVE:  BP (!) 162/76   Pulse 98   Temp 100.4 °F (38 °C) (Bladder)   Resp 24   Ht 6' (1.829 m)   Wt (!) 317 lb 10.9 oz (144.1 kg)   SpO2 94%   BMI 43.09 kg/m²   Temp  Av °F (38.3 °C)  Min: 99.9 °F (37.7 °C)  Max: 102.7 °F (39.3 °C)     Constitutional: The patient is lying in bed in the ICU. He is awake and alert. He gets somewhat agitated when  examiner walks in the room. FiO2 45%. PEEP 6. No changes. Skin: Warm and dry. No rashes were noted. HEENT: Round and nonreactive pupils. Decubitus lesions on cheeks, hands and lower extremities noted. Neck: Supple. Tracheostomy. PEG. Chest: Coarse breath sounds bilaterally. No crackles  Cardiovascular: Heart sounds rhythmic and regular. Tachycardic.   Abdomen: Round, soft and benign to palpation. Genitourinary: Busch catheter  Extremities: Moderate edema. Lines: Left IJ TLC 9/11/2021. Busch changed 3/24/2021    Laboratory and Tests Review:  Lab Results   Component Value Date    WBC 18.2 (H) 09/16/2021    WBC 15.7 (H) 09/15/2021    WBC 13.5 (H) 09/14/2021    HGB 8.7 (L) 09/16/2021    HCT 27.0 (L) 09/16/2021    MCV 91.5 09/16/2021     09/16/2021     Lab Results   Component Value Date    NEUTROABS 14.35 (H) 09/16/2021    NEUTROABS 13.38 (H) 09/15/2021    NEUTROABS 11.43 (H) 09/14/2021     No results found for: [facility]  Lab Results   Component Value Date     (H) 09/16/2021     (H) 09/16/2021    ALKPHOS 127 09/16/2021    BILITOT 0.5 09/16/2021     Lab Results   Component Value Date     09/16/2021    K 4.2 09/16/2021    K 3.7 08/21/2021    CL 99 09/16/2021    CO2 29 09/16/2021    BUN 14 09/16/2021    CREATININE 0.5 09/16/2021    CREATININE 0.4 09/15/2021    CREATININE 0.4 09/14/2021    GFRAA >60 09/16/2021    LABGLOM >60 09/16/2021    GLUCOSE 158 09/16/2021    PROT 6.3 09/16/2021    LABALBU 2.6 09/16/2021    CALCIUM 8.3 09/16/2021    BILITOT 0.5 09/16/2021    ALKPHOS 127 09/16/2021     09/16/2021     09/16/2021     Lab Results   Component Value Date    CRP 29.4 (H) 09/16/2021    CRP 25.5 (H) 09/15/2021    CRP 26.7 (H) 09/14/2021     Lab Results   Component Value Date    SEDRATE 125 (H) 09/16/2021    SEDRATE 120 (H) 09/15/2021    SEDRATE 125 (H) 09/14/2021     Radiology:      Microbiology:   Streptococcus pneumoniae/Legionella urine Ag: negative  Nares screen MRSA: Negative  Urine culture 8/24/2021: Negative  Blood cultures 8/21/2021: Negative x2  Blood cultures 9/15/2021: Negative so far  Respiratory culture 8/27/2021: OP terrell reduced  Respiratory culture 8/30/2021: E. coli, MSSA   BAL 9/8/2021: OP terrell reduced. E. coli, Enterobacter cloacae, MSSA.   PjP negative  TLC tip culture 9/11/2021: Negative  Tracheostomy site culture 9/15/2021: Pending    ASSESSMENT:  · Severe Covid pneumonia causing acute respiratory failure CRP has come down nicely. Completed Remdesivir. Received Tocilizumab  · VAP versus tracheobronchitis. Cultures growing MSSA and E. coli. Improving. Treated with 10 days of Cefazolin  · Status post bronchoscopy 9/8/2021. Cultures growing rare MSSA,, Enterobacter cloacae. Possible tracheobronchitis  · Leukocytosis associated to the above, stable  · Status post tracheostomy and PEG 9/10/2021   · Fevers probably secondary to line infection. Lines were changed. Tip cultures were negative. Temperatures were trending downward but they have come up again. There is no eosinophilia or rash to suspect drug-induced fever  · Possible tracheitis    PLAN:  · Continue Zosyn and Tobramycin by inhalation until 9/25/2021  · Resume Vancomycin.   Check trough tomorrow  · Add Fluconazole  · Check tracheostomy site culture  · We will follow with you  · Awaiting transfer to [facility] with nursing    Michelle Ribeiro MD  10:50 AM   9/16/2021

## 2021-09-17 ENCOUNTER — APPOINTMENT (OUTPATIENT)
Dept: GENERAL RADIOLOGY | Age: 56
DRG: 005 | End: 2021-09-17
Payer: COMMERCIAL

## 2021-09-17 LAB
AADO2: 399.3 MMHG
ALBUMIN SERPL-MCNC: 2.6 G/DL (ref 3.5–5.2)
ALP BLD-CCNC: 117 U/L (ref 40–129)
ALT SERPL-CCNC: 139 U/L (ref 0–40)
ANION GAP SERPL CALCULATED.3IONS-SCNC: 10 MMOL/L (ref 7–16)
AST SERPL-CCNC: 151 U/L (ref 0–39)
B.E.: 3.4 MMOL/L (ref -3–3)
BASOPHILS ABSOLUTE: 0.17 E9/L (ref 0–0.2)
BASOPHILS RELATIVE PERCENT: 0.8 % (ref 0–2)
BILIRUB SERPL-MCNC: 0.4 MG/DL (ref 0–1.2)
BOTTLE TYPE: ABNORMAL
BUN BLDV-MCNC: 15 MG/DL (ref 6–20)
C-REACTIVE PROTEIN: 22.7 MG/DL (ref 0–0.4)
CALCIUM SERPL-MCNC: 8.6 MG/DL (ref 8.6–10.2)
CANDIDA ALBICANS BY PCR: NOT DETECTED
CANDIDA GLABRATA BY PCR: NOT DETECTED
CANDIDA KRUSEI BY PCR: NOT DETECTED
CANDIDA PARAPSILOSIS BY PCR: NOT DETECTED
CANDIDA TROPICALIS BY PCR: NOT DETECTED
CHLORIDE BLD-SCNC: 96 MMOL/L (ref 98–107)
CO2: 28 MMOL/L (ref 22–29)
COHB: 1.1 % (ref 0–1.5)
CREAT SERPL-MCNC: 0.5 MG/DL (ref 0.7–1.2)
CRITICAL: ABNORMAL
DATE ANALYZED: ABNORMAL
DATE OF COLLECTION: ABNORMAL
EKG ATRIAL RATE: 114 BPM
EKG P AXIS: 24 DEGREES
EKG P-R INTERVAL: 152 MS
EKG Q-T INTERVAL: 328 MS
EKG QRS DURATION: 100 MS
EKG QTC CALCULATION (BAZETT): 452 MS
EKG R AXIS: 8 DEGREES
EKG T AXIS: 38 DEGREES
EKG VENTRICULAR RATE: 114 BPM
ENTEROBACTER CLOACAE COMPLEX BY PCR: NOT DETECTED
ENTEROBACTERALES BY PCR: NOT DETECTED
ENTEROCOCCUS BY PCR: NOT DETECTED
EOSINOPHILS ABSOLUTE: 0.76 E9/L (ref 0.05–0.5)
EOSINOPHILS RELATIVE PERCENT: 3.5 % (ref 0–6)
ESCHERICHIA COLI BY PCR: NOT DETECTED
FIO2: 80 %
GFR AFRICAN AMERICAN: >60
GFR NON-AFRICAN AMERICAN: >60 ML/MIN/1.73
GLUCOSE BLD-MCNC: 236 MG/DL (ref 74–99)
HAEMOPHILUS INFLUENZAE BY PCR: NOT DETECTED
HCO3: 27.5 MMOL/L (ref 22–26)
HCT VFR BLD CALC: 31 % (ref 37–54)
HEMOGLOBIN: 9.7 G/DL (ref 12.5–16.5)
HHB: 1.7 % (ref 0–5)
HYPOCHROMIA: ABNORMAL
INR BLD: 1.6
KLEBSIELLA OXYTOCA BY PCR: NOT DETECTED
KLEBSIELLA PNEUMONIAE GROUP BY PCR: NOT DETECTED
LAB: ABNORMAL
LISTERIA MONOCYTOGENES BY PCR: NOT DETECTED
LYMPHOCYTES ABSOLUTE: 0.86 E9/L (ref 1.5–4)
LYMPHOCYTES RELATIVE PERCENT: 3.5 % (ref 20–42)
Lab: ABNORMAL
MAGNESIUM: 1.9 MG/DL (ref 1.6–2.6)
MCH RBC QN AUTO: 29.1 PG (ref 26–35)
MCHC RBC AUTO-ENTMCNC: 31.3 % (ref 32–34.5)
MCV RBC AUTO: 93.1 FL (ref 80–99.9)
METAMYELOCYTES RELATIVE PERCENT: 0.9 % (ref 0–1)
METER GLUCOSE: 159 MG/DL (ref 74–99)
METER GLUCOSE: 188 MG/DL (ref 74–99)
METER GLUCOSE: 190 MG/DL (ref 74–99)
METER GLUCOSE: 220 MG/DL (ref 74–99)
METER GLUCOSE: 231 MG/DL (ref 74–99)
METHB: 0.2 % (ref 0–1.5)
METHICILLIN RESISTANCE MECA/C  BY PCR: DETECTED
MODE: AC
MONOCYTES ABSOLUTE: 1.08 E9/L (ref 0.1–0.95)
MONOCYTES RELATIVE PERCENT: 5.2 % (ref 2–12)
MYELOCYTE PERCENT: 0.9 % (ref 0–0)
NEISSERIA MENINGITIDIS BY PCR: NOT DETECTED
NEUTROPHILS ABSOLUTE: 18.79 E9/L (ref 1.8–7.3)
NEUTROPHILS RELATIVE PERCENT: 85.2 % (ref 43–80)
NUCLEATED RED BLOOD CELLS: 0.9 /100 WBC
O2 CONTENT: 15.3 ML/DL
O2 SATURATION: 98.3 % (ref 92–98.5)
O2HB: 97 % (ref 94–97)
OPERATOR ID: 3342
ORDER NUMBER: ABNORMAL
PATIENT TEMP: 37 C
PCO2: 39.8 MMHG (ref 35–45)
PDW BLD-RTO: 15 FL (ref 11.5–15)
PEEP/CPAP: 5 CMH2O
PFO2: 1.37 MMHG/%
PH BLOOD GAS: 7.46 (ref 7.35–7.45)
PHOSPHORUS: 4.4 MG/DL (ref 2.5–4.5)
PLATELET # BLD: 376 E9/L (ref 130–450)
PMV BLD AUTO: 9.5 FL (ref 7–12)
PO2: 109.3 MMHG (ref 75–100)
POIKILOCYTES: ABNORMAL
POLYCHROMASIA: ABNORMAL
POTASSIUM SERPL-SCNC: 4.4 MMOL/L (ref 3.5–5)
PROTEUS SPECIES BY PCR: NOT DETECTED
PROTHROMBIN TIME: 17.6 SEC (ref 9.3–12.4)
PSEUDOMONAS AERUGINOSA BY PCR: NOT DETECTED
RBC # BLD: 3.33 E12/L (ref 3.8–5.8)
RI(T): 365 %
ROULEAUX: ABNORMAL
RR MECHANICAL: 12 B/MIN
SCHISTOCYTES: ABNORMAL
SEDIMENTATION RATE, ERYTHROCYTE: 125 MM/HR (ref 0–15)
SERRATIA MARCESCENS BY PCR: NOT DETECTED
SODIUM BLD-SCNC: 134 MMOL/L (ref 132–146)
SOURCE OF BLOOD CULTURE: ABNORMAL
SOURCE, BLOOD GAS: ABNORMAL
STAPHYLOCOCCUS AUREUS BY PCR: NOT DETECTED
STAPHYLOCOCCUS SPECIES BY PCR: DETECTED
STREPTOCOCCUS AGALACTIAE BY PCR: NOT DETECTED
STREPTOCOCCUS PNEUMONIAE BY PCR: NOT DETECTED
STREPTOCOCCUS PYOGENES  BY PCR: NOT DETECTED
STREPTOCOCCUS SPECIES BY PCR: NOT DETECTED
THB: 11.1 G/DL (ref 11.5–16.5)
TIME ANALYZED: 332
TOTAL PROTEIN: 6.8 G/DL (ref 6.4–8.3)
TROPONIN, HIGH SENSITIVITY: 65 NG/L (ref 0–11)
VANCOMYCIN TROUGH: 6.9 MCG/ML (ref 5–16)
VT MECHANICAL: 480 ML
WBC # BLD: 21.6 E9/L (ref 4.5–11.5)

## 2021-09-17 PROCEDURE — 6370000000 HC RX 637 (ALT 250 FOR IP): Performed by: SURGERY

## 2021-09-17 PROCEDURE — 2500000003 HC RX 250 WO HCPCS: Performed by: INTERNAL MEDICINE

## 2021-09-17 PROCEDURE — 85651 RBC SED RATE NONAUTOMATED: CPT

## 2021-09-17 PROCEDURE — 82805 BLOOD GASES W/O2 SATURATION: CPT

## 2021-09-17 PROCEDURE — 71045 X-RAY EXAM CHEST 1 VIEW: CPT

## 2021-09-17 PROCEDURE — 83735 ASSAY OF MAGNESIUM: CPT

## 2021-09-17 PROCEDURE — 80053 COMPREHEN METABOLIC PANEL: CPT

## 2021-09-17 PROCEDURE — 93005 ELECTROCARDIOGRAM TRACING: CPT | Performed by: INTERNAL MEDICINE

## 2021-09-17 PROCEDURE — 6370000000 HC RX 637 (ALT 250 FOR IP): Performed by: STUDENT IN AN ORGANIZED HEALTH CARE EDUCATION/TRAINING PROGRAM

## 2021-09-17 PROCEDURE — 84100 ASSAY OF PHOSPHORUS: CPT

## 2021-09-17 PROCEDURE — 6360000002 HC RX W HCPCS: Performed by: SPECIALIST

## 2021-09-17 PROCEDURE — 6370000000 HC RX 637 (ALT 250 FOR IP): Performed by: INTERNAL MEDICINE

## 2021-09-17 PROCEDURE — 2580000003 HC RX 258: Performed by: SURGERY

## 2021-09-17 PROCEDURE — 2580000003 HC RX 258: Performed by: INTERNAL MEDICINE

## 2021-09-17 PROCEDURE — 6360000002 HC RX W HCPCS: Performed by: INTERNAL MEDICINE

## 2021-09-17 PROCEDURE — 85610 PROTHROMBIN TIME: CPT

## 2021-09-17 PROCEDURE — 2580000003 HC RX 258: Performed by: STUDENT IN AN ORGANIZED HEALTH CARE EDUCATION/TRAINING PROGRAM

## 2021-09-17 PROCEDURE — 86140 C-REACTIVE PROTEIN: CPT

## 2021-09-17 PROCEDURE — 93010 ELECTROCARDIOGRAM REPORT: CPT | Performed by: INTERNAL MEDICINE

## 2021-09-17 PROCEDURE — 82962 GLUCOSE BLOOD TEST: CPT

## 2021-09-17 PROCEDURE — 94003 VENT MGMT INPAT SUBQ DAY: CPT

## 2021-09-17 PROCEDURE — 2500000003 HC RX 250 WO HCPCS: Performed by: SURGERY

## 2021-09-17 PROCEDURE — 6370000000 HC RX 637 (ALT 250 FOR IP): Performed by: SPECIALIST

## 2021-09-17 PROCEDURE — 2000000000 HC ICU R&B

## 2021-09-17 PROCEDURE — 36415 COLL VENOUS BLD VENIPUNCTURE: CPT

## 2021-09-17 PROCEDURE — 94640 AIRWAY INHALATION TREATMENT: CPT

## 2021-09-17 PROCEDURE — 99233 SBSQ HOSP IP/OBS HIGH 50: CPT | Performed by: FAMILY MEDICINE

## 2021-09-17 PROCEDURE — 85025 COMPLETE CBC W/AUTO DIFF WBC: CPT

## 2021-09-17 PROCEDURE — 84484 ASSAY OF TROPONIN QUANT: CPT

## 2021-09-17 PROCEDURE — 36600 WITHDRAWAL OF ARTERIAL BLOOD: CPT

## 2021-09-17 PROCEDURE — 6360000002 HC RX W HCPCS: Performed by: SURGERY

## 2021-09-17 PROCEDURE — 2580000003 HC RX 258: Performed by: SPECIALIST

## 2021-09-17 PROCEDURE — 36592 COLLECT BLOOD FROM PICC: CPT

## 2021-09-17 PROCEDURE — 80202 ASSAY OF VANCOMYCIN: CPT

## 2021-09-17 RX ORDER — SODIUM CHLORIDE 0.9 % (FLUSH) 0.9 %
5-40 SYRINGE (ML) INJECTION PRN
Status: DISCONTINUED | OUTPATIENT
Start: 2021-09-17 | End: 2021-09-20 | Stop reason: HOSPADM

## 2021-09-17 RX ORDER — HEPARIN SODIUM (PORCINE) LOCK FLUSH IV SOLN 100 UNIT/ML 100 UNIT/ML
3 SOLUTION INTRAVENOUS PRN
Status: DISCONTINUED | OUTPATIENT
Start: 2021-09-17 | End: 2021-09-20 | Stop reason: HOSPADM

## 2021-09-17 RX ORDER — SODIUM CHLORIDE 9 MG/ML
25 INJECTION, SOLUTION INTRAVENOUS PRN
Status: DISCONTINUED | OUTPATIENT
Start: 2021-09-17 | End: 2021-09-20 | Stop reason: HOSPADM

## 2021-09-17 RX ORDER — MIDAZOLAM HYDROCHLORIDE 2 MG/2ML
2 INJECTION, SOLUTION INTRAMUSCULAR; INTRAVENOUS ONCE
Status: DISCONTINUED | OUTPATIENT
Start: 2021-09-17 | End: 2021-09-17

## 2021-09-17 RX ORDER — HEPARIN SODIUM (PORCINE) LOCK FLUSH IV SOLN 100 UNIT/ML 100 UNIT/ML
3 SOLUTION INTRAVENOUS EVERY 12 HOURS SCHEDULED
Status: DISCONTINUED | OUTPATIENT
Start: 2021-09-17 | End: 2021-09-17

## 2021-09-17 RX ORDER — MIDAZOLAM HYDROCHLORIDE 2 MG/2ML
2 INJECTION, SOLUTION INTRAMUSCULAR; INTRAVENOUS ONCE
Status: COMPLETED | OUTPATIENT
Start: 2021-09-17 | End: 2021-09-17

## 2021-09-17 RX ORDER — QUETIAPINE FUMARATE 25 MG/1
50 TABLET, FILM COATED ORAL NIGHTLY
Status: DISCONTINUED | OUTPATIENT
Start: 2021-09-17 | End: 2021-09-20 | Stop reason: HOSPADM

## 2021-09-17 RX ORDER — LORAZEPAM 2 MG/ML
2 INJECTION INTRAMUSCULAR EVERY 4 HOURS PRN
Status: DISCONTINUED | OUTPATIENT
Start: 2021-09-17 | End: 2021-09-20 | Stop reason: HOSPADM

## 2021-09-17 RX ORDER — INSULIN GLARGINE 100 [IU]/ML
30 INJECTION, SOLUTION SUBCUTANEOUS 2 TIMES DAILY
Status: DISCONTINUED | OUTPATIENT
Start: 2021-09-17 | End: 2021-09-20 | Stop reason: HOSPADM

## 2021-09-17 RX ORDER — FUROSEMIDE 10 MG/ML
40 INJECTION INTRAMUSCULAR; INTRAVENOUS ONCE
Status: COMPLETED | OUTPATIENT
Start: 2021-09-17 | End: 2021-09-17

## 2021-09-17 RX ORDER — SODIUM CHLORIDE 0.9 % (FLUSH) 0.9 %
5-40 SYRINGE (ML) INJECTION EVERY 12 HOURS SCHEDULED
Status: DISCONTINUED | OUTPATIENT
Start: 2021-09-17 | End: 2021-09-17

## 2021-09-17 RX ORDER — 0.9 % SODIUM CHLORIDE 0.9 %
1000 INTRAVENOUS SOLUTION INTRAVENOUS ONCE
Status: COMPLETED | OUTPATIENT
Start: 2021-09-17 | End: 2021-09-17

## 2021-09-17 RX ORDER — LIDOCAINE HYDROCHLORIDE 10 MG/ML
5 INJECTION, SOLUTION EPIDURAL; INFILTRATION; INTRACAUDAL; PERINEURAL ONCE
Status: DISCONTINUED | OUTPATIENT
Start: 2021-09-17 | End: 2021-09-20

## 2021-09-17 RX ADMIN — IPRATROPIUM BROMIDE AND ALBUTEROL SULFATE 1 AMPULE: .5; 3 SOLUTION RESPIRATORY (INHALATION) at 04:31

## 2021-09-17 RX ADMIN — HYDRALAZINE HYDROCHLORIDE 20 MG: 20 INJECTION INTRAMUSCULAR; INTRAVENOUS at 02:10

## 2021-09-17 RX ADMIN — LORAZEPAM 2 MG: 2 INJECTION INTRAMUSCULAR; INTRAVENOUS at 19:45

## 2021-09-17 RX ADMIN — CHLORHEXIDINE GLUCONATE 0.12% ORAL RINSE 15 ML: 1.2 LIQUID ORAL at 08:46

## 2021-09-17 RX ADMIN — Medication 2000 UNITS: at 08:44

## 2021-09-17 RX ADMIN — MIDAZOLAM HYDROCHLORIDE 4 MG: 1 INJECTION, SOLUTION INTRAMUSCULAR; INTRAVENOUS at 16:39

## 2021-09-17 RX ADMIN — OXYCODONE 10 MG: 5 TABLET ORAL at 17:07

## 2021-09-17 RX ADMIN — LANSOPRAZOLE 30 MG: KIT at 05:21

## 2021-09-17 RX ADMIN — INSULIN LISPRO 6 UNITS: 100 INJECTION, SOLUTION INTRAVENOUS; SUBCUTANEOUS at 05:26

## 2021-09-17 RX ADMIN — FLUCONAZOLE 400 MG: 100 TABLET ORAL at 08:45

## 2021-09-17 RX ADMIN — INSULIN LISPRO 3 UNITS: 100 INJECTION, SOLUTION INTRAVENOUS; SUBCUTANEOUS at 12:48

## 2021-09-17 RX ADMIN — FUROSEMIDE 40 MG: 10 INJECTION, SOLUTION INTRAMUSCULAR; INTRAVENOUS at 10:13

## 2021-09-17 RX ADMIN — QUETIAPINE FUMARATE 50 MG: 25 TABLET ORAL at 20:05

## 2021-09-17 RX ADMIN — Medication 50 MCG/HR: at 02:50

## 2021-09-17 RX ADMIN — OXYCODONE 10 MG: 5 TABLET ORAL at 20:06

## 2021-09-17 RX ADMIN — SODIUM CHLORIDE 1000 ML: 9 INJECTION, SOLUTION INTRAVENOUS at 21:45

## 2021-09-17 RX ADMIN — OXYCODONE HYDROCHLORIDE AND ACETAMINOPHEN 1000 MG: 500 TABLET ORAL at 08:45

## 2021-09-17 RX ADMIN — VANCOMYCIN HYDROCHLORIDE 1750 MG: 5 INJECTION, POWDER, LYOPHILIZED, FOR SOLUTION INTRAVENOUS at 14:07

## 2021-09-17 RX ADMIN — DIAZEPAM 5 MG: 5 TABLET ORAL at 10:13

## 2021-09-17 RX ADMIN — PHENYTOIN 200 MG: 125 SUSPENSION ORAL at 08:45

## 2021-09-17 RX ADMIN — ENOXAPARIN SODIUM 135 MG: 150 INJECTION SUBCUTANEOUS at 08:46

## 2021-09-17 RX ADMIN — LORAZEPAM 2 MG: 2 INJECTION INTRAMUSCULAR; INTRAVENOUS at 11:05

## 2021-09-17 RX ADMIN — INSULIN GLARGINE 25 UNITS: 100 INJECTION, SOLUTION SUBCUTANEOUS at 08:48

## 2021-09-17 RX ADMIN — WARFARIN SODIUM 2.5 MG: 2.5 TABLET ORAL at 17:07

## 2021-09-17 RX ADMIN — TOBRAMYCIN SULFATE 300 MG: 40 INJECTION, SOLUTION INTRAMUSCULAR; INTRAVENOUS at 19:56

## 2021-09-17 RX ADMIN — IPRATROPIUM BROMIDE AND ALBUTEROL SULFATE 1 AMPULE: .5; 3 SOLUTION RESPIRATORY (INHALATION) at 11:25

## 2021-09-17 RX ADMIN — DIAZEPAM 5 MG: 5 TABLET ORAL at 05:21

## 2021-09-17 RX ADMIN — Medication 300 UNITS: at 10:14

## 2021-09-17 RX ADMIN — SODIUM CHLORIDE, PRESERVATIVE FREE 10 ML: 5 INJECTION INTRAVENOUS at 08:46

## 2021-09-17 RX ADMIN — LABETALOL HYDROCHLORIDE 20 MG: 5 INJECTION INTRAVENOUS at 05:21

## 2021-09-17 RX ADMIN — LISINOPRIL 10 MG: 10 TABLET ORAL at 08:45

## 2021-09-17 RX ADMIN — Medication 50 MCG/HR: at 11:46

## 2021-09-17 RX ADMIN — OXYCODONE 10 MG: 5 TABLET ORAL at 03:36

## 2021-09-17 RX ADMIN — FENTANYL CITRATE 25 MCG: 0.05 INJECTION, SOLUTION INTRAMUSCULAR; INTRAVENOUS at 01:08

## 2021-09-17 RX ADMIN — QUETIAPINE FUMARATE 50 MG: 25 TABLET ORAL at 14:07

## 2021-09-17 RX ADMIN — ENOXAPARIN SODIUM 135 MG: 150 INJECTION SUBCUTANEOUS at 20:30

## 2021-09-17 RX ADMIN — IPRATROPIUM BROMIDE AND ALBUTEROL SULFATE 1 AMPULE: .5; 3 SOLUTION RESPIRATORY (INHALATION) at 08:35

## 2021-09-17 RX ADMIN — QUETIAPINE FUMARATE 50 MG: 25 TABLET ORAL at 20:06

## 2021-09-17 RX ADMIN — Medication 300 UNITS: at 20:30

## 2021-09-17 RX ADMIN — PIPERACILLIN AND TAZOBACTAM 3375 MG: 3; .375 INJECTION, POWDER, LYOPHILIZED, FOR SOLUTION INTRAVENOUS at 04:14

## 2021-09-17 RX ADMIN — ACETAMINOPHEN 650 MG: 650 SOLUTION ORAL at 08:46

## 2021-09-17 RX ADMIN — CHLORHEXIDINE GLUCONATE 0.12% ORAL RINSE 15 ML: 1.2 LIQUID ORAL at 20:05

## 2021-09-17 RX ADMIN — IPRATROPIUM BROMIDE AND ALBUTEROL SULFATE 1 AMPULE: .5; 3 SOLUTION RESPIRATORY (INHALATION) at 16:52

## 2021-09-17 RX ADMIN — SODIUM CHLORIDE SOLN NEBU 3% 4 ML: 3 NEBU SOLN at 08:35

## 2021-09-17 RX ADMIN — QUETIAPINE FUMARATE 50 MG: 25 TABLET ORAL at 08:45

## 2021-09-17 RX ADMIN — IPRATROPIUM BROMIDE AND ALBUTEROL SULFATE 1 AMPULE: .5; 3 SOLUTION RESPIRATORY (INHALATION) at 00:52

## 2021-09-17 RX ADMIN — INSULIN LISPRO 6 UNITS: 100 INJECTION, SOLUTION INTRAVENOUS; SUBCUTANEOUS at 18:07

## 2021-09-17 RX ADMIN — SODIUM CHLORIDE, PRESERVATIVE FREE 10 ML: 5 INJECTION INTRAVENOUS at 20:08

## 2021-09-17 RX ADMIN — PHENYTOIN 200 MG: 125 SUSPENSION ORAL at 14:04

## 2021-09-17 RX ADMIN — INSULIN LISPRO 3 UNITS: 100 INJECTION, SOLUTION INTRAVENOUS; SUBCUTANEOUS at 23:42

## 2021-09-17 RX ADMIN — IPRATROPIUM BROMIDE AND ALBUTEROL SULFATE 1 AMPULE: .5; 3 SOLUTION RESPIRATORY (INHALATION) at 19:56

## 2021-09-17 RX ADMIN — PIPERACILLIN AND TAZOBACTAM 3375 MG: 3; .375 INJECTION, POWDER, LYOPHILIZED, FOR SOLUTION INTRAVENOUS at 12:40

## 2021-09-17 RX ADMIN — SODIUM CHLORIDE SOLN NEBU 3% 4 ML: 3 NEBU SOLN at 19:56

## 2021-09-17 RX ADMIN — OXYCODONE 10 MG: 5 TABLET ORAL at 12:40

## 2021-09-17 RX ADMIN — INSULIN GLARGINE 30 UNITS: 100 INJECTION, SOLUTION SUBCUTANEOUS at 20:38

## 2021-09-17 RX ADMIN — TOBRAMYCIN SULFATE 300 MG: 40 INJECTION, SOLUTION INTRAMUSCULAR; INTRAVENOUS at 13:44

## 2021-09-17 RX ADMIN — ZINC SULFATE 220 MG (50 MG) CAPSULE 50 MG: CAPSULE at 10:13

## 2021-09-17 RX ADMIN — AMLODIPINE BESYLATE 10 MG: 10 TABLET ORAL at 08:45

## 2021-09-17 RX ADMIN — DEXAMETHASONE 6 MG: 4 TABLET ORAL at 10:13

## 2021-09-17 RX ADMIN — PIPERACILLIN AND TAZOBACTAM 3375 MG: 3; .375 INJECTION, POWDER, LYOPHILIZED, FOR SOLUTION INTRAVENOUS at 20:07

## 2021-09-17 RX ADMIN — ATORVASTATIN CALCIUM 40 MG: 40 TABLET, FILM COATED ORAL at 20:05

## 2021-09-17 RX ADMIN — ACETAMINOPHEN 650 MG: 650 SOLUTION ORAL at 20:35

## 2021-09-17 RX ADMIN — OXYCODONE 10 MG: 5 TABLET ORAL at 08:44

## 2021-09-17 RX ADMIN — PHENYTOIN 200 MG: 125 SUSPENSION ORAL at 20:40

## 2021-09-17 RX ADMIN — DIAZEPAM 5 MG: 5 TABLET ORAL at 17:11

## 2021-09-17 ASSESSMENT — PULMONARY FUNCTION TESTS
PIF_VALUE: 25
PIF_VALUE: 35
PIF_VALUE: 23
PIF_VALUE: 59
PIF_VALUE: 31
PIF_VALUE: 32
PIF_VALUE: 25
PIF_VALUE: 33
PIF_VALUE: 26
PIF_VALUE: 22
PIF_VALUE: 31
PIF_VALUE: 25
PIF_VALUE: 29
PIF_VALUE: 29
PIF_VALUE: 26
PIF_VALUE: 33
PIF_VALUE: 33
PIF_VALUE: 23

## 2021-09-17 ASSESSMENT — PAIN SCALES - GENERAL
PAINLEVEL_OUTOF10: 4
PAINLEVEL_OUTOF10: 0
PAINLEVEL_OUTOF10: 4
PAINLEVEL_OUTOF10: 4
PAINLEVEL_OUTOF10: 0
PAINLEVEL_OUTOF10: 4

## 2021-09-17 NOTE — PLAN OF CARE
Problem: Airway Clearance - Ineffective  Goal: Achieve or maintain patent airway  9/17/2021 0943 by Kasandra Rueda RN  Outcome: Met This Shift  9/17/2021 0115 by Brigido aPtel RN  Outcome: Ongoing     Problem: Gas Exchange - Impaired  Goal: Absence of hypoxia  9/17/2021 0943 by Kasandra Rueda RN  Outcome: Met This Shift  9/17/2021 0115 by Brigido Patel RN  Outcome: Ongoing  Goal: Promote optimal lung function  9/17/2021 0943 by Kasandra Rueda RN  Outcome: Met This Shift  9/17/2021 0115 by Briigdo Patel RN  Outcome: Ongoing     Problem: Breathing Pattern - Ineffective  Goal: Ability to achieve and maintain a regular respiratory rate  9/17/2021 0943 by Kasandra Rueda RN  Outcome: Met This Shift  9/17/2021 0115 by Brigido Patel RN  Outcome: Ongoing     Problem:  Body Temperature -  Risk of, Imbalanced  Goal: Complications related to the disease process, condition or treatment will be avoided or minimized  9/17/2021 0943 by Kasandra Rueda RN  Outcome: Met This Shift  9/17/2021 0115 by Brigido Patel RN  Outcome: Ongoing     Problem: Isolation Precautions - Risk of Spread of Infection  Goal: Prevent transmission of infection  Outcome: Met This Shift     Problem: Nutrition Deficits  Goal: Optimize nutritional status  Outcome: Met This Shift     Problem: Risk for Fluid Volume Deficit  Goal: Maintain normal heart rhythm  9/17/2021 0943 by Kasandra Rueda RN  Outcome: Met This Shift  9/17/2021 0115 by Brigido Patel RN  Outcome: Met This Shift  Goal: Maintain absence of muscle cramping  9/17/2021 0943 by Kasandra Rueda RN  Outcome: Met This Shift  9/17/2021 0115 by Brigido Patel RN  Outcome: Met This Shift  Goal: Maintain normal serum potassium, sodium, calcium, phosphorus, and pH  Outcome: Met This Shift     Problem: Loneliness or Risk for Loneliness  Goal: Demonstrate positive use of time alone when socialization is not possible  9/17/2021 0943 by Kasandra Rueda RN  Outcome: Met This Shift  9/17/2021 0115 by Lucy Hood RN  Outcome: Ongoing     Problem: Fatigue  Goal: Verbalize increase energy and improved vitality  9/17/2021 0943 by Jennifer Ambrose RN  Outcome: Met This Shift  9/17/2021 0115 by Lucy Hood RN  Outcome: Ongoing     Problem: Skin Integrity:  Goal: Will show no infection signs and symptoms  Description: Will show no infection signs and symptoms  Outcome: Met This Shift  Goal: Absence of new skin breakdown  Description: Absence of new skin breakdown  Outcome: Met This Shift

## 2021-09-17 NOTE — PROGRESS NOTES
Sidra Arriaga M.D.,[facility]  Ashley Cortez D.O., [facility]SE LUIS., Mark Ramos M.D. AMIRAH Gregg D.O. Daily Pulmonary Progress Note    Patient:  Kati Robles 60 y.o. male MRN: [id number]     Date of Service: 9/17/2021      Synopsis     We are following patient for acute hypoxic respiratory failure, COVID-19 pneumonia      Code status: Full code      Subjective      Patient was seen and examined. He has been removed out of isolation. 9/16/21 Fevers have been trending down. No longer tachypneic. Opens eyes and able to follow commands. Intermittent anxiety. 9/17/21 Await elective trache change to 8 xlt. Awake and follows commands. Tolerating PS 15 trials.       Review of Systems:  Unable to obtain    24-hour events:    Trache change    Objective   Vitals: BP (!) 149/91   Pulse 91   Temp 100.3 °F (37.9 °C) (Bladder)   Resp 17   Ht 6' (1.829 m)   Wt (!) 317 lb 10.9 oz (144.1 kg)   SpO2 98%   BMI 43.09 kg/m²     I/O:    Intake/Output Summary (Last 24 hours) at 9/17/2021 1443  Last data filed at 9/17/2021 0641  Gross per 24 hour   Intake 1180 ml   Output 500 ml   Net 680 ml       Vent Information  $Ventilation: $Subsequent Day  Skin Assessment: Clean, dry, & intact  Equipment ID: [id number]  Equipment Changed: (S) Humidification  Vent Type: 980  Vent Mode: (S) PS  Vt Ordered: 0 mL  Pressure Ordered: 20  Rate Set: 0 bmp  Peak Flow: 0 L/min  Pressure Support: 15 cmH20  FiO2 : 70 %  SpO2: 98 %  SpO2/FiO2 ratio: 140  Sensitivity: 3  PEEP/CPAP: 5  I Time/ I Time %: 0 s  Humidification Source: Heated wire  Humidification Temp: 37  Humidification Temp Measured: 36.2  Circuit Condensation: Drained  Mask Type: Full face mask  Mask Size: Medium       IPAP: 16 cmH20  CPAP/EPAP: 10 cmH2O     CURRENT MEDS :  Scheduled Meds:   insulin glargine  30 Units SubCUTAneous BID    lidocaine PF  5 mL IntraDERmal Once    midazolam  2 mg IntraVENous Once    fluconazole  400 mg Oral Daily    vancomycin  1,750 mg IntraVENous Q24H    warfarin  2.5 mg Oral Daily    dexamethasone  6 mg Oral Daily    oxyCODONE  10 mg Oral Q4H    diazePAM  5 mg Oral Q6H    phenytoin  200 mg Per G Tube TID    lansoprazole  30 mg Per G Tube QAM AC    QUEtiapine  50 mg Oral TID    sodium chloride flush  5-40 mL IntraVENous 2 times per day    heparin flush  3 mL IntraVENous 2 times per day    piperacillin-tazobactam  3,375 mg IntraVENous Q8H    enoxaparin  1 mg/kg SubCUTAneous BID    tobramycin  300 mg Inhalation Q12H    lisinopril  10 mg Oral Daily    ascorbic acid  1,000 mg Oral Daily    sodium chloride (Inhalant)  4 mL Nebulization Q12H    insulin lispro  0-18 Units SubCUTAneous Q6H    chlorhexidine  15 mL Mouth/Throat BID    ipratropium-albuterol  1 ampule Inhalation Q4H    amLODIPine  10 mg Oral Daily    atorvastatin  40 mg Oral Daily    Vitamin D  2,000 Units Oral Daily    zinc sulfate  50 mg Oral Daily       Physical Exam:  General Appearance: appears comfortable in no acute distress. HEENT: Normocephalic atraumatic without obvious abnormality  8 trache   Neck: Lips, mucosa, and tongue normal.  Supple, symmetrical, trachea midline, no adenopathy;thyroid:  no enlargement/tenderness/nodules or JVD. Trachea midline  Lung: Breath sounds CTA. Respirations   unlabored. Symmetrical expansion. Heart: RRR, normal S1, S2. No MRG  Abdomen: Soft, NT, ND. BS present x 4 quadrants. No bruit or organomegaly. Extremities: Pedal pulses 2+ symmetric b/l. Extremities normal, no cyanosis, clubbing, or edema.    Neurologic: Opens eyes able to follow commands      Pertinent/ New Labs and Imaging Studies     Imaging Personally Reviewed:  CXR 9/17/21    FINDINGS:   Tracheostomy tube and left central venous catheter are unchanged.       There are extensive bilateral infiltrates which are not significantly changed.       There is no cardiomegaly.  There is no pneumothorax.  I cannot confirm   pleural 4. Mucus plugging of bronchi s/p bronchoscopy 9/8/21  5. Tracheostomy 8 shiley 9/9/21  6. DVT LUE  7. Fevers/sepsis         Plan:   1. Trache PEG  9/9/21. To change trache due to cuff malfunction per general surgery team today. 2. Currently on full vent support. AC mode,  FiO2 40% and PEEP of 5. Trials of weaning as tolerated. PF ratio 140  2. Completed Remdesivir and Tocilizumab  3. Antibiotics as per ID - Zosyn and remains on inhaled Tobramycin till 9/25/2021 per ID, Diflucan was added 9/16  4. Chest physio/bronchodilators- duonebs Q 4 hrs, s/p bronch 9/8/21  5. Dexamethasone-continue taper 6 mg po daily  6. Full dose anticoagulation lovenox 1 mg /kg bid DVT LUE    Hopefully can be transferred to select LTAC soon    This plan of care was reviewed in collaboration with Dr. Yobany Pereira  Electronically signed by HOMERO Estes CNP on 9/17/2021 at 2:43 PM       I personally saw, examined and provided care for the patient. Radiographs, labs and medication list were reviewed by me independently. I spoke with bedside nursing, therapists and consultants. The case was discussed in detail and plans for care were established. Review of CNP documentation was conducted and revisions were made as appropriate. I agree with the above documented exam, problem list and plan of care.     Ranjana Lopez MD

## 2021-09-17 NOTE — PATIENT CARE CONFERENCE
Intensive Care Daily Quality Rounding Checklist      ICU Team Members: Dr. Ceci Chance (resident), charge nurse, bedside nurse    ICU Day #:  Number 28     Intubation Date:  Aug 23     Ventilator Day #: [address] 25 (trach placed 9/9)     Central Line Insertion Date:  Sept 11                                                    Day #:  Number 7      Arterial Line Insertion Date: N/A                             Day #: N/A     Temporary Hemodialysis Catheter Insertion Date: N/A                             Day #:  N/A     DVT Prophylaxis: Lovenox    GI Prophylaxis: Protonix     Busch Catheter Insertion Date: Sept 13                                        Day #: Number 5                             Continued need (if yes, reason documented and discussed with physician): need for accurate I+O's     Skin Issues/ Wounds and ordered treatment discussed on rounds: has numerous healing ulcers from time proned     Goals/ Plans for the Day: Daily labs, wean vent as able, consult Psych for assistance with anxiety meds, PICC line if OK with ID

## 2021-09-17 NOTE — PROGRESS NOTES
8 trach changed at bedside by surgical resident due to decreased return volumes and the patients ability to hold a conversation with therapist despite the  balloon being inflated. Patient now has an 8 XLT with adequate return volumes. Patient is no longer able to vocalize with staff. End tidal 47 post exchange.

## 2021-09-17 NOTE — PROGRESS NOTES
Patient seen and examined. Noted to have leak around tracheostomy tube unable to obtain tidal volumes. Was able to speak around tracheostomy tube. Patient still requiring high FiO2. Decision was made to exchange tracheostomy for a distal Shiley XLT. This was performed over a bougie with respiratory assistance. 4 mg of Versed was given prior to the procedure. Post procedure end-tidal CO2 was 37. Patient tolerated procedure well without difficulty. Feel free to call with any questions.     Electronically signed by Deneen Delcid MD on 9/17/21 at 4:54 PM EDT

## 2021-09-17 NOTE — PROGRESS NOTES
Critical Care Team - Daily Progress Note      Date and time: 9/17/2021 9:33 AM  Patient's name:  Vandana Quintero  Medical Record Number: [id number]  Patient's account/billing number: [de-identified]  Patient's YOB: 1965  Age: 60 y.o. Date of Admission: 8/21/2021  9:35 AM  Length of stay during current admission: 27      Primary Care Physician: Loco Salazar,   ICU Attending Physician: Dr. Stone Nine    Code Status: Full Code    Reason for ICU admission: Respiratory failure due to COVID-19      SUBJECTIVE:     OVERNIGHT EVENTS:           8/30: tolerating longer periods supine, thick dark brown secretions     8/31: secretions out of NG, michelle prone     9/1: Decreased urine output and increased creatinine today, continues with thick secretions     9/2: Cr and Uop improved     9/3: P/F improved and has remained supine     9/4: thick secretions     9/5: desat with turning, 1500 loose stool from [facility]    9/6: Patient trying low tidal volumes overnight occasionally, otherwise no other acute events. 9/7: No acute events overnight    9/8: Did receive labetalol 2 times overnight for elevated blood pressures otherwise unremarkable good urine output. Will need essentially exchanged today, bronchoscopy for retained left lung and bibasilar mucopurulent secretions     9/10-no acute events overnight did receive trach and PEG yesterday, will wean sedation as able. , upper extremity DVT noted    9/11: No acute events overnight, sedation was increased overnight due to increased respirations, will slowly come down on that according to nursing staff.    9/12: bumex TID, seroquel to 50 mg PO BID    9/13-patient is improving, is able to follow commands at this time, is able to be transferred to [facility] once  sedation is weaned off, will do that today. 9/14, patient able to be weaned off IV sedation, and doing well.     9/15: Patient is off IV sedation, tachypneic respirations high 30s to low 40s, repeat Covid test did come back positive again. Markedly febrile. : Patient febrile overnight, agitation continues however he is tolerating ventilator better respirations were down in the 20s instead of the 40s yesterday. : Patient continues to be agitated stating something feels like it is in his throat. He was started on fentanyl infusion overnight that will be stopped today. We will to get a psychiatric consult for anxiety. OBJECTIVE:     VITAL SIGNS:  BP (!) 153/79   Pulse 99   Temp 101.3 °F (38.5 °C) (Bladder)   Resp (!) 36   Ht 6' (1.829 m)   Wt (!) 317 lb 10.9 oz (144.1 kg)   SpO2 97%   BMI 43.09 kg/m²   Tmax over 24 hours:  Temp (24hrs), Av.2 °F (37.9 °C), Min:99.1 °F (37.3 °C), Max:101.3 °F (38.5 °C)      Patient Vitals for the past 6 hrs:   BP Temp Temp src Pulse Resp SpO2   21 0836 -- -- -- -- (!) 36 97 %   21 0835 -- -- -- 99 (!) 38 97 %   21 0738 (!) 153/79 101.3 °F (38.5 °C) Bladder 98 27 97 %   21 0641 (!) 149/77 -- -- 98 (!) 43 97 %   21 0600 (!) 110/56 -- -- 97 (!) 42 96 %   21 0500 (!) 214/95 -- -- 125 (!) 42 96 %   21 0432 -- -- -- 118 (!) 41 95 %   21 0400 (!) 222/98 100.2 °F (37.9 °C) Bladder 116 (!) 32 96 %         Intake/Output Summary (Last 24 hours) at 2021 0933  Last data filed at 2021 0641  Gross per 24 hour   Intake 2480 ml   Output 1250 ml   Net 1230 ml     Wt Readings from Last 2 Encounters:   21 (!) 317 lb 10.9 oz (144.1 kg)   21 (!) 320 lb (145.2 kg)     Body mass index is 43.09 kg/m².         PHYSICAL EXAMINATION:    General appearance -no acute distress, trach present on ventilator  Mental status -patient is able to follow commands and tell the date as well as his birthday is coming up  Eyes - pupils equal and reactive, extraocular eye movements intact  Mouth - mucous membranes moist, pharynx normal without lesions  Neck - supple, no significant adenopathy  Chest -tracheostomy present, decreased breath sounds no evidence of crackles rhonchi noted bilaterally  heart - normal rate, regular rhythm, normal S1, S2, no murmurs, rubs, clicks or gallops  Abdomen - soft, nontender, nondistended, no masses or organomegaly  Neurological -following commands, no focal neurological deficits, is able to answer questions, agitation  Extremities - peripheral pulses normal, no pedal edema, no clubbing or cyanosis  Skin -patient did have skin breakdown to the face as well as blistering on the hands     Any additional physical findings:    MEDICATIONS:    Scheduled Meds:   fluconazole  400 mg Oral Daily    vancomycin  1,750 mg IntraVENous Q24H    warfarin  2.5 mg Oral Daily    dexamethasone  6 mg Oral Daily    oxyCODONE  10 mg Oral Q4H    diazePAM  5 mg Oral Q6H    phenytoin  200 mg Per G Tube TID    lansoprazole  30 mg Per G Tube QAM AC    QUEtiapine  50 mg Oral TID    sodium chloride flush  5-40 mL IntraVENous 2 times per day    heparin flush  3 mL IntraVENous 2 times per day    piperacillin-tazobactam  3,375 mg IntraVENous Q8H    enoxaparin  1 mg/kg SubCUTAneous BID    tobramycin  300 mg Inhalation Q12H    lisinopril  10 mg Oral Daily    insulin glargine  25 Units SubCUTAneous BID    ascorbic acid  1,000 mg Oral Daily    sodium chloride (Inhalant)  4 mL Nebulization Q12H    insulin lispro  0-18 Units SubCUTAneous Q6H    chlorhexidine  15 mL Mouth/Throat BID    ipratropium-albuterol  1 ampule Inhalation Q4H    amLODIPine  10 mg Oral Daily    atorvastatin  40 mg Oral Daily    Vitamin D  2,000 Units Oral Daily    zinc sulfate  50 mg Oral Daily     Continuous Infusions:   fentaNYL 5 mcg/ml in 0.9%  ml infusion 100 mcg/hr (09/17/21 0922)    sodium chloride      dextrose       PRN Meds:   fentanNYL, 25 mcg, Q3H PRN  sodium chloride flush, 5-40 mL, PRN  sodium chloride, 25 mL, PRN  acetaminophen, 650 mg, Q4H PRN  labetalol, 20 mg, Q6H PRN  hydrALAZINE, 20 mg, Q6H PRN  sodium chloride (Inhalant), 4 mL, PRN  albuterol, 2.5 mg, Q6H PRN  benzonatate, 200 mg, TID PRN  glucose, 15 g, PRN  dextrose, 12.5 g, PRN  glucagon (rDNA), 1 mg, PRN  dextrose, 100 mL/hr, PRN  sodium chloride, 30 mL, PRN          VENT SETTINGS (Comprehensive) (if applicable):  Vent Information  $Ventilation: $Subsequent Day  Skin Assessment: Clean, dry, & intact  Equipment ID: [id number]  Equipment Changed: (S) Humidification  Vent Type: [id number]  Vent Mode: AC/VC  Vt Ordered: 480 mL  Pressure Ordered: 20  Rate Set: 12 bmp  Peak Flow: 65 L/min  Pressure Support: 0 cmH20  FiO2 : 70 %  SpO2: 97 %  SpO2/FiO2 ratio: 138.57  Sensitivity: 3  PEEP/CPAP: 5  I Time/ I Time %: 0 s  Humidification Source: Heated wire  Humidification Temp: 37  Humidification Temp Measured: 36.2  Circuit Condensation: Drained  Mask Type: Full face mask  Mask Size: Medium  Additional Respiratory  Assessments  Pulse: 99  Resp: (!) 36  SpO2: 97 %  Position: Sellers's  Humidification Source: Heated wire  Humidification Temp: 37  Circuit Condensation: Drained  Oral Care: Mouth swabbed, Mouth moisturizer  Subglottic Suction Done?: No  Airway Type: Trachial  Airway Size: 8  Cuff Pressure (cm H2O): 29 cm H2O    ABGs:     Laboratory findings:    Complete Blood Count:   Recent Labs     09/15/21  0450 09/16/21  0525 09/17/21  0534   WBC 15.7* 18.2* 21.6*   HGB 9.2* 8.7* 9.7*   HCT 28.4* 27.0* 31.0*    296 376        Last 3 Blood Glucose:   Recent Labs     09/15/21  0450 09/16/21  0525 09/17/21  0534   GLUCOSE 198* 158* 236*        PT/INR:    Lab Results   Component Value Date    PROTIME 17.6 09/17/2021    INR 1.6 09/17/2021     PTT:  No results found for: APTT, PTT    Comprehensive Metabolic Profile:   Recent Labs     09/15/21  0450 09/15/21  0450 09/16/21  0525 09/16/21  0525 09/17/21  0534     --  135  --  134   K 3.8  --  4.2  --  4.4   CL 97*  --  99  --  96*   CO2 29  --  29  --  28   BUN 11  --  14  --  15   CREATININE 0.4*  --  0.5*  --  0.5*   GLUCOSE 198*  --  158*  -- 236*   CALCIUM 8.7  --  8.3*  --  8.6   PROT 6.5   < > 6.3*   < > 6.8   LABALBU 2.9*   < > 2.6*   < > 2.6*   BILITOT 0.6   < > 0.5   < > 0.4   ALKPHOS 122   < > 127   < > 117   *   < > 121*   < > 151*   *   < > 124*   < > 139*    < > = values in this interval not displayed. Magnesium:   Lab Results   Component Value Date    MG 1.9 09/17/2021     Phosphorus:   Lab Results   Component Value Date    PHOS 4.4 09/17/2021     Ionized Calcium: No results found for: CAION     Urinalysis:     Troponin: No results for input(s): TROPONINI in the last 72 hours. ASSESSMENT:     Patient Active Problem List    Diagnosis Date Noted    Elevated LFTs     Busch catheter problem ([facility] [address] 08/26/2021    Sepsis ([facility] [address] 08/26/2021    Thrombocytopenia ([facility] [address] 08/26/2021    Hyperlipidemia 08/22/2021    Acute hypoxemic respiratory failure due to COVID-19 [facility]) 08/21/2021    Type 2 diabetes mellitus without complication, without long-term current use of insulin ([facility] [address] 08/10/2021    History of seizures 08/10/2021     SYSTEMS ASSESSMENT    Neuro     Following commands at this time  Patient is able to be weaned at this time  Oral Valium every 6 hours, OxyIR 10 mg every 4 hours  Ativan 2 mg as needed  Seroquel 50 mg three times daily  Continue to monitor      Respiratory     Acute hypoxic respiratory failure requiring mechanical ventilation  Severe COVID-19 pneumonia s/p toe C, remdesivir  Pneumonia VAP-MSSA, E. coli  On pip/ tazo  Trach/PEG    Bronchoscopy 9/8/2021 due to white out left side chest on x-ray. DuoNebs every 4 hours  6 mg Decadron daily  Wean oxygen as tolerated.  Keep O2 sat 90-92%    Cardiovascular     Hypertension  Amlodipine-10 mg  Lisinopril-10 mg  Lipitor 40 mg    Gastrointestinal     GI prophylaxis-Protonix  Bowel regimen  Continue monitor    Renal     OSCAR-improved  Hypokalemia-potassium replaced  Hypomagnesemia-magnesium replaced  Avoid nephrotoxins  Continue to monitor     Infectious Disease Pneumonia VAP-positive for MSSA, E. coli  lactic acidosis-resolved  Inhaled tobramycin for possible tracheobronchitis  Follow cultures of previous central line tip and new blood cultures from 9/11  Infectious disease following  Zosyn, vancomycin, fluconazole due to fevers, could be due to line infection versus tracheostomy site culture pending at this time, tip of central line was negative at this time. Tracheostomy site culture    Hematology/Oncology     DVT left upper arm-we will transition to warfarin due to high bmi  Anemia  Hemoglobin stable  Continue to monitor    Endocrine     Hyperglycemia  Lantus 30 units  High-dose sliding scale    Social/Spiritual/DNR/Other     Full code  vitamin regimen  DVT prophylaxis Lovenox   Psych consult for anxiety    Plan-continue to wean oxygenation as able, wean off fentanyl infusion. Attempt PICC line placement as well for central line removal.    The patient was seen and evaluated by myself and Ilda Mcguire MD PGY-2  9/17/2021 9:33 AM    Critical Care Attending Addendum:    Patient seen and examined with the house staff. X-rays personally reviewed through the PACS. Family is updated at the bedside as available. Additional findings listed below as necessary. Additional comments:  1. Acute hypoxic respiratory failure worsened today and febrile . Will taper off fentanyl and uptitrate Seroquel as needed per psych recs. Trach exchanged by surgery and will try to wean O2 back down. 2. Fever on vanco, Zosyn and Antelmo nebs. 3. Watch slightly worse hyperglycemia. 4. DVT on Lovenox and warfarin.   30 min CCT

## 2021-09-17 NOTE — PLAN OF CARE
Problem: Airway Clearance - Ineffective  Goal: Achieve or maintain patent airway  Outcome: Ongoing     Problem: Gas Exchange - Impaired  Goal: Absence of hypoxia  Outcome: Ongoing  Goal: Promote optimal lung function  Outcome: Ongoing     Problem: Breathing Pattern - Ineffective  Goal: Ability to achieve and maintain a regular respiratory rate  Outcome: Ongoing     Problem:  Body Temperature -  Risk of, Imbalanced  Goal: Complications related to the disease process, condition or treatment will be avoided or minimized  Outcome: Ongoing

## 2021-09-17 NOTE — PROGRESS NOTES
[facility] Progress Note    Admitting Date and Time: 8/21/2021  9:35 AM  Admit Dx: Acute hypoxemic respiratory failure due to COVID-19 (HCC) [U07.1, J96.01]  Pneumonia due to COVID-19 virus [U07.1, J12.82]    Subjective:  Patient is being followed for Acute hypoxemic respiratory failure due to COVID-19 ([address] [U07.1, J96.01]  Pneumonia due to COVID-19 virus [U07.1, J12.82]     Pt did not sleep well all night. Slightly agitated. Delirium noted. Seeing people on floor. Per RN:     ROS: denies fever, chills, cp, sob, n/v, HA unless stated above.       fluconazole  400 mg Oral Daily    vancomycin  1,750 mg IntraVENous Q24H    warfarin  2.5 mg Oral Daily    dexamethasone  6 mg Oral Daily    oxyCODONE  10 mg Oral Q4H    diazePAM  5 mg Oral Q6H    phenytoin  200 mg Per G Tube TID    lansoprazole  30 mg Per G Tube QAM AC    QUEtiapine  50 mg Oral TID    sodium chloride flush  5-40 mL IntraVENous 2 times per day    heparin flush  3 mL IntraVENous 2 times per day    piperacillin-tazobactam  3,375 mg IntraVENous Q8H    enoxaparin  1 mg/kg SubCUTAneous BID    tobramycin  300 mg Inhalation Q12H    lisinopril  10 mg Oral Daily    insulin glargine  25 Units SubCUTAneous BID    ascorbic acid  1,000 mg Oral Daily    sodium chloride (Inhalant)  4 mL Nebulization Q12H    insulin lispro  0-18 Units SubCUTAneous Q6H    chlorhexidine  15 mL Mouth/Throat BID    ipratropium-albuterol  1 ampule Inhalation Q4H    amLODIPine  10 mg Oral Daily    atorvastatin  40 mg Oral Daily    Vitamin D  2,000 Units Oral Daily    zinc sulfate  50 mg Oral Daily     fentanNYL, 25 mcg, Q3H PRN  sodium chloride flush, 5-40 mL, PRN  sodium chloride, 25 mL, PRN  acetaminophen, 650 mg, Q4H PRN  labetalol, 20 mg, Q6H PRN  hydrALAZINE, 20 mg, Q6H PRN  sodium chloride (Inhalant), 4 mL, PRN  albuterol, 2.5 mg, Q6H PRN  benzonatate, 200 mg, TID PRN  glucose, 15 g, PRN  dextrose, 12.5 g, PRN  glucagon (rDNA), 1 mg, PRN  dextrose, 100 mL/hr, PRN  sodium chloride, 30 mL, PRN         Objective:    BP (!) 153/79   Pulse 98   Temp 101.3 °F (38.5 °C) (Bladder)   Resp 27   Ht 6' (1.829 m)   Wt (!) 317 lb 10.9 oz (144.1 kg)   SpO2 97%   BMI 43.09 kg/m²     General Appearance: alert and agitated. Talking through trache. Skin: warm and dry  Head: normocephalic and atraumatic  Eyes: pupils equal, round, and reactive to light, extraocular eye movements intact, conjunctivae normal  Neck: neck supple and non tender without mass   Pulmonary/Chest: clear to auscultation bilaterally- no wheezes, rales or rhonchi, normal air movement, no respiratory distress  Cardiovascular: normal rate, normal S1 and S2 and no carotid bruits  Abdomen: soft, non-tender, non-distended, normal bowel sounds, no masses or organomegaly  Extremities: no cyanosis, no clubbing and 2+ edema  Neurologic: no cranial nerve deficit and speech normal        Recent Labs     09/15/21  0450 09/16/21  0525 09/17/21  0534    135 134   K 3.8 4.2 4.4   CL 97* 99 96*   CO2 29 29 28   BUN 11 14 15   CREATININE 0.4* 0.5* 0.5*   GLUCOSE 198* 158* 236*   CALCIUM 8.7 8.3* 8.6       Recent Labs     09/15/21  0450 09/16/21  0525 09/17/21  0534   WBC 15.7* 18.2* 21.6*   RBC 3.11* 2.95* 3.33*   HGB 9.2* 8.7* 9.7*   HCT 28.4* 27.0* 31.0*   MCV 91.3 91.5 93.1   MCH 29.6 29.5 29.1   MCHC 32.4 32.2 31.3*   RDW 14.6 14.7 15.0    296 376   MPV 9.6 9.5 9.5       Radiology:   XR CHEST PORTABLE    Result Date: 9/17/2021  EXAMINATION: ONE XRAY VIEW OF THE CHEST 9/17/2021 3:15 am COMPARISON: 09/15/2021 HISTORY: ORDERING SYSTEM PROVIDED HISTORY: sob TECHNOLOGIST PROVIDED HISTORY: Reason for exam:->sob FINDINGS: Tracheostomy tube and left central venous catheter are unchanged. There are extensive bilateral infiltrates which are not significantly changed. There is no cardiomegaly. There is no pneumothorax. I cannot confirm pleural effusion.      No significant change in extensive bilateral infiltrates. XR CHEST PORTABLE    Result Date: 9/15/2021  EXAMINATION: ONE XRAY VIEW OF THE CHEST 9/15/2021 10:43 am COMPARISON: 09/12/2021 HISTORY: ORDERING SYSTEM PROVIDED HISTORY: fever TECHNOLOGIST PROVIDED HISTORY: Reason for exam:->fever FINDINGS: Bilateral pulmonary infiltrates are unchanged. There are no obvious effusions. Tracheostomy tube and left-sided central line are appropriate. No interval change     Assessment:    Principal Problem:    Acute hypoxemic respiratory failure due to COVID-19 [facility])  Active Problems:    Type 2 diabetes mellitus without complication, without long-term current use of insulin (HCC)    History of seizures    Hyperlipidemia    Busch catheter problem (HCC)    Sepsis (HCC)    Thrombocytopenia ([facility])    Elevated LFTs  Resolved Problems:    OSCAR (acute kidney injury) ([facility] [address]    Lactic acidosis      Plan:  1. Acute hypoxic respiratory failure, most likely due to viral pneumonia, O2 sat was below 90% on room air, requiring mechanical ventilation.  Successful tracheostomy placement.  Going to [facility]. Requiring more oxygen today (70%). 99 temp today. pending culture results  2.  Acute VAP with MSSA/E.coli - on Zosyn and Vancomycin and Tobramycin nebs. To continue same until 9/25/21. Await recent cultures results from 9/15/21. 3. Severe COVID 19 infection - completed Remdesivir IV and Tocilizumab x 1.  Decadron 6 mg orally daily. 4.  Hypokalemia - K+ 4.54  Replete as per protocol.  Trend labs and treat accordingly. 5.  HTN - Continue meds with adjustments as needed. 6.  OSCAR, resolved. Creatinine 0.5. Avoid nephrotoxic agents. 7.  Hyperglycemia - trend and treat accordingly. Was on Januvia and Metformin prior to admission. On tube feedings at 20 ml/hr -> 45 ml/hr goal rate. On Lantus and high dose sliding scale. 8.  Delirium - supportive care. Seroquel continued. NOTE: This report was transcribed using voice recognition software.  Every effort was made to ensure accuracy; however, inadvertent computerized transcription errors may be present.     Electronically signed by Murphy Soto MD on 9/17/2021 at 8:35 AM

## 2021-09-17 NOTE — PROGRESS NOTES
[facility]  [facility]  Progress Note      Chief Complaint   Patient presents with    Shortness of Breath     covid, per family SaO2 75% RA, hx of DM and panic attack        SUBJECTIVE:  8/21/21  Prone. FiO2 60% and PEEP of 16    8/22/2021  Patient is tolerating medications. No reported adverse drug reactions. No nausea, vomiting, diarrhea. Afebrile BiPAP 100% FiO2 with breathing at 44 times a minute and probably is going to decompensate    8/23/2021  Not doing well intubated on a rotating bed  FiO2 70% and PEEP 16    8/24/2021  Prone 50% FiO2-PEEP of 10  Issues with the urinary Busch last night this was changed  Paralyzed and on Versed    8/25/2021  Paralyzed and sedated with Versed no pressors  Prone intubated on 50% FiO2, PEEP of 10  Tolerating medications    8/26/21  Paralyzed and sedated with Versed no pressors  Prone intubated on 70% FiO2, PEEP of 10  Does not tolerate supine position    8/27/2021  Afebrile  Still prone on FiO2 of 100%  Paralyzed and sedated    8/28/2021  The patient is still in the ICU. He is pronated. FiO2 100%. Is still sedated and paralyzed. Fair amount of thick, brown secretions    8/29/2021  The patient is still on a RotoProne bed. You are still intubated, sedated and paralyzed. O2 is being weaned down. FiO2 55%. PEEP 12.    8/30/2021  Patient remains intubated, sedated and paralyzed. He is still on a RotoProne bed. Supine now. 8/31/2021  He is still on a RotoProne bed. Still having fair amount of secretions from the nostrils. Minimal from the ET tube, however. Tube feeding seems to be coming out of his nose. 9/1/2021  He is supinated but still on a RotoProne bed. He still has fair amount of secretions from the ET tube. Tolerating antibiotic    9/2/2021. He is still on a RotoProne bed. He is pronated. Less secretions from the ET tube and nostrils. FiO2 50%. PEEP 14.    9/3/2021. He is off the RotoProne bed. Tolerating supination. Tolerating weaning. PEEP was brought down to 12. FiO2 still at 50%. Tolerating antibiotics. 9/4/2021. He is still in the ICU. He is still on a ventilator. He is still paralyzed. 9/5/2021. He had an episode of acute desaturation and had to be bagged. He is now back on the ventilator. He is still sedated and paralyzed. 9/6/2021. The patient still in the ICU. He is still requiring paralytics. Secretions are becoming thinner. No fever. 9/7/2021. The patient still on a ventilator and paralyzed. No new problems reported. 9/8/2021. Patient is still on the ventilator, paralyzed. No fever. Tolerating medications. 9/9/2021. Patient had a bronchoscopy yesterday a fair amount of thick secretions were obtained. Cultures were sent. He is still on a ventilator, sedated and paralyzed. 9/10/2021. The patient is still in the ICU. He is still on a ventilator. He had a tracheostomy and PEG. Denies dyspnea or pain. 9/11/2021. Patient is now having fevers. He is still on a ventilator. Cooling blanket. 9/12/2021. Lines were changed. Still having some fevers but they seem to be trending downwards. Denies any pain. Denies dyspnea. 9/13/2021. The patient is still in the ICU. He is quite restless and becomes agitated. No fever. Tolerating antibiotics. 9/14/2021. The patient remains in the ICU. Still awaiting for a bed at [facility]. No fevers. Tolerating antibiotics. 9/15/2021. The patient is still in the ICU. He is still in isolation because there are no beds available to take him out of isolation and terminally clean his room. He is having fevers. Nursing reports secretions from around the tracheostomy. 9/16/2021. The patient was removed out of isolation. He is still having the ICU. He denies any dyspnea. No pain. Tolerating antibiotics. 9/17/2021. The patient remains in the ICU. Still awaiting a bed for LTAC.   He continues to be restless and continuing to Constitutional: The patient is lying in bed in the ICU. He is awake and alert. He gets somewhat agitated when  examiner walks in the room. FiO2 45%. PEEP 6. No changes. Skin: Warm and dry. No rashes were noted. HEENT: Round and nonreactive pupils. Decubitus lesions on cheeks, hands and lower extremities noted. Neck: Supple. Tracheostomy. PEG. Chest: Coarse breath sounds bilaterally. No crackles  Cardiovascular: Heart sounds rhythmic and regular. Tachycardic. Abdomen: Round, soft and benign to palpation. Genitourinary: Busch catheter  Extremities: Moderate edema. Lines: Left IJ TLC 9/11/2021.   Busch changed 3/24/2021    Laboratory and Tests Review:  Lab Results   Component Value Date    WBC 21.6 (H) 09/17/2021    WBC 18.2 (H) 09/16/2021    WBC 15.7 (H) 09/15/2021    HGB 9.7 (L) 09/17/2021    HCT 31.0 (L) 09/17/2021    MCV 93.1 09/17/2021     09/17/2021     Lab Results   Component Value Date    NEUTROABS 18.79 (H) 09/17/2021    NEUTROABS 14.35 (H) 09/16/2021    NEUTROABS 13.38 (H) 09/15/2021     No results found for: [facility]  Lab Results   Component Value Date     (H) 09/17/2021     (H) 09/17/2021    ALKPHOS 117 09/17/2021    BILITOT 0.4 09/17/2021     Lab Results   Component Value Date     09/17/2021    K 4.4 09/17/2021    K 3.7 08/21/2021    CL 96 09/17/2021    CO2 28 09/17/2021    BUN 15 09/17/2021    CREATININE 0.5 09/17/2021    CREATININE 0.5 09/16/2021    CREATININE 0.4 09/15/2021    GFRAA >60 09/17/2021    LABGLOM >60 09/17/2021    GLUCOSE 236 09/17/2021    PROT 6.8 09/17/2021    LABALBU 2.6 09/17/2021    CALCIUM 8.6 09/17/2021    BILITOT 0.4 09/17/2021    ALKPHOS 117 09/17/2021     09/17/2021     09/17/2021     Lab Results   Component Value Date    CRP 22.7 (H) 09/17/2021    CRP 29.4 (H) 09/16/2021    CRP 25.5 (H) 09/15/2021     Lab Results   Component Value Date    SEDRATE 125 (H) 09/17/2021    SEDRATE 125 (H) 09/16/2021    SEDRATE 120 (H) 09/15/2021     Radiology:      Microbiology:   Streptococcus pneumoniae/Legionella urine Ag: negative  Nares screen MRSA: Negative  Urine culture 8/24/2021: Negative  Blood cultures 8/21/2021: Negative x2  Blood cultures 9/15/2021: CoNS in 1 of 4 bottles  Respiratory culture 8/27/2021: OP terrell reduced  Respiratory culture 8/30/2021: E. coli, MSSA   BAL 9/8/2021: OP terrell reduced. E. coli, Enterobacter cloacae, MSSA. PjP negative  TLC tip culture 9/11/2021: Negative  Tracheostomy site culture 9/15/2021: GPO    ASSESSMENT:  · Severe Covid pneumonia causing acute respiratory failure CRP has come down nicely. Completed Remdesivir. Received Tocilizumab  · VAP versus tracheobronchitis. Cultures growing MSSA and E. coli. Improving. Treated with 10 days of Cefazolin  · Status post bronchoscopy 9/8/2021. Cultures growing rare MSSA,, Enterobacter cloacae. Possible tracheobronchitis  · Leukocytosis associated to the above, stable  · Status post tracheostomy and PEG 9/10/2021   · Fevers probably secondary to line infection. Lines were changed. Tip cultures were negative. Temperatures were trending downward but they have come up again. There is no eosinophilia or rash to suspect drug-induced fever  · Possible tracheitis  · CoNS bacteremia. Contaminant versus line colonization    PLAN:  · Continue Zosyn and Tobramycin by inhalation until 9/25/2021  · Resume Vanc continue Vancomycin, day 3. Trough today  · Continue Fluconazole for now. If no yeast recovered from tracheostomy site, we will discontinue it  · Check tracheostomy site culture  · We will follow with you  · Awaiting transfer to LTAC  · Okay for PICC since patient has already been on Vancomycin for 3 days    Spoke with nursing.   Discussed with Dr. Charmayne Irani, MD  9:44 AM   9/17/2021

## 2021-09-18 LAB
ALBUMIN SERPL-MCNC: 2.2 G/DL (ref 3.5–5.2)
ALP BLD-CCNC: 107 U/L (ref 40–129)
ALT SERPL-CCNC: 90 U/L (ref 0–40)
ANION GAP SERPL CALCULATED.3IONS-SCNC: 7 MMOL/L (ref 7–16)
AST SERPL-CCNC: 85 U/L (ref 0–39)
BASOPHILS ABSOLUTE: 0.02 E9/L (ref 0–0.2)
BASOPHILS RELATIVE PERCENT: 0.2 % (ref 0–2)
BILIRUB SERPL-MCNC: 0.3 MG/DL (ref 0–1.2)
BUN BLDV-MCNC: 24 MG/DL (ref 6–20)
C-REACTIVE PROTEIN: 16.5 MG/DL (ref 0–0.4)
CALCIUM SERPL-MCNC: 8.2 MG/DL (ref 8.6–10.2)
CHLORIDE BLD-SCNC: 99 MMOL/L (ref 98–107)
CO2: 30 MMOL/L (ref 22–29)
CREAT SERPL-MCNC: 0.8 MG/DL (ref 0.7–1.2)
EOSINOPHILS ABSOLUTE: 0.34 E9/L (ref 0.05–0.5)
EOSINOPHILS RELATIVE PERCENT: 2.9 % (ref 0–6)
GFR AFRICAN AMERICAN: >60
GFR NON-AFRICAN AMERICAN: >60 ML/MIN/1.73
GLUCOSE BLD-MCNC: 166 MG/DL (ref 74–99)
HCT VFR BLD CALC: 24.2 % (ref 37–54)
HCT VFR BLD CALC: 24.5 % (ref 37–54)
HEMOGLOBIN: 7.5 G/DL (ref 12.5–16.5)
HEMOGLOBIN: 7.6 G/DL (ref 12.5–16.5)
IMMATURE GRANULOCYTES #: 0.59 E9/L
IMMATURE GRANULOCYTES %: 5 % (ref 0–5)
INR BLD: 2
LYMPHOCYTES ABSOLUTE: 0.97 E9/L (ref 1.5–4)
LYMPHOCYTES RELATIVE PERCENT: 8.3 % (ref 20–42)
MAGNESIUM: 2.2 MG/DL (ref 1.6–2.6)
MCH RBC QN AUTO: 29.5 PG (ref 26–35)
MCHC RBC AUTO-ENTMCNC: 31 % (ref 32–34.5)
MCV RBC AUTO: 95.3 FL (ref 80–99.9)
METER GLUCOSE: 144 MG/DL (ref 74–99)
METER GLUCOSE: 146 MG/DL (ref 74–99)
METER GLUCOSE: 167 MG/DL (ref 74–99)
METER GLUCOSE: 168 MG/DL (ref 74–99)
METER GLUCOSE: 229 MG/DL (ref 74–99)
MONOCYTES ABSOLUTE: 0.74 E9/L (ref 0.1–0.95)
MONOCYTES RELATIVE PERCENT: 6.3 % (ref 2–12)
NEUTROPHILS ABSOLUTE: 9.05 E9/L (ref 1.8–7.3)
NEUTROPHILS RELATIVE PERCENT: 77.3 % (ref 43–80)
ORGANISM: ABNORMAL
PDW BLD-RTO: 15.2 FL (ref 11.5–15)
PHOSPHORUS: 4.7 MG/DL (ref 2.5–4.5)
PLATELET # BLD: 282 E9/L (ref 130–450)
PMV BLD AUTO: 9.2 FL (ref 7–12)
POTASSIUM SERPL-SCNC: 4.5 MMOL/L (ref 3.5–5)
PROTHROMBIN TIME: 21.3 SEC (ref 9.3–12.4)
RBC # BLD: 2.54 E12/L (ref 3.8–5.8)
SEDIMENTATION RATE, ERYTHROCYTE: 133 MM/HR (ref 0–15)
SODIUM BLD-SCNC: 136 MMOL/L (ref 132–146)
TOTAL PROTEIN: 6.3 G/DL (ref 6.4–8.3)
WBC # BLD: 11.7 E9/L (ref 4.5–11.5)
WOUND/ABSCESS: ABNORMAL

## 2021-09-18 PROCEDURE — 80053 COMPREHEN METABOLIC PANEL: CPT

## 2021-09-18 PROCEDURE — 94640 AIRWAY INHALATION TREATMENT: CPT

## 2021-09-18 PROCEDURE — 6370000000 HC RX 637 (ALT 250 FOR IP): Performed by: SPECIALIST

## 2021-09-18 PROCEDURE — 6370000000 HC RX 637 (ALT 250 FOR IP): Performed by: SURGERY

## 2021-09-18 PROCEDURE — 2580000003 HC RX 258: Performed by: SPECIALIST

## 2021-09-18 PROCEDURE — 6370000000 HC RX 637 (ALT 250 FOR IP): Performed by: INTERNAL MEDICINE

## 2021-09-18 PROCEDURE — 2580000003 HC RX 258: Performed by: SURGERY

## 2021-09-18 PROCEDURE — 82962 GLUCOSE BLOOD TEST: CPT

## 2021-09-18 PROCEDURE — 2580000003 HC RX 258: Performed by: INTERNAL MEDICINE

## 2021-09-18 PROCEDURE — 36592 COLLECT BLOOD FROM PICC: CPT

## 2021-09-18 PROCEDURE — 2000000000 HC ICU R&B

## 2021-09-18 PROCEDURE — 6360000002 HC RX W HCPCS: Performed by: INTERNAL MEDICINE

## 2021-09-18 PROCEDURE — 6370000000 HC RX 637 (ALT 250 FOR IP): Performed by: STUDENT IN AN ORGANIZED HEALTH CARE EDUCATION/TRAINING PROGRAM

## 2021-09-18 PROCEDURE — 85025 COMPLETE CBC W/AUTO DIFF WBC: CPT

## 2021-09-18 PROCEDURE — 6360000002 HC RX W HCPCS: Performed by: SPECIALIST

## 2021-09-18 PROCEDURE — 99233 SBSQ HOSP IP/OBS HIGH 50: CPT | Performed by: FAMILY MEDICINE

## 2021-09-18 PROCEDURE — 85014 HEMATOCRIT: CPT

## 2021-09-18 PROCEDURE — 83735 ASSAY OF MAGNESIUM: CPT

## 2021-09-18 PROCEDURE — 94003 VENT MGMT INPAT SUBQ DAY: CPT

## 2021-09-18 PROCEDURE — 85610 PROTHROMBIN TIME: CPT

## 2021-09-18 PROCEDURE — 36415 COLL VENOUS BLD VENIPUNCTURE: CPT

## 2021-09-18 PROCEDURE — 85018 HEMOGLOBIN: CPT

## 2021-09-18 PROCEDURE — 86140 C-REACTIVE PROTEIN: CPT

## 2021-09-18 PROCEDURE — 87070 CULTURE OTHR SPECIMN AEROBIC: CPT

## 2021-09-18 PROCEDURE — 85651 RBC SED RATE NONAUTOMATED: CPT

## 2021-09-18 PROCEDURE — 84100 ASSAY OF PHOSPHORUS: CPT

## 2021-09-18 RX ADMIN — OXYCODONE 10 MG: 5 TABLET ORAL at 11:30

## 2021-09-18 RX ADMIN — DIAZEPAM 5 MG: 5 TABLET ORAL at 16:43

## 2021-09-18 RX ADMIN — SODIUM CHLORIDE SOLN NEBU 3% 4 ML: 3 NEBU SOLN at 21:13

## 2021-09-18 RX ADMIN — PIPERACILLIN AND TAZOBACTAM 3375 MG: 3; .375 INJECTION, POWDER, LYOPHILIZED, FOR SOLUTION INTRAVENOUS at 20:39

## 2021-09-18 RX ADMIN — PIPERACILLIN AND TAZOBACTAM 3375 MG: 3; .375 INJECTION, POWDER, LYOPHILIZED, FOR SOLUTION INTRAVENOUS at 13:27

## 2021-09-18 RX ADMIN — INSULIN GLARGINE 30 UNITS: 100 INJECTION, SOLUTION SUBCUTANEOUS at 20:43

## 2021-09-18 RX ADMIN — ATORVASTATIN CALCIUM 40 MG: 40 TABLET, FILM COATED ORAL at 20:38

## 2021-09-18 RX ADMIN — SODIUM CHLORIDE, PRESERVATIVE FREE 10 ML: 5 INJECTION INTRAVENOUS at 11:11

## 2021-09-18 RX ADMIN — OXYCODONE 10 MG: 5 TABLET ORAL at 23:30

## 2021-09-18 RX ADMIN — OXYCODONE HYDROCHLORIDE AND ACETAMINOPHEN 1000 MG: 500 TABLET ORAL at 08:09

## 2021-09-18 RX ADMIN — IPRATROPIUM BROMIDE AND ALBUTEROL SULFATE 1 AMPULE: .5; 3 SOLUTION RESPIRATORY (INHALATION) at 08:47

## 2021-09-18 RX ADMIN — PHENYTOIN 200 MG: 125 SUSPENSION ORAL at 14:46

## 2021-09-18 RX ADMIN — INSULIN LISPRO 3 UNITS: 100 INJECTION, SOLUTION INTRAVENOUS; SUBCUTANEOUS at 11:07

## 2021-09-18 RX ADMIN — IPRATROPIUM BROMIDE AND ALBUTEROL SULFATE 1 AMPULE: .5; 3 SOLUTION RESPIRATORY (INHALATION) at 04:45

## 2021-09-18 RX ADMIN — DIAZEPAM 5 MG: 5 TABLET ORAL at 11:10

## 2021-09-18 RX ADMIN — LISINOPRIL 10 MG: 10 TABLET ORAL at 08:12

## 2021-09-18 RX ADMIN — QUETIAPINE FUMARATE 50 MG: 25 TABLET ORAL at 20:39

## 2021-09-18 RX ADMIN — SODIUM CHLORIDE SOLN NEBU 3% 4 ML: 3 NEBU SOLN at 08:47

## 2021-09-18 RX ADMIN — QUETIAPINE FUMARATE 50 MG: 25 TABLET ORAL at 14:46

## 2021-09-18 RX ADMIN — CHLORHEXIDINE GLUCONATE 0.12% ORAL RINSE 15 ML: 1.2 LIQUID ORAL at 20:46

## 2021-09-18 RX ADMIN — ZINC SULFATE 220 MG (50 MG) CAPSULE 50 MG: CAPSULE at 08:09

## 2021-09-18 RX ADMIN — LORAZEPAM 2 MG: 2 INJECTION INTRAMUSCULAR; INTRAVENOUS at 05:00

## 2021-09-18 RX ADMIN — CHLORHEXIDINE GLUCONATE 0.12% ORAL RINSE 15 ML: 1.2 LIQUID ORAL at 08:10

## 2021-09-18 RX ADMIN — LANSOPRAZOLE 30 MG: KIT at 05:42

## 2021-09-18 RX ADMIN — PHENYTOIN 200 MG: 125 SUSPENSION ORAL at 08:30

## 2021-09-18 RX ADMIN — TOBRAMYCIN SULFATE 300 MG: 40 INJECTION, SOLUTION INTRAMUSCULAR; INTRAVENOUS at 21:17

## 2021-09-18 RX ADMIN — INSULIN LISPRO 3 UNITS: 100 INJECTION, SOLUTION INTRAVENOUS; SUBCUTANEOUS at 23:24

## 2021-09-18 RX ADMIN — INSULIN GLARGINE 30 UNITS: 100 INJECTION, SOLUTION SUBCUTANEOUS at 08:20

## 2021-09-18 RX ADMIN — IPRATROPIUM BROMIDE AND ALBUTEROL SULFATE 1 AMPULE: .5; 3 SOLUTION RESPIRATORY (INHALATION) at 13:07

## 2021-09-18 RX ADMIN — QUETIAPINE FUMARATE 50 MG: 25 TABLET ORAL at 08:14

## 2021-09-18 RX ADMIN — OXYCODONE 10 MG: 5 TABLET ORAL at 16:01

## 2021-09-18 RX ADMIN — PHENYTOIN 200 MG: 125 SUSPENSION ORAL at 20:38

## 2021-09-18 RX ADMIN — QUETIAPINE FUMARATE 50 MG: 25 TABLET ORAL at 20:38

## 2021-09-18 RX ADMIN — FLUCONAZOLE 400 MG: 100 TABLET ORAL at 08:30

## 2021-09-18 RX ADMIN — DEXAMETHASONE 6 MG: 4 TABLET ORAL at 08:10

## 2021-09-18 RX ADMIN — OXYCODONE 10 MG: 5 TABLET ORAL at 04:30

## 2021-09-18 RX ADMIN — LORAZEPAM 2 MG: 2 INJECTION INTRAMUSCULAR; INTRAVENOUS at 16:43

## 2021-09-18 RX ADMIN — DIAZEPAM 5 MG: 5 TABLET ORAL at 23:21

## 2021-09-18 RX ADMIN — DIAZEPAM 5 MG: 5 TABLET ORAL at 04:52

## 2021-09-18 RX ADMIN — SODIUM CHLORIDE, PRESERVATIVE FREE 10 ML: 5 INJECTION INTRAVENOUS at 20:41

## 2021-09-18 RX ADMIN — IPRATROPIUM BROMIDE AND ALBUTEROL SULFATE 1 AMPULE: .5; 3 SOLUTION RESPIRATORY (INHALATION) at 21:13

## 2021-09-18 RX ADMIN — Medication 2000 UNITS: at 08:09

## 2021-09-18 RX ADMIN — IPRATROPIUM BROMIDE AND ALBUTEROL SULFATE 1 AMPULE: .5; 3 SOLUTION RESPIRATORY (INHALATION) at 17:12

## 2021-09-18 RX ADMIN — INSULIN LISPRO 3 UNITS: 100 INJECTION, SOLUTION INTRAVENOUS; SUBCUTANEOUS at 05:43

## 2021-09-18 RX ADMIN — OXYCODONE 10 MG: 5 TABLET ORAL at 08:12

## 2021-09-18 RX ADMIN — ACETAMINOPHEN 650 MG: 650 SOLUTION ORAL at 07:07

## 2021-09-18 RX ADMIN — IPRATROPIUM BROMIDE AND ALBUTEROL SULFATE 1 AMPULE: .5; 3 SOLUTION RESPIRATORY (INHALATION) at 01:28

## 2021-09-18 RX ADMIN — INSULIN LISPRO 6 UNITS: 100 INJECTION, SOLUTION INTRAVENOUS; SUBCUTANEOUS at 17:54

## 2021-09-18 RX ADMIN — TOBRAMYCIN SULFATE 300 MG: 40 INJECTION, SOLUTION INTRAMUSCULAR; INTRAVENOUS at 08:47

## 2021-09-18 RX ADMIN — PIPERACILLIN AND TAZOBACTAM 3375 MG: 3; .375 INJECTION, POWDER, LYOPHILIZED, FOR SOLUTION INTRAVENOUS at 04:03

## 2021-09-18 RX ADMIN — AMLODIPINE BESYLATE 10 MG: 10 TABLET ORAL at 08:09

## 2021-09-18 RX ADMIN — VANCOMYCIN HYDROCHLORIDE 1750 MG: 5 INJECTION, POWDER, LYOPHILIZED, FOR SOLUTION INTRAVENOUS at 13:26

## 2021-09-18 ASSESSMENT — PAIN SCALES - GENERAL
PAINLEVEL_OUTOF10: 0
PAINLEVEL_OUTOF10: 3

## 2021-09-18 ASSESSMENT — PULMONARY FUNCTION TESTS
PIF_VALUE: 32
PIF_VALUE: 32
PIF_VALUE: 30
PIF_VALUE: 36
PIF_VALUE: 32
PIF_VALUE: 31
PIF_VALUE: 35
PIF_VALUE: 33
PIF_VALUE: 35
PIF_VALUE: 22
PIF_VALUE: 35
PIF_VALUE: 34
PIF_VALUE: 21
PIF_VALUE: 22
PIF_VALUE: 22
PIF_VALUE: 35
PIF_VALUE: 45
PIF_VALUE: 43
PIF_VALUE: 34
PIF_VALUE: 30
PIF_VALUE: 28
PIF_VALUE: 22
PIF_VALUE: 33
PIF_VALUE: 21

## 2021-09-18 NOTE — PLAN OF CARE
Problem: Airway Clearance - Ineffective  Goal: Achieve or maintain patent airway  Outcome: Met This Shift     Problem: Gas Exchange - Impaired  Goal: Absence of hypoxia  Outcome: Met This Shift     Problem: Nutrition Deficits  Goal: Optimize nutritional status  Outcome: Met This Shift     Problem: Risk for Fluid Volume Deficit  Goal: Maintain normal heart rhythm  Outcome: Met This Shift

## 2021-09-18 NOTE — PROGRESS NOTES
[facility] Progress Note    Admitting Date and Time: 8/21/2021  9:35 AM  Admit Dx: Acute hypoxemic respiratory failure due to COVID-19 (HCC) [U07.1, J96.01]  Pneumonia due to COVID-19 virus [U07.1, J12.82]    Subjective:  Patient is being followed for Acute hypoxemic respiratory failure due to COVID-19 ([address] [U07.1, J96.01]  Pneumonia due to COVID-19 virus [U07.1, J12.82]     Pt knows [birth year] today. O2 sat:  93% on 50% FiO2     Fever:  101.1; no tachycardia    Per RN: Hgb dropped to 7.5 from 9.7 yesterday. OB negative. No hemetemesis. No tea colored urine. On Prevacid and Lovenox. Had tracheostomy changed yesterday without complication by General Surgery. ROS: denies cp, n/v, HA unless stated above.       insulin glargine  30 Units SubCUTAneous BID    lidocaine PF  5 mL IntraDERmal Once    QUEtiapine  50 mg Oral Nightly    fluconazole  400 mg Oral Daily    vancomycin  1,750 mg IntraVENous Q24H    warfarin  2.5 mg Oral Daily    dexamethasone  6 mg Oral Daily    oxyCODONE  10 mg Oral Q4H    diazePAM  5 mg Oral Q6H    phenytoin  200 mg Per G Tube TID    lansoprazole  30 mg Per G Tube QAM AC    QUEtiapine  50 mg Oral TID    sodium chloride flush  5-40 mL IntraVENous 2 times per day    heparin flush  3 mL IntraVENous 2 times per day    piperacillin-tazobactam  3,375 mg IntraVENous Q8H    enoxaparin  1 mg/kg SubCUTAneous BID    tobramycin  300 mg Inhalation Q12H    lisinopril  10 mg Oral Daily    ascorbic acid  1,000 mg Oral Daily    sodium chloride (Inhalant)  4 mL Nebulization Q12H    insulin lispro  0-18 Units SubCUTAneous Q6H    chlorhexidine  15 mL Mouth/Throat BID    ipratropium-albuterol  1 ampule Inhalation Q4H    amLODIPine  10 mg Oral Daily    atorvastatin  40 mg Oral Daily    Vitamin D  2,000 Units Oral Daily    zinc sulfate  50 mg Oral Daily     LORazepam, 2 mg, Q4H PRN  sodium chloride flush, 5-40 mL, PRN  sodium chloride, 25 mL, PRN  heparin flush, 3 mL, PRN  fentanNYL, 25 mcg, Q3H PRN  sodium chloride flush, 5-40 mL, PRN  sodium chloride, 25 mL, PRN  acetaminophen, 650 mg, Q4H PRN  labetalol, 20 mg, Q6H PRN  hydrALAZINE, 20 mg, Q6H PRN  sodium chloride (Inhalant), 4 mL, PRN  albuterol, 2.5 mg, Q6H PRN  benzonatate, 200 mg, TID PRN  glucose, 15 g, PRN  dextrose, 12.5 g, PRN  glucagon (rDNA), 1 mg, PRN  dextrose, 100 mL/hr, PRN  sodium chloride, 30 mL, PRN         Objective:    BP (!) 125/54   Pulse 88   Temp 100.3 °F (37.9 °C) (Bladder)   Resp (!) 32   Ht 6' (1.829 m)   Wt (!) 302 lb 0.5 oz (137 kg)   SpO2 93%   BMI 40.96 kg/m²     General Appearance: alert and oriented to person, place and time and in no acute distress  Skin: warm and dry  Head: normocephalic and atraumatic  Eyes: pupils equal, round, and reactive to light, extraocular eye movements intact, conjunctivae normal  Neck: neck supple and non tender without mass   Pulmonary/Chest: crackles bilaterally- no wheezes, no respiratory distress  Cardiovascular: normal rate, normal S1 and S2 and no carotid bruits  Abdomen: soft, non-tender, non-distended, normal bowel sounds, no masses or organomegaly  Extremities: no cyanosis, no clubbing and no edema  Neurologic: no cranial nerve deficit and speech normal        Recent Labs     09/16/21  0525 09/17/21  0534 09/18/21  0549    134 136   K 4.2 4.4 4.5   CL 99 96* 99   CO2 29 28 30*   BUN 14 15 24*   CREATININE 0.5* 0.5* 0.8   GLUCOSE 158* 236* 166*   CALCIUM 8.3* 8.6 8.2*       Recent Labs     09/16/21  0525 09/17/21  0534 09/18/21  0549   WBC 18.2* 21.6* 11.7*   RBC 2.95* 3.33* 2.54*   HGB 8.7* 9.7* 7.5*   HCT 27.0* 31.0* 24.2*   MCV 91.5 93.1 95.3   MCH 29.5 29.1 29.5   MCHC 32.2 31.3* 31.0*   RDW 14.7 15.0 15.2*    376 282   MPV 9.5 9.5 9.2       Radiology:   XR CHEST PORTABLE    Addendum Date: 9/17/2021    ADDENDUM: Critical results were called by Dr. Kimi Arcos MD to Dr. Laura Steven on 9/17/2021 at 2131 hours, and read back occurred. Result Date: 9/17/2021  EXAMINATION: ONE XRAY VIEW OF THE CHEST 9/17/2021 7:52 pm COMPARISON: 0306 hours today. HISTORY: ORDERING SYSTEM PROVIDED HISTORY: right basilic picc placement TECHNOLOGIST PROVIDED HISTORY: Reason for exam:->right basilic picc placement FINDINGS: Intervally new right PICC courses aberrant Paige up the right neck, with tip out of the field of view. Tracheostomy cannula appears appropriately position for single projection. Stable left IJ catheter with tip in the left brachiocephalic vein. Overlying EKG snaps, leads and oxygen tubing/apparatus. Stable cardiomediastinal silhouette. Unchanged diffuse bilateral airspace disease. Mild right apical capping, which was present to some extent on prior study as well. No acute osseous abnormality. Aberrant course/malposition of the intervally new right PICC, coursing up the right side of the neck, with tip out of the field of view. This should be pulled back approximately 15 cm, with attempted repositioning into the SVC, with reimaging to confirm placement. Otherwise, stable chest.     XR CHEST PORTABLE    Result Date: 9/17/2021  EXAMINATION: ONE XRAY VIEW OF THE CHEST 9/17/2021 3:15 am COMPARISON: 09/15/2021 HISTORY: ORDERING SYSTEM PROVIDED HISTORY: sob TECHNOLOGIST PROVIDED HISTORY: Reason for exam:->sob FINDINGS: Tracheostomy tube and left central venous catheter are unchanged. There are extensive bilateral infiltrates which are not significantly changed. There is no cardiomegaly. There is no pneumothorax. I cannot confirm pleural effusion. No significant change in extensive bilateral infiltrates.      XR CHEST 1 VIEW    Result Date: 9/17/2021  EXAMINATION: ONE XRAY VIEW OF THE CHEST 9/17/2021 8:41 pm COMPARISON: 09/17/2021 earlier the same day HISTORY: ORDERING SYSTEM PROVIDED HISTORY: Right PICC line placement TECHNOLOGIST PROVIDED HISTORY: Reason for exam:->Right PICC line placement FINDINGS: There is a vascular catheter present in the right upper extremity approach. The catheter tip projects over the distal jugular vein and is been pulled down. The tracheostomy device projects over the airway and is above the donna. The position of the left jugular central line projects over the distal jugular vein. There are no signs of associated pneumothorax. Heart mediastinum: Unchanged. Lungs and pleura: There are bilateral extensive central and peripheral multifocal ground-glass consolidative opacities, most likely represents underlying infectious or inflammatory process such as viral airway disease. There is blunting of the costophrenic angles bilaterally suggests small effusions. No signs of pneumothorax. 1. The tip of the right upper extremity PICC line projects over the right jugular vein. 2. No signs of pneumothorax 3. The tracheostomy device and left central line is stable in position. 4. Multifocal ground-glass and consolidative airspace disease, stable 5. Stable bilateral pleural effusions     Assessment:    Principal Problem:    Acute hypoxemic respiratory failure due to COVID-19 [facility])  Active Problems:    Type 2 diabetes mellitus without complication, without long-term current use of insulin (HCC)    History of seizures    Hyperlipidemia    Busch catheter problem (HCC)    Sepsis (HCC)    Thrombocytopenia (HCC)    Elevated LFTs  Resolved Problems:    OSCAR (acute kidney injury) ([facility] [address]    Lactic acidosis      Plan:  1. Acute hypoxic respiratory failure, most likely due to viral pneumonia, O2 sat was below 90% on room air, requiring 50% FiO2 and oxygen saturation above 90%. Treating the underlying process. 2.  Acute viral pneumonia, rapid influenza a and B were negative, respiratory culture was negative, procalcitonin was 0.36,  CAT scan of the chest showed bilateral patchy groundglass opacities, sent for COVID-19, result was positive.   3.  Anemia of inflammation - had 9.7 Hgb yesterday and Tracheostomy replacement. Today Hgb 7.5; no tachycardia. No evidence of bleeding, platelet count normal.  No worsening of his third spacing. Albumin dropped to 2.2. Defer Holding of Lovenox to Intensivist.   4. VAP vs. Tracheobronchitis - on Zosyn, Vancomycin IV. Tobramycin via inhalation and oral Diflucan. Continue same. 5.  HTN - requiring several anti-hypertensives. Continue same. 6.  Spiking fevers - continue anti-pyretics. ? line infection. Defer to ID. Patient still requiring intensive management of his respiratory status and pneumonia. Delirium appears less today. LTAC when afebrile and respiratory requirements met for admission. Making slow progress. NOTE: This report was transcribed using voice recognition software. Every effort was made to ensure accuracy; however, inadvertent computerized transcription errors may be present.     Electronically signed by Erica Aldrich MD on 9/18/2021 at 8:12 AM

## 2021-09-18 NOTE — PROGRESS NOTES
Critical Care Team - Daily Progress Note      Date and time: 9/18/2021 9:58 AM  Patient's name:  Bernard Travis  Medical Record Number: [id number]  Patient's account/billing number: [de-identified]  Patient's YOB: 1965  Age: 60 y.o. Date of Admission: 8/21/2021  9:35 AM  Length of stay during current admission: 28      Primary Care Physician: Loco Salazar, DO  ICU Attending Physician: Dr. Aly Lesser    Code Status: Full Code    Reason for ICU admission: Respiratory failure due to COVID-19      SUBJECTIVE:     OVERNIGHT EVENTS:           8/30: tolerating longer periods supine, thick dark brown secretions     8/31: secretions out of NG, michelle prone     9/1: Decreased urine output and increased creatinine today, continues with thick secretions     9/2: Cr and Uop improved     9/3: P/F improved and has remained supine     9/4: thick secretions     9/5: desat with turning, 1500 loose stool from [facility]    9/6: Patient trying low tidal volumes overnight occasionally, otherwise no other acute events. 9/7: No acute events overnight    9/8: Did receive labetalol 2 times overnight for elevated blood pressures otherwise unremarkable good urine output. Will need essentially exchanged today, bronchoscopy for retained left lung and bibasilar mucopurulent secretions     9/10-no acute events overnight did receive trach and PEG yesterday, will wean sedation as able. , upper extremity DVT noted    9/11: No acute events overnight, sedation was increased overnight due to increased respirations, will slowly come down on that according to nursing staff.    9/12: bumex TID, seroquel to 50 mg PO BID    9/13-patient is improving, is able to follow commands at this time, is able to be transferred to [facility] once  sedation is weaned off, will do that today. 9/14, patient able to be weaned off IV sedation, and doing well.     9/15: Patient is off IV sedation, tachypneic respirations high 30s to low 40s, repeat Covid test did come back positive again. Markedly febrile. : Patient febrile overnight, agitation continues however he is tolerating ventilator better respirations were down in the 20s instead of the 40s yesterday. : Patient continues to be agitated stating something feels like it is in his throat. He was started on fentanyl infusion overnight that will be stopped today. We will to get a psychiatric consult for anxiety. : Tolerating high PSV. Hg dropped and is being rechecked. Creat bumped but only to 0.8. OBJECTIVE:     VITAL SIGNS:  BP (!) 125/54   Pulse 88   Temp 100.3 °F (37.9 °C) (Bladder)   Resp (!) 32   Ht 6' (1.829 m)   Wt (!) 302 lb 0.5 oz (137 kg)   SpO2 93%   BMI 40.96 kg/m²   Tmax over 24 hours:  Temp (24hrs), Av.2 °F (37.9 °C), Min:99.6 °F (37.6 °C), Max:100.6 °F (38.1 °C)      Patient Vitals for the past 6 hrs:   BP Temp Temp src Pulse Resp SpO2 Weight   21 0809 (!) 125/54 -- -- -- -- -- --   21 0659 (!) 101/53 -- -- 88 (!) 32 93 % --   21 0500 (!) 114/54 -- -- 98 (!) 81 95 % --   21 0446 -- -- -- 77 15 100 % --   21 0400 (!) 112/52 100.3 °F (37.9 °C) Bladder 78 (!) 31 100 % (!) 302 lb 0.5 oz (137 kg)         Intake/Output Summary (Last 24 hours) at 2021 0958  Last data filed at 2021 0659  Gross per 24 hour   Intake 2444 ml   Output 1650 ml   Net 794 ml     Wt Readings from Last 2 Encounters:   21 (!) 302 lb 0.5 oz (137 kg)   21 (!) 320 lb (145.2 kg)     Body mass index is 40.96 kg/m².         PHYSICAL EXAMINATION:    General appearance -no acute distress, trach present on ventilator  Mental status -patient is able to follow commands   Eyes - pupils equal and reactive, extraocular eye movements intact  Mouth - mucous membranes moist, pharynx normal without lesions  Neck - supple, no significant adenopathy, trach site intact  Chest -rhonchi right> left     heart - normal rate, regular rhythm, normal S1, S2, no murmurs, rubs, clicks or gallops  Abdomen - soft, nontender, nondistended, no masses or organomegaly peg intact  Neurological -following commands, no focal neurological deficits, is able to answer questions, less agitation  Extremities - peripheral pulses normal, no pedal edema, no clubbing or cyanosis  Skin -patient did have skin breakdown to the face as well as blistering on the hands     Any additional physical findings:    MEDICATIONS:    Scheduled Meds:   insulin glargine  30 Units SubCUTAneous BID    lidocaine PF  5 mL IntraDERmal Once    QUEtiapine  50 mg Oral Nightly    fluconazole  400 mg Oral Daily    vancomycin  1,750 mg IntraVENous Q24H    warfarin  2.5 mg Oral Daily    dexamethasone  6 mg Oral Daily    oxyCODONE  10 mg Oral Q4H    diazePAM  5 mg Oral Q6H    phenytoin  200 mg Per G Tube TID    lansoprazole  30 mg Per G Tube QAM AC    QUEtiapine  50 mg Oral TID    sodium chloride flush  5-40 mL IntraVENous 2 times per day    heparin flush  3 mL IntraVENous 2 times per day    piperacillin-tazobactam  3,375 mg IntraVENous Q8H    enoxaparin  1 mg/kg SubCUTAneous BID    tobramycin  300 mg Inhalation Q12H    lisinopril  10 mg Oral Daily    ascorbic acid  1,000 mg Oral Daily    sodium chloride (Inhalant)  4 mL Nebulization Q12H    insulin lispro  0-18 Units SubCUTAneous Q6H    chlorhexidine  15 mL Mouth/Throat BID    ipratropium-albuterol  1 ampule Inhalation Q4H    amLODIPine  10 mg Oral Daily    atorvastatin  40 mg Oral Daily    Vitamin D  2,000 Units Oral Daily    zinc sulfate  50 mg Oral Daily     Continuous Infusions:   fentaNYL 5 mcg/ml in 0.9%  ml infusion Stopped (09/17/21 1403)    sodium chloride      sodium chloride      dextrose       PRN Meds:   LORazepam, 2 mg, Q4H PRN  sodium chloride flush, 5-40 mL, PRN  sodium chloride, 25 mL, PRN  heparin flush, 3 mL, PRN  fentanNYL, 25 mcg, Q3H PRN  sodium chloride flush, 5-40 mL, PRN  sodium chloride, 25 mL, PRN  acetaminophen, 650 mg, Q4H PRN  labetalol, 20 mg, Q6H PRN  hydrALAZINE, 20 mg, Q6H PRN  sodium chloride (Inhalant), 4 mL, PRN  albuterol, 2.5 mg, Q6H PRN  benzonatate, 200 mg, TID PRN  glucose, 15 g, PRN  dextrose, 12.5 g, PRN  glucagon (rDNA), 1 mg, PRN  dextrose, 100 mL/hr, PRN  sodium chloride, 30 mL, PRN          VENT SETTINGS (Comprehensive) (if applicable):  Vent Information  $Ventilation: $Subsequent Day  Skin Assessment: Clean, dry, & intact  Equipment ID: [id number]  Equipment Changed: (S) Humidification  Vent Type: [id number]  Vent Mode: (S) PS  Vt Ordered: 480 mL  Pressure Ordered: 20  Rate Set: 12 bmp  Peak Flow: 65 L/min  Pressure Support: (S) 15 cmH20  FiO2 : 50 %  SpO2: 93 %  SpO2/FiO2 ratio: 190  Sensitivity: 3  PEEP/CPAP: 5  I Time/ I Time %: 0 s  Humidification Source: Heated wire  Humidification Temp: 37  Humidification Temp Measured: 37  Circuit Condensation: Drained  Mask Type: Full face mask  Mask Size: Medium  Additional Respiratory  Assessments  Pulse: 88  Resp: (!) 32  SpO2: 93 %  Position: Semi-Sellers's  Humidification Source: Heated wire  Humidification Temp: 37  Circuit Condensation: Drained  Oral Care: Mouth swabbed, Mouth moisturizer, Teeth brushed, Mouthwash with chlorhexidine  Subglottic Suction Done?: No  Airway Type: Trachial  Airway Size: 8 (xlt)  Cuff Pressure (cm H2O): 29 cm H2O    ABGs:     Laboratory findings:    Complete Blood Count:   Recent Labs     09/16/21  0525 09/16/21  0525 09/17/21  0534 09/18/21  0549 09/18/21  0945   WBC 18.2*  --  21.6* 11.7*  --    HGB 8.7*   < > 9.7* 7.5* 7.6*   HCT 27.0*   < > 31.0* 24.2* 24.5*     --  376 282  --     < > = values in this interval not displayed.         Last 3 Blood Glucose:   Recent Labs     09/16/21  0525 09/17/21  0534 09/18/21  0549   GLUCOSE 158* 236* 166*        PT/INR:    Lab Results   Component Value Date    PROTIME 21.3 09/18/2021    INR 2.0 09/18/2021     PTT:  No results found for: APTT, PTT    Comprehensive Metabolic Profile:   Recent Labs 09/16/21  0525 09/16/21  0525 09/17/21  0534 09/17/21  0534 09/18/21  0549     --  134  --  136   K 4.2  --  4.4  --  4.5   CL 99  --  96*  --  99   CO2 29  --  28  --  30*   BUN 14  --  15  --  24*   CREATININE 0.5*  --  0.5*  --  0.8   GLUCOSE 158*  --  236*  --  166*   CALCIUM 8.3*  --  8.6  --  8.2*   PROT 6.3*   < > 6.8   < > 6.3*   LABALBU 2.6*   < > 2.6*   < > 2.2*   BILITOT 0.5   < > 0.4   < > 0.3   ALKPHOS 127   < > 117   < > 107   *   < > 151*   < > 85*   *   < > 139*   < > 90*    < > = values in this interval not displayed. Magnesium:   Lab Results   Component Value Date    MG 2.2 09/18/2021     Phosphorus:   Lab Results   Component Value Date    PHOS 4.7 09/18/2021     Ionized Calcium: No results found for: CAION     Urinalysis:     Troponin: No results for input(s): TROPONINI in the last 72 hours. ASSESSMENT:     Patient Active Problem List    Diagnosis Date Noted    Elevated LFTs     Busch catheter problem ([facility] 08/26/2021    Sepsis ([facility] 08/26/2021    Thrombocytopenia ([facility] 08/26/2021    Hyperlipidemia 08/22/2021    Acute hypoxemic respiratory failure due to COVID-19 [facility]) 08/21/2021    Type 2 diabetes mellitus without complication, without long-term current use of insulin ([facility] 08/10/2021    History of seizures 08/10/2021     SYSTEMS ASSESSMENT    Neuro     Following commands at this time  Patient is able to be weaned at this time  Oral Valium every 6 hours, OxyIR 10 mg every 4 hours  Ativan 2 mg as needed  Seroquel 50 mg three times daily  Continue to monitor      Respiratory     Acute hypoxic respiratory failure requiring mechanical ventilation  Severe COVID-19 pneumonia s/p toe C, remdesivir  Pneumonia VAP-MSSA, E. coli  On pip/ tazo  Trach/PEG    Bronchoscopy 9/8/2021 due to white out left side chest on x-ray. DuoNebs every 4 hours  6 mg Decadron daily  Wean oxygen as tolerated, currently at 50%. Keep O2 sat 90-92%  Tolerating some PSV.   Cardiovascular

## 2021-09-18 NOTE — FLOWSHEET NOTE
Very anxious. Kicking legs, increased agitation with care. Unable to calm with explanation. Confused as to place. Ativan given.  Darnell Snowden RN  9/18/2021

## 2021-09-18 NOTE — PLAN OF CARE
Problem: Airway Clearance - Ineffective  Goal: Achieve or maintain patent airway  Outcome: Ongoing     Problem: Gas Exchange - Impaired  Goal: Absence of hypoxia  Outcome: Ongoing  Goal: Promote optimal lung function  Outcome: Ongoing     Problem: Breathing Pattern - Ineffective  Goal: Ability to achieve and maintain a regular respiratory rate  Outcome: Ongoing     Problem:  Body Temperature -  Risk of, Imbalanced  Goal: Complications related to the disease process, condition or treatment will be avoided or minimized  Outcome: Ongoing     Problem: Fatigue  Goal: Verbalize increase energy and improved vitality  Outcome: Not Met This Shift     Problem: Skin Integrity:  Goal: Will show no infection signs and symptoms  Description: Will show no infection signs and symptoms  Outcome: Ongoing  Goal: Absence of new skin breakdown  Description: Absence of new skin breakdown  Outcome: Met This Shift

## 2021-09-18 NOTE — PROGRESS NOTES
[facility]  [facility]  Progress Note      Chief Complaint   Patient presents with    Shortness of Breath     covid, per family SaO2 75% RA, hx of DM and panic attack        SUBJECTIVE:  8/21/21  Prone. FiO2 60% and PEEP of 16    8/22/2021  Patient is tolerating medications. No reported adverse drug reactions. No nausea, vomiting, diarrhea. Afebrile BiPAP 100% FiO2 with breathing at 44 times a minute and probably is going to decompensate    8/23/2021  Not doing well intubated on a rotating bed  FiO2 70% and PEEP 16    8/24/2021  Prone 50% FiO2-PEEP of 10  Issues with the urinary Busch last night this was changed  Paralyzed and on Versed    8/25/2021  Paralyzed and sedated with Versed no pressors  Prone intubated on 50% FiO2, PEEP of 10  Tolerating medications    8/26/21  Paralyzed and sedated with Versed no pressors  Prone intubated on 70% FiO2, PEEP of 10  Does not tolerate supine position    8/27/2021  Afebrile  Still prone on FiO2 of 100%  Paralyzed and sedated    8/28/2021  The patient is still in the ICU. He is pronated. FiO2 100%. Is still sedated and paralyzed. Fair amount of thick, brown secretions    8/29/2021  The patient is still on a RotoProne bed. You are still intubated, sedated and paralyzed. O2 is being weaned down. FiO2 55%. PEEP 12.    8/30/2021  Patient remains intubated, sedated and paralyzed. He is still on a RotoProne bed. Supine now. 8/31/2021  He is still on a RotoProne bed. Still having fair amount of secretions from the nostrils. Minimal from the ET tube, however. Tube feeding seems to be coming out of his nose. 9/1/2021  He is supinated but still on a RotoProne bed. He still has fair amount of secretions from the ET tube. Tolerating antibiotic    9/2/2021. He is still on a RotoProne bed. He is pronated. Less secretions from the ET tube and nostrils. FiO2 50%. PEEP 14.    9/3/2021. He is off the RotoProne bed. Tolerating supination. Tolerating weaning. PEEP was brought down to 12. FiO2 still at 50%. Tolerating antibiotics. 9/4/2021. He is still in the ICU. He is still on a ventilator. He is still paralyzed. 9/5/2021. He had an episode of acute desaturation and had to be bagged. He is now back on the ventilator. He is still sedated and paralyzed. 9/6/2021. The patient still in the ICU. He is still requiring paralytics. Secretions are becoming thinner. No fever. 9/7/2021. The patient still on a ventilator and paralyzed. No new problems reported. 9/8/2021. Patient is still on the ventilator, paralyzed. No fever. Tolerating medications. 9/9/2021. Patient had a bronchoscopy yesterday a fair amount of thick secretions were obtained. Cultures were sent. He is still on a ventilator, sedated and paralyzed. 9/10/2021. The patient is still in the ICU. He is still on a ventilator. He had a tracheostomy and PEG. Denies dyspnea or pain. 9/11/2021. Patient is now having fevers. He is still on a ventilator. Cooling blanket. 9/12/2021. Lines were changed. Still having some fevers but they seem to be trending downwards. Denies any pain. Denies dyspnea. 9/13/2021. The patient is still in the ICU. He is quite restless and becomes agitated. No fever. Tolerating antibiotics. 9/14/2021. The patient remains in the ICU. Still awaiting for a bed at [facility]. No fevers. Tolerating antibiotics. 9/15/2021. The patient is still in the ICU. He is still in isolation because there are no beds available to take him out of isolation and terminally clean his room. He is having fevers. Nursing reports secretions from around the tracheostomy. 9/16/2021. The patient was removed out of isolation. He is still having the ICU. He denies any dyspnea. No pain. Tolerating antibiotics. 9/17/2021. The patient remains in the ICU. Still awaiting a bed for LT[facility].   He continues to be restless and continuing to alarm the ventilator. He wants help getting up but cannot really even move and bed. Denies dyspnea. No pain. He is having fevers. T-max 101.3 °F.    9/18/2021. The patient is still in the ICU. He is still on a ventilator via tracheostomy. He still becomes restless. He has low-grade fevers. Review of systems:  As stated above in the chief complaint, otherwise negative.     Medications:  Scheduled Meds:   insulin glargine  30 Units SubCUTAneous BID    lidocaine PF  5 mL IntraDERmal Once    QUEtiapine  50 mg Oral Nightly    fluconazole  400 mg Oral Daily    vancomycin  1,750 mg IntraVENous Q24H    warfarin  2.5 mg Oral Daily    dexamethasone  6 mg Oral Daily    oxyCODONE  10 mg Oral Q4H    diazePAM  5 mg Oral Q6H    phenytoin  200 mg Per G Tube TID    lansoprazole  30 mg Per G Tube QAM AC    QUEtiapine  50 mg Oral TID    sodium chloride flush  5-40 mL IntraVENous 2 times per day    heparin flush  3 mL IntraVENous 2 times per day    piperacillin-tazobactam  3,375 mg IntraVENous Q8H    enoxaparin  1 mg/kg SubCUTAneous BID    tobramycin  300 mg Inhalation Q12H    lisinopril  10 mg Oral Daily    ascorbic acid  1,000 mg Oral Daily    sodium chloride (Inhalant)  4 mL Nebulization Q12H    insulin lispro  0-18 Units SubCUTAneous Q6H    chlorhexidine  15 mL Mouth/Throat BID    ipratropium-albuterol  1 ampule Inhalation Q4H    amLODIPine  10 mg Oral Daily    atorvastatin  40 mg Oral Daily    Vitamin D  2,000 Units Oral Daily    zinc sulfate  50 mg Oral Daily     Continuous Infusions:   fentaNYL 5 mcg/ml in 0.9%  ml infusion Stopped (09/17/21 1403)    sodium chloride      sodium chloride      dextrose       PRN Meds:LORazepam, sodium chloride flush, sodium chloride, heparin flush, fentanNYL, sodium chloride flush, sodium chloride, acetaminophen, labetalol, hydrALAZINE, sodium chloride (Inhalant), albuterol, benzonatate, glucose, dextrose, glucagon (rDNA), dextrose, sodium Component Value Date    CRP 16.5 (H) 09/18/2021    CRP 22.7 (H) 09/17/2021    CRP 29.4 (H) 09/16/2021     Lab Results   Component Value Date    SEDRATE 125 (H) 09/17/2021    SEDRATE 125 (H) 09/16/2021    SEDRATE 120 (H) 09/15/2021     Radiology:      Microbiology:   Streptococcus pneumoniae/Legionella urine Ag: negative  Nares screen MRSA: Negative  Urine culture 8/24/2021: Negative  Blood cultures 8/21/2021: Negative x2  Blood cultures 9/15/2021: CoNS in 1 of 4 bottles  Respiratory culture 8/27/2021: OP terrell reduced  Respiratory culture 8/30/2021: E. coli, MSSA   BAL 9/8/2021: OP terrell reduced. E. coli, Enterobacter cloacae, MSSA. PjP negative  TLC tip culture 9/11/2021: Negative  TLC tip culture  Tracheostomy site culture 9/15/2021: Staphylococcus  Tracheostomy wound site 9/15/2021: CoNS    ASSESSMENT:  · Severe Covid pneumonia causing acute respiratory failure CRP has come down nicely. Completed Remdesivir. Received Tocilizumab  · VAP versus tracheobronchitis. Cultures growing MSSA and E. coli. Improving. Treated with 10 days of Cefazolin  · Status post bronchoscopy 9/8/2021. Cultures growing rare MSSA,, Enterobacter cloacae. Possible tracheobronchitis  · Leukocytosis associated to the above, stable  · Status post tracheostomy and PEG 9/10/2021   · Probable CLABSI. TLC was removed. Culture tip pending. Blood cultures grew CoNS. Temperatures trending down since the TLC was removed  · Possible tracheitis  · Elevation of transaminases, improving    PLAN:  · Continue Zosyn and Tobramycin by inhalation until 9/25/2021  · Continue Vancomycin, day 4. Trough was 6.9  · Continue Fluconazole for now.   If no yeast recovered from tracheostomy site, we will discontinue it  · Check tracheostomy site culture  · We will follow with you  · Awaiting transfer to LTAC  · Okay for PICC since patient has already been on Vancomycin for 3 days    Spoke with nursing    Salvador Fisher MD  8:50 AM   9/18/2021

## 2021-09-19 LAB
ABO/RH: NORMAL
ALBUMIN SERPL-MCNC: 2.4 G/DL (ref 3.5–5.2)
ALP BLD-CCNC: 79 U/L (ref 40–129)
ALT SERPL-CCNC: 79 U/L (ref 0–40)
ANION GAP SERPL CALCULATED.3IONS-SCNC: 5 MMOL/L (ref 7–16)
ANTIBODY SCREEN: NORMAL
AST SERPL-CCNC: 68 U/L (ref 0–39)
BASOPHILS ABSOLUTE: 0.01 E9/L (ref 0–0.2)
BASOPHILS RELATIVE PERCENT: 0.1 % (ref 0–2)
BILIRUB SERPL-MCNC: 0.2 MG/DL (ref 0–1.2)
BUN BLDV-MCNC: 27 MG/DL (ref 6–20)
C-REACTIVE PROTEIN: 13.3 MG/DL (ref 0–0.4)
CALCIUM SERPL-MCNC: 8.2 MG/DL (ref 8.6–10.2)
CHLORIDE BLD-SCNC: 101 MMOL/L (ref 98–107)
CO2: 32 MMOL/L (ref 22–29)
CREAT SERPL-MCNC: 0.8 MG/DL (ref 0.7–1.2)
EOSINOPHILS ABSOLUTE: 0.3 E9/L (ref 0.05–0.5)
EOSINOPHILS RELATIVE PERCENT: 2.5 % (ref 0–6)
GFR AFRICAN AMERICAN: >60
GFR NON-AFRICAN AMERICAN: >60 ML/MIN/1.73
GLUCOSE BLD-MCNC: 134 MG/DL (ref 74–99)
HCT VFR BLD CALC: 23.2 % (ref 37–54)
HEMOGLOBIN: 7.1 G/DL (ref 12.5–16.5)
IMMATURE GRANULOCYTES #: 0.61 E9/L
IMMATURE GRANULOCYTES %: 5 % (ref 0–5)
INR BLD: 1.6
LYMPHOCYTES ABSOLUTE: 0.91 E9/L (ref 1.5–4)
LYMPHOCYTES RELATIVE PERCENT: 7.5 % (ref 20–42)
MAGNESIUM: 2.1 MG/DL (ref 1.6–2.6)
MCH RBC QN AUTO: 29.5 PG (ref 26–35)
MCHC RBC AUTO-ENTMCNC: 30.6 % (ref 32–34.5)
MCV RBC AUTO: 96.3 FL (ref 80–99.9)
METER GLUCOSE: 134 MG/DL (ref 74–99)
METER GLUCOSE: 138 MG/DL (ref 74–99)
METER GLUCOSE: 143 MG/DL (ref 74–99)
METER GLUCOSE: 179 MG/DL (ref 74–99)
METER GLUCOSE: 200 MG/DL (ref 74–99)
MONOCYTES ABSOLUTE: 0.83 E9/L (ref 0.1–0.95)
MONOCYTES RELATIVE PERCENT: 6.8 % (ref 2–12)
NEUTROPHILS ABSOLUTE: 9.49 E9/L (ref 1.8–7.3)
NEUTROPHILS RELATIVE PERCENT: 78.1 % (ref 43–80)
PDW BLD-RTO: 15 FL (ref 11.5–15)
PHOSPHORUS: 3.6 MG/DL (ref 2.5–4.5)
PLATELET # BLD: 275 E9/L (ref 130–450)
PMV BLD AUTO: 9.1 FL (ref 7–12)
POTASSIUM SERPL-SCNC: 4.2 MMOL/L (ref 3.5–5)
PROTHROMBIN TIME: 17.4 SEC (ref 9.3–12.4)
RBC # BLD: 2.41 E12/L (ref 3.8–5.8)
SEDIMENTATION RATE, ERYTHROCYTE: 137 MM/HR (ref 0–15)
SODIUM BLD-SCNC: 138 MMOL/L (ref 132–146)
TOTAL PROTEIN: 6 G/DL (ref 6.4–8.3)
WBC # BLD: 12.2 E9/L (ref 4.5–11.5)

## 2021-09-19 PROCEDURE — 6370000000 HC RX 637 (ALT 250 FOR IP): Performed by: SURGERY

## 2021-09-19 PROCEDURE — 2580000003 HC RX 258: Performed by: SURGERY

## 2021-09-19 PROCEDURE — 87206 SMEAR FLUORESCENT/ACID STAI: CPT

## 2021-09-19 PROCEDURE — 85610 PROTHROMBIN TIME: CPT

## 2021-09-19 PROCEDURE — 86850 RBC ANTIBODY SCREEN: CPT

## 2021-09-19 PROCEDURE — 6370000000 HC RX 637 (ALT 250 FOR IP): Performed by: INTERNAL MEDICINE

## 2021-09-19 PROCEDURE — 6360000002 HC RX W HCPCS: Performed by: SPECIALIST

## 2021-09-19 PROCEDURE — 94640 AIRWAY INHALATION TREATMENT: CPT

## 2021-09-19 PROCEDURE — 6370000000 HC RX 637 (ALT 250 FOR IP): Performed by: SPECIALIST

## 2021-09-19 PROCEDURE — 2000000000 HC ICU R&B

## 2021-09-19 PROCEDURE — 86140 C-REACTIVE PROTEIN: CPT

## 2021-09-19 PROCEDURE — 36415 COLL VENOUS BLD VENIPUNCTURE: CPT

## 2021-09-19 PROCEDURE — 82962 GLUCOSE BLOOD TEST: CPT

## 2021-09-19 PROCEDURE — 2580000003 HC RX 258: Performed by: INTERNAL MEDICINE

## 2021-09-19 PROCEDURE — 84100 ASSAY OF PHOSPHORUS: CPT

## 2021-09-19 PROCEDURE — 6370000000 HC RX 637 (ALT 250 FOR IP): Performed by: STUDENT IN AN ORGANIZED HEALTH CARE EDUCATION/TRAINING PROGRAM

## 2021-09-19 PROCEDURE — 85651 RBC SED RATE NONAUTOMATED: CPT

## 2021-09-19 PROCEDURE — 85025 COMPLETE CBC W/AUTO DIFF WBC: CPT

## 2021-09-19 PROCEDURE — 6360000002 HC RX W HCPCS: Performed by: INTERNAL MEDICINE

## 2021-09-19 PROCEDURE — 2580000003 HC RX 258: Performed by: SPECIALIST

## 2021-09-19 PROCEDURE — 99233 SBSQ HOSP IP/OBS HIGH 50: CPT | Performed by: FAMILY MEDICINE

## 2021-09-19 PROCEDURE — 87070 CULTURE OTHR SPECIMN AEROBIC: CPT

## 2021-09-19 PROCEDURE — 80053 COMPREHEN METABOLIC PANEL: CPT

## 2021-09-19 PROCEDURE — 86900 BLOOD TYPING SEROLOGIC ABO: CPT

## 2021-09-19 PROCEDURE — 86901 BLOOD TYPING SEROLOGIC RH(D): CPT

## 2021-09-19 PROCEDURE — 94003 VENT MGMT INPAT SUBQ DAY: CPT

## 2021-09-19 PROCEDURE — 83735 ASSAY OF MAGNESIUM: CPT

## 2021-09-19 RX ORDER — OXYCODONE HYDROCHLORIDE 5 MG/1
10 TABLET ORAL EVERY 6 HOURS SCHEDULED
Status: DISCONTINUED | OUTPATIENT
Start: 2021-09-19 | End: 2021-09-20 | Stop reason: HOSPADM

## 2021-09-19 RX ADMIN — QUETIAPINE FUMARATE 50 MG: 25 TABLET ORAL at 21:45

## 2021-09-19 RX ADMIN — INSULIN GLARGINE 30 UNITS: 100 INJECTION, SOLUTION SUBCUTANEOUS at 09:08

## 2021-09-19 RX ADMIN — DEXAMETHASONE 6 MG: 4 TABLET ORAL at 08:31

## 2021-09-19 RX ADMIN — PHENYTOIN 200 MG: 125 SUSPENSION ORAL at 21:43

## 2021-09-19 RX ADMIN — QUETIAPINE FUMARATE 50 MG: 25 TABLET ORAL at 14:11

## 2021-09-19 RX ADMIN — SODIUM CHLORIDE SOLN NEBU 3% 4 ML: 3 NEBU SOLN at 08:37

## 2021-09-19 RX ADMIN — ATORVASTATIN CALCIUM 40 MG: 40 TABLET, FILM COATED ORAL at 21:43

## 2021-09-19 RX ADMIN — CHLORHEXIDINE GLUCONATE 0.12% ORAL RINSE 15 ML: 1.2 LIQUID ORAL at 23:19

## 2021-09-19 RX ADMIN — INSULIN LISPRO 3 UNITS: 100 INJECTION, SOLUTION INTRAVENOUS; SUBCUTANEOUS at 12:45

## 2021-09-19 RX ADMIN — IPRATROPIUM BROMIDE AND ALBUTEROL SULFATE 1 AMPULE: .5; 3 SOLUTION RESPIRATORY (INHALATION) at 08:37

## 2021-09-19 RX ADMIN — VANCOMYCIN HYDROCHLORIDE 1750 MG: 5 INJECTION, POWDER, LYOPHILIZED, FOR SOLUTION INTRAVENOUS at 12:51

## 2021-09-19 RX ADMIN — AMLODIPINE BESYLATE 10 MG: 10 TABLET ORAL at 08:30

## 2021-09-19 RX ADMIN — QUETIAPINE FUMARATE 50 MG: 25 TABLET ORAL at 08:32

## 2021-09-19 RX ADMIN — INSULIN GLARGINE 30 UNITS: 100 INJECTION, SOLUTION SUBCUTANEOUS at 21:43

## 2021-09-19 RX ADMIN — DIAZEPAM 5 MG: 5 TABLET ORAL at 23:19

## 2021-09-19 RX ADMIN — IPRATROPIUM BROMIDE AND ALBUTEROL SULFATE 1 AMPULE: .5; 3 SOLUTION RESPIRATORY (INHALATION) at 04:13

## 2021-09-19 RX ADMIN — IPRATROPIUM BROMIDE AND ALBUTEROL SULFATE 1 AMPULE: .5; 3 SOLUTION RESPIRATORY (INHALATION) at 00:39

## 2021-09-19 RX ADMIN — QUETIAPINE FUMARATE 50 MG: 25 TABLET ORAL at 21:43

## 2021-09-19 RX ADMIN — PHENYTOIN 200 MG: 125 SUSPENSION ORAL at 08:32

## 2021-09-19 RX ADMIN — SODIUM CHLORIDE SOLN NEBU 3% 4 ML: 3 NEBU SOLN at 20:59

## 2021-09-19 RX ADMIN — LISINOPRIL 10 MG: 10 TABLET ORAL at 08:39

## 2021-09-19 RX ADMIN — OXYCODONE 10 MG: 5 TABLET ORAL at 23:19

## 2021-09-19 RX ADMIN — TOBRAMYCIN SULFATE 300 MG: 40 INJECTION, SOLUTION INTRAMUSCULAR; INTRAVENOUS at 08:37

## 2021-09-19 RX ADMIN — DIAZEPAM 5 MG: 5 TABLET ORAL at 17:18

## 2021-09-19 RX ADMIN — Medication 2000 UNITS: at 08:33

## 2021-09-19 RX ADMIN — DIAZEPAM 5 MG: 5 TABLET ORAL at 12:46

## 2021-09-19 RX ADMIN — SODIUM CHLORIDE, PRESERVATIVE FREE 10 ML: 5 INJECTION INTRAVENOUS at 12:52

## 2021-09-19 RX ADMIN — PIPERACILLIN AND TAZOBACTAM 3375 MG: 3; .375 INJECTION, POWDER, LYOPHILIZED, FOR SOLUTION INTRAVENOUS at 21:42

## 2021-09-19 RX ADMIN — LANSOPRAZOLE 30 MG: KIT at 05:49

## 2021-09-19 RX ADMIN — PIPERACILLIN AND TAZOBACTAM 3375 MG: 3; .375 INJECTION, POWDER, LYOPHILIZED, FOR SOLUTION INTRAVENOUS at 12:51

## 2021-09-19 RX ADMIN — INSULIN LISPRO 6 UNITS: 100 INJECTION, SOLUTION INTRAVENOUS; SUBCUTANEOUS at 18:28

## 2021-09-19 RX ADMIN — IPRATROPIUM BROMIDE AND ALBUTEROL SULFATE 1 AMPULE: .5; 3 SOLUTION RESPIRATORY (INHALATION) at 16:19

## 2021-09-19 RX ADMIN — PHENYTOIN 200 MG: 125 SUSPENSION ORAL at 14:11

## 2021-09-19 RX ADMIN — CHLORHEXIDINE GLUCONATE 0.12% ORAL RINSE 15 ML: 1.2 LIQUID ORAL at 08:31

## 2021-09-19 RX ADMIN — FLUCONAZOLE 400 MG: 100 TABLET ORAL at 08:39

## 2021-09-19 RX ADMIN — LORAZEPAM 2 MG: 2 INJECTION INTRAMUSCULAR; INTRAVENOUS at 02:34

## 2021-09-19 RX ADMIN — DIAZEPAM 5 MG: 5 TABLET ORAL at 04:52

## 2021-09-19 RX ADMIN — ZINC SULFATE 220 MG (50 MG) CAPSULE 50 MG: CAPSULE at 08:32

## 2021-09-19 RX ADMIN — OXYCODONE 10 MG: 5 TABLET ORAL at 04:30

## 2021-09-19 RX ADMIN — OXYCODONE HYDROCHLORIDE AND ACETAMINOPHEN 1000 MG: 500 TABLET ORAL at 08:31

## 2021-09-19 RX ADMIN — IPRATROPIUM BROMIDE AND ALBUTEROL SULFATE 1 AMPULE: .5; 3 SOLUTION RESPIRATORY (INHALATION) at 20:59

## 2021-09-19 RX ADMIN — LORAZEPAM 2 MG: 2 INJECTION INTRAMUSCULAR; INTRAVENOUS at 06:34

## 2021-09-19 RX ADMIN — SODIUM CHLORIDE, PRESERVATIVE FREE 10 ML: 5 INJECTION INTRAVENOUS at 21:43

## 2021-09-19 RX ADMIN — PIPERACILLIN AND TAZOBACTAM 3375 MG: 3; .375 INJECTION, POWDER, LYOPHILIZED, FOR SOLUTION INTRAVENOUS at 04:57

## 2021-09-19 ASSESSMENT — PULMONARY FUNCTION TESTS
PIF_VALUE: 29
PIF_VALUE: 26
PIF_VALUE: 36
PIF_VALUE: 29
PIF_VALUE: 37
PIF_VALUE: 33
PIF_VALUE: 29
PIF_VALUE: 31
PIF_VALUE: 35
PIF_VALUE: 35
PIF_VALUE: 39
PIF_VALUE: 32
PIF_VALUE: 31
PIF_VALUE: 28
PIF_VALUE: 28
PIF_VALUE: 29
PIF_VALUE: 38
PIF_VALUE: 21
PIF_VALUE: 39
PIF_VALUE: 34
PIF_VALUE: 29
PIF_VALUE: 21
PIF_VALUE: 30
PIF_VALUE: 31
PIF_VALUE: 33
PIF_VALUE: 31
PIF_VALUE: 31

## 2021-09-19 ASSESSMENT — PAIN SCALES - GENERAL
PAINLEVEL_OUTOF10: 0

## 2021-09-19 NOTE — PROGRESS NOTES
[facility] Progress Note    Admitting Date and Time: 8/21/2021  9:35 AM  Admit Dx: Acute hypoxemic respiratory failure due to COVID-19 (HCC) [U07.1, J96.01]  Pneumonia due to COVID-19 virus [U07.1, J12.82]    Subjective:  Patient is being followed for Acute hypoxemic respiratory failure due to COVID-19 ([address] [U07.1, J96.01]  Pneumonia due to COVID-19 virus [U07.1, J12.82]     Pt with respiratory. Suctioning without lot of secretions. Patient seems annoyed/irritated. O2 sat:  94% on  45% FiO2, improving slowly     Fever:  99    Per RN:   Respiratory working with now; due a breathing treatment    ROS: denies cp, n/v, HA unless stated above.       insulin glargine  30 Units SubCUTAneous BID    lidocaine PF  5 mL IntraDERmal Once    QUEtiapine  50 mg Oral Nightly    fluconazole  400 mg Oral Daily    vancomycin  1,750 mg IntraVENous Q24H    dexamethasone  6 mg Oral Daily    oxyCODONE  10 mg Oral Q4H    diazePAM  5 mg Oral Q6H    phenytoin  200 mg Per G Tube TID    lansoprazole  30 mg Per G Tube QAM AC    QUEtiapine  50 mg Oral TID    sodium chloride flush  5-40 mL IntraVENous 2 times per day    heparin flush  3 mL IntraVENous 2 times per day    piperacillin-tazobactam  3,375 mg IntraVENous Q8H    [Held by provider] enoxaparin  1 mg/kg SubCUTAneous BID    tobramycin  300 mg Inhalation Q12H    lisinopril  10 mg Oral Daily    ascorbic acid  1,000 mg Oral Daily    sodium chloride (Inhalant)  4 mL Nebulization Q12H    insulin lispro  0-18 Units SubCUTAneous Q6H    chlorhexidine  15 mL Mouth/Throat BID    ipratropium-albuterol  1 ampule Inhalation Q4H    amLODIPine  10 mg Oral Daily    atorvastatin  40 mg Oral Daily    Vitamin D  2,000 Units Oral Daily    zinc sulfate  50 mg Oral Daily     LORazepam, 2 mg, Q4H PRN  sodium chloride flush, 5-40 mL, PRN  sodium chloride, 25 mL, PRN  heparin flush, 3 mL, PRN  fentanNYL, 25 mcg, Q3H PRN  sodium chloride flush, 5-40 mL, PRN  sodium chloride, 25 mL, PRN  acetaminophen, 650 mg, Q4H PRN  labetalol, 20 mg, Q6H PRN  hydrALAZINE, 20 mg, Q6H PRN  sodium chloride (Inhalant), 4 mL, PRN  albuterol, 2.5 mg, Q6H PRN  benzonatate, 200 mg, TID PRN  glucose, 15 g, PRN  dextrose, 12.5 g, PRN  glucagon (rDNA), 1 mg, PRN  dextrose, 100 mL/hr, PRN  sodium chloride, 30 mL, PRN         Objective:    BP (!) 107/56   Pulse 101   Temp 99 °F (37.2 °C) (Bladder)   Resp 18   Ht 6' (1.829 m)   Wt (!) 302 lb 0.5 oz (137 kg)   SpO2 94%   BMI 40.96 kg/m²     General Appearance: alert and in no acute distress. Seems irritated today  Skin: warm and dry  Head: normocephalic and atraumatic  Eyes: pupils equal, round, and reactive to light, extraocular eye movements intact, conjunctivae normal  Neck: neck supple and non tender without mass   Pulmonary/Chest: crackles bilaterally- no wheezes,  no respiratory distress  Cardiovascular: normal rate, normal S1 and S2 and no carotid bruits  Abdomen: soft, non-tender, non-distended, normal bowel sounds, no masses or organomegaly  Extremities: no cyanosis, no clubbing and no edema  Neurologic: no cranial nerve deficit and speech normal        Recent Labs     09/17/21  0534 09/18/21  0549 09/19/21  0555    136 138   K 4.4 4.5 4.2   CL 96* 99 101   CO2 28 30* 32*   BUN 15 24* 27*   CREATININE 0.5* 0.8 0.8   GLUCOSE 236* 166* 134*   CALCIUM 8.6 8.2* 8.2*       Recent Labs     09/17/21  0534 09/17/21  0534 09/18/21  0549 09/18/21  0945 09/19/21  0555   WBC 21.6*  --  11.7*  --  12.2*   RBC 3.33*  --  2.54*  --  2.41*   HGB 9.7*   < > 7.5* 7.6* 7.1*   HCT 31.0*   < > 24.2* 24.5* 23.2*   MCV 93.1  --  95.3  --  96.3   MCH 29.1  --  29.5  --  29.5   MCHC 31.3*  --  31.0*  --  30.6*   RDW 15.0  --  15.2*  --  15.0     --  282  --  275   MPV 9.5  --  9.2  --  9.1    < > = values in this interval not displayed.      Radiology:   XR CHEST PORTABLE    Addendum Date: 9/17/2021    ADDENDUM: Critical results were called by Dr. Jeanette Contreras MD to Dr. Edilma Lam on 9/17/2021 at 2131 hours, and read back occurred. Result Date: 9/17/2021  EXAMINATION: ONE XRAY VIEW OF THE CHEST 9/17/2021 7:52 pm COMPARISON: 0306 hours today. HISTORY: ORDERING SYSTEM PROVIDED HISTORY: right basilic picc placement TECHNOLOGIST PROVIDED HISTORY: Reason for exam:->right basilic picc placement FINDINGS: Intervally new right PICC courses aberrant Paige up the right neck, with tip out of the field of view. Tracheostomy cannula appears appropriately position for single projection. Stable left IJ catheter with tip in the left brachiocephalic vein. Overlying EKG snaps, leads and oxygen tubing/apparatus. Stable cardiomediastinal silhouette. Unchanged diffuse bilateral airspace disease. Mild right apical capping, which was present to some extent on prior study as well. No acute osseous abnormality. Aberrant course/malposition of the intervally new right PICC, coursing up the right side of the neck, with tip out of the field of view. This should be pulled back approximately 15 cm, with attempted repositioning into the SVC, with reimaging to confirm placement. Otherwise, stable chest.     XR CHEST 1 VIEW    Result Date: 9/17/2021  EXAMINATION: ONE XRAY VIEW OF THE CHEST 9/17/2021 8:41 pm COMPARISON: 09/17/2021 earlier the same day HISTORY: ORDERING SYSTEM PROVIDED HISTORY: Right PICC line placement TECHNOLOGIST PROVIDED HISTORY: Reason for exam:->Right PICC line placement FINDINGS: There is a vascular catheter present in the right upper extremity approach. The catheter tip projects over the distal jugular vein and is been pulled down. The tracheostomy device projects over the airway and is above the donna. The position of the left jugular central line projects over the distal jugular vein. There are no signs of associated pneumothorax. Heart mediastinum: Unchanged. Lungs and pleura:  There are bilateral extensive central and peripheral multifocal ground-glass consolidative opacities, most likely represents underlying infectious or inflammatory process such as viral airway disease. There is blunting of the costophrenic angles bilaterally suggests small effusions. No signs of pneumothorax. 1. The tip of the right upper extremity PICC line projects over the right jugular vein. 2. No signs of pneumothorax 3. The tracheostomy device and left central line is stable in position. 4. Multifocal ground-glass and consolidative airspace disease, stable 5. Stable bilateral pleural effusions     Assessment:    Principal Problem:    Acute hypoxemic respiratory failure due to COVID-19 [facility])  Active Problems:    Type 2 diabetes mellitus without complication, without long-term current use of insulin ([facility])    History of seizures    Hyperlipidemia    Busch catheter problem ([facility])    Sepsis ([facility])    Thrombocytopenia ([facility])    Elevated LFTs  Resolved Problems:    OSCAR (acute kidney injury) ([facility] [address]    Lactic acidosis      Plan:  1. Acute hypoxic respiratory failure, most likely due to viral pneumonia, O2 sat was below 90% on room air, requiring MV on 45% FiO2 and oxygen saturation above 90%. Treating the underlying process. For LTAC when cultures finalized and condition still stable. 2.  Acute viral pneumonia, rapid influenza a and B were negative, respiratory panel was negative, procalcitonin was 0.36, CAT scan of the chest showed bilateral patchy groundglass opacities, sent for COVID-19, result was positive. 3.  Anemia of inflammation -  Hgb in the 7s. 7.5 -> 7.6 -> now 7.1. . BP stable at 107/56. Continue to trend. No evidence of acute blood loss. Transfuse if < 7.  4. VAP vs. Tracheobronchitis - on Zosyn, Vancomycin IV. Tobramycin via inhalation and oral Diflucan. Continue same. 5.  HTN - requiring several anti-hypertensives. Continue same. 6.  Spiking fevers - C/S of line tip pending. Tmax 101 yesterday. 99 Temp at midnight.   Continue IV antibiotics

## 2021-09-19 NOTE — PROGRESS NOTES
Tolerating weaning. PEEP was brought down to 12. FiO2 still at 50%. Tolerating antibiotics. 9/4/2021. He is still in the ICU. He is still on a ventilator. He is still paralyzed. 9/5/2021. He had an episode of acute desaturation and had to be bagged. He is now back on the ventilator. He is still sedated and paralyzed. 9/6/2021. The patient still in the ICU. He is still requiring paralytics. Secretions are becoming thinner. No fever. 9/7/2021. The patient still on a ventilator and paralyzed. No new problems reported. 9/8/2021. Patient is still on the ventilator, paralyzed. No fever. Tolerating medications. 9/9/2021. Patient had a bronchoscopy yesterday a fair amount of thick secretions were obtained. Cultures were sent. He is still on a ventilator, sedated and paralyzed. 9/10/2021. The patient is still in the ICU. He is still on a ventilator. He had a tracheostomy and PEG. Denies dyspnea or pain. 9/11/2021. Patient is now having fevers. He is still on a ventilator. Cooling blanket. 9/12/2021. Lines were changed. Still having some fevers but they seem to be trending downwards. Denies any pain. Denies dyspnea. 9/13/2021. The patient is still in the ICU. He is quite restless and becomes agitated. No fever. Tolerating antibiotics. 9/14/2021. The patient remains in the ICU. Still awaiting for a bed at [facility]. No fevers. Tolerating antibiotics. 9/15/2021. The patient is still in the ICU. He is still in isolation because there are no beds available to take him out of isolation and terminally clean his room. He is having fevers. Nursing reports secretions from around the tracheostomy. 9/16/2021. The patient was removed out of isolation. He is still having the ICU. He denies any dyspnea. No pain. Tolerating antibiotics. 9/17/2021. The patient remains in the ICU. Still awaiting a bed for LTAC.   He continues to be restless and continuing to alarm the ventilator. He wants help getting up but cannot really even move and bed. Denies dyspnea. No pain. He is having fevers. T-max 101.3 °F.    9/18/2021. The patient is still in the ICU. He is still on a ventilator via tracheostomy. He still becomes restless. He has low-grade fevers. He is out of isolation. 9/19/2021. The patient is still in the ICU. He has not has restless as in previous days. He is still weak. He is still on a ventilator. Review of systems:  As stated above in the chief complaint, otherwise negative.     Medications:  Scheduled Meds:   insulin glargine  30 Units SubCUTAneous BID    lidocaine PF  5 mL IntraDERmal Once    QUEtiapine  50 mg Oral Nightly    fluconazole  400 mg Oral Daily    vancomycin  1,750 mg IntraVENous Q24H    dexamethasone  6 mg Oral Daily    oxyCODONE  10 mg Oral Q4H    diazePAM  5 mg Oral Q6H    phenytoin  200 mg Per G Tube TID    lansoprazole  30 mg Per G Tube QAM AC    QUEtiapine  50 mg Oral TID    sodium chloride flush  5-40 mL IntraVENous 2 times per day    heparin flush  3 mL IntraVENous 2 times per day    piperacillin-tazobactam  3,375 mg IntraVENous Q8H    [Held by provider] enoxaparin  1 mg/kg SubCUTAneous BID    tobramycin  300 mg Inhalation Q12H    lisinopril  10 mg Oral Daily    ascorbic acid  1,000 mg Oral Daily    sodium chloride (Inhalant)  4 mL Nebulization Q12H    insulin lispro  0-18 Units SubCUTAneous Q6H    chlorhexidine  15 mL Mouth/Throat BID    ipratropium-albuterol  1 ampule Inhalation Q4H    amLODIPine  10 mg Oral Daily    atorvastatin  40 mg Oral Daily    Vitamin D  2,000 Units Oral Daily    zinc sulfate  50 mg Oral Daily     Continuous Infusions:   fentaNYL 5 mcg/ml in 0.9%  ml infusion Stopped (09/17/21 1403)    sodium chloride      sodium chloride      dextrose       PRN Meds:LORazepam, sodium chloride flush, sodium chloride, heparin flush, fentanNYL, sodium chloride flush, sodium chloride, acetaminophen, labetalol, hydrALAZINE, sodium chloride (Inhalant), albuterol, benzonatate, glucose, dextrose, glucagon (rDNA), dextrose, sodium chloride    OBJECTIVE:  /61   Pulse 101   Temp 99 °F (37.2 °C) (Bladder)   Resp 30   Ht 6' (1.829 m)   Wt (!) 302 lb 0.5 oz (137 kg)   SpO2 94%   BMI 40.96 kg/m²   Temp  Av.7 °F (37.6 °C)  Min: 99 °F (37.2 °C)  Max: 100.1 °F (37.8 °C)     Constitutional: The patient is lying in bed in the ICU. He is awake and alert. He gets restless when someone walks in the room. FiO2 45%. PEEP 5. Skin: Warm and dry. No rashes were noted. HEENT: Round and nonreactive pupils. Decubitus lesions on cheeks, hands and lower extremities noted. Neck: Supple. Tracheostomy. PEG. Chest: Coarse breath sounds bilaterally. No crackles  Cardiovascular: Heart sounds rhythmic and regular. Tachycardic. Abdomen: Round, soft and benign to palpation. Genitourinary: Busch catheter  Extremities: Moderate edema. Lines: Right midline 2021. Busch catheter.     Laboratory and Tests Review:  Lab Results   Component Value Date    WBC 12.2 (H) 2021    WBC 11.7 (H) 2021    WBC 21.6 (H) 2021    HGB 7.1 (L) 2021    HCT 23.2 (L) 2021    MCV 96.3 2021     2021     Lab Results   Component Value Date    NEUTROABS 9.49 (H) 2021    NEUTROABS 9.05 (H) 2021    NEUTROABS 18.79 (H) 2021     No results found for: [facility]  Lab Results   Component Value Date    ALT 79 (H) 2021    AST 68 (H) 2021    ALKPHOS 79 2021    BILITOT 0.2 2021     Lab Results   Component Value Date     2021    K 4.2 2021    K 3.7 2021     2021    CO2 32 2021    BUN 27 2021    CREATININE 0.8 2021    CREATININE 0.8 2021    CREATININE 0.5 2021    GFRAA >60 2021    LABGLOM >60 2021    GLUCOSE 134 2021    PROT 6.0 2021    LABALBU 2.4 2021 CALCIUM 8.2 09/19/2021    BILITOT 0.2 09/19/2021    ALKPHOS 79 09/19/2021    AST 68 09/19/2021    ALT 79 09/19/2021     Lab Results   Component Value Date    CRP 16.5 (H) 09/18/2021    CRP 22.7 (H) 09/17/2021    CRP 29.4 (H) 09/16/2021     Lab Results   Component Value Date    SEDRATE 137 (H) 09/19/2021    SEDRATE 133 (H) 09/18/2021    SEDRATE 125 (H) 09/17/2021     Radiology:      Microbiology:   Streptococcus pneumoniae/Legionella urine Ag: negative  Nares screen MRSA: Negative  Urine culture 8/24/2021: Negative  Blood cultures 8/21/2021: Negative x2  Blood cultures 9/15/2021: CoNS in 1 of 4 bottles  Respiratory culture 8/27/2021: OP terrell reduced  Respiratory culture 8/30/2021: E. coli, MSSA   BAL 9/8/2021: OP terrell reduced. E. coli, Enterobacter cloacae, MSSA. PjP negative  TLC tip culture 9/11/2021: Negative  TLC tip culture  Tracheostomy site culture 9/15/2021: Staphylococcus  Tracheostomy wound site 9/15/2021: CoNS    ASSESSMENT:  · Severe Covid pneumonia causing acute respiratory failure. CRP has come down nicely. Completed Remdesivir. Received Tocilizumab  · VAP versus tracheobronchitis. Cultures growing MSSA and E. coli. Improving. Treated with 10 days of Cefazolin  · Status post bronchoscopy 9/8/2021. Cultures growing rare MSSA,, Enterobacter cloacae. Possible tracheobronchitis  · Leukocytosis associated to the above, stable  · Status post tracheostomy and PEG 9/10/2021   · Probable CLABSI. TLC was removed. Culture tip pending. Blood cultures grew CoNS. Temperatures trending down since the TLC was removed  · Possible tracheitis  · Elevation of transaminases, improving    PLAN:  · Continue Zosyn and Tobramycin by inhalation until 9/25/2021  · Continue Vancomycin, day 5.   Trough was 6.9  · Discontinue Fluconazole  · We will follow with you  · Awaiting transfer to [facility] with nursing    Maria Samuels MD  9:18 AM   9/19/2021

## 2021-09-19 NOTE — PROGRESS NOTES
Critical Care Team - Daily Progress Note      Date and time: 9/19/2021 12:15 PM  Patient's name:  Michelle Russell  Medical Record Number: [id number]  Patient's account/billing number: [de-identified]  Patient's YOB: 1965  Age: 60 y.o. Date of Admission: 8/21/2021  9:35 AM  Length of stay during current admission: 29      Primary Care Physician: Don Santacruz DO  ICU Attending Physician: Dr. Radha Rojas    Code Status: Full Code    Reason for ICU admission: Respiratory failure due to COVID-19      SUBJECTIVE:     OVERNIGHT EVENTS:           8/30: tolerating longer periods supine, thick dark brown secretions     8/31: secretions out of NG, michelle prone     9/1: Decreased urine output and increased creatinine today, continues with thick secretions     9/2: Cr and Uop improved     9/3: P/F improved and has remained supine     9/4: thick secretions     9/5: desat with turning, 1500 loose stool from [facility]    9/6: Patient trying low tidal volumes overnight occasionally, otherwise no other acute events. 9/7: No acute events overnight    9/8: Did receive labetalol 2 times overnight for elevated blood pressures otherwise unremarkable good urine output. Will need essentially exchanged today, bronchoscopy for retained left lung and bibasilar mucopurulent secretions     9/10-no acute events overnight did receive trach and PEG yesterday, will wean sedation as able. , upper extremity DVT noted    9/11: No acute events overnight, sedation was increased overnight due to increased respirations, will slowly come down on that according to nursing staff.    9/12: bumex TID, seroquel to 50 mg PO BID    9/13-patient is improving, is able to follow commands at this time, is able to be transferred to [facility] once  sedation is weaned off, will do that today. 9/14, patient able to be weaned off IV sedation, and doing well.     9/15: Patient is off IV sedation, tachypneic respirations high 30s to low 40s, repeat Covid test lesions  Neck - supple, no significant adenopathy, trach site intact  Chest -wheezing bilaterally     heart - normal rate, regular rhythm, normal S1, S2, no murmurs, rubs, clicks or gallops  Abdomen - soft, nontender, nondistended, no masses or organomegaly peg intact  Neurological -following commands, no focal neurological deficits, is able to answer questions, less agitation  Extremities - peripheral pulses normal, 2+ peripheral  edema, no clubbing or cyanosis  Skin -patient did have skin breakdown to the face as well as blistering on the hands     Any additional physical findings:    MEDICATIONS:    Scheduled Meds:   oxyCODONE  10 mg Oral 4 times per day    insulin glargine  30 Units SubCUTAneous BID    lidocaine PF  5 mL IntraDERmal Once    QUEtiapine  50 mg Oral Nightly    vancomycin  1,750 mg IntraVENous Q24H    dexamethasone  6 mg Oral Daily    diazePAM  5 mg Oral Q6H    phenytoin  200 mg Per G Tube TID    lansoprazole  30 mg Per G Tube QAM AC    QUEtiapine  50 mg Oral TID    sodium chloride flush  5-40 mL IntraVENous 2 times per day    heparin flush  3 mL IntraVENous 2 times per day    piperacillin-tazobactam  3,375 mg IntraVENous Q8H    [Held by provider] enoxaparin  1 mg/kg SubCUTAneous BID    tobramycin  300 mg Inhalation Q12H    lisinopril  10 mg Oral Daily    ascorbic acid  1,000 mg Oral Daily    sodium chloride (Inhalant)  4 mL Nebulization Q12H    insulin lispro  0-18 Units SubCUTAneous Q6H    chlorhexidine  15 mL Mouth/Throat BID    ipratropium-albuterol  1 ampule Inhalation Q4H    amLODIPine  10 mg Oral Daily    atorvastatin  40 mg Oral Daily    Vitamin D  2,000 Units Oral Daily    zinc sulfate  50 mg Oral Daily     Continuous Infusions:   fentaNYL 5 mcg/ml in 0.9%  ml infusion Stopped (09/17/21 1403)    sodium chloride      sodium chloride      dextrose       PRN Meds:   LORazepam, 2 mg, Q4H PRN  sodium chloride flush, 5-40 mL, PRN  sodium chloride, 25 mL, PRN  heparin flush, 3 mL, PRN  fentanNYL, 25 mcg, Q3H PRN  sodium chloride flush, 5-40 mL, PRN  sodium chloride, 25 mL, PRN  acetaminophen, 650 mg, Q4H PRN  labetalol, 20 mg, Q6H PRN  hydrALAZINE, 20 mg, Q6H PRN  sodium chloride (Inhalant), 4 mL, PRN  albuterol, 2.5 mg, Q6H PRN  benzonatate, 200 mg, TID PRN  glucose, 15 g, PRN  dextrose, 12.5 g, PRN  glucagon (rDNA), 1 mg, PRN  dextrose, 100 mL/hr, PRN  sodium chloride, 30 mL, PRN          VENT SETTINGS (Comprehensive) (if applicable):  Vent Information  $Ventilation: $Subsequent Day  Skin Assessment: Clean, dry, & intact  Equipment ID: [id number]  Equipment Changed: (S) Humidification  Vent Type: [id number]  Vent Mode: AC/VC  Vt Ordered: 480 mL  Pressure Ordered: 20  Rate Set: 12 bmp  Peak Flow: 65 L/min  Pressure Support: 0 cmH20  FiO2 : 45 %  SpO2: 97 %  SpO2/FiO2 ratio: 215.56  Sensitivity: 3  PEEP/CPAP: 5  I Time/ I Time %: 0 s  Humidification Source: Heated wire  Humidification Temp: 37  Humidification Temp Measured: 36.9  Circuit Condensation: Drained  Mask Type: Full face mask  Mask Size: Medium  Additional Respiratory  Assessments  Pulse: 87  Resp: 18  SpO2: 97 %  Position: Semi-Sellers's  Humidification Source: Heated wire  Humidification Temp: 37  Circuit Condensation: Drained  Oral Care: Mouthwash with chlorhexidine, Mouth swabbed, Mouth suctioned  Subglottic Suction Done?: No  Airway Type: Trachial  Airway Size: 8  Cuff Pressure (cm H2O): 29 cm H2O    ABGs:     Laboratory findings:    Complete Blood Count:   Recent Labs     09/17/21  0534 09/17/21  0534 09/18/21  0549 09/18/21  0945 09/19/21  0555   WBC 21.6*  --  11.7*  --  12.2*   HGB 9.7*   < > 7.5* 7.6* 7.1*   HCT 31.0*   < > 24.2* 24.5* 23.2*     --  282  --  275    < > = values in this interval not displayed.         Last 3 Blood Glucose:   Recent Labs     09/17/21  0534 09/18/21  0549 09/19/21  0555   GLUCOSE 236* 166* 134*        PT/INR:    Lab Results   Component Value Date    PROTIME 17.4 09/19/2021    INR 1.6 09/19/2021     PTT:  No results found for: APTT, PTT    Comprehensive Metabolic Profile:   Recent Labs     09/17/21  0534 09/17/21  0534 09/18/21  0549 09/18/21  0549 09/19/21  0555     --  136  --  138   K 4.4  --  4.5  --  4.2   CL 96*  --  99  --  101   CO2 28  --  30*  --  32*   BUN 15  --  24*  --  27*   CREATININE 0.5*  --  0.8  --  0.8   GLUCOSE 236*  --  166*  --  134*   CALCIUM 8.6  --  8.2*  --  8.2*   PROT 6.8   < > 6.3*   < > 6.0*   LABALBU 2.6*   < > 2.2*   < > 2.4*   BILITOT 0.4   < > 0.3   < > 0.2   ALKPHOS 117   < > 107   < > 79   *   < > 85*   < > 68*   *   < > 90*   < > 79*    < > = values in this interval not displayed. Magnesium:   Lab Results   Component Value Date    MG 2.1 09/19/2021     Phosphorus:   Lab Results   Component Value Date    PHOS 3.6 09/19/2021     Ionized Calcium: No results found for: CANDICE     Urinalysis:     Troponin: No results for input(s): TROPONINI in the last 72 hours.       ASSESSMENT:     Patient Active Problem List    Diagnosis Date Noted    Elevated LFTs     Busch catheter problem ([facility] [address] 08/26/2021    Sepsis ([facility] [address] 08/26/2021    Thrombocytopenia ([facility] [address] 08/26/2021    Hyperlipidemia 08/22/2021    Acute hypoxemic respiratory failure due to COVID-19 [facility]) 08/21/2021    Type 2 diabetes mellitus without complication, without long-term current use of insulin ([facility] [address] 08/10/2021    History of seizures 08/10/2021     SYSTEMS ASSESSMENT    Neuro     Following commands at this time and somewhat less anxious  Patient is able to be weaned at this time  Oral Valium every 6 hours, OxyIR 10 mg decreased to every 6  hours  Ativan 2 mg as needed  Seroquel 50 mg three times daily  Continue to monitor      Respiratory     Acute hypoxic respiratory failure requiring mechanical ventilation  Severe COVID-19 pneumonia s/p toe C, remdesivir  Pneumonia VAP-MSSA, E. coli  On pip/ tazo  Trach/PEG    Bronchoscopy 9/8/2021 due to white out left side chest on x-ray. DuoNebs every 4 hours  6 mg Decadron daily  Wean oxygen as tolerated, currently at 50%. Keep O2 sat 90-92%  Tolerating some PSV, will advance. Check respiratory cx with copious secretions  Cardiovascular     Hypertension  Amlodipine-10 mg  Lisinopril-10 mg  Lipitor 40 mg    Gastrointestinal     GI prophylaxis-Protonix  Bowel regimen  Continue monitor    Renal     OSCAR-resolved  Hypokalemia-resolved  Hypomagnesemia-resolved  Avoid nephrotoxins  Continue to monitor     Infectious Disease     Pneumonia VAP-positive for MSSA, E. coli  lactic acidosis-resolved  Inhaled tobramycin for possible tracheobronchitis  Follow cultures of previous central line tip and new blood cultures from 9/11  Infectious disease following  Zosyn, vancomycin, fluconazole due to fevers,    versus tracheostomy site culture pending at this time, tip of central line was negative at this time. Tracheostomy site culture  Still febrile, continue vanco and Zosyn with  per ID  Hematology/Oncology     DVT left upper arm- not therapeutic on warfarin, so continue enoxaparin.   Anemia  Hemoglobin slightly lower  Continue to monitor    Endocrine     Hyperglycemia-controlled  Lantus 30 units  High-dose sliding scale    Social/Spiritual/DNR/Other     Full code  vitamin regimen  DVT prophylaxis Lovenox, warfarin   Psych consult for anxiety-felt delirious and recommended stopping narcotics    Plan-continue to wean oxygenation as able      9/19/2021 12:15 PM

## 2021-09-19 NOTE — PLAN OF CARE
Problem: Airway Clearance - Ineffective  Goal: Achieve or maintain patent airway  Outcome: Met This Shift     Problem: Gas Exchange - Impaired  Goal: Absence of hypoxia  Outcome: Met This Shift     Problem: Breathing Pattern - Ineffective  Goal: Ability to achieve and maintain a regular respiratory rate  Outcome: Met This Shift     Problem: Nutrition Deficits  Goal: Optimize nutritional status  Outcome: Met This Shift     Problem: Risk for Fluid Volume Deficit  Goal: Maintain normal heart rhythm  Outcome: Met This Shift     Problem: Skin Integrity:  Goal: Will show no infection signs and symptoms  Description: Will show no infection signs and symptoms  Outcome: Met This Shift  Goal: Absence of new skin breakdown  Description: Absence of new skin breakdown  Outcome: Met This Shift

## 2021-09-19 NOTE — PLAN OF CARE
Problem: Airway Clearance - Ineffective  Goal: Achieve or maintain patent airway  9/19/2021 0301 by Gabby Hernández RN  Outcome: Ongoing  9/18/2021 1852 by Darnell Snowden RN  Outcome: Met This Shift     Problem: Gas Exchange - Impaired  Goal: Absence of hypoxia  9/18/2021 1852 by Darnell Snowden RN  Outcome: Met This Shift  Goal: Promote optimal lung function  Outcome: Ongoing     Problem: Breathing Pattern - Ineffective  Goal: Ability to achieve and maintain a regular respiratory rate  Outcome: Ongoing     Problem:  Body Temperature -  Risk of, Imbalanced  Goal: Complications related to the disease process, condition or treatment will be avoided or minimized  Outcome: Ongoing

## 2021-09-20 VITALS
BODY MASS INDEX: 40.91 KG/M2 | OXYGEN SATURATION: 95 % | HEIGHT: 72 IN | WEIGHT: 302.03 LBS | HEART RATE: 90 BPM | RESPIRATION RATE: 35 BRPM | TEMPERATURE: 99.5 F | DIASTOLIC BLOOD PRESSURE: 60 MMHG | SYSTOLIC BLOOD PRESSURE: 116 MMHG

## 2021-09-20 PROBLEM — J12.82 PNEUMONIA DUE TO COVID-19 VIRUS: Status: ACTIVE | Noted: 2021-08-21

## 2021-09-20 LAB
ALBUMIN SERPL-MCNC: 2.4 G/DL (ref 3.5–5.2)
ALP BLD-CCNC: 82 U/L (ref 40–129)
ALT SERPL-CCNC: 77 U/L (ref 0–40)
ANION GAP SERPL CALCULATED.3IONS-SCNC: 7 MMOL/L (ref 7–16)
AST SERPL-CCNC: 73 U/L (ref 0–39)
BASOPHILS ABSOLUTE: 0.02 E9/L (ref 0–0.2)
BASOPHILS RELATIVE PERCENT: 0.2 % (ref 0–2)
BILIRUB SERPL-MCNC: 0.3 MG/DL (ref 0–1.2)
BUN BLDV-MCNC: 23 MG/DL (ref 6–20)
C-REACTIVE PROTEIN: 12.1 MG/DL (ref 0–0.4)
CALCIUM SERPL-MCNC: 8.5 MG/DL (ref 8.6–10.2)
CHLORIDE BLD-SCNC: 100 MMOL/L (ref 98–107)
CO2: 31 MMOL/L (ref 22–29)
CREAT SERPL-MCNC: 0.7 MG/DL (ref 0.7–1.2)
CULTURE CATHETER TIP: NORMAL
CULTURE, BLOOD 2: NORMAL
EOSINOPHILS ABSOLUTE: 0.27 E9/L (ref 0.05–0.5)
EOSINOPHILS RELATIVE PERCENT: 2.3 % (ref 0–6)
GFR AFRICAN AMERICAN: >60
GFR NON-AFRICAN AMERICAN: >60 ML/MIN/1.73
GLUCOSE BLD-MCNC: 123 MG/DL (ref 74–99)
HCT VFR BLD CALC: 23.5 % (ref 37–54)
HEMOGLOBIN: 7.3 G/DL (ref 12.5–16.5)
IMMATURE GRANULOCYTES #: 0.5 E9/L
IMMATURE GRANULOCYTES %: 4.2 % (ref 0–5)
INR BLD: 1.3
LYMPHOCYTES ABSOLUTE: 1.15 E9/L (ref 1.5–4)
LYMPHOCYTES RELATIVE PERCENT: 9.7 % (ref 20–42)
MAGNESIUM: 2 MG/DL (ref 1.6–2.6)
MCH RBC QN AUTO: 29.8 PG (ref 26–35)
MCHC RBC AUTO-ENTMCNC: 31.1 % (ref 32–34.5)
MCV RBC AUTO: 95.9 FL (ref 80–99.9)
METER GLUCOSE: 128 MG/DL (ref 74–99)
METER GLUCOSE: 153 MG/DL (ref 74–99)
METER GLUCOSE: 187 MG/DL (ref 74–99)
MONOCYTES ABSOLUTE: 0.75 E9/L (ref 0.1–0.95)
MONOCYTES RELATIVE PERCENT: 6.3 % (ref 2–12)
NEUTROPHILS ABSOLUTE: 9.16 E9/L (ref 1.8–7.3)
NEUTROPHILS RELATIVE PERCENT: 77.3 % (ref 43–80)
PDW BLD-RTO: 15 FL (ref 11.5–15)
PHOSPHORUS: 3.3 MG/DL (ref 2.5–4.5)
PLATELET # BLD: 275 E9/L (ref 130–450)
PMV BLD AUTO: 9.2 FL (ref 7–12)
POTASSIUM SERPL-SCNC: 4.1 MMOL/L (ref 3.5–5)
PROTHROMBIN TIME: 14.1 SEC (ref 9.3–12.4)
RBC # BLD: 2.45 E12/L (ref 3.8–5.8)
SEDIMENTATION RATE, ERYTHROCYTE: 140 MM/HR (ref 0–15)
SODIUM BLD-SCNC: 138 MMOL/L (ref 132–146)
TOTAL PROTEIN: 6.4 G/DL (ref 6.4–8.3)
WBC # BLD: 11.9 E9/L (ref 4.5–11.5)

## 2021-09-20 PROCEDURE — 85651 RBC SED RATE NONAUTOMATED: CPT

## 2021-09-20 PROCEDURE — 6360000002 HC RX W HCPCS: Performed by: INTERNAL MEDICINE

## 2021-09-20 PROCEDURE — 94003 VENT MGMT INPAT SUBQ DAY: CPT

## 2021-09-20 PROCEDURE — 6370000000 HC RX 637 (ALT 250 FOR IP): Performed by: STUDENT IN AN ORGANIZED HEALTH CARE EDUCATION/TRAINING PROGRAM

## 2021-09-20 PROCEDURE — 2580000003 HC RX 258: Performed by: INTERNAL MEDICINE

## 2021-09-20 PROCEDURE — 6370000000 HC RX 637 (ALT 250 FOR IP): Performed by: SURGERY

## 2021-09-20 PROCEDURE — 84100 ASSAY OF PHOSPHORUS: CPT

## 2021-09-20 PROCEDURE — 36592 COLLECT BLOOD FROM PICC: CPT

## 2021-09-20 PROCEDURE — 80053 COMPREHEN METABOLIC PANEL: CPT

## 2021-09-20 PROCEDURE — 86140 C-REACTIVE PROTEIN: CPT

## 2021-09-20 PROCEDURE — 6360000002 HC RX W HCPCS: Performed by: SPECIALIST

## 2021-09-20 PROCEDURE — 82962 GLUCOSE BLOOD TEST: CPT

## 2021-09-20 PROCEDURE — 36415 COLL VENOUS BLD VENIPUNCTURE: CPT

## 2021-09-20 PROCEDURE — 6370000000 HC RX 637 (ALT 250 FOR IP): Performed by: INTERNAL MEDICINE

## 2021-09-20 PROCEDURE — 85610 PROTHROMBIN TIME: CPT

## 2021-09-20 PROCEDURE — 83735 ASSAY OF MAGNESIUM: CPT

## 2021-09-20 PROCEDURE — 85025 COMPLETE CBC W/AUTO DIFF WBC: CPT

## 2021-09-20 PROCEDURE — 2580000003 HC RX 258: Performed by: SURGERY

## 2021-09-20 PROCEDURE — 2580000003 HC RX 258: Performed by: SPECIALIST

## 2021-09-20 PROCEDURE — 94640 AIRWAY INHALATION TREATMENT: CPT

## 2021-09-20 PROCEDURE — 99239 HOSP IP/OBS DSCHRG MGMT >30: CPT | Performed by: FAMILY MEDICINE

## 2021-09-20 RX ORDER — TOBRAMYCIN SULFATE 40 MG/ML
300 INJECTION, SOLUTION INTRAMUSCULAR; INTRAVENOUS EVERY 12 HOURS
Qty: 60 ML | Refills: 0 | DISCHARGE
Start: 2021-09-20 | End: 2021-12-01

## 2021-09-20 RX ORDER — LISINOPRIL 10 MG/1
10 TABLET ORAL DAILY
Qty: 30 TABLET | Refills: 0 | DISCHARGE
Start: 2021-09-21 | End: 2021-12-01

## 2021-09-20 RX ORDER — DEXAMETHASONE 4 MG/1
4 TABLET ORAL DAILY
Status: DISCONTINUED | OUTPATIENT
Start: 2021-09-21 | End: 2021-09-20 | Stop reason: HOSPADM

## 2021-09-20 RX ORDER — OXYCODONE HYDROCHLORIDE 10 MG/1
10 TABLET ORAL EVERY 8 HOURS PRN
Qty: 9 TABLET | Refills: 0 | Status: SHIPPED | DISCHARGE
Start: 2021-09-20 | End: 2021-09-23

## 2021-09-20 RX ORDER — IPRATROPIUM BROMIDE AND ALBUTEROL SULFATE 2.5; .5 MG/3ML; MG/3ML
3 SOLUTION RESPIRATORY (INHALATION) EVERY 4 HOURS
Qty: 360 ML | Refills: 0 | DISCHARGE
Start: 2021-09-20 | End: 2021-12-01

## 2021-09-20 RX ORDER — LANSOPRAZOLE
30 KIT
Qty: 300 ML | Refills: 0 | DISCHARGE
Start: 2021-09-21 | End: 2021-12-01

## 2021-09-20 RX ORDER — ZINC SULFATE 50(220)MG
50 CAPSULE ORAL DAILY
Qty: 30 CAPSULE | Refills: 0 | DISCHARGE
Start: 2021-09-21 | End: 2021-12-01

## 2021-09-20 RX ORDER — DEXAMETHASONE 4 MG/1
4 TABLET ORAL DAILY
Qty: 4 TABLET | Refills: 0 | DISCHARGE
Start: 2021-09-21 | End: 2021-09-25

## 2021-09-20 RX ORDER — CHOLECALCIFEROL (VITAMIN D3) 50 MCG
2000 TABLET ORAL DAILY
Qty: 30 TABLET | Refills: 0 | DISCHARGE
Start: 2021-09-21 | End: 2021-12-01

## 2021-09-20 RX ORDER — WARFARIN SODIUM 5 MG/1
TABLET ORAL
Qty: 30 TABLET | Refills: 0 | DISCHARGE
Start: 2021-09-20 | End: 2021-12-01

## 2021-09-20 RX ORDER — CHLORHEXIDINE GLUCONATE 0.12 MG/ML
15 RINSE ORAL 2 TIMES DAILY
Qty: 420 ML | Refills: 0 | DISCHARGE
Start: 2021-09-20 | End: 2021-10-04

## 2021-09-20 RX ORDER — NICOTINE POLACRILEX 4 MG
15 LOZENGE BUCCAL PRN
Qty: 45 G | Refills: 0 | DISCHARGE
Start: 2021-09-20 | End: 2021-12-01

## 2021-09-20 RX ORDER — WARFARIN SODIUM 5 MG/1
5 TABLET ORAL DAILY
Status: DISCONTINUED | OUTPATIENT
Start: 2021-09-20 | End: 2021-09-20 | Stop reason: HOSPADM

## 2021-09-20 RX ORDER — QUETIAPINE FUMARATE 50 MG/1
50 TABLET, FILM COATED ORAL 3 TIMES DAILY
Qty: 60 TABLET | Refills: 0 | DISCHARGE
Start: 2021-09-20 | End: 2021-12-01

## 2021-09-20 RX ORDER — INSULIN GLARGINE 100 [IU]/ML
30 INJECTION, SOLUTION SUBCUTANEOUS 2 TIMES DAILY
Qty: 10 ML | Refills: 0 | DISCHARGE
Start: 2021-09-20 | End: 2021-12-01

## 2021-09-20 RX ORDER — BENZONATATE 100 MG/1
200 CAPSULE ORAL 3 TIMES DAILY PRN
Qty: 21 CAPSULE | Refills: 0 | DISCHARGE
Start: 2021-09-20 | End: 2021-09-27

## 2021-09-20 RX ORDER — DEXAMETHASONE 2 MG/1
2 TABLET ORAL DAILY
Qty: 4 TABLET | Refills: 0 | DISCHARGE
Start: 2021-09-25 | End: 2021-09-29

## 2021-09-20 RX ORDER — DEXAMETHASONE 1 MG
2 TABLET ORAL DAILY
Status: DISCONTINUED | OUTPATIENT
Start: 2021-09-25 | End: 2021-09-20 | Stop reason: HOSPADM

## 2021-09-20 RX ORDER — LORAZEPAM 2 MG/ML
2 INJECTION INTRAMUSCULAR EVERY 6 HOURS
Qty: 12 ML | Status: SHIPPED | DISCHARGE
Start: 2021-09-20 | End: 2021-09-23

## 2021-09-20 RX ADMIN — LANSOPRAZOLE 30 MG: KIT at 05:13

## 2021-09-20 RX ADMIN — IPRATROPIUM BROMIDE AND ALBUTEROL SULFATE 1 AMPULE: .5; 3 SOLUTION RESPIRATORY (INHALATION) at 07:57

## 2021-09-20 RX ADMIN — CHLORHEXIDINE GLUCONATE 0.12% ORAL RINSE 15 ML: 1.2 LIQUID ORAL at 09:01

## 2021-09-20 RX ADMIN — DEXAMETHASONE 6 MG: 4 TABLET ORAL at 09:00

## 2021-09-20 RX ADMIN — QUETIAPINE FUMARATE 50 MG: 25 TABLET ORAL at 14:18

## 2021-09-20 RX ADMIN — VANCOMYCIN HYDROCHLORIDE 1750 MG: 5 INJECTION, POWDER, LYOPHILIZED, FOR SOLUTION INTRAVENOUS at 14:18

## 2021-09-20 RX ADMIN — IPRATROPIUM BROMIDE AND ALBUTEROL SULFATE 1 AMPULE: .5; 3 SOLUTION RESPIRATORY (INHALATION) at 04:46

## 2021-09-20 RX ADMIN — ZINC SULFATE 220 MG (50 MG) CAPSULE 50 MG: CAPSULE at 09:01

## 2021-09-20 RX ADMIN — PIPERACILLIN AND TAZOBACTAM 3375 MG: 3; .375 INJECTION, POWDER, LYOPHILIZED, FOR SOLUTION INTRAVENOUS at 11:57

## 2021-09-20 RX ADMIN — LISINOPRIL 10 MG: 10 TABLET ORAL at 09:00

## 2021-09-20 RX ADMIN — SODIUM CHLORIDE, PRESERVATIVE FREE 10 ML: 5 INJECTION INTRAVENOUS at 09:01

## 2021-09-20 RX ADMIN — IPRATROPIUM BROMIDE AND ALBUTEROL SULFATE 1 AMPULE: .5; 3 SOLUTION RESPIRATORY (INHALATION) at 00:56

## 2021-09-20 RX ADMIN — Medication 2000 UNITS: at 09:00

## 2021-09-20 RX ADMIN — ENOXAPARIN SODIUM 135 MG: 150 INJECTION SUBCUTANEOUS at 11:56

## 2021-09-20 RX ADMIN — PHENYTOIN 200 MG: 125 SUSPENSION ORAL at 09:00

## 2021-09-20 RX ADMIN — TOBRAMYCIN SULFATE 300 MG: 40 INJECTION, SOLUTION INTRAMUSCULAR; INTRAVENOUS at 07:58

## 2021-09-20 RX ADMIN — IPRATROPIUM BROMIDE AND ALBUTEROL SULFATE 1 AMPULE: .5; 3 SOLUTION RESPIRATORY (INHALATION) at 12:51

## 2021-09-20 RX ADMIN — QUETIAPINE FUMARATE 50 MG: 25 TABLET ORAL at 09:00

## 2021-09-20 RX ADMIN — DIAZEPAM 5 MG: 5 TABLET ORAL at 05:13

## 2021-09-20 RX ADMIN — LORAZEPAM 2 MG: 2 INJECTION INTRAMUSCULAR; INTRAVENOUS at 09:01

## 2021-09-20 RX ADMIN — PHENYTOIN 200 MG: 125 SUSPENSION ORAL at 14:18

## 2021-09-20 RX ADMIN — INSULIN LISPRO 3 UNITS: 100 INJECTION, SOLUTION INTRAVENOUS; SUBCUTANEOUS at 14:18

## 2021-09-20 RX ADMIN — SODIUM CHLORIDE SOLN NEBU 3% 4 ML: 3 NEBU SOLN at 07:57

## 2021-09-20 RX ADMIN — DIAZEPAM 5 MG: 5 TABLET ORAL at 11:55

## 2021-09-20 RX ADMIN — INSULIN GLARGINE 30 UNITS: 100 INJECTION, SOLUTION SUBCUTANEOUS at 09:40

## 2021-09-20 RX ADMIN — AMLODIPINE BESYLATE 10 MG: 10 TABLET ORAL at 09:01

## 2021-09-20 RX ADMIN — OXYCODONE HYDROCHLORIDE AND ACETAMINOPHEN 1000 MG: 500 TABLET ORAL at 09:01

## 2021-09-20 RX ADMIN — OXYCODONE 10 MG: 5 TABLET ORAL at 11:55

## 2021-09-20 RX ADMIN — OXYCODONE 10 MG: 5 TABLET ORAL at 05:13

## 2021-09-20 RX ADMIN — PIPERACILLIN AND TAZOBACTAM 3375 MG: 3; .375 INJECTION, POWDER, LYOPHILIZED, FOR SOLUTION INTRAVENOUS at 05:13

## 2021-09-20 ASSESSMENT — PULMONARY FUNCTION TESTS
PIF_VALUE: 32
PIF_VALUE: 24
PIF_VALUE: 24
PIF_VALUE: 36
PIF_VALUE: 31
PIF_VALUE: 28
PIF_VALUE: 24
PIF_VALUE: 36
PIF_VALUE: 30
PIF_VALUE: 32
PIF_VALUE: 24
PIF_VALUE: 29
PIF_VALUE: 24
PIF_VALUE: 25
PIF_VALUE: 36
PIF_VALUE: 24
PIF_VALUE: 24
PIF_VALUE: 29

## 2021-09-20 ASSESSMENT — PAIN SCALES - GENERAL
PAINLEVEL_OUTOF10: 0

## 2021-09-20 NOTE — CARE COORDINATION
Patient is accepted at [facility] today.   Assigned to room 745, at 11am.  Will await formal dc orders

## 2021-09-20 NOTE — PATIENT CARE CONFERENCE
Intensive Care Daily Quality Rounding Checklist      ICU Team Members: Dr. Mike Dunham, Dr. Amber Solis (resident), clinical pharmacist, pharmacy students, charge nurse, bedside nurse, respiratory therapist     ICU Day #: Barbara Gee 31     Intubation Date:  Aug 23     Ventilator Day #: Hira Sanchez (trach placed 9/9)     Central Line Insertion Date: N/A                                                    Day #: N/A      Arterial Line Insertion Date: N/A                             Day #: N/A     Temporary Hemodialysis Catheter Insertion Date: N/A                             Day #:  N/A     DVT Prophylaxis: Lovenox on hold d/t low Hgb--will restart today    GI Prophylaxis: Protonix     Busch Catheter Insertion Date: Sept 13                                        Day #: Number 8                             Continued need (if yes, reason documented and discussed with physician): need for accurate I+O's     Skin Issues/ Wounds and ordered treatment discussed on rounds: has numerous healing ulcers from time proned     Goals/ Plans for the Day: vent management, labs, DC to [facility] today

## 2021-09-20 NOTE — DISCHARGE SUMMARY
[facility] Physician Discharge Summary       Chen Wang MD  [address] [place] [address], [address]  [place] [address]    Call  As needed      Activity level: As tolerated     Dispo:LTAC      Condition on discharge: Stable     Patient ID:  Marita Chance  [id number]  60 y.o.  1965    Admit date: 8/21/2021    Discharge date and time:  9/20/2021  1:17 PM    Admission Diagnoses: Principal Problem:    Acute hypoxemic respiratory failure due to [address])  Active Problems:    Type 2 diabetes mellitus without complication, without long-term current use of insulin ([facility]    History of seizures    Hyperlipidemia    Busch catheter problem (HCC)    Sepsis ([facility]    Thrombocytopenia ([facility]    Elevated LFTs  Resolved Problems:    OSCAR (acute kidney injury) ([facility]    Lactic acidosis      Discharge Diagnoses: Principal Problem:    Acute hypoxemic respiratory failure due to [facility] [place] [address])  Active Problems:    Type 2 diabetes mellitus without complication, without long-term current use of insulin (HCC)    History of seizures    Hyperlipidemia    Busch catheter problem (HCC)    Sepsis (HCC)    Thrombocytopenia (HCC)    Elevated LFTs  Resolved Problems:    OSCAR (acute kidney injury) ([address]    Lactic acidosis      Consults:  IP CONSULT TO CRITICAL CARE  IP CONSULT TO PHARMACY  IP CONSULT TO INFECTIOUS DISEASES  IP CONSULT TO DIETITIAN  IP CONSULT TO NEPHROLOGY  IP CONSULT TO GENERAL SURGERY  IP CONSULT TO IV TEAM  IP CONSULT TO PSYCHIATRY    Procedures:   8/23/2021 - Arterial line placement. 8/23/2021 - Endotracheal intubation. 8/23/2021 - Central line insertion. 8/30/2021 - Arterial line placement. 9/4/2021 - Arterial line placement. 9/8/2021 -PICC line insertion. 9/8/2021, bronchoscopy with BAL.  9/11/2021 -central line insertion. 9/9/2021-tracheotomy. EGD esophagogastroduodenoscopy PEG tube insertion.     Hospital Course:   Patient Marita Chance is a 60 y.o. presented with Acute hypoxemic respiratory failure due to COVID-19 ([facility] [U07.1, J96.01]  Pneumonia due to COVID-19 virus [U07.1, J12.82]  He was admitted on 8/21/2021 for worsening shortness of breath. He tested positive for Covid on 8/19/2021. He had been started on doxycycline for pneumonia. In the emergency room patient was found with OSCAR, hypoxia and extensive groundglass and consolidative opacities throughout both of the lungs consistent with COVID-19 pneumonia. Admission was advised. Patient was placed on BiPAP and IV Decadron, remdesivir and oral vitamins zinc, vitamin C and vitamin D. He was admitted to the ICU. He was given antibiotics IV Rocephin and doxycycline. He required intubation on 8/23/2021. He completed a course of remdesivir and received 1 dose of Tocilizumab. He required central line and arterial line placement. He was placed on a RotoProne bed due to inadequate oxygenation supine. He was seen by infectious disease, critical care/intensivist, nephrology, general surgery and psychiatry. His kidney function improved. Patient was able to tolerate supine positioning 8/30/2021. He was observed off antibiotics until 8/31/2021. There was concern for aspiration pneumonia and ertapenem IV was started. Vancomycin was given once on 9/1/2021. There was concern for ventilator associated pneumonia versus tracheobronchitis. Gram-negative isabelle and staph was isolated. Antibiotics were changed to Invanz IV and vancomycin. His antibiotics were changed to cefazolin when methicillin sensitive staph was isolated and E. coli. General surgery was consulted regarding tracheostomy and PEG tube placement. He completed 10 days of cefazolin. On 9/8/2021 patient had a bronchoscopy with BAL due to complete whiteout of his left side on chest x-ray. On 9/9/2021 he had tracheostomy placed and a PEG tube insertion.   He was started on tobramycin inhalation for tracheobronchitis his BAL cultures were positive for gram-negative isabelle and methicillin sensitive staph aureus it was negative for pneumocystis jiroveciipneumonia identification revealed E. coli, Enterobacter cloacae and methicillin sensitive staph aureus. Zosyn was started. Repeat blood cultures were ordered. Patient developed fevers. His lines were changed. Catheter tubes were cultured. Cultures have been negative. ID recommended continuing IV Zosyn and tobramycin by nebulization until 9/25/2021. Patient's respiratory status slowly improved. He currently requires 40% FiO2, tidal volume 480, PEEP of 5 with pressure support of 18. His oxygenation saturation was 95%. He was stable for discharge to [facility]. Discharge Exam:    General Appearance: alert and oriented to person, place and time and in no acute distress  Skin: warm and dry  Head: normocephalic and atraumatic  Eyes: pupils equal, round, and reactive to light, extraocular eye movements intact, conjunctivae normal  Neck: neck supple and non tender without mass   Pulmonary/Chest: clear to auscultation bilaterally- no wheezes, rales or rhonchi, normal air movement, no respiratory distress  Cardiovascular: normal rate, normal S1 and S2 and no carotid bruits  Abdomen: soft, non-tender, non-distended, normal bowel sounds, no masses or organomegaly  Extremities: no cyanosis, no clubbing and no edema  Neurologic: no cranial nerve deficit and speech normal    I/O last 3 completed shifts: In: 1998 [I.V.:140; NG/GT:1258; IV Piggyback:600]  Out: 2075 [Urine:1775; Stool:300]  No intake/output data recorded.       LABS:  Recent Labs     09/18/21  0549 09/19/21  0555 09/20/21  0521    138 138   K 4.5 4.2 4.1   CL 99 101 100   CO2 30* 32* 31*   BUN 24* 27* 23*   CREATININE 0.8 0.8 0.7   GLUCOSE 166* 134* 123*   CALCIUM 8.2* 8.2* 8.5*       Recent Labs     09/18/21  0549 09/18/21  0549 09/18/21  0945 09/19/21  0555 09/20/21  0521   WBC 11.7*  --   --  12.2* 11.9*   RBC 2.54*  --   --  2.41* 2.45*   HGB 7.5*   < > 7.6* 7.1* 7.3*   HCT 24.2*   < > 24.5* 23.2* 23.5*   MCV 95.3  --   --  96.3 95.9   MCH 29.5  --   --  29.5 29.8   MCHC 31.0*  --   --  30.6* 31.1*   RDW 15.2*  --   --  15.0 15.0     --   --  275 275   MPV 9.2  --   --  9.1 9.2    < > = values in this interval not displayed. Imaging:  XR CHEST PORTABLE    Result Date: 8/22/2021  EXAMINATION: ONE XRAY VIEW OF THE CHEST 8/22/2021 12:00 pm COMPARISON: 08/21/2021 HISTORY: ORDERING SYSTEM PROVIDED HISTORY: Acute resp failure, COVID TECHNOLOGIST PROVIDED HISTORY: Reason for exam:->Acute resp failure, COVID FINDINGS: Bilateral airspace opacities with improved aeration of the right lung compared to prior study. There is no effusion or pneumothorax. The cardiomediastinal silhouette is without acute process. The osseous structures are without acute process. Improved aeration of the right lung compared to prior study. XR CHEST PORTABLE    Result Date: 8/21/2021  EXAMINATION: ONE XRAY VIEW OF THE CHEST 8/21/2021 9:56 am COMPARISON: August 17, 2021 HISTORY: ORDERING SYSTEM PROVIDED HISTORY: pneumonia TECHNOLOGIST PROVIDED HISTORY: Reason for exam:->pneumonia FINDINGS: Compared with most recent examination, there has been a significant interval increase in the degree of patchy bilateral airspace opacities, in keeping with the patient's known diagnosis of COVID pneumonia. No evidence of a sizable pleural effusion. No pneumothorax is identified. Heart size is unable to be accurately assessed on this single portable view of the chest, but appears to be stable. No acute osseous abnormality. Significantly increased airspace opacities throughout both lungs, in keeping with multifocal pneumonia. CTA PULMONARY W CONTRAST    Result Date: 8/21/2021  EXAMINATION: CTA OF THE CHEST, 8/21/2021 12:11 pm TECHNIQUE: CTA of the chest was performed after the administration of intravenous contrast.  Multiplanar reformatted images are provided for review.   MIP images are provided for review. Dose modulation, iterative reconstruction, and/or weight based adjustment of the mA/kV was utilized to reduce the radiation dose to as low as reasonably achievable. COMPARISON: None HISTORY: ORDERING SYSTEM PROVIDED HISTORY:  PE vs COVID TECHNOLOGIST PROVIDED HISTORY: Reason for Exam:  PE vs COVID Decision Support Exception - unselect if not a suspected or confirmed emergency medical condition->Emergency Medical Condition (MA) FINDINGS: Pulmonary Arteries: There is a combination of poor contrast bolus timing as well as significant respiratory motion artifact that severely degrades the examination. Unfortunately, multiple segmental and subsegmental pulmonary artery branches are not opacified well enough to exclude a pulmonary embolism. There is no evidence of a central pulmonary embolism or convincing evidence of right heart strain. Mediastinum: There is at least one enlarged mediastinal lymph node measuring 17 mm in short axis in the subcarinal distribution. Several additional smaller but nonenlarged mediastinal lymph nodes are present, likely reactive. The thoracic esophagus is decompressed, limiting assessment. No obvious esophageal abnormality. The heart size is top normal.  Trace pericardial fluid is present. Lungs/pleura: The central airways are patent. There is no evidence of a pleural effusion or pneumothorax. Extensive ground-glass and consolidative airspace opacities are present bilaterally, in keeping with the provided history of COVID pneumonia. Upper Abdomen: Imaged portions of the upper abdomen demonstrates diffuse hepatic steatosis. There are also multiple nonobstructing bilateral renal calculi, partially imaged. Soft Tissues/Bones: No acute bone or soft tissue abnormality. Extensive ground-glass and consolidative airspace opacities throughout both lungs, in keeping with the provided history of COVID pneumonia.  The examination is nondiagnostic for detection of pulmonary emboli in multiple segmental and subsegmental pulmonary artery distributions, secondary to poor contrast bolus timing and respiratory motion artifact. There is no evidence of a central pulmonary embolism. Hepatic steatosis. Enlarged mediastinal lymph nodes, likely reactive. Patient Instructions:      Medication List      START taking these medications    ascorbic acid 1000 MG tablet  Commonly known as: VITAMIN C  Take 1 tablet by mouth daily  Start taking on: September 21, 2021     chlorhexidine 0.12 % solution  Commonly known as: PERIDEX  Take 15 mLs by mouth 2 times daily for 14 days     * dexamethasone 4 MG tablet  Commonly known as: DECADRON  Take 1 tablet by mouth daily for 4 doses  Start taking on: September 21, 2021     * dexamethasone 2 MG tablet  Commonly known as: DECADRON  Take 1 tablet by mouth daily for 4 doses  Start taking on: September 25, 2021     enoxaparin 150 MG/ML injection  Commonly known as: LOVENOX  Inject 0.93 mLs into the skin 2 times daily for 7 days     glucose 40 % Gel  Commonly known as: GLUTOSE  Take 37.5 mLs by mouth as needed (BS <60)     insulin glargine 100 UNIT/ML injection vial  Commonly known as: LANTUS  Inject 30 Units into the skin 2 times daily     insulin lispro 100 UNIT/ML injection vial  Commonly known as: HUMALOG  Inject 0-18 Units into the skin every 6 hours     ipratropium-albuterol 0.5-2.5 (3) MG/3ML Soln nebulizer solution  Commonly known as: DUONEB  Inhale 3 mLs into the lungs every 4 hours     lansoprazole 3 MG/ML Susp  10 mLs by Per G Tube route every morning (before breakfast)  Start taking on: September 21, 2021     lisinopril 10 MG tablet  Commonly known as: PRINIVIL;ZESTRIL  Take 1 tablet by mouth daily  Start taking on: September 21, 2021     LORazepam 2 MG/ML injection  Commonly known as: ATIVAN  Infuse 1 mL intravenously every 6 hours for 3 days.      oxyCODONE HCl 10 MG immediate release tablet  Commonly known as: OXY-IR  Take 1 tablet by mouth every 8 hours as needed for Pain for up to 3 days. QUEtiapine 50 MG tablet  Commonly known as: SEROQUEL  Take 1 tablet by mouth 3 times daily     tobramycin 80 MG/2ML Soln  Commonly known as: NEBCIN  Inhale 7.5 mLs into the lungs every 12 hours     vitamin D 50 MCG (2000 UT) Tabs tablet  Commonly known as: CHOLECALCIFEROL  Take 1 tablet by mouth daily  Start taking on: September 21, 2021     warfarin 5 MG tablet  Commonly known as: COUMADIN  Take daily     zinc sulfate 220 (50 Zn) MG capsule  Commonly known as: ZINCATE  Take 1 capsule by mouth daily  Start taking on: September 21, 2021         * This list has 2 medication(s) that are the same as other medications prescribed for you. Read the directions carefully, and ask your doctor or other care provider to review them with you.             CONTINUE taking these medications    amLODIPine 10 MG tablet  Commonly known as: NORVASC  Take 1 tablet by mouth daily     atorvastatin 40 MG tablet  Commonly known as: LIPITOR  Take 1 tablet by mouth daily     benzonatate 100 MG capsule  Commonly known as: Tessalon Perles  Take 2 capsules by mouth 3 times daily as needed for Cough     phenytoin 100 MG ER capsule  Commonly known as: DILANTIN  Take 2 tabs in the am and 3 tabs in pm        STOP taking these medications    benazepril 40 MG tablet  Commonly known as: LOTENSIN     cefdinir 300 MG capsule  Commonly known as: OMNICEF     Claritin-D 12 Hour 5-120 MG per extended release tablet  Generic drug: loratadine-pseudoephedrine     doxycycline hyclate 100 MG tablet  Commonly known as: VIBRA-TABS     metFORMIN 1000 MG tablet  Commonly known as: GLUCOPHAGE     OneTouch Delica Plus [id number] Misc     OneTouch Ultra strip  Generic drug: blood glucose test strips     SITagliptin 100 MG tablet  Commonly known as: Januvia     terazosin 10 MG capsule  Commonly known as: HYTRIN           Where to Get Your Medications      You can get these medications from any pharmacy    Bring a paper prescription for each of these medications  · LORazepam 2 MG/ML injection  · oxyCODONE HCl 10 MG immediate release tablet     Information about where to get these medications is not yet available    Ask your nurse or doctor about these medications  · ascorbic acid 1000 MG tablet  · benzonatate 100 MG capsule  · chlorhexidine 0.12 % solution  · dexamethasone 2 MG tablet  · dexamethasone 4 MG tablet  · enoxaparin 150 MG/ML injection  · glucose 40 % Gel  · insulin glargine 100 UNIT/ML injection vial  · insulin lispro 100 UNIT/ML injection vial  · ipratropium-albuterol 0.5-2.5 (3) MG/3ML Soln nebulizer solution  · lansoprazole 3 MG/ML Susp  · lisinopril 10 MG tablet  · QUEtiapine 50 MG tablet  · tobramycin 80 MG/2ML Soln  · vitamin D 50 MCG (2000 UT) Tabs tablet  · warfarin 5 MG tablet  · zinc sulfate 220 (50 Zn) MG capsule           Note that more than 40 minutes was spent in preparing discharge papers, discussing discharge with patient, medication review, etc.    Signed:  Electronically signed by Betty Kinsey MD on 9/20/2021 at 1:17 PM

## 2021-09-20 NOTE — PROGRESS NOTES
[facility] Progress Note    Admitting Date and Time: 8/21/2021  9:35 AM  Admit Dx: Acute hypoxemic respiratory failure due to COVID-19 (HCC) [U07.1, J96.01]  Pneumonia due to COVID-19 virus [U07.1, J12.82]    Subjective:  Patient is being followed for Acute hypoxemic respiratory failure due to COVID-19 ([address] [U07.1, J96.01]  Pneumonia due to COVID-19 virus [U07.1, J12.82]     Pt alert and following commands    Per RN: hopefully gets to a LTAC today. ROS: denies fever, chills, cp, sob, n/v, HA unless stated above.       oxyCODONE  10 mg Oral 4 times per day    insulin glargine  30 Units SubCUTAneous BID    lidocaine PF  5 mL IntraDERmal Once    QUEtiapine  50 mg Oral Nightly    vancomycin  1,750 mg IntraVENous Q24H    dexamethasone  6 mg Oral Daily    diazePAM  5 mg Oral Q6H    phenytoin  200 mg Per G Tube TID    lansoprazole  30 mg Per G Tube QAM AC    QUEtiapine  50 mg Oral TID    sodium chloride flush  5-40 mL IntraVENous 2 times per day    heparin flush  3 mL IntraVENous 2 times per day    piperacillin-tazobactam  3,375 mg IntraVENous Q8H    [Held by provider] enoxaparin  1 mg/kg SubCUTAneous BID    tobramycin  300 mg Inhalation Q12H    lisinopril  10 mg Oral Daily    ascorbic acid  1,000 mg Oral Daily    sodium chloride (Inhalant)  4 mL Nebulization Q12H    insulin lispro  0-18 Units SubCUTAneous Q6H    chlorhexidine  15 mL Mouth/Throat BID    ipratropium-albuterol  1 ampule Inhalation Q4H    amLODIPine  10 mg Oral Daily    atorvastatin  40 mg Oral Daily    Vitamin D  2,000 Units Oral Daily    zinc sulfate  50 mg Oral Daily     LORazepam, 2 mg, Q4H PRN  sodium chloride flush, 5-40 mL, PRN  sodium chloride, 25 mL, PRN  heparin flush, 3 mL, PRN  fentanNYL, 25 mcg, Q3H PRN  sodium chloride flush, 5-40 mL, PRN  sodium chloride, 25 mL, PRN  acetaminophen, 650 mg, Q4H PRN  labetalol, 20 mg, Q6H PRN  hydrALAZINE, 20 mg, Q6H PRN  sodium chloride (Inhalant), 4 mL, PRN  albuterol, 2.5 mg, Q6H PRN  benzonatate, 200 mg, TID PRN  glucose, 15 g, PRN  dextrose, 12.5 g, PRN  glucagon (rDNA), 1 mg, PRN  dextrose, 100 mL/hr, PRN  sodium chloride, 30 mL, PRN         Objective:    BP (!) 111/55   Pulse 96   Temp 98.6 °F (37 °C) (Bladder)   Resp 25   Ht 6' (1.829 m)   Wt (!) 302 lb 0.5 oz (137 kg)   SpO2 95%   BMI 40.96 kg/m²     General Appearance: alert and oriented to person, place and time and in no acute distress  Skin: warm and dry  Head: normocephalic and atraumatic  Eyes: pupils equal, round, and reactive to light, extraocular eye movements intact, conjunctivae normal  Neck: neck supple and non tender without mass   Pulmonary/Chest: clear to auscultation bilaterally- no wheezes, rales or rhonchi, normal air movement, no respiratory distress  Cardiovascular: normal rate, normal S1 and S2 and no carotid bruits  Abdomen: soft, non-tender, non-distended, normal bowel sounds, no masses or organomegaly  Extremities: no cyanosis, no clubbing and no edema  Neurologic: no cranial nerve deficit and speech normal        Recent Labs     09/18/21  0549 09/19/21  0555 09/20/21  0521    138 138   K 4.5 4.2 4.1   CL 99 101 100   CO2 30* 32* 31*   BUN 24* 27* 23*   CREATININE 0.8 0.8 0.7   GLUCOSE 166* 134* 123*   CALCIUM 8.2* 8.2* 8.5*       Recent Labs     09/18/21  0549 09/18/21  0549 09/18/21  0945 09/19/21  0555 09/20/21  0521   WBC 11.7*  --   --  12.2* 11.9*   RBC 2.54*  --   --  2.41* 2.45*   HGB 7.5*   < > 7.6* 7.1* 7.3*   HCT 24.2*   < > 24.5* 23.2* 23.5*   MCV 95.3  --   --  96.3 95.9   MCH 29.5  --   --  29.5 29.8   MCHC 31.0*  --   --  30.6* 31.1*   RDW 15.2*  --   --  15.0 15.0     --   --  275 275   MPV 9.2  --   --  9.1 9.2    < > = values in this interval not displayed. Radiology:   No results found.     Assessment:    Principal Problem:    Acute hypoxemic respiratory failure due to COVID-19 [facility])  Active Problems:    Type 2 diabetes mellitus without complication, without long-term current use of insulin (HCC)    History of seizures    Hyperlipidemia    Busch catheter problem (HCC)    Sepsis (HCC)    Thrombocytopenia (HCC)    Elevated LFTs  Resolved Problems:    OSCAR (acute kidney injury) ([facility]    Lactic acidosis    Plan:  1. Acute hypoxic respiratory failure, most likely due to viral pneumonia, O2 sat was below 90% on room air, requiring MV on 45% FiO2 and oxygen saturation above 90%. Treating the underlying process. For LTAC hopefully today  2. Acute viral pneumonia, rapid influenza a and B were negative, respiratory panel was negative, procalcitonin was 0.36, CAT scan of the chest showed bilateral patchy groundglass opacities, sent for COVID-19, result was positive. 3.  Anemia of inflammation -  Hgb in the 7s. 7.5 -> 7.6 -> 7.1 -> 7.3. HR 96. BP stable at 111/55. Continue to trend. No evidence of acute blood loss. Transfuse if < 7.  4.  VAP vs. Tracheobronchitis - on Zosyn, Vancomycin IV.  Tobramycin via inhalation and oral Diflucan.  Continue same. 5.  HTN - requiring several anti-hypertensives.  Continue same. 6.  Spiking fevers -  Last temp 100.2 at 1400 on 9/19. C/S of line tip pending. Continue IV antibiotics per ID.     NOTE: This report was transcribed using voice recognition software. Every effort was made to ensure accuracy; however, inadvertent computerized transcription errors may be present.     Electronically signed by Jeff Randolph MD on 9/20/2021 at 9:05 AM

## 2021-09-20 NOTE — PLAN OF CARE
Problem: Airway Clearance - Ineffective  Goal: Achieve or maintain patent airway  9/20/2021 0112 by Heron Lamas RN  Outcome: Met This Shift  9/19/2021 1916 by Karen Fierro RN  Outcome: Met This Shift     Problem: Gas Exchange - Impaired  Goal: Absence of hypoxia  9/20/2021 0112 by Heron Lamas RN  Outcome: Met This Shift  9/19/2021 1916 by Karen Fierro RN  Outcome: Met This Shift  Goal: Promote optimal lung function  Outcome: Met This Shift     Problem: Breathing Pattern - Ineffective  Goal: Ability to achieve and maintain a regular respiratory rate  9/20/2021 0112 by Heron Lamas RN  Outcome: Met This Shift  9/19/2021 1916 by Karen Fierro RN  Outcome: Met This Shift     Problem:  Body Temperature -  Risk of, Imbalanced  Goal: Complications related to the disease process, condition or treatment will be avoided or minimized  Outcome: Met This Shift     Problem: Isolation Precautions - Risk of Spread of Infection  Goal: Prevent transmission of infection  Outcome: Met This Shift     Problem: Nutrition Deficits  Goal: Optimize nutritional status  9/20/2021 0112 by Heron Lamas RN  Outcome: Met This Shift  9/19/2021 1916 by Karen Fierro RN  Outcome: Met This Shift     Problem: Risk for Fluid Volume Deficit  Goal: Maintain normal heart rhythm  9/20/2021 0112 by Heron Lamas RN  Outcome: Met This Shift  9/19/2021 1916 by Karen Fierro RN  Outcome: Met This Shift  Goal: Maintain absence of muscle cramping  Outcome: Met This Shift  Goal: Maintain normal serum potassium, sodium, calcium, phosphorus, and pH  Outcome: Met This Shift     Problem: Fatigue  Goal: Verbalize increase energy and improved vitality  Outcome: Met This Shift     Problem: Skin Integrity:  Goal: Will show no infection signs and symptoms  Description: Will show no infection signs and symptoms  9/20/2021 0112 by Heron Lamas RN  Outcome: Met This Shift  9/19/2021 1916 by Karen Fierro RN  Outcome: Met This Shift  Goal: Absence of new skin breakdown  Description: Absence of new skin breakdown  9/20/2021 0112 by Tata Ruiz RN  Outcome: Met This Shift  9/19/2021 1916 by Lynn Ang RN  Outcome: Met This Shift 39.2

## 2021-09-20 NOTE — PROGRESS NOTES
9/20/2021  2:02 PM               Nutrition Note    Pt remains intubated/trach and on TF via PEG. Discharge planned for today to LTAC. Pt stablized for tranfer to [facility].  Available if further needs arise    Electronically signed by Cecile Mckinney RD, CNSC, LD on 9/20/21 at 2:02 PM EDT    Contact: [phone]

## 2021-09-20 NOTE — PROGRESS NOTES
Critical Care Team - Daily Progress Note      Date and time: 9/20/2021 12:55 PM  Patient's name:  Geoff Markham  Medical Record Number: [id number]  Patient's account/billing number: [de-identified]  Patient's YOB: 1965  Age: 60 y.o. Date of Admission: 8/21/2021  9:35 AM  Length of stay during current admission: 30      Primary Care Physician: Loco Salazar,   ICU Attending Physician: Dr. Monica Ramirez    Code Status: Full Code    Reason for ICU admission: Respiratory failure due to COVID-19      SUBJECTIVE:     OVERNIGHT EVENTS:           8/30: tolerating longer periods supine, thick dark brown secretions     8/31: secretions out of NG, michelle prone     9/1: Decreased urine output and increased creatinine today, continues with thick secretions     9/2: Cr and Uop improved     9/3: P/F improved and has remained supine     9/4: thick secretions     9/5: desat with turning, 1500 loose stool from [facility]    9/6: Patient trying low tidal volumes overnight occasionally, otherwise no other acute events. 9/7: No acute events overnight    9/8: Did receive labetalol 2 times overnight for elevated blood pressures otherwise unremarkable good urine output. Will need essentially exchanged today, bronchoscopy for retained left lung and bibasilar mucopurulent secretions     9/10-no acute events overnight did receive trach and PEG yesterday, will wean sedation as able. , upper extremity DVT noted    9/11: No acute events overnight, sedation was increased overnight due to increased respirations, will slowly come down on that according to nursing staff.    9/12: bumex TID, seroquel to 50 mg PO BID    9/13-patient is improving, is able to follow commands at this time, is able to be transferred to [facility] once  sedation is weaned off, will do that today. 9/14, patient able to be weaned off IV sedation, and doing well.     9/15: Patient is off IV sedation, tachypneic respirations high 30s to low 40s, repeat Covid test did come back positive again. Markedly febrile. : Patient febrile overnight, agitation continues however he is tolerating ventilator better respirations were down in the 20s instead of the 40s yesterday. : Patient continues to be agitated stating something feels like it is in his throat. He was started on fentanyl infusion overnight that will be stopped today. We will to get a psychiatric consult for anxiety. : Tolerating high PSV. Hg dropped and is being rechecked. Creat bumped but only to 0.8.    : Hg dropping slightly, copious secretions from trach. More awake. Less anxious. : Patient is awake and alert, tolerating pressure support. No fevers. OBJECTIVE:     VITAL SIGNS:  /64   Pulse 85   Temp 99.5 °F (37.5 °C) (Bladder)   Resp 29   Ht 6' (1.829 m)   Wt (!) 302 lb 0.5 oz (137 kg)   SpO2 95%   BMI 40.96 kg/m²   Tmax over 24 hours:  Temp (24hrs), Av.4 °F (37.4 °C), Min:98.6 °F (37 °C), Max:100.2 °F (37.9 °C)      Patient Vitals for the past 6 hrs:   BP Temp Temp src Pulse Resp SpO2   21 1252 -- -- -- -- 29 95 %   21 1249 -- -- -- 85 30 95 %   21 1200 131/64 99.5 °F (37.5 °C) Bladder 96 30 90 %   21 1100 119/62 -- -- 101 29 94 %   21 1000 122/61 -- -- 88 30 94 %   21 0900 (!) 116/55 -- -- 86 30 95 %   21 0800 112/66 99.5 °F (37.5 °C) Bladder 85 (!) 33 95 %   21 0700 (!) 108/54 -- -- 84 18 96 %         Intake/Output Summary (Last 24 hours) at 2021 1255  Last data filed at 2021 0529  Gross per 24 hour   Intake 1818 ml   Output 1675 ml   Net 143 ml     Wt Readings from Last 2 Encounters:   21 (!) 302 lb 0.5 oz (137 kg)   21 (!) 320 lb (145.2 kg)     Body mass index is 40.96 kg/m².         PHYSICAL EXAMINATION:    General appearance -no acute distress, trach present on ventilator  Mental status -patient is able to follow commands   Eyes - pupils equal and reactive, extraocular eye movements intact  Mouth - mucous membranes moist, pharynx normal without lesions  Neck - supple, no significant adenopathy, trach site intact  Chest -wheezing bilaterally     heart - normal rate, regular rhythm, normal S1, S2, no murmurs, rubs, clicks or gallops  Abdomen - soft, nontender, nondistended, no masses or organomegaly peg intact  Neurological -following commands, no focal neurological deficits, is able to answer questions, less agitation  Extremities - peripheral pulses normal, 2+ peripheral  edema, no clubbing or cyanosis  Skin -patient did have skin breakdown to the face as well as blistering on the hands     Any additional physical findings:    MEDICATIONS:    Scheduled Meds:   [START ON 9/21/2021] dexamethasone  4 mg Oral Daily    [START ON 9/25/2021] dexamethasone  2 mg Oral Daily    warfarin  5 mg Oral Daily    enoxaparin  1 mg/kg SubCUTAneous BID    oxyCODONE  10 mg Oral 4 times per day    insulin glargine  30 Units SubCUTAneous BID    QUEtiapine  50 mg Oral Nightly    vancomycin  1,750 mg IntraVENous Q24H    diazePAM  5 mg Oral Q6H    phenytoin  200 mg Per G Tube TID    lansoprazole  30 mg Per G Tube QAM AC    QUEtiapine  50 mg Oral TID    sodium chloride flush  5-40 mL IntraVENous 2 times per day    heparin flush  3 mL IntraVENous 2 times per day    piperacillin-tazobactam  3,375 mg IntraVENous Q8H    tobramycin  300 mg Inhalation Q12H    lisinopril  10 mg Oral Daily    ascorbic acid  1,000 mg Oral Daily    sodium chloride (Inhalant)  4 mL Nebulization Q12H    insulin lispro  0-18 Units SubCUTAneous Q6H    chlorhexidine  15 mL Mouth/Throat BID    ipratropium-albuterol  1 ampule Inhalation Q4H    amLODIPine  10 mg Oral Daily    atorvastatin  40 mg Oral Daily    Vitamin D  2,000 Units Oral Daily    zinc sulfate  50 mg Oral Daily     Continuous Infusions:   sodium chloride      dextrose       PRN Meds:   LORazepam, 2 mg, Q4H PRN  sodium chloride flush, 5-40 mL, PRN  sodium chloride, 25 mL, PRN  heparin flush, 3 mL, PRN  fentanNYL, 25 mcg, Q3H PRN  acetaminophen, 650 mg, Q4H PRN  labetalol, 20 mg, Q6H PRN  hydrALAZINE, 20 mg, Q6H PRN  sodium chloride (Inhalant), 4 mL, PRN  albuterol, 2.5 mg, Q6H PRN  benzonatate, 200 mg, TID PRN  glucose, 15 g, PRN  dextrose, 12.5 g, PRN  glucagon (rDNA), 1 mg, PRN  dextrose, 100 mL/hr, PRN  sodium chloride, 30 mL, PRN          VENT SETTINGS (Comprehensive) (if applicable):  Vent Information  $Ventilation: $Subsequent Day  Skin Assessment: Clean, dry, & intact  Equipment ID: [id number]  Equipment Changed: (S) Humidification  Vent Type: [id number]  Vent Mode: PS  Vt Ordered: 0 mL  Pressure Ordered: 20  Rate Set: 0 bmp  Peak Flow: 0 L/min  Pressure Support: 18 cmH20  FiO2 : 40 %  SpO2: 95 %  SpO2/FiO2 ratio: 237.5  Sensitivity: 3  PEEP/CPAP: 5  I Time/ I Time %: 0 s  Humidification Source: Heated wire  Humidification Temp: 37  Humidification Temp Measured: 36.4  Circuit Condensation: Drained  Mask Type: Full face mask  Mask Size: Medium  Additional Respiratory  Assessments  Pulse: 85  Resp: 29  SpO2: 95 %  Position: Semi-Sellers's  Humidification Source: Heated wire  Humidification Temp: 37  Circuit Condensation: Drained  Oral Care: Mouth swabbed, Mouth moisturizer, Mouth suctioned, Suction toothette  Subglottic Suction Done?: No  Airway Type: Trachial  Airway Size: 8  Cuff Pressure (cm H2O): 29 cm H2O    ABGs:     Laboratory findings:    Complete Blood Count:   Recent Labs     09/18/21  0549 09/18/21  0549 09/18/21  0945 09/19/21  0555 09/20/21  0521   WBC 11.7*  --   --  12.2* 11.9*   HGB 7.5*   < > 7.6* 7.1* 7.3*   HCT 24.2*   < > 24.5* 23.2* 23.5*     --   --  275 275    < > = values in this interval not displayed.         Last 3 Blood Glucose:   Recent Labs     09/18/21  0549 09/19/21  0555 09/20/21  0521   GLUCOSE 166* 134* 123*        PT/INR:    Lab Results   Component Value Date    PROTIME 14.1 09/20/2021    INR 1.3 09/20/2021     PTT:  No results found for: APTT, PTT    Comprehensive Metabolic Profile:   Recent Labs     09/18/21  0549 09/18/21  0549 09/19/21  0555 09/19/21  0555 09/20/21  0521     --  138  --  138   K 4.5  --  4.2  --  4.1   CL 99  --  101  --  100   CO2 30*  --  32*  --  31*   BUN 24*  --  27*  --  23*   CREATININE 0.8  --  0.8  --  0.7   GLUCOSE 166*  --  134*  --  123*   CALCIUM 8.2*  --  8.2*  --  8.5*   PROT 6.3*   < > 6.0*   < > 6.4   LABALBU 2.2*   < > 2.4*   < > 2.4*   BILITOT 0.3   < > 0.2   < > 0.3   ALKPHOS 107   < > 79   < > 82   AST 85*   < > 68*   < > 73*   ALT 90*   < > 79*   < > 77*    < > = values in this interval not displayed. Magnesium:   Lab Results   Component Value Date    MG 2.0 09/20/2021     Phosphorus:   Lab Results   Component Value Date    PHOS 3.3 09/20/2021     Ionized Calcium: No results found for: CANDICE     Urinalysis:     Troponin: No results for input(s): TROPONINI in the last 72 hours.       ASSESSMENT:     Patient Active Problem List    Diagnosis Date Noted    Elevated LFTs     Busch catheter problem ([facility] [address] 08/26/2021    Sepsis ([facility] [address] 08/26/2021    Thrombocytopenia ([facility] [address] 08/26/2021    Hyperlipidemia 08/22/2021    Acute hypoxemic respiratory failure due to COVID-19 [facility]) 08/21/2021    Type 2 diabetes mellitus without complication, without long-term current use of insulin ([facility] [address] 08/10/2021    History of seizures 08/10/2021     SYSTEMS ASSESSMENT    Neuro     Following commands at this time and somewhat less anxious  Patient is able to be weaned at this time  Oral Valium 5 mg every 6 hours, OxyIR 10 mg decreased to every 6  hours  Ativan 2 mg as needed  Seroquel 50 mg three times daily  Continue to monitor      Respiratory     Acute hypoxic respiratory failure requiring mechanical ventilation  Severe COVID-19 pneumonia s/p toe C, remdesivir  Pneumonia VAP-MSSA, E. coli  On pip/ tazo and RODNEY per ID 2 [id number]  Trach/PEG      DuoNebs every 4 hours  Wean dexamethasone to 4 mg for 4 days followed by 2 mg for 4 days  Wean oxygen as tolerated, currently at 50%. Keep O2 sat 90-92%  Continue pressure support weaning trials as tolerated  Respiratory culture sent yesterday results are pending so far no growth    Cardiovascular     Hypertension  Amlodipine-10 mg  Lisinopril-10 mg  Lipitor 40 mg    Gastrointestinal     GI prophylaxis-Protonix  Bowel regimen  Continue monitor    Renal     OSCAR-resolved  Hypokalemia-resolved  Hypomagnesemia-resolved  Avoid nephrotoxins  Continue to monitor     Infectious Disease     Pneumonia VAP-positive for MSSA, E. coli  lactic acidosis-resolved  Inhaled tobramycin for possible tracheobronchitis  Follow cultures of previous central line tip and new blood cultures from 9/11  Infectious disease following, continue Zosyn and tobramycin by inhalation till 9/25/2021   Continue vancomycin day 6    Hematology/Oncology     DVT left upper arm-continue Lovenox we will resume warfarin   Anemia-continue to monitor H&H  Hemoglobin slightly lower      Endocrine     Hyperglycemia-controlled  Lantus 30 units  High-dose sliding scale    Social/Spiritual/DNR/Other     Full code  vitamin regimen  DVT prophylaxis Lovenox, warfarin   Psych consult for anxiety-felt delirious and recommended stopping narcotics    Patient is stable to be transferred to select specialty LTAC.     Thao Wiley MD   CCT excluding procedures:35'     9/20/2021 12:55 PM

## 2021-09-20 NOTE — PLAN OF CARE
pH  9/20/2021 1445 by Terrence Tobin RN  Outcome: Met This Shift  9/20/2021 0112 by Casey Ledezma RN  Outcome: Met This Shift     Problem: Loneliness or Risk for Loneliness  Goal: Demonstrate positive use of time alone when socialization is not possible  Outcome: Met This Shift     Problem: Fatigue  Goal: Verbalize increase energy and improved vitality  9/20/2021 1445 by Terrence Tobin RN  Outcome: Met This Shift  9/20/2021 0112 by Casey Ledezma RN  Outcome: Met This Shift     Problem: Skin Integrity:  Goal: Will show no infection signs and symptoms  Description: Will show no infection signs and symptoms  9/20/2021 1445 by Terrence Tobin RN  Outcome: Met This Shift  9/20/2021 0112 by Casey Ledezma RN  Outcome: Met This Shift  Goal: Absence of new skin breakdown  Description: Absence of new skin breakdown  9/20/2021 1445 by Terrence Tobin RN  Outcome: Met This Shift  9/20/2021 0112 by Casey Ledezma RN  Outcome: Met This Shift

## 2021-09-20 NOTE — PROGRESS NOTES
[facility]  [facility]  Progress Note      Chief Complaint   Patient presents with    Shortness of Breath     covid, per family SaO2 75% RA, hx of DM and panic attack        SUBJECTIVE:  8/21/21  Prone. FiO2 60% and PEEP of 16    8/22/2021  Patient is tolerating medications. No reported adverse drug reactions. No nausea, vomiting, diarrhea. Afebrile BiPAP 100% FiO2 with breathing at 44 times a minute and probably is going to decompensate    8/23/2021  Not doing well intubated on a rotating bed  FiO2 70% and PEEP 16    8/24/2021  Prone 50% FiO2-PEEP of 10  Issues with the urinary Busch last night this was changed  Paralyzed and on Versed    8/25/2021  Paralyzed and sedated with Versed no pressors  Prone intubated on 50% FiO2, PEEP of 10  Tolerating medications    8/26/21  Paralyzed and sedated with Versed no pressors  Prone intubated on 70% FiO2, PEEP of 10  Does not tolerate supine position    8/27/2021  Afebrile  Still prone on FiO2 of 100%  Paralyzed and sedated    8/28/2021  The patient is still in the ICU. He is pronated. FiO2 100%. Is still sedated and paralyzed. Fair amount of thick, brown secretions    8/29/2021  The patient is still on a RotoProne bed. You are still intubated, sedated and paralyzed. O2 is being weaned down. FiO2 55%. PEEP 12.    8/30/2021  Patient remains intubated, sedated and paralyzed. He is still on a RotoProne bed. Supine now. 8/31/2021  He is still on a RotoProne bed. Still having fair amount of secretions from the nostrils. Minimal from the ET tube, however. Tube feeding seems to be coming out of his nose. 9/1/2021  He is supinated but still on a RotoProne bed. He still has fair amount of secretions from the ET tube. Tolerating antibiotic    9/2/2021. He is still on a RotoProne bed. He is pronated. Less secretions from the ET tube and nostrils. FiO2 50%. PEEP 14.    9/3/2021. He is off the RotoProne bed. Tolerating supination. Tolerating weaning. PEEP was brought down to 12. FiO2 still at 50%. Tolerating antibiotics. 9/4/2021. He is still in the ICU. He is still on a ventilator. He is still paralyzed. 9/5/2021. He had an episode of acute desaturation and had to be bagged. He is now back on the ventilator. He is still sedated and paralyzed. 9/6/2021. The patient still in the ICU. He is still requiring paralytics. Secretions are becoming thinner. No fever. 9/7/2021. The patient still on a ventilator and paralyzed. No new problems reported. 9/8/2021. Patient is still on the ventilator, paralyzed. No fever. Tolerating medications. 9/9/2021. Patient had a bronchoscopy yesterday a fair amount of thick secretions were obtained. Cultures were sent. He is still on a ventilator, sedated and paralyzed. 9/10/2021. The patient is still in the ICU. He is still on a ventilator. He had a tracheostomy and PEG. Denies dyspnea or pain. 9/11/2021. Patient is now having fevers. He is still on a ventilator. Cooling blanket. 9/12/2021. Lines were changed. Still having some fevers but they seem to be trending downwards. Denies any pain. Denies dyspnea. 9/13/2021. The patient is still in the ICU. He is quite restless and becomes agitated. No fever. Tolerating antibiotics. 9/14/2021. The patient remains in the ICU. Still awaiting for a bed at [facility]. No fevers. Tolerating antibiotics. 9/15/2021. The patient is still in the ICU. He is still in isolation because there are no beds available to take him out of isolation and terminally clean his room. He is having fevers. Nursing reports secretions from around the tracheostomy. 9/16/2021. The patient was removed out of isolation. He is still having the ICU. He denies any dyspnea. No pain. Tolerating antibiotics. 9/17/2021. The patient remains in the ICU. Still awaiting a bed for LTAC.   He continues to be restless and continuing to alarm the ventilator. He wants help getting up but cannot really even move and bed. Denies dyspnea. No pain. He is having fevers. T-max 101.3 °F.    9/18/2021. The patient is still in the ICU. He is still on a ventilator via tracheostomy. He still becomes restless. He has low-grade fevers. He is out of isolation. 9/19/2021. The patient is still in the ICU. He has not has restless as in previous days. He is still weak. He is still on a ventilator. 9/20/2021. The patient is in ICU. She is doing better. No pain. No dyspnea. No fevers. Review of systems:  As stated above in the chief complaint, otherwise negative.     Medications:  Scheduled Meds:   [START ON 9/21/2021] dexamethasone  4 mg Oral Daily    [START ON 9/25/2021] dexamethasone  2 mg Oral Daily    warfarin  5 mg Oral Daily    enoxaparin  1 mg/kg SubCUTAneous BID    oxyCODONE  10 mg Oral 4 times per day    insulin glargine  30 Units SubCUTAneous BID    QUEtiapine  50 mg Oral Nightly    vancomycin  1,750 mg IntraVENous Q24H    diazePAM  5 mg Oral Q6H    phenytoin  200 mg Per G Tube TID    lansoprazole  30 mg Per G Tube QAM AC    QUEtiapine  50 mg Oral TID    sodium chloride flush  5-40 mL IntraVENous 2 times per day    heparin flush  3 mL IntraVENous 2 times per day    piperacillin-tazobactam  3,375 mg IntraVENous Q8H    tobramycin  300 mg Inhalation Q12H    lisinopril  10 mg Oral Daily    ascorbic acid  1,000 mg Oral Daily    sodium chloride (Inhalant)  4 mL Nebulization Q12H    insulin lispro  0-18 Units SubCUTAneous Q6H    chlorhexidine  15 mL Mouth/Throat BID    ipratropium-albuterol  1 ampule Inhalation Q4H    amLODIPine  10 mg Oral Daily    atorvastatin  40 mg Oral Daily    Vitamin D  2,000 Units Oral Daily    zinc sulfate  50 mg Oral Daily     Continuous Infusions:   sodium chloride      dextrose       PRN Meds:LORazepam, sodium chloride flush, sodium chloride, heparin flush, fentanNYL, acetaminophen, labetalol, hydrALAZINE, sodium chloride (Inhalant), albuterol, benzonatate, glucose, dextrose, glucagon (rDNA), dextrose, sodium chloride    OBJECTIVE:  BP (!) 111/55   Pulse 96   Temp 98.6 °F (37 °C) (Bladder)   Resp 25   Ht 6' (1.829 m)   Wt (!) 302 lb 0.5 oz (137 kg)   SpO2 95%   BMI 40.96 kg/m²   Temp  Av.5 °F (37.5 °C)  Min: 98.6 °F (37 °C)  Max: 100.2 °F (37.9 °C)     Constitutional: The patient is lying in bed in the ICU. He is awake and alert. He gets restless when someone walks in the room. FiO2 40%. PEEP 5. Skin: Warm and dry. No rashes were noted. HEENT: Round and nonreactive pupils. Decubitus lesions on cheeks, hands and lower extremities noted. Neck: Supple. Tracheostomy. PEG. Chest: Coarse breath sounds bilaterally. No crackles  Cardiovascular: Heart sounds rhythmic and regular. Tachycardic. Abdomen: Round, soft and benign to palpation. Genitourinary: Busch catheter  Extremities: Moderate edema. Lines: Right midline 2021. Busch catheter.     Laboratory and Tests Review:  Lab Results   Component Value Date    WBC 11.9 (H) 2021    WBC 12.2 (H) 2021    WBC 11.7 (H) 2021    HGB 7.3 (L) 2021    HCT 23.5 (L) 2021    MCV 95.9 2021     2021     Lab Results   Component Value Date    NEUTROABS 9.16 (H) 2021    NEUTROABS 9.49 (H) 2021    NEUTROABS 9.05 (H) 2021     No results found for: [facility]  Lab Results   Component Value Date    ALT 77 (H) 2021    AST 73 (H) 2021    ALKPHOS 82 2021    BILITOT 0.3 2021     Lab Results   Component Value Date     2021    K 4.1 2021    K 3.7 2021     2021    CO2 31 2021    BUN 23 2021    CREATININE 0.7 2021    CREATININE 0.8 2021    CREATININE 0.8 2021    GFRAA >60 2021    LABGLOM >60 2021    GLUCOSE 123 2021    PROT 6.4 2021    LABALBU 2.4 2021    CALCIUM 8.5 09/20/2021    BILITOT 0.3 09/20/2021    ALKPHOS 82 09/20/2021    AST 73 09/20/2021    ALT 77 09/20/2021     Lab Results   Component Value Date    CRP 12.1 (H) 09/20/2021    CRP 13.3 (H) 09/19/2021    CRP 16.5 (H) 09/18/2021     Lab Results   Component Value Date    SEDRATE 140 (H) 09/20/2021    SEDRATE 137 (H) 09/19/2021    SEDRATE 133 (H) 09/18/2021     Radiology:      Microbiology:   Streptococcus pneumoniae/Legionella urine Ag: negative  Nares screen MRSA: Negative  Urine culture 8/24/2021: Negative  Blood cultures 8/21/2021: Negative x2  Blood cultures 9/15/2021: CoNS in 1 of 4 bottles  Respiratory culture 8/27/2021: OP terrell reduced  Respiratory culture 8/30/2021: E. coli, MSSA   BAL 9/8/2021: OP terrell reduced. E. coli, Enterobacter cloacae, MSSA. PjP negative  TLC tip culture 9/11/2021: Negative  TLC tip culture  Tracheostomy site culture 9/15/2021: Staphylococcus  Tracheostomy wound site 9/15/2021: CoNS    ASSESSMENT:  · Severe Covid pneumonia causing acute respiratory failure. CRP has come down nicely. Completed Remdesivir. Received Tocilizumab  · VAP versus tracheobronchitis. Cultures growing MSSA and E. coli. Improving. Treated with 10 days of Cefazolin  · Status post bronchoscopy 9/8/2021. Cultures growing rare MSSA, Enterobacter cloacae. Possible tracheobronchitis  · Leukocytosis associated to the above, stable  · Status post tracheostomy and PEG 9/10/2021   · Probable CLABSI. TLC was removed. Culture tip pending. Blood cultures grew CoNS. Temperatures trending down since the TLC was removed  · Possible tracheitis.   Cultures grew CoNS, improving  · Elevation of transaminases, improving    PLAN:  · Continue Zosyn and Tobramycin by inhalation until 9/25/2021  · Continue Vancomycin, day 6  · We will follow with you  · Awaiting transfer to [facility] with Pj Clark MD  11:44 AM   9/20/2021

## 2021-09-21 LAB
BLOOD CULTURE, ROUTINE: ABNORMAL
CULTURE, RESPIRATORY: NORMAL
ORGANISM: ABNORMAL
SMEAR, RESPIRATORY: NORMAL

## 2021-10-03 ENCOUNTER — HOSPITAL ENCOUNTER (OUTPATIENT)
Dept: NON INVASIVE DIAGNOSTICS | Age: 56
Discharge: HOME | End: 2021-10-03

## 2021-10-11 LAB
FUNGUS (MYCOLOGY) CULTURE: ABNORMAL
FUNGUS STAIN: ABNORMAL
ORGANISM: ABNORMAL

## 2021-10-26 LAB
AFB CULTURE (MYCOBACTERIA): NORMAL
AFB SMEAR: NORMAL

## 2021-11-02 ENCOUNTER — TELEPHONE (OUTPATIENT)
Dept: FAMILY MEDICINE CLINIC | Age: 56
End: 2021-11-02

## 2021-11-02 NOTE — TELEPHONE ENCOUNTER
Pt is being discharged from [place] on 11/05/21. [facility] wants to know if dr Molly Ratliff will follow for pt and ot.   Sandro Leggett  [phone]

## 2021-11-08 ENCOUNTER — TELEPHONE (OUTPATIENT)
Dept: FAMILY MEDICINE CLINIC | Age: 56
End: 2021-11-08

## 2021-11-08 NOTE — TELEPHONE ENCOUNTER
Patients wife called and is asking for a prescription to be called in for Diarrhea since Covid , please advise

## 2021-11-09 NOTE — TELEPHONE ENCOUNTER
Pt's wife states he has had diarrhea since Friday, going 2-3 x a day. She states it is not watery just loose, normal color not dark.

## 2021-11-10 NOTE — TELEPHONE ENCOUNTER
Wife called back, they tried kaopectate and then switched to [facility] and neither really helped      Call wife's phone as Eyad Flores does not answer

## 2021-12-01 ENCOUNTER — OFFICE VISIT (OUTPATIENT)
Dept: FAMILY MEDICINE CLINIC | Age: 56
End: 2021-12-01
Payer: COMMERCIAL

## 2021-12-01 VITALS
HEIGHT: 72 IN | RESPIRATION RATE: 20 BRPM | SYSTOLIC BLOOD PRESSURE: 143 MMHG | TEMPERATURE: 97.1 F | DIASTOLIC BLOOD PRESSURE: 87 MMHG | WEIGHT: 278 LBS | HEART RATE: 89 BPM | BODY MASS INDEX: 37.65 KG/M2

## 2021-12-01 DIAGNOSIS — R53.81 PHYSICAL DECONDITIONING: ICD-10-CM

## 2021-12-01 DIAGNOSIS — E11.9 TYPE 2 DIABETES MELLITUS WITHOUT COMPLICATION, WITHOUT LONG-TERM CURRENT USE OF INSULIN (HCC): Primary | ICD-10-CM

## 2021-12-01 DIAGNOSIS — U09.9 COVID-19 LONG HAULER: ICD-10-CM

## 2021-12-01 DIAGNOSIS — E78.5 HYPERLIPIDEMIA, UNSPECIFIED HYPERLIPIDEMIA TYPE: ICD-10-CM

## 2021-12-01 DIAGNOSIS — E55.9 VITAMIN D DEFICIENCY: ICD-10-CM

## 2021-12-01 DIAGNOSIS — Z87.898 HISTORY OF SEIZURES: ICD-10-CM

## 2021-12-01 LAB — HBA1C MFR BLD: 5.6 %

## 2021-12-01 PROCEDURE — 99215 OFFICE O/P EST HI 40 MIN: CPT | Performed by: FAMILY MEDICINE

## 2021-12-01 PROCEDURE — G8427 DOCREV CUR MEDS BY ELIG CLIN: HCPCS | Performed by: FAMILY MEDICINE

## 2021-12-01 PROCEDURE — G8484 FLU IMMUNIZE NO ADMIN: HCPCS | Performed by: FAMILY MEDICINE

## 2021-12-01 PROCEDURE — 83036 HEMOGLOBIN GLYCOSYLATED A1C: CPT | Performed by: FAMILY MEDICINE

## 2021-12-01 PROCEDURE — G8417 CALC BMI ABV UP PARAM F/U: HCPCS | Performed by: FAMILY MEDICINE

## 2021-12-01 PROCEDURE — 2022F DILAT RTA XM EVC RTNOPTHY: CPT | Performed by: FAMILY MEDICINE

## 2021-12-01 PROCEDURE — 3044F HG A1C LEVEL LT 7.0%: CPT | Performed by: FAMILY MEDICINE

## 2021-12-01 PROCEDURE — 3017F COLORECTAL CA SCREEN DOC REV: CPT | Performed by: FAMILY MEDICINE

## 2021-12-01 PROCEDURE — 1036F TOBACCO NON-USER: CPT | Performed by: FAMILY MEDICINE

## 2021-12-01 RX ORDER — APIXABAN 5 MG/1
5 TABLET, FILM COATED ORAL 2 TIMES DAILY
COMMUNITY
Start: 2021-11-05 | End: 2021-12-03 | Stop reason: SDUPTHER

## 2021-12-01 RX ORDER — GABAPENTIN 300 MG/1
300 CAPSULE ORAL DAILY
COMMUNITY
Start: 2021-11-05 | End: 2021-12-03 | Stop reason: SDUPTHER

## 2021-12-01 RX ORDER — ATORVASTATIN CALCIUM 40 MG/1
40 TABLET, FILM COATED ORAL DAILY
COMMUNITY
Start: 2021-10-19 | End: 2022-09-16 | Stop reason: SDUPTHER

## 2021-12-01 NOTE — PROGRESS NOTES
Marvin De Los Santos  : 1965    Chief Complaint:     Chief Complaint   Patient presents with    Diabetes    Discuss Medications       HPI  Marvin De Los Santos 60 y.o. presents for   Chief Complaint   Patient presents with    Diabetes    Discuss Medications     Patient presents for follow-up from the hospital.  He was in the ICU for a month went to [facility] and then to [facility] for rehabilitation. He states he is feeling much improved since being out of the hospital.  He still has some deconditioning especially in his right side of his body. He does wish to try physical therapy for this. Blood pressure slightly elevated though he states he is anxious to be here. We did review his medications in detail today. He has not been taking the Protonix and his insulin was stopped as well. He is only taking Metformin for his diabetes. He does have Januvia at home but he has not been taking this medication. His A1c today is 5.8. He is on Eliquis due to a DVT in his upper extremity. All questions were answered to patients satisfaction. Past Medical History:   Diagnosis Date    HIGH CHOLESTEROL     Hypertension     Seizures ([address]        No Known Allergies    Health Maintenance Due   Topic Date Due    Hepatitis C screen  Never done    Pneumococcal 0-64 years Vaccine (1 of 2 - PPSV23) Never done    Diabetic foot exam  Never done    Diabetic retinal exam  Never done    COVID-19 Vaccine (1) Never done    HIV screen  Never done    Diabetic microalbuminuria test  Never done    Hepatitis B vaccine (1 of 3 - Risk 3-dose series) Never done    DTaP/Tdap/Td vaccine (1 - Tdap) Never done    Colon cancer screen colonoscopy  Never done    Shingles Vaccine (1 of 2) Never done    Flu vaccine (1) Never done    Lipid screen  2021         REVIEW OF SYSTEMS  Review of Systems   Constitutional: Positive for fatigue. Negative for chills and fever.    HENT: Negative for congestion, ear pain, postnasal drip, rhinorrhea and sore throat. Eyes: Negative for pain and itching. Respiratory: Negative for cough, chest tightness and shortness of breath. Cardiovascular: Negative for chest pain and leg swelling. Gastrointestinal: Negative for abdominal pain, constipation, diarrhea and nausea. Endocrine: Negative for heat intolerance. Genitourinary: Negative for flank pain, frequency and urgency. Musculoskeletal: Negative for arthralgias and back pain. Skin: Negative for pallor and rash. Allergic/Immunologic: Negative for environmental allergies. Neurological: Negative for dizziness, numbness and headaches. Weakness   Hematological: Does not bruise/bleed easily. Psychiatric/Behavioral: Negative for agitation, behavioral problems and hallucinations. PHYSICAL EXAM  BP (!) 143/87   Pulse 89   Temp 97.1 °F (36.2 °C) (Temporal)   Resp 20   Ht 6' (1.829 m)   Wt 278 lb (126.1 kg)   BMI 37.70 kg/m²   Physical Exam  Vitals and nursing note reviewed. Constitutional:       General: He is not in acute distress. Appearance: He is well-developed. He is obese. He is not diaphoretic. HENT:      Head: Normocephalic and atraumatic. Right Ear: External ear normal.      Left Ear: External ear normal.      Nose: Nose normal.      Mouth/Throat:      Pharynx: No oropharyngeal exudate. Eyes:      General:         Right eye: No discharge. Left eye: No discharge. Conjunctiva/sclera: Conjunctivae normal.   Neck:      Thyroid: No thyromegaly. Cardiovascular:      Rate and Rhythm: Normal rate and regular rhythm. Heart sounds: Normal heart sounds. No murmur heard. No friction rub. Pulmonary:      Effort: Pulmonary effort is normal.      Breath sounds: Normal breath sounds. No wheezing. Abdominal:      Palpations: Abdomen is soft. Tenderness: There is no abdominal tenderness. Musculoskeletal:         General: No tenderness. Normal range of motion.       Cervical back: Normal range of motion and neck supple. Right lower leg: Edema (At baseline) present. Left lower leg: Edema (At baseline) present. Lymphadenopathy:      Cervical: No cervical adenopathy. Skin:     General: Skin is warm and dry. Findings: No erythema or rash. Neurological:      Mental Status: He is alert and oriented to person, place, and time. Coordination: Coordination normal.      Deep Tendon Reflexes: Reflexes are normal and symmetric. Psychiatric:         Behavior: Behavior normal.         Thought Content: Thought content normal.         Judgment: Judgment normal.              Laboratory: All laboratory and radiology results have been personally reviewed by myself    Lab Results   Component Value Date     10/18/2021    K 3.7 10/18/2021    K 3.7 08/21/2021     10/18/2021    CO2 23 10/18/2021    BUN 13 10/18/2021    CREATININE 0.8 10/18/2021    PROT 6.6 10/11/2021    LABALBU 2.7 10/11/2021    CALCIUM 9.0 10/18/2021    GFRAA >60 10/18/2021    LABGLOM >60 10/18/2021    GLUCOSE 169 10/18/2021    AST 26 10/11/2021    ALT 25 10/11/2021    ALKPHOS 110 10/11/2021    BILITOT <0.2 10/11/2021    TSH 1.390 12/12/2020    VITD25 12 12/12/2020    CHOL 223 12/12/2020    TRIG 247 09/01/2021    HDL 50 12/12/2020    LDLCALC 139 12/12/2020    LABA1C 5.6 12/01/2021        Lab Results   Component Value Date    CHOL 223 (H) 12/12/2020     Lab Results   Component Value Date    TRIG 247 (H) 09/01/2021     Lab Results   Component Value Date    HDL 50 12/12/2020     Lab Results   Component Value Date    LDLCALC 139 (H) 12/12/2020       Lab Results   Component Value Date    LABA1C 5.6 12/01/2021     Lab Results   Component Value Date    LDLCALC 139 (H) 12/12/2020    CREATININE 0.8 10/18/2021       ASSESSMENT/PLAN:    1. Type 2 diabetes mellitus without complication, without long-term current use of insulin (HCC)  -     POCT glycosylated hemoglobin (Hb A1C)  -     Lipid Panel;  Future  -     COMPREHENSIVE METABOLIC PANEL; Future  -     MICROALBUMIN / CREATININE URINE RATIO; Future  2. History of seizures  -     PHENYTOIN LEVEL, TOTAL; Future  -     TSH without Reflex; Future  3. Hyperlipidemia, unspecified hyperlipidemia type  -     Lipid Panel; Future  4. Vitamin D deficiency  -     Vitamin D 25 Hydroxy; Future  5. Physical deconditioning  -     External Referral To Physical Therapy  6. COVID-19 long hauler  -     External Referral To Physical Therapy     A1c currently well controlled at 5.8 we will decrease Metformin to 500 mg twice daily. He does not need to use Januvia and will stop this medication. Labs to be completed as above. As per history of seizures we will continue Dilantin and check phenytoin level. We will repeat lipid panel as he is on atorvastatin. Due to deconditioning and history of Covid will refer to physical therapy. Have patient return in 3 months or sooner if needed. Did personally review all labs, imaging, notes from hospitalization and rehab facilities    More than 40 minutes was spent with the patient during the encounter, during which more than half the time was spent counseling on the above concerns    Problem list reviewed andsimplified/updated  HM reviewed today and counseled as appropriate    Call or go to ED immediately if symptoms worsen or persist.  No future appointments. Or sooner if necessary. Educational materials and/or homeexercises printed for patient's review and were included in patient instructions on his/her After Visit Summary and given to patient at the end of visit. Counseled regarding above diagnosis, including possible risks and complications,  especially if left uncontrolled. Counseled regarding the possible side effects, risks, benefits and alternatives to treatment; patient and/or guardian verbalizes understanding, agrees,feels comfortable with and wishes to proceed with above treatment plan.      Advised patient to call withany new medication issues, and read all Rx info from pharmacy to assure aware of all possible risks and side effects of medication before taking. Reviewed age and gender appropriate health screening exams and vaccinations. Advised patient regarding importance of keeping up with recommended health maintenance and toschedule as soon as possible if overdue, as this is important in assessing for undiagnosed pathology, especially cancer, as well as protecting against potentially harmful/life threatening disease. and/or guardian verbalizes understanding and agrees with above counseling, assessment and plan. All questions answered. Loco Salazar, DO  12/1/21    NOTE: This report was transcribed using voice recognition software.  Every effort was made to ensure accuracy; however, inadvertent computerized transcription errors may be present

## 2021-12-02 ASSESSMENT — ENCOUNTER SYMPTOMS
EYE ITCHING: 0
BACK PAIN: 0
NAUSEA: 0
COUGH: 0
DIARRHEA: 0
SORE THROAT: 0
SHORTNESS OF BREATH: 0
RHINORRHEA: 0
ABDOMINAL PAIN: 0
CHEST TIGHTNESS: 0
EYE PAIN: 0
CONSTIPATION: 0

## 2021-12-03 RX ORDER — LANCETS 30 GAUGE
1 EACH MISCELLANEOUS 2 TIMES DAILY
Qty: 100 EACH | Refills: 5 | Status: SHIPPED
Start: 2021-12-03 | End: 2021-12-10

## 2021-12-03 RX ORDER — APIXABAN 5 MG/1
5 TABLET, FILM COATED ORAL 2 TIMES DAILY
Qty: 60 TABLET | Refills: 2 | Status: SHIPPED
Start: 2021-12-03 | End: 2022-01-12 | Stop reason: ALTCHOICE

## 2021-12-03 RX ORDER — GABAPENTIN 300 MG/1
300 CAPSULE ORAL DAILY
Qty: 90 CAPSULE | Refills: 1 | Status: ON HOLD
Start: 2021-12-03 | End: 2021-12-17 | Stop reason: HOSPADM

## 2021-12-03 NOTE — TELEPHONE ENCOUNTER
Pt is asking for refills for amlodipine 10 mg , Eliquis 5 mg and gabapentin 300 mg , please send 90 day supply to [facility]

## 2021-12-04 ENCOUNTER — HOSPITAL ENCOUNTER (OUTPATIENT)
Age: 56
Discharge: HOME | End: 2021-12-04
Payer: COMMERCIAL

## 2021-12-04 DIAGNOSIS — E55.9 VITAMIN D DEFICIENCY: ICD-10-CM

## 2021-12-04 DIAGNOSIS — Z87.898 HISTORY OF SEIZURES: ICD-10-CM

## 2021-12-04 DIAGNOSIS — E11.9 TYPE 2 DIABETES MELLITUS WITHOUT COMPLICATION, WITHOUT LONG-TERM CURRENT USE OF INSULIN (HCC): ICD-10-CM

## 2021-12-04 DIAGNOSIS — E78.5 HYPERLIPIDEMIA, UNSPECIFIED HYPERLIPIDEMIA TYPE: ICD-10-CM

## 2021-12-04 LAB
ALBUMIN SERPL-MCNC: 2.8 G/DL (ref 3.5–5.2)
ALP BLD-CCNC: 75 U/L (ref 40–129)
ALT SERPL-CCNC: 13 U/L (ref 0–40)
ANION GAP SERPL CALCULATED.3IONS-SCNC: 8 MMOL/L (ref 7–16)
AST SERPL-CCNC: 14 U/L (ref 0–39)
BILIRUB SERPL-MCNC: <0.2 MG/DL (ref 0–1.2)
BUN BLDV-MCNC: 10 MG/DL (ref 6–20)
CALCIUM SERPL-MCNC: 8.4 MG/DL (ref 8.6–10.2)
CHLORIDE BLD-SCNC: 107 MMOL/L (ref 98–107)
CHOLESTEROL, TOTAL: 166 MG/DL (ref 0–199)
CO2: 29 MMOL/L (ref 22–29)
CREAT SERPL-MCNC: 0.9 MG/DL (ref 0.7–1.2)
CREATININE URINE: 82 MG/DL (ref 40–278)
GFR AFRICAN AMERICAN: >60
GFR NON-AFRICAN AMERICAN: >60 ML/MIN/1.73
GLUCOSE BLD-MCNC: 122 MG/DL (ref 74–99)
HDLC SERPL-MCNC: 57 MG/DL
LDL CHOLESTEROL CALCULATED: 92 MG/DL (ref 0–99)
MICROALBUMIN UR-MCNC: <12 MG/L
MICROALBUMIN/CREAT UR-RTO: ABNORMAL (ref 0–30)
POTASSIUM SERPL-SCNC: 3.9 MMOL/L (ref 3.5–5)
SODIUM BLD-SCNC: 144 MMOL/L (ref 132–146)
TOTAL PROTEIN: 5.4 G/DL (ref 6.4–8.3)
TRIGL SERPL-MCNC: 84 MG/DL (ref 0–149)
TSH SERPL DL<=0.05 MIU/L-ACNC: 1.6 UIU/ML (ref 0.27–4.2)
VITAMIN D 25-HYDROXY: 16 NG/ML (ref 30–100)
VLDLC SERPL CALC-MCNC: 17 MG/DL

## 2021-12-04 PROCEDURE — 80053 COMPREHEN METABOLIC PANEL: CPT

## 2021-12-04 PROCEDURE — 80061 LIPID PANEL: CPT

## 2021-12-04 PROCEDURE — 84443 ASSAY THYROID STIM HORMONE: CPT

## 2021-12-04 PROCEDURE — 82306 VITAMIN D 25 HYDROXY: CPT

## 2021-12-04 PROCEDURE — 36415 COLL VENOUS BLD VENIPUNCTURE: CPT

## 2021-12-04 PROCEDURE — 82570 ASSAY OF URINE CREATININE: CPT

## 2021-12-04 PROCEDURE — 80185 ASSAY OF PHENYTOIN TOTAL: CPT

## 2021-12-04 PROCEDURE — 82044 UR ALBUMIN SEMIQUANTITATIVE: CPT

## 2021-12-05 LAB
PHENYTOIN DOSE AMOUNT: ABNORMAL
PHENYTOIN LEVEL: 3.1 UG/ML (ref 10–20)

## 2021-12-06 ENCOUNTER — TELEPHONE (OUTPATIENT)
Dept: FAMILY MEDICINE CLINIC | Age: 56
End: 2021-12-06

## 2021-12-06 DIAGNOSIS — E55.9 VITAMIN D DEFICIENCY: Primary | ICD-10-CM

## 2021-12-06 RX ORDER — ERGOCALCIFEROL 1.25 MG/1
50000 CAPSULE ORAL WEEKLY
Qty: 4 CAPSULE | Refills: 5 | Status: SHIPPED
Start: 2021-12-06 | End: 2021-12-10

## 2021-12-06 NOTE — TELEPHONE ENCOUNTER
Vitamin D level is low. Should take 50,000 IU weekly for 12 weeks. This is a prescription and is in the pharmacy. Should then take 2000 IU daily, which is over the counter. Should also get her level rechecked in 3-4 months.

## 2021-12-10 ENCOUNTER — APPOINTMENT (OUTPATIENT)
Dept: CT IMAGING | Age: 56
DRG: 720 | End: 2021-12-10
Payer: COMMERCIAL

## 2021-12-10 ENCOUNTER — HOSPITAL ENCOUNTER (INPATIENT)
Age: 56
LOS: 10 days | Discharge: NURSING, REHAB OR LONG-TERM CARE | DRG: 720 | End: 2021-12-20
Attending: EMERGENCY MEDICINE | Admitting: INTERNAL MEDICINE
Payer: COMMERCIAL

## 2021-12-10 ENCOUNTER — APPOINTMENT (OUTPATIENT)
Dept: GENERAL RADIOLOGY | Age: 56
DRG: 720 | End: 2021-12-10
Payer: COMMERCIAL

## 2021-12-10 DIAGNOSIS — J18.9 PNEUMONIA OF BOTH LUNGS DUE TO INFECTIOUS ORGANISM, UNSPECIFIED PART OF LUNG: ICD-10-CM

## 2021-12-10 DIAGNOSIS — Z98.890 HISTORY OF TRACHEOSTOMY: ICD-10-CM

## 2021-12-10 DIAGNOSIS — Z86.16 HISTORY OF COVID-19: ICD-10-CM

## 2021-12-10 DIAGNOSIS — Z87.01 HISTORY OF RECENT PNEUMONIA: ICD-10-CM

## 2021-12-10 DIAGNOSIS — J96.01 ACUTE RESPIRATORY FAILURE WITH HYPOXIA (HCC): Primary | ICD-10-CM

## 2021-12-10 PROBLEM — J96.21 ACUTE ON CHRONIC RESPIRATORY FAILURE WITH HYPOXIA (HCC): Status: ACTIVE | Noted: 2021-12-10

## 2021-12-10 LAB
ADENOVIRUS BY PCR: NOT DETECTED
ALBUMIN SERPL-MCNC: 3.4 G/DL (ref 3.5–5.2)
ALP BLD-CCNC: 86 U/L (ref 40–129)
ALT SERPL-CCNC: 13 U/L (ref 0–40)
ANION GAP SERPL CALCULATED.3IONS-SCNC: 10 MMOL/L (ref 7–16)
ANION GAP SERPL CALCULATED.3IONS-SCNC: 14 MMOL/L (ref 7–16)
APTT: 28.1 SEC (ref 24.5–35.1)
APTT: 81.1 SEC (ref 24.5–35.1)
AST SERPL-CCNC: 17 U/L (ref 0–39)
BASOPHILS ABSOLUTE: 0.07 E9/L (ref 0–0.2)
BASOPHILS RELATIVE PERCENT: 0.4 % (ref 0–2)
BILIRUB SERPL-MCNC: 0.3 MG/DL (ref 0–1.2)
BORDETELLA PARAPERTUSSIS BY PCR: NOT DETECTED
BORDETELLA PERTUSSIS BY PCR: NOT DETECTED
BUN BLDV-MCNC: 12 MG/DL (ref 6–20)
BUN BLDV-MCNC: 13 MG/DL (ref 6–20)
C-REACTIVE PROTEIN: 3.7 MG/DL (ref 0–0.4)
CALCIUM SERPL-MCNC: 8.6 MG/DL (ref 8.6–10.2)
CALCIUM SERPL-MCNC: 8.8 MG/DL (ref 8.6–10.2)
CHLAMYDOPHILIA PNEUMONIAE BY PCR: NOT DETECTED
CHLORIDE BLD-SCNC: 105 MMOL/L (ref 98–107)
CHLORIDE BLD-SCNC: 107 MMOL/L (ref 98–107)
CO2: 22 MMOL/L (ref 22–29)
CO2: 27 MMOL/L (ref 22–29)
CORONAVIRUS 229E BY PCR: NOT DETECTED
CORONAVIRUS HKU1 BY PCR: NOT DETECTED
CORONAVIRUS NL63 BY PCR: NOT DETECTED
CORONAVIRUS OC43 BY PCR: NOT DETECTED
CREAT SERPL-MCNC: 0.9 MG/DL (ref 0.7–1.2)
CREAT SERPL-MCNC: 0.9 MG/DL (ref 0.7–1.2)
D DIMER: 475 NG/ML DDU
EKG ATRIAL RATE: 112 BPM
EKG P AXIS: 38 DEGREES
EKG P-R INTERVAL: 168 MS
EKG Q-T INTERVAL: 348 MS
EKG QRS DURATION: 98 MS
EKG QTC CALCULATION (BAZETT): 475 MS
EKG R AXIS: 1 DEGREES
EKG T AXIS: 20 DEGREES
EKG VENTRICULAR RATE: 112 BPM
EOSINOPHILS ABSOLUTE: 0 E9/L (ref 0.05–0.5)
EOSINOPHILS RELATIVE PERCENT: 0 % (ref 0–6)
GFR AFRICAN AMERICAN: >60
GFR AFRICAN AMERICAN: >60
GFR NON-AFRICAN AMERICAN: >60 ML/MIN/1.73
GFR NON-AFRICAN AMERICAN: >60 ML/MIN/1.73
GLUCOSE BLD-MCNC: 168 MG/DL (ref 74–99)
GLUCOSE BLD-MCNC: 236 MG/DL (ref 74–99)
HCT VFR BLD CALC: 35.4 % (ref 37–54)
HCT VFR BLD CALC: 36.2 % (ref 37–54)
HEMOGLOBIN: 11.1 G/DL (ref 12.5–16.5)
HEMOGLOBIN: 11.5 G/DL (ref 12.5–16.5)
HUMAN METAPNEUMOVIRUS BY PCR: NOT DETECTED
HUMAN RHINOVIRUS/ENTEROVIRUS BY PCR: NOT DETECTED
IMMATURE GRANULOCYTES #: 0.1 E9/L
IMMATURE GRANULOCYTES %: 0.5 % (ref 0–5)
INFLUENZA A BY PCR: NOT DETECTED
INFLUENZA B BY PCR: NOT DETECTED
INR BLD: 1.1
LACTIC ACID, SEPSIS: 0.9 MMOL/L (ref 0.5–1.9)
LACTIC ACID, SEPSIS: 4.6 MMOL/L (ref 0.5–1.9)
LACTIC ACID: 1 MMOL/L (ref 0.5–2.2)
LIPASE: 21 U/L (ref 13–60)
LYMPHOCYTES ABSOLUTE: 2.28 E9/L (ref 1.5–4)
LYMPHOCYTES RELATIVE PERCENT: 11.6 % (ref 20–42)
MAGNESIUM: 1.2 MG/DL (ref 1.6–2.6)
MCH RBC QN AUTO: 29.4 PG (ref 26–35)
MCH RBC QN AUTO: 29.8 PG (ref 26–35)
MCHC RBC AUTO-ENTMCNC: 31.4 % (ref 32–34.5)
MCHC RBC AUTO-ENTMCNC: 31.8 % (ref 32–34.5)
MCV RBC AUTO: 93.7 FL (ref 80–99.9)
MCV RBC AUTO: 93.8 FL (ref 80–99.9)
MONOCYTES ABSOLUTE: 0.81 E9/L (ref 0.1–0.95)
MONOCYTES RELATIVE PERCENT: 4.1 % (ref 2–12)
MYCOPLASMA PNEUMONIAE BY PCR: NOT DETECTED
NEUTROPHILS ABSOLUTE: 16.46 E9/L (ref 1.8–7.3)
NEUTROPHILS RELATIVE PERCENT: 83.4 % (ref 43–80)
PARAINFLUENZA VIRUS 1 BY PCR: NOT DETECTED
PARAINFLUENZA VIRUS 2 BY PCR: NOT DETECTED
PARAINFLUENZA VIRUS 3 BY PCR: NOT DETECTED
PARAINFLUENZA VIRUS 4 BY PCR: NOT DETECTED
PDW BLD-RTO: 14.9 FL (ref 11.5–15)
PDW BLD-RTO: 15 FL (ref 11.5–15)
PHENYTOIN DOSE AMOUNT: ABNORMAL
PHENYTOIN LEVEL: 2.4 UG/ML (ref 10–20)
PLATELET # BLD: 182 E9/L (ref 130–450)
PLATELET # BLD: 241 E9/L (ref 130–450)
PMV BLD AUTO: 9.5 FL (ref 7–12)
PMV BLD AUTO: 9.9 FL (ref 7–12)
POC BASE EXCESS: -0.5 MMOL/L (ref -3–3)
POC CPB: NO
POC DELIVERY SYSTEM: ABNORMAL
POC DEVICE ID: ABNORMAL
POC HCO3: 25 MMOL/L (ref 22–26)
POC O2 SATURATION: 99.4 % (ref 92–98.5)
POC OPERATOR ID: ABNORMAL
POC PCO2: 44 MMHG (ref 35–45)
POC PH: 7.36 (ref 7.35–7.45)
POC PO2: 162.1 MMHG (ref 80–100)
POC SAMPLE TYPE: ABNORMAL
POTASSIUM SERPL-SCNC: 4.2 MMOL/L (ref 3.5–5)
POTASSIUM SERPL-SCNC: 4.3 MMOL/L (ref 3.5–5)
PRO-BNP: 1080 PG/ML (ref 0–125)
PROCALCITONIN: 0.27 NG/ML (ref 0–0.08)
PROCALCITONIN: 0.27 NG/ML (ref 0–0.08)
PROTHROMBIN TIME: 12.2 SEC (ref 9.3–12.4)
RBC # BLD: 3.78 E12/L (ref 3.8–5.8)
RBC # BLD: 3.86 E12/L (ref 3.8–5.8)
RESPIRATORY SYNCYTIAL VIRUS BY PCR: NOT DETECTED
SARS-COV-2, NAAT: NOT DETECTED
SARS-COV-2, PCR: NOT DETECTED
SEDIMENTATION RATE, ERYTHROCYTE: 38 MM/HR (ref 0–15)
SODIUM BLD-SCNC: 141 MMOL/L (ref 132–146)
SODIUM BLD-SCNC: 144 MMOL/L (ref 132–146)
TOTAL PROTEIN: 6.4 G/DL (ref 6.4–8.3)
TROPONIN, HIGH SENSITIVITY: 104 NG/L (ref 0–11)
TROPONIN, HIGH SENSITIVITY: 105 NG/L (ref 0–11)
TROPONIN, HIGH SENSITIVITY: 46 NG/L (ref 0–11)
WBC # BLD: 17.5 E9/L (ref 4.5–11.5)
WBC # BLD: 19.7 E9/L (ref 4.5–11.5)

## 2021-12-10 PROCEDURE — 6360000002 HC RX W HCPCS: Performed by: EMERGENCY MEDICINE

## 2021-12-10 PROCEDURE — 85730 THROMBOPLASTIN TIME PARTIAL: CPT

## 2021-12-10 PROCEDURE — 36415 COLL VENOUS BLD VENIPUNCTURE: CPT

## 2021-12-10 PROCEDURE — 83735 ASSAY OF MAGNESIUM: CPT

## 2021-12-10 PROCEDURE — 93010 ELECTROCARDIOGRAM REPORT: CPT | Performed by: INTERNAL MEDICINE

## 2021-12-10 PROCEDURE — 2500000003 HC RX 250 WO HCPCS: Performed by: EMERGENCY MEDICINE

## 2021-12-10 PROCEDURE — 6370000000 HC RX 637 (ALT 250 FOR IP): Performed by: EMERGENCY MEDICINE

## 2021-12-10 PROCEDURE — 96375 TX/PRO/DX INJ NEW DRUG ADDON: CPT

## 2021-12-10 PROCEDURE — 2140000000 HC CCU INTERMEDIATE R&B

## 2021-12-10 PROCEDURE — 83690 ASSAY OF LIPASE: CPT

## 2021-12-10 PROCEDURE — 71275 CT ANGIOGRAPHY CHEST: CPT

## 2021-12-10 PROCEDURE — 87070 CULTURE OTHR SPECIMN AEROBIC: CPT

## 2021-12-10 PROCEDURE — 83880 ASSAY OF NATRIURETIC PEPTIDE: CPT

## 2021-12-10 PROCEDURE — 99283 EMERGENCY DEPT VISIT LOW MDM: CPT

## 2021-12-10 PROCEDURE — 82803 BLOOD GASES ANY COMBINATION: CPT

## 2021-12-10 PROCEDURE — 87635 SARS-COV-2 COVID-19 AMP PRB: CPT

## 2021-12-10 PROCEDURE — 80053 COMPREHEN METABOLIC PANEL: CPT

## 2021-12-10 PROCEDURE — 87040 BLOOD CULTURE FOR BACTERIA: CPT

## 2021-12-10 PROCEDURE — 85610 PROTHROMBIN TIME: CPT

## 2021-12-10 PROCEDURE — 96374 THER/PROPH/DIAG INJ IV PUSH: CPT

## 2021-12-10 PROCEDURE — 85651 RBC SED RATE NONAUTOMATED: CPT

## 2021-12-10 PROCEDURE — 84484 ASSAY OF TROPONIN QUANT: CPT

## 2021-12-10 PROCEDURE — 6360000002 HC RX W HCPCS: Performed by: INTERNAL MEDICINE

## 2021-12-10 PROCEDURE — 94664 DEMO&/EVAL PT USE INHALER: CPT

## 2021-12-10 PROCEDURE — 80185 ASSAY OF PHENYTOIN TOTAL: CPT

## 2021-12-10 PROCEDURE — 84145 PROCALCITONIN (PCT): CPT

## 2021-12-10 PROCEDURE — 94640 AIRWAY INHALATION TREATMENT: CPT

## 2021-12-10 PROCEDURE — 94660 CPAP INITIATION&MGMT: CPT

## 2021-12-10 PROCEDURE — 6360000004 HC RX CONTRAST MEDICATION: Performed by: RADIOLOGY

## 2021-12-10 PROCEDURE — 86140 C-REACTIVE PROTEIN: CPT

## 2021-12-10 PROCEDURE — 96365 THER/PROPH/DIAG IV INF INIT: CPT

## 2021-12-10 PROCEDURE — 2580000003 HC RX 258: Performed by: INTERNAL MEDICINE

## 2021-12-10 PROCEDURE — 0202U NFCT DS 22 TRGT SARS-COV-2: CPT

## 2021-12-10 PROCEDURE — 93005 ELECTROCARDIOGRAM TRACING: CPT | Performed by: EMERGENCY MEDICINE

## 2021-12-10 PROCEDURE — 83605 ASSAY OF LACTIC ACID: CPT

## 2021-12-10 PROCEDURE — 2580000003 HC RX 258: Performed by: EMERGENCY MEDICINE

## 2021-12-10 PROCEDURE — 71045 X-RAY EXAM CHEST 1 VIEW: CPT

## 2021-12-10 PROCEDURE — 6370000000 HC RX 637 (ALT 250 FOR IP): Performed by: INTERNAL MEDICINE

## 2021-12-10 PROCEDURE — 85378 FIBRIN DEGRADE SEMIQUANT: CPT

## 2021-12-10 PROCEDURE — 85027 COMPLETE CBC AUTOMATED: CPT

## 2021-12-10 PROCEDURE — 87449 NOS EACH ORGANISM AG IA: CPT

## 2021-12-10 PROCEDURE — 80048 BASIC METABOLIC PNL TOTAL CA: CPT

## 2021-12-10 PROCEDURE — 85025 COMPLETE CBC W/AUTO DIFF WBC: CPT

## 2021-12-10 RX ORDER — DEXAMETHASONE SODIUM PHOSPHATE 10 MG/ML
10 INJECTION INTRAMUSCULAR; INTRAVENOUS ONCE
Status: COMPLETED | OUTPATIENT
Start: 2021-12-10 | End: 2021-12-10

## 2021-12-10 RX ORDER — IPRATROPIUM BROMIDE AND ALBUTEROL SULFATE 2.5; .5 MG/3ML; MG/3ML
1 SOLUTION RESPIRATORY (INHALATION)
Status: DISCONTINUED | OUTPATIENT
Start: 2021-12-10 | End: 2021-12-20 | Stop reason: HOSPADM

## 2021-12-10 RX ORDER — PHENYTOIN SODIUM 100 MG/1
300 CAPSULE, EXTENDED RELEASE ORAL NIGHTLY
Status: DISCONTINUED | OUTPATIENT
Start: 2021-12-10 | End: 2021-12-20 | Stop reason: HOSPADM

## 2021-12-10 RX ORDER — GUAIFENESIN 400 MG/1
400 TABLET ORAL 3 TIMES DAILY
Status: DISCONTINUED | OUTPATIENT
Start: 2021-12-10 | End: 2021-12-20 | Stop reason: HOSPADM

## 2021-12-10 RX ORDER — FUROSEMIDE 10 MG/ML
40 INJECTION INTRAMUSCULAR; INTRAVENOUS 2 TIMES DAILY
Status: DISCONTINUED | OUTPATIENT
Start: 2021-12-10 | End: 2021-12-15

## 2021-12-10 RX ORDER — PHENYTOIN SODIUM 100 MG/1
300 CAPSULE, EXTENDED RELEASE ORAL NIGHTLY
COMMUNITY
End: 2022-09-13 | Stop reason: SDUPTHER

## 2021-12-10 RX ORDER — HEPARIN SODIUM 1000 [USP'U]/ML
10000 INJECTION, SOLUTION INTRAVENOUS; SUBCUTANEOUS ONCE
Status: COMPLETED | OUTPATIENT
Start: 2021-12-10 | End: 2021-12-10

## 2021-12-10 RX ORDER — AMLODIPINE BESYLATE 10 MG/1
10 TABLET ORAL DAILY
Status: DISCONTINUED | OUTPATIENT
Start: 2021-12-10 | End: 2021-12-20 | Stop reason: HOSPADM

## 2021-12-10 RX ORDER — ATORVASTATIN CALCIUM 40 MG/1
40 TABLET, FILM COATED ORAL NIGHTLY
Status: DISCONTINUED | OUTPATIENT
Start: 2021-12-10 | End: 2021-12-20 | Stop reason: HOSPADM

## 2021-12-10 RX ORDER — PHENYTOIN SODIUM 100 MG/1
200 CAPSULE, EXTENDED RELEASE ORAL EVERY MORNING
COMMUNITY
End: 2022-09-13 | Stop reason: SDUPTHER

## 2021-12-10 RX ORDER — HEPARIN SODIUM 1000 [USP'U]/ML
10000 INJECTION, SOLUTION INTRAVENOUS; SUBCUTANEOUS PRN
Status: DISCONTINUED | OUTPATIENT
Start: 2021-12-10 | End: 2021-12-10

## 2021-12-10 RX ORDER — SODIUM CHLORIDE 9 MG/ML
25 INJECTION, SOLUTION INTRAVENOUS PRN
Status: DISCONTINUED | OUTPATIENT
Start: 2021-12-10 | End: 2021-12-20 | Stop reason: HOSPADM

## 2021-12-10 RX ORDER — GABAPENTIN 300 MG/1
300 CAPSULE ORAL DAILY
Status: DISCONTINUED | OUTPATIENT
Start: 2021-12-10 | End: 2021-12-17

## 2021-12-10 RX ORDER — SODIUM CHLORIDE 0.9 % (FLUSH) 0.9 %
5-40 SYRINGE (ML) INJECTION EVERY 12 HOURS SCHEDULED
Status: DISCONTINUED | OUTPATIENT
Start: 2021-12-10 | End: 2021-12-20 | Stop reason: HOSPADM

## 2021-12-10 RX ORDER — ONDANSETRON 2 MG/ML
4 INJECTION INTRAMUSCULAR; INTRAVENOUS EVERY 6 HOURS PRN
Status: DISCONTINUED | OUTPATIENT
Start: 2021-12-10 | End: 2021-12-20 | Stop reason: HOSPADM

## 2021-12-10 RX ORDER — DOXYCYCLINE 100 MG/10ML
INJECTION, POWDER, LYOPHILIZED, FOR SOLUTION INTRAVENOUS
Status: DISPENSED
Start: 2021-12-10 | End: 2021-12-10

## 2021-12-10 RX ORDER — SODIUM CHLORIDE 0.9 % (FLUSH) 0.9 %
5-40 SYRINGE (ML) INJECTION PRN
Status: DISCONTINUED | OUTPATIENT
Start: 2021-12-10 | End: 2021-12-20 | Stop reason: HOSPADM

## 2021-12-10 RX ORDER — POLYETHYLENE GLYCOL 3350 17 G/17G
17 POWDER, FOR SOLUTION ORAL DAILY PRN
Status: DISCONTINUED | OUTPATIENT
Start: 2021-12-10 | End: 2021-12-20 | Stop reason: HOSPADM

## 2021-12-10 RX ORDER — ONDANSETRON 4 MG/1
4 TABLET, ORALLY DISINTEGRATING ORAL EVERY 8 HOURS PRN
Status: DISCONTINUED | OUTPATIENT
Start: 2021-12-10 | End: 2021-12-20 | Stop reason: HOSPADM

## 2021-12-10 RX ORDER — HEPARIN SODIUM 10000 [USP'U]/100ML
5-30 INJECTION, SOLUTION INTRAVENOUS CONTINUOUS
Status: DISCONTINUED | OUTPATIENT
Start: 2021-12-10 | End: 2021-12-10

## 2021-12-10 RX ORDER — FUROSEMIDE 10 MG/ML
60 INJECTION INTRAMUSCULAR; INTRAVENOUS ONCE
Status: COMPLETED | OUTPATIENT
Start: 2021-12-10 | End: 2021-12-10

## 2021-12-10 RX ORDER — ACETAMINOPHEN 650 MG/1
650 SUPPOSITORY RECTAL EVERY 6 HOURS PRN
Status: DISCONTINUED | OUTPATIENT
Start: 2021-12-10 | End: 2021-12-20 | Stop reason: HOSPADM

## 2021-12-10 RX ORDER — IPRATROPIUM BROMIDE AND ALBUTEROL SULFATE 2.5; .5 MG/3ML; MG/3ML
1 SOLUTION RESPIRATORY (INHALATION)
Status: DISPENSED | OUTPATIENT
Start: 2021-12-10 | End: 2021-12-10

## 2021-12-10 RX ORDER — MAGNESIUM SULFATE IN WATER 40 MG/ML
2000 INJECTION, SOLUTION INTRAVENOUS ONCE
Status: COMPLETED | OUTPATIENT
Start: 2021-12-10 | End: 2021-12-10

## 2021-12-10 RX ORDER — HEPARIN SODIUM 1000 [USP'U]/ML
5000 INJECTION, SOLUTION INTRAVENOUS; SUBCUTANEOUS PRN
Status: DISCONTINUED | OUTPATIENT
Start: 2021-12-10 | End: 2021-12-10

## 2021-12-10 RX ORDER — ACETAMINOPHEN 325 MG/1
650 TABLET ORAL EVERY 6 HOURS PRN
Status: DISCONTINUED | OUTPATIENT
Start: 2021-12-10 | End: 2021-12-20 | Stop reason: HOSPADM

## 2021-12-10 RX ORDER — PHENYTOIN SODIUM 100 MG/1
200 CAPSULE, EXTENDED RELEASE ORAL EVERY MORNING
Status: DISCONTINUED | OUTPATIENT
Start: 2021-12-11 | End: 2021-12-20 | Stop reason: HOSPADM

## 2021-12-10 RX ADMIN — IPRATROPIUM BROMIDE AND ALBUTEROL SULFATE 1 AMPULE: .5; 2.5 SOLUTION RESPIRATORY (INHALATION) at 17:01

## 2021-12-10 RX ADMIN — FUROSEMIDE 60 MG: 10 INJECTION, SOLUTION INTRAMUSCULAR; INTRAVENOUS at 07:07

## 2021-12-10 RX ADMIN — DOXYCYCLINE 100 MG: 100 INJECTION, POWDER, LYOPHILIZED, FOR SOLUTION INTRAVENOUS at 08:01

## 2021-12-10 RX ADMIN — MAGNESIUM SULFATE HEPTAHYDRATE 2000 MG: 40 INJECTION, SOLUTION INTRAVENOUS at 10:50

## 2021-12-10 RX ADMIN — HEPARIN SODIUM 10000 UNITS: 1000 INJECTION INTRAVENOUS; SUBCUTANEOUS at 07:47

## 2021-12-10 RX ADMIN — CEFEPIME HYDROCHLORIDE 2000 MG: 2 INJECTION, POWDER, FOR SOLUTION INTRAVENOUS at 10:41

## 2021-12-10 RX ADMIN — IPRATROPIUM BROMIDE AND ALBUTEROL SULFATE 1 AMPULE: .5; 2.5 SOLUTION RESPIRATORY (INHALATION) at 21:10

## 2021-12-10 RX ADMIN — AMLODIPINE BESYLATE 10 MG: 10 TABLET ORAL at 22:29

## 2021-12-10 RX ADMIN — APIXABAN 5 MG: 5 TABLET, FILM COATED ORAL at 22:30

## 2021-12-10 RX ADMIN — GABAPENTIN 300 MG: 300 CAPSULE ORAL at 22:28

## 2021-12-10 RX ADMIN — FUROSEMIDE 40 MG: 10 INJECTION, SOLUTION INTRAMUSCULAR; INTRAVENOUS at 22:28

## 2021-12-10 RX ADMIN — VANCOMYCIN HYDROCHLORIDE 2500 MG: 10 INJECTION, POWDER, LYOPHILIZED, FOR SOLUTION INTRAVENOUS at 22:55

## 2021-12-10 RX ADMIN — IPRATROPIUM BROMIDE AND ALBUTEROL SULFATE 1 AMPULE: .5; 2.5 SOLUTION RESPIRATORY (INHALATION) at 11:08

## 2021-12-10 RX ADMIN — IOPAMIDOL 60 ML: 755 INJECTION, SOLUTION INTRAVENOUS at 14:11

## 2021-12-10 RX ADMIN — HEPARIN SODIUM 16.6 UNITS/KG/HR: 10000 INJECTION, SOLUTION INTRAVENOUS at 07:43

## 2021-12-10 RX ADMIN — DEXAMETHASONE SODIUM PHOSPHATE 10 MG: 10 INJECTION INTRAMUSCULAR; INTRAVENOUS at 07:05

## 2021-12-10 RX ADMIN — GUAIFENESIN 400 MG: 400 TABLET ORAL at 12:20

## 2021-12-10 RX ADMIN — IPRATROPIUM BROMIDE AND ALBUTEROL SULFATE 1 AMPULE: .5; 2.5 SOLUTION RESPIRATORY (INHALATION) at 07:25

## 2021-12-10 RX ADMIN — ATORVASTATIN CALCIUM 40 MG: 40 TABLET, FILM COATED ORAL at 22:28

## 2021-12-10 ASSESSMENT — ENCOUNTER SYMPTOMS
SHORTNESS OF BREATH: 1
WHEEZING: 0
ABDOMINAL PAIN: 0
COUGH: 0
DIARRHEA: 0
STRIDOR: 0
ABDOMINAL DISTENTION: 0
VOMITING: 0
BACK PAIN: 0
COLOR CHANGE: 0
NAUSEA: 0
CONSTIPATION: 0

## 2021-12-10 NOTE — CONSULTS
Ron Patel M.D.,[facility]  Reyes White D.O., FGISELA., Kelly Aldana M.D. Reji Rodriguez M.D. Willy Brown D.O. Patient:  Dez Choi 60 y.o. male MRN: [id number]     Date of Service: 12/10/2021      PULMONARY CONSULTATION    Reason for Consultation: respiratory failure, PKTY  Referring Physician: Dr. Eduardo Ramirez    Communication with the referring physician will be sent via the electronic medical record. Chief Complaint: shortness of breath    CODE STATUS: Prior    SUBJECTIVE:  HPI:  Dez Choi is a 60 y.o. male lying in bed in no apparent distress. He is seen in the ED. He says that last night he suddenly could not breathe and thought he was having a panic attack. His oxygen saturation went down to 50% on his home pulse ox. His wife put her CPAP on him for awhile on auto setting and it did help for awhile, but when the squad came, his oxygen desaturated even lower. He had a slight headache, but did not get confused or lose consciousness. Complaint had been persistent, severe, and worsened by nothing. He recently had covid in August and was intubated for awhile with a trach. He had a history of ARDS and VAP MSSA/ecoli and was treated with Zosyn and Antelmo. He went to rehab at [facility] and finally went home on 11/5. He had no other symptoms besides the shortness of breath, but his feet are edematous. He is on Eliquis for anticoagulation   Today, he is lying on the cart in no apparent distress. He is currently on 13L oxygen and says he feels a little better. Lung sounds are clear.        Past Medical History:   Diagnosis Date    HIGH CHOLESTEROL     Hypertension     Seizures ([address]        Past Surgical History:   Procedure Laterality Date    APPENDECTOMY      BRONCHOSCOPY N/A 9/8/2021    BRONCHOSCOPY performed by Cuco Nesbitt MD at [id number]  9/8/2021         TRACHEOSTOMY N/A 9/9/2021    TRACHEOTOMY performed by Ashli Shah [facility] at [facility] OR    UPPER GASTROINTESTINAL ENDOSCOPY N/A 9/9/2021    EGD ESOPHAGOGASTRODUODENOSCOPY PEG TUBE INSERTION performed by Burgess Kim MD at [address] [facility] [address]       History reviewed. No pertinent family history. Social History:   Social History     Socioeconomic History    Marital status:      Spouse name: Not on file    Number of children: Not on file    Years of education: Not on file    Highest education level: Not on file   Occupational History    Not on file   Tobacco Use    Smoking status: Never Smoker    Smokeless tobacco: Never Used   Substance and Sexual Activity    Alcohol use: Not on file    Drug use: Not on file    Sexual activity: Not on file   Other Topics Concern    Not on file   Social History Narrative    Not on file     Social Determinants of Health     Financial Resource Strain: Low Risk     Difficulty of Paying Living Expenses: Not hard at all   Food Insecurity: No Food Insecurity    Worried About [address] in the Last Year: Never true    [address] in the Last Year: Never true   Transportation Needs:     Lack of Transportation (Medical): Not on file    Lack of Transportation (Non-Medical):  Not on file   Physical Activity:     Days of Exercise per Week: Not on file    Minutes of Exercise per Session: Not on file   Stress:     Feeling of Stress : Not on file   Social Connections:     Frequency of Communication with Friends and Family: Not on file    Frequency of Social Gatherings with Friends and Family: Not on file    Attends Oriental orthodox Services: Not on file    Active Member of Clubs or Organizations: Not on file    Attends Club or Organization Meetings: Not on file    Marital Status: Not on file   Intimate Partner Violence:     Fear of Current or Ex-Partner: Not on file    Emotionally Abused: Not on file    Physically Abused: Not on file    Sexually Abused: Not on file   Housing Stability:     Unable to Pay for Housing in the Last Year: Not on file  Number of Places Lived in the Last Year: Not on file    Unstable Housing in the Last Year: Not on file     Smoking history: The patient is a Never smoker   ETOH:   has no history on file for alcohol use. Exposures: There  is not history of TB or TB exposure. There is not asbestos or silica dust exposure. The patient reports does not have coal, foundry, quarry or [employer] exposure. Recent travel history none. There is not  history of recreational or IV drug use. There is not hot tub exposure. The patient does not have any exotic pets, turtles or exotic birds. Vaccines:    Influenza: Indicated for current flu vaccination season Oct. to Feb.  Pneumococcal Polysaccharide:  Indicated for current flu vaccination season Oct. to Feb.  He was not vaccinated for covid    There is no immunization history on file for this patient. Home Meds: Not in a hospital admission. CURRENT MEDS :  Scheduled Meds:   vancomycin  1,500 mg IntraVENous Once    magnesium sulfate  2,000 mg IntraVENous Once    doxycycline        guaiFENesin  400 mg Oral TID    ipratropium-albuterol  1 ampule Inhalation Q4H While awake    cefepime  2,000 mg IntraVENous Q12H       Continuous Infusions:   heparin (PORCINE) Infusion 16.6 Units/kg/hr (12/10/21 0743)       No Known Allergies    REVIEW OF SYSTEMS:  Constitutional: Denies fever, weight loss, night sweats, and fatigue  Skin: Denies pigmentation, dark lesions, and rashes   HEENT: Denies hearing loss, tinnitus, ear drainage, epistaxis, sore throat, and hoarseness. Cardiovascular: Denies palpitations, chest pain, and chest pressure. Respiratory: Denies cough,+ dyspnea at rest, no hemoptysis, apnea, and choking.   Gastrointestinal: Denies nausea, vomiting, poor appetite, diarrhea, heartburn or reflux  Genitourinary: Denies dysuria, frequency, urgency or hematuria  Musculoskeletal: Denies myalgias, muscle weakness, and bone pain  Neurological: Denies dizziness, vertigo, headache, and focal weakness  Psychological: Denies anxiety and depression  Endocrine: Denies heat intolerance and cold intolerance  Hematopoietic/Lymphatic: Denies bleeding problems and blood transfusions    OBJECTIVE:   BP (!) 145/84   Pulse 107   Temp 97.2 °F (36.2 °C) (Infrared)   Resp (!) 34   Wt 278 lb (126.1 kg)   SpO2 100%   BMI 37.70 kg/m²   SpO2 Readings from Last 1 Encounters:   12/10/21 100%        I/O:  No intake or output data in the 24 hours ending 12/10/21 1113  Vent Information  SpO2: 100 %    Physical Exam:  General: The patient is lying in bed comfortably without any distress. Breathing is not labored  HEENT: Pupils are equal round and reactive to light, there are no oral lesions and no post-nasal drip   Neck: supple without adenopathy  Cardiovascular: regular rate and rhythm without murmur or gallop  Respiratory: Clear to auscultation bilaterally without wheezing or crackles, diminished. Air entry is symmetric  Abdomen: soft, non-tender, non-distended, normal bowel sounds  Extremities: warm, no clubbing, edema to feet  Skin: no rash or lesion  Neurologic: CN II-XII grossly intact, no focal deficits    Pulmonary Function Testing personally reviewed and interpreted  None on file    Imaging personally reviewed:  12/10/2021 cxr  1. Significant worsening of the multifocal bilateral pneumonia    Echo:  10/3/2021   Left ventricle grossly normal in size. Mild - moderate left ventricular concentric hypertrophy noted. Normal LV segmental wall motion. Estimated left ventricular ejection fraction is 59±5%. Does not meet 50% threshold for diastolic dysfunction. The LAESV Index is <34ml/m2. Mildly dilated right ventricle. Right ventricular segmental wall motion is normal.   Physiologic and/or trace mitral regurgitation is present. Trace aortic regurgitation is noted. Physiologic and/or trace tricuspid regurgitation. Physiologic and/or trace pulmonic regurgitation present. Technically good quality study. No previous echo for comparison. Suggest clinical correlation. Labs:  Lab Results   Component Value Date    WBC 19.7 12/10/2021    HGB 11.5 12/10/2021    HCT 36.2 12/10/2021    MCV 93.8 12/10/2021    MCH 29.8 12/10/2021    MCHC 31.8 12/10/2021    RDW 15.0 12/10/2021     12/10/2021    MPV 9.5 12/10/2021     Lab Results   Component Value Date     12/10/2021    K 4.2 12/10/2021    K 3.7 08/21/2021     12/10/2021    CO2 22 12/10/2021    BUN 13 12/10/2021    CREATININE 0.9 12/10/2021    LABALBU 3.4 12/10/2021    CALCIUM 8.8 12/10/2021    GFRAA >60 12/10/2021    LABGLOM >60 12/10/2021     Lab Results   Component Value Date    PROTIME 12.2 12/10/2021    INR 1.1 12/10/2021     Recent Labs     12/10/21  0649   PROBNP 1,080*     No results for input(s): TROPONINI in the last 72 hours. No results for input(s): PROCAL in the last 72 hours. This SmartLink has not been configured with any valid records. Assessment:  1. Acute respiratory failure with hypoxia  2. Probable healthcare associated pneumonia  3. History of covid-19 pneumonia  4. Fluid volume overload    Plan:  1. Wean FiO2 for saturations above 92%   2. Agree with current antibiotic coverage   3. Check respiratory cultures, Legionella and strep urinary antigens   4. Duoneb as needed  5. Heparin for anticoagulation  6. Recommend diuresis      Thank you for allowing me to participate in the care of Gilberto Joel. Please feel free to call with questions. This plan of care was reviewed in collaboration with Dr. Kiera Smith    Electronically signed by HOMERO Leavitt CNP on 12/10/2021 at 11:13 AM      Note: This report was completed utilizing computer voice recognition software. Every effort has been made to ensure accuracy, however; inadvertent computerized transcription errors may be present      Addendum:    I personally saw, examined and provided care for the patient.  Radiographs, labs and medication list were reviewed by me independently. I spoke with bedside nursing, therapists and ER physicians. Mr. Frankie Aponte is well-known to me. He had contracted COVID-19 pneumonia back in August 2021. [facility] He had a prolonged ICU stay requiring intubation sedation and paralytic prone positioning. He was eventually improved to the point that he got trach and PEG on September 9, 2021. He was discharged to [facility] he continued to improve and was eventually weaned off the vent and decannulated. From [facility] he was discharged to [facility] rehab and he did relatively well and finally was discharged home about a month ago in a stable condition. He states for the past few days he has noticed progressive shortness of breath with increased swelling in his legs. He was seen in [place] [facility] was found to be profoundly hypoxic. Patient was not on full dose anticoagulation due to previous PEs. Chest x-ray showed bilateral patchy infiltrates. He had an elevated white BC count. He received antibiotics and transferred to our emergency room for further evaluation. He is currently on a nonrebreather mask saturating 98%. Impression:  1. Acute hypoxic respiratory failure  2. Bilateral patchy infiltrates differential diagnosis includes healthcare associated pneumonia versus possibly volume overload  3. History of severe Covid pneumonia in August 2021    Recommendations:  1. Wean FiO2 for saturations above 94%  2.  Obtain a CT of the chest without contrast  3. Follow procalcitonin, strep and Legionella urinary antigens and respiratory cultures  4. Follow proBNP  5. Agree with cefepime and vancomycin for now  6. Incentive spirometry  7. Would recommend to consider diuresis  8. Patient will benefit f     The case was discussed in detail and plans for care were established. Review of CNP documentation was conducted and revisions were made as appropriate.  I agree with the above documented exam, problem list and plan of care.   Janak Mooney MD

## 2021-12-10 NOTE — ED PROVIDER NOTES
ATTENDING PROVIDER ATTESTATION:     Helga Calderon presented to the emergency department for evaluation of Shortness of Breath (pt arrived to department with pusle ox of 35% on room air with c/o shortness of breath.)   and was initially evaluated by the Medical Resident. See Original ED Note for H&P and ED course above. I have reviewed and discussed the case, including pertinent history (medical, surgical, family and social) and exam findings with the Medical Resident assigned to Helga Calderon. I have personally performed and/or participated in the history, exam, medical decision making, and procedures and agree with all pertinent clinical information and any additional changes or corrections are noted below in my assessment and plan. I have discussed this patient in detail with the resident, and provided the instruction and education,       I have reviewed my findings and recommendations with the assigned Medical Resident, Helga Calderon and members of family present at the time of disposition. I have performed a history and physical examination of this patient and directly supervised the resident caring for this patient      History of Present Illness:    Presents to the ED for shortness of breath, beginning prior to arrival.  The complaint has been constant, severe in severity, and worsened by nothing. Shortness of breath, went to [facility], found to be in respiratory distress and oxygen saturations of 27%. He was placed on oxygen and BiPAP at the facility. He was also given diuretics as well as antibiotics and nebulizer treatments. He was transferred here for further stabilization evaluation. On arrival he is no longer in distress. He appears remarkably better from initially described. He has a Busch catheter in place with nearly 2 L of urine diuresis. He is breathing comfortably on a nonrebreather. He denies chest pain. He denies other complaints.         Review of Systems:   A complete review of systems was performed and pertinent positives and negatives are stated within HPI, all other systems reviewed and are negative.    --------------------------------------------- PAST HISTORY ---------------------------------------------  Past Medical History:  has a past medical history of HIGH CHOLESTEROL, Hypertension, and Seizures ([facility] [address] Past Surgical History:  has a past surgical history that includes Appendectomy; picc line insertion nurse (9/8/2021); bronchoscopy (N/A, 9/8/2021); tracheostomy (N/A, 9/9/2021); and Upper gastrointestinal endoscopy (N/A, 9/9/2021). Social History:  reports that he has never smoked. He has never used smokeless tobacco.    Family History: family history is not on file. Unless otherwise noted, family history is non contributory    The patients home medications have been reviewed. Allergies: Patient has no known allergies. Physical Exam:  Constitutional/General: Alert and oriented x3  Head: Normocephalic and atraumatic  Eyes: PERRL, EOMI, sclera non icteric  ENT: Oropharynx clear, handling secretions  Neck: Supple, full ROM, no stridor, no meningeal signs  Respiratory: Lungs Rales bilaterally tachypnea  Cardiovascular: Tachycardic but regular rhythm. Equal extremity pulses. GI:  Abdomen Soft, Non tender, Non distended. No rebound, guarding, or rigidity. No pulsatile masses. Musculoskeletal: Moves all extremities x 4. Warm and well perfused,  no clubbing, no cyanosis, no edema. Palpable peripheral pulses  Integument: skin warm and dry. No rashes. Neurologic: GCS 15, no focal deficits  Psychiatric: Normal Affect      I directly supervised any procedures performed by the resident and was present for the procedure including all critical portions of the procedure      The cardiac monitor revealed sinus tachycardia with a heart rate in the 100s as interpreted by me.  The cardiac monitor was ordered secondary to the patient's shortness of breath and to monitor the patient for dysrhythmia. CPT [id number]      I, Dr. Jl Brnenan, am the primary provider of record    My Medical Decision Making:         Initially when the patient presented he had been improving since being placed on BiPAP at [facility] and giving diuretics and antibiotics and breathing treatments. He was stabilized here doing well admitted to the hospital.  He was admitted and pulmonology requested CT scan of the chest for further evaluation and this was performed. Upon returning back from CAT scan the patient was acutely short of breath and found to be again profoundly hypoxemic on his 10 L that he was on initially stable. BiPAP was quickly applied to prevent life-threatening deterioration and his oxygen saturation improved dramatically with BiPAP. His work of breathing and respiratory status continued to improve. He was closely monitored. His breathing stabilized. CRITICAL CARE:  Please note that the withdrawal or failure to initiate urgent interventions for this patient would likely result in a life threatening deterioration or permanent disability. Accordingly this patient received 30 minutes of critical care time, including coordination of care, and direct bedside care and excluding separately billable procedures. 1. Acute respiratory failure with hypoxia ([address]    2. History of COVID-19    3. History of tracheostomy    4. History of recent pneumonia    5.  Pneumonia of both lungs due to infectious organism, unspecified part of lung           Bereket Wilson MD  12/10/21 1571

## 2021-12-10 NOTE — ED PROVIDER NOTES
[address]      Pt Name: Pancho Muse  MRN: [id number]  [place] 1965  Date of evaluation: 12/10/2021      CHIEF COMPLAINT       Chief Complaint   Patient presents with    Shortness of Breath     pt arrived to department with pusle ox of 35% on room air with c/o shortness of breath. HPI  Pancho Muse is a 60 y.o. male history of COVID-19 infection, tracheostomy, presents with shortness of breath. Patient was seen at [facility] emergency room saturating at 30% on room air. He required BiPAP and nonrebreather to saturate at 100%. Per patient states that he is acutely short of breath for the last day. States it is progressively worsening. Reports exertional.  Alleviated with rest.  Admits to bilateral leg swelling, orthopnea and paroxysmal nocturnal dyspnea. Denies any recent fevers, chills, nausea, vomiting, abdominal pain, flank pain, dysuria, hematuria, diarrhea, constipation, rashes or sores. Except as noted above the remainder of the review of systems was reviewed and negative. Review of Systems   Constitutional: Negative for activity change, appetite change, diaphoresis, fatigue, fever and unexpected weight change. HENT: Negative for congestion. Eyes: Negative for visual disturbance. Respiratory: Positive for shortness of breath. Negative for cough, wheezing and stridor. Cardiovascular: Negative for chest pain, palpitations and leg swelling. Gastrointestinal: Negative for abdominal distention, abdominal pain, constipation, diarrhea, nausea and vomiting. Endocrine: Negative for polyphagia and polyuria. Genitourinary: Negative for dysuria and hematuria. Musculoskeletal: Negative for back pain. Skin: Negative for color change, pallor, rash and wound. Neurological: Negative for dizziness and syncope. Hematological: Negative for adenopathy. Does not bruise/bleed easily. Psychiatric/Behavioral: Negative for agitation. Physical Exam  Vitals and nursing note reviewed. Constitutional:       General: He is not in acute distress. Appearance: Normal appearance. He is not ill-appearing or diaphoretic. HENT:      Head: Normocephalic and atraumatic. Nose: Nose normal.   Eyes:      Extraocular Movements: Extraocular movements intact. Pupils: Pupils are equal, round, and reactive to light. Cardiovascular:      Rate and Rhythm: Normal rate and regular rhythm. Pulses: Normal pulses. Heart sounds: Normal heart sounds. Pulmonary:      Effort: Pulmonary effort is normal.      Breath sounds: Examination of the right-upper field reveals rhonchi. Examination of the left-upper field reveals rhonchi. Examination of the right-middle field reveals rhonchi. Examination of the left-middle field reveals rhonchi. Examination of the right-lower field reveals rhonchi. Examination of the left-lower field reveals rhonchi. Rhonchi present. Chest:      Chest wall: No mass. Abdominal:      General: Bowel sounds are normal.      Palpations: Abdomen is soft. Tenderness: There is no abdominal tenderness. There is no right CVA tenderness, left CVA tenderness or rebound. Negative signs include García's sign, Rovsing's sign and McBurney's sign. Musculoskeletal:         General: Normal range of motion. Cervical back: Normal range of motion. Right lower leg: No tenderness. Edema present. Left lower leg: No tenderness. Edema present. Skin:     General: Skin is warm and dry. Capillary Refill: Capillary refill takes less than 2 seconds. Neurological:      General: No focal deficit present. Mental Status: He is alert and oriented to person, place, and time.    Psychiatric:         Mood and Affect: Mood normal.          Procedures     MDM  Number of Diagnoses or Management Options  Acute respiratory failure with hypoxia ([facility] [address]  History of COVID-19  History of recent pneumonia  History of tracheostomy  Pneumonia of both lungs due to infectious organism, unspecified part of lung  Diagnosis management comments: 49-year-old male history of COVID-19 infection in August presents in acute respiratory failure with hypoxia from outside facility. Vitals significant for hypoxia. He has rhonchorous breath sounds throughout all lung fields. Normal S1-S2. Abdomen benign. He has bilateral leg edema. Diagnostic labs and imaging are interpreted. Patient has a leukocytosis. Any markedly bilateral groundglass opacities. No pulmonary embolism appreciated. Patient is also hypomagnesemia. Patient was given cefepime, vancomycin, repleted with magnesium, and placed on BiPAP. During patient's evaluation patient is significantly desaturated after he received a CTA. He was saturating at 50% on his 10 L nonrebreather. He was diaphoretic. He required BiPAP and emergent management. After he was given BiPAP patient's oxygenation, and mentation improved. Patient is much more awake, alert, as well as his work of breathing has improved. Patient has been admitted to the hospital for acute respiratory failure with hypoxia.         Amount and/or Complexity of Data Reviewed  Decide to obtain previous medical records or to obtain history from someone other than the patient: yes         ED Course as of 12/10/21 2000   Fri Dec 10, 2021   0701 Pt initially placed on NRB o2 sat came up to 87% pt placed on bipap wife updated on severity of condition pt will be transferred  [GOMEZ]   0704 Bipap mode 15/8 rr 14, 100% fio2, pt is pox 100% ventilating well -650 [GOMEZ]   0719 Pt stablilized in no distress, d/w dr Gerard Johnson at [facility] accepts for transfer to higher level of care [GOMEZ]   0719 Wife updated on plan, agrees with transfer aware of criticalness of patients condition [GOMEZ]   0815 Pt markedly better at this time, transport here to [facility] e pt to [facility], pt signed out to mobile icu team [GOMEZ]   7763 Critical care  Please note that the withdrawal or failure to initiate urgent interventions for this patient would likely result in a life threatening deterioration or permanent disability. Accordingly this patient received 60 minutes of critical care time, excluding separately billable procedures. [GOMEZ]   0901 D/w dr Tequila chris will see [GOMEZ]   26 I spoke with Dr. Jamarcus Hopper. They will consult on patient. Patient to be admitted to an intermediate floor. [JV]   1027 Dr. Marisol Avalos will admit patient to intermediate  [JV]   [phone] Call to bedside patient in respiratory distress. BIPAP placed back on patient. [JV]   1429 Patient after being placed on BIPAP his oxygenation and work of breathing has improved. He is much more awake and alert. [JV]      ED Course User Index  [GOMEZ] Bernard Garcia DO  [JV] Francis Varela MD         --------------------------------------------- PAST HISTORY ---------------------------------------------  Past Medical History:  has a past medical history of HIGH CHOLESTEROL, Hypertension, and Seizures ([facility] [address] Past Surgical History:  has a past surgical history that includes Appendectomy; picc line insertion nurse (9/8/2021); bronchoscopy (N/A, 9/8/2021); tracheostomy (N/A, 9/9/2021); and Upper gastrointestinal endoscopy (N/A, 9/9/2021). Social History:  reports that he has never smoked. He has never used smokeless tobacco.    Family History: family history is not on file. The patients home medications have been reviewed. Allergies: Patient has no known allergies.     -------------------------------------------------- RESULTS -------------------------------------------------    LABS:  Results for orders placed or performed during the hospital encounter of 12/10/21   COVID-19, Rapid    Specimen: Nasopharyngeal Swab   Result Value Ref Range    SARS-CoV-2, NAAT Not Detected Not Detected   Respiratory Panel, Molecular, with COVID-19 (Restricted: peds pts or suitable admitted adults)    Specimen: Nasopharyngeal   Result Value Ref Range    Adenovirus by PCR Not Detected Not Detected    Bordetella parapertussis by PCR Not Detected Not Detected    Bordetella pertussis by PCR Not Detected Not Detected    Chlamydophilia pneumoniae by PCR Not Detected Not Detected    Coronavirus 229E by PCR Not Detected Not Detected    Coronavirus HKU1 by PCR Not Detected Not Detected    Coronavirus NL63 by PCR Not Detected Not Detected    Coronavirus OC43 by PCR Not Detected Not Detected    SARS-CoV-2, PCR Not Detected Not Detected    Human Metapneumovirus by PCR Not Detected Not Detected    Human Rhinovirus/Enterovirus by PCR Not Detected Not Detected    Influenza A by PCR Not Detected Not Detected    Influenza B by PCR Not Detected Not Detected    Mycoplasma pneumoniae by PCR Not Detected Not Detected    Parainfluenza Virus 1 by PCR Not Detected Not Detected    Parainfluenza Virus 2 by PCR Not Detected Not Detected    Parainfluenza Virus 3 by PCR Not Detected Not Detected    Parainfluenza Virus 4 by PCR Not Detected Not Detected    Respiratory Syncytial Virus by PCR Not Detected Not Detected   Lactate, Sepsis   Result Value Ref Range    Lactic Acid, Sepsis 4.6 (HH) 0.5 - 1.9 mmol/L   CBC Auto Differential   Result Value Ref Range    WBC 19.7 (H) 4.5 - 11.5 E9/L    RBC 3.86 3.80 - 5.80 E12/L    Hemoglobin 11.5 (L) 12.5 - 16.5 g/dL    Hematocrit 36.2 (L) 37.0 - 54.0 %    MCV 93.8 80.0 - 99.9 fL    MCH 29.8 26.0 - 35.0 pg    MCHC 31.8 (L) 32.0 - 34.5 %    RDW 15.0 11.5 - 15.0 fL    Platelets 546 921 - 579 E9/L    MPV 9.5 7.0 - 12.0 fL    Neutrophils % 83.4 (H) 43.0 - 80.0 %    Immature Granulocytes % 0.5 0.0 - 5.0 %    Lymphocytes % 11.6 (L) 20.0 - 42.0 %    Monocytes % 4.1 2.0 - 12.0 %    Eosinophils % 0.0 0.0 - 6.0 %    Basophils % 0.4 0.0 - 2.0 %    Neutrophils Absolute 16.46 (H) 1.80 - 7.30 E9/L    Immature Granulocytes # 0.10 E9/L    Lymphocytes Absolute 2.28 1.50 - 4.00 E9/L Monocytes Absolute 0.81 0.10 - 0.95 E9/L    Eosinophils Absolute 0.00 (L) 0.05 - 0.50 E9/L    Basophils Absolute 0.07 0.00 - 0.20 E9/L   Comprehensive Metabolic Panel   Result Value Ref Range    Sodium 141 132 - 146 mmol/L    Potassium 4.2 3.5 - 5.0 mmol/L    Chloride 105 98 - 107 mmol/L    CO2 22 22 - 29 mmol/L    Anion Gap 14 7 - 16 mmol/L    Glucose 236 (H) 74 - 99 mg/dL    BUN 13 6 - 20 mg/dL    CREATININE 0.9 0.7 - 1.2 mg/dL    GFR Non-African American >60 >=60 mL/min/1.73    GFR African American >60     Calcium 8.8 8.6 - 10.2 mg/dL    Total Protein 6.4 6.4 - 8.3 g/dL    Albumin 3.4 (L) 3.5 - 5.2 g/dL    Total Bilirubin 0.3 0.0 - 1.2 mg/dL    Alkaline Phosphatase 86 40 - 129 U/L    ALT 13 0 - 40 U/L    AST 17 0 - 39 U/L   Magnesium   Result Value Ref Range    Magnesium 1.2 (L) 1.6 - 2.6 mg/dL   Lipase   Result Value Ref Range    Lipase 21 13 - 60 U/L   Troponin   Result Value Ref Range    Troponin, High Sensitivity 46 (H) 0 - 11 ng/L   Brain Natriuretic Peptide   Result Value Ref Range    Pro-BNP 1,080 (H) 0 - 125 pg/mL   Protime-INR   Result Value Ref Range    Protime 12.2 9.3 - 12.4 sec    INR 1.1    APTT   Result Value Ref Range    aPTT 28.1 24.5 - 35.1 sec   Sedimentation Rate   Result Value Ref Range    Sed Rate 38 (H) 0 - 15 mm/Hr   C-Reactive Protein   Result Value Ref Range    CRP 3.7 (H) 0.0 - 0.4 mg/dL   D-Dimer, Quantitative   Result Value Ref Range    D-Dimer, Quant 475 ng/mL DDU   Phenytoin level, total   Result Value Ref Range    Phenytoin Lvl 2.4 (L) 10.0 - 20.0 ug/mL    Phenytoin Dose Amount Unknown    CBC   Result Value Ref Range    WBC 17.5 (H) 4.5 - 11.5 E9/L    RBC 3.78 (L) 3.80 - 5.80 E12/L    Hemoglobin 11.1 (L) 12.5 - 16.5 g/dL    Hematocrit 35.4 (L) 37.0 - 54.0 %    MCV 93.7 80.0 - 99.9 fL    MCH 29.4 26.0 - 35.0 pg    MCHC 31.4 (L) 32.0 - 34.5 %    RDW 14.9 11.5 - 15.0 fL    Platelets 018 926 - 425 E9/L    MPV 9.9 7.0 - 12.0 fL   APTT   Result Value Ref Range    aPTT 81.1 (H) 24.5 - 35.1 sec   Troponin   Result Value Ref Range    Troponin, High Sensitivity 104 (H) 0 - 11 ng/L   Procalcitonin   Result Value Ref Range    Procalcitonin 0.27 (H) 0.00 - 0.08 ng/mL   Basic Metabolic Panel   Result Value Ref Range    Sodium 144 132 - 146 mmol/L    Potassium 4.3 3.5 - 5.0 mmol/L    Chloride 107 98 - 107 mmol/L    CO2 27 22 - 29 mmol/L    Anion Gap 10 7 - 16 mmol/L    Glucose 168 (H) 74 - 99 mg/dL    BUN 12 6 - 20 mg/dL    CREATININE 0.9 0.7 - 1.2 mg/dL    GFR Non-African American >60 >=60 mL/min/1.73    GFR African American >60     Calcium 8.6 8.6 - 10.2 mg/dL   Troponin   Result Value Ref Range    Troponin, High Sensitivity 105 (H) 0 - 11 ng/L   Lactic acid, plasma   Result Value Ref Range    Lactic Acid 1.0 0.5 - 2.2 mmol/L   Procalcitonin   Result Value Ref Range    Procalcitonin 0.27 (H) 0.00 - 0.08 ng/mL   POCT blood gases   Result Value Ref Range    Sample Type Arterial     POC pH 7.363 7.350 - 7.450    POC pCO2 44.0 35.0 - 45.0 mmHg    POC PO2 162.1 (H) 80.0 - 100.0 mmHg    POC HCO3 25.0 22.0 - 26.0 mmol/L    POC Base Excess -0.5 -3.0 - 3.0 mmol/L    POC O2 SAT 99.4 (H) 92.0 - 98.5 %    POC CPB No     POC  ID 41,623     POC Device ID [id number]     POC Delivery System BiPAP    EKG 12 Lead   Result Value Ref Range    Ventricular Rate 112 BPM    Atrial Rate 112 BPM    P-R Interval 168 ms    QRS Duration 98 ms    Q-T Interval 348 ms    QTc Calculation (Bazett) 475 ms    P Axis 38 degrees    R Axis 1 degrees    T Axis 20 degrees       RADIOLOGY:  CTA PULMONARY W CONTRAST   Final Result   No evidence of pulmonary embolism      Consolidation and ground-glass opacities of the lungs is suspicious for   infectious process. Small bilateral pleural effusions. XR CHEST PORTABLE   Final Result   1. Significant worsening of the multifocal bilateral pneumonia             EKG:  This EKG is signed and interpreted by me. Heart rate 112. Sinus tachycardia with PVCs.   Normal axis deviation. QTc 475. No ST elevations or depressions. Stable as compared to previous EKG.      ------------------------- NURSING NOTES AND VITALS REVIEWED ---------------------------  Date / Time Roomed:  12/10/2021  6:41 AM  ED Bed Assignment:  10/10    The nursing notes within the ED encounter and vital signs as below have been reviewed. Patient Vitals for the past 24 hrs:   BP Temp Temp src Pulse Resp SpO2 Weight   12/10/21 1703 -- -- -- -- (!) 38 100 % --   12/10/21 1627 -- -- -- -- (!) 44 -- --   12/10/21 1618 (!) 152/85 -- -- 101 (!) 40 100 % --   12/10/21 1410 -- -- -- -- 27 -- --   12/10/21 1109 -- -- -- -- (!) 34 100 % --   12/10/21 0924 (!) 145/84 -- -- 107 25 98 % --   12/10/21 0736 131/66 -- -- 114 -- 100 % --   12/10/21 0709 -- -- -- -- -- 100 % --   12/10/21 0702 -- -- -- -- -- -- 278 lb (126.1 kg)   12/10/21 0648 (!) 189/103 97.2 °F (36.2 °C) Infrared 118 26 (!) 30 % --       Oxygen Saturation Interpretation: Improved after treatment    ------------------------------------------ PROGRESS NOTES ------------------------------------------    Counseling:  I have spoken with the patient and discussed todays results, in addition to providing specific details for the plan of care and counseling regarding the diagnosis and prognosis. Their questions are answered at this time and they are agreeable with the plan of admission.    --------------------------------- ADDITIONAL PROVIDER NOTES ---------------------------------  Consultations:  Spoke with Dr. Leo Lr. Discussed case. They will admit the patient.   Spoke with Dr. Abdiel Chapa who will consult on patient    This patient's ED course included: a personal history and physicial examination, re-evaluation prior to disposition, multiple bedside re-evaluations, IV medications, cardiac monitoring, continuous pulse oximetry and complex medical decision making and emergency management    This patient has remained hemodynamically stable during their ED course. Please note that the withdrawal or failure to initiate urgent interventions for this patient would likely result in a life threatening deterioration or permanent disability. Accordingly this patient received 30 minutes of critical care time, excluding separately billable procedures. Diagnosis:  1. Acute respiratory failure with hypoxia ([address]    2. History of COVID-19    3. History of tracheostomy    4. History of recent pneumonia    5. Pneumonia of both lungs due to infectious organism, unspecified part of lung        Disposition:  Patient's disposition: Admit to telemetry  Patient's condition is severe.           Mary Kay Valentine MD  Resident  12/10/21 2001

## 2021-12-10 NOTE — ED NOTES
Call placed to lab [address] pt respiratory panel and repeat is needed per moni.    Connor sending additional swabs  81.1 ptt     Arjun Kwong RN  12/10/21 8975

## 2021-12-10 NOTE — CONSULTS
Anand Collins M.D.,[facility]  Cookie Packer D.O., F.A.C.O.I., Rena Hu M.D. Mimi Kingsley M.D. Brigida Frankel D.O. Patient:  Bryon Underwood 60 y.o. male MRN: [id number]     Date of Service: 12/10/2021      PULMONARY CONSULTATION    Reason for Consultation: respiratory failure, PKTY  Referring Physician: Dr. Ailyn Martines    Communication with the referring physician will be sent via the electronic medical record. Chief Complaint: shortness of breath    CODE STATUS: Prior    SUBJECTIVE:  HPI:  Bryon Underwood is a 60 y.o. male lying in bed in no apparent distress. He is seen in the ED. He says that last night he suddenly could not breathe and thought he was having a panic attack. His oxygen saturation went down to 50% on his home pulse ox. His wife put her CPAP on him for awhile on auto setting and it did help for awhile, but when the squad came, his oxygen desaturated even lower. He had a slight headache, but did not get confused or lose consciousness. Complaint had been persistent, severe, and worsened by nothing. He recently had covid in August and was intubated for awhile with a trach. He had a history of ARDS and VAP MSSA/ecoli and was treated with Zosyn and Antelmo. He went to rehab at [facility] and finally went home on 11/5. He had no other symptoms besides the shortness of breath, but his feet are edematous. He is on Eliquis for anticoagulation   Today, he is lying on the cart in no apparent distress. He is currently on 13L oxygen and says he feels a little better. Lung sounds are clear.        Past Medical History:   Diagnosis Date    HIGH CHOLESTEROL     Hypertension     Seizures ([address]        Past Surgical History:   Procedure Laterality Date    APPENDECTOMY      BRONCHOSCOPY N/A 9/8/2021    BRONCHOSCOPY performed by Bere Diaz MD at [id number]  9/8/2021         TRACHEOSTOMY N/A 9/9/2021    TRACHEOTOMY performed by Allyn East MD at [facility] OR    UPPER GASTROINTESTINAL ENDOSCOPY N/A 9/9/2021    EGD ESOPHAGOGASTRODUODENOSCOPY PEG TUBE INSERTION performed by Prieto Parekh MD at [address]       History reviewed. No pertinent family history. Social History:   Social History     Socioeconomic History    Marital status:      Spouse name: Not on file    Number of children: Not on file    Years of education: Not on file    Highest education level: Not on file   Occupational History    Not on file   Tobacco Use    Smoking status: Never Smoker    Smokeless tobacco: Never Used   Substance and Sexual Activity    Alcohol use: Not on file    Drug use: Not on file    Sexual activity: Not on file   Other Topics Concern    Not on file   Social History Narrative    Not on file     Social Determinants of Health     Financial Resource Strain: Low Risk     Difficulty of Paying Living Expenses: Not hard at all   Food Insecurity: No Food Insecurity    Worried About [address] in the Last Year: Never true    [address] in the Last Year: Never true   Transportation Needs:     Lack of Transportation (Medical): Not on file    Lack of Transportation (Non-Medical):  Not on file   Physical Activity:     Days of Exercise per Week: Not on file    Minutes of Exercise per Session: Not on file   Stress:     Feeling of Stress : Not on file   Social Connections:     Frequency of Communication with Friends and Family: Not on file    Frequency of Social Gatherings with Friends and Family: Not on file    Attends Evangelical Services: Not on file    Active Member of Clubs or Organizations: Not on file    Attends Club or Organization Meetings: Not on file    Marital Status: Not on file   Intimate Partner Violence:     Fear of Current or Ex-Partner: Not on file    Emotionally Abused: Not on file    Physically Abused: Not on file    Sexually Abused: Not on file   Housing Stability:     Unable to Pay for Housing in the Last Year: Not on file  Number of Places Lived in the Last Year: Not on file    Unstable Housing in the Last Year: Not on file     Smoking history: The patient is a Never smoker   ETOH:   has no history on file for alcohol use. Exposures: There  is not history of TB or TB exposure. There is not asbestos or silica dust exposure. The patient reports does not have coal, foundry, quarry or [employer] exposure. Recent travel history none. There is not  history of recreational or IV drug use. There is not hot tub exposure. The patient does not have any exotic pets, turtles or exotic birds. Vaccines:    Influenza: Indicated for current flu vaccination season Oct. to Feb.  Pneumococcal Polysaccharide:  Indicated for current flu vaccination season Oct. to Feb.  He was not vaccinated for covid    There is no immunization history on file for this patient. Home Meds: Not in a hospital admission. CURRENT MEDS :  Scheduled Meds:   vancomycin  1,500 mg IntraVENous Once    magnesium sulfate  2,000 mg IntraVENous Once    doxycycline        guaiFENesin  400 mg Oral TID    ipratropium-albuterol  1 ampule Inhalation Q4H While awake    cefepime  2,000 mg IntraVENous Q12H       Continuous Infusions:   heparin (PORCINE) Infusion 16.6 Units/kg/hr (12/10/21 0743)       No Known Allergies    REVIEW OF SYSTEMS:  Constitutional: Denies fever, weight loss, night sweats, and fatigue  Skin: Denies pigmentation, dark lesions, and rashes   HEENT: Denies hearing loss, tinnitus, ear drainage, epistaxis, sore throat, and hoarseness. Cardiovascular: Denies palpitations, chest pain, and chest pressure. Respiratory: Denies cough,+ dyspnea at rest, no hemoptysis, apnea, and choking.   Gastrointestinal: Denies nausea, vomiting, poor appetite, diarrhea, heartburn or reflux  Genitourinary: Denies dysuria, frequency, urgency or hematuria  Musculoskeletal: Denies myalgias, muscle weakness, and bone pain  Neurological: Denies dizziness, vertigo, headache, and focal weakness  Psychological: Denies anxiety and depression  Endocrine: Denies heat intolerance and cold intolerance  Hematopoietic/Lymphatic: Denies bleeding problems and blood transfusions    OBJECTIVE:   BP (!) 145/84   Pulse 107   Temp 97.2 °F (36.2 °C) (Infrared)   Resp (!) 34   Wt 278 lb (126.1 kg)   SpO2 100%   BMI 37.70 kg/m²   SpO2 Readings from Last 1 Encounters:   12/10/21 100%        I/O:  No intake or output data in the 24 hours ending 12/10/21 1113  Vent Information  SpO2: 100 %    Physical Exam:  General: The patient is lying in bed comfortably without any distress. Breathing is not labored  HEENT: Pupils are equal round and reactive to light, there are no oral lesions and no post-nasal drip   Neck: supple without adenopathy  Cardiovascular: regular rate and rhythm without murmur or gallop  Respiratory: Clear to auscultation bilaterally without wheezing or crackles. Air entry is symmetric  Abdomen: soft, non-tender, non-distended, normal bowel sounds  Extremities: warm, no edema, no clubbing  Skin: no rash or lesion  Neurologic: CN II-XII grossly intact, no focal deficits    Pulmonary Function Testing personally reviewed and interpreted  None on file    Imaging personally reviewed:  12/10/2021 cxr  1.  Significant worsening of the multifocal bilateral pneumonia    Echo:      Labs:  Lab Results   Component Value Date    WBC 19.7 12/10/2021    HGB 11.5 12/10/2021    HCT 36.2 12/10/2021    MCV 93.8 12/10/2021    MCH 29.8 12/10/2021    MCHC 31.8 12/10/2021    RDW 15.0 12/10/2021     12/10/2021    MPV 9.5 12/10/2021     Lab Results   Component Value Date     12/10/2021    K 4.2 12/10/2021    K 3.7 08/21/2021     12/10/2021    CO2 22 12/10/2021    BUN 13 12/10/2021    CREATININE 0.9 12/10/2021    LABALBU 3.4 12/10/2021    CALCIUM 8.8 12/10/2021    GFRAA >60 12/10/2021    LABGLOM >60 12/10/2021     Lab Results   Component Value Date    PROTIME 12.2 12/10/2021    INR 1.1 12/10/2021     Recent Labs     12/10/21  0649   PROBNP 1,080*     No results for input(s): TROPONINI in the last 72 hours. No results for input(s): PROCAL in the last 72 hours. This SmartLink has not been configured with any valid records. Micro:  No results for input(s): CULTRESP in the last 72 hours. No results for input(s): LABGRAM in the last 72 hours. No results for input(s): LEGUR in the last 72 hours. No results for input(s): STREPNEUMAGU in the last 72 hours. No results for input(s): LP1UAG in the last 72 hours. Assessment:  1. Plan:  1. Thank you for allowing me to participate in the care of Jesús Barrera. Please feel free to call with questions. This plan of care was reviewed in collaboration with      Electronically signed by HOMERO Shaffer CNP on 12/10/2021 at 11:13 AM      Note: This report was completed utilizing computer voice recognition software.  Every effort has been made to ensure accuracy, however; inadvertent computerized transcription errors may be present

## 2021-12-10 NOTE — H&P
Inpatient H&P      PCP:  Kelly Vinson DO  Admitting Physician:  No admitting provider for patient encounter. Consultants:  Pulm  Chief Complaint:    Chief Complaint   Patient presents with    Shortness of Breath     pt arrived to department with pusle ox of 35% on room air with c/o shortness of breath. History of Present Illness  Stephenie Akers is a 60 y.o. male who presents to [facility] ER complaining of SOB. Stephenie Akers has a past medical history that includes diabetes, history of seizures, hyperlipidemia, recent Covid. Sushila Agrawal originally presented to [facility] ER with shortness of breath and was found to have a pulse ox of 35% on room air upon arrival to the ER. He was placed on BiPAP in the ER and due to respiratory failure concern he was transferred ED to ED. He states that last night around 10:30 PM he developed acute shortness of breath. He states that it progressed and was exertional.  He also admits to leg swelling. In the ER he looks more comfortable and is on a nonrebreather mask. He does have a history of Covid admission where he required long-term acute care stay. CTA showed no evidence of pulmonary embolism Heparin drip will be stopped that was started in [place] ER for concern for PE. Consolidation and groundglass opacities of the lungs is suspicious for infectious process. There are small bilateral pleural effusions. ER Course  Upon presentation to the ER, routine labwork was performed which revealed lactic acid 4.6, magnesium 1.2, troponin 46, proBNP 1080, WBC 19.7. Imaging results are as outlined below in the Imaging section of this note. EKG revealed sinus tachycardia with occasional PVCs. Upon arrival to the ER, patient was 189/103 with a heart rate of 118 pulse ox of 30. The patient received DuoNeb, cefepime, Decadron, Lasix, doxycycline, heparin, magnesium in the emergency room and was admitted under the care of [facility] physicians.     Last DEONTE LAY JOSH - HUMACAO Admission - 8/21/21    Acute hypoxemic respiratory failure due to COVID-19 [facility])  Active Problems:    Type 2 diabetes mellitus without complication, without long-term current use of insulin (HCC)    History of seizures    Hyperlipidemia    Busch catheter problem (HCC)    Sepsis (HCC)    Thrombocytopenia (HCC)    Elevated LFTs  Resolved Problems:    OSCAR (acute kidney injury) ([facility] [address]    Lactic acidosis    Last Echocardiogram - 9/20/21   Left ventricle grossly normal in size. Mild - moderate left ventricular concentric hypertrophy noted. Normal LV segmental wall motion. Estimated left ventricular ejection fraction is 59±5%. Does not meet 50% threshold for diastolic dysfunction. The LAESV Index is <34ml/m2. Mildly dilated right ventricle. Right ventricular segmental wall motion is normal.   Physiologic and/or trace mitral regurgitation is present. Trace aortic regurgitation is noted. Physiologic and/or trace tricuspid regurgitation. Physiologic and/or trace pulmonic regurgitation present. Technically good quality study. No previous echo for comparison. Suggest clinical correlation.      ED TRIAGE VITALS  BP: (!) 145/84, Temp: 97.2 °F (36.2 °C), Pulse: 107, Resp: 25, SpO2: 98 %    Vitals:    12/10/21 0702 12/10/21 0709 12/10/21 0736 12/10/21 0924   BP:   131/66 (!) 145/84   Pulse:   114 107   Resp:    25   Temp:       TempSrc:       SpO2:  100% 100% 98%   Weight: 278 lb (126.1 kg)            Histories  Past Medical History:   Diagnosis Date    HIGH CHOLESTEROL     Hypertension     Seizures ([facility] [address]      Past Surgical History:   Procedure Laterality Date    APPENDECTOMY      BRONCHOSCOPY N/A 9/8/2021    BRONCHOSCOPY performed by Fadia Han MD at [id number]  9/8/2021         TRACHEOSTOMY N/A 9/9/2021    TRACHEOTOMY performed by Rosy Soliman MD at [address] N/A 9/9/2021    EGD ESOPHAGOGASTRODUODENOSCOPY PEG TUBE INSERTION performed by Marquis Oconnell MD at [address]     History reviewed. No pertinent family history. Home Medications  Prior to Admission medications    Medication Sig Start Date End Date Taking? Authorizing Provider   vitamin D (ERGOCALCIFEROL) 1.25 MG (46861 UT) CAPS capsule Take 1 capsule by mouth once a week 12/6/21   Rhonda Morales, DO   blood glucose test strips (ASCENSIA AUTODISC VI;ONE TOUCH ULTRA TEST VI) strip Use daily 12/3/21   Felicia Kimble DO   Lancets MISC 1 each by Does not apply route 2 times daily 12/3/21   Felicia Kimble DO   ELIQUIS 5 MG TABS tablet Take 1 tablet by mouth 2 times daily 12/3/21   Felicia Kimble DO   gabapentin (NEURONTIN) 300 MG capsule Take 1 capsule by mouth daily for 90 days. 12/3/21 3/3/22  Felicia iKmble DO   atorvastatin (LIPITOR) 40 MG tablet Take 40 mg by mouth nightly 10/19/21   Historical Provider, MD   metFORMIN (GLUCOPHAGE) 500 MG tablet Take 1 tablet by mouth 2 times daily (with meals) 12/1/21   Felicia Kimble DO   amLODIPine (NORVASC) 10 MG tablet Take 1 tablet by mouth daily 8/10/21   Felicia Kimble DO   phenytoin (DILANTIN) 100 MG ER capsule Take 2 tabs in the am and 3 tabs in pm 8/10/21   [address], DO       Allergies  Patient has no known allergies.     Social Hx  Social History     Socioeconomic History    Marital status:      Spouse name: Not on file    Number of children: Not on file    Years of education: Not on file    Highest education level: Not on file   Occupational History    Not on file   Tobacco Use    Smoking status: Never Smoker    Smokeless tobacco: Never Used   Substance and Sexual Activity    Alcohol use: Not on file    Drug use: Not on file    Sexual activity: Not on file   Other Topics Concern    Not on file   Social History Narrative    Not on file     Social Determinants of Health     Financial Resource Strain: Low Risk     Difficulty of Paying Living Expenses: Not hard at all   Food Insecurity: No Food Insecurity    Worried About Running Out of Food in the Last Year: Never true    Ran Out of Food in the Last Year: Never true   Transportation Needs:     Lack of Transportation (Medical): Not on file    Lack of Transportation (Non-Medical): Not on file   Physical Activity:     Days of Exercise per Week: Not on file    Minutes of Exercise per Session: Not on file   Stress:     Feeling of Stress : Not on file   Social Connections:     Frequency of Communication with Friends and Family: Not on file    Frequency of Social Gatherings with Friends and Family: Not on file    Attends Scientologist Services: Not on file    Active Member of [employer] Group or Organizations: Not on file    Attends Club or Organization Meetings: Not on file    Marital Status: Not on file   Intimate Partner Violence:     Fear of Current or Ex-Partner: Not on file    Emotionally Abused: Not on file    Physically Abused: Not on file    Sexually Abused: Not on file   Housing Stability:     Unable to Pay for Housing in the Last Year: Not on file    Number of [place] in the Last Year: Not on file    Unstable Housing in the Last Year: Not on file       Review of Systems  All bolded are positive; please see HPI  General:  Fever, chills, diaphoresis, fatigue, malaise, night sweats, weight loss  Psychological:  Anxiety, disorientation, hallucinations. ENT:  Epistaxis, headaches, vertigo, visual changes. Cardiovascular:  Chest pain, irregular heartbeats, palpitations, paroxysmal nocturnal dyspnea. Respiratory:  Shortness of breath, coughing, sputum production, hemoptysis, wheezing, orthopnea.   Gastrointestinal:  Nausea, vomiting, diarrhea, heartburn, constipation, abdominal pain, hematemesis, hematochezia, melena, acholic stools  Genito-Urinary:  Dysuria, urgency, frequency, hematuria  Musculoskeletal:  Joint pain, joint stiffness, joint swelling, muscle pain  Neurology:  Headache, focal neurological deficits, weakness, numbness, paresthesia  Derm:  Rashes, ulcers, excoriations, bruising  Extremities:  Decreased ROM, peripheral edema, mottling    Physical Examination  Vitals:  BP (!) 145/84   Pulse 107   Temp 97.2 °F (36.2 °C) (Infrared)   Resp 25   Wt 278 lb (126.1 kg)   SpO2 98%   BMI 37.70 kg/m²   General Appearance:  awake, alert, and oriented to person, place, time, and purpose; appears stated age and cooperative; no apparent distress no labored breathing NRB  HEENT:  NCAT; PERRL; conjunctiva pink, sclera clear  Neck:  no adenopathy, bruit, JVD, tenderness, masses, or nodules; supple, symmetrical, trachea midline, thyroid not enlarged  Lung:  clear to auscultation bilaterally; no use of accessory muscles; no rhonchi, rales, or crackles  Heart:  regular rate and regular rhythm without murmur, rub, or gallop  Abdomen:  soft, nontender, nondistended; normoactive bowel sounds; no organomegaly  Extremities:  extremities normal, atraumatic, no cyanosis or edema  Musculokeletal:  no joint swelling, no muscle tenderness. ROM normal in all joints of extremities.    Neurologic:  mental status A&Ox3, thought content appropriate; CN II-XII grossly intact; sensation intact, motor strength 5/5 globally; no slurred speech    Laboratory Data  Recent Results (from the past 24 hour(s))   EKG 12 Lead    Collection Time: 12/10/21  6:44 AM   Result Value Ref Range    Ventricular Rate 112 BPM    Atrial Rate 112 BPM    P-R Interval 168 ms    QRS Duration 98 ms    Q-T Interval 348 ms    QTc Calculation (Bazett) 475 ms    P Axis 38 degrees    R Axis 1 degrees    T Axis 20 degrees   Lactate, Sepsis    Collection Time: 12/10/21  6:49 AM   Result Value Ref Range    Lactic Acid, Sepsis 4.6 (HH) 0.5 - 1.9 mmol/L   CBC Auto Differential    Collection Time: 12/10/21  6:49 AM   Result Value Ref Range    WBC 19.7 (H) 4.5 - 11.5 E9/L    RBC 3.86 3.80 - 5.80 E12/L    Hemoglobin 11.5 (L) 12.5 - 16.5 g/dL    Hematocrit 36.2 (L) 37.0 - 54.0 %    MCV 93.8 80.0 - 99.9 fL    MCH 29.8 26.0 - 35.0 pg    MCHC 31.8 (L) 32.0 - 34.5 %    RDW 15.0 11.5 - 15.0 fL    Platelets 541 826 - 878 E9/L    MPV 9.5 7.0 - 12.0 fL    Neutrophils % 83.4 (H) 43.0 - 80.0 %    Immature Granulocytes % 0.5 0.0 - 5.0 %    Lymphocytes % 11.6 (L) 20.0 - 42.0 %    Monocytes % 4.1 2.0 - 12.0 %    Eosinophils % 0.0 0.0 - 6.0 %    Basophils % 0.4 0.0 - 2.0 %    Neutrophils Absolute 16.46 (H) 1.80 - 7.30 E9/L    Immature Granulocytes # 0.10 E9/L    Lymphocytes Absolute 2.28 1.50 - 4.00 E9/L    Monocytes Absolute 0.81 0.10 - 0.95 E9/L    Eosinophils Absolute 0.00 (L) 0.05 - 0.50 E9/L    Basophils Absolute 0.07 0.00 - 0.20 E9/L   Comprehensive Metabolic Panel    Collection Time: 12/10/21  6:49 AM   Result Value Ref Range    Sodium 141 132 - 146 mmol/L    Potassium 4.2 3.5 - 5.0 mmol/L    Chloride 105 98 - 107 mmol/L    CO2 22 22 - 29 mmol/L    Anion Gap 14 7 - 16 mmol/L    Glucose 236 (H) 74 - 99 mg/dL    BUN 13 6 - 20 mg/dL    CREATININE 0.9 0.7 - 1.2 mg/dL    GFR Non-African American >60 >=60 mL/min/1.73    GFR African American >60     Calcium 8.8 8.6 - 10.2 mg/dL    Total Protein 6.4 6.4 - 8.3 g/dL    Albumin 3.4 (L) 3.5 - 5.2 g/dL    Total Bilirubin 0.3 0.0 - 1.2 mg/dL    Alkaline Phosphatase 86 40 - 129 U/L    ALT 13 0 - 40 U/L    AST 17 0 - 39 U/L   Magnesium    Collection Time: 12/10/21  6:49 AM   Result Value Ref Range    Magnesium 1.2 (L) 1.6 - 2.6 mg/dL   Lipase    Collection Time: 12/10/21  6:49 AM   Result Value Ref Range    Lipase 21 13 - 60 U/L   Troponin    Collection Time: 12/10/21  6:49 AM   Result Value Ref Range    Troponin, High Sensitivity 46 (H) 0 - 11 ng/L   Brain Natriuretic Peptide    Collection Time: 12/10/21  6:49 AM   Result Value Ref Range    Pro-BNP 1,080 (H) 0 - 125 pg/mL   Protime-INR    Collection Time: 12/10/21  6:49 AM   Result Value Ref Range    Protime 12.2 9.3 - 12.4 sec    INR 1.1    APTT    Collection Time: 12/10/21  6:49 AM   Result Value Ref Range    aPTT 28.1 24.5 - 35.1 sec   Sedimentation Rate    Collection Time: 12/10/21  6:49 AM   Result Value Ref Range    Sed Rate 38 (H) 0 - 15 mm/Hr   D-Dimer, Quantitative    Collection Time: 12/10/21  6:49 AM   Result Value Ref Range    D-Dimer, Quant 475 ng/mL DDU   COVID-19, Rapid    Collection Time: 12/10/21  7:15 AM    Specimen: Nasopharyngeal Swab   Result Value Ref Range    SARS-CoV-2, NAAT Not Detected Not Detected   POCT blood gases    Collection Time: 12/10/21  7:27 AM   Result Value Ref Range    Sample Type Arterial     POC pH 7.363 7.350 - 7.450    POC pCO2 44.0 35.0 - 45.0 mmHg    POC PO2 162.1 (H) 80.0 - 100.0 mmHg    POC HCO3 25.0 22.0 - 26.0 mmol/L    POC Base Excess -0.5 -3.0 - 3.0 mmol/L    POC O2 SAT 99.4 (H) 92.0 - 98.5 %    POC CPB No     POC  ID 41,623     POC Device ID [id number]     POC Delivery System BiPAP        Imaging  XR CHEST PORTABLE    Result Date: 12/10/2021  EXAMINATION: ONE XRAY VIEW OF THE CHEST 12/10/2021 6:54 am COMPARISON: 10/18/2021 and 10/15/2021 HISTORY: ORDERING SYSTEM PROVIDED HISTORY: hypoxia sob TECHNOLOGIST PROVIDED HISTORY: Reason for exam:->hypoxia sob FINDINGS: There has been significant worsening of the multifocal bilateral pulmonary infiltrates when compared to patient's prior studies. Diffuse dense bilateral airspace disease is noted. There is no pneumothorax. There is no pneumomediastinum. No pleural effusion noted. The heart is enlarged. 1. Significant worsening of the multifocal bilateral pneumonia     CTA PULMONARY W CONTRAST    Result Date: 12/10/2021  EXAMINATION: CTA OF THE CHEST 12/10/2021 2:12 pm TECHNIQUE: CTA of the chest was performed after the administration of intravenous contrast.  Multiplanar reformatted images are provided for review. MIP images are provided for review. Dose modulation, iterative reconstruction, and/or weight based adjustment of the mA/kV was utilized to reduce the radiation dose to as low as reasonably achievable.  COMPARISON: Chest radiograph December 10, 2021 HISTORY: ORDERING SYSTEM PROVIDED HISTORY: sob TECHNOLOGIST PROVIDED HISTORY: Reason for exam:->sob Decision Support Exception - unselect if not a suspected or confirmed emergency medical condition->Emergency Medical Condition (MA) What reading provider will be dictating this exam?->CRC FINDINGS: Limited evaluation due to respiratory motion. Pulmonary Arteries: Pulmonary arteries are adequately opacified for evaluation. No evidence of intraluminal filling defect to suggest pulmonary embolism. Main pulmonary artery is normal in caliber. Mediastinum: No evidence of mediastinal lymphadenopathy. The heart and pericardium demonstrate no acute abnormality. There is no acute abnormality of the thoracic aorta. Lungs/pleura: Extensive bilateral ground-glass opacities and consolidations (mid air bronchograms) in the bilateral lungs. Small bilateral pleural effusions. No focal consolidation or pulmonary edema. No evidence of pneumothorax. Upper Abdomen: Limited images of the upper abdomen are unremarkable. Soft Tissues/Bones: No acute bone or soft tissue abnormality. No evidence of pulmonary embolism Consolidation and ground-glass opacities of the lungs is suspicious for infectious process. Small bilateral pleural effusions. Assessment and Plan  Patient is a 60 y.o. male who presented with SOB. The active problem list is as follows:    · Acute hypoxemic respiratory failure due to pneumonia- found to be 30% in ER. Blood cultures sent from ED. Sputum culture. Pneumococcal antigen. Legionella urine antigen. Respiratory DFA panel. Continuous pulse oximetry monitoring. Duonebs q4. Mucinex. Antibiotics: Cefepime and vanc. Pulm consulted. · Sepsis due to above  · History of DVT- on eliquis normally, says he has not missed any doses  · Lactic acidosis- now resolved  · Elevated troponin- denies chest pain. 46>104>15. Likely due to hypoxia.    · Non insulin dependent type 2

## 2021-12-10 NOTE — ED NOTES
Call placed to lab personal about the respiratory panel and she states its still running on the instrument.       Arjun Kwong RN  12/10/21 8705

## 2021-12-10 NOTE — PROGRESS NOTES
Pharmacy Consultation Note  (Antibiotic Dosing and Monitoring)    Initial consult date: 12-  Consulting physician/provider: Dr Fatimah Angel  Drug: Vancomycin  Indication: CAP    Age/  Gender Height Weight IBW  Allergy Information   56 y.o./male   278 lb (126.1 kg)     Ideal body weight: 77.6 kg (171 lb 1.2 oz)  Adjusted ideal body weight: 97 kg (213 lb 13.5 oz)   Patient has no known allergies. Renal Function:  Recent Labs     12/10/21  0649 12/10/21  1043   BUN 13 12   CREATININE 0.9 0.9     No intake or output data in the 24 hours ending 12/10/21 1735    Vancomycin Monitoring:  Trough:  No results for input(s): VANCOTROUGH in the last 72 hours. Random:  No results for input(s): VANCORANDOM in the last 72 hours. Vancomycin Administration Times:  Recent vancomycin administrations      No vancomycin IV orders with administrations found. Assessment:  · Patient is a 60 y.o. male who has been initiated on vancomycin  · Estimated Creatinine Clearance: 123 mL/min (based on SCr of 0.9 mg/dL).       Plan:  · Will continue vancomycin 2500 mg IV once followed by vancomycin 1750 mg every 12 hours  · Will check vancomycin levels when appropriate  · Will continue to monitor renal function   · Clinical pharmacy to follow      Tim Olguin [facility] 12/10/2021 5:35 PM

## 2021-12-10 NOTE — LETTER
[place] Department Medicaid  CERTIFICATION OF NECESSITY  FOR NON-EMERGENCY TRANSPORTATION   BY GROUND AMBULANCE      Individual Information   1. Name: Onesimo De La Garza 2. [place] Medicaid Billing Number:    3. Address: [address]  [place] [address]      Transportation Provider Information   4. Provider Name:    5. [place] Medicaid Provider Number:  National Provider Identifier (NPI):      Certification  7. Criteria:  During transport, this individual requires:  [x] Medical treatment or continuous     supervision by an EMT. [x] The administration or regulation of oxygen by another person. [] Supervised protective restraint. 8. Period Beginning Date: 12/15/21   9. Length  [x] Not more than 10 day(s)  [] One Year     Additional Information Relevant to Certification   10. Comments or Explanations, If Necessary or Appropriate     Post covid pulmonary fibrosis, PNA, respiratory failure, continuous bipap     Certifying Practitioner Information   11. Name of Practitioner: Yuki Eden MD   12. [place] Medicaid Provider Number, If Applicable:  [address] Provider Identifier (NPI):      Signature Information   14. Date of Signature: 12/15/21 15. Name of Person Signing: [place], [place]   46. Signature and Professional Designation: Electronically signed by [facility] on 12/15/2021 at 3:33 PM  Discharge planner     [facility] [address]  Rev. 7/2015    [address] [place] [address] Encounter Date/Time: 12/10/2021 Cathy Account: [de-identified]    MRN: [id number]    Patient: Onesimo De La Garza    Contact Serial #: [id number]      ENCOUNTER          Patient Class: I Private Enc?   No Unit RM BD: SEYZ 4S PICU 4504/4504-A   Hospital Service: Intermediate   Encounter DX: History of tracheostomy *   ADM Provider: Yuki Eden MD   Procedure:     ATT Provider: Yuki Eden MD   REF Provider:        Admission DX: History of tracheostomy, Acute respiratory failure with hypoxia ([facility] [address] Acute on chronic respiratory failure with hypoxia ([facility] [address] Pneumonia of both lungs due to infectious organism, unspecified part of lung, History of recent pneumonia, History of COVID-19 and DX codes: Z98.890, J96.01, J96.21, J18.9, Z87.01, Z86.16      PATIENT                 Name: Marjory Olszewski : 1965 (60 yrs)   Address: [address] [place] [address] Sex: Male   [place] city: [place] [address]         Marital Status:    Employer: NOT EMPLOYED         Lutheran: [employer]   Primary Care Provider: Kam Xiong DO         Primary Phone: [phone]   EMERGENCY CONTACT   Contact Name Legal Guardian? Relationship to Patient Home Phone Work Phone   1. Brandt Vann  2. [facility]      Spouse  Child [phone]              GUARANTOR            Guarantor: Marjory Olszewski     : 1965   Address: [address] Sex: Male     [place] [address]     Relation to Patient: Self       Home Phone: [phone]   Guarantor ID: [id number]       Work Phone:     Guarantor Employer: NOT EMPLOYED         Status: NOT EMPLO*      COVERAGE        PRIMARY INSURANCE   Payor: Chan Warner Plan: [facility] MEDICAID   Payor Address: [facility] DEPARTMENT; [address] [facility],  [address] [place] [address], [address] [place] [address]       Group Number: [id number] Insurance Type: INDEMNITY   Subscriber Name: Herman Denise : 1965   Subscriber ID: [id number] Pat. Rel. to Sub: Self   SECONDARY INSURANCE   Payor:   Plan:     Payor Address:  ,           Group Number:   Insurance Type:     Subscriber Name:   Subscriber :     Subscriber ID:   Pat.  Rel. to Sub:             CSN: [id number]

## 2021-12-10 NOTE — Clinical Note
Patient Class: Inpatient [101]   REQUIRED: Diagnosis: Acute respiratory failure with hypoxia ([facility] [address] [[id number]]   Estimated Length of Stay: Estimated stay of more than 2 midnights   Admitting Provider: Katlyn Raymond [[id number]]   Telemetry/Cardiac Monitoring Required?: Yes

## 2021-12-10 NOTE — ED NOTES
at bedside updating pt with plan of care. Pt and wife verbalized understanding.         Sheldon Ray RN  12/10/21 4151

## 2021-12-11 ENCOUNTER — APPOINTMENT (OUTPATIENT)
Dept: GENERAL RADIOLOGY | Age: 56
DRG: 720 | End: 2021-12-11
Payer: COMMERCIAL

## 2021-12-11 LAB
AADO2: 517.9 MMHG
ALBUMIN SERPL-MCNC: 3.1 G/DL (ref 3.5–5.2)
ALP BLD-CCNC: 69 U/L (ref 40–129)
ALT SERPL-CCNC: 13 U/L (ref 0–40)
ANION GAP SERPL CALCULATED.3IONS-SCNC: 10 MMOL/L (ref 7–16)
AST SERPL-CCNC: 22 U/L (ref 0–39)
B.E.: 4.1 MMOL/L (ref -3–3)
BILIRUB SERPL-MCNC: 0.4 MG/DL (ref 0–1.2)
BUN BLDV-MCNC: 11 MG/DL (ref 6–20)
CALCIUM SERPL-MCNC: 8.7 MG/DL (ref 8.6–10.2)
CHLORIDE BLD-SCNC: 101 MMOL/L (ref 98–107)
CHOLESTEROL, TOTAL: 173 MG/DL (ref 0–199)
CO2: 27 MMOL/L (ref 22–29)
COHB: 0.6 % (ref 0–1.5)
CREAT SERPL-MCNC: 0.8 MG/DL (ref 0.7–1.2)
CRITICAL: ABNORMAL
DATE ANALYZED: ABNORMAL
DATE OF COLLECTION: ABNORMAL
FIO2: 90 %
GFR AFRICAN AMERICAN: >60
GFR NON-AFRICAN AMERICAN: >60 ML/MIN/1.73
GLUCOSE BLD-MCNC: 148 MG/DL (ref 74–99)
HBA1C MFR BLD: 5.3 % (ref 4–5.6)
HCO3: 28.3 MMOL/L (ref 22–26)
HCT VFR BLD CALC: 33.2 % (ref 37–54)
HDLC SERPL-MCNC: 65 MG/DL
HEMOGLOBIN: 10.6 G/DL (ref 12.5–16.5)
HHB: 9.1 % (ref 0–5)
L. PNEUMOPHILA SEROGP 1 UR AG: NORMAL
LAB: ABNORMAL
LACTIC ACID: 1 MMOL/L (ref 0.5–2.2)
LDL CHOLESTEROL CALCULATED: 88 MG/DL (ref 0–99)
Lab: ABNORMAL
MAGNESIUM: 1.6 MG/DL (ref 1.6–2.6)
MCH RBC QN AUTO: 29.4 PG (ref 26–35)
MCHC RBC AUTO-ENTMCNC: 31.9 % (ref 32–34.5)
MCV RBC AUTO: 92 FL (ref 80–99.9)
METER GLUCOSE: 152 MG/DL (ref 74–99)
METHB: 0.5 % (ref 0–1.5)
MODE: ABNORMAL
O2 CONTENT: 14.8 ML/DL
O2 SATURATION: 90.8 % (ref 92–98.5)
O2HB: 89.8 % (ref 94–97)
OPERATOR ID: 1741
PATIENT TEMP: 37 C
PCO2: 40.9 MMHG (ref 35–45)
PDW BLD-RTO: 15 FL (ref 11.5–15)
PEEP/CPAP: 12 CMH2O
PFO2: 0.66 MMHG/%
PH BLOOD GAS: 7.46 (ref 7.35–7.45)
PHOSPHORUS: 3.1 MG/DL (ref 2.5–4.5)
PLATELET # BLD: 161 E9/L (ref 130–450)
PMV BLD AUTO: 9.9 FL (ref 7–12)
PO2: 59.4 MMHG (ref 75–100)
POTASSIUM SERPL-SCNC: 3.9 MMOL/L (ref 3.5–5)
RBC # BLD: 3.61 E12/L (ref 3.8–5.8)
RI(T): 8.72
RR MECHANICAL: 14 B/MIN
SODIUM BLD-SCNC: 138 MMOL/L (ref 132–146)
SOURCE, BLOOD GAS: ABNORMAL
STREP PNEUMONIAE ANTIGEN, URINE: NORMAL
THB: 11.7 G/DL (ref 11.5–16.5)
TIME ANALYZED: 947
TOTAL PROTEIN: 6.2 G/DL (ref 6.4–8.3)
TRIGL SERPL-MCNC: 100 MG/DL (ref 0–149)
TROPONIN, HIGH SENSITIVITY: 87 NG/L (ref 0–11)
TSH SERPL DL<=0.05 MIU/L-ACNC: 0.51 UIU/ML (ref 0.27–4.2)
VLDLC SERPL CALC-MCNC: 20 MG/DL
VT MECHANICAL: 550 ML
WBC # BLD: 16.9 E9/L (ref 4.5–11.5)

## 2021-12-11 PROCEDURE — 6360000002 HC RX W HCPCS: Performed by: INTERNAL MEDICINE

## 2021-12-11 PROCEDURE — 80053 COMPREHEN METABOLIC PANEL: CPT

## 2021-12-11 PROCEDURE — 2580000003 HC RX 258: Performed by: INTERNAL MEDICINE

## 2021-12-11 PROCEDURE — 83605 ASSAY OF LACTIC ACID: CPT

## 2021-12-11 PROCEDURE — 94660 CPAP INITIATION&MGMT: CPT

## 2021-12-11 PROCEDURE — 82805 BLOOD GASES W/O2 SATURATION: CPT

## 2021-12-11 PROCEDURE — 6370000000 HC RX 637 (ALT 250 FOR IP): Performed by: INTERNAL MEDICINE

## 2021-12-11 PROCEDURE — 2060000000 HC ICU INTERMEDIATE R&B

## 2021-12-11 PROCEDURE — 36415 COLL VENOUS BLD VENIPUNCTURE: CPT

## 2021-12-11 PROCEDURE — 94640 AIRWAY INHALATION TREATMENT: CPT

## 2021-12-11 PROCEDURE — 2700000000 HC OXYGEN THERAPY PER DAY

## 2021-12-11 PROCEDURE — 71045 X-RAY EXAM CHEST 1 VIEW: CPT

## 2021-12-11 PROCEDURE — 94644 CONT INHLJ TX 1ST HOUR: CPT

## 2021-12-11 PROCEDURE — 80061 LIPID PANEL: CPT

## 2021-12-11 PROCEDURE — 84100 ASSAY OF PHOSPHORUS: CPT

## 2021-12-11 PROCEDURE — 84484 ASSAY OF TROPONIN QUANT: CPT

## 2021-12-11 PROCEDURE — 85027 COMPLETE CBC AUTOMATED: CPT

## 2021-12-11 PROCEDURE — 83735 ASSAY OF MAGNESIUM: CPT

## 2021-12-11 PROCEDURE — 84443 ASSAY THYROID STIM HORMONE: CPT

## 2021-12-11 PROCEDURE — 82962 GLUCOSE BLOOD TEST: CPT

## 2021-12-11 PROCEDURE — 83036 HEMOGLOBIN GLYCOSYLATED A1C: CPT

## 2021-12-11 RX ORDER — LORAZEPAM 2 MG/ML
0.5 INJECTION INTRAMUSCULAR EVERY 6 HOURS PRN
Status: DISCONTINUED | OUTPATIENT
Start: 2021-12-11 | End: 2021-12-12

## 2021-12-11 RX ORDER — MECOBALAMIN 5000 MCG
10 TABLET,DISINTEGRATING ORAL NIGHTLY PRN
Status: DISCONTINUED | OUTPATIENT
Start: 2021-12-11 | End: 2021-12-20 | Stop reason: HOSPADM

## 2021-12-11 RX ADMIN — VANCOMYCIN HYDROCHLORIDE 1500 MG: 10 INJECTION, POWDER, LYOPHILIZED, FOR SOLUTION INTRAVENOUS at 18:19

## 2021-12-11 RX ADMIN — IPRATROPIUM BROMIDE AND ALBUTEROL SULFATE 1 AMPULE: .5; 2.5 SOLUTION RESPIRATORY (INHALATION) at 20:44

## 2021-12-11 RX ADMIN — IPRATROPIUM BROMIDE AND ALBUTEROL SULFATE 1 AMPULE: .5; 2.5 SOLUTION RESPIRATORY (INHALATION) at 13:34

## 2021-12-11 RX ADMIN — Medication 10 ML: at 22:13

## 2021-12-11 RX ADMIN — Medication 10 ML: at 02:28

## 2021-12-11 RX ADMIN — FUROSEMIDE 40 MG: 10 INJECTION, SOLUTION INTRAMUSCULAR; INTRAVENOUS at 08:29

## 2021-12-11 RX ADMIN — AMLODIPINE BESYLATE 10 MG: 10 TABLET ORAL at 11:21

## 2021-12-11 RX ADMIN — SODIUM CHLORIDE, PRESERVATIVE FREE 10 ML: 5 INJECTION INTRAVENOUS at 22:56

## 2021-12-11 RX ADMIN — Medication 10 ML: at 11:22

## 2021-12-11 RX ADMIN — GUAIFENESIN 400 MG: 400 TABLET ORAL at 20:27

## 2021-12-11 RX ADMIN — CEFEPIME 2000 MG: 2 INJECTION, POWDER, FOR SOLUTION INTRAVENOUS at 02:22

## 2021-12-11 RX ADMIN — IPRATROPIUM BROMIDE AND ALBUTEROL SULFATE 1 AMPULE: .5; 2.5 SOLUTION RESPIRATORY (INHALATION) at 08:28

## 2021-12-11 RX ADMIN — SODIUM CHLORIDE, PRESERVATIVE FREE 10 ML: 5 INJECTION INTRAVENOUS at 18:18

## 2021-12-11 RX ADMIN — LORAZEPAM 0.5 MG: 2 INJECTION INTRAMUSCULAR; INTRAVENOUS at 16:03

## 2021-12-11 RX ADMIN — GUAIFENESIN 400 MG: 400 TABLET ORAL at 01:44

## 2021-12-11 RX ADMIN — IPRATROPIUM BROMIDE AND ALBUTEROL SULFATE 1 AMPULE: .5; 2.5 SOLUTION RESPIRATORY (INHALATION) at 15:19

## 2021-12-11 RX ADMIN — PHENYTOIN SODIUM 200 MG: 100 CAPSULE, EXTENDED RELEASE ORAL at 11:21

## 2021-12-11 RX ADMIN — FUROSEMIDE 40 MG: 10 INJECTION, SOLUTION INTRAMUSCULAR; INTRAVENOUS at 18:18

## 2021-12-11 RX ADMIN — APIXABAN 5 MG: 5 TABLET, FILM COATED ORAL at 11:21

## 2021-12-11 RX ADMIN — VANCOMYCIN HYDROCHLORIDE 1750 MG: 10 INJECTION, POWDER, LYOPHILIZED, FOR SOLUTION INTRAVENOUS at 06:25

## 2021-12-11 RX ADMIN — LORAZEPAM 0.5 MG: 2 INJECTION INTRAMUSCULAR; INTRAVENOUS at 22:12

## 2021-12-11 RX ADMIN — PHENYTOIN SODIUM 300 MG: 100 CAPSULE, EXTENDED RELEASE ORAL at 20:28

## 2021-12-11 RX ADMIN — CEFEPIME 2000 MG: 2 INJECTION, POWDER, FOR SOLUTION INTRAVENOUS at 11:21

## 2021-12-11 RX ADMIN — GABAPENTIN 300 MG: 300 CAPSULE ORAL at 11:23

## 2021-12-11 RX ADMIN — GUAIFENESIN 400 MG: 400 TABLET ORAL at 16:03

## 2021-12-11 RX ADMIN — SODIUM CHLORIDE, PRESERVATIVE FREE 10 ML: 5 INJECTION INTRAVENOUS at 16:03

## 2021-12-11 RX ADMIN — APIXABAN 5 MG: 5 TABLET, FILM COATED ORAL at 20:28

## 2021-12-11 RX ADMIN — ATORVASTATIN CALCIUM 40 MG: 40 TABLET, FILM COATED ORAL at 20:28

## 2021-12-11 RX ADMIN — PHENYTOIN SODIUM 300 MG: 100 CAPSULE, EXTENDED RELEASE ORAL at 02:31

## 2021-12-11 RX ADMIN — Medication 10 MG: at 20:27

## 2021-12-11 RX ADMIN — CEFEPIME 2000 MG: 2 INJECTION, POWDER, FOR SOLUTION INTRAVENOUS at 22:56

## 2021-12-11 NOTE — PROGRESS NOTES
Hospitalist Progress Note      SYNOPSIS: Patient admitted on 12/10/2021  presents to [facility] ER complaining of SOB.     Lyndon Park has a past medical history that includes diabetes, history of seizures, hyperlipidemia, recent [address] originally presented to [facility] ER with shortness of breath and was found to have a pulse ox of 35% on room air upon arrival to the ER. He was placed on BiPAP in the ER and due to respiratory failure concern he was transferred ED to ED. He states that last night around 10:30 PM he developed acute shortness of breath. He states that it progressed and was exertional.  He also admits to leg swelling. In the ER he looks more comfortable and is on a nonrebreather mask. He does have a history of Covid admission where he required long-term acute care stay. CTA showed no evidence of pulmonary embolism Heparin drip will be stopped that was started in [place] ER for concern for PE. Consolidation and groundglass opacities of the lungs is suspicious for infectious process. There are small bilateral pleural effusions. SUBJECTIVE:  Stable overnight. No other overnight issues reported. Patient seen and examined  Records reviewed. Desaturates quickly  He is now on BiPAP      Temp (24hrs), Av °F (37.2 °C), Min:98 °F (36.7 °C), Max:100.2 °F (37.9 °C)    DIET: ADULT DIET; Regular; 4 carb choices (60 gm/meal)  CODE: Full Code    Intake/Output Summary (Last 24 hours) at 2021 0932  Last data filed at 2021 0313  Gross per 24 hour   Intake 250 ml   Output 2900 ml   Net -2650 ml       Review of Systems  All bolded are positive; please see HPI  General:  Fever, chills, diaphoresis, fatigue, malaise, night sweats, weight loss  Psychological:  Anxiety, disorientation, hallucinations. ENT:  Epistaxis, headaches, vertigo, visual changes. Cardiovascular:  Chest pain, irregular heartbeats, palpitations, paroxysmal nocturnal dyspnea.   Respiratory:  Shortness of breath, coughing, sputum production, hemoptysis, wheezing, orthopnea. Gastrointestinal:  Nausea, vomiting, diarrhea, heartburn, constipation, abdominal pain, hematemesis, hematochezia, melena, acholic stools  Genito-Urinary:  Dysuria, urgency, frequency, hematuria  Musculoskeletal:  Joint pain, joint stiffness, joint swelling, muscle pain  Neurology:  Headache, focal neurological deficits, weakness, numbness, paresthesia  Derm:  Rashes, ulcers, excoriations, bruising  Extremities:  Decreased ROM, peripheral edema, mottling      OBJECTIVE:    /66   Pulse 111   Temp 100.2 °F (37.9 °C) (Temporal)   Resp (!) 37   Ht 6' (1.829 m)   Wt 276 lb 10.8 oz (125.5 kg)   SpO2 95%   BMI 37.52 kg/m²     General appearance:  awake, alert, and oriented to person, place, time, and purpose; appears stated age and cooperative; no apparent distress no labored breathing BiPAP  HEENT:  Conjunctivae/corneas clear. Neck: Supple. No jugular venous distention. Respiratory: symmetrical; coarse bilaterally; no wheezes; no rhonchi; no rales  Cardiovascular: rhythm regular; rate controlled; no murmurs  Abdomen: Soft, nontender, nondistended  Extremities:  peripheral pulses present; no peripheral edema; no ulcers  Musculoskeletal: No clubbing, cyanosis, no bilateral lower extremity edema. Brisk capillary refill. Skin:  No rashes  on visible skin  Neurologic: awake, alert and following commands     ASSESSMENT and PLAN:  · Acute hypoxemic respiratory failure due to pneumonia- found to be 30% in ER. Blood cultures sent from ED. Sputum culture. Pneumococcal antigen. Legionella urine antigen. Respiratory DFA panel. Continuous pulse oximetry monitoring. Duonebs q4. Mucinex. Antibiotics: Cefepime and vanc. Pulm consulted. · Sepsis due to above  · Decompensated chf- on lasix check echo  · History of DVT- on eliquis normally, says he has not missed any doses  · Lactic acidosis- now resolved  · Elevated troponin- denies chest pain. 09>900>737>98. Likely due to hypoxia. · Non insulin dependent type 2 diabetes mellitus  · History of seizures- on dilantin  · Hyperlipidemia           DISPOSITION: Continue current plan of care    Medications:  REVIEWED DAILY    Infusion Medications    sodium chloride       Scheduled Medications    influenza virus vaccine  0.5 mL IntraMUSCular Prior to discharge    guaiFENesin  400 mg Oral TID    ipratropium-albuterol  1 ampule Inhalation Q4H While awake    cefepime  2,000 mg IntraVENous Q12H    sodium chloride flush  5-40 mL IntraVENous 2 times per day    amLODIPine  10 mg Oral Daily    atorvastatin  40 mg Oral Nightly    apixaban  5 mg Oral BID    gabapentin  300 mg Oral Daily    phenytoin  300 mg Oral Nightly    phenytoin  200 mg Oral QAM    furosemide  40 mg IntraVENous BID    vancomycin  1,750 mg IntraVENous Q12H     PRN Meds: sodium chloride flush, sodium chloride, ondansetron **OR** ondansetron, polyethylene glycol, acetaminophen **OR** acetaminophen    Labs:     Recent Labs     12/10/21  0649 12/10/21  1043 12/11/21  0700   WBC 19.7* 17.5* 16.9*   HGB 11.5* 11.1* 10.6*   HCT 36.2* 35.4* 33.2*    182 161       Recent Labs     12/10/21  0649 12/10/21  1043 12/11/21  0700    144 138   K 4.2 4.3 3.9    107 101   CO2 22 27 27   BUN 13 12 11   CREATININE 0.9 0.9 0.8   CALCIUM 8.8 8.6 8.7   PHOS  --   --  3.1       Recent Labs     12/10/21  0649 12/11/21  0700   PROT 6.4 6.2*   ALKPHOS 86 69   ALT 13 13   AST 17 22   BILITOT 0.3 0.4   LIPASE 21  --        Recent Labs     12/10/21  0649   INR 1.1       No results for input(s): CKTOTAL, TROPONINI in the last 72 hours.     Chronic labs:    Lab Results   Component Value Date    CHOL 173 12/11/2021    TRIG 100 12/11/2021    HDL 65 12/11/2021    LDLCALC 88 12/11/2021    TSH 0.507 12/11/2021    PSA 0.84 12/12/2020    INR 1.1 12/10/2021    LABA1C 5.6 12/01/2021       Radiology: REVIEWED DAILY    +++++++++++++++++++++++++++++++++++++++++++++++++  DO Carlos Sosa Physician - [address], [place]  +++++++++++++++++++++++++++++++++++++++++++++++++  NOTE: This report was transcribed using voice recognition software. Every effort was made to ensure accuracy; however, inadvertent computerized transcription errors may be present.

## 2021-12-11 NOTE — PROGRESS NOTES
Date: 12/10/2021    Time: 9:22 PM    Patient Placed On BIPAP/CPAP/ Non-Invasive Ventilation? No    If no must comment. Facial area red/color change? No           If YES are Blister/Lesion present? No   If yes must notify nursing staff  BIPAP/CPAP skin barrier?   Yes    Skin barrier type:mepilexlite       Comments:    Remains on      Trish Marquez RCP

## 2021-12-11 NOTE — PROGRESS NOTES
Dr Carlos Feldman notified pt quickly desat when switched to 15L HF to 75%. Immediately placed back to PAP 18/12 Fi02 90%. RR 30-40s. RT called to assess settings. Settings adjusted to AVAPS. 40 IV lasix administered.

## 2021-12-11 NOTE — PROGRESS NOTES
Zheng Eller M.D.,[facility]  CICI AlyO., F.A.C.O.I., Susi Chi M.D. Sam Batres M.D. Maeve Farias D.O. Daily Pulmonary Progress Note    Patient:  Gilberto Joel 60 y.o. male MRN: [id number]     Date of Service: 12/11/2021      Synopsis     We are following patient for acute hypoxic resp failure and history of COVID -19  pneumonia in August 2021    \"CC\" :SOB    Code status: Full       Subjective      Patient was seen and examined. He had desaturated earlier this a.m. and has b een on AVAPS. I was able to take him off and on HFNC and NRBM he is saturating 88-92%. Hs has responded to diuresis and so far has diuresed 2.9 liters. He has history of anxiety and currently while looking at his saturations he gets tachypnea and anxious. Has no cough      Review of Systems:  Constitutional: Denies fever, weight loss, night sweats, and fatigue  Skin: Denies pigmentation, dark lesions, and rashes   HEENT: Denies hearing loss, tinnitus, ear drainage, epistaxis, sore throat, and hoarseness. Cardiovascular: Denies palpitations, chest pain, and chest pressure. Respiratory: Denies cough, dyspnea at rest, hemoptysis, apnea, and choking.   Gastrointestinal: Denies nausea, vomiting, poor appetite, diarrhea, heartburn or reflux  Genitourinary: Denies dysuria, frequency, urgency or hematuria  Musculoskeletal: Denies myalgias, muscle weakness, and bone pain, positive for LE swelling  Neurological: Denies dizziness, vertigo, headache, and focal weakness  Psychological: Denies anxiety and depression  Endocrine: Denies heat intolerance and cold intolerance  Hematopoietic/Lymphatic: Denies bleeding problems and blood transfusions    24-hour events:      Objective   Vitals: BP (!) 154/89   Pulse 112   Temp 98.1 °F (36.7 °C) (Temporal)   Resp (!) 36   Ht 6' (1.829 m)   Wt 276 lb 10.8 oz (125.5 kg)   SpO2 95%   BMI 37.52 kg/m²     I/O:    Intake/Output Summary (Last 24 hours) at 12/11/2021 1402  Last data filed at 12/11/2021 1116  Gross per 24 hour   Intake 250 ml   Output 4500 ml   Net -4250 ml       Vent Information  Skin Assessment: Clean, dry, & intact  Equipment ID: [id number]  Vt Ordered: 550 mL  FiO2 : 90 %  SpO2: 95 %  SpO2/FiO2 ratio: 105.56  I Time/ I Time %: 0.8 s  Mask Type: Full face mask  Mask Size: Large       IPAP: 18 cmH20  CPAP/EPAP: 12 cmH2O     CURRENT MEDS :  Scheduled Meds:   influenza virus vaccine  0.5 mL IntraMUSCular Prior to discharge    guaiFENesin  400 mg Oral TID    ipratropium-albuterol  1 ampule Inhalation Q4H While awake    cefepime  2,000 mg IntraVENous Q12H    sodium chloride flush  5-40 mL IntraVENous 2 times per day    amLODIPine  10 mg Oral Daily    atorvastatin  40 mg Oral Nightly    apixaban  5 mg Oral BID    gabapentin  300 mg Oral Daily    phenytoin  300 mg Oral Nightly    phenytoin  200 mg Oral QAM    furosemide  40 mg IntraVENous BID    vancomycin  1,750 mg IntraVENous Q12H       Physical Exam:  General Appearance: appears comfortable in no acute distress. HEENT: Normocephalic atraumatic without obvious abnormality   Neck: Lips, mucosa, and tongue normal.  Supple, symmetrical, trachea midline, no adenopathy;thyroid:  no enlargement/tenderness/nodules or JVD. Lung: Breath sounds CTA. Respirations   unlabored. Symmetrical expansion. Heart: RRR, normal S1, S2. No MRG  Abdomen: Soft, NT, ND. BS present x 4 quadrants. No bruit or organomegaly. Extremities: Pedal pulses 2+ symmetric b/l. Extremities normal, no cyanosis, clubbing,+2edema. Neurologic: Mental status: Alert and Oriented X3 . Pertinent/ New Labs and Imaging Studies     Imaging Personally Reviewed:      Impression   No evidence of pulmonary embolism       Consolidation and ground-glass opacities of the lungs is suspicious for   infectious process.       Small bilateral pleural effusions.                   ECHO     Summary   Left ventricle grossly normal in size.    Mild - moderate left ventricular concentric hypertrophy noted. Normal LV segmental wall motion. Estimated left ventricular ejection fraction is 59±5%. Does not meet 50% threshold for diastolic dysfunction. The LAESV Index is <34ml/m2. Mildly dilated right ventricle. Right ventricular segmental wall motion is normal.   Physiologic and/or trace mitral regurgitation is present. Trace aortic regurgitation is noted. Physiologic and/or trace tricuspid regurgitation. Physiologic and/or trace pulmonic regurgitation present. Technically good quality study. No previous echo for comparison. Suggest clinical correlation. Signature      ----------------------------------------------------------------   Electronically signed by John Meza DO(Interpreting   physician) on 10/03/2021 01:49 PM   ----------------------------------------------------------------    Labs:  Lab Results   Component Value Date    WBC 16.9 12/11/2021    HGB 10.6 12/11/2021    HCT 33.2 12/11/2021    MCV 92.0 12/11/2021    MCH 29.4 12/11/2021    MCHC 31.9 12/11/2021    RDW 15.0 12/11/2021     12/11/2021    MPV 9.9 12/11/2021     Lab Results   Component Value Date     12/11/2021    K 3.9 12/11/2021    K 3.7 08/21/2021     12/11/2021    CO2 27 12/11/2021    BUN 11 12/11/2021    CREATININE 0.8 12/11/2021    LABALBU 3.1 12/11/2021    CALCIUM 8.7 12/11/2021    GFRAA >60 12/11/2021    LABGLOM >60 12/11/2021     Lab Results   Component Value Date    PROTIME 12.2 12/10/2021    INR 1.1 12/10/2021     Recent Labs     12/10/21  0649   PROBNP 1,080*     Recent Labs     12/10/21  1043   PROCAL 0.27*     This SmartLink has not been configured with any valid records. Micro:  No results for input(s): CULTRESP in the last 72 hours. No results for input(s): LABGRAM in the last 72 hours. No results for input(s): LEGUR in the last 72 hours.   Recent Labs     12/10/21  2216   STREPNEUMAGU Presumptive negative- suggests no current or recent  pneumococcal infection. Infection due to Strep pneumoniae cannot be  ruled out since the antigen present in the sample  may be below the detection limit of the test.  Normal Range:Presumptive Negative       Recent Labs     12/10/21  2216   LP1UAG Presumptive Negative -suggesting no recent or current infections  with Legionella pneumophila serogroup 1. Infection to Legionella cannot be ruled out since other serogroups  and species may cause infection, antigen may not be present in  early infection, or level of antigen may be below the  detection limit. Normal Range: Presumptive Negative            Assessment:    1. Acute hypoxic respiratory failure  2. Bilateral patchy infiltrates differential diagnosis includes healthcare associated pneumonia versus possibly volume overload/Acute diastlic HF  3. History of severe Covid pneumonia in August 2021  4. H/O anxiety      Plan:   1. Alternate between NIV and Optiflow. 2. Continue diuresis, monitor I&O's closely  3. Continue Duoneb  4. Will consult ID, resp viral panel and urine legionella and strep are negative  5. Will continue to monitor daily CXR if no improvement will consider bronchoscopy with BAL although given amount of oxygen requirements he is at high risk for intubation . 6. Will add small dose ativan prn for anxiety. 7. Discussed with wife at the bedside and nursing staff.         Electronically signed by Trav Newell MD on 12/11/2021 at 2:02 PM

## 2021-12-11 NOTE — PLAN OF CARE
Problem: Falls - Risk of:  Goal: Will remain free from falls  Description: Will remain free from falls  Outcome: Met This Shift  Goal: Absence of physical injury  Description: Absence of physical injury  Outcome: Met This Shift     Problem: OXYGENATION/RESPIRATORY FUNCTION  Goal: Patient will maintain patent airway  Outcome: Met This Shift  Goal: Patient will achieve/maintain normal respiratory rate/effort  Description: Respiratory rate and effort will be within normal limits for the patient  Outcome: Met This Shift     Problem: MECHANICAL VENTILATION  Goal: Patient will maintain patent airway  Outcome: Met This Shift  Goal: Mobility/activity is maintained at optimum level for patient  Outcome: Met This Shift     Problem: SKIN INTEGRITY  Goal: Skin integrity is maintained or improved  Outcome: Met This Shift     Problem: NUTRITION  Goal: Nutritional status is improving  Outcome: Met This Shift

## 2021-12-12 ENCOUNTER — APPOINTMENT (OUTPATIENT)
Dept: GENERAL RADIOLOGY | Age: 56
DRG: 720 | End: 2021-12-12
Payer: COMMERCIAL

## 2021-12-12 LAB
AADO2: 572.9 MMHG
ALBUMIN SERPL-MCNC: 2.7 G/DL (ref 3.5–5.2)
ALP BLD-CCNC: 63 U/L (ref 40–129)
ALT SERPL-CCNC: 14 U/L (ref 0–40)
ANION GAP SERPL CALCULATED.3IONS-SCNC: 10 MMOL/L (ref 7–16)
AST SERPL-CCNC: 20 U/L (ref 0–39)
B.E.: 3.8 MMOL/L (ref -3–3)
BILIRUB SERPL-MCNC: 0.5 MG/DL (ref 0–1.2)
BUN BLDV-MCNC: 16 MG/DL (ref 6–20)
CALCIUM SERPL-MCNC: 8.6 MG/DL (ref 8.6–10.2)
CHLORIDE BLD-SCNC: 99 MMOL/L (ref 98–107)
CO2: 27 MMOL/L (ref 22–29)
COHB: 1.3 % (ref 0–1.5)
CREAT SERPL-MCNC: 0.9 MG/DL (ref 0.7–1.2)
CRITICAL: ABNORMAL
DATE ANALYZED: ABNORMAL
DATE OF COLLECTION: ABNORMAL
FIO2: 100 %
GFR AFRICAN AMERICAN: >60
GFR NON-AFRICAN AMERICAN: >60 ML/MIN/1.73
GLUCOSE BLD-MCNC: 127 MG/DL (ref 74–99)
HCO3: 29.5 MMOL/L (ref 22–26)
HCT VFR BLD CALC: 32.9 % (ref 37–54)
HEMOGLOBIN: 10.3 G/DL (ref 12.5–16.5)
HHB: 7.6 % (ref 0–5)
LAB: ABNORMAL
LACTIC ACID: 1.1 MMOL/L (ref 0.5–2.2)
Lab: ABNORMAL
MAGNESIUM: 1.6 MG/DL (ref 1.6–2.6)
MCH RBC QN AUTO: 28.9 PG (ref 26–35)
MCHC RBC AUTO-ENTMCNC: 31.3 % (ref 32–34.5)
MCV RBC AUTO: 92.2 FL (ref 80–99.9)
METER GLUCOSE: 116 MG/DL (ref 74–99)
METHB: 0.2 % (ref 0–1.5)
MODE: ABNORMAL
O2 CONTENT: 15 ML/DL
O2 SATURATION: 92.3 % (ref 92–98.5)
O2HB: 90.9 % (ref 94–97)
OPERATOR ID: 3342
PATIENT TEMP: 37 C
PCO2: 49.3 MMHG (ref 35–45)
PDW BLD-RTO: 14.8 FL (ref 11.5–15)
PEEP/CPAP: 10 CMH2O
PFO2: 0.66 MMHG/%
PH BLOOD GAS: 7.39 (ref 7.35–7.45)
PHOSPHORUS: 3.5 MG/DL (ref 2.5–4.5)
PLATELET # BLD: 156 E9/L (ref 130–450)
PMV BLD AUTO: 10 FL (ref 7–12)
PO2: 65.8 MMHG (ref 75–100)
POTASSIUM SERPL-SCNC: 3.9 MMOL/L (ref 3.5–5)
PRO-BNP: 4909 PG/ML (ref 0–125)
PROCALCITONIN: 3.84 NG/ML (ref 0–0.08)
RBC # BLD: 3.57 E12/L (ref 3.8–5.8)
RI(T): 8.71
RR MECHANICAL: 14 B/MIN
SODIUM BLD-SCNC: 136 MMOL/L (ref 132–146)
SOURCE, BLOOD GAS: ABNORMAL
THB: 11.7 G/DL (ref 11.5–16.5)
TIME ANALYZED: 427
TOTAL PROTEIN: 5.9 G/DL (ref 6.4–8.3)
TROPONIN, HIGH SENSITIVITY: 78 NG/L (ref 0–11)
VANCOMYCIN TROUGH: 21.3 MCG/ML (ref 5–16)
VT MECHANICAL: 550 ML
WBC # BLD: 19.2 E9/L (ref 4.5–11.5)

## 2021-12-12 PROCEDURE — 6360000002 HC RX W HCPCS

## 2021-12-12 PROCEDURE — 94640 AIRWAY INHALATION TREATMENT: CPT

## 2021-12-12 PROCEDURE — 85027 COMPLETE CBC AUTOMATED: CPT

## 2021-12-12 PROCEDURE — 83605 ASSAY OF LACTIC ACID: CPT

## 2021-12-12 PROCEDURE — 80053 COMPREHEN METABOLIC PANEL: CPT

## 2021-12-12 PROCEDURE — 2580000003 HC RX 258: Performed by: INTERNAL MEDICINE

## 2021-12-12 PROCEDURE — 71045 X-RAY EXAM CHEST 1 VIEW: CPT

## 2021-12-12 PROCEDURE — 82962 GLUCOSE BLOOD TEST: CPT

## 2021-12-12 PROCEDURE — 84145 PROCALCITONIN (PCT): CPT

## 2021-12-12 PROCEDURE — 84484 ASSAY OF TROPONIN QUANT: CPT

## 2021-12-12 PROCEDURE — 6370000000 HC RX 637 (ALT 250 FOR IP): Performed by: INTERNAL MEDICINE

## 2021-12-12 PROCEDURE — 2060000000 HC ICU INTERMEDIATE R&B

## 2021-12-12 PROCEDURE — 80202 ASSAY OF VANCOMYCIN: CPT

## 2021-12-12 PROCEDURE — 87305 ASPERGILLUS AG IA: CPT

## 2021-12-12 PROCEDURE — 82805 BLOOD GASES W/O2 SATURATION: CPT

## 2021-12-12 PROCEDURE — 36415 COLL VENOUS BLD VENIPUNCTURE: CPT

## 2021-12-12 PROCEDURE — 2500000003 HC RX 250 WO HCPCS

## 2021-12-12 PROCEDURE — 6360000002 HC RX W HCPCS: Performed by: INTERNAL MEDICINE

## 2021-12-12 PROCEDURE — 6360000002 HC RX W HCPCS: Performed by: FAMILY MEDICINE

## 2021-12-12 PROCEDURE — 36600 WITHDRAWAL OF ARTERIAL BLOOD: CPT

## 2021-12-12 PROCEDURE — 84100 ASSAY OF PHOSPHORUS: CPT

## 2021-12-12 PROCEDURE — 83880 ASSAY OF NATRIURETIC PEPTIDE: CPT

## 2021-12-12 PROCEDURE — 94660 CPAP INITIATION&MGMT: CPT

## 2021-12-12 PROCEDURE — 83735 ASSAY OF MAGNESIUM: CPT

## 2021-12-12 RX ORDER — MORPHINE SULFATE 2 MG/ML
1 INJECTION, SOLUTION INTRAMUSCULAR; INTRAVENOUS ONCE
Status: COMPLETED | OUTPATIENT
Start: 2021-12-12 | End: 2021-12-12

## 2021-12-12 RX ORDER — LABETALOL HYDROCHLORIDE 5 MG/ML
INJECTION, SOLUTION INTRAVENOUS
Status: COMPLETED
Start: 2021-12-12 | End: 2021-12-12

## 2021-12-12 RX ORDER — LABETALOL HYDROCHLORIDE 5 MG/ML
5 INJECTION, SOLUTION INTRAVENOUS ONCE
Status: DISCONTINUED | OUTPATIENT
Start: 2021-12-12 | End: 2021-12-20 | Stop reason: HOSPADM

## 2021-12-12 RX ORDER — MORPHINE SULFATE 2 MG/ML
1 INJECTION, SOLUTION INTRAMUSCULAR; INTRAVENOUS EVERY 4 HOURS PRN
Status: DISCONTINUED | OUTPATIENT
Start: 2021-12-12 | End: 2021-12-18

## 2021-12-12 RX ORDER — HYDROXYZINE HYDROCHLORIDE 50 MG/ML
50 INJECTION, SOLUTION INTRAMUSCULAR ONCE
Status: COMPLETED | OUTPATIENT
Start: 2021-12-12 | End: 2021-12-12

## 2021-12-12 RX ORDER — FUROSEMIDE 10 MG/ML
INJECTION INTRAMUSCULAR; INTRAVENOUS
Status: DISCONTINUED
Start: 2021-12-12 | End: 2021-12-12 | Stop reason: WASHOUT

## 2021-12-12 RX ORDER — HYDROXYZINE HYDROCHLORIDE 50 MG/ML
25 INJECTION, SOLUTION INTRAMUSCULAR ONCE
Status: DISCONTINUED | OUTPATIENT
Start: 2021-12-12 | End: 2021-12-12

## 2021-12-12 RX ORDER — METHYLPREDNISOLONE SODIUM SUCCINATE 40 MG/ML
40 INJECTION, POWDER, LYOPHILIZED, FOR SOLUTION INTRAMUSCULAR; INTRAVENOUS EVERY 6 HOURS
Status: DISCONTINUED | OUTPATIENT
Start: 2021-12-12 | End: 2021-12-15

## 2021-12-12 RX ORDER — LORAZEPAM 2 MG/ML
INJECTION INTRAMUSCULAR
Status: COMPLETED
Start: 2021-12-12 | End: 2021-12-12

## 2021-12-12 RX ORDER — FUROSEMIDE 10 MG/ML
20 INJECTION INTRAMUSCULAR; INTRAVENOUS ONCE
Status: COMPLETED | OUTPATIENT
Start: 2021-12-12 | End: 2021-12-12

## 2021-12-12 RX ORDER — LORAZEPAM 2 MG/ML
0.5 INJECTION INTRAMUSCULAR EVERY 4 HOURS PRN
Status: DISCONTINUED | OUTPATIENT
Start: 2021-12-12 | End: 2021-12-20 | Stop reason: HOSPADM

## 2021-12-12 RX ADMIN — METHYLPREDNISOLONE SODIUM SUCCINATE 40 MG: 40 INJECTION, POWDER, FOR SOLUTION INTRAMUSCULAR; INTRAVENOUS at 20:32

## 2021-12-12 RX ADMIN — GUAIFENESIN 400 MG: 400 TABLET ORAL at 08:42

## 2021-12-12 RX ADMIN — IPRATROPIUM BROMIDE AND ALBUTEROL SULFATE 1 AMPULE: .5; 2.5 SOLUTION RESPIRATORY (INHALATION) at 13:11

## 2021-12-12 RX ADMIN — FUROSEMIDE 20 MG: 10 INJECTION, SOLUTION INTRAMUSCULAR; INTRAVENOUS at 04:45

## 2021-12-12 RX ADMIN — FUROSEMIDE 40 MG: 10 INJECTION, SOLUTION INTRAMUSCULAR; INTRAVENOUS at 08:43

## 2021-12-12 RX ADMIN — IPRATROPIUM BROMIDE AND ALBUTEROL SULFATE 1 AMPULE: .5; 2.5 SOLUTION RESPIRATORY (INHALATION) at 00:00

## 2021-12-12 RX ADMIN — ATORVASTATIN CALCIUM 40 MG: 40 TABLET, FILM COATED ORAL at 20:32

## 2021-12-12 RX ADMIN — CEFEPIME 2000 MG: 2 INJECTION, POWDER, FOR SOLUTION INTRAVENOUS at 10:27

## 2021-12-12 RX ADMIN — MORPHINE SULFATE 1 MG: 2 INJECTION, SOLUTION INTRAMUSCULAR; INTRAVENOUS at 00:59

## 2021-12-12 RX ADMIN — GUAIFENESIN 400 MG: 400 TABLET ORAL at 13:40

## 2021-12-12 RX ADMIN — APIXABAN 5 MG: 5 TABLET, FILM COATED ORAL at 20:32

## 2021-12-12 RX ADMIN — SODIUM CHLORIDE, PRESERVATIVE FREE 10 ML: 5 INJECTION INTRAVENOUS at 01:00

## 2021-12-12 RX ADMIN — AMLODIPINE BESYLATE 10 MG: 10 TABLET ORAL at 08:42

## 2021-12-12 RX ADMIN — LORAZEPAM: 2 INJECTION INTRAMUSCULAR; INTRAVENOUS at 04:30

## 2021-12-12 RX ADMIN — LORAZEPAM 0.5 MG: 2 INJECTION INTRAMUSCULAR; INTRAVENOUS at 08:45

## 2021-12-12 RX ADMIN — CEFEPIME 2000 MG: 2 INJECTION, POWDER, FOR SOLUTION INTRAVENOUS at 23:15

## 2021-12-12 RX ADMIN — VANCOMYCIN HYDROCHLORIDE 1250 MG: 10 INJECTION, POWDER, LYOPHILIZED, FOR SOLUTION INTRAVENOUS at 16:40

## 2021-12-12 RX ADMIN — GUAIFENESIN 400 MG: 400 TABLET ORAL at 20:33

## 2021-12-12 RX ADMIN — PHENYTOIN SODIUM 300 MG: 100 CAPSULE, EXTENDED RELEASE ORAL at 20:32

## 2021-12-12 RX ADMIN — IPRATROPIUM BROMIDE AND ALBUTEROL SULFATE 1 AMPULE: .5; 2.5 SOLUTION RESPIRATORY (INHALATION) at 15:28

## 2021-12-12 RX ADMIN — METHYLPREDNISOLONE SODIUM SUCCINATE 40 MG: 40 INJECTION, POWDER, FOR SOLUTION INTRAMUSCULAR; INTRAVENOUS at 13:50

## 2021-12-12 RX ADMIN — LABETALOL HYDROCHLORIDE: 5 INJECTION, SOLUTION INTRAVENOUS at 04:33

## 2021-12-12 RX ADMIN — HYDROXYZINE HYDROCHLORIDE 50 MG: 50 INJECTION, SOLUTION INTRAMUSCULAR at 04:53

## 2021-12-12 RX ADMIN — MORPHINE SULFATE 1 MG: 2 INJECTION, SOLUTION INTRAMUSCULAR; INTRAVENOUS at 08:44

## 2021-12-12 RX ADMIN — MORPHINE SULFATE 1 MG: 2 INJECTION, SOLUTION INTRAMUSCULAR; INTRAVENOUS at 04:25

## 2021-12-12 RX ADMIN — Medication 10 ML: at 20:33

## 2021-12-12 RX ADMIN — PHENYTOIN SODIUM 200 MG: 100 CAPSULE, EXTENDED RELEASE ORAL at 08:41

## 2021-12-12 RX ADMIN — LORAZEPAM 0.5 MG: 2 INJECTION INTRAMUSCULAR; INTRAVENOUS at 21:06

## 2021-12-12 RX ADMIN — LORAZEPAM 0.5 MG: 2 INJECTION INTRAMUSCULAR; INTRAVENOUS at 15:53

## 2021-12-12 RX ADMIN — Medication 10 ML: at 08:44

## 2021-12-12 RX ADMIN — APIXABAN 5 MG: 5 TABLET, FILM COATED ORAL at 08:42

## 2021-12-12 RX ADMIN — IPRATROPIUM BROMIDE AND ALBUTEROL SULFATE 1 AMPULE: .5; 2.5 SOLUTION RESPIRATORY (INHALATION) at 08:08

## 2021-12-12 RX ADMIN — ACETAMINOPHEN 650 MG: 325 TABLET ORAL at 05:14

## 2021-12-12 RX ADMIN — MORPHINE SULFATE 1 MG: 2 INJECTION, SOLUTION INTRAMUSCULAR; INTRAVENOUS at 23:11

## 2021-12-12 RX ADMIN — FUROSEMIDE 40 MG: 10 INJECTION, SOLUTION INTRAMUSCULAR; INTRAVENOUS at 16:40

## 2021-12-12 RX ADMIN — GABAPENTIN 300 MG: 300 CAPSULE ORAL at 08:42

## 2021-12-12 RX ADMIN — IPRATROPIUM BROMIDE AND ALBUTEROL SULFATE 1 AMPULE: .5; 2.5 SOLUTION RESPIRATORY (INHALATION) at 19:55

## 2021-12-12 ASSESSMENT — PAIN SCALES - GENERAL
PAINLEVEL_OUTOF10: 0

## 2021-12-12 NOTE — PROGRESS NOTES
Pharmacy Consultation Note  (Antibiotic Dosing and Monitoring)    Initial consult date: 12/10/21  Consulting physician/provider: Dr. Stuart Durant  Drug: Vancomycin  Indication: Pneumonia    Age/  Gender Height Weight IBW  Allergy Information   56 y.o./male 6' (182.9 cm) 278 lb (126.1 kg)     Ideal body weight: 77.6 kg (171 lb 1.2 oz)  Adjusted ideal body weight: 96.8 kg (213 lb 5.1 oz)   Patient has no known allergies. Renal Function:  Recent Labs     12/10/21  1043 12/11/21  0700 12/12/21  0455   BUN 12 11 16   CREATININE 0.9 0.8 0.9       Intake/Output Summary (Last 24 hours) at 12/12/2021 1112  Last data filed at 12/12/2021 0525  Gross per 24 hour   Intake 120 ml   Output 2725 ml   Net -2605 ml       Vancomycin Monitoring:  Trough:    Recent Labs     12/12/21  0455   VANCOTROUGH 21.3*     Random:  No results for input(s): VANCORANDOM in the last 72 hours. Vancomycin Administration Times:  Recent vancomycin administrations                   vancomycin 1500 mg in dextrose 5% 300 mL IVPB (mg) 1,500 mg New Bag 12/11/21 1819    vancomycin (VANCOCIN) 1,750 mg in dextrose 5 % 500 mL IVPB (mg) 1,750 mg New Bag 12/11/21 0625    vancomycin (VANCOCIN) 2,500 mg in dextrose 5 % 500 mL IVPB (mg) 2,500 mg New Bag 12/10/21 2255                Assessment:  · Patient is a 60 y.o. male who has been initiated on vancomycin  · Estimated Creatinine Clearance: 122 mL/min (based on SCr of 0.9 mg/dL). · To dose vancomycin, pharmacy will be utilizing [employer]ink[employer] calculation software for goal AUC/VIOLET 400-600 mg/L-hr   · Creatinine stable at 0.9. Level ~10.5 hours post dose was 21.3 this morning.   Inputted into [employer], current regimen with predicted AUC/VIOLET of 620 and steady state trough of 20    Plan:  · Decrease vancomycin to 1.25gm Q12h with predicted AUC/VIOLET of 513 and steady state trough of 16.8  · Will check vancomycin levels when appropriate  · Will continue to monitor renal function     Beatriz Santos, PharmD, BCCCP 12/12/2021 11:14 AM  Pharmacy Clinical Specialist, Emergency Medicine  [phone]

## 2021-12-12 NOTE — CONSULTS
Department of Internal Medicine  Infectious Diseases   Consult Note      Reason for Consult: Pneumonia, respiratory failure     Requesting Physician:  Dr Princess Sprague:             Pt is known to ID . This is a 60 yrs old male with hx of COVID 19 pneumonia, MSSA E coli /Enterobacter pneumonia in August /Sept 2021 - he required prolonged hospitalization s/p trach ,  [facility]- was treated with zosyn and tobramycin . Pt presented to the ER with increased shortness of breath, cough for about ~1 week, reported leg swelling . He denied fever, chills sputum expectoration . WBC was 19 K, procalcitonin 3.7, proBNP 4909 , crp 3.7   CTA  Chest - no PE, bilateral lung opacities, consolidation .   Pt required BiPAP for resp failure   Vancomycin and Cefepime started       Past Medical History:      Past Medical History:   Diagnosis Date    HIGH CHOLESTEROL     Hypertension     Seizures ([address]        Past Surgical History:      Past Surgical History:   Procedure Laterality Date    APPENDECTOMY      BRONCHOSCOPY N/A 9/8/2021    BRONCHOSCOPY performed by Carolyn Vanessa MD at [id number]  9/8/2021         TRACHEOSTOMY N/A 9/9/2021    TRACHEOTOMY performed by Prieto Parekh MD at [place] [address] N/A 9/9/2021    EGD ESOPHAGOGASTRODUODENOSCOPY PEG TUBE INSERTION performed by Prieto Parekh MD at [facility] OR         Current Medications:      Current Facility-Administered Medications   Medication Dose Route Frequency Provider Last Rate Last Admin    morphine (PF) injection 1 mg  1 mg IntraVENous Q4H PRN Caitlin Maciel MD   1 mg at 12/12/21 0844    LORazepam (ATIVAN) injection 0.5 mg  0.5 mg IntraVENous Q4H PRN Kam Davis MD   0.5 mg at 12/12/21 0845    labetalol (NORMODYNE;TRANDATE) injection 5 mg  5 mg IntraVENous Once Kam Davis MD        influenza quadrivalent split vaccine Lesher, DO   300 mg at 12/12/21 5875    phenytoin (DILANTIN) ER capsule 300 mg  300 mg Oral Nightly YevgeniyStockton LUCAS Lesher, DO   300 mg at 12/11/21 2028    phenytoin (DILANTIN) ER capsule 200 mg  200 mg Oral QAM YevgeniyStockton LUCAS Marte, DO   200 mg at 12/12/21 8420    furosemide (LASIX) injection 40 mg  40 mg IntraVENous BID [facility] LUCAS Marte, DO   40 mg at 12/12/21 9631       Allergies:  Patient has no known allergies. Social History:      Social History     Socioeconomic History    Marital status:      Spouse name: Not on file    Number of children: Not on file    Years of education: Not on file    Highest education level: Not on file   Occupational History    Not on file   Tobacco Use    Smoking status: Never Smoker    Smokeless tobacco: Never Used   Substance and Sexual Activity    Alcohol use: Not on file    Drug use: Not on file    Sexual activity: Not on file   Other Topics Concern    Not on file   Social History Narrative    Not on file     Social Determinants of Health     Financial Resource Strain: Low Risk     Difficulty of Paying Living Expenses: Not hard at all   Food Insecurity: No Food Insecurity    Worried About [facility] in the Last Year: Never true    [facility] in the Last Year: Never true   Transportation Needs:     Lack of Transportation (Medical): Not on file    Lack of Transportation (Non-Medical):  Not on file   Physical Activity:     Days of Exercise per Week: Not on file    Minutes of Exercise per Session: Not on file   Stress:     Feeling of Stress : Not on file   Social Connections:     Frequency of Communication with Friends and Family: Not on file    Frequency of Social Gatherings with Friends and Family: Not on file    Attends [employer] Services: Not on file    Active Member of Clubs or Organizations: Not on file    Attends Club or Organization Meetings: Not on file    Marital Status: Not on file   Intimate Partner Violence:     Fear of Current or Ex-Partner: Not on file    Emotionally Abused: Not on file    Physically Abused: Not on file    Sexually Abused: Not on file   Housing Stability:     Unable to Pay for Housing in the Last Year: Not on file    Number of Places Lived in the Last Year: Not on file    Unstable Housing in the Last Year: Not on file         Family History:     Not pertinent to present illness     REVIEW OF SYSTEMS:    CONSTITUTIONAL:  Denies fever, chill or rigors, nausea or vomiting. HEENT: denies blurring of vision or double vision, denies hearing problem  RESPIRATORY: SOB . CARDIOVASCULAR:  Denies palpitation  GASTROINTESTINAL:  Denies abdomen pain, diarrhea or constipation. GENITOURINARY:  Denies burning urination or frequency of urination  INTEGUMENT: denies wound , rash  HEMATOLOGIC/LYMPHATIC:  Denies lymph node swelling, gum bleeding or easy bruising. MUSCULOSKELETAL: Leg swelling   NEUROLOGICAL:  Denies light headed, dizziness, loss of consciousness      PHYSICAL EXAM:      Vitals:     /75   Pulse 98   Temp 97.5 °F (36.4 °C) (Temporal)   Resp (!) 31   Ht 6' (1.829 m)   Wt 276 lb 10.8 oz (125.5 kg)   SpO2 92%   BMI 37.52 kg/m²     General Appearance:    Awake, alert ,in resp distress . Head:    Normocephalic, atraumatic   Eyes:    No pallor, no icterus,   Ears:    No obvious deformity or drainage. Nose:   No nasal drainage   Throat:   Mucosa moist, no oral thrush   Neck:   Supple, no lymphadenopathy   Back:     no CVA tenderness   Lungs:      In resp distress, on BiPAP,lungs clear     Heart:    Regular rate and rhythm   Abdomen:     Soft, non-tender, bowel sounds present    Extremities:   ++ edema, non tender    Pulses:   Dorsalis pedis palpable    Skin:   No rash        CBC with Differential:      Lab Results   Component Value Date    WBC 19.2 12/12/2021    RBC 3.57 12/12/2021    HGB 10.3 12/12/2021    HCT 32.9 12/12/2021     12/12/2021    MCV 92.2 12/12/2021    MCH 28.9 12/12/2021 Adenovirus by PCR Not Detected    Bordetella parapertussis by PCR Not Detected    Bordetella pertussis by PCR Not Detected    Chlamydophilia pneumoniae by PCR Not Detected    Coronavirus 229E by PCR Not Detected    Coronavirus HKU1 by PCR Not Detected    Coronavirus NL63 by PCR Not Detected    Coronavirus OC43 by PCR Not Detected    SARS-CoV-2, PCR Not Detected    Human Metapneumovirus by PCR Not Detected    Human Rhinovirus/Enterovirus by PCR Not Detected    Influenza A by PCR Not Detected    Influenza B by PCR Not Detected    Mycoplasma pneumoniae by PCR Not Detected    Parainfluenza Virus 1 by PCR Not Detected    Parainfluenza Virus 2 by PCR Not Detected    Parainfluenza Virus 3 by PCR Not Detected    Parainfluenza Virus 4 by PCR Not Detected    Respiratory Syncytial Virus by PCR Not Detected   COVID-19, Rapid [[id number]]            Radiology :    CTA chest -   Impression:       No evidence of pulmonary embolism     Consolidation and ground-glass opacities of the lungs is suspicious for   infectious process. Small bilateral pleural effusions. IMPRESSION:     1. Respiratory failure   2. Pneumonia  3. Post COVID pulmonary fibrosis   4. Leukocytosis, elevated procalcitonin       RECOMMENDATIONS:      1. Cefepime 2 grams IV q 12 hrs, Vancomycin to 1250 mg IV q 12 hrs  2. Serum galactomannan test   3.  Diuretics     Thank you Dr Jun Perry for the consult

## 2021-12-12 NOTE — PROGRESS NOTES
Angel Luis Trevino M.D.,[facility]  Roddy Taylor D.O., F.A.C.O.I., Vivian Hodge M.D. Marya Stokes M.D. Ashish Martinez D.O. Daily Pulmonary Progress Note    Patient:  Alberto Flores 60 y.o. male MRN: [id number]     Date of Service: 12/12/2021      Synopsis     We are following patient for acute hypoxic resp failure and history of COVID -19  pneumonia in August 2021    \"CC\" :SOB    Code status: Full       Subjective      Patient was seen and examined. He had desaturated earlier this a.m. and has b een on AVAPS. I was able to take him off and on HFNC and NRBM he is saturating 88-92%. Hs has responded to diuresis and so far has diuresed 2.9 liters. He has history of anxiety and currently while looking at his saturations he gets tachypnea and anxious. Has no cough    12/12: I personally seen and examined patient. Patient currently continues to be on NIV with FiO2 of 100% and PEEP of 10 and saturating 94%. He was for very short time on OptiFlow to be able to be eat lunch. He has diuresed so far 5.2 L. He is still anxious but the Ativan appears to help. Review of Systems:  Constitutional: Denies fever, weight loss, night sweats, and fatigue  Skin: Denies pigmentation, dark lesions, and rashes   HEENT: Denies hearing loss, tinnitus, ear drainage, epistaxis, sore throat, and hoarseness. Cardiovascular: Denies palpitations, chest pain, and chest pressure. Respiratory: Denies cough, dyspnea at rest, hemoptysis, apnea, and choking.   Gastrointestinal: Denies nausea, vomiting, poor appetite, diarrhea, heartburn or reflux  Genitourinary: Denies dysuria, frequency, urgency or hematuria  Musculoskeletal: Denies myalgias, muscle weakness, and bone pain, positive for LE swelling  Neurological: Denies dizziness, vertigo, headache, and focal weakness  Psychological: Denies anxiety and depression  Endocrine: Denies heat intolerance and cold intolerance  Hematopoietic/Lymphatic: Denies bleeding problems and blood transfusions    24-hour events:      Objective   Vitals: /75   Pulse 98   Temp 97.5 °F (36.4 °C) (Temporal)   Resp (!) 32   Ht 6' (1.829 m)   Wt 276 lb 10.8 oz (125.5 kg)   SpO2 92%   BMI 37.52 kg/m²     I/O:    Intake/Output Summary (Last 24 hours) at 12/12/2021 1334  Last data filed at 12/12/2021 0525  Gross per 24 hour   Intake 120 ml   Output 1125 ml   Net -1005 ml       Vent Information  Skin Assessment: Redness (see comment/note)  Equipment ID: [id number]  Vt Ordered: 500 mL  FiO2 : 100 %  SpO2: 92 %  SpO2/FiO2 ratio: 92  I Time/ I Time %: 0.8 s  Mask Type: Full face mask  Mask Size: Large       IPAP: 18 cmH20  CPAP/EPAP: 12 cmH2O     CURRENT MEDS :  Scheduled Meds:   labetalol  5 mg IntraVENous Once    vancomycin  1,250 mg IntraVENous Q12H    influenza virus vaccine  0.5 mL IntraMUSCular Prior to discharge    guaiFENesin  400 mg Oral TID    ipratropium-albuterol  1 ampule Inhalation Q4H While awake    cefepime  2,000 mg IntraVENous Q12H    sodium chloride flush  5-40 mL IntraVENous 2 times per day    amLODIPine  10 mg Oral Daily    atorvastatin  40 mg Oral Nightly    apixaban  5 mg Oral BID    gabapentin  300 mg Oral Daily    phenytoin  300 mg Oral Nightly    phenytoin  200 mg Oral QAM    furosemide  40 mg IntraVENous BID       Physical Exam:  General Appearance: appears comfortable in no acute distress. HEENT: Normocephalic atraumatic without obvious abnormality   Neck: Lips, mucosa, and tongue normal.  Supple, symmetrical, trachea midline, no adenopathy;thyroid:  no enlargement/tenderness/nodules or JVD. Lung: Breath sounds CTA. Respirations   unlabored. Symmetrical expansion. Heart: RRR, normal S1, S2. No MRG  Abdomen: Soft, NT, ND. BS present x 4 quadrants. No bruit or organomegaly. Extremities: Pedal pulses 2+ symmetric b/l. Extremities normal, no cyanosis, clubbing,+2edema. Neurologic: Mental status: Alert and Oriented X3 .     Pertinent/ New Labs and Imaging Studies     Imaging Personally Reviewed:                    Impression   No evidence of pulmonary embolism       Consolidation and ground-glass opacities of the lungs is suspicious for   infectious process.       Small bilateral pleural effusions.                   ECHO     Summary   Left ventricle grossly normal in size. Mild - moderate left ventricular concentric hypertrophy noted. Normal LV segmental wall motion. Estimated left ventricular ejection fraction is 59±5%. Does not meet 50% threshold for diastolic dysfunction. The LAESV Index is <34ml/m2. Mildly dilated right ventricle. Right ventricular segmental wall motion is normal.   Physiologic and/or trace mitral regurgitation is present. Trace aortic regurgitation is noted. Physiologic and/or trace tricuspid regurgitation. Physiologic and/or trace pulmonic regurgitation present. Technically good quality study. No previous echo for comparison. Suggest clinical correlation.       Signature      ----------------------------------------------------------------   Electronically signed by Loreto Hancock DO(Interpreting   physician) on 10/03/2021 01:49 PM   ----------------------------------------------------------------    Labs:  Lab Results   Component Value Date    WBC 19.2 12/12/2021    HGB 10.3 12/12/2021    HCT 32.9 12/12/2021    MCV 92.2 12/12/2021    MCH 28.9 12/12/2021    MCHC 31.3 12/12/2021    RDW 14.8 12/12/2021     12/12/2021    MPV 10.0 12/12/2021     Lab Results   Component Value Date     12/12/2021    K 3.9 12/12/2021    K 3.7 08/21/2021    CL 99 12/12/2021    CO2 27 12/12/2021    BUN 16 12/12/2021    CREATININE 0.9 12/12/2021    LABALBU 2.7 12/12/2021    CALCIUM 8.6 12/12/2021    GFRAA >60 12/12/2021    LABGLOM >60 12/12/2021     Lab Results   Component Value Date    PROTIME 12.2 12/10/2021    INR 1.1 12/10/2021     Recent Labs     12/12/21  0455   PROBNP 4,909*     Recent Labs     12/12/21  0455   PROCAL 3.84* This SmartLink has not been configured with any valid records. Micro:  No results for input(s): CULTRESP in the last 72 hours. No results for input(s): LABGRAM in the last 72 hours. No results for input(s): LEGUR in the last 72 hours. Recent Labs     12/10/21  2216   STREPNEUMAGU Presumptive negative- suggests no current or recent  pneumococcal infection. Infection due to Strep pneumoniae cannot be  ruled out since the antigen present in the sample  may be below the detection limit of the test.  Normal Range:Presumptive Negative       Recent Labs     12/10/21  2216   LP1UAG Presumptive Negative -suggesting no recent or current infections  with Legionella pneumophila serogroup 1. Infection to Legionella cannot be ruled out since other serogroups  and species may cause infection, antigen may not be present in  early infection, or level of antigen may be below the  detection limit. Normal Range: Presumptive Negative            Assessment:    1. Acute hypoxic respiratory failure  2. Bilateral patchy infiltrates differential diagnosis includes healthcare associated pneumonia versus possibly volume overload/Acute diastlic HF  3. History of severe Covid pneumonia in August 2021  4. H/O anxiety      Plan:   1. Alternate between NIV and Optiflow. We will try to wean FiO2 as tolerated for saturations above 90%  2. Continue diuresis, monitor I&O's closely  3. We will start patient on IV Solu-Medrol 40 mg every 6 hours  4. Continue Duoneb  5. Appreciate infectious disease input. Continue cefepime and vancomycin will follow serum galactomannan results. 6. Chest x-ray shows slight improvement in the upper lobes. We will continue to monitor. If there is no significant improvement will consider doing a bronchoscopy but patient most likely will need to be intubated for the procedure. 7. Continue Ativan as needed  8. Discussed with wife at the bedside and nursing staff.     Discussed with patient and wife about goals of care. Patient does wish to be intubated if his respiratory status declines.         Electronically signed by Lauri Darling MD on 12/12/2021 at 1:34 PM

## 2021-12-12 NOTE — PROGRESS NOTES
12/12/21 0808   Patient Observation   Observations was asked by nurse to trial patient on Airvo. Placed patient on Airvo 60L 100% so he could have breakfast. Patient SpO2 dropped to 64% after 5 minutes. Patient placed back on bipap on AVAPS settings.

## 2021-12-12 NOTE — PROGRESS NOTES
Hospitalist Progress Note      SYNOPSIS: Patient admitted on 12/10/2021  presents to [facility] ER complaining of SOB.     Lyndon Park has a past medical history that includes diabetes, history of seizures, hyperlipidemia, recent [address] originally presented to [facility] ER with shortness of breath and was found to have a pulse ox of 35% on room air upon arrival to the ER. He was placed on BiPAP in the ER and due to respiratory failure concern he was transferred ED to ED. He states that last night around 10:30 PM he developed acute shortness of breath. He states that it progressed and was exertional.  He also admits to leg swelling. In the ER he looks more comfortable and is on a nonrebreather mask. He does have a history of Covid admission where he required long-term acute care stay. CTA showed no evidence of pulmonary embolism Heparin drip will be stopped that was started in [place] ER for concern for PE. Consolidation and groundglass opacities of the lungs is suspicious for infectious process. There are small bilateral pleural effusions. SUBJECTIVE:  Stable overnight. No other overnight issues reported. Patient seen and examined  Records reviewed. Desaturates quickly  He is now on BiPAP  RRTed overnight for respiratory distress  Fever overnight 100.6  Procalcitonin increased to 3.84  Originally saying did not want intubation during RRT but wife was saying he was just scared-- full code      Temp (24hrs), Av.5 °F (36.9 °C), Min:97.3 °F (36.3 °C), Max:100.6 °F (38.1 °C)    DIET: ADULT DIET;  Regular; 4 carb choices (60 gm/meal)  CODE: Full Code    Intake/Output Summary (Last 24 hours) at 2021 0709  Last data filed at 2021 0525  Gross per 24 hour   Intake 120 ml   Output 2725 ml   Net -2605 ml       Review of Systems  All bolded are positive; please see HPI  General:  Fever, chills, diaphoresis, fatigue, malaise, night sweats, weight loss  Psychological:  Anxiety, disorientation, hallucinations. ENT:  Epistaxis, headaches, vertigo, visual changes. Cardiovascular:  Chest pain, irregular heartbeats, palpitations, paroxysmal nocturnal dyspnea. Respiratory:  Shortness of breath, coughing, sputum production, hemoptysis, wheezing, orthopnea. Gastrointestinal:  Nausea, vomiting, diarrhea, heartburn, constipation, abdominal pain, hematemesis, hematochezia, melena, acholic stools  Genito-Urinary:  Dysuria, urgency, frequency, hematuria  Musculoskeletal:  Joint pain, joint stiffness, joint swelling, muscle pain  Neurology:  Headache, focal neurological deficits, weakness, numbness, paresthesia  Derm:  Rashes, ulcers, excoriations, bruising  Extremities:  Decreased ROM, peripheral edema, mottling      OBJECTIVE:    /75   Pulse 98   Temp 97.5 °F (36.4 °C) (Temporal)   Resp (!) 35   Ht 6' (1.829 m)   Wt 276 lb 10.8 oz (125.5 kg)   SpO2 94%   BMI 37.52 kg/m²     General appearance:  awake, alert, and oriented to person, place, time, and purpose; appears stated age and cooperative; no apparent distress no labored breathing BiPAP  HEENT:  Conjunctivae/corneas clear. Neck: Supple. No jugular venous distention. Respiratory: symmetrical; coarse bilaterally; no wheezes; no rhonchi; no rales  Cardiovascular: rhythm regular; rate controlled; no murmurs  Abdomen: Soft, nontender, nondistended  Extremities:  peripheral pulses present; no peripheral edema; no ulcers  Musculoskeletal: No clubbing, cyanosis, no bilateral lower extremity edema. Brisk capillary refill. Skin:  No rashes  on visible skin  Neurologic: awake, alert and following commands     ASSESSMENT and PLAN:  · Acute hypoxemic respiratory failure due to pneumonia- found to be 30% in ER. Blood cultures sent from ED. Sputum culture. Pneumococcal antigen neg. Legionella urine antigenneg. Respiratory DFA panel neg. Continuous pulse oximetry monitoring. Duonebs q4. Mucinex. Antibiotics: Cefepime and vanc. Pulm consulted. ID consulted. Procal elevated 0.27>3.84  · Sepsis due to above  · Decompensated chf- on lasix. Recednt eccho 10/3 Estimated left ventricular ejection fraction is 59±5%. · History of DVT- on eliquis normally, says he has not missed any doses  · Lactic acidosis- now resolved  · Elevated troponin- denies chest pain. 46>104>105>87. Likely due to hypoxia.    · Non insulin dependent type 2 diabetes mellitus  · History of seizures- on dilantin  · Hyperlipidemia           DISPOSITION: Continue current plan of care    Medications:  REVIEWED DAILY    Infusion Medications    sodium chloride       Scheduled Medications    labetalol  5 mg IntraVENous Once    influenza virus vaccine  0.5 mL IntraMUSCular Prior to discharge    vancomycin  1,500 mg IntraVENous Q12H    guaiFENesin  400 mg Oral TID    ipratropium-albuterol  1 ampule Inhalation Q4H While awake    cefepime  2,000 mg IntraVENous Q12H    sodium chloride flush  5-40 mL IntraVENous 2 times per day    amLODIPine  10 mg Oral Daily    atorvastatin  40 mg Oral Nightly    apixaban  5 mg Oral BID    gabapentin  300 mg Oral Daily    phenytoin  300 mg Oral Nightly    phenytoin  200 mg Oral QAM    furosemide  40 mg IntraVENous BID     PRN Meds: morphine, LORazepam, melatonin, sodium chloride flush, sodium chloride, ondansetron **OR** ondansetron, polyethylene glycol, acetaminophen **OR** acetaminophen    Labs:     Recent Labs     12/10/21  1043 12/11/21  0700 12/12/21  0455   WBC 17.5* 16.9* 19.2*   HGB 11.1* 10.6* 10.3*   HCT 35.4* 33.2* 32.9*    161 156       Recent Labs     12/10/21  1043 12/11/21  0700 12/12/21  0455    138 136   K 4.3 3.9 3.9    101 99   CO2 27 27 27   BUN 12 11 16   CREATININE 0.9 0.8 0.9   CALCIUM 8.6 8.7 8.6   PHOS  --  3.1 3.5       Recent Labs     12/10/21  0649 12/11/21  0700 12/12/21  0455   PROT 6.4 6.2* 5.9*   ALKPHOS 86 69 63   ALT 13 13 14   AST 17 22 20   BILITOT 0.3 0.4 0.5   LIPASE 21  --   --        Recent Labs 12/10/21  0649   INR 1.1       No results for input(s): CKTOTAL, TROPONINI in the last 72 hours. Chronic labs:    Lab Results   Component Value Date    CHOL 173 12/11/2021    TRIG 100 12/11/2021    HDL 65 12/11/2021    LDLCALC 88 12/11/2021    TSH 0.507 12/11/2021    PSA 0.84 12/12/2020    INR 1.1 12/10/2021    LABA1C 5.3 12/11/2021       Radiology: REVIEWED DAILY    +++++++++++++++++++++++++++++++++++++++++++++++++  DO Carlos Reeves Physician - [address] [place], [place]  +++++++++++++++++++++++++++++++++++++++++++++++++  NOTE: This report was transcribed using voice recognition software. Every effort was made to ensure accuracy; however, inadvertent computerized transcription errors may be present.

## 2021-12-12 NOTE — PLAN OF CARE
Patient is AOx4. Talked to patient regarding wishes if he were too need intubation and his heart were to stop. Patient states that he does not want intubation but does want CPR, Meds and shock if indicated. Primary will continue to have these discussions with patient moving forward.

## 2021-12-13 ENCOUNTER — APPOINTMENT (OUTPATIENT)
Dept: GENERAL RADIOLOGY | Age: 56
DRG: 720 | End: 2021-12-13
Payer: COMMERCIAL

## 2021-12-13 LAB
ALBUMIN SERPL-MCNC: 2.6 G/DL (ref 3.5–5.2)
ALP BLD-CCNC: 70 U/L (ref 40–129)
ALT SERPL-CCNC: 15 U/L (ref 0–40)
ANION GAP SERPL CALCULATED.3IONS-SCNC: 9 MMOL/L (ref 7–16)
AST SERPL-CCNC: 22 U/L (ref 0–39)
BILIRUB SERPL-MCNC: 0.2 MG/DL (ref 0–1.2)
BUN BLDV-MCNC: 26 MG/DL (ref 6–20)
CALCIUM SERPL-MCNC: 8.5 MG/DL (ref 8.6–10.2)
CHLORIDE BLD-SCNC: 101 MMOL/L (ref 98–107)
CO2: 30 MMOL/L (ref 22–29)
CREAT SERPL-MCNC: 1.1 MG/DL (ref 0.7–1.2)
GFR AFRICAN AMERICAN: >60
GFR NON-AFRICAN AMERICAN: >60 ML/MIN/1.73
GLUCOSE BLD-MCNC: 173 MG/DL (ref 74–99)
HCT VFR BLD CALC: 30.4 % (ref 37–54)
HEMOGLOBIN: 9.5 G/DL (ref 12.5–16.5)
MCH RBC QN AUTO: 29.3 PG (ref 26–35)
MCHC RBC AUTO-ENTMCNC: 31.3 % (ref 32–34.5)
MCV RBC AUTO: 93.8 FL (ref 80–99.9)
PDW BLD-RTO: 14.2 FL (ref 11.5–15)
PLATELET # BLD: 126 E9/L (ref 130–450)
PMV BLD AUTO: 10.7 FL (ref 7–12)
POTASSIUM SERPL-SCNC: 4.4 MMOL/L (ref 3.5–5)
RBC # BLD: 3.24 E12/L (ref 3.8–5.8)
SODIUM BLD-SCNC: 140 MMOL/L (ref 132–146)
TOTAL PROTEIN: 5.6 G/DL (ref 6.4–8.3)
WBC # BLD: 10.6 E9/L (ref 4.5–11.5)

## 2021-12-13 PROCEDURE — 71045 X-RAY EXAM CHEST 1 VIEW: CPT

## 2021-12-13 PROCEDURE — 6360000002 HC RX W HCPCS: Performed by: INTERNAL MEDICINE

## 2021-12-13 PROCEDURE — 6370000000 HC RX 637 (ALT 250 FOR IP): Performed by: INTERNAL MEDICINE

## 2021-12-13 PROCEDURE — P9047 ALBUMIN (HUMAN), 25%, 50ML: HCPCS | Performed by: INTERNAL MEDICINE

## 2021-12-13 PROCEDURE — 2700000000 HC OXYGEN THERAPY PER DAY

## 2021-12-13 PROCEDURE — 2060000000 HC ICU INTERMEDIATE R&B

## 2021-12-13 PROCEDURE — 80053 COMPREHEN METABOLIC PANEL: CPT

## 2021-12-13 PROCEDURE — 2580000003 HC RX 258: Performed by: INTERNAL MEDICINE

## 2021-12-13 PROCEDURE — 6360000002 HC RX W HCPCS

## 2021-12-13 PROCEDURE — 94640 AIRWAY INHALATION TREATMENT: CPT

## 2021-12-13 PROCEDURE — 36415 COLL VENOUS BLD VENIPUNCTURE: CPT

## 2021-12-13 PROCEDURE — 6360000002 HC RX W HCPCS: Performed by: FAMILY MEDICINE

## 2021-12-13 PROCEDURE — 94660 CPAP INITIATION&MGMT: CPT

## 2021-12-13 PROCEDURE — 85027 COMPLETE CBC AUTOMATED: CPT

## 2021-12-13 RX ORDER — ALBUMIN (HUMAN) 12.5 G/50ML
25 SOLUTION INTRAVENOUS EVERY 12 HOURS
Status: DISCONTINUED | OUTPATIENT
Start: 2021-12-13 | End: 2021-12-17

## 2021-12-13 RX ADMIN — LORAZEPAM 0.5 MG: 2 INJECTION INTRAMUSCULAR; INTRAVENOUS at 22:42

## 2021-12-13 RX ADMIN — IPRATROPIUM BROMIDE AND ALBUTEROL SULFATE 1 AMPULE: .5; 2.5 SOLUTION RESPIRATORY (INHALATION) at 13:07

## 2021-12-13 RX ADMIN — CEFEPIME 2000 MG: 2 INJECTION, POWDER, FOR SOLUTION INTRAVENOUS at 11:47

## 2021-12-13 RX ADMIN — METHYLPREDNISOLONE SODIUM SUCCINATE 40 MG: 40 INJECTION, POWDER, FOR SOLUTION INTRAMUSCULAR; INTRAVENOUS at 21:00

## 2021-12-13 RX ADMIN — METHYLPREDNISOLONE SODIUM SUCCINATE 40 MG: 40 INJECTION, POWDER, FOR SOLUTION INTRAMUSCULAR; INTRAVENOUS at 13:26

## 2021-12-13 RX ADMIN — GUAIFENESIN 400 MG: 400 TABLET ORAL at 21:00

## 2021-12-13 RX ADMIN — METHYLPREDNISOLONE SODIUM SUCCINATE 40 MG: 40 INJECTION, POWDER, FOR SOLUTION INTRAMUSCULAR; INTRAVENOUS at 01:11

## 2021-12-13 RX ADMIN — PHENYTOIN SODIUM 200 MG: 100 CAPSULE, EXTENDED RELEASE ORAL at 09:03

## 2021-12-13 RX ADMIN — ALBUMIN (HUMAN) 25 G: 0.25 INJECTION, SOLUTION INTRAVENOUS at 20:59

## 2021-12-13 RX ADMIN — Medication 10 ML: at 08:56

## 2021-12-13 RX ADMIN — ALBUMIN (HUMAN) 25 G: 0.25 INJECTION, SOLUTION INTRAVENOUS at 10:39

## 2021-12-13 RX ADMIN — VANCOMYCIN HYDROCHLORIDE 1250 MG: 10 INJECTION, POWDER, LYOPHILIZED, FOR SOLUTION INTRAVENOUS at 18:05

## 2021-12-13 RX ADMIN — IPRATROPIUM BROMIDE AND ALBUTEROL SULFATE 1 AMPULE: .5; 2.5 SOLUTION RESPIRATORY (INHALATION) at 20:06

## 2021-12-13 RX ADMIN — VANCOMYCIN HYDROCHLORIDE 1250 MG: 10 INJECTION, POWDER, LYOPHILIZED, FOR SOLUTION INTRAVENOUS at 05:31

## 2021-12-13 RX ADMIN — SODIUM CHLORIDE, PRESERVATIVE FREE 10 ML: 5 INJECTION INTRAVENOUS at 09:01

## 2021-12-13 RX ADMIN — GABAPENTIN 300 MG: 300 CAPSULE ORAL at 09:03

## 2021-12-13 RX ADMIN — FUROSEMIDE 40 MG: 10 INJECTION, SOLUTION INTRAMUSCULAR; INTRAVENOUS at 18:06

## 2021-12-13 RX ADMIN — LORAZEPAM 0.5 MG: 2 INJECTION INTRAMUSCULAR; INTRAVENOUS at 04:05

## 2021-12-13 RX ADMIN — IPRATROPIUM BROMIDE AND ALBUTEROL SULFATE 1 AMPULE: .5; 2.5 SOLUTION RESPIRATORY (INHALATION) at 00:02

## 2021-12-13 RX ADMIN — IPRATROPIUM BROMIDE AND ALBUTEROL SULFATE 1 AMPULE: .5; 2.5 SOLUTION RESPIRATORY (INHALATION) at 16:19

## 2021-12-13 RX ADMIN — SODIUM CHLORIDE, PRESERVATIVE FREE 10 ML: 5 INJECTION INTRAVENOUS at 13:27

## 2021-12-13 RX ADMIN — Medication 10 ML: at 20:59

## 2021-12-13 RX ADMIN — GUAIFENESIN 400 MG: 400 TABLET ORAL at 09:03

## 2021-12-13 RX ADMIN — IPRATROPIUM BROMIDE AND ALBUTEROL SULFATE 1 AMPULE: .5; 2.5 SOLUTION RESPIRATORY (INHALATION) at 08:05

## 2021-12-13 RX ADMIN — CEFEPIME 2000 MG: 2 INJECTION, POWDER, FOR SOLUTION INTRAVENOUS at 22:43

## 2021-12-13 RX ADMIN — PHENYTOIN SODIUM 300 MG: 100 CAPSULE, EXTENDED RELEASE ORAL at 21:00

## 2021-12-13 RX ADMIN — FUROSEMIDE 40 MG: 10 INJECTION, SOLUTION INTRAMUSCULAR; INTRAVENOUS at 08:59

## 2021-12-13 RX ADMIN — SODIUM CHLORIDE, PRESERVATIVE FREE 10 ML: 5 INJECTION INTRAVENOUS at 22:43

## 2021-12-13 RX ADMIN — MORPHINE SULFATE 1 MG: 2 INJECTION, SOLUTION INTRAMUSCULAR; INTRAVENOUS at 07:02

## 2021-12-13 RX ADMIN — METHYLPREDNISOLONE SODIUM SUCCINATE 40 MG: 40 INJECTION, POWDER, FOR SOLUTION INTRAMUSCULAR; INTRAVENOUS at 08:54

## 2021-12-13 RX ADMIN — APIXABAN 5 MG: 5 TABLET, FILM COATED ORAL at 21:00

## 2021-12-13 RX ADMIN — APIXABAN 5 MG: 5 TABLET, FILM COATED ORAL at 09:03

## 2021-12-13 RX ADMIN — AMLODIPINE BESYLATE 10 MG: 10 TABLET ORAL at 09:03

## 2021-12-13 RX ADMIN — ATORVASTATIN CALCIUM 40 MG: 40 TABLET, FILM COATED ORAL at 21:00

## 2021-12-13 RX ADMIN — Medication 10 MG: at 22:42

## 2021-12-13 RX ADMIN — GUAIFENESIN 400 MG: 400 TABLET ORAL at 13:27

## 2021-12-13 ASSESSMENT — PAIN SCALES - GENERAL
PAINLEVEL_OUTOF10: 0

## 2021-12-13 NOTE — PROGRESS NOTES
Pharmacy Consultation Note  (Antibiotic Dosing and Monitoring)    Initial consult date: 12/10/21  Consulting physician/provider: Dr. Rafa Guevara  Drug: Vancomycin  Indication: Pneumonia    Age/  Gender Height Weight IBW  Allergy Information   56 y.o./male 6' (182.9 cm) 278 lb (126.1 kg)     Ideal body weight: 77.6 kg (171 lb 1.2 oz)  Adjusted ideal body weight: 96.5 kg (212 lb 12.3 oz)   Patient has no known allergies. Renal Function:  Recent Labs     12/11/21  0700 12/12/21  0455 12/13/21  0505   BUN 11 16 26*   CREATININE 0.8 0.9 1.1       Intake/Output Summary (Last 24 hours) at 12/13/2021 0926  Last data filed at 12/13/2021 0856  Gross per 24 hour   Intake 460 ml   Output 3450 ml   Net -2990 ml       Vancomycin Monitoring:  Trough:    Recent Labs     12/12/21  0455   VANCOTROUGH 21.3*     Random:  No results for input(s): VANCORANDOM in the last 72 hours. Vancomycin Administration Times:  Recent vancomycin administrations                   vancomycin (VANCOCIN) 1,250 mg in dextrose 5 % 250 mL IVPB (mg) 1,250 mg New Bag 12/13/21 0531     1,250 mg New Bag 12/12/21 1640    vancomycin 1500 mg in dextrose 5% 300 mL IVPB (mg) 1,500 mg New Bag 12/11/21 1819    vancomycin (VANCOCIN) 1,750 mg in dextrose 5 % 500 mL IVPB (mg) 1,750 mg New Bag 12/11/21 0625    vancomycin (VANCOCIN) 2,500 mg in dextrose 5 % 500 mL IVPB (mg) 2,500 mg New Bag 12/10/21 2255              Assessment:  · Patient is a 60 y.o. male who has been initiated on vancomycin  Estimated Creatinine Clearance: 100 mL/min (based on SCr of 1.1 mg/dL). · To dose vancomycin, pharmacy will be utilizing [employer] calculation software for goal AUC/VIOLET 400-600 mg/L-hr   · 12/12: Creatinine stable at 0.9. Level ~10.5 hours post dose was 21.3 this morning. Inputted into [employer], current regimen with predicted AUC/VIOLET of 620 and steady state trough of 20  · 12/13: Scr 1.1 today up from 0.9. WBC 10.6.      Plan:  · Continue vancomycin to 1250 mg Q12h with predicted AUC/VIOLET of 573 and steady state trough of 19.3  · Will check vancomycin levels when appropriate  · Will continue to monitor renal function     Kae De La Vega, PharmD   Pharmacy Resident   Phone: [phone]  12/13/2021 9:34 AM

## 2021-12-13 NOTE — PROGRESS NOTES
Caren Higgins M.D.,[facility]  Yoana Bhat D.O., F.A.C.O.I., Corinna Guerra M.D. Monika Mosley M.D. Chuck Leija D.O. Daily Pulmonary Progress Note    Patient:  Helga Calderon 60 y.o. male MRN: [id number]     Date of Service: 12/13/2021      Synopsis     We are following patient for acute hypoxic resp failure and history of COVID -19  pneumonia in August 2021    \"CC\" :SOB    Code status: Full       Subjective      Patient was seen and examined. He had desaturated earlier this a.m. and has b een on AVAPS. I was able to take him off and on HFNC and NRBM he is saturating 88-92%. Hs has responded to diuresis and so far has diuresed 2.9 liters. He has history of anxiety and currently while looking at his saturations he gets tachypnea and anxious. Has no cough    12/12: I personally seen and examined patient. Patient currently continues to be on NIV with FiO2 of 100% and PEEP of 10 and saturating 94%. He was for very short time on OptiFlow to be able to be eat lunch. He has diuresed so far 5.2 L. He is still anxious but the Ativan appears to help.    12/13/2021-much improved with breathing. Aggressive diuresis, 8.2 L negative fluid balance. Using NIV for respiratory support. Transition to air Vo to take pills and try to eat. SPO2 87% at rest.  States he feels improved, decreased edema peripherally. Review of Systems:  Constitutional: Denies fever, weight loss, night sweats, and fatigue  Skin: Denies pigmentation, dark lesions, and rashes   HEENT: Denies hearing loss, tinnitus, ear drainage, epistaxis, sore throat, and hoarseness. Cardiovascular: Denies palpitations, chest pain, and chest pressure.   Respiratory: As above  Gastrointestinal: Denies nausea, vomiting, poor appetite, diarrhea, heartburn or reflux  Genitourinary: Denies dysuria, frequency, urgency or hematuria  Musculoskeletal: Denies myalgias, muscle weakness, and bone pain, positive for LE swelling  Neurological: Denies dizziness, vertigo, headache, and focal weakness  Psychological: Denies anxiety and depression  Endocrine: Denies heat intolerance and cold intolerance  Hematopoietic/Lymphatic: Denies bleeding problems and blood transfusions    24-hour events:  None    Objective   Vitals: /68   Pulse 99   Temp 98.9 °F (37.2 °C) (Oral)   Resp 28   Ht 6' (1.829 m)   Wt 275 lb 5 oz (124.9 kg)   SpO2 (!) 87%   BMI 37.34 kg/m²     I/O:    Intake/Output Summary (Last 24 hours) at 12/13/2021 1306  Last data filed at 12/13/2021 0856  Gross per 24 hour   Intake 460 ml   Output 3450 ml   Net -2990 ml       Vent Information  Skin Assessment: Clean, dry, & intact  Equipment ID: [id number]  Vt Ordered: 500 mL  FiO2 : 100 %  SpO2: (!) 87 %  SpO2/FiO2 ratio: 94  I Time/ I Time %: 0.8 s  Mask Type: Full face mask  Mask Size: Large       IPAP: 18 cmH20  CPAP/EPAP: 12 cmH2O     CURRENT MEDS :  Scheduled Meds:   albumin human  25 g IntraVENous Q12H    labetalol  5 mg IntraVENous Once    vancomycin  1,250 mg IntraVENous Q12H    methylPREDNISolone  40 mg IntraVENous Q6H    influenza virus vaccine  0.5 mL IntraMUSCular Prior to discharge    guaiFENesin  400 mg Oral TID    ipratropium-albuterol  1 ampule Inhalation Q4H While awake    cefepime  2,000 mg IntraVENous Q12H    sodium chloride flush  5-40 mL IntraVENous 2 times per day    amLODIPine  10 mg Oral Daily    atorvastatin  40 mg Oral Nightly    apixaban  5 mg Oral BID    gabapentin  300 mg Oral Daily    phenytoin  300 mg Oral Nightly    phenytoin  200 mg Oral QAM    furosemide  40 mg IntraVENous BID       Physical Exam:  General Appearance: appears comfortable in no acute distress. HEENT: Normocephalic atraumatic without obvious abnormality   Neck: Lips, mucosa, and tongue normal.  Supple, symmetrical, trachea midline, no adenopathy;thyroid:  no enlargement/tenderness/nodules or JVD. Lung: Breath sounds CTA. Respirations   unlabored. Symmetrical expansion.   Heart: RRR, normal S1, S2. No MRG  Abdomen: Soft, NT, ND. BS present x 4 quadrants. No bruit or organomegaly. Extremities: Pedal pulses 2+ symmetric b/l. Extremities normal, no cyanosis, clubbing,+2edema. Neurologic: Mental status: Alert and Oriented X3 . Pertinent/ New Labs and Imaging Studies     Imaging Personally Reviewed:  12/13/2021           FINDINGS:   No significant change.  Diffuse bilateral parenchymal opacities which are   confluent and with areas of consolidation at the perihilar regions.  The   heart appears unchanged in size.  Probable small right pleural effusion.  No   pneumothorax.           Impression   No significant change.  Diffuse bilateral opacities with areas of   consolidation, nonspecific, and considerations would include pneumonia,   pulmonary edema, or some combination there of.  ARDS is also a consideration.                         12/10/2021 CT chest      Impression   No evidence of pulmonary embolism       Consolidation and ground-glass opacities of the lungs is suspicious for   infectious process.       Small bilateral pleural effusions.                   ECHO     Summary   Left ventricle grossly normal in size. Mild - moderate left ventricular concentric hypertrophy noted. Normal LV segmental wall motion. Estimated left ventricular ejection fraction is 59±5%. Does not meet 50% threshold for diastolic dysfunction. The LAESV Index is <34ml/m2. Mildly dilated right ventricle. Right ventricular segmental wall motion is normal.   Physiologic and/or trace mitral regurgitation is present. Trace aortic regurgitation is noted. Physiologic and/or trace tricuspid regurgitation. Physiologic and/or trace pulmonic regurgitation present. Technically good quality study. No previous echo for comparison. Suggest clinical correlation.       Signature      ----------------------------------------------------------------   Electronically signed by Zain Knott DO(Interpreting physician) on 10/03/2021 01:49 PM   ----------------------------------------------------------------    Labs:  Lab Results   Component Value Date    WBC 10.6 12/13/2021    HGB 9.5 12/13/2021    HCT 30.4 12/13/2021    MCV 93.8 12/13/2021    MCH 29.3 12/13/2021    MCHC 31.3 12/13/2021    RDW 14.2 12/13/2021     12/13/2021    MPV 10.7 12/13/2021     Lab Results   Component Value Date     12/13/2021    K 4.4 12/13/2021    K 3.7 08/21/2021     12/13/2021    CO2 30 12/13/2021    BUN 26 12/13/2021    CREATININE 1.1 12/13/2021    LABALBU 2.6 12/13/2021    CALCIUM 8.5 12/13/2021    GFRAA >60 12/13/2021    LABGLOM >60 12/13/2021     Lab Results   Component Value Date    PROTIME 12.2 12/10/2021    INR 1.1 12/10/2021     Recent Labs     12/12/21  0455   PROBNP 4,909*     Recent Labs     12/12/21  0455   PROCAL 3.84*     This SmartLink has not been configured with any valid records. Micro:  No results for input(s): CULTRESP in the last 72 hours. No results for input(s): LABGRAM in the last 72 hours. No results for input(s): LEGUR in the last 72 hours. Recent Labs     12/10/21  2216   STREPNEUMAGU Presumptive negative- suggests no current or recent  pneumococcal infection. Infection due to Strep pneumoniae cannot be  ruled out since the antigen present in the sample  may be below the detection limit of the test.  Normal Range:Presumptive Negative       Recent Labs     12/10/21  2216   LP1UAG Presumptive Negative -suggesting no recent or current infections  with Legionella pneumophila serogroup 1. Infection to Legionella cannot be ruled out since other serogroups  and species may cause infection, antigen may not be present in  early infection, or level of antigen may be below the  detection limit. Normal Range: Presumptive Negative            Assessment:    1. Acute hypoxic respiratory failure  2.   Bilateral patchy infiltrates differential diagnosis includes healthcare associated pneumonia versus possibly volume overload/Acute diastlic HF  3. History of severe Covid pneumonia in August 2021  4. H/O anxiety      Plan:   1. Alternate between NIV AVAPS volume 500 EPAP +12 backup rate 14 FiO2 100% and Optiflow 60 L FiO2 100%. We will try to wean FiO2 as tolerated for saturations above 90%  2. Continue diuresis, monitor I&O's closely -8.2 L negative fluid balance  3. Solu-Medrol 40 mg IV every 6hours  4. Continue Duonebs every 4 hours while awake  5. Appreciate infectious disease input. Continue cefepime and vancomycin - serum galactomannan in process. 6. Repeat chest x-ray today. if there is no significant improvement will consider doing a bronchoscopy but patient most likely will need to be intubated for the procedure. 7. Continue Ativan as needed  This plan of care was reviewed in collaboration with Dr. Hilary Arriaga    Electronically signed by HOMERO Bee CNP on 12/13/2021 at 1:06 PM        I personally saw, examined and provided care for the patient. Radiographs, labs and medication list were reviewed by me independently. I spoke with bedside nursing, therapists and consultants. The case was discussed in detail and plans for care were established. Review of CNP documentation was conducted and revisions were made as appropriate. I agree with the above documented exam, problem list and plan of care.   Colonel Josette MD

## 2021-12-13 NOTE — CARE COORDINATION
Patient from home, lives with spouse and daughter. He walks with a cane, prn, was driving himself to outpatient therapy prior to admission. PCP is Dr. Kami Menard. No oxygen or cpap/bipap, he does have a nebulizer. Currently on bipap fio2 100% or optiflow 60L 100%. [facility], had a trach but has since been decannulated and stoma is closed and healed. If breathing does not improve may need LTAC again. IV cefepime and vanco.     For questions I can be reached at [phone].  [facility]

## 2021-12-13 NOTE — PROGRESS NOTES
Hospitalist Progress Note      SYNOPSIS: Patient admitted on 12/10/2021  presents to [facility] ER complaining of SOB.     Bryon Underwood has a past medical history that includes diabetes, history of seizures, hyperlipidemia, recent [address] originally presented to [facility] ER with shortness of breath and was found to have a pulse ox of 35% on room air upon arrival to the ER. He was placed on BiPAP in the ER and due to respiratory failure concern he was transferred ED to ED. He states that last night around 10:30 PM he developed acute shortness of breath. He states that it progressed and was exertional.  He also admits to leg swelling. In the ER he looks more comfortable and is on a nonrebreather mask. He does have a history of Covid admission where he required long-term acute care stay. CTA showed no evidence of pulmonary embolism Heparin drip will be stopped that was started in [place] ER for concern for PE. Consolidation and groundglass opacities of the lungs is suspicious for infectious process. There are small bilateral pleural effusions. SUBJECTIVE:  Stable overnight. No other overnight issues reported. Patient seen and examined  Records reviewed. Desaturates quickly  He alternates between bipap and optiflow  Says anxiety medication is helping            Temp (24hrs), Av.3 °F (36.8 °C), Min:98.3 °F (36.8 °C), Max:98.3 °F (36.8 °C)    DIET: ADULT DIET; Regular; 4 carb choices (60 gm/meal)  CODE: Full Code    Intake/Output Summary (Last 24 hours) at 2021 0913  Last data filed at 2021 0856  Gross per 24 hour   Intake 460 ml   Output 3450 ml   Net -2990 ml       Review of Systems  All bolded are positive; please see HPI  General:  Fever, chills, diaphoresis, fatigue, malaise, night sweats, weight loss  Psychological:  Anxiety, disorientation, hallucinations. ENT:  Epistaxis, headaches, vertigo, visual changes.   Cardiovascular:  Chest pain, irregular heartbeats, palpitations, paroxysmal nocturnal dyspnea. Respiratory:  Shortness of breath, coughing, sputum production, hemoptysis, wheezing, orthopnea. Gastrointestinal:  Nausea, vomiting, diarrhea, heartburn, constipation, abdominal pain, hematemesis, hematochezia, melena, acholic stools  Genito-Urinary:  Dysuria, urgency, frequency, hematuria  Musculoskeletal:  Joint pain, joint stiffness, joint swelling, muscle pain  Neurology:  Headache, focal neurological deficits, weakness, numbness, paresthesia  Derm:  Rashes, ulcers, excoriations, bruising  Extremities:  Decreased ROM, peripheral edema, mottling      OBJECTIVE:    /68   Pulse 99   Temp 98.3 °F (36.8 °C) (Axillary)   Resp 29   Ht 6' (1.829 m)   Wt 275 lb 5 oz (124.9 kg)   SpO2 94%   BMI 37.34 kg/m²     General appearance:  awake, alert, and oriented to person, place, time, and purpose; appears stated age and cooperative; no apparent distress no labored breathing optiflow  HEENT:  Conjunctivae/corneas clear. Neck: Supple. No jugular venous distention. Respiratory: symmetrical; coarse bilaterally; no wheezes; no rhonchi; no rales  Cardiovascular: rhythm regular; rate controlled; no murmurs  Abdomen: Soft, nontender, nondistended  Extremities:  peripheral pulses present; no peripheral edema; no ulcers  Musculoskeletal: No clubbing, cyanosis, no bilateral lower extremity edema. Brisk capillary refill. Skin:  No rashes  on visible skin  Neurologic: awake, alert and following commands     ASSESSMENT and PLAN:  1. Acute hypoxemic respiratory failure due to pneumonia vs chf- found to be 30% in ER. Blood cultures sent from ED. Sputum culture. Pneumococcal antigen neg. Legionella urine antigenneg. Respiratory DFA panel neg. Continuous pulse oximetry monitoring. Duonebs q4. Mucinex. Antibiotics: Cefepime and vanc. Pulm consulted. ID consulted. Procal elevated 0.27>3. 84.  May need bronchoscopy but patient most likely will need to be intubated for the BILITOT 0.4 0.5 0.2       No results for input(s): INR in the last 72 hours. No results for input(s): Harish Vargas in the last 72 hours. Chronic labs:    Lab Results   Component Value Date    CHOL 173 12/11/2021    TRIG 100 12/11/2021    HDL 65 12/11/2021    LDLCALC 88 12/11/2021    TSH 0.507 12/11/2021    PSA 0.84 12/12/2020    INR 1.1 12/10/2021    LABA1C 5.3 12/11/2021       Radiology: REVIEWED DAILY    +++++++++++++++++++++++++++++++++++++++++++++++++  DO Carlos Oneill Physician - [address], [place]  +++++++++++++++++++++++++++++++++++++++++++++++++  NOTE: This report was transcribed using voice recognition software. Every effort was made to ensure accuracy; however, inadvertent computerized transcription errors may be present.

## 2021-12-13 NOTE — PROGRESS NOTES
Department of Internal Medicine  Infectious Diseases  Progress  Note      C/C :  Pneumonia, respiratory failure     Pt is awake, alert, in resp distress  On BiPAP     Current Facility-Administered Medications   Medication Dose Route Frequency Provider Last Rate Last Admin    morphine (PF) injection 1 mg  1 mg IntraVENous Q4H PRN Shweta Mays MD   1 mg at 12/13/21 6399    LORazepam (ATIVAN) injection 0.5 mg  0.5 mg IntraVENous Q4H PRN Kaushal Berman MD   0.5 mg at 12/13/21 0405    labetalol (NORMODYNE;TRANDATE) injection 5 mg  5 mg IntraVENous Once Kaushal Berman MD        vancomycin (VANCOCIN) 1,250 mg in dextrose 5 % 250 mL IVPB  1,250 mg IntraVENous Q12H Mine Finch MD   Stopped at 12/13/21 0702    methylPREDNISolone sodium (SOLU-MEDROL) injection 40 mg  40 mg IntraVENous Q6H Janak Mooney MD   40 mg at 12/13/21 0111    influenza quadrivalent split vaccine (FLUZONE;FLUARIX;FLULAVAL;AFLURIA) injection 0.5 mL  0.5 mL IntraMUSCular Prior to discharge [address], DO        melatonin disintegrating tablet 10 mg  10 mg Oral Nightly PRN Janak Mooney MD   10 mg at 12/11/21 2027    guaiFENesin tablet 400 mg  400 mg Oral  [address], DO   400 mg at 12/12/21 2033    ipratropium-albuterol (DUONEB) nebulizer solution 1 ampule  1 ampule Inhalation Q4H While awake [address], DO   1 ampule at 12/13/21 0002    cefepime (MAXIPIME) 2000 mg IVPB minibag  2,000 mg IntraVENous Q12H Swapnil Marte DO   Stopped at 12/13/21 0300    sodium chloride flush 0.9 % injection 5-40 mL  5-40 mL IntraVENous 2 times per day [address], DO   10 mL at 12/12/21 2033    sodium chloride flush 0.9 % injection 5-40 mL  5-40 mL IntraVENous PRN Swapnil Marte DO   10 mL at 12/12/21 0100    0.9 % sodium chloride infusion  25 mL IntraVENous PRN Swapnil Marte DO        ondansetron (ZOFRAN-ODT) disintegrating tablet 4 mg  4 mg Oral Q8H  [address], DO        Or   [place] Awake, alert ,in resp distress . Head:    Normocephalic, atraumatic   Eyes:    No pallor, no icterus,   Ears:    No obvious deformity or drainage. Nose:   No nasal drainage   Throat:   Mucosa moist, no oral thrush   Neck:   Supple, no lymphadenopathy   Lungs:      In resp distress, on BiPAP, decreased breath sound, lungs clear     Heart:    Regular rate and rhythm   Abdomen:     Soft, non-tender, bowel sounds present    Extremities:   ++ edema, non tender    Pulses:   Dorsalis pedis palpable    Skin:   No rash        CBC with Differential:      Lab Results   Component Value Date    WBC 10.6 12/13/2021    RBC 3.24 12/13/2021    HGB 9.5 12/13/2021    HCT 30.4 12/13/2021     12/13/2021    MCV 93.8 12/13/2021    MCH 29.3 12/13/2021    MCHC 31.3 12/13/2021    RDW 14.2 12/13/2021    NRBC 0.9 09/17/2021    METASPCT 0.9 09/17/2021    LYMPHOPCT 11.6 12/10/2021    MONOPCT 4.1 12/10/2021    MYELOPCT 0.9 09/17/2021    BASOPCT 0.4 12/10/2021    MONOSABS 0.81 12/10/2021    LYMPHSABS 2.28 12/10/2021    EOSABS 0.00 12/10/2021    BASOSABS 0.07 12/10/2021       CMP     Lab Results   Component Value Date     12/13/2021    K 4.4 12/13/2021    K 3.7 08/21/2021     12/13/2021    CO2 30 12/13/2021    BUN 26 12/13/2021    CREATININE 1.1 12/13/2021    GFRAA >60 12/13/2021    LABGLOM >60 12/13/2021    GLUCOSE 173 12/13/2021    PROT 5.6 12/13/2021    LABALBU 2.6 12/13/2021    CALCIUM 8.5 12/13/2021    BILITOT 0.2 12/13/2021    ALKPHOS 70 12/13/2021    AST 22 12/13/2021    ALT 15 12/13/2021         Hepatic Function Panel:    Lab Results   Component Value Date    ALKPHOS 70 12/13/2021    ALT 15 12/13/2021    AST 22 12/13/2021    PROT 5.6 12/13/2021    BILITOT 0.2 12/13/2021    LABALBU 2.6 12/13/2021       PT/INR:    Lab Results   Component Value Date    PROTIME 12.2 12/10/2021    INR 1.1 12/10/2021       TSH:    Lab Results   Component Value Date    TSH 0.507 12/11/2021       U/A:    Lab Results   Component Value Date

## 2021-12-14 ENCOUNTER — APPOINTMENT (OUTPATIENT)
Dept: GENERAL RADIOLOGY | Age: 56
DRG: 720 | End: 2021-12-14
Payer: COMMERCIAL

## 2021-12-14 LAB
ALBUMIN SERPL-MCNC: 3.1 G/DL (ref 3.5–5.2)
ALP BLD-CCNC: 72 U/L (ref 40–129)
ALT SERPL-CCNC: 38 U/L (ref 0–40)
ANION GAP SERPL CALCULATED.3IONS-SCNC: 13 MMOL/L (ref 7–16)
AST SERPL-CCNC: 58 U/L (ref 0–39)
BILIRUB SERPL-MCNC: 0.3 MG/DL (ref 0–1.2)
BUN BLDV-MCNC: 34 MG/DL (ref 6–20)
CALCIUM SERPL-MCNC: 9 MG/DL (ref 8.6–10.2)
CHLORIDE BLD-SCNC: 103 MMOL/L (ref 98–107)
CO2: 30 MMOL/L (ref 22–29)
CREAT SERPL-MCNC: 1.1 MG/DL (ref 0.7–1.2)
GFR AFRICAN AMERICAN: >60
GFR NON-AFRICAN AMERICAN: >60 ML/MIN/1.73
GLUCOSE BLD-MCNC: 199 MG/DL (ref 74–99)
HCT VFR BLD CALC: 32 % (ref 37–54)
HEMOGLOBIN: 9.9 G/DL (ref 12.5–16.5)
MCH RBC QN AUTO: 29.8 PG (ref 26–35)
MCHC RBC AUTO-ENTMCNC: 30.9 % (ref 32–34.5)
MCV RBC AUTO: 96.4 FL (ref 80–99.9)
PDW BLD-RTO: 14.3 FL (ref 11.5–15)
PLATELET # BLD: 169 E9/L (ref 130–450)
PMV BLD AUTO: 10.4 FL (ref 7–12)
POTASSIUM SERPL-SCNC: 4.1 MMOL/L (ref 3.5–5)
RBC # BLD: 3.32 E12/L (ref 3.8–5.8)
SODIUM BLD-SCNC: 146 MMOL/L (ref 132–146)
TOTAL PROTEIN: 6.3 G/DL (ref 6.4–8.3)
VANCOMYCIN TROUGH: 24.9 MCG/ML (ref 5–16)
WBC # BLD: 19.5 E9/L (ref 4.5–11.5)

## 2021-12-14 PROCEDURE — 36415 COLL VENOUS BLD VENIPUNCTURE: CPT

## 2021-12-14 PROCEDURE — 71045 X-RAY EXAM CHEST 1 VIEW: CPT

## 2021-12-14 PROCEDURE — 80053 COMPREHEN METABOLIC PANEL: CPT

## 2021-12-14 PROCEDURE — 2580000003 HC RX 258: Performed by: INTERNAL MEDICINE

## 2021-12-14 PROCEDURE — 6370000000 HC RX 637 (ALT 250 FOR IP): Performed by: INTERNAL MEDICINE

## 2021-12-14 PROCEDURE — P9047 ALBUMIN (HUMAN), 25%, 50ML: HCPCS | Performed by: INTERNAL MEDICINE

## 2021-12-14 PROCEDURE — 2700000000 HC OXYGEN THERAPY PER DAY

## 2021-12-14 PROCEDURE — 6360000002 HC RX W HCPCS: Performed by: INTERNAL MEDICINE

## 2021-12-14 PROCEDURE — 85027 COMPLETE CBC AUTOMATED: CPT

## 2021-12-14 PROCEDURE — 94660 CPAP INITIATION&MGMT: CPT

## 2021-12-14 PROCEDURE — 94640 AIRWAY INHALATION TREATMENT: CPT

## 2021-12-14 PROCEDURE — 6360000002 HC RX W HCPCS

## 2021-12-14 PROCEDURE — 80202 ASSAY OF VANCOMYCIN: CPT

## 2021-12-14 PROCEDURE — 2060000000 HC ICU INTERMEDIATE R&B

## 2021-12-14 RX ADMIN — SODIUM CHLORIDE, PRESERVATIVE FREE 10 ML: 5 INJECTION INTRAVENOUS at 14:00

## 2021-12-14 RX ADMIN — IPRATROPIUM BROMIDE AND ALBUTEROL SULFATE 1 AMPULE: .5; 2.5 SOLUTION RESPIRATORY (INHALATION) at 00:44

## 2021-12-14 RX ADMIN — VANCOMYCIN HYDROCHLORIDE 1250 MG: 10 INJECTION, POWDER, LYOPHILIZED, FOR SOLUTION INTRAVENOUS at 05:51

## 2021-12-14 RX ADMIN — GUAIFENESIN 400 MG: 400 TABLET ORAL at 21:05

## 2021-12-14 RX ADMIN — METHYLPREDNISOLONE SODIUM SUCCINATE 40 MG: 40 INJECTION, POWDER, FOR SOLUTION INTRAMUSCULAR; INTRAVENOUS at 20:54

## 2021-12-14 RX ADMIN — IPRATROPIUM BROMIDE AND ALBUTEROL SULFATE 1 AMPULE: .5; 2.5 SOLUTION RESPIRATORY (INHALATION) at 15:52

## 2021-12-14 RX ADMIN — PHENYTOIN SODIUM 200 MG: 100 CAPSULE, EXTENDED RELEASE ORAL at 09:17

## 2021-12-14 RX ADMIN — METHYLPREDNISOLONE SODIUM SUCCINATE 40 MG: 40 INJECTION, POWDER, FOR SOLUTION INTRAMUSCULAR; INTRAVENOUS at 14:00

## 2021-12-14 RX ADMIN — ATORVASTATIN CALCIUM 40 MG: 40 TABLET, FILM COATED ORAL at 21:05

## 2021-12-14 RX ADMIN — FUROSEMIDE 40 MG: 10 INJECTION, SOLUTION INTRAMUSCULAR; INTRAVENOUS at 09:07

## 2021-12-14 RX ADMIN — Medication 10 MG: at 21:04

## 2021-12-14 RX ADMIN — METHYLPREDNISOLONE SODIUM SUCCINATE 40 MG: 40 INJECTION, POWDER, FOR SOLUTION INTRAMUSCULAR; INTRAVENOUS at 09:09

## 2021-12-14 RX ADMIN — PHENYTOIN SODIUM 300 MG: 100 CAPSULE, EXTENDED RELEASE ORAL at 21:04

## 2021-12-14 RX ADMIN — APIXABAN 5 MG: 5 TABLET, FILM COATED ORAL at 21:05

## 2021-12-14 RX ADMIN — LORAZEPAM 0.5 MG: 2 INJECTION INTRAMUSCULAR; INTRAVENOUS at 11:20

## 2021-12-14 RX ADMIN — Medication 10 ML: at 09:06

## 2021-12-14 RX ADMIN — METHYLPREDNISOLONE SODIUM SUCCINATE 40 MG: 40 INJECTION, POWDER, FOR SOLUTION INTRAMUSCULAR; INTRAVENOUS at 02:31

## 2021-12-14 RX ADMIN — ALBUMIN (HUMAN) 25 G: 0.25 INJECTION, SOLUTION INTRAVENOUS at 21:04

## 2021-12-14 RX ADMIN — SODIUM CHLORIDE, PRESERVATIVE FREE 10 ML: 5 INJECTION INTRAVENOUS at 11:23

## 2021-12-14 RX ADMIN — LORAZEPAM 0.5 MG: 2 INJECTION INTRAMUSCULAR; INTRAVENOUS at 15:30

## 2021-12-14 RX ADMIN — IPRATROPIUM BROMIDE AND ALBUTEROL SULFATE 1 AMPULE: .5; 2.5 SOLUTION RESPIRATORY (INHALATION) at 23:57

## 2021-12-14 RX ADMIN — CEFEPIME 2000 MG: 2 INJECTION, POWDER, FOR SOLUTION INTRAVENOUS at 11:22

## 2021-12-14 RX ADMIN — ALBUMIN (HUMAN) 25 G: 0.25 INJECTION, SOLUTION INTRAVENOUS at 09:13

## 2021-12-14 RX ADMIN — LORAZEPAM 0.5 MG: 2 INJECTION INTRAMUSCULAR; INTRAVENOUS at 02:38

## 2021-12-14 RX ADMIN — CEFEPIME 2000 MG: 2 INJECTION, POWDER, FOR SOLUTION INTRAVENOUS at 23:33

## 2021-12-14 RX ADMIN — GUAIFENESIN 400 MG: 400 TABLET ORAL at 13:59

## 2021-12-14 RX ADMIN — IPRATROPIUM BROMIDE AND ALBUTEROL SULFATE 1 AMPULE: .5; 2.5 SOLUTION RESPIRATORY (INHALATION) at 07:57

## 2021-12-14 RX ADMIN — Medication 10 ML: at 21:05

## 2021-12-14 RX ADMIN — IPRATROPIUM BROMIDE AND ALBUTEROL SULFATE 1 AMPULE: .5; 2.5 SOLUTION RESPIRATORY (INHALATION) at 20:06

## 2021-12-14 RX ADMIN — SODIUM CHLORIDE, PRESERVATIVE FREE 10 ML: 5 INJECTION INTRAVENOUS at 17:03

## 2021-12-14 RX ADMIN — APIXABAN 5 MG: 5 TABLET, FILM COATED ORAL at 09:18

## 2021-12-14 RX ADMIN — FUROSEMIDE 40 MG: 10 INJECTION, SOLUTION INTRAMUSCULAR; INTRAVENOUS at 17:03

## 2021-12-14 RX ADMIN — GUAIFENESIN 400 MG: 400 TABLET ORAL at 09:17

## 2021-12-14 RX ADMIN — GABAPENTIN 300 MG: 300 CAPSULE ORAL at 09:17

## 2021-12-14 RX ADMIN — IPRATROPIUM BROMIDE AND ALBUTEROL SULFATE 1 AMPULE: .5; 2.5 SOLUTION RESPIRATORY (INHALATION) at 12:33

## 2021-12-14 RX ADMIN — AMLODIPINE BESYLATE 10 MG: 10 TABLET ORAL at 09:17

## 2021-12-14 ASSESSMENT — PAIN SCALES - GENERAL
PAINLEVEL_OUTOF10: 0

## 2021-12-14 NOTE — PROGRESS NOTES
Hospitalist Progress Note      SYNOPSIS:     Patient admitted on 12/10/2021  presents to [facility] ER complaining of SOB. Mary Mahoney a past medical history that includes diabetes, history of seizures, hyperlipidemia, recent Covid. Amina Noe originally presented to [facility] ER with shortness of breath and was found to have a pulse ox of 35% on room air upon arrival to the ER. Belkys Hall was placed on BiPAP in the ER and due to respiratory failure concern he was transferred ED to ED. Belkys Hall states that last night around 10:30 PM he developed acute shortness of breath.  He states that it progressed and was exertional. Belkys Hall also admits to leg swelling.  In the ER he looks more comfortable and is on a nonrebreather mask.  He does have a history of Covid admission where he required long-term acute care stay. Omar Ann showed no evidence of pulmonary embolism Heparin drip was stopped that was started in [place] ER for concern for PE.  Consolidation and groundglass opacities of the lungs is suspicious for infectious process.  There are small bilateral pleural effusions.        SUBJECTIVE:    Patient seen and examined  Records reviewed. Cont to be on BiPAP FiO2 100%. Wife at bedside. Temp (24hrs), Av.5 °F (36.9 °C), Min:98.5 °F (36.9 °C), Max:98.5 °F (36.9 °C)    DIET: ADULT DIET; Regular; 4 carb choices (60 gm/meal)  CODE: Full Code    Intake/Output Summary (Last 24 hours) at 2021 1118  Last data filed at 2021 0906  Gross per 24 hour   Intake 20 ml   Output 4850 ml   Net -4830 ml       OBJECTIVE:    /78   Pulse 92   Temp 98.5 °F (36.9 °C) (Axillary)   Resp 18   Ht 6' (1.829 m)   Wt 275 lb 5 oz (124.9 kg)   SpO2 95%   BMI 37.34 kg/m²     General appearance: mild distress distress, appears stated age and cooperative. HEENT:  Conjunctivae/corneas clear. Neck: Supple. No jugular venous distention. Respiratory: Tachypnea, Decreased air movement.    Cardiovascular: Regular rate rhythm, normal 12/14/21  0605   WBC 19.2* 10.6 19.5*   HGB 10.3* 9.5* 9.9*   HCT 32.9* 30.4* 32.0*    126* 169       Recent Labs     12/12/21  0455 12/13/21  0505 12/14/21  0605    140 146   K 3.9 4.4 4.1   CL 99 101 103   CO2 27 30* 30*   BUN 16 26* 34*   CREATININE 0.9 1.1 1.1   CALCIUM 8.6 8.5* 9.0   PHOS 3.5  --   --        Recent Labs     12/12/21  0455 12/13/21  0505 12/14/21  0605   PROT 5.9* 5.6* 6.3*   ALKPHOS 63 70 72   ALT 14 15 38   AST 20 22 58*   BILITOT 0.5 0.2 0.3       No results for input(s): INR in the last 72 hours. No results for input(s): Dutch Shortyle in the last 72 hours. Chronic labs:    Lab Results   Component Value Date    CHOL 173 12/11/2021    TRIG 100 12/11/2021    HDL 65 12/11/2021    LDLCALC 88 12/11/2021    TSH 0.507 12/11/2021    PSA 0.84 12/12/2020    INR 1.1 12/10/2021    LABA1C 5.3 12/11/2021       Radiology: REVIEWED DAILY    +++++++++++++++++++++++++++++++++++++++++++++++++  Gurjit Brian MD  Sound Physician - [address] [place], [place]  +++++++++++++++++++++++++++++++++++++++++++++++++  NOTE: This report was transcribed using voice recognition software. Every effort was made to ensure accuracy; however, inadvertent computerized transcription errors may be present.

## 2021-12-14 NOTE — ACP (ADVANCE CARE PLANNING)
Advance Care Planning     Advance Care Planning Activator (Inpatient)  Conversation Note      Date of ACP Conversation: 12/14/2021   Conversation Conducted with: Patient with Decision Making Capacity  ACP Activator: Jacob Pérez MD    Hospital diagnoses warranting ACP:  Active Problems:    Acute respiratory failure with hypoxia ([facility] [address]    Acute on chronic respiratory failure with hypoxia ([facility] [address]  Resolved Problems:    * No resolved hospital problems. *        Health Care Decision Maker:   Current Designated Health Care Decision Maker:         Care Preferences    Ventilation: \"If you were in your present state of health and suddenly became very ill and were unable to breathe on your own, what would your preference be about the use of a ventilator (breathing machine) if it were available to you? \"      Would the patient desire the use of ventilator (breathing machine)?: yes    \"If your health worsens and it becomes clear that your chance of recovery is unlikely, what would your preference be about the use of a ventilator (breathing machine) if it were available to you? \"     Would the patient desire the use of ventilator (breathing machine)?: Yes      Resuscitation  \"CPR works best to restart the heart when there is a sudden event, like a heart attack, in someone who is otherwise healthy. Unfortunately, CPR does not typically restart the heart for people who have serious health conditions or who are very sick. \"    \"In the event your heart stopped as a result of an underlying serious health condition, would you want attempts to be made to restart your heart (answer \"yes\" for attempt to resuscitate) or would you prefer a natural death (answer \"no\" for do not attempt to resuscitate)? \" yes       [] Yes   [] No   Educated Patient / Douglas Rodriguez regarding differences between Advance Directives and portable DNR orders.         Conversation Outcomes:  [] ACP discussion completed  [] Existing advance directive reviewed with patient; no changes to patient's previously recorded wishes  [] New Advance Directive completed  [] Portable Do Not Rescitate prepared for Provider review and signature  [] POLST/POST/MOLST/MOST prepared for Provider review and signature    Details of ACP discussion:  Discussion was held with the wife and the . The pt is to remain full code and if respiratory status deteriorates further and intubation is necessary the pt and family are ok with intubation.     Length of ACP Conversation in minutes:    Code status following completion of discussion: Full Code     Follow-up plan:    [] Schedule follow-up conversation to continue planning  [x] Referred individual to Provider for additional questions/concerns   [] Advised patient/agent/surrogate to review completed ACP document and update if needed with changes in condition, patient preferences or care setting  [] This note routed to one or more involved healthcare providers  [] None    Electronically signed by Farnaz Walter MD on 12/14/2021 at 11:32 AM

## 2021-12-14 NOTE — PROGRESS NOTES
RT took patient off bipap and place don Airvo at 60L/100% with NR if needed. Patient's oxygen level is 95%. Will wean oxygen as able.

## 2021-12-14 NOTE — PROGRESS NOTES
Department of Internal Medicine  Infectious Diseases  Progress  Note      C/C :  Pneumonia, respiratory failure     Pt is awake, alert, in resp distress  On BiPAP     Current Facility-Administered Medications   Medication Dose Route Frequency Provider Last Rate Last Admin    albumin human 25 % IV solution 25 g  25 g IntraVENous Q12H Jasmin Hernandez MD   Stopped at 12/14/21 1104    morphine (PF) injection 1 mg  1 mg IntraVENous Q4H PRN Mao Blankenship MD   1 mg at 12/13/21 6459    LORazepam (ATIVAN) injection 0.5 mg  0.5 mg IntraVENous Q4H PRN Chan Kurtz MD   0.5 mg at 12/14/21 1120    labetalol (NORMODYNE;TRANDATE) injection 5 mg  5 mg IntraVENous Once Chan Kurtz MD        vancomycin (VANCOCIN) 1,250 mg in dextrose 5 % 250 mL IVPB  1,250 mg IntraVENous Q12H Page Rivera MD   Stopped at 12/14/21 0854    methylPREDNISolone sodium (SOLU-MEDROL) injection 40 mg  40 mg IntraVENous Q6H Jasmin Hernandez MD   40 mg at 12/14/21 0909    influenza quadrivalent split vaccine (FLUZONE;FLUARIX;FLULAVAL;AFLURIA) injection 0.5 mL  0.5 mL IntraMUSCular Prior to discharge [place], DO        melatonin disintegrating tablet 10 mg  10 mg Oral Nightly PRN Jasmin Hernandez MD   10 mg at 12/13/21 2242    guaiFENesin tablet 400 mg  400 mg Oral TID Haleytu, DO   400 mg at 12/14/21 9056    ipratropium-albuterol (DUONEB) nebulizer solution 1 ampule  1 ampule Inhalation Q4H While awake Haleytu, DO   1 ampule at 12/14/21 1233    cefepime (MAXIPIME) 2000 mg IVPB minibag  2,000 mg IntraVENous Q12H Florentino Marte, DO 12.5 mL/hr at 12/14/21 1122 2,000 mg at 12/14/21 1122    sodium chloride flush 0.9 % injection 5-40 mL  5-40 mL IntraVENous 2 times per day Haleytu, DO   10 mL at 12/14/21 0906    sodium chloride flush 0.9 % injection 5-40 mL  5-40 mL IntraVENous PRN Swapnil Marte, DO   10 mL at 12/14/21 1123    0.9 % sodium chloride infusion  25 mL IntraVENous PRN Becca Roberto, DO        ondansetron (ZOFRAN-ODT) disintegrating tablet 4 mg  4 mg Oral Q8H PRN Becca Roberto DO        Or    [facility]ron [facility]) injection 4 mg  4 mg IntraVENous Q6H PRN YevgeniyMenasha LUCAS Marte, DO        polyethylene glycol [facility]) packet 17 g  17 g Oral Daily PRN Andrewrine Pardeep, DO        acetaminophen (TYLENOL) tablet 650 mg  650 mg Oral Q6H PRN Becca Roberto, DO   650 mg at 12/12/21 5597    Or    acetaminophen (TYLENOL) suppository 650 mg  650 mg Rectal Q6H PRN Becca Roberto, DO        amLODIPine (NORVASC) tablet 10 mg  10 mg Oral Daily [facility] Rosanna, DO   10 mg at 12/14/21 8410    atorvastatin (LIPITOR) tablet 40 mg  40 mg Oral Nightly [facility] Rosanna, DO   40 mg at 12/13/21 2100    apixaban (ELIQUIS) tablet 5 mg  5 mg Oral BID [facility] Rosanna, DO   5 mg at 12/14/21 1084    gabapentin (NEURONTIN) capsule 300 mg  300 mg Oral Daily [facility] Rosanna, DO   300 mg at 12/14/21 5217    phenytoin (DILANTIN) ER capsule 300 mg  300 mg Oral Nightly [facility] Rosanna, DO   300 mg at 12/13/21 2100    phenytoin (DILANTIN) ER capsule 200 mg  200 mg Oral QAM [facility] Rosanna, DO   200 mg at 12/14/21 1866    furosemide (LASIX) injection 40 mg  40 mg IntraVENous BID Lalo Meredith MD   40 mg at 12/14/21 0817       REVIEW OF SYSTEMS:    CONSTITUTIONAL:  Denies fever, chill or rigors, nausea or vomiting. HEENT: denies blurring of vision or double vision, denies hearing problem  RESPIRATORY: SOB and cough   CARDIOVASCULAR:  Denies palpitation  GASTROINTESTINAL:  Denies abdomen pain, diarrhea or constipation. GENITOURINARY:  Denies burning urination or frequency of urination  INTEGUMENT: denies wound , rash  HEMATOLOGIC/LYMPHATIC:  Denies lymph node swelling, gum bleeding or easy bruising.   MUSCULOSKELETAL: Leg swelling   NEUROLOGICAL:  Denies light headed, dizziness, loss of consciousness      PHYSICAL EXAM:      Vitals:     /78   Pulse 92   Temp 98.4 °F (36.9 °C) (Oral)   Resp (!) 44   Ht 6' (1.829 m)   Wt 275 lb 5 oz (124.9 kg)   SpO2 94%   BMI 37.34 kg/m²     General Appearance:    Awake, alert ,in resp distress . Head:    Normocephalic, atraumatic   Eyes:    No pallor, no icterus,   Ears:    No obvious deformity or drainage. Nose:   No nasal drainage   Throat:   Mucosa moist, no oral thrush   Neck:   Supple, no lymphadenopathy   Lungs:      In resp distress, on BiPAP, decreased breath sound,poor inspiration, tachypneic    Heart:    Regular rate and rhythm   Abdomen:     Soft, non-tender, bowel sounds present    Extremities:   ++ edema, non tender    Pulses:   Dorsalis pedis palpable    Skin:   No rash        CBC with Differential:      Lab Results   Component Value Date    WBC 19.5 12/14/2021    RBC 3.32 12/14/2021    HGB 9.9 12/14/2021    HCT 32.0 12/14/2021     12/14/2021    MCV 96.4 12/14/2021    MCH 29.8 12/14/2021    MCHC 30.9 12/14/2021    RDW 14.3 12/14/2021    NRBC 0.9 09/17/2021    METASPCT 0.9 09/17/2021    LYMPHOPCT 11.6 12/10/2021    MONOPCT 4.1 12/10/2021    MYELOPCT 0.9 09/17/2021    BASOPCT 0.4 12/10/2021    MONOSABS 0.81 12/10/2021    LYMPHSABS 2.28 12/10/2021    EOSABS 0.00 12/10/2021    BASOSABS 0.07 12/10/2021       CMP     Lab Results   Component Value Date     12/14/2021    K 4.1 12/14/2021    K 3.7 08/21/2021     12/14/2021    CO2 30 12/14/2021    BUN 34 12/14/2021    CREATININE 1.1 12/14/2021    GFRAA >60 12/14/2021    LABGLOM >60 12/14/2021    GLUCOSE 199 12/14/2021    PROT 6.3 12/14/2021    LABALBU 3.1 12/14/2021    CALCIUM 9.0 12/14/2021    BILITOT 0.3 12/14/2021    ALKPHOS 72 12/14/2021    AST 58 12/14/2021    ALT 38 12/14/2021         Hepatic Function Panel:    Lab Results   Component Value Date    ALKPHOS 72 12/14/2021    ALT 38 12/14/2021    AST 58 12/14/2021    PROT 6.3 12/14/2021    BILITOT 0.3 12/14/2021    LABALBU 3.1 12/14/2021       PT/INR:    Lab Results   Component Value Date    PROTIME 12.2 12/10/2021 INR 1.1 12/10/2021       TSH:    Lab Results   Component Value Date    TSH 0.507 12/11/2021       U/A:    Lab Results   Component Value Date    COLORU DARK YELLOW 10/10/2021    PHUR 5.5 10/10/2021    WBCUA 1-3 10/10/2021    RBCUA 5-10 10/10/2021    BACTERIA MODERATE 10/10/2021    CLARITYU CLOUDY 10/10/2021    SPECGRAV >=1.030 10/10/2021    LEUKOCYTESUR Negative 10/10/2021    UROBILINOGEN 0.2 10/10/2021    BILIRUBINUR Negative 10/10/2021    BLOODU LARGE 10/10/2021    GLUCOSEU Negative 10/10/2021    AMORPHOUS FEW 10/10/2021       ABG:    Lab Results   Component Value Date    KKG4AFV 25.5 08/26/2021       MICROBIOLOGY:    Blood culture - negative     Urine antigen - negative         Respiratory Panel, Molecular, with COVID-19 (Restricted: peds pts or suitable admitted adults) [[id number]] Collected: 12/10/21 1446   Order Status: Completed Specimen: Nasopharyngeal Updated: 12/10/21 1627    Adenovirus by PCR Not Detected    Bordetella parapertussis by PCR Not Detected    Bordetella pertussis by PCR Not Detected    Chlamydophilia pneumoniae by PCR Not Detected    Coronavirus 229E by PCR Not Detected    Coronavirus HKU1 by PCR Not Detected    Coronavirus NL63 by PCR Not Detected    Coronavirus OC43 by PCR Not Detected    SARS-CoV-2, PCR Not Detected    Human Metapneumovirus by PCR Not Detected    Human Rhinovirus/Enterovirus by PCR Not Detected    Influenza A by PCR Not Detected    Influenza B by PCR Not Detected    Mycoplasma pneumoniae by PCR Not Detected    Parainfluenza Virus 1 by PCR Not Detected    Parainfluenza Virus 2 by PCR Not Detected    Parainfluenza Virus 3 by PCR Not Detected    Parainfluenza Virus 4 by PCR Not Detected    Respiratory Syncytial Virus by PCR Not Detected   COVID-19, Rapid [[id number]]            Radiology :    CTA chest -   Impression:       No evidence of pulmonary embolism     Consolidation and ground-glass opacities of the lungs is suspicious for   infectious process.      Small bilateral pleural effusions. IMPRESSION:     1. Respiratory failure   2. Pneumonia  3. Post COVID pulmonary fibrosis   4. Leukocytosis ( on solumedrol ) , elevated procalcitonin       RECOMMENDATIONS:      1. Cefepime 2 grams IV q 12 hrs, Vancomycin  1250 mg IV q 12 hrs-vanco trough   2.  NIV / breathing treatment

## 2021-12-14 NOTE — CARE COORDINATION
Patient continues on bipap 100% FIO2 or optiflow 60L 100%. IV cefepime and vanco continue. Sitter initiated due to removing oxygen. Will likely need LTAC if oxygen needs do not improve. Needs to great for LTAC today. For questions I can be reached at [phone].  Marilyn Alpers, [place]

## 2021-12-14 NOTE — PROGRESS NOTES
RT walked into patient room to transport and pt was on airvo. RT transported on BIPAP to new room without difficulty. Pt on AVAPS settings on bipap resting comfortably. Saturation is 93%.

## 2021-12-14 NOTE — PROGRESS NOTES
Danielle Leal M.D.,[facility]  Caitlyn Hood D.O., F.A.C.O.I., Julienne Shane M.D. Maryjane Landaverde M.D. Dari Corrigan D.O. Daily Pulmonary Progress Note    Patient:  Sherine Rater 60 y.o. male MRN: [id number]     Date of Service: 12/14/2021      Synopsis     We are following patient for acute hypoxic resp failure and history of COVID -19  pneumonia in August 2021    \"CC\" :SOB    Code status: Full       Subjective      Patient was seen and examined. He had desaturated earlier this a.m. and has b een on AVAPS. I was able to take him off and on HFNC and NRBM he is saturating 88-92%. Hs has responded to diuresis and so far has diuresed 2.9 liters. He has history of anxiety and currently while looking at his saturations he gets tachypnea and anxious. Has no cough    12/12: I personally seen and examined patient. Patient currently continues to be on NIV with FiO2 of 100% and PEEP of 10 and saturating 94%. He was for very short time on OptiFlow to be able to be eat lunch. He has diuresed so far 5.2 L. He is still anxious but the Ativan appears to help.    12/13/2021-much improved with breathing. Aggressive diuresis, 8.2 L negative fluid balance. Using NIV for respiratory support. Transition to air Vo to take pills and try to eat. SPO2 87% at rest.  States he feels improved, decreased edema peripherally. 12/14/21 less dyspnea, improved oxygen levels. Switched to AIRVO 95% FIO2 60 L. Will try to wean FIO2 on NIV, cycle during day for breaks off mask. Very good diuresis noted - 12 L neg fluid balance. Less edema. CXR reviewed, no significant change. Removing oxygen with episodes of desaturation, sitter at bedside. WBCs elevated >19. Review of Systems:  Constitutional: Denies fever, weight loss, night sweats, and fatigue  Skin: Denies pigmentation, dark lesions, and rashes   HEENT: Denies hearing loss, tinnitus, ear drainage, epistaxis, sore throat, and hoarseness.   Cardiovascular: Denies palpitations, chest pain, and chest pressure.   Respiratory: As above  Gastrointestinal: Denies nausea, vomiting, poor appetite, diarrhea, heartburn or reflux  Genitourinary: Denies dysuria, frequency, urgency or hematuria  Musculoskeletal: Denies myalgias, muscle weakness, and bone pain, positive for LE swelling  Neurological: Denies dizziness, vertigo, headache, and focal weakness  Psychological: Denies anxiety and depression  Endocrine: Denies heat intolerance and cold intolerance  Hematopoietic/Lymphatic: Denies bleeding problems and blood transfusions    24-hour events:  None    Objective   Vitals: /78   Pulse 92   Temp 98.4 °F (36.9 °C) (Oral)   Resp 18   Ht 6' (1.829 m)   Wt 275 lb 5 oz (124.9 kg)   SpO2 95%   BMI 37.34 kg/m²     I/O:    Intake/Output Summary (Last 24 hours) at 12/14/2021 1225  Last data filed at 12/14/2021 1123  Gross per 24 hour   Intake 30 ml   Output 4850 ml   Net -4820 ml       Vent Information  Skin Assessment: Clean, dry, & intact  Equipment ID: [id number]  Vt Ordered: 500 mL  FiO2 : 100 %  SpO2: 95 %  SpO2/FiO2 ratio: 95  I Time/ I Time %: 0.8 s  Humidification Source: Heated wire  Humidification Temp: 34  Humidification Temp Measured: 34  Mask Type: Full face mask  Mask Size: Large       IPAP: 18 cmH20  CPAP/EPAP: 12 cmH2O     CURRENT MEDS :  Scheduled Meds:   albumin human  25 g IntraVENous Q12H    labetalol  5 mg IntraVENous Once    vancomycin  1,250 mg IntraVENous Q12H    methylPREDNISolone  40 mg IntraVENous Q6H    influenza virus vaccine  0.5 mL IntraMUSCular Prior to discharge    guaiFENesin  400 mg Oral TID    ipratropium-albuterol  1 ampule Inhalation Q4H While awake    cefepime  2,000 mg IntraVENous Q12H    sodium chloride flush  5-40 mL IntraVENous 2 times per day    amLODIPine  10 mg Oral Daily    atorvastatin  40 mg Oral Nightly    apixaban  5 mg Oral BID    gabapentin  300 mg Oral Daily    phenytoin  300 mg Oral Nightly    phenytoin  200 mg Oral QAM    furosemide 40 mg IntraVENous BID       Physical Exam:  General Appearance: appears comfortable in no acute distress. HEENT: Normocephalic atraumatic without obvious abnormality   Neck: Lips, mucosa, and tongue normal.  Supple, symmetrical, trachea midline, no adenopathy;thyroid:  no enlargement/tenderness/nodules or JVD. Lung: Breath sounds CTA. Respirations   unlabored. Symmetrical expansion. Heart: RRR, normal S1, S2. No MRG  Abdomen: Soft, NT, ND. BS present x 4 quadrants. No bruit or organomegaly. Extremities: Pedal pulses 2+ symmetric b/l. Extremities normal, no cyanosis, clubbing,+2edema. Neurologic: Mental status: Alert and Oriented X3 . Pertinent/ New Labs and Imaging Studies     Imaging Personally Reviewed:  12/14/2021       HISTORY:   ORDERING SYSTEM PROVIDED HISTORY: see medical records   TECHNOLOGIST PROVIDED HISTORY:   Reason for exam:->see medical records   What reading provider will be dictating this exam?->CRC       FINDINGS:   No significant change. Diffuse bilateral airspace opacities and with   bilateral areas of consolidation. The heart appears unchanged in size. Possible small right pleural effusion. No pneumothorax. Impression   No significant change. Diffuse bilateral airspace opacities, nonspecific.                          12/10/2021 CT chest      Impression   No evidence of pulmonary embolism       Consolidation and ground-glass opacities of the lungs is suspicious for   infectious process. Small bilateral pleural effusions. ECHO     Summary   Left ventricle grossly normal in size. Mild - moderate left ventricular concentric hypertrophy noted. Normal LV segmental wall motion. Estimated left ventricular ejection fraction is 59±5%. Does not meet 50% threshold for diastolic dysfunction. The LAESV Index is <34ml/m2. Mildly dilated right ventricle.    Right ventricular segmental wall motion is normal.   Physiologic and/or trace mitral regurgitation is present. Trace aortic regurgitation is noted. Physiologic and/or trace tricuspid regurgitation. Physiologic and/or trace pulmonic regurgitation present. Technically good quality study. No previous echo for comparison. Suggest clinical correlation. Signature      ----------------------------------------------------------------   Electronically signed by Victorino Melendez DO(Interpreting   physician) on 10/03/2021 01:49 PM   ----------------------------------------------------------------    Labs:  Lab Results   Component Value Date    WBC 19.5 12/14/2021    HGB 9.9 12/14/2021    HCT 32.0 12/14/2021    MCV 96.4 12/14/2021    MCH 29.8 12/14/2021    MCHC 30.9 12/14/2021    RDW 14.3 12/14/2021     12/14/2021    MPV 10.4 12/14/2021     Lab Results   Component Value Date     12/14/2021    K 4.1 12/14/2021    K 3.7 08/21/2021     12/14/2021    CO2 30 12/14/2021    BUN 34 12/14/2021    CREATININE 1.1 12/14/2021    LABALBU 3.1 12/14/2021    CALCIUM 9.0 12/14/2021    GFRAA >60 12/14/2021    LABGLOM >60 12/14/2021     Lab Results   Component Value Date    PROTIME 12.2 12/10/2021    INR 1.1 12/10/2021     Recent Labs     12/12/21 0455   PROBNP 4,909*     Recent Labs     12/12/21 0455   PROCAL 3.84*     This SmartLink has not been configured with any valid records. Micro:  No results for input(s): CULTRESP in the last 72 hours. No results for input(s): LABGRAM in the last 72 hours. No results for input(s): LEGUR in the last 72 hours. No results for input(s): STREPNEUMAGU in the last 72 hours. No results for input(s): LP1UAG in the last 72 hours. Assessment:    1. Acute hypoxic respiratory failure  2. Bilateral patchy infiltrates differential diagnosis includes healthcare associated pneumonia versus possibly volume overload/Acute diastlic HF  3. History of severe Covid pneumonia in August 2021  4. H/O anxiety      Plan:    Alternate between NIV AVAPS volume 500 EPAP +12 backup rate 14 FiO2 100% and Optiflow 60 L FiO2 95%. We will try to wean FiO2 as tolerated for saturations above 90%  Continue diuresis, monitor I&O's closely -12 L negative fluid balance  Solu-Medrol 40 mg IV every 6hours  Continue Duonebs every 4 hours while awake  Appreciate infectious disease input. Continue cefepime and vancomycin - serum galactomannan in process. Follow CXR. No plan for bronch so far, patient would likely need intubated for procedure  Continue Ativan as needed  This plan of care was reviewed in collaboration with Dr. Anat Maciel    Electronically signed by HOMERO Veliz CNP on 12/14/2021 at 12:25 PM      I personally saw, examined and provided care for the patient. Radiographs, labs and medication list were reviewed by me independently. I spoke with bedside nursing, therapists and consultants. The case was discussed in detail and plans for care were established. Review of CNP documentation was conducted and revisions were made as appropriate. I agree with the above documented exam, problem list and plan of care.    Trav Newell MD

## 2021-12-14 NOTE — PROGRESS NOTES
Pharmacy Consultation Note  (Antibiotic Dosing and Monitoring)    Initial consult date: 12/10/21  Consulting physician/provider: Dr. Jett Torres  Drug: Vancomycin  Indication: Pneumonia    Age/  Gender Height Weight IBW  Allergy Information   56 y.o./male 6' (182.9 cm) 278 lb (126.1 kg)     Ideal body weight: 77.6 kg (171 lb 1.2 oz)  Adjusted ideal body weight: 96.5 kg (212 lb 12.3 oz)   Patient has no known allergies. Renal Function:  Recent Labs     12/12/21  0455 12/13/21  0505 12/14/21  0605   BUN 16 26* 34*   CREATININE 0.9 1.1 1.1       Intake/Output Summary (Last 24 hours) at 12/14/2021 0834  Last data filed at 12/14/2021 1826  Gross per 24 hour   Intake 40 ml   Output 4850 ml   Net -4810 ml       Vancomycin Monitoring:  Trough:    Recent Labs     12/12/21  0455   VANCOTROUGH 21.3*     Random:  No results for input(s): VANCORANDOM in the last 72 hours. Vancomycin Administration Times:  Recent vancomycin administrations                   vancomycin (VANCOCIN) 1,250 mg in dextrose 5 % 250 mL IVPB (mg) 1,250 mg New Bag 12/14/21 0551     1,250 mg New Bag 12/13/21 1805     1,250 mg New Bag  0531     1,250 mg New Bag 12/12/21 1640    vancomycin 1500 mg in dextrose 5% 300 mL IVPB (mg) 1,500 mg New Bag 12/11/21 1819              Assessment:  · Patient is a 60 y.o. male who has been initiated on vancomycin  Estimated Creatinine Clearance: 100 mL/min (based on SCr of 1.1 mg/dL). · To dose vancomycin, pharmacy will be utilizing [employer] calculation software for goal AUC/VIOLET 400-600 mg/L-hr   · 12/12: Creatinine stable at 0.9. Level ~10.5 hours post dose was 21.3 this morning. Inputted into [employer], current regimen with predicted AUC/VIOLET of 620 and steady state trough of 20  · 12/13: Scr 1.1 today up from 0.9. WBC 10.6. · 12/14: Scr 1.1, elevated again today from baseline will check vancomycin level.      Plan:  · Continue vancomycin to 1250 mg Q12h with predicted AUC/VIOLET of 576 and steady state trough of 19.4  · Will check vancomycin level prior to afternoon dose today  · Will continue to monitor renal function     [place] Kitchen, PharmD   Pharmacy Resident   Phone: [phone]  12/14/2021 8:34 AM

## 2021-12-15 ENCOUNTER — APPOINTMENT (OUTPATIENT)
Dept: GENERAL RADIOLOGY | Age: 56
DRG: 720 | End: 2021-12-15
Payer: COMMERCIAL

## 2021-12-15 LAB
ALBUMIN SERPL-MCNC: 3.7 G/DL (ref 3.5–5.2)
ALP BLD-CCNC: 75 U/L (ref 40–129)
ALT SERPL-CCNC: 43 U/L (ref 0–40)
ANION GAP SERPL CALCULATED.3IONS-SCNC: 11 MMOL/L (ref 7–16)
AST SERPL-CCNC: 40 U/L (ref 0–39)
BILIRUB SERPL-MCNC: 0.4 MG/DL (ref 0–1.2)
BLOOD CULTURE, ROUTINE: NORMAL
BUN BLDV-MCNC: 32 MG/DL (ref 6–20)
CALCIUM SERPL-MCNC: 9.1 MG/DL (ref 8.6–10.2)
CHLORIDE BLD-SCNC: 97 MMOL/L (ref 98–107)
CO2: 32 MMOL/L (ref 22–29)
CREAT SERPL-MCNC: 1 MG/DL (ref 0.7–1.2)
CULTURE, BLOOD 2: NORMAL
GFR AFRICAN AMERICAN: >60
GFR NON-AFRICAN AMERICAN: >60 ML/MIN/1.73
GLUCOSE BLD-MCNC: 197 MG/DL (ref 74–99)
HCT VFR BLD CALC: 32.8 % (ref 37–54)
HEMOGLOBIN: 10.3 G/DL (ref 12.5–16.5)
MCH RBC QN AUTO: 29 PG (ref 26–35)
MCHC RBC AUTO-ENTMCNC: 31.4 % (ref 32–34.5)
MCV RBC AUTO: 92.4 FL (ref 80–99.9)
PDW BLD-RTO: 14.2 FL (ref 11.5–15)
PLATELET # BLD: 164 E9/L (ref 130–450)
PMV BLD AUTO: 10.2 FL (ref 7–12)
POTASSIUM SERPL-SCNC: 3.8 MMOL/L (ref 3.5–5)
RBC # BLD: 3.55 E12/L (ref 3.8–5.8)
SODIUM BLD-SCNC: 140 MMOL/L (ref 132–146)
TOTAL PROTEIN: 6.5 G/DL (ref 6.4–8.3)
WBC # BLD: 12.3 E9/L (ref 4.5–11.5)

## 2021-12-15 PROCEDURE — 6370000000 HC RX 637 (ALT 250 FOR IP): Performed by: INTERNAL MEDICINE

## 2021-12-15 PROCEDURE — 6360000002 HC RX W HCPCS: Performed by: INTERNAL MEDICINE

## 2021-12-15 PROCEDURE — 94660 CPAP INITIATION&MGMT: CPT

## 2021-12-15 PROCEDURE — 85027 COMPLETE CBC AUTOMATED: CPT

## 2021-12-15 PROCEDURE — 71045 X-RAY EXAM CHEST 1 VIEW: CPT

## 2021-12-15 PROCEDURE — 2060000000 HC ICU INTERMEDIATE R&B

## 2021-12-15 PROCEDURE — 2700000000 HC OXYGEN THERAPY PER DAY

## 2021-12-15 PROCEDURE — 80053 COMPREHEN METABOLIC PANEL: CPT

## 2021-12-15 PROCEDURE — 2580000003 HC RX 258: Performed by: INTERNAL MEDICINE

## 2021-12-15 PROCEDURE — 94640 AIRWAY INHALATION TREATMENT: CPT

## 2021-12-15 PROCEDURE — P9047 ALBUMIN (HUMAN), 25%, 50ML: HCPCS | Performed by: INTERNAL MEDICINE

## 2021-12-15 PROCEDURE — 36415 COLL VENOUS BLD VENIPUNCTURE: CPT

## 2021-12-15 PROCEDURE — 6360000002 HC RX W HCPCS

## 2021-12-15 RX ORDER — FUROSEMIDE 10 MG/ML
40 INJECTION INTRAMUSCULAR; INTRAVENOUS DAILY
Status: DISCONTINUED | OUTPATIENT
Start: 2021-12-16 | End: 2021-12-18

## 2021-12-15 RX ORDER — METHYLPREDNISOLONE SODIUM SUCCINATE 40 MG/ML
40 INJECTION, POWDER, LYOPHILIZED, FOR SOLUTION INTRAMUSCULAR; INTRAVENOUS EVERY 12 HOURS
Status: DISPENSED | OUTPATIENT
Start: 2021-12-16 | End: 2021-12-18

## 2021-12-15 RX ADMIN — Medication 10 ML: at 19:26

## 2021-12-15 RX ADMIN — LORAZEPAM 0.5 MG: 2 INJECTION INTRAMUSCULAR; INTRAVENOUS at 02:30

## 2021-12-15 RX ADMIN — IPRATROPIUM BROMIDE AND ALBUTEROL SULFATE 1 AMPULE: .5; 2.5 SOLUTION RESPIRATORY (INHALATION) at 20:16

## 2021-12-15 RX ADMIN — ALBUMIN (HUMAN) 25 G: 0.25 INJECTION, SOLUTION INTRAVENOUS at 20:41

## 2021-12-15 RX ADMIN — SODIUM CHLORIDE, PRESERVATIVE FREE 10 ML: 5 INJECTION INTRAVENOUS at 10:54

## 2021-12-15 RX ADMIN — Medication 10 ML: at 08:46

## 2021-12-15 RX ADMIN — CEFEPIME 2000 MG: 2 INJECTION, POWDER, FOR SOLUTION INTRAVENOUS at 22:31

## 2021-12-15 RX ADMIN — METHYLPREDNISOLONE SODIUM SUCCINATE 40 MG: 40 INJECTION, POWDER, FOR SOLUTION INTRAMUSCULAR; INTRAVENOUS at 08:44

## 2021-12-15 RX ADMIN — PHENYTOIN SODIUM 200 MG: 100 CAPSULE, EXTENDED RELEASE ORAL at 08:45

## 2021-12-15 RX ADMIN — APIXABAN 5 MG: 5 TABLET, FILM COATED ORAL at 08:45

## 2021-12-15 RX ADMIN — PHENYTOIN SODIUM 300 MG: 100 CAPSULE, EXTENDED RELEASE ORAL at 20:41

## 2021-12-15 RX ADMIN — FUROSEMIDE 40 MG: 10 INJECTION, SOLUTION INTRAMUSCULAR; INTRAVENOUS at 09:57

## 2021-12-15 RX ADMIN — SODIUM CHLORIDE, PRESERVATIVE FREE 10 ML: 5 INJECTION INTRAVENOUS at 15:17

## 2021-12-15 RX ADMIN — GUAIFENESIN 400 MG: 400 TABLET ORAL at 20:42

## 2021-12-15 RX ADMIN — GABAPENTIN 300 MG: 300 CAPSULE ORAL at 09:56

## 2021-12-15 RX ADMIN — GUAIFENESIN 400 MG: 400 TABLET ORAL at 15:17

## 2021-12-15 RX ADMIN — ATORVASTATIN CALCIUM 40 MG: 40 TABLET, FILM COATED ORAL at 20:42

## 2021-12-15 RX ADMIN — IPRATROPIUM BROMIDE AND ALBUTEROL SULFATE 1 AMPULE: .5; 2.5 SOLUTION RESPIRATORY (INHALATION) at 16:36

## 2021-12-15 RX ADMIN — SODIUM CHLORIDE, PRESERVATIVE FREE 10 ML: 5 INJECTION INTRAVENOUS at 09:57

## 2021-12-15 RX ADMIN — LORAZEPAM 0.5 MG: 2 INJECTION INTRAMUSCULAR; INTRAVENOUS at 10:54

## 2021-12-15 RX ADMIN — METHYLPREDNISOLONE SODIUM SUCCINATE 40 MG: 40 INJECTION, POWDER, FOR SOLUTION INTRAMUSCULAR; INTRAVENOUS at 02:05

## 2021-12-15 RX ADMIN — IPRATROPIUM BROMIDE AND ALBUTEROL SULFATE 1 AMPULE: .5; 2.5 SOLUTION RESPIRATORY (INHALATION) at 12:21

## 2021-12-15 RX ADMIN — VANCOMYCIN HYDROCHLORIDE 1250 MG: 10 INJECTION, POWDER, LYOPHILIZED, FOR SOLUTION INTRAVENOUS at 01:20

## 2021-12-15 RX ADMIN — CEFEPIME 2000 MG: 2 INJECTION, POWDER, FOR SOLUTION INTRAVENOUS at 10:53

## 2021-12-15 RX ADMIN — GUAIFENESIN 400 MG: 400 TABLET ORAL at 08:45

## 2021-12-15 RX ADMIN — IPRATROPIUM BROMIDE AND ALBUTEROL SULFATE 1 AMPULE: .5; 2.5 SOLUTION RESPIRATORY (INHALATION) at 08:57

## 2021-12-15 RX ADMIN — ALBUMIN (HUMAN) 25 G: 0.25 INJECTION, SOLUTION INTRAVENOUS at 08:44

## 2021-12-15 RX ADMIN — VANCOMYCIN HYDROCHLORIDE 1250 MG: 10 INJECTION, POWDER, LYOPHILIZED, FOR SOLUTION INTRAVENOUS at 19:26

## 2021-12-15 RX ADMIN — AMLODIPINE BESYLATE 10 MG: 10 TABLET ORAL at 08:45

## 2021-12-15 RX ADMIN — APIXABAN 5 MG: 5 TABLET, FILM COATED ORAL at 20:42

## 2021-12-15 ASSESSMENT — PAIN SCALES - GENERAL
PAINLEVEL_OUTOF10: 0
PAINLEVEL_OUTOF10: 0

## 2021-12-15 NOTE — PROGRESS NOTES
normal S1-S2  Abdomen: Soft, nontender, nondistended  Musculoskeletal: No clubbing, cyanosis, no bilateral lower extremity edema. Brisk capillary refill. Skin:  No rashes  on visible skin  Neurologic: awake, alert and following commands     ASSESSMENT:    Acute hypoxemic respiratory failure due to pneumonia   Sepsis due to Pneumonia  Acute diastolic congestive heart failure  Hist of DVTs  Elevated troponins  History of Seizures  HLD  HTN  Post COVID Pulmonary Fibrosis    PLAN:    Cont Vancomycin and Cefepime. ID and pulmonology on board  Cont Bronchodilators  Cont IV solumedrol Q6h  Appreciate pulmonology recommendations. No plans of Bronch at this time. Ativan PRN   Cont Eliquis  Cont IV lasix 40 bid. Pt responded well with negative fluid balance, renal functions stable. Daily CXR.      DISPOSITION:     Medications:  REVIEWED DAILY    Infusion Medications    sodium chloride       Scheduled Medications    vancomycin  1,250 mg IntraVENous Q18H    albumin human  25 g IntraVENous Q12H    labetalol  5 mg IntraVENous Once    methylPREDNISolone  40 mg IntraVENous Q6H    influenza virus vaccine  0.5 mL IntraMUSCular Prior to discharge    guaiFENesin  400 mg Oral TID    ipratropium-albuterol  1 ampule Inhalation Q4H While awake    cefepime  2,000 mg IntraVENous Q12H    sodium chloride flush  5-40 mL IntraVENous 2 times per day    amLODIPine  10 mg Oral Daily    atorvastatin  40 mg Oral Nightly    apixaban  5 mg Oral BID    gabapentin  300 mg Oral Daily    phenytoin  300 mg Oral Nightly    phenytoin  200 mg Oral QAM    furosemide  40 mg IntraVENous BID     PRN Meds: morphine, LORazepam, melatonin, sodium chloride flush, sodium chloride, ondansetron **OR** ondansetron, polyethylene glycol, acetaminophen **OR** acetaminophen    Labs:     Recent Labs     12/13/21  0505 12/14/21  0605 12/15/21  0452   WBC 10.6 19.5* 12.3*   HGB 9.5* 9.9* 10.3*   HCT 30.4* 32.0* 32.8*   * 169 164       Recent Labs 12/13/21  0505 12/14/21  0605 12/15/21  0452    146 140   K 4.4 4.1 3.8    103 97*   CO2 30* 30* 32*   BUN 26* 34* 32*   CREATININE 1.1 1.1 1.0   CALCIUM 8.5* 9.0 9.1       Recent Labs     12/13/21  0505 12/14/21  0605 12/15/21  0452   PROT 5.6* 6.3* 6.5   ALKPHOS 70 72 75   ALT 15 38 43*   AST 22 58* 40*   BILITOT 0.2 0.3 0.4       No results for input(s): INR in the last 72 hours. No results for input(s): Amberlydenver [place] in the last 72 hours. Chronic labs:    Lab Results   Component Value Date    CHOL 173 12/11/2021    TRIG 100 12/11/2021    HDL 65 12/11/2021    LDLCALC 88 12/11/2021    TSH 0.507 12/11/2021    PSA 0.84 12/12/2020    INR 1.1 12/10/2021    LABA1C 5.3 12/11/2021       Radiology: REVIEWED DAILY    +++++++++++++++++++++++++++++++++++++++++++++++++  Marin Eckert MD  [employer] - [address], [place]  +++++++++++++++++++++++++++++++++++++++++++++++++  NOTE: This report was transcribed using voice recognition software. Every effort was made to ensure accuracy; however, inadvertent computerized transcription errors may be present.

## 2021-12-15 NOTE — PROGRESS NOTES
Nola Nix M.D.,[facility]  David Og D.O., F.A.C.O.I., Singh Winkler M.D. Navi Elkins M.D. Samantha Patel D.O. Daily Pulmonary Progress Note    Patient:  Reeves Meckel 60 y.o. male MRN: [id number]     Date of Service: 12/15/2021      Synopsis     We are following patient for acute hypoxic resp failure and history of COVID -19  pneumonia in August 2021    \"CC\" :SOB    Code status: Full       Subjective      Patient was seen and examined. 12/13/2021-much improved with breathing. Aggressive diuresis, 8.2 L negative fluid balance. Using NIV for respiratory support. Transition to air Vo to take pills and try to eat. SPO2 87% at rest.  States he feels improved, decreased edema peripherally. 12/14/21 less dyspnea, improved oxygen levels. Switched to AIRVO 95% FIO2 60 L. Will try to wean FIO2 on NIV, cycle during day for breaks off mask. Very good diuresis noted - 12 L neg fluid balance. Less edema. CXR reviewed, no significant change. Removing oxygen with episodes of desaturation, sitter at bedside. WBCs elevated >19.    12/15/2021 4.8 L urine output yesterday. Aggressive diuresis. Improved oxygen levels. Weaning FiO2 on AVAPS 75% alternating with heated high flow nasal cannula 60 L FiO2 weaned to 80%. Sitting up at the side of the bed eating lunch. Sitter at bedside. Chest x-ray completed. Patient is very tearful and weepy. Previously ripping off oxygen with episode of desaturation into the 60s. States he has to use the bathroom and feels the urge to urinate even with Busch catheter in place. Review of Systems:  Constitutional: Denies fever, weight loss, night sweats, and fatigue  Skin: Denies pigmentation, dark lesions, and rashes   HEENT: Denies hearing loss, tinnitus, ear drainage, epistaxis, sore throat, and hoarseness. Cardiovascular: Denies palpitations, chest pain, and chest pressure.   Respiratory: As above  Gastrointestinal: Denies nausea, vomiting, poor appetite, diarrhea, heartburn or reflux  Genitourinary: Denies dysuria, frequency, urgency or hematuria  Musculoskeletal: Denies myalgias, muscle weakness, and bone pain, positive for LE swelling  Neurological: Denies dizziness, vertigo, headache, and focal weakness  Psychological: Denies anxiety and depression  Endocrine: Denies heat intolerance and cold intolerance  Hematopoietic/Lymphatic: Denies bleeding problems and blood transfusions    24-hour events:  None    Objective   Vitals: /73   Pulse 91   Temp 98.2 °F (36.8 °C)   Resp 24   Ht 6' (1.829 m)   Wt 275 lb 5 oz (124.9 kg)   SpO2 97%   BMI 37.34 kg/m²     I/O:    Intake/Output Summary (Last 24 hours) at 12/15/2021 1028  Last data filed at 12/15/2021 0957  Gross per 24 hour   Intake 90 ml   Output 4850 ml   Net -4760 ml       Vent Information  Skin Assessment: Clean, dry, & intact  Equipment ID: [id number]  Vt Ordered: 500 mL  FiO2 : 90 %  SpO2: 97 %  SpO2/FiO2 ratio: 107.78  I Time/ I Time %: 0.8 s  Humidification Source: Heated wire  Humidification Temp: 34  Humidification Temp Measured: 34  Mask Type: Full face mask  Mask Size: Large       IPAP: 18 cmH20  CPAP/EPAP: 10 cmH2O     CURRENT MEDS :  Scheduled Meds:   vancomycin  1,250 mg IntraVENous Q18H    albumin human  25 g IntraVENous Q12H    labetalol  5 mg IntraVENous Once    methylPREDNISolone  40 mg IntraVENous Q6H    influenza virus vaccine  0.5 mL IntraMUSCular Prior to discharge    guaiFENesin  400 mg Oral TID    ipratropium-albuterol  1 ampule Inhalation Q4H While awake    cefepime  2,000 mg IntraVENous Q12H    sodium chloride flush  5-40 mL IntraVENous 2 times per day    amLODIPine  10 mg Oral Daily    atorvastatin  40 mg Oral Nightly    apixaban  5 mg Oral BID    gabapentin  300 mg Oral Daily    phenytoin  300 mg Oral Nightly    phenytoin  200 mg Oral QAM    furosemide  40 mg IntraVENous BID       Physical Exam:  General Appearance: appears comfortable in no acute distress. HEENT: Normocephalic atraumatic without obvious abnormality   Neck: Lips, mucosa, and tongue normal.  Supple, symmetrical, trachea midline, no adenopathy;thyroid:  no enlargement/tenderness/nodules or JVD. Lung: Breath sounds CTA. Respirations   unlabored. Symmetrical expansion. Heart: RRR, normal S1, S2. No MRG  Abdomen: Soft, NT, ND. BS present x 4 quadrants. No bruit or organomegaly. Extremities: Pedal pulses 2+ symmetric b/l. Extremities normal, no cyanosis, clubbing, improving edema. Neurologic: Mental status: Alert and Oriented X3 . Pertinent/ New Labs and Imaging Studies     Imaging Personally Reviewed:  12/15/2021 chest x-ray pending      12/14/2021  Chest x-ray     HISTORY:   ORDERING SYSTEM PROVIDED HISTORY: see medical records   TECHNOLOGIST PROVIDED HISTORY:   Reason for exam:->see medical records   What reading provider will be dictating this exam?->CRC       FINDINGS:   No significant change. Diffuse bilateral airspace opacities and with   bilateral areas of consolidation. The heart appears unchanged in size. Possible small right pleural effusion. No pneumothorax. Impression   No significant change. Diffuse bilateral airspace opacities, nonspecific.                          12/10/2021 CT chest      Impression   No evidence of pulmonary embolism       Consolidation and ground-glass opacities of the lungs is suspicious for   infectious process. Small bilateral pleural effusions. ECHO     Summary   Left ventricle grossly normal in size. Mild - moderate left ventricular concentric hypertrophy noted. Normal LV segmental wall motion. Estimated left ventricular ejection fraction is 59±5%. Does not meet 50% threshold for diastolic dysfunction. The LAESV Index is <34ml/m2. Mildly dilated right ventricle. Right ventricular segmental wall motion is normal.   Physiologic and/or trace mitral regurgitation is present.    Trace aortic regurgitation is noted.   Physiologic and/or trace tricuspid regurgitation. Physiologic and/or trace pulmonic regurgitation present. Technically good quality study. No previous echo for comparison. Suggest clinical correlation. Signature      ----------------------------------------------------------------   Electronically signed by Annmarie Calhoun DO(Interpreting   physician) on 10/03/2021 01:49 PM   ----------------------------------------------------------------    Labs:  Lab Results   Component Value Date    WBC 12.3 12/15/2021    HGB 10.3 12/15/2021    HCT 32.8 12/15/2021    MCV 92.4 12/15/2021    MCH 29.0 12/15/2021    MCHC 31.4 12/15/2021    RDW 14.2 12/15/2021     12/15/2021    MPV 10.2 12/15/2021     Lab Results   Component Value Date     12/15/2021    K 3.8 12/15/2021    K 3.7 08/21/2021    CL 97 12/15/2021    CO2 32 12/15/2021    BUN 32 12/15/2021    CREATININE 1.0 12/15/2021    LABALBU 3.7 12/15/2021    CALCIUM 9.1 12/15/2021    GFRAA >60 12/15/2021    LABGLOM >60 12/15/2021     Lab Results   Component Value Date    PROTIME 12.2 12/10/2021    INR 1.1 12/10/2021     No results for input(s): PROBNP in the last 72 hours. No results for input(s): PROCAL in the last 72 hours. This SmartLink has not been configured with any valid records. Micro:  No results for input(s): CULTRESP in the last 72 hours. No results for input(s): LABGRAM in the last 72 hours. No results for input(s): LEGUR in the last 72 hours. No results for input(s): STREPNEUMAGU in the last 72 hours. No results for input(s): LP1UAG in the last 72 hours. Assessment:    1. Acute hypoxic respiratory failure  2. Bilateral patchy infiltrates differential diagnosis includes healthcare associated pneumonia versus possibly volume overload/Acute diastlic HF  3. History of severe Covid pneumonia in August 2021  4. H/O anxiety      Plan:   1. Heated high flow nasal cannula 60 L decreased FiO2 to 80% today.   Saturation 99% continue to wean oxygen discussed with nursing and respiratory  2. NIV in the form of AVAPS for respiratory support settings volume 500 EPAP +10 FiO2 75%  3. Continue diuresis, monitor I&O's closely -17 L negative fluid balance  4. Start to taper Solu-Medrol  5. Continue Duonebs every 4 hours while awake  6. Appreciate infectious disease input. Continue cefepime and vancomycin - serum galactomannan in process 12/12/2021.  7. Follow CXR 12/14/2021. No plan for bronch so far, patient would likely need intubated for procedure  8. Continue Ativan as needed  This plan of care was reviewed in collaboration with Dr. Shekhar Schneider    Electronically signed by HOMERO Carney CNP on 12/15/2021 at 10:28 AM    Addendum: We will wean Solu-Medrol to 40 every 12. Also cut down on Lasix 40 mg IV daily patient so far diuresed 20 L. We will check proBNP and a.m. Chest x-ray still pending. Please wean FiO2 for saturations above 88%      I personally saw, examined and provided care for the patient. Radiographs, labs and medication list were reviewed by me independently. I spoke with bedside nursing, therapists and consultants. The case was discussed in detail and plans for care were established. Review of CNP documentation was conducted and revisions were made as appropriate. I agree with the above documented exam, problem list and plan of care.   Norma Cerna MD

## 2021-12-15 NOTE — PROGRESS NOTES
Pharmacy Consultation Note  (Antibiotic Dosing and Monitoring)    Initial consult date: 12/10/21  Consulting physician/provider: Dr. Kapil Peña  Drug: Vancomycin  Indication: Pneumonia    Age/  Gender Height Weight IBW  Allergy Information   56 y.o./male 6' (182.9 cm) 278 lb (126.1 kg)     Ideal body weight: 77.6 kg (171 lb 1.2 oz)  Adjusted ideal body weight: 96.5 kg (212 lb 12.3 oz)   Patient has no known allergies. Renal Function:  Recent Labs     12/13/21  0505 12/14/21  0605 12/15/21  0452   BUN 26* 34* 32*   CREATININE 1.1 1.1 1.0       Intake/Output Summary (Last 24 hours) at 12/15/2021 0918  Last data filed at 12/15/2021 0846  Gross per 24 hour   Intake 80 ml   Output 4850 ml   Net -4770 ml       Vancomycin Monitoring:  Trough:    Recent Labs     12/14/21  1726   VANCOTROUGH 24.9*     Random:  No results for input(s): VANCORANDOM in the last 72 hours. Vancomycin Administration Times:  Recent vancomycin administrations                   vancomycin (VANCOCIN) 1,250 mg in dextrose 5 % 250 mL IVPB (mg) 1,250 mg New Bag 12/14/21 0551     1,250 mg New Bag 12/13/21 1805     1,250 mg New Bag  0531     1,250 mg New Bag 12/12/21 1640    vancomycin 1500 mg in dextrose 5% 300 mL IVPB (mg) 1,500 mg New Bag 12/11/21 1819              Assessment:  · Patient is a 60 y.o. male who has been initiated on vancomycin  Estimated Creatinine Clearance: 110 mL/min (based on SCr of 1 mg/dL). · To dose vancomycin, pharmacy will be utilizing [employer] calculation software for goal AUC/VIOLET 400-600 mg/L-hr   · 12/12: Creatinine stable at 0.9. Level ~10.5 hours post dose was 21.3 this morning. Inputted into [employer], current regimen with predicted AUC/VIOLET of 620 and steady state trough of 20  · 12/13: Scr 1.1 today up from 0.9. WBC 10.6. · 12/14: Scr 1.1, elevated again today from baseline will check vancomycin level. · 12/15: Scr 1, vancomycin level @17:26 = 24.9 mcg/mL, true trough drawn 11.5 hours post dose.  Regimen adjusted overnight to vancomycin 1250 mg IV q18h due to elevated trough. Plan:  · Continue vancomycin to 1250 mg q18h with predicted AUC/VIOLET of 460 and steady state trough of 15 mcg/mL  · Will check vancomycin levels when appropriate.    · Will continue to monitor renal function     Thuy Jefferson, PharmD   Pharmacy Resident   Phone: [phone]  12/15/2021 9:18 AM

## 2021-12-15 NOTE — CARE COORDINATION
Spoke with patient's spouse, she is agreeable to 1. [facility], 2. [facility]. Referral pending to [facility]. Bipap 75% today, [facility] typically likes below 70[facility], plans to accept when less support needed on bipap. Called and updated spouse. For questions I can be reached at [phone]. [facility]    The Plan for Transition of Care is related to the following treatment goals: discharge planning when stable     The Patient and/or patient representative Amy Mate was provided with a choice of provider and agrees   with the discharge plan. [x] Yes [] No    Freedom of choice list was provided with basic dialogue that supports the patient's individualized plan of care/goals, treatment preferences and shares the quality data associated with the providers.  [x] Yes [] No

## 2021-12-15 NOTE — PROGRESS NOTES
Department of Internal Medicine  Infectious Diseases  Progress  Note      C/C :  Pneumonia, respiratory failure     Pt is awake, alert, in resp distress  On Optiflow      Current Facility-Administered Medications   Medication Dose Route Frequency Provider Last Rate Last Admin    vancomycin (VANCOCIN) 1,250 mg in dextrose 5 % 250 mL IVPB  1,250 mg IntraVENous Q18H Ordomenico Curry DO   Stopped at 12/15/21 0252    albumin human 25 % IV solution 25 g  25 g IntraVENous Q12H Ermias Easton MD   Stopped at 12/15/21 0957    morphine (PF) injection 1 mg  1 mg IntraVENous Q4H PRN Ciera Banks MD   1 mg at 12/13/21 5268    LORazepam (ATIVAN) injection 0.5 mg  0.5 mg IntraVENous Q4H PRN Lita Zepeda MD   0.5 mg at 12/15/21 1054    labetalol (NORMODYNE;TRANDATE) injection 5 mg  5 mg IntraVENous Once Lita Zepeda MD        methylPREDNISolone sodium (SOLU-MEDROL) injection 40 mg  40 mg IntraVENous Q6H Ermias Easton MD   40 mg at 12/15/21 0844    influenza quadrivalent split vaccine (FLUZONE;FLUARIX;FLULAVAL;AFLURIA) injection 0.5 mL  0.5 mL IntraMUSCular Prior to discharge Blayne Curry, DO        melatonin disintegrating tablet 10 mg  10 mg Oral Nightly PRN Ermias Easton MD   10 mg at 12/14/21 2104    guaiFENesin tablet 400 mg  400 mg Oral TID Blayne Curry DO   400 mg at 12/15/21 0845    ipratropium-albuterol (DUONEB) nebulizer solution 1 ampule  1 ampule Inhalation Q4H While awake Blayne Curry DO   1 ampule at 12/15/21 1221    cefepime (MAXIPIME) 2000 mg IVPB minibag  2,000 mg IntraVENous Q12H Marleeradha Marte, DO 12.5 mL/hr at 12/15/21 1053 2,000 mg at 12/15/21 1053    sodium chloride flush 0.9 % injection 5-40 mL  5-40 mL IntraVENous 2 times per day Blayne Curry, DO   10 mL at 12/15/21 0846    sodium chloride flush 0.9 % injection 5-40 mL  5-40 mL IntraVENous PRN Marlee Marte DO   10 mL at 12/15/21 1054    0.9 % sodium chloride infusion  25 mL IntraVENous PRN Lizett Gretchen, DO        ondansetron (ZOFRAN-ODT) disintegrating tablet 4 mg  4 mg Oral Q8H PRN Lizett Gretchen, DO        Or    [facility]ron [facility] injection 4 mg  4 mg IntraVENous Q6H PRN [facility] Lesher, DO        polyethylene glycol [facility]) packet 17 g  17 g Oral Daily PRN Lizett Gretchen, DO        acetaminophen (TYLENOL) tablet 650 mg  650 mg Oral Q6H PRN Lizett Gretchen, DO   650 mg at 12/12/21 2407    Or    acetaminophen (TYLENOL) suppository 650 mg  650 mg Rectal Q6H PRN Lizett Gretchen, DO        amLODIPine (NORVASC) tablet 10 mg  10 mg Oral Daily [facility], DO   10 mg at 12/15/21 0845    atorvastatin (LIPITOR) tablet 40 mg  40 mg Oral Nightly [facility], DO   40 mg at 12/14/21 2105    apixaban (ELIQUIS) tablet 5 mg  5 mg Oral BID [facility] Geoffher, DO   5 mg at 12/15/21 0845    gabapentin (NEURONTIN) capsule 300 mg  300 mg Oral Daily [facility], DO   300 mg at 12/15/21 6862    phenytoin (DILANTIN) ER capsule 300 mg  300 mg Oral Nightly [facility], DO   300 mg at 12/14/21 2104    phenytoin (DILANTIN) ER capsule 200 mg  200 mg Oral QAM [facility] Lesher, DO   200 mg at 12/15/21 0845    furosemide (LASIX) injection 40 mg  40 mg IntraVENous BID Tyree Serna MD   40 mg at 12/15/21 0957       REVIEW OF SYSTEMS:    CONSTITUTIONAL:  Denies fever, chill or rigors, nausea or vomiting. HEENT: denies blurring of vision or double vision, denies hearing problem  RESPIRATORY: SOB and cough   CARDIOVASCULAR:  Denies palpitation  GASTROINTESTINAL:  Denies abdomen pain, diarrhea or constipation. GENITOURINARY:  Denies burning urination or frequency of urination  INTEGUMENT: denies wound , rash  HEMATOLOGIC/LYMPHATIC:  Denies lymph node swelling, gum bleeding or easy bruising.   MUSCULOSKELETAL: Leg swelling   NEUROLOGICAL:  Denies light headed, dizziness, loss of consciousness      PHYSICAL EXAM:      Vitals:     /73   Pulse 91   Temp 98.2 °F (36.8 °C) (Oral)   Resp 24   Ht 6' (1.829 m)   Wt 275 lb 5 oz (124.9 kg)   SpO2 97%   BMI 37.34 kg/m²     General Appearance:    Awake, alert ,in resp distress . Head:    Normocephalic, atraumatic   Eyes:    No pallor, no icterus,   Ears:    No obvious deformity or drainage.    Nose:   No nasal drainage   Throat:   Mucosa moist, no oral thrush   Neck:   Supple, no lymphadenopathy   Lungs:     Bilateral wheeze     Heart:    Regular rate and rhythm   Abdomen:     Soft, non-tender, bowel sounds present    Extremities:   ++ edema, non tender    Pulses:   Dorsalis pedis palpable    Skin:   No rash        CBC with Differential:      Lab Results   Component Value Date    WBC 12.3 12/15/2021    RBC 3.55 12/15/2021    HGB 10.3 12/15/2021    HCT 32.8 12/15/2021     12/15/2021    MCV 92.4 12/15/2021    MCH 29.0 12/15/2021    MCHC 31.4 12/15/2021    RDW 14.2 12/15/2021    NRBC 0.9 09/17/2021    METASPCT 0.9 09/17/2021    LYMPHOPCT 11.6 12/10/2021    MONOPCT 4.1 12/10/2021    MYELOPCT 0.9 09/17/2021    BASOPCT 0.4 12/10/2021    MONOSABS 0.81 12/10/2021    LYMPHSABS 2.28 12/10/2021    EOSABS 0.00 12/10/2021    BASOSABS 0.07 12/10/2021       CMP     Lab Results   Component Value Date     12/15/2021    K 3.8 12/15/2021    K 3.7 08/21/2021    CL 97 12/15/2021    CO2 32 12/15/2021    BUN 32 12/15/2021    CREATININE 1.0 12/15/2021    GFRAA >60 12/15/2021    LABGLOM >60 12/15/2021    GLUCOSE 197 12/15/2021    PROT 6.5 12/15/2021    LABALBU 3.7 12/15/2021    CALCIUM 9.1 12/15/2021    BILITOT 0.4 12/15/2021    ALKPHOS 75 12/15/2021    AST 40 12/15/2021    ALT 43 12/15/2021         Hepatic Function Panel:    Lab Results   Component Value Date    ALKPHOS 75 12/15/2021    ALT 43 12/15/2021    AST 40 12/15/2021    PROT 6.5 12/15/2021    BILITOT 0.4 12/15/2021    LABALBU 3.7 12/15/2021       PT/INR:    Lab Results   Component Value Date    PROTIME 12.2 12/10/2021    INR 1.1 12/10/2021       TSH:    Lab Results   Component Value Date    TSH 0.507 12/11/2021       U/A:    Lab Results   Component Value Date    COLORU DARK YELLOW 10/10/2021    PHUR 5.5 10/10/2021    WBCUA 1-3 10/10/2021    RBCUA 5-10 10/10/2021    BACTERIA MODERATE 10/10/2021    CLARITYU CLOUDY 10/10/2021    SPECGRAV >=1.030 10/10/2021    LEUKOCYTESUR Negative 10/10/2021    UROBILINOGEN 0.2 10/10/2021    BILIRUBINUR Negative 10/10/2021    BLOODU LARGE 10/10/2021    GLUCOSEU Negative 10/10/2021    AMORPHOUS FEW 10/10/2021       ABG:    Lab Results   Component Value Date    VSY0KEY 25.5 08/26/2021       MICROBIOLOGY:    Blood culture - negative     Urine antigen - negative         Respiratory Panel, Molecular, with COVID-19 (Restricted: peds pts or suitable admitted adults) [[id number]] Collected: 12/10/21 1446   Order Status: Completed Specimen: Nasopharyngeal Updated: 12/10/21 1627    Adenovirus by PCR Not Detected    Bordetella parapertussis by PCR Not Detected    Bordetella pertussis by PCR Not Detected    Chlamydophilia pneumoniae by PCR Not Detected    Coronavirus 229E by PCR Not Detected    Coronavirus HKU1 by PCR Not Detected    Coronavirus NL63 by PCR Not Detected    Coronavirus OC43 by PCR Not Detected    SARS-CoV-2, PCR Not Detected    Human Metapneumovirus by PCR Not Detected    Human Rhinovirus/Enterovirus by PCR Not Detected    Influenza A by PCR Not Detected    Influenza B by PCR Not Detected    Mycoplasma pneumoniae by PCR Not Detected    Parainfluenza Virus 1 by PCR Not Detected    Parainfluenza Virus 2 by PCR Not Detected    Parainfluenza Virus 3 by PCR Not Detected    Parainfluenza Virus 4 by PCR Not Detected    Respiratory Syncytial Virus by PCR Not Detected   COVID-19, Rapid [[id number]]      Vanco trough  24.9       Radiology :    CTA chest -   Impression:       No evidence of pulmonary embolism     Consolidation and ground-glass opacities of the lungs is suspicious for   infectious process. Small bilateral pleural effusions.           IMPRESSION: 1. Respiratory failure   2. Pneumonia  3. Post COVID pulmonary fibrosis   4. Leukocytosis ( 12 K ) , elevated procalcitonin       RECOMMENDATIONS:      1. Cefepime 2 grams IV q 12 hrs, Vancomycin  1250 to 18 hrs -vanco trough   2.  NIV / breathing treatment

## 2021-12-16 ENCOUNTER — APPOINTMENT (OUTPATIENT)
Dept: GENERAL RADIOLOGY | Age: 56
DRG: 720 | End: 2021-12-16
Payer: COMMERCIAL

## 2021-12-16 LAB
ALBUMIN SERPL-MCNC: 3.4 G/DL (ref 3.5–5.2)
ALBUMIN SERPL-MCNC: 3.6 G/DL (ref 3.5–5.2)
ALP BLD-CCNC: 65 U/L (ref 40–129)
ALP BLD-CCNC: 66 U/L (ref 40–129)
ALT SERPL-CCNC: 32 U/L (ref 0–40)
ALT SERPL-CCNC: 32 U/L (ref 0–40)
ANION GAP SERPL CALCULATED.3IONS-SCNC: 10 MMOL/L (ref 7–16)
ANION GAP SERPL CALCULATED.3IONS-SCNC: 9 MMOL/L (ref 7–16)
AST SERPL-CCNC: 23 U/L (ref 0–39)
AST SERPL-CCNC: 23 U/L (ref 0–39)
BASOPHILS ABSOLUTE: 0.04 E9/L (ref 0–0.2)
BASOPHILS RELATIVE PERCENT: 0.4 % (ref 0–2)
BILIRUB SERPL-MCNC: 0.4 MG/DL (ref 0–1.2)
BILIRUB SERPL-MCNC: 0.5 MG/DL (ref 0–1.2)
BUN BLDV-MCNC: 27 MG/DL (ref 6–20)
BUN BLDV-MCNC: 27 MG/DL (ref 6–20)
C-REACTIVE PROTEIN: 4.6 MG/DL (ref 0–0.4)
C-REACTIVE PROTEIN: 4.7 MG/DL (ref 0–0.4)
CALCIUM SERPL-MCNC: 9.1 MG/DL (ref 8.6–10.2)
CALCIUM SERPL-MCNC: 9.2 MG/DL (ref 8.6–10.2)
CHLORIDE BLD-SCNC: 98 MMOL/L (ref 98–107)
CHLORIDE BLD-SCNC: 99 MMOL/L (ref 98–107)
CO2: 34 MMOL/L (ref 22–29)
CO2: 36 MMOL/L (ref 22–29)
CREAT SERPL-MCNC: 0.9 MG/DL (ref 0.7–1.2)
CREAT SERPL-MCNC: 0.9 MG/DL (ref 0.7–1.2)
EOSINOPHILS ABSOLUTE: 0.64 E9/L (ref 0.05–0.5)
EOSINOPHILS RELATIVE PERCENT: 6.4 % (ref 0–6)
GFR AFRICAN AMERICAN: >60
GFR AFRICAN AMERICAN: >60
GFR NON-AFRICAN AMERICAN: >60 ML/MIN/1.73
GFR NON-AFRICAN AMERICAN: >60 ML/MIN/1.73
GLUCOSE BLD-MCNC: 175 MG/DL (ref 74–99)
GLUCOSE BLD-MCNC: 177 MG/DL (ref 74–99)
HCT VFR BLD CALC: 30.8 % (ref 37–54)
HEMOGLOBIN: 9.7 G/DL (ref 12.5–16.5)
IMMATURE GRANULOCYTES #: 0.03 E9/L
IMMATURE GRANULOCYTES %: 0.3 % (ref 0–5)
LYMPHOCYTES ABSOLUTE: 1.18 E9/L (ref 1.5–4)
LYMPHOCYTES RELATIVE PERCENT: 11.8 % (ref 20–42)
MAGNESIUM: 1.9 MG/DL (ref 1.6–2.6)
MAGNESIUM: 1.9 MG/DL (ref 1.6–2.6)
MCH RBC QN AUTO: 28.8 PG (ref 26–35)
MCHC RBC AUTO-ENTMCNC: 31.5 % (ref 32–34.5)
MCV RBC AUTO: 91.4 FL (ref 80–99.9)
METER GLUCOSE: 340 MG/DL (ref 74–99)
MONOCYTES ABSOLUTE: 0.62 E9/L (ref 0.1–0.95)
MONOCYTES RELATIVE PERCENT: 6.2 % (ref 2–12)
NEUTROPHILS ABSOLUTE: 7.52 E9/L (ref 1.8–7.3)
NEUTROPHILS RELATIVE PERCENT: 74.9 % (ref 43–80)
PDW BLD-RTO: 14.3 FL (ref 11.5–15)
PLATELET # BLD: 145 E9/L (ref 130–450)
PMV BLD AUTO: 9.8 FL (ref 7–12)
POTASSIUM SERPL-SCNC: 3.8 MMOL/L (ref 3.5–5)
POTASSIUM SERPL-SCNC: 3.9 MMOL/L (ref 3.5–5)
PRO-BNP: 1985 PG/ML (ref 0–125)
PRO-BNP: 2004 PG/ML (ref 0–125)
PROCALCITONIN: 0.77 NG/ML (ref 0–0.08)
PROCALCITONIN: 0.8 NG/ML (ref 0–0.08)
RBC # BLD: 3.37 E12/L (ref 3.8–5.8)
SODIUM BLD-SCNC: 142 MMOL/L (ref 132–146)
SODIUM BLD-SCNC: 144 MMOL/L (ref 132–146)
TOTAL PROTEIN: 6.2 G/DL (ref 6.4–8.3)
TOTAL PROTEIN: 6.3 G/DL (ref 6.4–8.3)
WBC # BLD: 10 E9/L (ref 4.5–11.5)

## 2021-12-16 PROCEDURE — 94640 AIRWAY INHALATION TREATMENT: CPT

## 2021-12-16 PROCEDURE — 6360000002 HC RX W HCPCS: Performed by: FAMILY MEDICINE

## 2021-12-16 PROCEDURE — 80053 COMPREHEN METABOLIC PANEL: CPT

## 2021-12-16 PROCEDURE — 6360000002 HC RX W HCPCS: Performed by: INTERNAL MEDICINE

## 2021-12-16 PROCEDURE — 2700000000 HC OXYGEN THERAPY PER DAY

## 2021-12-16 PROCEDURE — 85025 COMPLETE CBC W/AUTO DIFF WBC: CPT

## 2021-12-16 PROCEDURE — 2060000000 HC ICU INTERMEDIATE R&B

## 2021-12-16 PROCEDURE — 6370000000 HC RX 637 (ALT 250 FOR IP): Performed by: INTERNAL MEDICINE

## 2021-12-16 PROCEDURE — 86140 C-REACTIVE PROTEIN: CPT

## 2021-12-16 PROCEDURE — 36415 COLL VENOUS BLD VENIPUNCTURE: CPT

## 2021-12-16 PROCEDURE — 83735 ASSAY OF MAGNESIUM: CPT

## 2021-12-16 PROCEDURE — 82962 GLUCOSE BLOOD TEST: CPT

## 2021-12-16 PROCEDURE — 6360000002 HC RX W HCPCS

## 2021-12-16 PROCEDURE — 94660 CPAP INITIATION&MGMT: CPT

## 2021-12-16 PROCEDURE — 71045 X-RAY EXAM CHEST 1 VIEW: CPT

## 2021-12-16 PROCEDURE — 83880 ASSAY OF NATRIURETIC PEPTIDE: CPT

## 2021-12-16 PROCEDURE — 2580000003 HC RX 258: Performed by: INTERNAL MEDICINE

## 2021-12-16 PROCEDURE — 6360000002 HC RX W HCPCS: Performed by: NURSE PRACTITIONER

## 2021-12-16 PROCEDURE — 84145 PROCALCITONIN (PCT): CPT

## 2021-12-16 PROCEDURE — P9047 ALBUMIN (HUMAN), 25%, 50ML: HCPCS | Performed by: INTERNAL MEDICINE

## 2021-12-16 RX ORDER — CEFEPIME HYDROCHLORIDE 2 G/1
2 INJECTION, POWDER, FOR SOLUTION INTRAVENOUS EVERY 12 HOURS
Qty: 20 EACH | Refills: 0 | DISCHARGE
Start: 2021-12-16 | End: 2021-12-26

## 2021-12-16 RX ORDER — METHYLPREDNISOLONE SODIUM SUCCINATE 40 MG/ML
40 INJECTION, POWDER, LYOPHILIZED, FOR SOLUTION INTRAMUSCULAR; INTRAVENOUS EVERY 12 HOURS
Qty: 1 EACH | Refills: 0 | Status: SHIPPED | OUTPATIENT
Start: 2021-12-17 | End: 2021-12-20 | Stop reason: HOSPADM

## 2021-12-16 RX ORDER — LORAZEPAM 2 MG/ML
0.5 INJECTION INTRAMUSCULAR ONCE
Status: COMPLETED | OUTPATIENT
Start: 2021-12-16 | End: 2021-12-16

## 2021-12-16 RX ORDER — LORAZEPAM 2 MG/ML
1 INJECTION INTRAMUSCULAR ONCE
Status: COMPLETED | OUTPATIENT
Start: 2021-12-16 | End: 2021-12-17

## 2021-12-16 RX ADMIN — ALBUMIN (HUMAN) 25 G: 0.25 INJECTION, SOLUTION INTRAVENOUS at 10:48

## 2021-12-16 RX ADMIN — LORAZEPAM 0.5 MG: 2 INJECTION INTRAMUSCULAR; INTRAVENOUS at 22:50

## 2021-12-16 RX ADMIN — PHENYTOIN SODIUM 300 MG: 100 CAPSULE, EXTENDED RELEASE ORAL at 20:49

## 2021-12-16 RX ADMIN — IPRATROPIUM BROMIDE AND ALBUTEROL SULFATE 1 AMPULE: .5; 2.5 SOLUTION RESPIRATORY (INHALATION) at 11:24

## 2021-12-16 RX ADMIN — IPRATROPIUM BROMIDE AND ALBUTEROL SULFATE 1 AMPULE: .5; 2.5 SOLUTION RESPIRATORY (INHALATION) at 15:54

## 2021-12-16 RX ADMIN — Medication 10 ML: at 10:48

## 2021-12-16 RX ADMIN — AMLODIPINE BESYLATE 10 MG: 10 TABLET ORAL at 10:47

## 2021-12-16 RX ADMIN — LORAZEPAM 0.5 MG: 2 INJECTION INTRAMUSCULAR; INTRAVENOUS at 15:51

## 2021-12-16 RX ADMIN — IPRATROPIUM BROMIDE AND ALBUTEROL SULFATE 1 AMPULE: .5; 2.5 SOLUTION RESPIRATORY (INHALATION) at 20:43

## 2021-12-16 RX ADMIN — ATORVASTATIN CALCIUM 40 MG: 40 TABLET, FILM COATED ORAL at 20:49

## 2021-12-16 RX ADMIN — CEFEPIME 2000 MG: 2 INJECTION, POWDER, FOR SOLUTION INTRAVENOUS at 11:06

## 2021-12-16 RX ADMIN — VANCOMYCIN HYDROCHLORIDE 1250 MG: 10 INJECTION, POWDER, LYOPHILIZED, FOR SOLUTION INTRAVENOUS at 14:17

## 2021-12-16 RX ADMIN — LORAZEPAM 0.5 MG: 2 INJECTION INTRAMUSCULAR; INTRAVENOUS at 23:40

## 2021-12-16 RX ADMIN — GUAIFENESIN 400 MG: 400 TABLET ORAL at 10:47

## 2021-12-16 RX ADMIN — MORPHINE SULFATE 1 MG: 2 INJECTION, SOLUTION INTRAMUSCULAR; INTRAVENOUS at 23:16

## 2021-12-16 RX ADMIN — APIXABAN 5 MG: 5 TABLET, FILM COATED ORAL at 10:47

## 2021-12-16 RX ADMIN — GUAIFENESIN 400 MG: 400 TABLET ORAL at 14:09

## 2021-12-16 RX ADMIN — APIXABAN 5 MG: 5 TABLET, FILM COATED ORAL at 20:49

## 2021-12-16 RX ADMIN — IPRATROPIUM BROMIDE AND ALBUTEROL SULFATE 1 AMPULE: .5; 2.5 SOLUTION RESPIRATORY (INHALATION) at 04:04

## 2021-12-16 RX ADMIN — PHENYTOIN SODIUM 200 MG: 100 CAPSULE, EXTENDED RELEASE ORAL at 10:47

## 2021-12-16 RX ADMIN — SODIUM CHLORIDE, PRESERVATIVE FREE 10 ML: 5 INJECTION INTRAVENOUS at 23:16

## 2021-12-16 RX ADMIN — IPRATROPIUM BROMIDE AND ALBUTEROL SULFATE 1 AMPULE: .5; 2.5 SOLUTION RESPIRATORY (INHALATION) at 07:56

## 2021-12-16 RX ADMIN — FUROSEMIDE 40 MG: 10 INJECTION, SOLUTION INTRAMUSCULAR; INTRAVENOUS at 10:46

## 2021-12-16 RX ADMIN — METHYLPREDNISOLONE SODIUM SUCCINATE 40 MG: 40 INJECTION, POWDER, FOR SOLUTION INTRAMUSCULAR; INTRAVENOUS at 14:09

## 2021-12-16 RX ADMIN — GABAPENTIN 300 MG: 300 CAPSULE ORAL at 10:47

## 2021-12-16 RX ADMIN — GUAIFENESIN 400 MG: 400 TABLET ORAL at 20:49

## 2021-12-16 RX ADMIN — CEFEPIME 2000 MG: 2 INJECTION, POWDER, FOR SOLUTION INTRAVENOUS at 23:17

## 2021-12-16 ASSESSMENT — PAIN SCALES - GENERAL
PAINLEVEL_OUTOF10: 0
PAINLEVEL_OUTOF10: 0

## 2021-12-16 NOTE — DISCHARGE INSTR - COC
Continuity of Care Form    Patient Name: Sherine Vaughan   :  1965  MRN:  [id number]    Admit date:  12/10/2021  Discharge date:  2021    Code Status Order: Full Code   Advance Directives:      Admitting Physician:  Lazarus Suazo MD  PCP: Deloris Sunshine DO    Discharging Nurse: Mily Zamudio RN-BC  [address] Unit/Room#: 0052/0300-P  Discharging Unit Phone Number: [phone]    Emergency Contact:   Extended Emergency Contact Information  Primary Emergency Contact: Arianne Snell  Address: [place] Mel[place]dez, [facility] [address] Phone: [phone]  Mobile Phone: [phone]  Relation: Spouse  Secondary Emergency Contact: Neftali Baermarilynn 72 Phone: [phone]  Relation: Child    Past Surgical History:  Past Surgical History:   Procedure Laterality Date    APPENDECTOMY      BRONCHOSCOPY N/A 2021    BRONCHOSCOPY performed by Lalo Chavez MD at [address] [place] [address]  2021         TRACHEOSTOMY N/A 2021    TRACHEOTOMY performed by Estrella Carlton MD at [address] [place] [address] N/A 2021    EGD ESOPHAGOGASTRODUODENOSCOPY PEG TUBE INSERTION performed by Estrella Carlton MD at [address]       Immunization History: There is no immunization history for the selected administration types on file for this patient. Active Problems:  Patient Active Problem List   Diagnosis Code    Type 2 diabetes mellitus without complication, without long-term current use of insulin ([facility]) E11.9    History of seizures Z87.898    Pneumonia due to COVID-19 virus U07.1, J12.82    Hyperlipidemia E78.5    Busch catheter problem ([facility] T83. 9XXA    Sepsis ([facility] A41. 9    Thrombocytopenia ([facility]) D69.6    Elevated LFTs R79.89    VAP (ventilator-associated pneumonia) ([facility]) J95.851    Anemia D64.9    Acute respiratory failure with hypoxia ([facility]) J96.01    Acute on chronic respiratory failure with hypoxia ([facility]) J96.21 Isolation/Infection:   Isolation            No Isolation          Patient Infection Status       Infection Onset Added Last Indicated Last Indicated By Review Planned Expiration Resolved Resolved By    None active    Resolved    COVID-19 (Rule Out) 12/10/21 12/10/21 12/10/21 Respiratory Panel, Molecular, with COVID-19 (Restricted: peds pts or suitable admitted adults) (Ordered)   12/10/21 Rule-Out Test Resulted    COVID-19 (Rule Out) 12/10/21 12/10/21 12/10/21 COVID-19, Rapid (Ordered)   12/10/21 Rule-Out Test Resulted    C-diff Rule Out 10/09/21 10/10/21 10/09/21 Clostridium difficile EIA (Ordered)   10/10/21 Rule-Out Test Resulted    COVID-19 21 COVID-19, Rapid   10/26/21     Per Dr. Amparo Matta 21 droplet plus isolation may be discontinued.     COVID-19 (Rule Out) 21 COVID-19 (Ordered)   21 Rule-Out Test Resulted            Nurse Assessment:  Last Vital Signs: BP (!) 142/80   Pulse 96   Temp 98.5 °F (36.9 °C) (Oral)   Resp (!) 36   Ht 6' (1.829 m)   Wt 275 lb 5 oz (124.9 kg)   SpO2 (!) 88%   BMI 37.34 kg/m²     Last documented pain score (0-10 scale): Pain Level: 0  Last Weight:   Wt Readings from Last 1 Encounters:   21 275 lb 5 oz (124.9 kg)     Mental Status:  oriented and alert    IV Access:  # 22 left hand inserted - pt refusing site change    Nursing Mobility/ADLs:  Walking   Assisted  Transfer  Assisted  Bathing  dependent  Dressing  dependent  Toileting  Assisted  Feeding  Independent  Med Admin  dependent  Med Delivery   whole    Wound Care Documentation and Therapy:  Wound 21 Face Left (Active)   Number of days: 104       Wound 21 Face Right (Active)   Number of days: 104       Wound 21 Face forehead (Active)   Number of days: 104       Wound 21 Thigh Right (Active)   Number of days: 104       Wound 21 Leg Left (Active)   Number of days: 104       Wound 21 Heel Left (Active) Number of days: 104       Wound 09/03/21 Abdomen Left (Active)   Number of days: 104        Elimination:  Continence: Bowel: No  Bladder: villagran  Urinary Catheter: inserted 12/10/2021  Colostomy/Ileostomy/Ileal Conduit: No       Date of Last BM: 12/20/2021    Intake/Output Summary (Last 24 hours) at 12/16/2021 1830  Last data filed at 12/16/2021 1759  Gross per 24 hour   Intake 650 ml   Output 3900 ml   Net -3250 ml     I/O last 3 completed shifts: In: 65 [P.O.:600; I.V.:10; IV Piggyback:50]  Out: 5900 [Urine:5900]    Safety Concerns: At Risk for Falls and History of Seizures      Impairments/Disabilities:       At risk for impaired skin integrity      Nutrition Therapy:  Current Nutrition Therapy:   - Oral Diet:  Carb Control 4 carbs/meal (1800kcals/day)    Routes of Feeding: Oral  Liquids: No Restrictions  Daily Fluid Restriction: no  Last Modified Barium Swallow with Video (Video Swallowing Test): not done    Treatments at the Time of Hospital Discharge:   Respiratory Treatments: dounebs q4h  Oxygen Therapy:  heated high flow 55/40  with pulse ox %  Ventilator:    - BiPAP   IPAP: 18 cmH20, CPAP/EPAP: 10 cmH2O only when sleeping  - opiflow while awake, 65%, 55L    Rehab Therapies: Physical Therapy and Occupational Therapy  Weight Bearing Status/Restrictions: No weight bearing restirctions  Other Medical Equipment (for information only, NOT a DME order):  bedside commode and hospital bed  Other Treatments: none    Patient's personal belongings (please select all that are sent with patient):  None    RN SIGNATURE: Crystal Santa RN-BC    CASE MANAGEMENT/SOCIAL WORK SECTION    Inpatient Status Date: ***    Readmission Risk Assessment Score:  Readmission Risk              Risk of Unplanned Readmission:  25           Discharging to Facility/ Agency   Name:   Address:  Phone:  Fax:    Dialysis Facility (if applicable)   Name:  Address:  Dialysis Schedule:  Phone:  Fax:    / signature:

## 2021-12-16 NOTE — PROGRESS NOTES
Angel Luis Trevino M.D.,[facility]  Roddy Taylor D.O., F.A.C.O.I., Kartik Macdonald M.D. Marya Stokes M.D. Ashish Martinez D.O. Daily Pulmonary Progress Note    Patient:  Alberto Flores 60 y.o. male MRN: [id number]     Date of Service: 12/16/2021      Synopsis     We are following patient for acute hypoxic resp failure and history of COVID -19  pneumonia in August 2021    \"CC\" :SOB    Code status: Full       Subjective      Patient was seen and examined. 12/14/21 less dyspnea, improved oxygen levels. Switched to AIRVO 95% FIO2 60 L. Will try to wean FIO2 on NIV, cycle during day for breaks off mask. Very good diuresis noted - 12 L neg fluid balance. Less edema. CXR reviewed, no significant change. Removing oxygen with episodes of desaturation, sitter at bedside. WBCs elevated >19.    12/15/2021 4.8 L urine output yesterday. Aggressive diuresis. Improved oxygen levels. Weaning FiO2 on AVAPS 75% alternating with heated high flow nasal cannula 60 L FiO2 weaned to 80%. Sitting up at the side of the bed eating lunch. Sitter at bedside. Chest x-ray completed. Patient is very tearful and weepy. Previously ripping off oxygen with episode of desaturation into the 60s. States he has to use the bathroom and feels the urge to urinate even with Busch catheter in place. 12/16/21 lying in bed on AVAPS decreased FIO2 65%, switched to AirVo FIO2 weaned 70%, 55 L. SPO2 92%. Feeling ok with breathing. Mild anxiety. CXR reviewed with improvement. Review of Systems:  Constitutional: Denies fever, weight loss, night sweats, and fatigue  Skin: Denies pigmentation, dark lesions, and rashes   HEENT: Denies hearing loss, tinnitus, ear drainage, epistaxis, sore throat, and hoarseness. Cardiovascular: Denies palpitations, chest pain, and chest pressure.   Respiratory: As above  Gastrointestinal: Denies nausea, vomiting, poor appetite, diarrhea, heartburn or reflux  Genitourinary: Denies dysuria, frequency, urgency or hematuria  Musculoskeletal: Denies myalgias, muscle weakness, and bone pain, positive for LE swelling  Neurological: Denies dizziness, vertigo, headache, and focal weakness  Psychological: Denies anxiety and depression  Endocrine: Denies heat intolerance and cold intolerance  Hematopoietic/Lymphatic: Denies bleeding problems and blood transfusions    24-hour events:  None    Objective   Vitals: /76   Pulse 90   Temp 98.6 °F (37 °C) (Oral)   Resp 26   Ht 6' (1.829 m)   Wt 275 lb 5 oz (124.9 kg)   SpO2 (!) 89%   BMI 37.34 kg/m²     I/O:    Intake/Output Summary (Last 24 hours) at 12/16/2021 1147  Last data filed at 12/16/2021 9214  Gross per 24 hour   Intake 130 ml   Output 3950 ml   Net -3820 ml       Vent Information  Skin Assessment: Clean, dry, & intact  Equipment ID: [id number]  Equipment Changed: (S) Humidification  Vt Ordered: 500 mL  FiO2 : 65 %  SpO2: (!) 89 %  SpO2/FiO2 ratio: 136.92  I Time/ I Time %: 0.8 s  Humidification Source: Heated wire  Humidification Temp: 32  Humidification Temp Measured: 32  Mask Type: Full face mask  Mask Size: Large       IPAP: 18 cmH20  CPAP/EPAP: 10 cmH2O     CURRENT MEDS :  Scheduled Meds:   methylPREDNISolone  40 mg IntraVENous Q12H    furosemide  40 mg IntraVENous Daily    vancomycin  1,250 mg IntraVENous Q18H    albumin human  25 g IntraVENous Q12H    labetalol  5 mg IntraVENous Once    influenza virus vaccine  0.5 mL IntraMUSCular Prior to discharge    guaiFENesin  400 mg Oral TID    ipratropium-albuterol  1 ampule Inhalation Q4H While awake    cefepime  2,000 mg IntraVENous Q12H    sodium chloride flush  5-40 mL IntraVENous 2 times per day    amLODIPine  10 mg Oral Daily    atorvastatin  40 mg Oral Nightly    apixaban  5 mg Oral BID    gabapentin  300 mg Oral Daily    phenytoin  300 mg Oral Nightly    phenytoin  200 mg Oral QAM       Physical Exam:  General Appearance: appears comfortable in no acute distress.    HEENT: Normocephalic atraumatic without obvious abnormality   Neck: Lips, mucosa, and tongue normal.  Supple, symmetrical, trachea midline, no adenopathy;thyroid:  no enlargement/tenderness/nodules or JVD. Lung: Breath sounds CTA. Respirations   unlabored. Symmetrical expansion. Heart: RRR, normal S1, S2. No MRG  Abdomen: Soft, NT, ND. BS present x 4 quadrants. No bruit or organomegaly. Extremities: Pedal pulses 2+ symmetric b/l. Extremities normal, no cyanosis, clubbing, improving edema. Neurologic: Mental status: Alert and Oriented X3 . Pertinent/ New Labs and Imaging Studies     Imaging Personally Reviewed:  12/16/21   FINDINGS:   Bilateral airspace opacities in the lower lungs significantly changed.  When   compared to December 14, 2021, there is improved aeration of bilateral lung   apices.       There is no effusion or pneumothorax. The cardiomediastinal silhouette is   without acute process. The osseous structures are without acute process.           Impression   No significant change in bilateral lung opacities from December 15, 2021.       Improved aeration of upper lobes when compared to December 14, 2021          12/10/2021 CT chest      Impression   No evidence of pulmonary embolism       Consolidation and ground-glass opacities of the lungs is suspicious for   infectious process. Small bilateral pleural effusions. ECHO     Summary   Left ventricle grossly normal in size. Mild - moderate left ventricular concentric hypertrophy noted. Normal LV segmental wall motion. Estimated left ventricular ejection fraction is 59±5%. Does not meet 50% threshold for diastolic dysfunction. The LAESV Index is <34ml/m2. Mildly dilated right ventricle. Right ventricular segmental wall motion is normal.   Physiologic and/or trace mitral regurgitation is present. Trace aortic regurgitation is noted. Physiologic and/or trace tricuspid regurgitation.    Physiologic and/or trace pulmonic regurgitation present. Technically good quality study. No previous echo for comparison. Suggest clinical correlation. Signature      ----------------------------------------------------------------   Electronically signed by Victorino Melendez DO(Interpreting   physician) on 10/03/2021 01:49 PM   ----------------------------------------------------------------    Labs:  Lab Results   Component Value Date    WBC 10.0 12/16/2021    HGB 9.7 12/16/2021    HCT 30.8 12/16/2021    MCV 91.4 12/16/2021    MCH 28.8 12/16/2021    MCHC 31.5 12/16/2021    RDW 14.3 12/16/2021     12/16/2021    MPV 9.8 12/16/2021     Lab Results   Component Value Date     12/16/2021     12/16/2021    K 3.8 12/16/2021    K 3.9 12/16/2021    K 3.7 08/21/2021    CL 98 12/16/2021    CL 99 12/16/2021    CO2 34 12/16/2021    CO2 36 12/16/2021    BUN 27 12/16/2021    BUN 27 12/16/2021    CREATININE 0.9 12/16/2021    CREATININE 0.9 12/16/2021    LABALBU 3.6 12/16/2021    LABALBU 3.4 12/16/2021    CALCIUM 9.1 12/16/2021    CALCIUM 9.2 12/16/2021    GFRAA >60 12/16/2021    GFRAA >60 12/16/2021    LABGLOM >60 12/16/2021    LABGLOM >60 12/16/2021     Lab Results   Component Value Date    PROTIME 12.2 12/10/2021    INR 1.1 12/10/2021     Recent Labs     12/16/21 0721   PROBNP 2,004*  1,985*     Recent Labs     12/16/21 0721   PROCAL 0.80*  0.77*     This SmartLink has not been configured with any valid records. Micro:  No results for input(s): CULTRESP in the last 72 hours. No results for input(s): LABGRAM in the last 72 hours. No results for input(s): LEGUR in the last 72 hours. No results for input(s): STREPNEUMAGU in the last 72 hours. No results for input(s): LP1UAG in the last 72 hours. Assessment:    1. Acute hypoxic respiratory failure  2. Bilateral patchy infiltrates differential diagnosis includes healthcare associated pneumonia versus possibly volume overload/Acute diastlic HF  3.   History of severe Covid pneumonia in August 2021  4. H/O anxiety      Plan:   1. Heated high flow nasal cannula 55 L decreased FiO2 to 70% today. Saturation 99% continue to wean oxygen discussed with nursing and respiratory  2. NIV in the form of AVAPS for respiratory support settings volume 500 EPAP +10 FiO2 65%  3. Continue diuresis lasix 40 mg daily dosing, monitor I&O's closely -21 L negative fluid balance  4. Solu-Medrol 40 IV BID  5. Continue Duonebs every 4 hours while awake  6. Appreciate infectious disease input. Continue cefepime and vancomycin - serum galactomannan in process 12/12/2021.  7. Follow CXR 12/16/2021. Improving. 8. Continue Ativan as needed  9. LTAC referral  This plan of care was reviewed in collaboration with Dr. Sara Mcallister    Electronically signed by HOMERO Gaines CNP on 12/16/2021 at 11:47 AM       I personally saw, examined and provided care for the patient. Radiographs, labs and medication list were reviewed by me independently. I spoke with bedside nursing, therapists and consultants. The case was discussed in detail and plans for care were established. Review of CNP documentation was conducted and revisions were made as appropriate. I agree with the above documented exam, problem list and plan of care.   Claudette Forman MD

## 2021-12-16 NOTE — PROGRESS NOTES
Cardiovascular: Regular rate rhythm, normal S1-S2  Abdomen: Soft, nontender, nondistended  Musculoskeletal: No clubbing, cyanosis, no bilateral lower extremity edema. Brisk capillary refill. Skin:  No rashes  on visible skin  Neurologic: awake, alert and following commands     ASSESSMENT:    Acute hypoxemic respiratory failure due to pneumonia   Sepsis due to Pneumonia  Acute diastolic congestive heart failure  Hist of DVTs  Elevated troponins  History of Seizures  HLD  HTN  Post COVID Pulmonary Fibrosis    PLAN:    Cont Vancomycin and Cefepime. ID and pulmonology on board  Cont Bronchodilators  Cont IV solumedrol Q12  Appreciate pulmonology recommendations. No plans of Bronch at this time. Pt received Albumin. Ativan PRN   Cont Eliquis  Cont IV lasix 40 daily now. Pt responded well with negative fluid balance, renal functions stable. CXR this Am with slight improvement. LTAC Placement.      DISPOSITION:     Medications:  REVIEWED DAILY    Infusion Medications    sodium chloride       Scheduled Medications    methylPREDNISolone  40 mg IntraVENous Q12H    furosemide  40 mg IntraVENous Daily    vancomycin  1,250 mg IntraVENous Q18H    albumin human  25 g IntraVENous Q12H    labetalol  5 mg IntraVENous Once    influenza virus vaccine  0.5 mL IntraMUSCular Prior to discharge    guaiFENesin  400 mg Oral TID    ipratropium-albuterol  1 ampule Inhalation Q4H While awake    cefepime  2,000 mg IntraVENous Q12H    sodium chloride flush  5-40 mL IntraVENous 2 times per day    amLODIPine  10 mg Oral Daily    atorvastatin  40 mg Oral Nightly    apixaban  5 mg Oral BID    gabapentin  300 mg Oral Daily    phenytoin  300 mg Oral Nightly    phenytoin  200 mg Oral QAM     PRN Meds: morphine, LORazepam, melatonin, sodium chloride flush, sodium chloride, ondansetron **OR** ondansetron, polyethylene glycol, acetaminophen **OR** acetaminophen    Labs:     Recent Labs     12/14/21  0605 12/15/21  3764 12/16/21  0721   WBC 19.5* 12.3* 10.0   HGB 9.9* 10.3* 9.7*   HCT 32.0* 32.8* 30.8*    164 145       Recent Labs     12/14/21  0605 12/15/21  0452 12/16/21  0721    140 144  142   K 4.1 3.8 3.9  3.8    97* 99  98   CO2 30* 32* 36*  34*   BUN 34* 32* 27*  27*   CREATININE 1.1 1.0 0.9  0.9   CALCIUM 9.0 9.1 9.2  9.1       Recent Labs     12/14/21  0605 12/15/21  0452 12/16/21  0721   PROT 6.3* 6.5 6.2*  6.3*   ALKPHOS 72 75 65  66   ALT 38 43* 32  32   AST 58* 40* 23  23   BILITOT 0.3 0.4 0.5  0.4       No results for input(s): INR in the last 72 hours. No results for input(s): Assunta Garcia in the last 72 hours. Chronic labs:    Lab Results   Component Value Date    CHOL 173 12/11/2021    TRIG 100 12/11/2021    HDL 65 12/11/2021    LDLCALC 88 12/11/2021    TSH 0.507 12/11/2021    PSA 0.84 12/12/2020    INR 1.1 12/10/2021    LABA1C 5.3 12/11/2021       Radiology: REVIEWED DAILY    +++++++++++++++++++++++++++++++++++++++++++++++++  Nitin Arce MD  [employer] - [address] [place], [place]  +++++++++++++++++++++++++++++++++++++++++++++++++  NOTE: This report was transcribed using voice recognition software. Every effort was made to ensure accuracy; however, inadvertent computerized transcription errors may be present.

## 2021-12-16 NOTE — PLAN OF CARE
Problem: Falls - Risk of:  Goal: Will remain free from falls  Description: Will remain free from falls  Outcome: Completed     Problem: Falls - Risk of:  Goal: Absence of physical injury  Description: Absence of physical injury  Outcome: Completed     Problem: OXYGENATION/RESPIRATORY FUNCTION  Goal: Patient will maintain patent airway  Outcome: Completed     Problem: OXYGENATION/RESPIRATORY FUNCTION  Goal: Patient will achieve/maintain normal respiratory rate/effort  Description: Respiratory rate and effort will be within normal limits for the patient  Outcome: Completed     Problem: MECHANICAL VENTILATION  Goal: Patient will maintain patent airway  Outcome: Completed     Problem: MECHANICAL VENTILATION  Goal: Oral health is maintained or improved  Outcome: Completed     Problem: MECHANICAL VENTILATION  Goal: Tracheostomy will be managed safely  Outcome: Completed     Problem: MECHANICAL VENTILATION  Goal: ET tube will be managed safely  Outcome: Completed     Problem: MECHANICAL VENTILATION  Goal: Ability to express needs and understand communication  Outcome: Completed     Problem: MECHANICAL VENTILATION  Goal: Mobility/activity is maintained at optimum level for patient  Outcome: Completed     Problem: SKIN INTEGRITY  Goal: Skin integrity is maintained or improved  Outcome: Completed     Problem: NUTRITION  Goal: Nutritional status is improving  Outcome: Completed     Problem: Skin Integrity:  Goal: Will show no infection signs and symptoms  Description: Will show no infection signs and symptoms  Outcome: Completed     Problem: Skin Integrity:  Goal: Absence of new skin breakdown  Description: Absence of new skin breakdown  Outcome: Completed

## 2021-12-16 NOTE — CARE COORDINATION
SOCIAL WORK / DISCHARGE PLANNING:  COVID neg 12/10, no new testing needed. Pt accepted to [address] Room [address]28. PRECERT being waived by insurance. Transport arranged via [facility] 8pm. Sw notiified pt at bedside. Sw notified pt's wife, Sean Leblanc via phone of dc and time of transport.                  Electronically signed by MADDIE Escudero on 12/16/2021 at 3:12 PM

## 2021-12-16 NOTE — PROGRESS NOTES
Department of Internal Medicine  Infectious Diseases  Progress  Note      C/C :  Pneumonia, respiratory failure     Pt is awake, alert, no distress  Feels better today   Afebrile       Current Facility-Administered Medications   Medication Dose Route Frequency Provider Last Rate Last Admin    methylPREDNISolone sodium (SOLU-MEDROL) injection 40 mg  40 mg IntraVENous Q12H HOMERO Mendez - CNP   40 mg at 12/16/21 1409    furosemide (LASIX) injection 40 mg  40 mg IntraVENous Daily HOMERO Mendez CNP   40 mg at 12/16/21 1046    vancomycin (VANCOCIN) 1,250 mg in dextrose 5 % 250 mL IVPB  1,250 mg IntraVENous Q18H Karen Langford .7 mL/hr at 12/16/21 1417 1,250 mg at 12/16/21 1417    albumin human 25 % IV solution 25 g  25 g IntraVENous Q12H Ruddy Rodriguez MD   Stopped at 12/16/21 1222    morphine (PF) injection 1 mg  1 mg IntraVENous Q4H PRN Deborah Rodriguez MD   1 mg at 12/13/21 8584    LORazepam (ATIVAN) injection 0.5 mg  0.5 mg IntraVENous Q4H PRN Lacho Waters MD   0.5 mg at 12/15/21 1054    labetalol (NORMODYNE;TRANDATE) injection 5 mg  5 mg IntraVENous Once Lacho Waters MD        influenza quadrivalent split vaccine (FLUZONE;FLUARIX;FLULAVAL;AFLURIA) injection 0.5 mL  0.5 mL IntraMUSCular Prior to discharge Karen Langford DO        melatonin disintegrating tablet 10 mg  10 mg Oral Nightly PRN Ruddy Rodriguez MD   10 mg at 12/14/21 2104    guaiFENesin tablet 400 mg  400 mg Oral TID Karen Langford DO   400 mg at 12/16/21 1409    ipratropium-albuterol (DUONEB) nebulizer solution 1 ampule  1 ampule Inhalation Q4H While awake Karen Langford DO   1 ampule at 12/16/21 1124    cefepime (MAXIPIME) 2000 mg IVPB minibag  2,000 mg IntraVENous Q12H Larance Estela Marte, DO 12.5 mL/hr at 12/16/21 1106 2,000 mg at 12/16/21 1106    sodium chloride flush 0.9 % injection 5-40 mL  5-40 mL IntraVENous 2 times per day Karen Langford, DO   10 mL at 12/16/21 1048    sodium chloride flush 0.9 % injection 5-40 mL  5-40 mL IntraVENous PRN Budd Knights, DO   10 mL at 12/15/21 1517    0.9 % sodium chloride infusion  25 mL IntraVENous PRN Budd Knights, DO        ondansetron (ZOFRAN-ODT) disintegrating tablet 4 mg  4 mg Oral Q8H PRN Budd Knights, DO        Or    ondansetron [facility]) injection 4 mg  4 mg IntraVENous Q6H PRN [facility] Geoffher, DO        polyethylene glycol (GLYCOLAX) packet 17 g  17 g Oral Daily PRN Budd Knights, DO        acetaminophen (TYLENOL) tablet 650 mg  650 mg Oral Q6H PRN Budd Knights, DO   650 mg at 12/12/21 2756    Or    acetaminophen (TYLENOL) suppository 650 mg  650 mg Rectal Q6H PRN Ledyardd Knights, DO        amLODIPine (NORVASC) tablet 10 mg  10 mg Oral Daily [facility] Rosanna, DO   10 mg at 12/16/21 1047    atorvastatin (LIPITOR) tablet 40 mg  40 mg Oral Nightly [facility] Rosanna, DO   40 mg at 12/15/21 2042    apixaban (ELIQUIS) tablet 5 mg  5 mg Oral BID [facility] Rosanna, DO   5 mg at 12/16/21 1047    gabapentin (NEURONTIN) capsule 300 mg  300 mg Oral Daily [facility] Geoffher, DO   300 mg at 12/16/21 1047    phenytoin (DILANTIN) ER capsule 300 mg  300 mg Oral Nightly [facility] Geoffher, DO   300 mg at 12/15/21 2041    phenytoin (DILANTIN) ER capsule 200 mg  200 mg Oral QAM [facility] Rosanna, DO   200 mg at 12/16/21 1047       REVIEW OF SYSTEMS:    CONSTITUTIONAL:  Denies fever, chill or rigors, nausea or vomiting. HEENT: denies blurring of vision or double vision, denies hearing problem  RESPIRATORY: SOB -some improvement    CARDIOVASCULAR:  Denies palpitation  GASTROINTESTINAL:  Denies abdomen pain, diarrhea or constipation. GENITOURINARY:  Denies burning urination or frequency of urination  INTEGUMENT: denies wound , rash  HEMATOLOGIC/LYMPHATIC:  Denies lymph node swelling, gum bleeding or easy bruising.   MUSCULOSKELETAL: Leg swelling   NEUROLOGICAL:  Denies light headed, dizziness, loss of consciousness      PHYSICAL EXAM:      Vitals:     BP (!) 142/80   Pulse 96   Temp 98.5 °F (36.9 °C) (Oral)   Resp 24   Ht 6' (1.829 m)   Wt 275 lb 5 oz (124.9 kg)   SpO2 (!) 88%   BMI 37.34 kg/m²     General Appearance:    Awake, alert ,not in resp distress . Head:    Normocephalic, atraumatic   Eyes:    No pallor, no icterus,   Ears:    No obvious deformity or drainage.    Nose:   No nasal drainage   Throat:   Mucosa moist, no oral thrush   Neck:   Supple, no lymphadenopathy   Lungs:     Bilateral wheeze     Heart:    Regular rate and rhythm   Abdomen:     Soft, non-tender, bowel sounds present    Extremities:   ++ edema, non tender    Pulses:   Dorsalis pedis palpable    Skin:   No rash        CBC with Differential:      Lab Results   Component Value Date    WBC 10.0 12/16/2021    RBC 3.37 12/16/2021    HGB 9.7 12/16/2021    HCT 30.8 12/16/2021     12/16/2021    MCV 91.4 12/16/2021    MCH 28.8 12/16/2021    MCHC 31.5 12/16/2021    RDW 14.3 12/16/2021    NRBC 0.9 09/17/2021    METASPCT 0.9 09/17/2021    LYMPHOPCT 11.8 12/16/2021    MONOPCT 6.2 12/16/2021    MYELOPCT 0.9 09/17/2021    BASOPCT 0.4 12/16/2021    MONOSABS 0.62 12/16/2021    LYMPHSABS 1.18 12/16/2021    EOSABS 0.64 12/16/2021    BASOSABS 0.04 12/16/2021       CMP     Lab Results   Component Value Date     12/16/2021     12/16/2021    K 3.8 12/16/2021    K 3.9 12/16/2021    K 3.7 08/21/2021    CL 98 12/16/2021    CL 99 12/16/2021    CO2 34 12/16/2021    CO2 36 12/16/2021    BUN 27 12/16/2021    BUN 27 12/16/2021    CREATININE 0.9 12/16/2021    CREATININE 0.9 12/16/2021    GFRAA >60 12/16/2021    GFRAA >60 12/16/2021    LABGLOM >60 12/16/2021    LABGLOM >60 12/16/2021    GLUCOSE 175 12/16/2021    GLUCOSE 177 12/16/2021    PROT 6.3 12/16/2021    PROT 6.2 12/16/2021    LABALBU 3.6 12/16/2021    LABALBU 3.4 12/16/2021    CALCIUM 9.1 12/16/2021    CALCIUM 9.2 12/16/2021    BILITOT 0.4 12/16/2021    BILITOT 0.5 12/16/2021    ALKPHOS 66 12/16/2021    ALKPHOS 65 12/16/2021    AST 23 12/16/2021    AST 23 12/16/2021    ALT 32 12/16/2021    ALT 32 12/16/2021         Hepatic Function Panel:    Lab Results   Component Value Date    ALKPHOS 66 12/16/2021    ALKPHOS 65 12/16/2021    ALT 32 12/16/2021    ALT 32 12/16/2021    AST 23 12/16/2021    AST 23 12/16/2021    PROT 6.3 12/16/2021    PROT 6.2 12/16/2021    BILITOT 0.4 12/16/2021    BILITOT 0.5 12/16/2021    LABALBU 3.6 12/16/2021    LABALBU 3.4 12/16/2021       PT/INR:    Lab Results   Component Value Date    PROTIME 12.2 12/10/2021    INR 1.1 12/10/2021       TSH:    Lab Results   Component Value Date    TSH 0.507 12/11/2021       U/A:    Lab Results   Component Value Date    COLORU DARK YELLOW 10/10/2021    PHUR 5.5 10/10/2021    WBCUA 1-3 10/10/2021    RBCUA 5-10 10/10/2021    BACTERIA MODERATE 10/10/2021    CLARITYU CLOUDY 10/10/2021    SPECGRAV >=1.030 10/10/2021    LEUKOCYTESUR Negative 10/10/2021    UROBILINOGEN 0.2 10/10/2021    BILIRUBINUR Negative 10/10/2021    BLOODU LARGE 10/10/2021    GLUCOSEU Negative 10/10/2021    AMORPHOUS FEW 10/10/2021       ABG:    Lab Results   Component Value Date    LVM4LNO 25.5 08/26/2021       MICROBIOLOGY:    Blood culture - negative     Urine antigen - negative         Respiratory Panel, Molecular, with COVID-19 (Restricted: peds pts or suitable admitted adults) [[id number]] Collected: 12/10/21 1446   Order Status: Completed Specimen: Nasopharyngeal Updated: 12/10/21 1627    Adenovirus by PCR Not Detected    Bordetella parapertussis by PCR Not Detected    Bordetella pertussis by PCR Not Detected    Chlamydophilia pneumoniae by PCR Not Detected    Coronavirus 229E by PCR Not Detected    Coronavirus HKU1 by PCR Not Detected    Coronavirus NL63 by PCR Not Detected    Coronavirus OC43 by PCR Not Detected    SARS-CoV-2, PCR Not Detected    Human Metapneumovirus by PCR Not Detected    Human Rhinovirus/Enterovirus by PCR Not Detected    Influenza A by PCR Not Detected    Influenza B by PCR Not Detected    Mycoplasma pneumoniae by PCR Not Detected    Parainfluenza Virus 1 by PCR Not Detected    Parainfluenza Virus 2 by PCR Not Detected    Parainfluenza Virus 3 by PCR Not Detected    Parainfluenza Virus 4 by PCR Not Detected    Respiratory Syncytial Virus by PCR Not Detected   COVID-19, Rapid [[id number]]      Vanco random 11.5     Radiology :    CTA chest -   Impression:       No evidence of pulmonary embolism     Consolidation and ground-glass opacities of the lungs is suspicious for   infectious process. Small bilateral pleural effusions. IMPRESSION:     1. Respiratory failure - some improvement   2. Pneumonia  3. Post COVID pulmonary fibrosis   4. Leukocytosis ( 12 K ) , elevated procalcitonin - improved       RECOMMENDATIONS:      1. Cefepime 2 grams IV q 12 hrs, Vancomycin  1250 mg q 18 hrs  X 10 days   2.  [facility]

## 2021-12-16 NOTE — PROGRESS NOTES
Pharmacy Consultation Note  (Antibiotic Dosing and Monitoring)    Initial consult date: 12/10/21  Consulting physician/provider: Dr. Corazon Cabrera  Drug: Vancomycin  Indication: Pneumonia    Age/  Gender Height Weight IBW  Allergy Information   56 y.o./male 6' (182.9 cm) 278 lb (126.1 kg)     Ideal body weight: 77.6 kg (171 lb 1.2 oz)  Adjusted ideal body weight: 96.5 kg (212 lb 12.3 oz)   Patient has no known allergies. Renal Function:  Recent Labs     12/14/21  0605 12/15/21  0452 12/16/21  0721   BUN 34* 32* 27*  27*   CREATININE 1.1 1.0 0.9  0.9       Intake/Output Summary (Last 24 hours) at 12/16/2021 0939  Last data filed at 12/16/2021 0153  Gross per 24 hour   Intake 160 ml   Output 3950 ml   Net -3790 ml       Vancomycin Monitoring:  Trough:    Recent Labs     12/14/21  1726   VANCOTROUGH 24.9*     Random:  No results for input(s): VANCORANDOM in the last 72 hours. Vancomycin Administration Times:  Recent vancomycin administrations                   vancomycin (VANCOCIN) 1,250 mg in dextrose 5 % 250 mL IVPB (mg) 1,250 mg New Bag 12/15/21 1926     1,250 mg New Bag  0120    vancomycin (VANCOCIN) 1,250 mg in dextrose 5 % 250 mL IVPB (mg) 1,250 mg New Bag 12/14/21 0551     1,250 mg New Bag 12/13/21 1805              Assessment:  · Patient is a 60 y.o. male who has been initiated on vancomycin  Estimated Creatinine Clearance: 122 mL/min (based on SCr of 0.9 mg/dL). · To dose vancomycin, pharmacy will be utilizing [employer] calculation software for goal AUC/VIOLET 400-600 mg/L-hr   · 12/12: Creatinine stable at 0.9. Level ~10.5 hours post dose was 21.3 this morning. Inputted into [employer], current regimen with predicted AUC/VIOLET of 620 and steady state trough of 20  · 12/13: Scr 1.1 today up from 0.9. WBC 10.6. · 12/14: Scr 1.1, elevated again today from baseline will check vancomycin level. · 12/15: Scr 1, vancomycin level @17:26 = 24.9 mcg/mL, true trough drawn 11.5 hours post dose.  Regimen adjusted overnight to vancomycin 1250 mg IV q18h due to elevated trough. · 12/16: Scr 0.9 improving. WBC WNL. Plan:  · Continue vancomycin to 1250 mg q18h with predicted AUC/VIOLET of 460 and steady state trough of 15 mcg/mL  · Will check vancomycin levels when appropriate.    · Will continue to monitor renal function     Camden DayD   Pharmacy Resident   Phone: [phone]  12/16/2021 9:39 AM

## 2021-12-16 NOTE — DISCHARGE SUMMARY
Seizures  HLD  HTN  Post COVID Pulmonary Fibrosis    Discharge Instructions / Follow up:    Per LT[facility]    Continued appropriate risk factor modification of blood pressure, diabetes and serum lipids will remain essential to reducing risk of future atherosclerotic development    Activity: activity as tolerated    Significant labs:  CBC:   Recent Labs     12/14/21  0605 12/15/21  0452 12/16/21  0721   WBC 19.5* 12.3* 10.0   RBC 3.32* 3.55* 3.37*   HGB 9.9* 10.3* 9.7*   HCT 32.0* 32.8* 30.8*   MCV 96.4 92.4 91.4   RDW 14.3 14.2 14.3    164 145     BMP:   Recent Labs     12/14/21  0605 12/15/21  0452 12/16/21  0721    140 144  142   K 4.1 3.8 3.9  3.8    97* 99  98   CO2 30* 32* 36*  34*   BUN 34* 32* 27*  27*   CREATININE 1.1 1.0 0.9  0.9   MG  --   --  1.9  1.9     LFT:  Recent Labs     12/14/21  0605 12/15/21  0452 12/16/21  0721   PROT 6.3* 6.5 6.2*  6.3*   ALKPHOS 72 75 65  66   ALT 38 43* 32  32   AST 58* 40* 23  23   BILITOT 0.3 0.4 0.5  0.4     PT/INR: No results for input(s): INR, APTT in the last 72 hours. BNP: No results for input(s): BNP in the last 72 hours.   Hgb A1C:   Lab Results   Component Value Date    LABA1C 5.3 12/11/2021     Folate and B12:   Lab Results   Component Value Date    VERVTWJH14 363 12/12/2020   ,   Lab Results   Component Value Date    FOLATE 7.1 10/01/2021     Thyroid Studies:   Lab Results   Component Value Date    TSH 0.507 12/11/2021       Urinalysis:    Lab Results   Component Value Date    NITRU Negative 10/10/2021    WBCUA 1-3 10/10/2021    BACTERIA MODERATE 10/10/2021    RBCUA 5-10 10/10/2021    BLOODU LARGE 10/10/2021    SPECGRAV >=1.030 10/10/2021    GLUCOSEU Negative 10/10/2021       Imaging:  XR CHEST PORTABLE    Result Date: 12/16/2021  EXAMINATION: ONE XRAY VIEW OF THE CHEST 12/16/2021 8:54 am COMPARISON: Chest radiograph December 15, 2021 HISTORY: ORDERING SYSTEM PROVIDED HISTORY: see medical records TECHNOLOGIST PROVIDED HISTORY: Reason for exam:->see medical records What reading provider will be dictating this exam?->CRC FINDINGS: Bilateral airspace opacities in the lower lungs significantly changed. When compared to December 14, 2021, there is improved aeration of bilateral lung apices. There is no effusion or pneumothorax. The cardiomediastinal silhouette is without acute process. The osseous structures are without acute process. No significant change in bilateral lung opacities from December 15, 2021. Improved aeration of upper lobes when compared to December 14, 2021     XR CHEST PORTABLE    Result Date: 12/15/2021  EXAMINATION: ONE XRAY VIEW OF THE CHEST 12/15/2021 3:05 pm COMPARISON: 12/14/2021 HISTORY: ORDERING SYSTEM PROVIDED HISTORY: see medical records TECHNOLOGIST PROVIDED HISTORY: Reason for exam:->see medical records What reading provider will be dictating this exam?->CRC FINDINGS: Bilateral pulmonary infiltrates. There is no effusion or pneumothorax. Stable cardiomegaly. The osseous structures are without acute process. Stable bilateral pulmonary infiltrates. XR CHEST PORTABLE    Result Date: 12/14/2021  EXAMINATION: ONE XRAY VIEW OF THE CHEST PORTABLE UPRIGHT 12/14/2021 11:23 am COMPARISON: 12/13/2021 HISTORY: ORDERING SYSTEM PROVIDED HISTORY: see medical records TECHNOLOGIST PROVIDED HISTORY: Reason for exam:->see medical records What reading provider will be dictating this exam?->CRC FINDINGS: No significant change. Diffuse bilateral airspace opacities and with bilateral areas of consolidation. The heart appears unchanged in size. Possible small right pleural effusion. No pneumothorax. No significant change. Diffuse bilateral airspace opacities, nonspecific.      XR CHEST PORTABLE    Result Date: 12/13/2021  EXAMINATION: ONE XRAY VIEW OF THE CHEST PORTABLE UPRIGHT 12/13/2021 9:10 am COMPARISON: 12/12/2021 HISTORY: ORDERING SYSTEM PROVIDED HISTORY: see medical records TECHNOLOGIST PROVIDED HISTORY: Reason for exam:->see medical records What reading provider will be dictating this exam?->CRC FINDINGS: No significant change. Diffuse bilateral parenchymal opacities which are confluent and with areas of consolidation at the perihilar regions. The heart appears unchanged in size. Probable small right pleural effusion. No pneumothorax. No significant change. Diffuse bilateral opacities with areas of consolidation, nonspecific, and considerations would include pneumonia, pulmonary edema, or some combination there of. ARDS is also a consideration. XR CHEST PORTABLE    Result Date: 12/12/2021  EXAMINATION: ONE XRAY VIEW OF THE CHEST 12/12/2021 10:53 am COMPARISON: Chest series from December 12, 2021 at 04:36 HISTORY: ORDERING SYSTEM PROVIDED HISTORY: Hypoxia TECHNOLOGIST PROVIDED HISTORY: Reason for exam:->Hypoxia What reading provider will be dictating this exam?->CRC FINDINGS: Medical support devices: None seen Lungs: Diffuse ill-defined opacities in the bilateral lungs without significant change from most recent exam.  Difficult to assess for pleural effusions. A large pneumothorax is not seen. Cardiomediastinal silhouette and pulmonary vascularity: There appears to be some atherosclerotic disease in the aortic arch. Cardiac silhouette appears prominent. Difficult to assess pulmonary vascularity. Osseous and soft tissue structures: No acute findings. Stable bilateral diffuse ill-defined opacities. No new cardiopulmonary pathology seen. XR CHEST PORTABLE    Result Date: 12/12/2021  EXAMINATION: ONE XRAY VIEW OF THE CHEST 12/12/2021 4:42 am COMPARISON: 12/11/2021 HISTORY: ORDERING SYSTEM PROVIDED HISTORY: see medical records TECHNOLOGIST PROVIDED HISTORY: Reason for exam:->see medical records What reading provider will be dictating this exam?->CRC FINDINGS: The cardiomediastinal silhouette is unchanged. Overall stable bilateral pulmonary airspace opacities. No pneumothorax or large pleural effusion. Stable bilateral infiltrates. XR CHEST PORTABLE    Result Date: 12/11/2021  EXAMINATION: ONE XRAY VIEW OF THE CHEST 12/11/2021 2:42 pm COMPARISON: None. HISTORY: ORDERING SYSTEM PROVIDED HISTORY: hypoxia TECHNOLOGIST PROVIDED HISTORY: Reason for exam:->hypoxia What reading provider will be dictating this exam?->CRC FINDINGS: Stable bilateral infiltrates. There is no effusion or pneumothorax. The cardiomediastinal silhouette is without acute process. The osseous structures are without acute process. Stable bilateral infiltrates. XR CHEST PORTABLE    Result Date: 12/10/2021  EXAMINATION: ONE XRAY VIEW OF THE CHEST 12/10/2021 6:54 am COMPARISON: 10/18/2021 and 10/15/2021 HISTORY: ORDERING SYSTEM PROVIDED HISTORY: hypoxia sob TECHNOLOGIST PROVIDED HISTORY: Reason for exam:->hypoxia sob FINDINGS: There has been significant worsening of the multifocal bilateral pulmonary infiltrates when compared to patient's prior studies. Diffuse dense bilateral airspace disease is noted. There is no pneumothorax. There is no pneumomediastinum. No pleural effusion noted. The heart is enlarged. 1. Significant worsening of the multifocal bilateral pneumonia     CTA PULMONARY W CONTRAST    Result Date: 12/10/2021  EXAMINATION: CTA OF THE CHEST 12/10/2021 2:12 pm TECHNIQUE: CTA of the chest was performed after the administration of intravenous contrast.  Multiplanar reformatted images are provided for review. MIP images are provided for review. Dose modulation, iterative reconstruction, and/or weight based adjustment of the mA/kV was utilized to reduce the radiation dose to as low as reasonably achievable.  COMPARISON: Chest radiograph December 10, 2021 HISTORY: ORDERING SYSTEM PROVIDED HISTORY: sob TECHNOLOGIST PROVIDED HISTORY: Reason for exam:->sob Decision Support Exception - unselect if not a suspected or confirmed emergency medical condition->Emergency Medical Condition (MA) What reading provider will be dictating this exam?->CRC FINDINGS: Limited evaluation due to respiratory motion. Pulmonary Arteries: Pulmonary arteries are adequately opacified for evaluation. No evidence of intraluminal filling defect to suggest pulmonary embolism. Main pulmonary artery is normal in caliber. Mediastinum: No evidence of mediastinal lymphadenopathy. The heart and pericardium demonstrate no acute abnormality. There is no acute abnormality of the thoracic aorta. Lungs/pleura: Extensive bilateral ground-glass opacities and consolidations (mid air bronchograms) in the bilateral lungs. Small bilateral pleural effusions. No focal consolidation or pulmonary edema. No evidence of pneumothorax. Upper Abdomen: Limited images of the upper abdomen are unremarkable. Soft Tissues/Bones: No acute bone or soft tissue abnormality. No evidence of pulmonary embolism Consolidation and ground-glass opacities of the lungs is suspicious for infectious process. Small bilateral pleural effusions. Discharge Medications:      Medication List      START taking these medications    ceFEPIme 2 g injection  Commonly known as: MAXIPIME  Infuse 2,000 mg intravenously every 12 hours for 10 days For 10 days     methylPREDNISolone sodium 40 MG injection  Commonly known as: SOLU-MEDROL  Infuse 1 mL intravenously every 12 hours  Start taking on: December 17, 2021     vancomycin  infusion  Commonly known as: VANCOCIN  Infuse 1,250 mg intravenously Q18H for 10 days For 10 days        CONTINUE taking these medications    amLODIPine 10 MG tablet  Commonly known as: NORVASC  Take 1 tablet by mouth daily     atorvastatin 40 MG tablet  Commonly known as: LIPITOR     Eliquis 5 MG Tabs tablet  Generic drug: apixaban  Take 1 tablet by mouth 2 times daily     gabapentin 300 MG capsule  Commonly known as: NEURONTIN  Take 1 capsule by mouth daily for 90 days.      metFORMIN 500 MG tablet  Commonly known as: GLUCOPHAGE  Take 1 tablet by mouth 2 times daily (with meals)     * phenytoin 100 MG ER capsule  Commonly known as: DILANTIN     * phenytoin 100 MG ER capsule  Commonly known as: DILANTIN         * This list has 2 medication(s) that are the same as other medications prescribed for you. Read the directions carefully, and ask your doctor or other care provider to review them with you. Where to Get Your Medications      These medications were sent to [facility], [address] [address]    Phone: [phone]   · methylPREDNISolone sodium 40 MG injection     Information about where to get these medications is not yet available    Ask your nurse or doctor about these medications  · ceFEPIme 2 g injection  · vancomycin  infusion         Time Spent on discharge is more than 45 minutes in the examination, evaluation, counseling and review of medications and discharge plan.    +++++++++++++++++++++++++++++++++++++++++++++++++  Medina Miranda MD  [address]  +++++++++++++++++++++++++++++++++++++++++++++++++  NOTE: This report was transcribed using voice recognition software. Every effort was made to ensure accuracy; however, inadvertent computerized transcription errors may be present.

## 2021-12-16 NOTE — PROGRESS NOTES
Date: 12/15/2021    Time: 10:45 PM    Patient Placed On BIPAP/CPAP/ Non-Invasive Ventilation? Yes    If no must comment. Facial area red/color change? No           If YES are Blister/Lesion present? No   If yes must notify nursing staff  BIPAP/CPAP skin barrier?   Yes    Skin barrier type:mepilexlite       Comments:        Daya Rodriguez RCP

## 2021-12-17 ENCOUNTER — APPOINTMENT (OUTPATIENT)
Dept: GENERAL RADIOLOGY | Age: 56
DRG: 720 | End: 2021-12-17
Payer: COMMERCIAL

## 2021-12-17 PROBLEM — F06.4 ANXIETY DISORDER DUE TO KNOWN PHYSIOLOGICAL CONDITION: Status: ACTIVE | Noted: 2021-12-17

## 2021-12-17 PROBLEM — E43 SEVERE PROTEIN-CALORIE MALNUTRITION (HCC): Status: ACTIVE | Noted: 2021-12-17

## 2021-12-17 LAB
AADO2: 555 MMHG
ALBUMIN SERPL-MCNC: 3.8 G/DL (ref 3.5–5.2)
ALP BLD-CCNC: 64 U/L (ref 40–129)
ALT SERPL-CCNC: 27 U/L (ref 0–40)
ANION GAP SERPL CALCULATED.3IONS-SCNC: 12 MMOL/L (ref 7–16)
ASPERGILLUS GALACTO AG: NEGATIVE
ASPERGILLUS GALACTO INDEX: 0.06
AST SERPL-CCNC: 18 U/L (ref 0–39)
B.E.: 6.3 MMOL/L (ref -3–3)
BILIRUB SERPL-MCNC: 0.4 MG/DL (ref 0–1.2)
BUN BLDV-MCNC: 33 MG/DL (ref 6–20)
CALCIUM SERPL-MCNC: 8.9 MG/DL (ref 8.6–10.2)
CHLORIDE BLD-SCNC: 96 MMOL/L (ref 98–107)
CO2: 33 MMOL/L (ref 22–29)
COHB: 1.3 % (ref 0–1.5)
CREAT SERPL-MCNC: 1.3 MG/DL (ref 0.7–1.2)
CRITICAL: ABNORMAL
DATE ANALYZED: ABNORMAL
DATE OF COLLECTION: ABNORMAL
FIO2: 100 %
GFR AFRICAN AMERICAN: >60
GFR NON-AFRICAN AMERICAN: 57 ML/MIN/1.73
GLUCOSE BLD-MCNC: 196 MG/DL (ref 74–99)
HCO3: 33.9 MMOL/L (ref 22–26)
HCT VFR BLD CALC: 35.4 % (ref 37–54)
HEMOGLOBIN: 10.8 G/DL (ref 12.5–16.5)
HHB: 7.9 % (ref 0–5)
LAB: ABNORMAL
LACTIC ACID, SEPSIS: 1.1 MMOL/L (ref 0.5–1.9)
Lab: ABNORMAL
MCH RBC QN AUTO: 28.8 PG (ref 26–35)
MCHC RBC AUTO-ENTMCNC: 30.5 % (ref 32–34.5)
MCV RBC AUTO: 94.4 FL (ref 80–99.9)
METHB: 0.2 % (ref 0–1.5)
MODE: ABNORMAL
O2 CONTENT: 15.5 ML/DL
O2 SATURATION: 92 % (ref 92–98.5)
O2HB: 90.6 % (ref 94–97)
OPERATOR ID: ABNORMAL
PATIENT TEMP: 37 C
PCO2: 64.5 MMHG (ref 35–45)
PDW BLD-RTO: 14.1 FL (ref 11.5–15)
PEEP/CPAP: 10 CMH2O
PFO2: 0.69 MMHG/%
PH BLOOD GAS: 7.34 (ref 7.35–7.45)
PLATELET # BLD: 165 E9/L (ref 130–450)
PMV BLD AUTO: 9.9 FL (ref 7–12)
PO2: 68.5 MMHG (ref 75–100)
POTASSIUM SERPL-SCNC: 4.8 MMOL/L (ref 3.5–5)
PROCALCITONIN: 1.42 NG/ML (ref 0–0.08)
RBC # BLD: 3.75 E12/L (ref 3.8–5.8)
RI(T): 8.1
RR MECHANICAL: 14 B/MIN
SODIUM BLD-SCNC: 141 MMOL/L (ref 132–146)
SOURCE, BLOOD GAS: ABNORMAL
THB: 12.1 G/DL (ref 11.5–16.5)
TIME ANALYZED: 41
TOTAL PROTEIN: 6.6 G/DL (ref 6.4–8.3)
VANCOMYCIN RANDOM: 17.3 MCG/ML (ref 5–40)
VT MECHANICAL: 500 ML
WBC # BLD: 13.5 E9/L (ref 4.5–11.5)

## 2021-12-17 PROCEDURE — 6360000002 HC RX W HCPCS: Performed by: NURSE PRACTITIONER

## 2021-12-17 PROCEDURE — 6370000000 HC RX 637 (ALT 250 FOR IP): Performed by: NURSE PRACTITIONER

## 2021-12-17 PROCEDURE — 6360000002 HC RX W HCPCS: Performed by: INTERNAL MEDICINE

## 2021-12-17 PROCEDURE — P9047 ALBUMIN (HUMAN), 25%, 50ML: HCPCS | Performed by: INTERNAL MEDICINE

## 2021-12-17 PROCEDURE — 2700000000 HC OXYGEN THERAPY PER DAY

## 2021-12-17 PROCEDURE — 83605 ASSAY OF LACTIC ACID: CPT

## 2021-12-17 PROCEDURE — 87040 BLOOD CULTURE FOR BACTERIA: CPT

## 2021-12-17 PROCEDURE — 84145 PROCALCITONIN (PCT): CPT

## 2021-12-17 PROCEDURE — 99251 PR INITL INPATIENT CONSULT NEW/ESTAB PT 20 MIN: CPT | Performed by: NURSE PRACTITIONER

## 2021-12-17 PROCEDURE — 94660 CPAP INITIATION&MGMT: CPT

## 2021-12-17 PROCEDURE — 80053 COMPREHEN METABOLIC PANEL: CPT

## 2021-12-17 PROCEDURE — 2580000003 HC RX 258: Performed by: INTERNAL MEDICINE

## 2021-12-17 PROCEDURE — 80202 ASSAY OF VANCOMYCIN: CPT

## 2021-12-17 PROCEDURE — 2060000000 HC ICU INTERMEDIATE R&B

## 2021-12-17 PROCEDURE — 6370000000 HC RX 637 (ALT 250 FOR IP): Performed by: INTERNAL MEDICINE

## 2021-12-17 PROCEDURE — 6360000002 HC RX W HCPCS

## 2021-12-17 PROCEDURE — 36415 COLL VENOUS BLD VENIPUNCTURE: CPT

## 2021-12-17 PROCEDURE — 85027 COMPLETE CBC AUTOMATED: CPT

## 2021-12-17 PROCEDURE — 82805 BLOOD GASES W/O2 SATURATION: CPT

## 2021-12-17 PROCEDURE — 94640 AIRWAY INHALATION TREATMENT: CPT

## 2021-12-17 PROCEDURE — 6360000002 HC RX W HCPCS: Performed by: FAMILY MEDICINE

## 2021-12-17 PROCEDURE — 71045 X-RAY EXAM CHEST 1 VIEW: CPT

## 2021-12-17 RX ORDER — BUSPIRONE HYDROCHLORIDE 5 MG/1
10 TABLET ORAL 3 TIMES DAILY
Status: DISCONTINUED | OUTPATIENT
Start: 2021-12-17 | End: 2021-12-20 | Stop reason: HOSPADM

## 2021-12-17 RX ORDER — GABAPENTIN 300 MG/1
300 CAPSULE ORAL 3 TIMES DAILY
Qty: 90 CAPSULE | Refills: 0
Start: 2021-12-17 | End: 2022-06-13 | Stop reason: SDUPTHER

## 2021-12-17 RX ORDER — SODIUM CHLORIDE FOR INHALATION 3 %
4 VIAL, NEBULIZER (ML) INHALATION EVERY 8 HOURS
Status: DISCONTINUED | OUTPATIENT
Start: 2021-12-17 | End: 2021-12-20 | Stop reason: HOSPADM

## 2021-12-17 RX ORDER — PREDNISONE 20 MG/1
40 TABLET ORAL DAILY
Status: DISCONTINUED | OUTPATIENT
Start: 2021-12-18 | End: 2021-12-20 | Stop reason: HOSPADM

## 2021-12-17 RX ORDER — GABAPENTIN 300 MG/1
300 CAPSULE ORAL 3 TIMES DAILY
Status: DISCONTINUED | OUTPATIENT
Start: 2021-12-17 | End: 2021-12-20 | Stop reason: HOSPADM

## 2021-12-17 RX ORDER — BUSPIRONE HYDROCHLORIDE 10 MG/1
10 TABLET ORAL 3 TIMES DAILY
Qty: 90 TABLET | Refills: 0
Start: 2021-12-17 | End: 2022-01-12 | Stop reason: SDUPTHER

## 2021-12-17 RX ADMIN — GUAIFENESIN 400 MG: 400 TABLET ORAL at 14:27

## 2021-12-17 RX ADMIN — SODIUM CHLORIDE, PRESERVATIVE FREE 10 ML: 5 INJECTION INTRAVENOUS at 03:54

## 2021-12-17 RX ADMIN — IPRATROPIUM BROMIDE AND ALBUTEROL SULFATE 1 AMPULE: .5; 2.5 SOLUTION RESPIRATORY (INHALATION) at 09:55

## 2021-12-17 RX ADMIN — BUSPIRONE HYDROCHLORIDE 10 MG: 5 TABLET ORAL at 20:21

## 2021-12-17 RX ADMIN — CEFEPIME 2000 MG: 2 INJECTION, POWDER, FOR SOLUTION INTRAVENOUS at 14:26

## 2021-12-17 RX ADMIN — AMLODIPINE BESYLATE 10 MG: 10 TABLET ORAL at 10:54

## 2021-12-17 RX ADMIN — SODIUM CHLORIDE, PRESERVATIVE FREE 10 ML: 5 INJECTION INTRAVENOUS at 05:24

## 2021-12-17 RX ADMIN — METHYLPREDNISOLONE SODIUM SUCCINATE 40 MG: 40 INJECTION, POWDER, FOR SOLUTION INTRAMUSCULAR; INTRAVENOUS at 02:04

## 2021-12-17 RX ADMIN — METHYLPREDNISOLONE SODIUM SUCCINATE 40 MG: 40 INJECTION, POWDER, FOR SOLUTION INTRAMUSCULAR; INTRAVENOUS at 14:26

## 2021-12-17 RX ADMIN — BUSPIRONE HYDROCHLORIDE 10 MG: 5 TABLET ORAL at 15:40

## 2021-12-17 RX ADMIN — GABAPENTIN 300 MG: 300 CAPSULE ORAL at 11:50

## 2021-12-17 RX ADMIN — PHENYTOIN SODIUM 200 MG: 100 CAPSULE, EXTENDED RELEASE ORAL at 10:54

## 2021-12-17 RX ADMIN — PHENYTOIN SODIUM 300 MG: 100 CAPSULE, EXTENDED RELEASE ORAL at 20:21

## 2021-12-17 RX ADMIN — CEFEPIME 2000 MG: 2 INJECTION, POWDER, FOR SOLUTION INTRAVENOUS at 23:00

## 2021-12-17 RX ADMIN — IPRATROPIUM BROMIDE AND ALBUTEROL SULFATE 1 AMPULE: .5; 2.5 SOLUTION RESPIRATORY (INHALATION) at 16:38

## 2021-12-17 RX ADMIN — IPRATROPIUM BROMIDE AND ALBUTEROL SULFATE 1 AMPULE: .5; 2.5 SOLUTION RESPIRATORY (INHALATION) at 13:42

## 2021-12-17 RX ADMIN — SODIUM CHLORIDE SOLN NEBU 3% 4 ML: 3 NEBU SOLN at 13:42

## 2021-12-17 RX ADMIN — ALBUMIN (HUMAN) 25 G: 0.25 INJECTION, SOLUTION INTRAVENOUS at 09:01

## 2021-12-17 RX ADMIN — ATORVASTATIN CALCIUM 40 MG: 40 TABLET, FILM COATED ORAL at 20:22

## 2021-12-17 RX ADMIN — APIXABAN 5 MG: 5 TABLET, FILM COATED ORAL at 20:21

## 2021-12-17 RX ADMIN — FUROSEMIDE 40 MG: 10 INJECTION, SOLUTION INTRAMUSCULAR; INTRAVENOUS at 10:32

## 2021-12-17 RX ADMIN — SODIUM CHLORIDE, PRESERVATIVE FREE 10 ML: 5 INJECTION INTRAVENOUS at 02:05

## 2021-12-17 RX ADMIN — GABAPENTIN 300 MG: 300 CAPSULE ORAL at 20:22

## 2021-12-17 RX ADMIN — IPRATROPIUM BROMIDE AND ALBUTEROL SULFATE 1 AMPULE: .5; 2.5 SOLUTION RESPIRATORY (INHALATION) at 20:57

## 2021-12-17 RX ADMIN — Medication 10 ML: at 20:22

## 2021-12-17 RX ADMIN — VANCOMYCIN HYDROCHLORIDE 1250 MG: 10 INJECTION, POWDER, LYOPHILIZED, FOR SOLUTION INTRAVENOUS at 10:55

## 2021-12-17 RX ADMIN — MORPHINE SULFATE 1 MG: 2 INJECTION, SOLUTION INTRAMUSCULAR; INTRAVENOUS at 08:42

## 2021-12-17 RX ADMIN — GUAIFENESIN 400 MG: 400 TABLET ORAL at 20:22

## 2021-12-17 RX ADMIN — APIXABAN 5 MG: 5 TABLET, FILM COATED ORAL at 10:54

## 2021-12-17 RX ADMIN — LORAZEPAM 0.5 MG: 2 INJECTION INTRAMUSCULAR; INTRAVENOUS at 05:23

## 2021-12-17 RX ADMIN — MORPHINE SULFATE 1 MG: 2 INJECTION, SOLUTION INTRAMUSCULAR; INTRAVENOUS at 03:53

## 2021-12-17 RX ADMIN — GABAPENTIN 300 MG: 300 CAPSULE ORAL at 15:40

## 2021-12-17 RX ADMIN — SODIUM CHLORIDE SOLN NEBU 3% 4 ML: 3 NEBU SOLN at 20:57

## 2021-12-17 RX ADMIN — Medication 10 ML: at 08:42

## 2021-12-17 RX ADMIN — LORAZEPAM 1 MG: 2 INJECTION INTRAMUSCULAR; INTRAVENOUS at 02:04

## 2021-12-17 ASSESSMENT — PAIN SCALES - GENERAL
PAINLEVEL_OUTOF10: 4
PAINLEVEL_OUTOF10: 5
PAINLEVEL_OUTOF10: 0

## 2021-12-17 NOTE — SIGNIFICANT EVENT
Rapid Response Team Note  Date of event: 12/16/2021   Time of event: 23.20  Pravin Hughes 60y.o. year old male   YOB: 1965   Admit date:  12/10/2021   Location: [address] [place] [address]-A   Witnessed? : []Yes  [] No  Monitored? : []Yes  [] No  Code status: [] Full  [] DNR-CCA  []DNR-CC  ______________________________________________________________________  Reason for RRT:    [] RR < 8     [x] RR > 28   [] SpO2 <90%   [] HR < 40 bpm   [] HR > 130 bpm  [] SBP < 90 mmHg    [x] SpO2 <90%   [] LOC   [] Seizures    [] Significant Bleeding Event    [] Other: anxiety    Subjective:   CTSP regarding the above event,pt was desaturating to  70%, he was transfer to LTAC, and he did not tolerate the transport bipap, refused to go on it, got very anxious, RRT was called as he did not improve, and he was given 0.5 mg ativan by that time with 1 mg morphine, he was saturating upto 85%, by that time and he was not even opening eyes, tachypneic and tachycardic, was given another 0.5 mg ativan, he seemed to calm down, often his saturation improved to 95% and above, but when he is anxious or agitated he seems to desaturate, he was at last sustained at 90%, RN was advised to call us if anything worsens, as per the chart patient denied any intubation, but pts wife said he must be scared and okay for intubation. At this point he was stable, need to be reassessed and readdress goals of care.  Otherwise will follow on the ABG after an hour  Objective:   Vital signs: Temp: n/a/BP: 158/93/RR: 30/HR: 121  Initial Condition:  Conscious   [x] Yes  [] No     Breathing [x] Yes  [] No     Pulse  [x] Yes  [] No    Airway:   [] Open/ Clear     Intervention: [x] None  [] Pooled secretions     [] Suctioned  [] Stridor      [] Intubation    Lungs:   [] Symmetrical chest rise/ CTABL Intervention: [x] None  [] Use of accessory muscles    [] NIV (CPAP/BiPAP)  [] Cyanosis      [] Nasal Oxygen/Mask  [] Wheezing       [] ABG             [] CXR  [] Other: Circulation:   Rhythm:  [x] Sinus [] Other:  Intervention: [] None            [] IV Access  [] Peripheral              [] Central            [] EKG            [] Cardioversion            [] Defibrillation     Capillary Refill:  [] > 2 seconds [] < 2 seconds    Neurologic:   [] NIHSS      [] Pupillary Response:     Response to pain:   [] Yes  [] No  Follow commands:  [] Yes  [] No  Facial asymmetry:  [] Yes  [] No  Motor strength:  [] Equal  [] Focal deficit:   Diagnostic Test:  Blood Sugar:   mg/dL  EKG:      ABG:      Lab Results   Component Value Date    PH 7.339 12/17/2021    PCO2 64.5 12/17/2021    PO2 68.5 12/17/2021    HCO3 33.9 12/17/2021    O2SAT 92.0 12/17/2021     Medication(s) Given:  []  Narcan (Naloxone)   []  Romazicon (Flumazenil)    []  Breathing Treatment    []  Steroids (Prednisone, Solu-Medrol)  []  Adenosine  [] Cardiac Medicines:     Infusion(s):   [] Normal Saline    Amount:  cc/hr      [] Lactate Ringers      [] Other:     Imaging:   [] CXR:   [] Normal         [] Pneumothorax         [] Pulmonary Edema  [] Infiltrate          [] CT Head  [] Normal          [] ICB          [] [facility]          [] Other:     Laboratory Tests:     ABG:    Lab Results   Component Value Date    PH 7.339 12/17/2021    PCO2 64.5 12/17/2021    PO2 68.5 12/17/2021    HCO3 33.9 12/17/2021    BE 6.3 12/17/2021    O2SAT 92.0 12/17/2021         Teams Assessment and Plan:   acute hypoxic resp failure 2/2 panic/anxiety attack  Sepsis from PNA  Hx of DVT  Elevated trop  Hx of seizures  HLD  HTN  Post covid pul fibrosis    Plan  Continue current treatment  Cont eliquis  Good fluid balance no diuresis  Continue ativan 0.5 mg Q4 PRN  ?  ?  ?   Disposition:  [x] No transfer   [] Transfer to monitor floor  [] Transfer to: [] MICU [] NICU [] CCU [] SICU    Patients family updated: [] Yes  [x] No   Discussed with:  [] Critical Care Intensivist: Dr. Crow Fearing      [] Primary Care Provider:       [] Other: ?    Yin Cloud MD PGY-3  12/16/2021 11:49 PM  Attending Physician: Dr Chhaya Lozada

## 2021-12-17 NOTE — PROGRESS NOTES
Pharmacy Consultation Note  (Antibiotic Dosing and Monitoring)    Initial consult date: 12/10/21  Consulting physician/provider: Dr. Lala Alvares  Drug: Vancomycin  Indication: Pneumonia    Age/  Gender Height Weight IBW  Allergy Information   56 y.o./male 6' (182.9 cm) 278 lb (126.1 kg)     Ideal body weight: 77.6 kg (171 lb 1.2 oz)  Adjusted ideal body weight: 96.5 kg (212 lb 12.3 oz)   Patient has no known allergies. Renal Function:  Recent Labs     12/15/21  0452 12/16/21  0721 12/17/21  0509   BUN 32* 27*  27* 33*   CREATININE 1.0 0.9  0.9 1.3*       Intake/Output Summary (Last 24 hours) at 12/17/2021 0935  Last data filed at 12/17/2021 7308  Gross per 24 hour   Intake 340 ml   Output 3000 ml   Net -2660 ml       Vancomycin Monitoring:  Trough:    Recent Labs     12/14/21  1726   VANCOTROUGH 24.9*     Random:    Recent Labs     12/17/21  0509   VANCORANDOM 17.3       Vancomycin Administration Times:  Recent vancomycin administrations                   vancomycin (VANCOCIN) 1,250 mg in dextrose 5 % 250 mL IVPB (mg) 1,250 mg New Bag 12/17/21 1055     1,250 mg New Bag 12/16/21 1417     1,250 mg New Bag 12/15/21 1926     1,250 mg New Bag  0120              Assessment:  · Patient is a 60 y.o. male who has been initiated on vancomycin  Estimated Creatinine Clearance: 85 mL/min (A) (based on SCr of 1.3 mg/dL (H)). · To dose vancomycin, pharmacy will be utilizing [employer] calculation software for goal AUC/VIOLET 400-600 mg/L-hr   · 12/12: Creatinine stable at 0.9. Level ~10.5 hours post dose was 21.3 this morning. Inputted into [employer], current regimen with predicted AUC/VIOLET of 620 and steady state trough of 20  · 12/13: Scr 1.1 today up from 0.9. WBC 10.6. · 12/14: Scr 1.1, elevated again today from baseline will check vancomycin level. · 12/15: Scr 1, vancomycin level @17:26 = 24.9 mcg/mL, true trough drawn 11.5 hours post dose. Regimen adjusted overnight to vancomycin 1250 mg IV q18h due to elevated trough. · 12/16: Scr 0.9 improving. WBC WNL. · 12/17: Attempted transfer to [facility] yesterday but RRT was called. Scr 1.3 today up from 0.9.  Vancomycin level @05:09 = 17.3 (~ 15 hours post-dose)    Plan:  · Continue vancomycin 1250 mg IV q18h (predicted AUC/VIOLET = 527, Tr = 17.9)  · Will check vancomycin levels when appropriate  · Will continue to monitor renal function   · Clinical pharmacy to follow     Camden RiveroD   Pharmacy Resident   Phone: [phone]  12/17/2021 9:35 AM

## 2021-12-17 NOTE — PROGRESS NOTES
Comprehensive Nutrition Assessment    Type and Reason for Visit:  Initial    Nutrition Recommendations/Plan: Continue current diet, Start Ensure HP BID    Nutrition Assessment:  Pt adm w/ resp failure, prior trach 2/2 severe COVID 08/2021. Hx DM, seizures, CHF. Pt now s/p RRT, d/c held. Will provide ONS and monitor    Malnutrition Assessment:  Malnutrition Status:  Severe malnutrition    Context:  Chronic Illness     Findings of the 6 clinical characteristics of malnutrition:  Energy Intake:  7 - 75% or less estimated energy requirements for 1 month or longer  Weight Loss:  7 - Greater than 10% over 6 months     Body Fat Loss:  1 - Mild body fat loss Orbital   Muscle Mass Loss:  1 - Mild muscle mass loss Temples (temporalis), Clavicles (pectoralis & deltoids)  Fluid Accumulation:  No significant fluid accumulation     Strength:  Not Performed    Estimated Daily Nutrient Needs:  Energy (kcal):   (MSJ 2105 x 1.2 SF); Weight Used for Energy Requirements:  Current     Protein (g):  125-145; Weight Used for Protein Requirements:  Ideal (1.5-1.8)        Fluid (ml/day):  ; Method Used for Fluid Requirements:  1 ml/kcal      Nutrition Related Findings:  pt disoriented/anxious, NRB, soft abd, active BS, +2 edema, -I/Os      Wounds:  None       Current Nutrition Therapies:    ADULT DIET;  Regular; 4 carb choices (60 gm/meal)    Anthropometric Measures:  · Height: 6' (182.9 cm)  · Current Body Weight: 275 lb (124.7 kg) (bed scale 12/13)   · Admission Body Weight: 278 lb (126.1 kg) (bed scale 12/11)    · Usual Body Weight: 321 lb (145.6 kg) (actual per EMR 08/2021)     · Ideal Body Weight: 178 lbs; % Ideal Body Weight 154.5 %   · BMI: 37.3  · BMI Categories: Obese Class 2 (BMI 35.0 -39.9)       Nutrition Diagnosis:   · Severe malnutrition, In context of chronic illness related to inadequate protein-energy intake (2/2 prolonged COVID stay) as evidenced by poor intake prior to admission, weight loss greater than or equal to 10% in 6 months, mild loss of subcutaneous fat, mild muscle loss      Nutrition Interventions:   Food and/or Nutrient Delivery:  Continue Current Diet, Start Oral Nutrition Supplement (Ensure HP BID)  Nutrition Education/Counseling:  Education not appropriate   Coordination of Nutrition Care:  Continue to monitor while inpatient    Goals:  Consume >75% meals/ONS       Nutrition Monitoring and Evaluation:   Behavioral-Environmental Outcomes:  None Identified   Food/Nutrient Intake Outcomes:  Diet Advancement/Tolerance, Food and Nutrient Intake, Supplement Intake  Physical Signs/Symptoms Outcomes:  Biochemical Data, GI Status, Fluid Status or Edema, Nutrition Focused Physical Findings, Skin, Weight     Discharge Planning:     Too soon to determine     Electronically signed by Naye Yeh MS, RD, LD on 12/17/21 at 11:53 AM EST    Contact: [phone]

## 2021-12-17 NOTE — CARE COORDINATION
Patient was told by EMT that arrived to transport yesterday evening that their bipap are ineffective at times compared to hospital bipap, unsurprisingly this created an anxiety inducing situation for patient. RRT called during transport arrangements and now on 100% bipap. Will need to be less that 70% FIO2 on bipap and considered stable by pulm prior to transport. Oksana Oneal continues to follow. Ambulance form on soft chart. Updated spouse. For questions I can be reached at [phone].  Jose Isabel [facility]

## 2021-12-17 NOTE — CONSULTS
limited due to his seizure disorder when education was provided. He has a sitter at bedside as he at times pulls at his bipap which would cause him to desaturate. Unclear if there is some transient confusion, on time of encounter he is alert and orient to person, place time and situation and seems to have good insight into need for treatment and can verbalize his treatment goals. MSE  Mental status examination reveals a 58-year-old  male with average hygiene average grooming is pleasant and cooperative for assessment to the best of his ability given his respiratory status. Psychomotor reveals no agitation retardation involuntary movement or abnormal posture though it is noted he is using all of his accessory muscles to breathe. Speech is clear and he attempts to the best of his ability to participate in full assessment. He is able to answer questions with relevance and express his needs. Eye contact is good during assessment. Mood is \"I am doing the best I can. \"  Affect is pleasant. Thought process is linear goal-directed he is very future focused and optimistic for his recovery. Thought content is devoid of auditory or visual hallucinations there is no overt or covert sign of psychosis or paranoia, there is no evident perceptual disturbance. He is devoid of suicidal or homicidal ideation intent or plan. There are no neurovegetative signs of depression on this encounter. Memory is intact during conversation. Insight judgment impulse control are fair. Cognitive function appears to be baseline. And he is alert and oriented to person place time situation and can recount the events that led to his hospitalization. 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     Psychiatry recommends caution with psychotropic medications due to patients overall compromised condition and seizure disorder. SSRI's are known to decrease seizure threshold therefore we suggest the following: We will recommend increasing the Gabapentin to 300 mg TID as this medication most effective for anxiety in TID dosing, this medication complements the patients current regime for seizure disorder. Gabapentin may be affective in assisting to clear any transient confusion secondary to illness. We will recommend the use of Buspar 10 mg TID as this medication is indicated as an anti- axolytic. This medication may be titrated up to total daily dose of 60 mg in three divided doses if clinically indicated. We recommend caution when using benzodiazepines in patients with compromised respiratory status as they may cause respiratory depression. Thank you for the consult. Please call if any questions or concerns. Psychiatry signs off.

## 2021-12-17 NOTE — PROGRESS NOTES
At approximately [phone] ambulance  arrived to transport patient. EMT explained to patient that they have a Bipap that is  different than the one used at the hospital .  Patient became very anxious when they  Attempted to use the ambulance Bipap. Patient now very scared to transport with this Bipap.and refuses to go . Patient was placed  back in the bed having a severe anxiety attack. Tried to calm him down , he received Ativan 0.5 mg with no improvement. Morphine 1mg then given , again with no improvement.  We now are unable to keep oxygen saturation above 88% with Avaps at 100% O2 , RRT called

## 2021-12-17 NOTE — PROGRESS NOTES
Hospitalist Progress Note      SYNOPSIS:     Patient admitted on 12/10/2021  presents to [facility] ER complaining of SOB. Mulu Rosado a past medical history that includes diabetes, history of seizures, hyperlipidemia, recent Covid. Asberry Paget originally presented to [address] [place] [address] ER with shortness of breath and was found to have a pulse ox of 35% on room air upon arrival to the ER. [facility] was placed on BiPAP in the ER and due to respiratory failure concern he was transferred ED to ED. [facility] states that last night around 10:30 PM he developed acute shortness of breath.  He states that it progressed and was exertional. [facility] also admits to leg swelling.  In the ER he looks more comfortable and is on a nonrebreather mask.  He does have a history of Covid admission where he required long-term acute care stay. Jennifer Mekhi showed no evidence of pulmonary embolism Heparin drip was stopped that was started in Oral ER for concern for PE.  Consolidation and groundglass opacities of the lungs is suspicious for infectious process.  There are small bilateral pleural effusions.  The pt has had protraced course of recovery and complicated by severe anxiety and post COVID lung fibrosis. Pt was accepted by LTAC but became hypoxic and discharge was held. SUBJECTIVE:    Patient seen and examined  Records reviewed. Pt was discharged yesterday to LTAC but during transport pt became more anxious and hypoxic and an RRT was called and discharge was held. Temp (24hrs), Av.4 °F (36.9 °C), Min:97.4 °F (36.3 °C), Max:98.9 °F (37.2 °C)    DIET: ADULT DIET;  Regular; 4 carb choices (60 gm/meal)  CODE: Full Code    Intake/Output Summary (Last 24 hours) at 2021 1101  Last data filed at 2021 0628  Gross per 24 hour   Intake 340 ml   Output 3000 ml   Net -2660 ml       OBJECTIVE:    /77   Pulse 84   Temp 97.4 °F (36.3 °C) (Axillary)   Resp 29   Ht 6' (1.829 m)   Wt 275 lb 5 oz (124.9 kg)   SpO2 98%   BMI 37.34 kg/m² General appearance: mild distress distress, appears stated age and cooperative. HEENT:  Conjunctivae/corneas clear. Neck: Supple. No jugular venous distention. Respiratory: Tachypnea, Decreased air movement. Cardiovascular: Regular rate rhythm, normal S1-S2  Abdomen: Soft, nontender, nondistended  Musculoskeletal: No clubbing, cyanosis, no bilateral lower extremity edema. Brisk capillary refill. Skin:  No rashes  on visible skin  Neurologic: awake, alert and following commands     ASSESSMENT:    Acute hypoxemic respiratory failure due to pneumonia   Sepsis due to Pneumonia  Acute diastolic congestive heart failure  Hist of DVTs  Elevated troponins  History of Seizures  HLD  HTN  Post COVID Pulmonary Fibrosis    PLAN:    Discharge to [place] [id number] held yesterday due to Hypoxia and RRT was called. Cont Vancomycin and Cefepime. ID and pulmonology on board  Cont Bronchodilators  Cont IV solumedrol Q12  Appreciate pulmonology recommendations. No plans of Bronch   Ativan PRN   Cont Eliquis  Cont IV lasix 40 daily. Pt responded well with negative fluid balance,   Renal functions slightly worst. Hold Lasix   Pt needs FiO2 below 70% to transfer to LTAC. Cont to wean as able.      DISPOSITION:     Medications:  REVIEWED DAILY    Infusion Medications    sodium chloride       Scheduled Medications    methylPREDNISolone  40 mg IntraVENous Q12H    furosemide  40 mg IntraVENous Daily    vancomycin  1,250 mg IntraVENous Q18H    albumin human  25 g IntraVENous Q12H    labetalol  5 mg IntraVENous Once    guaiFENesin  400 mg Oral TID    ipratropium-albuterol  1 ampule Inhalation Q4H While awake    cefepime  2,000 mg IntraVENous Q12H    sodium chloride flush  5-40 mL IntraVENous 2 times per day    amLODIPine  10 mg Oral Daily    atorvastatin  40 mg Oral Nightly    apixaban  5 mg Oral BID    gabapentin  300 mg Oral Daily    phenytoin  300 mg Oral Nightly    phenytoin  200 mg Oral QAM     PRN Meds: morphine, LORazepam, melatonin, sodium chloride flush, sodium chloride, ondansetron **OR** ondansetron, polyethylene glycol, acetaminophen **OR** acetaminophen    Labs:     Recent Labs     12/15/21  0452 12/16/21  0721 12/17/21  0509   WBC 12.3* 10.0 13.5*   HGB 10.3* 9.7* 10.8*   HCT 32.8* 30.8* 35.4*    145 165       Recent Labs     12/15/21  0452 12/16/21  0721 12/17/21  0509    144  142 141   K 3.8 3.9  3.8 4.8   CL 97* 99  98 96*   CO2 32* 36*  34* 33*   BUN 32* 27*  27* 33*   CREATININE 1.0 0.9  0.9 1.3*   CALCIUM 9.1 9.2  9.1 8.9       Recent Labs     12/15/21  0452 12/16/21  0721 12/17/21  0509   PROT 6.5 6.2*  6.3* 6.6   ALKPHOS 75 65  66 64   ALT 43* 32  32 27   AST 40* 23  23 18   BILITOT 0.4 0.5  0.4 0.4       No results for input(s): INR in the last 72 hours. No results for input(s): Bhavna Horn in the last 72 hours. Chronic labs:    Lab Results   Component Value Date    CHOL 173 12/11/2021    TRIG 100 12/11/2021    HDL 65 12/11/2021    LDLCALC 88 12/11/2021    TSH 0.507 12/11/2021    PSA 0.84 12/12/2020    INR 1.1 12/10/2021    LABA1C 5.3 12/11/2021       Radiology: REVIEWED DAILY    +++++++++++++++++++++++++++++++++++++++++++++++++  Pricila Lynn MD  [employer] - [address], [place]  +++++++++++++++++++++++++++++++++++++++++++++++++  NOTE: This report was transcribed using voice recognition software. Every effort was made to ensure accuracy; however, inadvertent computerized transcription errors may be present.

## 2021-12-17 NOTE — PROGRESS NOTES
Nurse to nurse report called to Wanda Garcia at [facility]. Awaiting transfer of patient.

## 2021-12-18 ENCOUNTER — APPOINTMENT (OUTPATIENT)
Dept: GENERAL RADIOLOGY | Age: 56
DRG: 720 | End: 2021-12-18
Payer: COMMERCIAL

## 2021-12-18 LAB
ALBUMIN SERPL-MCNC: 4 G/DL (ref 3.5–5.2)
ALP BLD-CCNC: 56 U/L (ref 40–129)
ALT SERPL-CCNC: 20 U/L (ref 0–40)
ANION GAP SERPL CALCULATED.3IONS-SCNC: 7 MMOL/L (ref 7–16)
AST SERPL-CCNC: 14 U/L (ref 0–39)
BILIRUB SERPL-MCNC: 0.4 MG/DL (ref 0–1.2)
BUN BLDV-MCNC: 32 MG/DL (ref 6–20)
CALCIUM SERPL-MCNC: 9.3 MG/DL (ref 8.6–10.2)
CHLORIDE BLD-SCNC: 98 MMOL/L (ref 98–107)
CO2: 38 MMOL/L (ref 22–29)
CREAT SERPL-MCNC: 1 MG/DL (ref 0.7–1.2)
GFR AFRICAN AMERICAN: >60
GFR NON-AFRICAN AMERICAN: >60 ML/MIN/1.73
GLUCOSE BLD-MCNC: 135 MG/DL (ref 74–99)
HCT VFR BLD CALC: 31.2 % (ref 37–54)
HEMOGLOBIN: 9.5 G/DL (ref 12.5–16.5)
MCH RBC QN AUTO: 28.6 PG (ref 26–35)
MCHC RBC AUTO-ENTMCNC: 30.4 % (ref 32–34.5)
MCV RBC AUTO: 94 FL (ref 80–99.9)
PDW BLD-RTO: 14.2 FL (ref 11.5–15)
PLATELET # BLD: 170 E9/L (ref 130–450)
PMV BLD AUTO: 10.2 FL (ref 7–12)
POTASSIUM SERPL-SCNC: 4.1 MMOL/L (ref 3.5–5)
PROCALCITONIN: 1.2 NG/ML (ref 0–0.08)
RBC # BLD: 3.32 E12/L (ref 3.8–5.8)
SODIUM BLD-SCNC: 143 MMOL/L (ref 132–146)
TOTAL PROTEIN: 6.4 G/DL (ref 6.4–8.3)
WBC # BLD: 11 E9/L (ref 4.5–11.5)

## 2021-12-18 PROCEDURE — 6360000002 HC RX W HCPCS: Performed by: INTERNAL MEDICINE

## 2021-12-18 PROCEDURE — 6370000000 HC RX 637 (ALT 250 FOR IP): Performed by: NURSE PRACTITIONER

## 2021-12-18 PROCEDURE — 71045 X-RAY EXAM CHEST 1 VIEW: CPT

## 2021-12-18 PROCEDURE — 36415 COLL VENOUS BLD VENIPUNCTURE: CPT

## 2021-12-18 PROCEDURE — 2060000000 HC ICU INTERMEDIATE R&B

## 2021-12-18 PROCEDURE — 85027 COMPLETE CBC AUTOMATED: CPT

## 2021-12-18 PROCEDURE — 80053 COMPREHEN METABOLIC PANEL: CPT

## 2021-12-18 PROCEDURE — 2580000003 HC RX 258: Performed by: INTERNAL MEDICINE

## 2021-12-18 PROCEDURE — 6370000000 HC RX 637 (ALT 250 FOR IP): Performed by: INTERNAL MEDICINE

## 2021-12-18 PROCEDURE — 2700000000 HC OXYGEN THERAPY PER DAY

## 2021-12-18 PROCEDURE — 84145 PROCALCITONIN (PCT): CPT

## 2021-12-18 PROCEDURE — 6360000002 HC RX W HCPCS

## 2021-12-18 PROCEDURE — 94640 AIRWAY INHALATION TREATMENT: CPT

## 2021-12-18 PROCEDURE — 94660 CPAP INITIATION&MGMT: CPT

## 2021-12-18 RX ORDER — FUROSEMIDE 20 MG/1
40 TABLET ORAL DAILY
Status: DISCONTINUED | OUTPATIENT
Start: 2021-12-18 | End: 2021-12-20 | Stop reason: HOSPADM

## 2021-12-18 RX ADMIN — ATORVASTATIN CALCIUM 40 MG: 40 TABLET, FILM COATED ORAL at 20:06

## 2021-12-18 RX ADMIN — GABAPENTIN 300 MG: 300 CAPSULE ORAL at 09:09

## 2021-12-18 RX ADMIN — IPRATROPIUM BROMIDE AND ALBUTEROL SULFATE 1 AMPULE: .5; 2.5 SOLUTION RESPIRATORY (INHALATION) at 08:38

## 2021-12-18 RX ADMIN — SODIUM CHLORIDE SOLN NEBU 3% 4 ML: 3 NEBU SOLN at 12:38

## 2021-12-18 RX ADMIN — APIXABAN 5 MG: 5 TABLET, FILM COATED ORAL at 20:06

## 2021-12-18 RX ADMIN — GABAPENTIN 300 MG: 300 CAPSULE ORAL at 13:56

## 2021-12-18 RX ADMIN — IPRATROPIUM BROMIDE AND ALBUTEROL SULFATE 1 AMPULE: .5; 2.5 SOLUTION RESPIRATORY (INHALATION) at 12:37

## 2021-12-18 RX ADMIN — LORAZEPAM 0.5 MG: 2 INJECTION INTRAMUSCULAR; INTRAVENOUS at 03:45

## 2021-12-18 RX ADMIN — CEFEPIME 2000 MG: 2 INJECTION, POWDER, FOR SOLUTION INTRAVENOUS at 23:40

## 2021-12-18 RX ADMIN — AMLODIPINE BESYLATE 10 MG: 10 TABLET ORAL at 09:09

## 2021-12-18 RX ADMIN — GUAIFENESIN 400 MG: 400 TABLET ORAL at 13:56

## 2021-12-18 RX ADMIN — ACETAMINOPHEN 650 MG: 325 TABLET ORAL at 05:41

## 2021-12-18 RX ADMIN — SODIUM CHLORIDE, PRESERVATIVE FREE 10 ML: 5 INJECTION INTRAVENOUS at 03:45

## 2021-12-18 RX ADMIN — BUSPIRONE HYDROCHLORIDE 10 MG: 5 TABLET ORAL at 20:07

## 2021-12-18 RX ADMIN — BUSPIRONE HYDROCHLORIDE 10 MG: 5 TABLET ORAL at 13:56

## 2021-12-18 RX ADMIN — VANCOMYCIN HYDROCHLORIDE 1250 MG: 10 INJECTION, POWDER, LYOPHILIZED, FOR SOLUTION INTRAVENOUS at 22:42

## 2021-12-18 RX ADMIN — APIXABAN 5 MG: 5 TABLET, FILM COATED ORAL at 09:09

## 2021-12-18 RX ADMIN — PHENYTOIN SODIUM 300 MG: 100 CAPSULE, EXTENDED RELEASE ORAL at 20:07

## 2021-12-18 RX ADMIN — CEFEPIME 2000 MG: 2 INJECTION, POWDER, FOR SOLUTION INTRAVENOUS at 11:39

## 2021-12-18 RX ADMIN — FUROSEMIDE 40 MG: 20 TABLET ORAL at 11:38

## 2021-12-18 RX ADMIN — Medication 10 ML: at 20:08

## 2021-12-18 RX ADMIN — IPRATROPIUM BROMIDE AND ALBUTEROL SULFATE 1 AMPULE: .5; 2.5 SOLUTION RESPIRATORY (INHALATION) at 17:09

## 2021-12-18 RX ADMIN — GABAPENTIN 300 MG: 300 CAPSULE ORAL at 20:06

## 2021-12-18 RX ADMIN — IPRATROPIUM BROMIDE AND ALBUTEROL SULFATE 1 AMPULE: .5; 2.5 SOLUTION RESPIRATORY (INHALATION) at 20:26

## 2021-12-18 RX ADMIN — GUAIFENESIN 400 MG: 400 TABLET ORAL at 20:06

## 2021-12-18 RX ADMIN — PREDNISONE 40 MG: 20 TABLET ORAL at 09:09

## 2021-12-18 RX ADMIN — BUSPIRONE HYDROCHLORIDE 10 MG: 5 TABLET ORAL at 09:09

## 2021-12-18 RX ADMIN — PHENYTOIN SODIUM 200 MG: 100 CAPSULE, EXTENDED RELEASE ORAL at 09:08

## 2021-12-18 RX ADMIN — SODIUM CHLORIDE SOLN NEBU 3% 4 ML: 3 NEBU SOLN at 20:27

## 2021-12-18 RX ADMIN — GUAIFENESIN 400 MG: 400 TABLET ORAL at 09:09

## 2021-12-18 RX ADMIN — VANCOMYCIN HYDROCHLORIDE 1250 MG: 10 INJECTION, POWDER, LYOPHILIZED, FOR SOLUTION INTRAVENOUS at 05:41

## 2021-12-18 RX ADMIN — SODIUM CHLORIDE, PRESERVATIVE FREE 10 ML: 5 INJECTION INTRAVENOUS at 05:42

## 2021-12-18 ASSESSMENT — PAIN SCALES - GENERAL
PAINLEVEL_OUTOF10: 6
PAINLEVEL_OUTOF10: 0
PAINLEVEL_OUTOF10: 0

## 2021-12-18 ASSESSMENT — PAIN DESCRIPTION - LOCATION: LOCATION: HEAD

## 2021-12-18 ASSESSMENT — PAIN DESCRIPTION - DESCRIPTORS: DESCRIPTORS: HEADACHE;THROBBING

## 2021-12-18 ASSESSMENT — PAIN DESCRIPTION - PAIN TYPE: TYPE: ACUTE PAIN

## 2021-12-18 NOTE — PROGRESS NOTES
trough. · 12/16: Scr 0.9 improving. WBC WNL. · 12/17: Attempted transfer to [facility] yesterday but RRT was called. Scr 1.3 today up from 0.9.  Vancomycin level @05:09 = 17.3 (~ 15 hours post-dose)  · 12/18: day #9 of vancomycin and cefepime; WBC WNL; afebrile    Plan:  · Continue vancomycin 1250 mg IV q18h (predicted AUC/VIOLET = 433, Tr = 14 mg/L)  · Will check vancomycin levels when appropriate  · Will continue to monitor renal function   · Clinical pharmacy to follow       Audrey Aguilar, PharmD  Pharmacy Resident  P: [phone]  12/18/2021 9:05 AM

## 2021-12-18 NOTE — PROGRESS NOTES
Hospitalist Progress Note      SYNOPSIS:     Patient admitted on 12/10/2021  presents to [facility] ER complaining of SOB. Tameka Herrera a past medical history that includes diabetes, history of seizures, hyperlipidemia, recent Covid. Ina Devine originally presented to [facility] ER with shortness of breath and was found to have a pulse ox of 35% on room air upon arrival to the ER. Gale Costa was placed on BiPAP in the ER and due to respiratory failure concern he was transferred ED to ED. Gale Costa states that last night around 10:30 PM he developed acute shortness of breath.  He states that it progressed and was exertional. Gale Costa also admits to leg swelling.  In the ER he looks more comfortable and is on a nonrebreather mask.  He does have a history of Covid admission where he required long-term acute care stay. Jania Mulligan showed no evidence of pulmonary embolism Heparin drip was stopped that was started in [place] ER for concern for PE.  Consolidation and groundglass opacities of the lungs is suspicious for infectious process.  There are small bilateral pleural effusions.  The pt has had protraced course of recovery and complicated by severe anxiety and post COVID lung fibrosis. Pt was accepted by LTAC but became hypoxic and discharge was held. SUBJECTIVE:    Patient seen and examined  Records reviewed. Pt on [facility] this AM. Denies any complaitns    Temp (24hrs), Av.5 °F (36.9 °C), Min:98.4 °F (36.9 °C), Max:98.6 °F (37 °C)    DIET: ADULT DIET; Regular; 4 carb choices (60 gm/meal)  ADULT ORAL NUTRITION SUPPLEMENT; Lunch, Dinner;  Low Calorie/High Protein Oral Supplement  CODE: Full Code    Intake/Output Summary (Last 24 hours) at 2021 0956  Last data filed at 2021 0852  Gross per 24 hour   Intake 170 ml   Output 2450 ml   Net -2280 ml       OBJECTIVE:    /74   Pulse 94   Temp 98.6 °F (37 °C) (Oral)   Resp 28   Ht 6' (1.829 m)   Wt 275 lb 5 oz (124.9 kg)   SpO2 97%   BMI 37.34 kg/m²     General appearance: mild distress distress, appears stated age and cooperative. HEENT:  Conjunctivae/corneas clear. Neck: Supple. No jugular venous distention. Respiratory: Tachypnea, Decreased air movement. Cardiovascular: Regular rate rhythm, normal S1-S2  Abdomen: Soft, nontender, nondistended  Musculoskeletal: No clubbing, cyanosis, no bilateral lower extremity edema. Brisk capillary refill. Skin:  No rashes  on visible skin  Neurologic: awake, alert and following commands     ASSESSMENT:    Acute hypoxemic respiratory failure due to pneumonia   Sepsis due to Pneumonia  Acute diastolic congestive heart failure  Hist of DVTs  Elevated troponins  History of Seizures  HLD  HTN  Post COVID Pulmonary Fibrosis    PLAN:    Cont Vancomycin and Cefepime.-to be continued for 10 days ID and pulmonology on board  Cont Bronchodilators  IV solumedrol transitioned to PO prednisone. Appreciate pulmonology recommendations. Possible plans of [address] psychiatry consult. Buspar started and increased gabapentin  Ativan PRN   Cont Eliquis  Renal functions improved. Resume oral Lasix. Pt needs FiO2 below 70% to transfer to LTAC. Cont to wean as able.      DISPOSITION:     Medications:  REVIEWED DAILY    Infusion Medications    sodium chloride       Scheduled Medications    predniSONE  40 mg Oral Daily    sodium chloride (Inhalant)  4 mL Nebulization Q8H    busPIRone  10 mg Oral TID    gabapentin  300 mg Oral TID    [Held by provider] furosemide  40 mg IntraVENous Daily    vancomycin  1,250 mg IntraVENous Q18H    labetalol  5 mg IntraVENous Once    guaiFENesin  400 mg Oral TID    ipratropium-albuterol  1 ampule Inhalation Q4H While awake    cefepime  2,000 mg IntraVENous Q12H    sodium chloride flush  5-40 mL IntraVENous 2 times per day    amLODIPine  10 mg Oral Daily    atorvastatin  40 mg Oral Nightly    apixaban  5 mg Oral BID    phenytoin  300 mg Oral Nightly    phenytoin  200 mg Oral QAM     PRN Meds: LORazepam, melatonin, sodium chloride flush, sodium chloride, ondansetron **OR** ondansetron, polyethylene glycol, acetaminophen **OR** acetaminophen    Labs:     Recent Labs     12/16/21  0721 12/17/21  0509 12/18/21  0502   WBC 10.0 13.5* 11.0   HGB 9.7* 10.8* 9.5*   HCT 30.8* 35.4* 31.2*    165 170       Recent Labs     12/16/21  0721 12/17/21  0509 12/18/21  0502     142 141 143   K 3.9  3.8 4.8 4.1   CL 99  98 96* 98   CO2 36*  34* 33* 38*   BUN 27*  27* 33* 32*   CREATININE 0.9  0.9 1.3* 1.0   CALCIUM 9.2  9.1 8.9 9.3       Recent Labs     12/16/21  0721 12/17/21  0509 12/18/21  0502   PROT 6.2*  6.3* 6.6 6.4   ALKPHOS 65  66 64 56   ALT 32  32 27 20   AST 23  23 18 14   BILITOT 0.5  0.4 0.4 0.4       No results for input(s): INR in the last 72 hours. No results for input(s): Kaleen Hope in the last 72 hours. Chronic labs:    Lab Results   Component Value Date    CHOL 173 12/11/2021    TRIG 100 12/11/2021    HDL 65 12/11/2021    LDLCALC 88 12/11/2021    TSH 0.507 12/11/2021    PSA 0.84 12/12/2020    INR 1.1 12/10/2021    LABA1C 5.3 12/11/2021       Radiology: REVIEWED DAILY    +++++++++++++++++++++++++++++++++++++++++++++++++  Rodrick Palmer MD  [employer] - [address] [place], [place]  +++++++++++++++++++++++++++++++++++++++++++++++++  NOTE: This report was transcribed using voice recognition software. Every effort was made to ensure accuracy; however, inadvertent computerized transcription errors may be present.

## 2021-12-18 NOTE — PROGRESS NOTES
Angel Luis Trevino M.D.,[facility]  Roddy Taylor D.O., F.A.C.O.I., Vivian Hodge M.D. Marya Stokes M.D. Ashish Martinez D.O. Daily Pulmonary Progress Note    Patient:  Alberto Flores 60 y.o. male MRN: [id number]     Date of Service: 12/18/2021      Synopsis     We are following patient for acute hypoxic resp failure and history of COVID -19  pneumonia in August 2021    \"CC\" :SOB    Code status: Full       Subjective      Patient was seen and examined. 12/14/21 less dyspnea, improved oxygen levels. Switched to AIRVO 95% FIO2 60 L. Will try to wean FIO2 on NIV, cycle during day for breaks off mask. Very good diuresis noted - 12 L neg fluid balance. Less edema. CXR reviewed, no significant change. Removing oxygen with episodes of desaturation, sitter at bedside. WBCs elevated >19.    12/15/2021 4.8 L urine output yesterday. Aggressive diuresis. Improved oxygen levels. Weaning FiO2 on AVAPS 75% alternating with heated high flow nasal cannula 60 L FiO2 weaned to 80%. Sitting up at the side of the bed eating lunch. Sitter at bedside. Chest x-ray completed. Patient is very tearful and weepy. Previously ripping off oxygen with episode of desaturation into the 60s. States he has to use the bathroom and feels the urge to urinate even with Busch catheter in place. 12/16/21 lying in bed on AVAPS decreased FIO2 65%, switched to AirVo FIO2 weaned 70%, 55 L. SPO2 92%. Feeling ok with breathing. Mild anxiety. CXR reviewed with improvement. 12/17/2021-events of overnight reviewed. Patient refusing to go to [facility] transfer having difficulty with BiPAP for transport. RRT episode of desaturation. This morning patiently currently on air Vo 60 L 100% plus nonrebreather. Wean off nonrebreather saturation maintained 95% at rest.  Very weepy and tearful increased anxiety. Chest x-ray today. Elevated procal 1.42. respiratory culture ordered.     12/18/21  Doing better today  Breathing is not labored  Heated high flow taken to 70% and 60LPM    Review of Systems:  Constitutional: Denies fever, weight loss, night sweats, and fatigue  Skin: Denies pigmentation, dark lesions, and rashes   HEENT: Denies hearing loss, tinnitus, ear drainage, epistaxis, sore throat, and hoarseness. Cardiovascular: Denies palpitations, chest pain, and chest pressure.   Respiratory: As above  Gastrointestinal: Denies nausea, vomiting, poor appetite, diarrhea, heartburn or reflux  Genitourinary: Denies dysuria, frequency, urgency or hematuria  Musculoskeletal: Denies myalgias, muscle weakness, and bone pain, positive for LE swelling  Neurological: Denies dizziness, vertigo, headache, and focal weakness  Psychological: Denies anxiety and depression  Endocrine: Denies heat intolerance and cold intolerance  Hematopoietic/Lymphatic: Denies bleeding problems and blood transfusions    24-hour events:  None    Objective   Vitals: /74   Pulse 94   Temp 98.6 °F (37 °C) (Oral)   Resp 28   Ht 6' (1.829 m)   Wt 275 lb 5 oz (124.9 kg)   SpO2 97%   BMI 37.34 kg/m²     I/O:    Intake/Output Summary (Last 24 hours) at 12/18/2021 1157  Last data filed at 12/18/2021 0852  Gross per 24 hour   Intake 170 ml   Output 2450 ml   Net -2280 ml       Vent Information  Skin Assessment: Clean, dry, & intact  Equipment ID: [id number]  Equipment Changed: (S) Humidification  Vt Ordered: 500 mL  FiO2 : 80 %  SpO2: 97 %  SpO2/FiO2 ratio: 121.25  I Time/ I Time %: 0.9 s  Humidification Source: Heated wire  Humidification Temp: 32  Humidification Temp Measured: 32  Mask Type: Full face mask  Mask Size: Large       IPAP: 18 cmH20  CPAP/EPAP: 10 cmH2O     CURRENT MEDS :  Scheduled Meds:   furosemide  40 mg Oral Daily    predniSONE  40 mg Oral Daily    sodium chloride (Inhalant)  4 mL Nebulization Q8H    busPIRone  10 mg Oral TID    gabapentin  300 mg Oral TID    vancomycin  1,250 mg IntraVENous Q18H    labetalol  5 mg IntraVENous Once    guaiFENesin 400 mg Oral TID    ipratropium-albuterol  1 ampule Inhalation Q4H While awake    cefepime  2,000 mg IntraVENous Q12H    sodium chloride flush  5-40 mL IntraVENous 2 times per day    amLODIPine  10 mg Oral Daily    atorvastatin  40 mg Oral Nightly    apixaban  5 mg Oral BID    phenytoin  300 mg Oral Nightly    phenytoin  200 mg Oral QAM       Physical Exam:  General Appearance: appears comfortable in no acute distress. HEENT: Normocephalic atraumatic without obvious abnormality   Neck: Lips, mucosa, and tongue normal.  Supple, symmetrical, trachea midline, no adenopathy;thyroid:  no enlargement/tenderness/nodules or JVD. Lung: Breath sounds CTA. Respirations   unlabored. Symmetrical expansion. Heart: RRR, normal S1, S2. No MRG  Abdomen: Soft, NT, ND. BS present x 4 quadrants. No bruit or organomegaly. Extremities: Pedal pulses 2+ symmetric b/l. Extremities normal, no cyanosis, clubbing, improving edema. Neurologic: Mental status: Alert and Oriented X3 . Pertinent/ New Labs and Imaging Studies     Imaging Personally Reviewed:  12/17/21       HISTORY:   ORDERING SYSTEM PROVIDED HISTORY: see medical records   TECHNOLOGIST PROVIDED HISTORY:   Reason for exam:->see medical records   What reading provider will be dictating this exam?->CRC       FINDINGS:   The heart is enlarged.  Multifocal bilateral pulmonary infiltrates are noted. There is no pneumothorax or pneumomediastinum.           Impression   1. There is no interval change in the significant multifocal bilateral   pneumonia.              12/10/2021 CT chest      Impression   No evidence of pulmonary embolism       Consolidation and ground-glass opacities of the lungs is suspicious for   infectious process. Small bilateral pleural effusions. ECHO     Summary   Left ventricle grossly normal in size. Mild - moderate left ventricular concentric hypertrophy noted. Normal LV segmental wall motion.    Estimated left nicely  5. incentive  6. Prednisone taper  7.  Anxiolysis    If stable through the weekend and FiO2 continues to improve then transfer to [facility]    Electronically signed by Cam Bryan MD on 12/18/2021 at 11:59 AM

## 2021-12-19 ENCOUNTER — APPOINTMENT (OUTPATIENT)
Dept: GENERAL RADIOLOGY | Age: 56
DRG: 720 | End: 2021-12-19
Payer: COMMERCIAL

## 2021-12-19 LAB
ALBUMIN SERPL-MCNC: 3.7 G/DL (ref 3.5–5.2)
ALP BLD-CCNC: 58 U/L (ref 40–129)
ALT SERPL-CCNC: 21 U/L (ref 0–40)
ANION GAP SERPL CALCULATED.3IONS-SCNC: 8 MMOL/L (ref 7–16)
AST SERPL-CCNC: 16 U/L (ref 0–39)
BILIRUB SERPL-MCNC: 0.4 MG/DL (ref 0–1.2)
BUN BLDV-MCNC: 33 MG/DL (ref 6–20)
CALCIUM SERPL-MCNC: 9.4 MG/DL (ref 8.6–10.2)
CHLORIDE BLD-SCNC: 98 MMOL/L (ref 98–107)
CO2: 34 MMOL/L (ref 22–29)
CREAT SERPL-MCNC: 1 MG/DL (ref 0.7–1.2)
GFR AFRICAN AMERICAN: >60
GFR NON-AFRICAN AMERICAN: >60 ML/MIN/1.73
GLUCOSE BLD-MCNC: 143 MG/DL (ref 74–99)
HCT VFR BLD CALC: 30.6 % (ref 37–54)
HEMOGLOBIN: 9.6 G/DL (ref 12.5–16.5)
MCH RBC QN AUTO: 29.3 PG (ref 26–35)
MCHC RBC AUTO-ENTMCNC: 31.4 % (ref 32–34.5)
MCV RBC AUTO: 93.3 FL (ref 80–99.9)
PDW BLD-RTO: 13.9 FL (ref 11.5–15)
PLATELET # BLD: 197 E9/L (ref 130–450)
PMV BLD AUTO: 10 FL (ref 7–12)
POTASSIUM SERPL-SCNC: 3.9 MMOL/L (ref 3.5–5)
RBC # BLD: 3.28 E12/L (ref 3.8–5.8)
SODIUM BLD-SCNC: 140 MMOL/L (ref 132–146)
TOTAL PROTEIN: 6.2 G/DL (ref 6.4–8.3)
WBC # BLD: 13 E9/L (ref 4.5–11.5)

## 2021-12-19 PROCEDURE — 6370000000 HC RX 637 (ALT 250 FOR IP): Performed by: INTERNAL MEDICINE

## 2021-12-19 PROCEDURE — 6370000000 HC RX 637 (ALT 250 FOR IP): Performed by: NURSE PRACTITIONER

## 2021-12-19 PROCEDURE — 2700000000 HC OXYGEN THERAPY PER DAY

## 2021-12-19 PROCEDURE — 2580000003 HC RX 258: Performed by: INTERNAL MEDICINE

## 2021-12-19 PROCEDURE — 6360000002 HC RX W HCPCS: Performed by: INTERNAL MEDICINE

## 2021-12-19 PROCEDURE — 71045 X-RAY EXAM CHEST 1 VIEW: CPT

## 2021-12-19 PROCEDURE — 94660 CPAP INITIATION&MGMT: CPT

## 2021-12-19 PROCEDURE — 94640 AIRWAY INHALATION TREATMENT: CPT

## 2021-12-19 PROCEDURE — 36415 COLL VENOUS BLD VENIPUNCTURE: CPT

## 2021-12-19 PROCEDURE — 80053 COMPREHEN METABOLIC PANEL: CPT

## 2021-12-19 PROCEDURE — 85027 COMPLETE CBC AUTOMATED: CPT

## 2021-12-19 PROCEDURE — 2060000000 HC ICU INTERMEDIATE R&B

## 2021-12-19 RX ADMIN — VANCOMYCIN HYDROCHLORIDE 1250 MG: 10 INJECTION, POWDER, LYOPHILIZED, FOR SOLUTION INTRAVENOUS at 17:13

## 2021-12-19 RX ADMIN — SODIUM CHLORIDE SOLN NEBU 3% 4 ML: 3 NEBU SOLN at 19:48

## 2021-12-19 RX ADMIN — PHENYTOIN SODIUM 300 MG: 100 CAPSULE, EXTENDED RELEASE ORAL at 20:38

## 2021-12-19 RX ADMIN — GABAPENTIN 300 MG: 300 CAPSULE ORAL at 14:25

## 2021-12-19 RX ADMIN — GUAIFENESIN 400 MG: 400 TABLET ORAL at 08:52

## 2021-12-19 RX ADMIN — CEFEPIME 2000 MG: 2 INJECTION, POWDER, FOR SOLUTION INTRAVENOUS at 23:03

## 2021-12-19 RX ADMIN — IPRATROPIUM BROMIDE AND ALBUTEROL SULFATE 1 AMPULE: .5; 2.5 SOLUTION RESPIRATORY (INHALATION) at 16:11

## 2021-12-19 RX ADMIN — Medication 10 ML: at 11:17

## 2021-12-19 RX ADMIN — BUSPIRONE HYDROCHLORIDE 10 MG: 5 TABLET ORAL at 14:25

## 2021-12-19 RX ADMIN — AMLODIPINE BESYLATE 10 MG: 10 TABLET ORAL at 08:52

## 2021-12-19 RX ADMIN — ATORVASTATIN CALCIUM 40 MG: 40 TABLET, FILM COATED ORAL at 20:38

## 2021-12-19 RX ADMIN — APIXABAN 5 MG: 5 TABLET, FILM COATED ORAL at 08:52

## 2021-12-19 RX ADMIN — CEFEPIME 2000 MG: 2 INJECTION, POWDER, FOR SOLUTION INTRAVENOUS at 11:16

## 2021-12-19 RX ADMIN — Medication 10 ML: at 21:05

## 2021-12-19 RX ADMIN — PHENYTOIN SODIUM 200 MG: 100 CAPSULE, EXTENDED RELEASE ORAL at 08:53

## 2021-12-19 RX ADMIN — FUROSEMIDE 40 MG: 20 TABLET ORAL at 08:53

## 2021-12-19 RX ADMIN — BUSPIRONE HYDROCHLORIDE 10 MG: 5 TABLET ORAL at 20:38

## 2021-12-19 RX ADMIN — PREDNISONE 40 MG: 20 TABLET ORAL at 11:16

## 2021-12-19 RX ADMIN — GUAIFENESIN 400 MG: 400 TABLET ORAL at 14:25

## 2021-12-19 RX ADMIN — GUAIFENESIN 400 MG: 400 TABLET ORAL at 20:38

## 2021-12-19 RX ADMIN — SODIUM CHLORIDE SOLN NEBU 3% 4 ML: 3 NEBU SOLN at 07:48

## 2021-12-19 RX ADMIN — APIXABAN 5 MG: 5 TABLET, FILM COATED ORAL at 20:38

## 2021-12-19 RX ADMIN — GABAPENTIN 300 MG: 300 CAPSULE ORAL at 20:38

## 2021-12-19 RX ADMIN — IPRATROPIUM BROMIDE AND ALBUTEROL SULFATE 1 AMPULE: .5; 2.5 SOLUTION RESPIRATORY (INHALATION) at 07:48

## 2021-12-19 RX ADMIN — IPRATROPIUM BROMIDE AND ALBUTEROL SULFATE 1 AMPULE: .5; 2.5 SOLUTION RESPIRATORY (INHALATION) at 19:48

## 2021-12-19 RX ADMIN — GABAPENTIN 300 MG: 300 CAPSULE ORAL at 08:52

## 2021-12-19 RX ADMIN — BUSPIRONE HYDROCHLORIDE 10 MG: 5 TABLET ORAL at 08:52

## 2021-12-19 ASSESSMENT — PAIN SCALES - GENERAL: PAINLEVEL_OUTOF10: 0

## 2021-12-19 NOTE — PROGRESS NOTES
Pharmacy Consultation Note  (Antibiotic Dosing and Monitoring)    Initial consult date: 12/10/21  Consulting physician/provider: Dr. Vishal Mijares  Drug: Vancomycin  Indication: Pneumonia    Age/  Gender Height Weight IBW  Allergy Information   56 y.o./male 6' (182.9 cm) 278 lb (126.1 kg)     Ideal body weight: 77.6 kg (171 lb 1.2 oz)  Adjusted ideal body weight: 93.1 kg (205 lb 4.7 oz)   Patient has no known allergies. Renal Function:  Recent Labs     12/17/21  0509 12/18/21  0502 12/19/21  0504   BUN 33* 32* 33*   CREATININE 1.3* 1.0 1.0       Intake/Output Summary (Last 24 hours) at 12/19/2021 0715  Last data filed at 12/18/2021 2043  Gross per 24 hour   Intake 440 ml   Output 1525 ml   Net -1085 ml       Vancomycin Monitoring:  Trough:    No results for input(s): VANCOTROUGH in the last 72 hours. Random:    Recent Labs     12/17/21  0509   VANCORANDOM 17.3       Vancomycin Administration Times:  Recent vancomycin administrations                   vancomycin (VANCOCIN) 1,250 mg in dextrose 5 % 250 mL IVPB (mg) 1,250 mg New Bag 12/18/21 2242     1,250 mg New Bag  0541     1,250 mg New Bag 12/17/21 1055     1,250 mg New Bag 12/16/21 1417                      Assessment:  · Patient is a 60 y.o. male who has been initiated on vancomycin  Estimated Creatinine Clearance: 106 mL/min (based on SCr of 1 mg/dL). · To dose vancomycin, pharmacy will be utilizing [employer] calculation software for goal AUC/VIOLET 400-600 mg/L-hr   · 12/12: Creatinine stable at 0.9. Level ~10.5 hours post dose was 21.3 this morning. Inputted into [employer], current regimen with predicted AUC/VIOLET of 620 and steady state trough of 20  · 12/13: Scr 1.1 today up from 0.9. WBC 10.6. · 12/14: Scr 1.1, elevated again today from baseline will check vancomycin level. · 12/15: Scr 1, vancomycin level @17:26 = 24.9 mcg/mL, true trough drawn 11.5 hours post dose. Regimen adjusted overnight to vancomycin 1250 mg IV q18h due to elevated trough.

## 2021-12-19 NOTE — PROGRESS NOTES
General appearance: mild distress distress, appears stated age and cooperative. HEENT:  Conjunctivae/corneas clear. Neck: Supple. No jugular venous distention. Respiratory: Tachypnea, Decreased air movement. Cardiovascular: Regular rate rhythm, normal S1-S2  Abdomen: Soft, nontender, nondistended  Musculoskeletal: No clubbing, cyanosis, no bilateral lower extremity edema. Brisk capillary refill. Skin:  No rashes  on visible skin  Neurologic: awake, alert and following commands     ASSESSMENT:    Acute hypoxemic respiratory failure due to pneumonia   Sepsis due to Pneumonia  Acute diastolic congestive heart failure  Hist of DVTs  Elevated troponins  History of Seizures  HLD  HTN  Post COVID Pulmonary Fibrosis  COVID Pneumonia in August 2021    PLAN:    Cont Vancomycin and Cefepime.-to be continued for 10 days ID on board  Cont Bronchodilators  IV solumedrol transitioned to PO prednisone. Appreciate pulmonology recommendations. Possible plans of [address] [facility] psychiatry consult. Buspar started and increased gabapentin  Ativan PRN   Cont Eliquis  Renal functions improved. Cont. oral Lasix. Pt needs FiO2 below 70% to transfer to LTAC. Cont to wean as able. FiO2 noted to be 60% with 40L/m flow rate HHFNC. Wean as able.      DISPOSITION:     Medications:  REVIEWED DAILY    Infusion Medications    sodium chloride       Scheduled Medications    furosemide  40 mg Oral Daily    predniSONE  40 mg Oral Daily    sodium chloride (Inhalant)  4 mL Nebulization Q8H    busPIRone  10 mg Oral TID    gabapentin  300 mg Oral TID    vancomycin  1,250 mg IntraVENous Q18H    labetalol  5 mg IntraVENous Once    guaiFENesin  400 mg Oral TID    ipratropium-albuterol  1 ampule Inhalation Q4H While awake    cefepime  2,000 mg IntraVENous Q12H    sodium chloride flush  5-40 mL IntraVENous 2 times per day    amLODIPine  10 mg Oral Daily    atorvastatin  40 mg Oral Nightly    apixaban  5 mg Oral BID    phenytoin 300 mg Oral Nightly    phenytoin  200 mg Oral QAM     PRN Meds: LORazepam, melatonin, sodium chloride flush, sodium chloride, ondansetron **OR** ondansetron, polyethylene glycol, acetaminophen **OR** acetaminophen    Labs:     Recent Labs     12/17/21  0509 12/18/21  0502 12/19/21  0504   WBC 13.5* 11.0 13.0*   HGB 10.8* 9.5* 9.6*   HCT 35.4* 31.2* 30.6*    170 197       Recent Labs     12/17/21  0509 12/18/21  0502 12/19/21  0504    143 140   K 4.8 4.1 3.9   CL 96* 98 98   CO2 33* 38* 34*   BUN 33* 32* 33*   CREATININE 1.3* 1.0 1.0   CALCIUM 8.9 9.3 9.4       Recent Labs     12/17/21  0509 12/18/21  0502 12/19/21  0504   PROT 6.6 6.4 6.2*   ALKPHOS 64 56 58   ALT 27 20 21   AST 18 14 16   BILITOT 0.4 0.4 0.4       No results for input(s): INR in the last 72 hours. No results for input(s): [place] in the last 72 hours. Chronic labs:    Lab Results   Component Value Date    CHOL 173 12/11/2021    TRIG 100 12/11/2021    HDL 65 12/11/2021    LDLCALC 88 12/11/2021    TSH 0.507 12/11/2021    PSA 0.84 12/12/2020    INR 1.1 12/10/2021    LABA1C 5.3 12/11/2021       Radiology: REVIEWED DAILY    +++++++++++++++++++++++++++++++++++++++++++++++++  Farnaz Walter MD  [employer] - [address] [facility], [place]  +++++++++++++++++++++++++++++++++++++++++++++++++  NOTE: This report was transcribed using voice recognition software. Every effort was made to ensure accuracy; however, inadvertent computerized transcription errors may be present.

## 2021-12-19 NOTE — PROGRESS NOTES
Bryan Rodriguez M.D.,[facility]  Jayashree Greene D.O., F.A.C.O.I., Paul Gibbs M.D. Pravin Johnston M.D. Ez Aguilar D.O. Daily Pulmonary Progress Note    Patient:  Saúl Brush 60 y.o. male MRN: [id number]     Date of Service: 12/19/2021      Synopsis     We are following patient for acute hypoxic resp failure and history of COVID -19  pneumonia in August 2021    \"CC\" :SOB    Code status: Full       Subjective      Patient was seen and examined. 12/14/21 less dyspnea, improved oxygen levels. Switched to AIRVO 95% FIO2 60 L. Will try to wean FIO2 on NIV, cycle during day for breaks off mask. Very good diuresis noted - 12 L neg fluid balance. Less edema. CXR reviewed, no significant change. Removing oxygen with episodes of desaturation, sitter at bedside. WBCs elevated >19.    12/15/2021 4.8 L urine output yesterday. Aggressive diuresis. Improved oxygen levels. Weaning FiO2 on AVAPS 75% alternating with heated high flow nasal cannula 60 L FiO2 weaned to 80%. Sitting up at the side of the bed eating lunch. Sitter at bedside. Chest x-ray completed. Patient is very tearful and weepy. Previously ripping off oxygen with episode of desaturation into the 60s. States he has to use the bathroom and feels the urge to urinate even with Busch catheter in place. 12/16/21 lying in bed on AVAPS decreased FIO2 65%, switched to AirVo FIO2 weaned 70%, 55 L. SPO2 92%. Feeling ok with breathing. Mild anxiety. CXR reviewed with improvement. 12/17/2021-events of overnight reviewed. Patient refusing to go to [place] [id number] transfer having difficulty with BiPAP for transport. RRT episode of desaturation. This morning patiently currently on air Vo 60 L 100% plus nonrebreather. Wean off nonrebreather saturation maintained 95% at rest.  Very weepy and tearful increased anxiety. Chest x-ray today. Elevated procal 1.42. respiratory culture ordered.     12/18/21  Doing better today  Breathing is not labored  Heated high flow taken to 70% and 60LPM    12/9/21  Continues to improve  Resting comfortably today  Remains on heated high flow 60% and 40LPM    Review of Systems:  Constitutional: Denies fever, weight loss, night sweats, and fatigue  Skin: Denies pigmentation, dark lesions, and rashes   HEENT: Denies hearing loss, tinnitus, ear drainage, epistaxis, sore throat, and hoarseness. Cardiovascular: Denies palpitations, chest pain, and chest pressure.   Respiratory: As above  Gastrointestinal: Denies nausea, vomiting, poor appetite, diarrhea, heartburn or reflux  Genitourinary: Denies dysuria, frequency, urgency or hematuria  Musculoskeletal: Denies myalgias, muscle weakness, and bone pain, positive for LE swelling  Neurological: Denies dizziness, vertigo, headache, and focal weakness  Psychological: Denies anxiety and depression  Endocrine: Denies heat intolerance and cold intolerance  Hematopoietic/Lymphatic: Denies bleeding problems and blood transfusions    24-hour events:  None    Objective   Vitals: /88   Pulse 84   Temp 98.4 °F (36.9 °C) (Oral)   Resp 22   Ht 6' (1.829 m)   Wt 256 lb 9.9 oz (116.4 kg)   SpO2 97%   BMI 34.80 kg/m²     I/O:    Intake/Output Summary (Last 24 hours) at 12/19/2021 1204  Last data filed at 12/18/2021 2043  Gross per 24 hour   Intake 320 ml   Output 1525 ml   Net -1205 ml       Vent Information  Skin Assessment: Clean, dry, & intact  Equipment ID: [id number]  Equipment Changed: (S) Humidification  Vt Ordered: 500 mL  FiO2 : 60 %  SpO2: 97 %  SpO2/FiO2 ratio: 155  I Time/ I Time %: 0.9 s  Humidification Source: Heated wire  Humidification Temp: 32  Humidification Temp Measured: 32  Mask Type: Full face mask  Mask Size: Large       IPAP: 18 cmH20  CPAP/EPAP: 10 cmH2O     CURRENT MEDS :  Scheduled Meds:   furosemide  40 mg Oral Daily    predniSONE  40 mg Oral Daily    sodium chloride (Inhalant)  4 mL Nebulization Q8H    busPIRone  10 mg Oral TID    gabapentin  300 mg Oral TID    vancomycin  1,250 mg IntraVENous Q18H    labetalol  5 mg IntraVENous Once    guaiFENesin  400 mg Oral TID    ipratropium-albuterol  1 ampule Inhalation Q4H While awake    cefepime  2,000 mg IntraVENous Q12H    sodium chloride flush  5-40 mL IntraVENous 2 times per day    amLODIPine  10 mg Oral Daily    atorvastatin  40 mg Oral Nightly    apixaban  5 mg Oral BID    phenytoin  300 mg Oral Nightly    phenytoin  200 mg Oral QAM       Physical Exam:  General Appearance: appears comfortable in no acute distress. HEENT: Normocephalic atraumatic without obvious abnormality   Neck: Lips, mucosa, and tongue normal.  Supple, symmetrical, trachea midline, no adenopathy;thyroid:  no enlargement/tenderness/nodules or JVD. Lung: Breath sounds CTA. Respirations   unlabored. Symmetrical expansion. Heart: RRR, normal S1, S2. No MRG  Abdomen: Soft, NT, ND. BS present x 4 quadrants. No bruit or organomegaly. Extremities: Pedal pulses 2+ symmetric b/l. Extremities normal, no cyanosis, clubbing, improving edema. Neurologic: Mental status: Alert and Oriented X3 . Pertinent/ New Labs and Imaging Studies     Imaging Personally Reviewed:  12/17/21       HISTORY:   ORDERING SYSTEM PROVIDED HISTORY: see medical records   TECHNOLOGIST PROVIDED HISTORY:   Reason for exam:->see medical records   What reading provider will be dictating this exam?->CRC       FINDINGS:   The heart is enlarged.  Multifocal bilateral pulmonary infiltrates are noted. There is no pneumothorax or pneumomediastinum.           Impression   1. There is no interval change in the significant multifocal bilateral   pneumonia.              12/10/2021 CT chest      Impression   No evidence of pulmonary embolism       Consolidation and ground-glass opacities of the lungs is suspicious for   infectious process. Small bilateral pleural effusions. ECHO     Summary   Left ventricle grossly normal in size.    Mild - moderate left ventricular concentric hypertrophy noted. Normal LV segmental wall motion. Estimated left ventricular ejection fraction is 59±5%. Does not meet 50% threshold for diastolic dysfunction. The LAESV Index is <34ml/m2. Mildly dilated right ventricle. Right ventricular segmental wall motion is normal.   Physiologic and/or trace mitral regurgitation is present. Trace aortic regurgitation is noted. Physiologic and/or trace tricuspid regurgitation. Physiologic and/or trace pulmonic regurgitation present. Technically good quality study. No previous echo for comparison. Suggest clinical correlation. Signature      ----------------------------------------------------------------   Electronically signed by Deonte Ramirez DO(Interpreting   physician) on 10/03/2021 01:49 PM   ----------------------------------------------------------------    Labs:  Lab Results   Component Value Date    WBC 13.0 12/19/2021    HGB 9.6 12/19/2021    HCT 30.6 12/19/2021    MCV 93.3 12/19/2021    MCH 29.3 12/19/2021    MCHC 31.4 12/19/2021    RDW 13.9 12/19/2021     12/19/2021    MPV 10.0 12/19/2021     Lab Results   Component Value Date     12/19/2021    K 3.9 12/19/2021    K 3.7 08/21/2021    CL 98 12/19/2021    CO2 34 12/19/2021    BUN 33 12/19/2021    CREATININE 1.0 12/19/2021    LABALBU 3.7 12/19/2021    CALCIUM 9.4 12/19/2021    GFRAA >60 12/19/2021    LABGLOM >60 12/19/2021     Lab Results   Component Value Date    PROTIME 12.2 12/10/2021    INR 1.1 12/10/2021     No results for input(s): PROBNP in the last 72 hours. Recent Labs     12/18/21  0502   PROCAL 1.20*        Assessment:    1. Acute hypoxic respiratory failure  2. Bilateral patchy infiltrates differential diagnosis includes healthcare associated pneumonia versus possibly volume overload/Acute diastlic HF  3. History of severe Covid pneumonia in August 2021  4. H/O anxiety      Plan:   1. Heated high flow taken to 60% and 40LPM  2.  NIV nocturnally   3. Antibiotics per ID  4. Oral lasix - he has diuresed nicely  5. incentive  6. Prednisone taper  7.  Anxiolysis    [facility] to [facility] tomorrow    Electronically signed by Marc Claire MD on 12/19/2021 at 12:04 PM

## 2021-12-20 ENCOUNTER — APPOINTMENT (OUTPATIENT)
Dept: GENERAL RADIOLOGY | Age: 56
DRG: 720 | End: 2021-12-20
Payer: COMMERCIAL

## 2021-12-20 VITALS
WEIGHT: 255.73 LBS | TEMPERATURE: 97.7 F | OXYGEN SATURATION: 97 % | HEIGHT: 72 IN | RESPIRATION RATE: 18 BRPM | BODY MASS INDEX: 34.64 KG/M2 | SYSTOLIC BLOOD PRESSURE: 130 MMHG | HEART RATE: 84 BPM | DIASTOLIC BLOOD PRESSURE: 76 MMHG

## 2021-12-20 LAB
ALBUMIN SERPL-MCNC: 3.4 G/DL (ref 3.5–5.2)
ALP BLD-CCNC: 58 U/L (ref 40–129)
ALT SERPL-CCNC: 26 U/L (ref 0–40)
ANION GAP SERPL CALCULATED.3IONS-SCNC: 9 MMOL/L (ref 7–16)
AST SERPL-CCNC: 22 U/L (ref 0–39)
BILIRUB SERPL-MCNC: 0.3 MG/DL (ref 0–1.2)
BUN BLDV-MCNC: 31 MG/DL (ref 6–20)
CALCIUM SERPL-MCNC: 9 MG/DL (ref 8.6–10.2)
CHLORIDE BLD-SCNC: 98 MMOL/L (ref 98–107)
CO2: 32 MMOL/L (ref 22–29)
CREAT SERPL-MCNC: 0.9 MG/DL (ref 0.7–1.2)
GFR AFRICAN AMERICAN: >60
GFR NON-AFRICAN AMERICAN: >60 ML/MIN/1.73
GLUCOSE BLD-MCNC: 134 MG/DL (ref 74–99)
HCT VFR BLD CALC: 31.4 % (ref 37–54)
HEMOGLOBIN: 9.6 G/DL (ref 12.5–16.5)
MCH RBC QN AUTO: 28.6 PG (ref 26–35)
MCHC RBC AUTO-ENTMCNC: 30.6 % (ref 32–34.5)
MCV RBC AUTO: 93.5 FL (ref 80–99.9)
PDW BLD-RTO: 14 FL (ref 11.5–15)
PLATELET # BLD: 200 E9/L (ref 130–450)
PMV BLD AUTO: 10.1 FL (ref 7–12)
POTASSIUM SERPL-SCNC: 4.2 MMOL/L (ref 3.5–5)
RBC # BLD: 3.36 E12/L (ref 3.8–5.8)
REASON FOR REJECTION: NORMAL
REJECTED TEST: NORMAL
SARS-COV-2, NAAT: NOT DETECTED
SODIUM BLD-SCNC: 139 MMOL/L (ref 132–146)
TOTAL PROTEIN: 6.5 G/DL (ref 6.4–8.3)
WBC # BLD: 10.7 E9/L (ref 4.5–11.5)

## 2021-12-20 PROCEDURE — 94660 CPAP INITIATION&MGMT: CPT

## 2021-12-20 PROCEDURE — 6370000000 HC RX 637 (ALT 250 FOR IP): Performed by: NURSE PRACTITIONER

## 2021-12-20 PROCEDURE — 2700000000 HC OXYGEN THERAPY PER DAY

## 2021-12-20 PROCEDURE — 2580000003 HC RX 258: Performed by: INTERNAL MEDICINE

## 2021-12-20 PROCEDURE — 6370000000 HC RX 637 (ALT 250 FOR IP): Performed by: INTERNAL MEDICINE

## 2021-12-20 PROCEDURE — 87635 SARS-COV-2 COVID-19 AMP PRB: CPT

## 2021-12-20 PROCEDURE — 6360000002 HC RX W HCPCS

## 2021-12-20 PROCEDURE — 71045 X-RAY EXAM CHEST 1 VIEW: CPT

## 2021-12-20 PROCEDURE — 36415 COLL VENOUS BLD VENIPUNCTURE: CPT

## 2021-12-20 PROCEDURE — 80053 COMPREHEN METABOLIC PANEL: CPT

## 2021-12-20 PROCEDURE — 94640 AIRWAY INHALATION TREATMENT: CPT

## 2021-12-20 PROCEDURE — 85027 COMPLETE CBC AUTOMATED: CPT

## 2021-12-20 PROCEDURE — 6360000002 HC RX W HCPCS: Performed by: INTERNAL MEDICINE

## 2021-12-20 RX ORDER — PREDNISONE 20 MG/1
TABLET ORAL
Qty: 14 TABLET | Refills: 0 | Status: SHIPPED | OUTPATIENT
Start: 2021-12-21 | End: 2022-01-03

## 2021-12-20 RX ORDER — FUROSEMIDE 40 MG/1
40 TABLET ORAL DAILY
Qty: 3 TABLET | Refills: 0 | Status: SHIPPED | OUTPATIENT
Start: 2021-12-21 | End: 2022-01-12 | Stop reason: SDUPTHER

## 2021-12-20 RX ADMIN — APIXABAN 5 MG: 5 TABLET, FILM COATED ORAL at 09:00

## 2021-12-20 RX ADMIN — LORAZEPAM 0.5 MG: 2 INJECTION INTRAMUSCULAR; INTRAVENOUS at 12:57

## 2021-12-20 RX ADMIN — SODIUM CHLORIDE SOLN NEBU 3% 4 ML: 3 NEBU SOLN at 09:12

## 2021-12-20 RX ADMIN — AMLODIPINE BESYLATE 10 MG: 10 TABLET ORAL at 09:00

## 2021-12-20 RX ADMIN — IPRATROPIUM BROMIDE AND ALBUTEROL SULFATE 1 AMPULE: .5; 2.5 SOLUTION RESPIRATORY (INHALATION) at 13:29

## 2021-12-20 RX ADMIN — FUROSEMIDE 40 MG: 20 TABLET ORAL at 09:00

## 2021-12-20 RX ADMIN — GABAPENTIN 300 MG: 300 CAPSULE ORAL at 09:00

## 2021-12-20 RX ADMIN — IPRATROPIUM BROMIDE AND ALBUTEROL SULFATE 1 AMPULE: .5; 2.5 SOLUTION RESPIRATORY (INHALATION) at 09:12

## 2021-12-20 RX ADMIN — PHENYTOIN SODIUM 200 MG: 100 CAPSULE, EXTENDED RELEASE ORAL at 09:00

## 2021-12-20 RX ADMIN — GUAIFENESIN 400 MG: 400 TABLET ORAL at 09:00

## 2021-12-20 RX ADMIN — PREDNISONE 40 MG: 20 TABLET ORAL at 09:01

## 2021-12-20 RX ADMIN — CEFEPIME 2000 MG: 2 INJECTION, POWDER, FOR SOLUTION INTRAVENOUS at 12:21

## 2021-12-20 RX ADMIN — VANCOMYCIN HYDROCHLORIDE 1250 MG: 10 INJECTION, POWDER, LYOPHILIZED, FOR SOLUTION INTRAVENOUS at 10:35

## 2021-12-20 RX ADMIN — BUSPIRONE HYDROCHLORIDE 10 MG: 5 TABLET ORAL at 09:00

## 2021-12-20 RX ADMIN — Medication 10 ML: at 09:01

## 2021-12-20 ASSESSMENT — PAIN SCALES - GENERAL: PAINLEVEL_OUTOF10: 0

## 2021-12-20 NOTE — PROGRESS NOTES
Nurse to nurse given to [facility], [employer]. All discharge instructions faxed to [phone]. Patient notified of transfer for [phone].

## 2021-12-20 NOTE — CARE COORDINATION
Care Coordination  This am the patient is on 50 % fio2 and the patient can transfer to [facility] at the [facility] today if medically stable. Room is room 728 at 1 pm. Nurse to nurse is [phone]. Messaging the PCP for an order. I will set up once discharge order in. Ambulance form on soft chart. The patient is set up to be picked up at 130 pm [facility] to [facility] to [facility] at the [address] [place] [address]. I notified the patients wife Brian Crane @ [phone] and she is in agreement.

## 2021-12-20 NOTE — PROGRESS NOTES
Ron Patel M.D.,[facility]  Reeys White D.O., F.A.ANGEL.OWilfredI., Kelly Aldana M.D. Reji Rodriguez M.D. Willy Brown D.O. Daily Pulmonary Progress Note    Patient:  Dez Choi 60 y.o. male MRN: [id number]     Date of Service: 12/20/2021      Synopsis     We are following patient for acute hypoxic resp failure and history of COVID -19  pneumonia in August 2021    \"CC\" :SOB    Code status: Full       Subjective      Patient was seen and examined. 12/14/21 less dyspnea, improved oxygen levels. Switched to AIRVO 95% FIO2 60 L. Will try to wean FIO2 on NIV, cycle during day for breaks off mask. Very good diuresis noted - 12 L neg fluid balance. Less edema. CXR reviewed, no significant change. Removing oxygen with episodes of desaturation, sitter at bedside. WBCs elevated >19.    12/15/2021 4.8 L urine output yesterday. Aggressive diuresis. Improved oxygen levels. Weaning FiO2 on AVAPS 75% alternating with heated high flow nasal cannula 60 L FiO2 weaned to 80%. Sitting up at the side of the bed eating lunch. Sitter at bedside. Chest x-ray completed. Patient is very tearful and weepy. Previously ripping off oxygen with episode of desaturation into the 60s. States he has to use the bathroom and feels the urge to urinate even with Busch catheter in place. 12/16/21 lying in bed on AVAPS decreased FIO2 65%, switched to AirVo FIO2 weaned 70%, 55 L. SPO2 92%. Feeling ok with breathing. Mild anxiety. CXR reviewed with improvement. 12/17/2021-events of overnight reviewed. Patient refusing to go to [facility] transfer having difficulty with BiPAP for transport. RRT episode of desaturation. This morning patiently currently on air Vo 60 L 100% plus nonrebreather. Wean off nonrebreather saturation maintained 95% at rest.  Very weepy and tearful increased anxiety. Chest x-ray today. Elevated procal 1.42. respiratory culture ordered.     12/18/21  Doing better today  Breathing is not labored  Heated high flow taken to 70% and 60LPM    12/19/21  Continues to improve  Resting comfortably today  Remains on heated high flow 60% and 40LPM    12/20/21 feeling much improved. Oxygen 40 L 50%. Will try to convert to 15 L plus NRB discussed with respiratory. For transfer to [facility] today. CxR improving. Less dyspnea. Review of Systems:  Constitutional: Denies fever, weight loss, night sweats, and fatigue  Skin: Denies pigmentation, dark lesions, and rashes   HEENT: Denies hearing loss, tinnitus, ear drainage, epistaxis, sore throat, and hoarseness. Cardiovascular: Denies palpitations, chest pain, and chest pressure.   Respiratory: As above  Gastrointestinal: Denies nausea, vomiting, poor appetite, diarrhea, heartburn or reflux  Genitourinary: Denies dysuria, frequency, urgency or hematuria  Musculoskeletal: Denies myalgias, muscle weakness, and bone pain, positive for LE swelling  Neurological: Denies dizziness, vertigo, headache, and focal weakness  Psychological: Denies anxiety and depression  Endocrine: Denies heat intolerance and cold intolerance  Hematopoietic/Lymphatic: Denies bleeding problems and blood transfusions    24-hour events:  None    Objective   Vitals: /76   Pulse 84   Temp 97.7 °F (36.5 °C) (Oral)   Resp 18   Ht 6' (1.829 m)   Wt 255 lb 11.7 oz (116 kg)   SpO2 97%   BMI 34.68 kg/m²     I/O:    Intake/Output Summary (Last 24 hours) at 12/20/2021 1323  Last data filed at 12/20/2021 1229  Gross per 24 hour   Intake 1840 ml   Output 4000 ml   Net -2160 ml       Vent Information  Skin Assessment: Clean, dry, & intact  Equipment ID: [id number]  Equipment Changed: (S) Humidification  Vt Ordered: 500 mL  FiO2 : (S) 50 %  SpO2: 97 %  SpO2/FiO2 ratio: 176.36  I Time/ I Time %: 0.9 s  Humidification Source: Heated wire  Humidification Temp: 37  Humidification Temp Measured: 37  Mask Type: Full face mask  Mask Size: Large       IPAP: 18 cmH20  CPAP/EPAP: 10 cmH2O     CURRENT MEDS :  Scheduled Meds:   furosemide  40 mg Oral Daily    predniSONE  40 mg Oral Daily    sodium chloride (Inhalant)  4 mL Nebulization Q8H    busPIRone  10 mg Oral TID    gabapentin  300 mg Oral TID    vancomycin  1,250 mg IntraVENous Q18H    labetalol  5 mg IntraVENous Once    guaiFENesin  400 mg Oral TID    ipratropium-albuterol  1 ampule Inhalation Q4H While awake    cefepime  2,000 mg IntraVENous Q12H    sodium chloride flush  5-40 mL IntraVENous 2 times per day    amLODIPine  10 mg Oral Daily    atorvastatin  40 mg Oral Nightly    apixaban  5 mg Oral BID    phenytoin  300 mg Oral Nightly    phenytoin  200 mg Oral QAM       Physical Exam:  General Appearance: appears comfortable in no acute distress. HEENT: Normocephalic atraumatic without obvious abnormality   Neck: Lips, mucosa, and tongue normal.  Supple, symmetrical, trachea midline, no adenopathy;thyroid:  no enlargement/tenderness/nodules or JVD. Lung: Breath sounds CTA. Respirations   unlabored. Symmetrical expansion. Heart: RRR, normal S1, S2. No MRG  Abdomen: Soft, NT, ND. BS present x 4 quadrants. No bruit or organomegaly. Extremities: Pedal pulses 2+ symmetric b/l. Extremities normal, no cyanosis, clubbing, improving edema. Neurologic: Mental status: Alert and Oriented X3 . Pertinent/ New Labs and Imaging Studies     Imaging Personally Reviewed:  12/20/2021      FINDINGS:   There is mild enlargement of the cardiac silhouette, which is similar to the   previous exam and may be exaggerated by AP technique. The mediastinal   contours are unchanged. Bilateral pulmonary opacities appear decreased. Partial opacification of the hemidiaphragms may be secondary to basilar   consolidation or small pleural effusions, right greater than left. Osseous   degenerative changes are present. EKG leads overlie the chest.           Impression   Bilateral pulmonary opacities appear decreased.                    12/17/21       HISTORY:   ORDERING SYSTEM PROVIDED HISTORY: see medical records   TECHNOLOGIST PROVIDED HISTORY:   Reason for exam:->see medical records   What reading provider will be dictating this exam?->CRC       FINDINGS:   The heart is enlarged. Multifocal bilateral pulmonary infiltrates are noted. There is no pneumothorax or pneumomediastinum. Impression   1. There is no interval change in the significant multifocal bilateral   pneumonia. 12/10/2021 CT chest      Impression   No evidence of pulmonary embolism       Consolidation and ground-glass opacities of the lungs is suspicious for   infectious process. Small bilateral pleural effusions. ECHO     Summary   Left ventricle grossly normal in size. Mild - moderate left ventricular concentric hypertrophy noted. Normal LV segmental wall motion. Estimated left ventricular ejection fraction is 59±5%. Does not meet 50% threshold for diastolic dysfunction. The LAESV Index is <34ml/m2. Mildly dilated right ventricle. Right ventricular segmental wall motion is normal.   Physiologic and/or trace mitral regurgitation is present. Trace aortic regurgitation is noted. Physiologic and/or trace tricuspid regurgitation. Physiologic and/or trace pulmonic regurgitation present. Technically good quality study. No previous echo for comparison. Suggest clinical correlation.       Signature      ----------------------------------------------------------------   Electronically signed by Dari Fletcher DO(Interpreting   physician) on 10/03/2021 01:49 PM   ----------------------------------------------------------------    Labs:  Lab Results   Component Value Date    WBC 10.7 12/20/2021    HGB 9.6 12/20/2021    HCT 31.4 12/20/2021    MCV 93.5 12/20/2021    MCH 28.6 12/20/2021    MCHC 30.6 12/20/2021    RDW 14.0 12/20/2021     12/20/2021    MPV 10.1 12/20/2021     Lab Results   Component Value Date     12/20/2021    K 4.2 12/20/2021    K 3.7 08/21/2021    CL 98 12/20/2021    CO2 32 12/20/2021    BUN 31 12/20/2021    CREATININE 0.9 12/20/2021    LABALBU 3.4 12/20/2021    CALCIUM 9.0 12/20/2021    GFRAA >60 12/20/2021    LABGLOM >60 12/20/2021     Lab Results   Component Value Date    PROTIME 12.2 12/10/2021    INR 1.1 12/10/2021     No results for input(s): PROBNP in the last 72 hours. Recent Labs     12/18/21  0502   PROCAL 1.20*        Assessment:    1. Acute hypoxic respiratory failure  2. Bilateral patchy infiltrates differential diagnosis includes healthcare associated pneumonia versus possibly volume overload/Acute diastlic HF  3. History of severe Covid pneumonia in August 2021  4. H/O anxiety      Plan:   Heated high flow taken to 50% and 40LPM-2 trial 15 L high flow cannula. Discussed with respiratory  NIV nocturnally-AVAPS volume 500 EPAP +10  Antibiotics per ID cefepime and vancomycin for 10 days total  Oral lasix - he has diuresed nicely. 3.2 L urine output yesterday.  -29 L overall. incentive spirometer, lung recruitment maneuvers  Prednisone taper-continue as symptoms improve  Anxiolysis  Psychiatry input appreciated and noted. Recommending gabapentin 300 mg 3 times daily most effective for his anxiety and this regimen of 3 times daily dosing. Recommend BuSpar 10 mg 3 times daily as anxiolytic. Can be titrated up to a daily dose of 60 mg in 3 divided doses if clinically indicated. Cautious use of benzodiazepines. Okay for transfer to [place] [address] today from a pulmonary perspective. Electronically signed by HOMERO Murillo CNP on 12/20/2021 at 1:23 PM      I personally saw, examined, and cared for the patient. Labs, medications, radiographs reviewed. I agree with history exam and plans detailed in NP note.         Celine Sorenson,

## 2021-12-20 NOTE — PROGRESS NOTES
Department of Internal Medicine  Infectious Diseases  Progress  Note      C/C :  Pneumonia, respiratory failure     Pt is awake, alert, no distress  Feels better   Afebrile       Current Facility-Administered Medications   Medication Dose Route Frequency Provider Last Rate Last Admin    furosemide (LASIX) tablet 40 mg  40 mg Oral Daily Jena Ceballos MD   40 mg at 12/20/21 0900    predniSONE (DELTASONE) tablet 40 mg  40 mg Oral Daily Tai Forman APRN - CNP   40 mg at 12/20/21 0901    sodium chloride (Inhalant) 3 % nebulizer solution 4 mL  4 mL Nebulization Q8H Audra Reed MD   4 mL at 12/20/21 0912    busPIRone (BUSPAR) tablet 10 mg  10 mg Oral TID HOMERO Macario - CNP   10 mg at 12/20/21 0900    gabapentin (NEURONTIN) capsule 300 mg  300 mg Oral TID HOMERO Macario - CNP   300 mg at 12/20/21 0900    vancomycin (VANCOCIN) 1,250 mg in dextrose 5 % 250 mL IVPB  1,250 mg IntraVENous Q18H Jack Goss DO   Stopped at 12/20/21 1205    LORazepam (ATIVAN) injection 0.5 mg  0.5 mg IntraVENous Q4H PRN Fartun Waldron MD   0.5 mg at 12/20/21 1257    labetalol (NORMODYNE;TRANDATE) injection 5 mg  5 mg IntraVENous Once Fartun Waldron MD        melatonin disintegrating tablet 10 mg  10 mg Oral Nightly PRN Audra Reed MD   10 mg at 12/14/21 2104    guaiFENesin tablet 400 mg  400 mg Oral TID Jack Goss DO   400 mg at 12/20/21 0900    ipratropium-albuterol (DUONEB) nebulizer solution 1 ampule  1 ampule Inhalation Q4H While awake Jack Goss DO   1 ampule at 12/20/21 0912    cefepime (MAXIPIME) 2000 mg IVPB minibag  2,000 mg IntraVENous Q12H Gustabo Marte DO 12.5 mL/hr at 12/20/21 1221 2,000 mg at 12/20/21 1221    sodium chloride flush 0.9 % injection 5-40 mL  5-40 mL IntraVENous 2 times per day Jack Goss DO   10 mL at 12/20/21 0901    sodium chloride flush 0.9 % injection 5-40 mL  5-40 mL IntraVENous PRN Jack Goss, DO   10 mL at 12/18/21 0542    0.9 % sodium chloride infusion  25 mL IntraVENous PRN Jack Goss, DO        ondansetron (ZOFRAN-ODT) disintegrating tablet 4 mg  4 mg Oral Q8H PRN Jack Goss, DO        Or    [facility]dans[facility]ron [facility]) injection 4 mg  4 mg IntraVENous Q6H PRN [facility] Rosanna, DO        polyethylene glycol [facility]) packet 17 g  17 g Oral Daily PRN Jack Goss, DO        acetaminophen (TYLENOL) tablet 650 mg  650 mg Oral Q6H PRN Jack Simmonsker, DO   650 mg at 12/18/21 0541    Or    acetaminophen (TYLENOL) suppository 650 mg  650 mg Rectal Q6H PRN Jack Goss, DO        amLODIPine (NORVASC) tablet 10 mg  10 mg Oral Daily [facility] Rosanna, DO   10 mg at 12/20/21 0900    atorvastatin (LIPITOR) tablet 40 mg  40 mg Oral Nightly [facility] Rosanna, DO   40 mg at 12/19/21 2038    apixaban (ELIQUIS) tablet 5 mg  5 mg Oral BID [facility] Rosanna, DO   5 mg at 12/20/21 0900    phenytoin (DILANTIN) ER capsule 300 mg  300 mg Oral Nightly [facility] Rosanna, DO   300 mg at 12/19/21 2038    phenytoin (DILANTIN) ER capsule 200 mg  200 mg Oral QAM [facility] Rosanna, DO   200 mg at 12/20/21 0900       REVIEW OF SYSTEMS:    CONSTITUTIONAL:  Denies fever, chill or rigors, nausea or vomiting. HEENT: denies blurring of vision or double vision, denies hearing problem  RESPIRATORY: SOB improving     CARDIOVASCULAR:  Denies palpitation  GASTROINTESTINAL:  Denies abdomen pain, diarrhea or constipation. GENITOURINARY:  Denies burning urination or frequency of urination  INTEGUMENT: denies wound , rash  HEMATOLOGIC/LYMPHATIC:  Denies lymph node swelling, gum bleeding or easy bruising.   MUSCULOSKELETAL: Leg swelling   NEUROLOGICAL:  Denies light headed, dizziness, loss of consciousness      PHYSICAL EXAM:      Vitals:     /76   Pulse 84   Temp 97.7 °F (36.5 °C) (Oral)   Resp 18   Ht 6' (1.829 m)   Wt 255 lb 11.7 oz (116 kg)   SpO2 97%   BMI 34.68 kg/m²     General Appearance:    Awake, alert ,not in resp distress . Head:    Normocephalic, atraumatic   Eyes:    No pallor, no icterus,   Ears:    No obvious deformity .    Nose:   No nasal drainage   Throat:   Mucosa moist, no oral thrush   Neck:   Supple, no lymphadenopathy   Lungs:     Bilateral clear    Heart:    Regular rate and rhythm,    Abdomen:     Soft, non-tender, bowel sounds present    Extremities:   ++ edema, non tender    Pulses:   Dorsalis pedis palpable    Skin:   No rash        CBC with Differential:      Lab Results   Component Value Date    WBC 10.7 12/20/2021    RBC 3.36 12/20/2021    HGB 9.6 12/20/2021    HCT 31.4 12/20/2021     12/20/2021    MCV 93.5 12/20/2021    MCH 28.6 12/20/2021    MCHC 30.6 12/20/2021    RDW 14.0 12/20/2021    NRBC 0.9 09/17/2021    METASPCT 0.9 09/17/2021    LYMPHOPCT 11.8 12/16/2021    MONOPCT 6.2 12/16/2021    MYELOPCT 0.9 09/17/2021    BASOPCT 0.4 12/16/2021    MONOSABS 0.62 12/16/2021    LYMPHSABS 1.18 12/16/2021    EOSABS 0.64 12/16/2021    BASOSABS 0.04 12/16/2021       CMP     Lab Results   Component Value Date     12/20/2021    K 4.2 12/20/2021    K 3.7 08/21/2021    CL 98 12/20/2021    CO2 32 12/20/2021    BUN 31 12/20/2021    CREATININE 0.9 12/20/2021    GFRAA >60 12/20/2021    LABGLOM >60 12/20/2021    GLUCOSE 134 12/20/2021    PROT 6.5 12/20/2021    LABALBU 3.4 12/20/2021    CALCIUM 9.0 12/20/2021    BILITOT 0.3 12/20/2021    ALKPHOS 58 12/20/2021    AST 22 12/20/2021    ALT 26 12/20/2021         Hepatic Function Panel:    Lab Results   Component Value Date    ALKPHOS 58 12/20/2021    ALT 26 12/20/2021    AST 22 12/20/2021    PROT 6.5 12/20/2021    BILITOT 0.3 12/20/2021    LABALBU 3.4 12/20/2021       PT/INR:    Lab Results   Component Value Date    PROTIME 12.2 12/10/2021    INR 1.1 12/10/2021       TSH:    Lab Results   Component Value Date    TSH 0.507 12/11/2021       U/A:    Lab Results   Component Value Date    COLORU DARK YELLOW 10/10/2021    PHUR 5.5 10/10/2021    WBCUA 1-3 10/10/2021 RBCUA 5-10 10/10/2021    BACTERIA MODERATE 10/10/2021    CLARITYU CLOUDY 10/10/2021    SPECGRAV >=1.030 10/10/2021    LEUKOCYTESUR Negative 10/10/2021    UROBILINOGEN 0.2 10/10/2021    BILIRUBINUR Negative 10/10/2021    BLOODU LARGE 10/10/2021    GLUCOSEU Negative 10/10/2021    AMORPHOUS FEW 10/10/2021       ABG:    Lab Results   Component Value Date    KSB6IOJ 25.5 08/26/2021       MICROBIOLOGY:    Blood culture - negative     Urine antigen - negative         Respiratory Panel, Molecular, with COVID-19 (Restricted: peds pts or suitable admitted adults) [[id number]] Collected: 12/10/21 1446   Order Status: Completed Specimen: Nasopharyngeal Updated: 12/10/21 1627    Adenovirus by PCR Not Detected    Bordetella parapertussis by PCR Not Detected    Bordetella pertussis by PCR Not Detected    Chlamydophilia pneumoniae by PCR Not Detected    Coronavirus 229E by PCR Not Detected    Coronavirus HKU1 by PCR Not Detected    Coronavirus NL63 by PCR Not Detected    Coronavirus OC43 by PCR Not Detected    SARS-CoV-2, PCR Not Detected    Human Metapneumovirus by PCR Not Detected    Human Rhinovirus/Enterovirus by PCR Not Detected    Influenza A by PCR Not Detected    Influenza B by PCR Not Detected    Mycoplasma pneumoniae by PCR Not Detected    Parainfluenza Virus 1 by PCR Not Detected    Parainfluenza Virus 2 by PCR Not Detected    Parainfluenza Virus 3 by PCR Not Detected    Parainfluenza Virus 4 by PCR Not Detected    Respiratory Syncytial Virus by PCR Not Detected   COVID-19, Rapid [[id number]]      Vanco random 11.5     Radiology :    CTA chest -   Impression:       No evidence of pulmonary embolism     Consolidation and ground-glass opacities of the lungs is suspicious for   infectious process. Small bilateral pleural effusions. IMPRESSION:     1. Respiratory failure - some improvement   2. Pneumonia  3. Post COVID pulmonary fibrosis   4. Leukocytosis - improved       RECOMMENDATIONS:      1.  Cefepime 2 grams IV q 12 hrs, Vancomycin  1250 mg q 18 hrs  X 7 days ( med rec done )   2.  [facility]

## 2021-12-20 NOTE — PROGRESS NOTES
Hospitalist Progress Note      SYNOPSIS:     Patient admitted on 12/10/2021  presents to [facility] ER complaining of SOB. Sunita Spicer a past medical history that includes diabetes, history of seizures, hyperlipidemia, recent Covid. Stuart Andujar originally presented to [facility] ER with shortness of breath and was found to have a pulse ox of 35% on room air upon arrival to the ER. Rachel Wilkerson was placed on BiPAP in the ER and due to respiratory failure concern he was transferred ED to ED. Rachel Wilkerson states that last night around 10:30 PM he developed acute shortness of breath.  He states that it progressed and was exertional. Rachel Wilkerson also admits to leg swelling.  In the ER he looks more comfortable and is on a nonrebreather mask.  He does have a history of Covid admission where he required long-term acute care stay. Nadja Sherrie showed no evidence of pulmonary embolism Heparin drip was stopped that was started in [place] ER for concern for PE.  Consolidation and groundglass opacities of the lungs is suspicious for infectious process.  There are small bilateral pleural effusions.  The pt has had protraced course of recovery and complicated by severe anxiety and post COVID lung fibrosis. Pt was accepted by LTAC but became hypoxic and discharge was held. The pt has improved today and is stable for discharge to LTAC      SUBJECTIVE:    Patient seen and examined  Records reviewed. Pt on HFNC FiO2 50%. Pt states that he feels much better and is ready for discharge. Temp (24hrs), Av.8 °F (36.6 °C), Min:97.7 °F (36.5 °C), Max:97.8 °F (36.6 °C)    DIET: ADULT DIET; Regular; 4 carb choices (60 gm/meal)  ADULT ORAL NUTRITION SUPPLEMENT; Lunch, Dinner;  Low Calorie/High Protein Oral Supplement  CODE: Full Code    Intake/Output Summary (Last 24 hours) at 2021 1033  Last data filed at 2021 0800  Gross per 24 hour   Intake 1060 ml   Output 3200 ml   Net -2140 ml       OBJECTIVE:    /76   Pulse 84   Temp 97.7 °F (36.5 °C) (Oral)   Resp 18   Ht 6' (1.829 m)   Wt 255 lb 11.7 oz (116 kg)   SpO2 97%   BMI 34.68 kg/m²     General appearance: mild distress distress, appears stated age and cooperative. HEENT:  Conjunctivae/corneas clear. Neck: Supple. No jugular venous distention. Respiratory: Tachypnea, Decreased air movement. Cardiovascular: Regular rate rhythm, normal S1-S2  Abdomen: Soft, nontender, nondistended  Musculoskeletal: No clubbing, cyanosis, no bilateral lower extremity edema. Brisk capillary refill. Skin:  No rashes  on visible skin  Neurologic: awake, alert and following commands     ASSESSMENT:    Acute hypoxemic respiratory failure due to pneumonia   Sepsis due to Pneumonia  Acute diastolic congestive heart failure  Hist of DVTs  Elevated troponins  History of Seizures  HLD  HTN  Post COVID Pulmonary Fibrosis  COVID Pneumonia in August 2021    PLAN:    Pt is discharged to [facility] and the current management is to be continues. Cont Vancomycin and Cefepime.-to be continued for 10 days ID on board  Cont Bronchodilators  IV solumedrol transitioned to PO prednisone and taper prescribed for LTAC. Appreciate pulmonology recommendations. Appreciate psychiatry consult. Buspar started and increased gabapentin  Ativan PRN   Cont Eliquis  Renal functions improved. Cont. oral Lasix. The pt is stable for discharge to LTAC this morning.       DISPOSITION:     Medications:  REVIEWED DAILY    Infusion Medications    sodium chloride       Scheduled Medications    furosemide  40 mg Oral Daily    predniSONE  40 mg Oral Daily    sodium chloride (Inhalant)  4 mL Nebulization Q8H    busPIRone  10 mg Oral TID    gabapentin  300 mg Oral TID    vancomycin  1,250 mg IntraVENous Q18H    labetalol  5 mg IntraVENous Once    guaiFENesin  400 mg Oral TID    ipratropium-albuterol  1 ampule Inhalation Q4H While awake    cefepime  2,000 mg IntraVENous Q12H    sodium chloride flush  5-40 mL IntraVENous 2 times per day    amLODIPine  10 mg Oral Daily    atorvastatin  40 mg Oral Nightly    apixaban  5 mg Oral BID    phenytoin  300 mg Oral Nightly    phenytoin  200 mg Oral QAM     PRN Meds: LORazepam, melatonin, sodium chloride flush, sodium chloride, ondansetron **OR** ondansetron, polyethylene glycol, acetaminophen **OR** acetaminophen    Labs:     Recent Labs     12/18/21  0502 12/19/21  0504 12/20/21  0512   WBC 11.0 13.0* 10.7   HGB 9.5* 9.6* 9.6*   HCT 31.2* 30.6* 31.4*    197 200       Recent Labs     12/18/21  0502 12/19/21  0504 12/20/21  0512    140 139   K 4.1 3.9 4.2   CL 98 98 98   CO2 38* 34* 32*   BUN 32* 33* 31*   CREATININE 1.0 1.0 0.9   CALCIUM 9.3 9.4 9.0       Recent Labs     12/18/21  0502 12/19/21  0504 12/20/21  0512   PROT 6.4 6.2* 6.5   ALKPHOS 56 58 58   ALT 20 21 26   AST 14 16 22   BILITOT 0.4 0.4 0.3       No results for input(s): INR in the last 72 hours. No results for input(s): Arlette Dross in the last 72 hours. Chronic labs:    Lab Results   Component Value Date    CHOL 173 12/11/2021    TRIG 100 12/11/2021    HDL 65 12/11/2021    LDLCALC 88 12/11/2021    TSH 0.507 12/11/2021    PSA 0.84 12/12/2020    INR 1.1 12/10/2021    LABA1C 5.3 12/11/2021       Radiology: REVIEWED DAILY    +++++++++++++++++++++++++++++++++++++++++++++++++  Jena Ceballos MD  [employer] - [address] [facility], [place]  +++++++++++++++++++++++++++++++++++++++++++++++++  NOTE: This report was transcribed using voice recognition software. Every effort was made to ensure accuracy; however, inadvertent computerized transcription errors may be present.

## 2021-12-20 NOTE — PROGRESS NOTES
Pharmacy Consultation Note  (Antibiotic Dosing and Monitoring)    Initial consult date: 12/10/21  Consulting physician/provider: Dr. Lesley Dsouza  Drug: Vancomycin  Indication: Pneumonia    Age/  Gender Height Weight IBW  Allergy Information   56 y.o./male 6' (182.9 cm) 278 lb (126.1 kg)     Ideal body weight: 77.6 kg (171 lb 1.2 oz)  Adjusted ideal body weight: 93 kg (204 lb 15 oz)   Patient has no known allergies. Renal Function:  Recent Labs     12/18/21  0502 12/19/21  0504 12/20/21  0512   BUN 32* 33* 31*   CREATININE 1.0 1.0 0.9       Intake/Output Summary (Last 24 hours) at 12/20/2021 0911  Last data filed at 12/20/2021 0644  Gross per 24 hour   Intake 700 ml   Output 3200 ml   Net -2500 ml       Vancomycin Monitoring:  Trough:    No results for input(s): VANCOTROUGH in the last 72 hours. Random:    No results for input(s): VANCORANDOM in the last 72 hours. Vancomycin Administration Times:  Recent vancomycin administrations                   vancomycin (VANCOCIN) 1,250 mg in dextrose 5 % 250 mL IVPB (mg) 1,250 mg New Bag 12/18/21 2242     1,250 mg New Bag  0541     1,250 mg New Bag 12/17/21 1055     1,250 mg New Bag 12/16/21 1417                      Assessment:  · Patient is a 60 y.o. male who has been initiated on vancomycin  Estimated Creatinine Clearance: 118 mL/min (based on SCr of 0.9 mg/dL). · To dose vancomycin, pharmacy will be utilizing [employer] calculation software for goal AUC/VIOLET 400-600 mg/L-hr   · 12/12: Creatinine stable at 0.9. Level ~10.5 hours post dose was 21.3 this morning. Inputted into [employer], current regimen with predicted AUC/VIOLET of 620 and steady state trough of 20  · 12/13: Scr 1.1 today up from 0.9. WBC 10.6. · 12/14: Scr 1.1, elevated again today from baseline will check vancomycin level. · 12/15: Scr 1, vancomycin level @17:26 = 24.9 mcg/mL, true trough drawn 11.5 hours post dose. Regimen adjusted overnight to vancomycin 1250 mg IV q18h due to elevated trough.

## 2021-12-22 LAB
BLOOD CULTURE, ROUTINE: NORMAL
CULTURE, BLOOD 2: NORMAL

## 2021-12-30 NOTE — PROGRESS NOTES
flush  5-40 mL IntraVENous 2 times per day    amLODIPine  10 mg Oral Daily    atorvastatin  40 mg Oral Nightly    apixaban  5 mg Oral BID    gabapentin  300 mg Oral Daily    phenytoin  300 mg Oral Nightly    phenytoin  200 mg Oral QAM       Physical Exam:  General Appearance: appears comfortable in no acute distress. HEENT: Normocephalic atraumatic without obvious abnormality   Neck: Lips, mucosa, and tongue normal.  Supple, symmetrical, trachea midline, no adenopathy;thyroid:  no enlargement/tenderness/nodules or JVD. Lung: Breath sounds CTA. Respirations   unlabored. Symmetrical expansion. Heart: RRR, normal S1, S2. No MRG  Abdomen: Soft, NT, ND. BS present x 4 quadrants. No bruit or organomegaly. Extremities: Pedal pulses 2+ symmetric b/l. Extremities normal, no cyanosis, clubbing, improving edema. Neurologic: Mental status: Alert and Oriented X3 . Pertinent/ New Labs and Imaging Studies     Imaging Personally Reviewed:  12/17/21       HISTORY:   ORDERING SYSTEM PROVIDED HISTORY: see medical records   TECHNOLOGIST PROVIDED HISTORY:   Reason for exam:->see medical records   What reading provider will be dictating this exam?->CRC       FINDINGS:   The heart is enlarged.  Multifocal bilateral pulmonary infiltrates are noted. There is no pneumothorax or pneumomediastinum.           Impression   1. There is no interval change in the significant multifocal bilateral   pneumonia.              12/10/2021 CT chest      Impression   No evidence of pulmonary embolism       Consolidation and ground-glass opacities of the lungs is suspicious for   infectious process. Small bilateral pleural effusions. ECHO     Summary   Left ventricle grossly normal in size. Mild - moderate left ventricular concentric hypertrophy noted. Normal LV segmental wall motion. Estimated left ventricular ejection fraction is 59±5%. Does not meet 50% threshold for diastolic dysfunction.    The LAESV Index is <34ml/m2. Mildly dilated right ventricle. Right ventricular segmental wall motion is normal.   Physiologic and/or trace mitral regurgitation is present. Trace aortic regurgitation is noted. Physiologic and/or trace tricuspid regurgitation. Physiologic and/or trace pulmonic regurgitation present. Technically good quality study. No previous echo for comparison. Suggest clinical correlation. Signature      ----------------------------------------------------------------   Electronically signed by Will Gallegos DO(Interpreting   physician) on 10/03/2021 01:49 PM   ----------------------------------------------------------------    Labs:  Lab Results   Component Value Date    WBC 13.5 12/17/2021    HGB 10.8 12/17/2021    HCT 35.4 12/17/2021    MCV 94.4 12/17/2021    MCH 28.8 12/17/2021    MCHC 30.5 12/17/2021    RDW 14.1 12/17/2021     12/17/2021    MPV 9.9 12/17/2021     Lab Results   Component Value Date     12/17/2021    K 4.8 12/17/2021    K 3.7 08/21/2021    CL 96 12/17/2021    CO2 33 12/17/2021    BUN 33 12/17/2021    CREATININE 1.3 12/17/2021    LABALBU 3.8 12/17/2021    CALCIUM 8.9 12/17/2021    GFRAA >60 12/17/2021    LABGLOM 57 12/17/2021     Lab Results   Component Value Date    PROTIME 12.2 12/10/2021    INR 1.1 12/10/2021     Recent Labs     12/16/21  0721   PROBNP 2,004*  1,985*     Recent Labs     12/17/21  0509   PROCAL 1.42*     This SmartLink has not been configured with any valid records. Micro:  No results for input(s): CULTRESP in the last 72 hours. No results for input(s): LABGRAM in the last 72 hours. No results for input(s): LEGUR in the last 72 hours. No results for input(s): STREPNEUMAGU in the last 72 hours. No results for input(s): LP1UAG in the last 72 hours. Assessment:    1. Acute hypoxic respiratory failure  2.   Bilateral patchy infiltrates differential diagnosis includes healthcare associated pneumonia versus possibly volume overload/Acute diastlic HF  3. History of severe Covid pneumonia in August 2021  4. H/O anxiety      Plan:   1. Heated high flow nasal cannula 60 L decreased FiO2 to 100% today. Saturation 95% continue to wean oxygen discussed with nursing and respiratory  2. NIV in the form of AVAPS for respiratory support settings volume 500 EPAP +10 FiO2 100%  3. Continue diuresis lasix 40 mg daily dosing, monitor I&O's closely -21 L negative fluid balance. Stop albumin. 4. Solu-Medrol 40 IV BID-transition to oral prednisone. 5. Continue Duonebs every 4 hours while awake add EZPAP. Elevated procalcitonin. Obtain respiratory culture, orders placed. Add 3% saline BID nebulized. 6. Appreciate infectious disease input-cefepime and vancomycin - serum galactomannan in process 12/12/2021.  7. Follow CXR 12/16/2021. Improving. 8. Continue Ativan as needed  9. LTAC referral  10. Patient very tearful and anxious. Will obtain psychiatric evaluation for further assistance with medication management. This plan of care was reviewed in collaboration with Dr. Jamarcus Hopper    Electronically signed by HOMERO Jasmine CNP on 12/17/2021 at 11:41 AM    Addendum:  I have personally seen and examined patient. His transfer was canceled last night due to the fact that he became very anxious and following that affected his breathing became tachypneic and desaturated. He was on AVAPS most of the night. Today at bedside he is on air Vo FiO2 of 100% but he saturating 96%. We will start weaning down his oxygen again. Chest x-ray does not show any significant change since yesterday. We will add EZ Pap, I am considering his procalcitonin is increased despite being on vancomycin and cefepime will order another respiratory culture in the sputum. Patient's condition was improving. If he remained stagnant over the weekend we will have no choice to consider doing a bronchoscopy next week. Monitor his creatinine. His diuretic has been home.   He no diplopia/no photophobia

## 2022-01-12 ENCOUNTER — OFFICE VISIT (OUTPATIENT)
Dept: FAMILY MEDICINE CLINIC | Age: 57
End: 2022-01-12
Payer: COMMERCIAL

## 2022-01-12 VITALS
HEART RATE: 109 BPM | DIASTOLIC BLOOD PRESSURE: 87 MMHG | BODY MASS INDEX: 36.44 KG/M2 | OXYGEN SATURATION: 97 % | WEIGHT: 269 LBS | HEIGHT: 72 IN | RESPIRATION RATE: 20 BRPM | SYSTOLIC BLOOD PRESSURE: 142 MMHG | TEMPERATURE: 98.4 F

## 2022-01-12 DIAGNOSIS — R60.0 BILATERAL LOWER EXTREMITY EDEMA: ICD-10-CM

## 2022-01-12 DIAGNOSIS — F41.9 ANXIETY: ICD-10-CM

## 2022-01-12 DIAGNOSIS — E11.9 TYPE 2 DIABETES MELLITUS WITHOUT COMPLICATION, WITHOUT LONG-TERM CURRENT USE OF INSULIN (HCC): ICD-10-CM

## 2022-01-12 DIAGNOSIS — D64.9 ANEMIA, UNSPECIFIED TYPE: ICD-10-CM

## 2022-01-12 DIAGNOSIS — I10 ESSENTIAL HYPERTENSION: Primary | ICD-10-CM

## 2022-01-12 DIAGNOSIS — R53.81 PHYSICAL DECONDITIONING: ICD-10-CM

## 2022-01-12 PROCEDURE — 2022F DILAT RTA XM EVC RTNOPTHY: CPT | Performed by: FAMILY MEDICINE

## 2022-01-12 PROCEDURE — G8427 DOCREV CUR MEDS BY ELIG CLIN: HCPCS | Performed by: FAMILY MEDICINE

## 2022-01-12 PROCEDURE — 1111F DSCHRG MED/CURRENT MED MERGE: CPT | Performed by: FAMILY MEDICINE

## 2022-01-12 PROCEDURE — G8484 FLU IMMUNIZE NO ADMIN: HCPCS | Performed by: FAMILY MEDICINE

## 2022-01-12 PROCEDURE — 1036F TOBACCO NON-USER: CPT | Performed by: FAMILY MEDICINE

## 2022-01-12 PROCEDURE — 3017F COLORECTAL CA SCREEN DOC REV: CPT | Performed by: FAMILY MEDICINE

## 2022-01-12 PROCEDURE — G8417 CALC BMI ABV UP PARAM F/U: HCPCS | Performed by: FAMILY MEDICINE

## 2022-01-12 PROCEDURE — 3046F HEMOGLOBIN A1C LEVEL >9.0%: CPT | Performed by: FAMILY MEDICINE

## 2022-01-12 PROCEDURE — 99214 OFFICE O/P EST MOD 30 MIN: CPT | Performed by: FAMILY MEDICINE

## 2022-01-12 RX ORDER — FUROSEMIDE 20 MG/1
20 TABLET ORAL 2 TIMES DAILY
Qty: 60 TABLET | Refills: 3 | Status: SHIPPED | OUTPATIENT
Start: 2022-01-12 | End: 2022-02-11

## 2022-01-12 RX ORDER — BUSPIRONE HYDROCHLORIDE 10 MG/1
10 TABLET ORAL DAILY
Qty: 30 TABLET | Refills: 5 | Status: SHIPPED | OUTPATIENT
Start: 2022-01-12 | End: 2022-02-11

## 2022-01-12 NOTE — PROGRESS NOTES
Fortunato James  : 1965    Chief Complaint:     Chief Complaint   Patient presents with    Follow-Up from Hospital       [facility]  Fortunato James 60 y.o. presents for   Chief Complaint   Patient presents with    Follow-Up from Hospital     Patient presents for follow-up from the hospital.  He was admitted due to pneumonia. Patient was placed on broad-spectrum antibiotics. He was also noted to have acute respiratory failure and was placed on oxygen. He was discharged from the hospital and sent to [facility].  While at [facility] he was able to be weaned down off of oxygen and only uses oxygen at night. 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 The combination of Eliquis/Dilantin slightly decreased effectiveness of the Eliquis. Pulmonary felt he no longer required the full anticoagulation as he has been treated for greater than 3 months. He was then placed on Lovenox for prophylaxis dosing. He was discharged on . Steroid taper was given. He was transitioned to p.o. Lasix he continues to take 20 mg twice daily. He continues to have some swelling in his lower extremities but it has been stable. He still has some anxiety but feels he can go down on the BuSpar. He has not had labs since discharge from the hospital.  He feels much improved. He denies any current shortness of breath. All questions were answered to patients satisfaction.     Past Medical History:   Diagnosis Date    HIGH CHOLESTEROL     Hypertension     Seizures ([address]        No Known Allergies    Health Maintenance Due   Topic Date Due    Hepatitis C screen  Never done    COVID-19 Vaccine (1) Never done    Pneumococcal 0-64 years Vaccine (1 of 2 - PPSV23) Never done    Diabetic foot exam  Never done    HIV screen  Never done    Diabetic retinal exam  Never done    Hepatitis B vaccine (1 of 3 - Risk 3-dose series) Never done    DTaP/Tdap/Td vaccine (1 - Tdap) Never done    Colon cancer screen colonoscopy  Never done  Shingles Vaccine (1 of 2) Never done    Flu vaccine (1) Never done         REVIEW OF SYSTEMS  Review of Systems   Constitutional: Negative for chills, fatigue and fever. HENT: Negative for congestion, ear pain, postnasal drip, rhinorrhea and sore throat. Eyes: Negative for pain and itching. Respiratory: Negative for cough, chest tightness and shortness of breath. Cardiovascular: Positive for leg swelling (At baseline). Negative for chest pain. Gastrointestinal: Negative for abdominal pain, constipation, diarrhea and nausea. Endocrine: Negative for heat intolerance. Genitourinary: Negative for flank pain, frequency and urgency. Musculoskeletal: Negative for arthralgias and back pain. Skin: Negative for pallor and rash. Allergic/Immunologic: Negative for environmental allergies. Neurological: Negative for dizziness, numbness and headaches. Hematological: Does not bruise/bleed easily. Psychiatric/Behavioral: Negative for agitation, behavioral problems and hallucinations. PHYSICAL EXAM  BP (!) 142/87   Pulse 109   Temp 98.4 °F (36.9 °C)   Resp 20   Ht 6' (1.829 m)   Wt 269 lb (122 kg)   SpO2 97%   BMI 36.48 kg/m²   Physical Exam  Vitals and nursing note reviewed. Constitutional:       General: He is not in acute distress. Appearance: He is well-developed. He is obese. He is not diaphoretic. HENT:      Head: Normocephalic and atraumatic. Right Ear: External ear normal.      Left Ear: External ear normal.      Nose: Nose normal.      Mouth/Throat:      Pharynx: No oropharyngeal exudate. Eyes:      General:         Right eye: No discharge. Left eye: No discharge. Conjunctiva/sclera: Conjunctivae normal.   Neck:      Thyroid: No thyromegaly. Cardiovascular:      Rate and Rhythm: Normal rate and regular rhythm. Heart sounds: Normal heart sounds. No murmur heard. No friction rub.    Pulmonary:      Effort: Pulmonary effort is normal.      Breath 12/11/2021    CREATININE 1.0 01/12/2022       ASSESSMENT/PLAN:    1. Essential hypertension  2. Type 2 diabetes mellitus without complication, without long-term current use of insulin ([facility])  -     COMPREHENSIVE METABOLIC PANEL; Future  3. Anemia, unspecified type  -     CBC Auto Differential; Future  4. Physical deconditioning  5. Anxiety  6. Bilateral lower extremity edema    BP slightly elevated though he is anxious today. Patient does wish to try to decrease BuSpar to once daily dosing. This was changed today. We will repeat labs as above. I did review all notes, imaging, labs from hospitalization with patient and wife today. We will continue same dose of Lasix as he is tolerating well and swelling under good control. Patient is agreeable with the above. Problem list reviewed andsimplified/updated  HM reviewed today and counseled as appropriate    Call or go to ED immediately if symptoms worsen or persist.  Future Appointments   Date Time Provider Sandra Neville   4/12/2022  9:30 AM Loco 5, DO [facility]     Or sooner if necessary. Educational materials and/or homeexercises printed for patient's review and were included in patient instructions on his/her After Visit Summary and given to patient at the end of visit. Counseled regarding above diagnosis, including possible risks and complications,  especially if left uncontrolled. Counseled regarding the possible side effects, risks, benefits and alternatives to treatment; patient and/or guardian verbalizes understanding, agrees,feels comfortable with and wishes to proceed with above treatment plan. Advised patient to call Dil[facility] Shearing new medication issues, and read all Rx info from pharmacy to assure aware of all possible risks and side effects of medication before taking. Reviewed age and gender appropriate health screening exams and vaccinations.   Advised patient regarding importance of keeping up with recommended health maintenance and toschedule as soon as possible if overdue, as this is important in assessing for undiagnosed pathology, especially cancer, as well as protecting against potentially harmful/life threatening disease. and/or guardian verbalizes understanding and agrees with above counseling, assessment and plan. All questions answered. Loco 5, DO  1/12/22    NOTE: This report was transcribed using voice recognition software.  Every effort was made to ensure accuracy; however, inadvertent computerized transcription errors may be present

## 2022-01-13 LAB
ALBUMIN SERPL-MCNC: 3.9 G/DL (ref 3.5–5.2)
ALP BLD-CCNC: 93 U/L (ref 40–129)
ALT SERPL-CCNC: 16 U/L (ref 0–40)
ANION GAP SERPL CALCULATED.3IONS-SCNC: 13 MMOL/L (ref 7–16)
AST SERPL-CCNC: 16 U/L (ref 0–39)
BASOPHILS ABSOLUTE: 0.03 E9/L (ref 0–0.2)
BASOPHILS RELATIVE PERCENT: 0.4 % (ref 0–2)
BILIRUB SERPL-MCNC: <0.2 MG/DL (ref 0–1.2)
BUN BLDV-MCNC: 16 MG/DL (ref 6–20)
CALCIUM SERPL-MCNC: 9.2 MG/DL (ref 8.6–10.2)
CHLORIDE BLD-SCNC: 102 MMOL/L (ref 98–107)
CO2: 27 MMOL/L (ref 22–29)
CREAT SERPL-MCNC: 1 MG/DL (ref 0.7–1.2)
EOSINOPHILS ABSOLUTE: 0.37 E9/L (ref 0.05–0.5)
EOSINOPHILS RELATIVE PERCENT: 4.3 % (ref 0–6)
GFR AFRICAN AMERICAN: >60
GFR NON-AFRICAN AMERICAN: >60 ML/MIN/1.73
GLUCOSE BLD-MCNC: 188 MG/DL (ref 74–99)
HCT VFR BLD CALC: 37.6 % (ref 37–54)
HEMOGLOBIN: 11.6 G/DL (ref 12.5–16.5)
IMMATURE GRANULOCYTES #: 0.01 E9/L
IMMATURE GRANULOCYTES %: 0.1 % (ref 0–5)
LYMPHOCYTES ABSOLUTE: 1.68 E9/L (ref 1.5–4)
LYMPHOCYTES RELATIVE PERCENT: 19.7 % (ref 20–42)
MCH RBC QN AUTO: 29.1 PG (ref 26–35)
MCHC RBC AUTO-ENTMCNC: 30.9 % (ref 32–34.5)
MCV RBC AUTO: 94.2 FL (ref 80–99.9)
MONOCYTES ABSOLUTE: 0.74 E9/L (ref 0.1–0.95)
MONOCYTES RELATIVE PERCENT: 8.7 % (ref 2–12)
NEUTROPHILS ABSOLUTE: 5.7 E9/L (ref 1.8–7.3)
NEUTROPHILS RELATIVE PERCENT: 66.8 % (ref 43–80)
PDW BLD-RTO: 14.6 FL (ref 11.5–15)
PLATELET # BLD: 157 E9/L (ref 130–450)
PMV BLD AUTO: 10.1 FL (ref 7–12)
POTASSIUM SERPL-SCNC: 4.8 MMOL/L (ref 3.5–5)
RBC # BLD: 3.99 E12/L (ref 3.8–5.8)
SODIUM BLD-SCNC: 142 MMOL/L (ref 132–146)
TOTAL PROTEIN: 7 G/DL (ref 6.4–8.3)
WBC # BLD: 8.5 E9/L (ref 4.5–11.5)

## 2022-01-13 ASSESSMENT — ENCOUNTER SYMPTOMS
SHORTNESS OF BREATH: 0
NAUSEA: 0
RHINORRHEA: 0
SORE THROAT: 0
COUGH: 0
ABDOMINAL PAIN: 0
EYE ITCHING: 0
CONSTIPATION: 0
BACK PAIN: 0
CHEST TIGHTNESS: 0
DIARRHEA: 0
EYE PAIN: 0

## 2022-04-12 ENCOUNTER — OFFICE VISIT (OUTPATIENT)
Dept: FAMILY MEDICINE CLINIC | Age: 57
End: 2022-04-12
Payer: COMMERCIAL

## 2022-04-12 VITALS
SYSTOLIC BLOOD PRESSURE: 160 MMHG | OXYGEN SATURATION: 96 % | BODY MASS INDEX: 35.49 KG/M2 | HEART RATE: 99 BPM | WEIGHT: 262 LBS | DIASTOLIC BLOOD PRESSURE: 84 MMHG | HEIGHT: 72 IN | TEMPERATURE: 97.6 F | RESPIRATION RATE: 20 BRPM

## 2022-04-12 DIAGNOSIS — E11.9 TYPE 2 DIABETES MELLITUS WITHOUT COMPLICATION, WITHOUT LONG-TERM CURRENT USE OF INSULIN (HCC): ICD-10-CM

## 2022-04-12 DIAGNOSIS — Z99.81 ON HOME O2: ICD-10-CM

## 2022-04-12 DIAGNOSIS — Z87.898 HISTORY OF SEIZURES: ICD-10-CM

## 2022-04-12 DIAGNOSIS — F41.9 ANXIETY: ICD-10-CM

## 2022-04-12 DIAGNOSIS — I10 ESSENTIAL HYPERTENSION: Primary | ICD-10-CM

## 2022-04-12 LAB
DISTANCE WALKED: 2300 FT
SPO2: 94 %

## 2022-04-12 PROCEDURE — G8417 CALC BMI ABV UP PARAM F/U: HCPCS | Performed by: FAMILY MEDICINE

## 2022-04-12 PROCEDURE — 3017F COLORECTAL CA SCREEN DOC REV: CPT | Performed by: FAMILY MEDICINE

## 2022-04-12 PROCEDURE — 3046F HEMOGLOBIN A1C LEVEL >9.0%: CPT | Performed by: FAMILY MEDICINE

## 2022-04-12 PROCEDURE — 2022F DILAT RTA XM EVC RTNOPTHY: CPT | Performed by: FAMILY MEDICINE

## 2022-04-12 PROCEDURE — 1036F TOBACCO NON-USER: CPT | Performed by: FAMILY MEDICINE

## 2022-04-12 PROCEDURE — 99214 OFFICE O/P EST MOD 30 MIN: CPT | Performed by: FAMILY MEDICINE

## 2022-04-12 PROCEDURE — 94618 PULMONARY STRESS TESTING: CPT | Performed by: FAMILY MEDICINE

## 2022-04-12 PROCEDURE — G8427 DOCREV CUR MEDS BY ELIG CLIN: HCPCS | Performed by: FAMILY MEDICINE

## 2022-04-12 ASSESSMENT — ENCOUNTER SYMPTOMS
CHEST TIGHTNESS: 0
BACK PAIN: 0
COUGH: 0
NAUSEA: 0
SORE THROAT: 0
EYE ITCHING: 0
EYE PAIN: 0
RHINORRHEA: 0
DIARRHEA: 0
ABDOMINAL PAIN: 0
CONSTIPATION: 0
SHORTNESS OF BREATH: 0

## 2022-04-12 NOTE — PROGRESS NOTES
6 minute walk test performed to see if patient could D/C oxygen.     Oxygen at rest 100% P: 99    Pt ambulated for roughly 2300 feet    lowext O2 reading at: 94% P: 137

## 2022-04-12 NOTE — PROGRESS NOTES
Rosa Buenrostro  : 1965    Chief Complaint:     Chief Complaint   Patient presents with    Hypertension     follow up        HPI  Rosa Buenrostro 60 y.o. presents for   Chief Complaint   Patient presents with    Hypertension     follow up      Presents for follow-up. He states he has been doing well overall. He denies any current complaints. He states that he has not been using oxygen as his pulse ox at home has been within normal limits. He states that even with walking his pulse ox has been in the 90s. He does wish to discontinue the oxygen. He otherwise has been taking all of his medications as prescribed. Pressure is elevated today. He denies any dizziness, lightheadedness, headaches, blurry vision, chest pain, shortness of breath. All questions were answered to patients satisfaction. Past Medical History:   Diagnosis Date    HIGH CHOLESTEROL     Hypertension     Seizures ([address]        No Known Allergies    Health Maintenance Due   Topic Date Due    Hepatitis C screen  Never done    COVID-19 Vaccine (1) Never done    Pneumococcal 0-64 years Vaccine (1 of 2 - PPSV23) Never done    Diabetic foot exam  Never done    HIV screen  Never done    Diabetic retinal exam  Never done    Hepatitis B vaccine (1 of 3 - Risk 3-dose series) Never done    DTaP/Tdap/Td vaccine (1 - Tdap) Never done    Shingles Vaccine (1 of 2) Never done         REVIEW OF SYSTEMS  Review of Systems   Constitutional: Negative for chills, fatigue and fever. HENT: Negative for congestion, ear pain, postnasal drip, rhinorrhea and sore throat. Eyes: Negative for pain and itching. Respiratory: Negative for cough, chest tightness and shortness of breath. Cardiovascular: Negative for chest pain and leg swelling. Gastrointestinal: Negative for abdominal pain, constipation, diarrhea and nausea. Endocrine: Negative for heat intolerance. Genitourinary: Negative for flank pain, frequency and urgency. Musculoskeletal: Negative for arthralgias and back pain. Skin: Negative for pallor and rash. Allergic/Immunologic: Negative for environmental allergies. Neurological: Negative for dizziness, numbness and headaches. Hematological: Does not bruise/bleed easily. Psychiatric/Behavioral: Negative for agitation, behavioral problems and hallucinations. PHYSICAL EXAM  BP (!) 160/84 (Site: Left Upper Arm, Position: Sitting, Cuff Size: Large Adult)   Pulse 99   Temp 97.6 °F (36.4 °C) (Temporal)   Resp 20   Ht 6' (1.829 m)   Wt 262 lb (118.8 kg)   SpO2 96%   BMI 35.53 kg/m²   Physical Exam  Vitals and nursing note reviewed. Constitutional:       General: He is not in acute distress. Appearance: He is well-developed. He is obese. He is not diaphoretic. HENT:      Head: Normocephalic and atraumatic. Right Ear: External ear normal.      Left Ear: External ear normal.      Nose: Nose normal.      Mouth/Throat:      Pharynx: No oropharyngeal exudate. Eyes:      General:         Right eye: No discharge. Left eye: No discharge. Conjunctiva/sclera: Conjunctivae normal.   Neck:      Thyroid: No thyromegaly. Cardiovascular:      Rate and Rhythm: Normal rate and regular rhythm. Heart sounds: Normal heart sounds. No murmur heard. No friction rub. Pulmonary:      Effort: Pulmonary effort is normal.      Breath sounds: Normal breath sounds. No wheezing. Abdominal:      Palpations: Abdomen is soft. Tenderness: There is no abdominal tenderness. Musculoskeletal:         General: No tenderness. Normal range of motion. Cervical back: Normal range of motion and neck supple. Right lower leg: Edema (At baseline) present. Left lower leg: Edema (At baseline) present. Lymphadenopathy:      Cervical: No cervical adenopathy. Skin:     General: Skin is warm and dry. Findings: No erythema or rash.    Neurological:      Mental Status: He is alert and oriented to person, place, and time. Coordination: Coordination normal.      Deep Tendon Reflexes: Reflexes are normal and symmetric. Psychiatric:         Behavior: Behavior normal.         Thought Content: Thought content normal.         Judgment: Judgment normal.              Laboratory: All laboratory and radiology results have been personally reviewed by myself    Lab Results   Component Value Date     01/12/2022    K 4.8 01/12/2022    K 3.7 08/21/2021     01/12/2022    CO2 27 01/12/2022    BUN 16 01/12/2022    CREATININE 1.0 01/12/2022    PROT 7.0 01/12/2022    LABALBU 3.9 01/12/2022    CALCIUM 9.2 01/12/2022    GFRAA >60 01/12/2022    LABGLOM >60 01/12/2022    GLUCOSE 188 01/12/2022    AST 16 01/12/2022    ALT 16 01/12/2022    ALKPHOS 93 01/12/2022    BILITOT <0.2 01/12/2022    TSH 0.507 12/11/2021    VITD25 16 12/04/2021    CHOL 173 12/11/2021    TRIG 100 12/11/2021    HDL 65 12/11/2021    LDLCALC 88 12/11/2021    LABA1C 5.3 12/11/2021        Lab Results   Component Value Date    CHOL 173 12/11/2021     Lab Results   Component Value Date    TRIG 100 12/11/2021     Lab Results   Component Value Date    HDL 65 12/11/2021     Lab Results   Component Value Date    LDLCALC 88 12/11/2021       Lab Results   Component Value Date    LABA1C 5.3 12/11/2021     Lab Results   Component Value Date    LABMICR <12.0 12/04/2021    LDLCALC 88 12/11/2021    CREATININE 1.0 01/12/2022       ASSESSMENT/PLAN:    1. Essential hypertension  2. Type 2 diabetes mellitus without complication, without long-term current use of insulin ([address]  3. Anxiety  4. History of seizures  5. On home O2  -     6 Minute Walk Test; Future     Patient does not check BP at home. Did recommend to start checking BPs at home. He will call in 2 weeks with an update. As for anxiety does wish to wean off BuSpar. He does feel much improved. He will call if his anxiety worsens. Seizures under good control has not had any seizures in some time.   We did complete 6-minute walk test he is able to discontinue oxygen as he was 94% on room air    Problem list reviewed andsimplified/updated  HM reviewed today and counseled as appropriate    Call or go to ED immediately if symptoms worsen or persist.  No future appointments. Or sooner if necessary. Educational materials and/or homeexercises printed for patient's review and were included in patient instructions on his/her After Visit Summary and given to patient at the end of visit. Counseled regarding above diagnosis, including possible risks and complications,  especially if left uncontrolled. Counseled regarding the possible side effects, risks, benefits and alternatives to treatment; patient and/or guardian verbalizes understanding, agrees,feels comfortable with and wishes to proceed with above treatment plan. Advised patient to call [facility] new medication issues, and read all Rx info from pharmacy to assure aware of all possible risks and side effects of medication before taking. Reviewed age and gender appropriate health screening exams and vaccinations. Advised patient regarding importance of keeping up with recommended health maintenance and toschedule as soon as possible if overdue, as this is important in assessing for undiagnosed pathology, especially cancer, as well as protecting against potentially harmful/life threatening disease. and/or guardian verbalizes understanding and agrees with above counseling, assessment and plan. All questions answered. Loco 5, DO  4/12/22    NOTE: This report was transcribed using voice recognition software.  Every effort was made to ensure accuracy; however, inadvertent computerized transcription errors may be present

## 2022-04-27 ENCOUNTER — TELEPHONE (OUTPATIENT)
Dept: FAMILY MEDICINE CLINIC | Age: 57
End: 2022-04-27

## 2022-04-27 NOTE — TELEPHONE ENCOUNTER
Pt states his blood pressure has been stable reading of per pt no high reading, pt driving unable to provide exact reading numbers

## 2022-05-21 NOTE — PROGRESS NOTES
Department of Internal Medicine  Infectious Diseases  Progress  Note      C/C :  Pneumonia, respiratory failure     Pt is awake, alert, no distress  SOB improving   Afebrile       Current Facility-Administered Medications   Medication Dose Route Frequency Provider Last Rate Last Admin    [START ON 12/18/2021] predniSONE (DELTASONE) tablet 40 mg  40 mg Oral Daily Earley Blizzard, APRN - CNP        sodium chloride (Inhalant) 3 % nebulizer solution 4 mL  4 mL Nebulization Q8H Tye Dennison MD   4 mL at 12/17/21 1342    methylPREDNISolone sodium (SOLU-MEDROL) injection 40 mg  40 mg IntraVENous Q12H Azuley Blizzard, APRN - CNP   40 mg at 12/17/21 1426    [Held by provider] furosemide (LASIX) injection 40 mg  40 mg IntraVENous Daily Earley Blizzard, APRN - CNP   40 mg at 12/17/21 1032    vancomycin (VANCOCIN) 1,250 mg in dextrose 5 % 250 mL IVPB  1,250 mg IntraVENous Q18H Elenore Sky Marte, .7 mL/hr at 12/17/21 1055 1,250 mg at 12/17/21 1055    morphine (PF) injection 1 mg  1 mg IntraVENous Q4H PRN Javi Dias MD   1 mg at 12/17/21 0873    LORazepam (ATIVAN) injection 0.5 mg  0.5 mg IntraVENous Q4H PRN Sandeep Nolasco MD   0.5 mg at 12/17/21 0523    labetalol (NORMODYNE;TRANDATE) injection 5 mg  5 mg IntraVENous Once Sandeep Nolasco MD        melatonin disintegrating tablet 10 mg  10 mg Oral Nightly PRN Pedro Griffiths MD   10 mg at 12/14/21 2104    guaiFENesin tablet 400 mg  400 mg Oral TID Willis Gonzalez DO   400 mg at 12/17/21 1427    ipratropium-albuterol (DUONEB) nebulizer solution 1 ampule  1 ampule Inhalation Q4H While awake Willis Gonzalez DO   1 ampule at 12/17/21 1342    cefepime (MAXIPIME) 2000 mg IVPB minibag  2,000 mg IntraVENous Q12H Elenore Sky Marte DO 12.5 mL/hr at 12/17/21 1426 2,000 mg at 12/17/21 1426    sodium chloride flush 0.9 % injection 5-40 mL  5-40 mL IntraVENous 2 times per day Willis Gonzalez DO   10 mL at 12/17/21 0842    sodium chloride flush 0.9 % injection 5-40 mL  5-40 mL IntraVENous PRN Alana Skiff, DO   10 mL at 12/17/21 0524    0.9 % sodium chloride infusion  25 mL IntraVENous PRN Alana Skiff, DO        ondansetron (ZOFRAN-ODT) disintegrating tablet 4 mg  4 mg Oral Q8H PRN Alana Skiff, DO        Or    ondansetron [facility]) injection 4 mg  4 mg IntraVENous Q6H PRN [facility] Geoffher, DO        polyethylene glycol (GLYCOLAX) packet 17 g  17 g Oral Daily PRN Alana Skiff, DO        acetaminophen (TYLENOL) tablet 650 mg  650 mg Oral Q6H PRN Alana Skiff, DO   650 mg at 12/12/21 5657    Or    acetaminophen (TYLENOL) suppository 650 mg  650 mg Rectal Q6H PRN Alana Skiff, DO        amLODIPine (NORVASC) tablet 10 mg  10 mg Oral Daily [facility], DO   10 mg at 12/17/21 1054    atorvastatin (LIPITOR) tablet 40 mg  40 mg Oral Nightly [facility], DO   40 mg at 12/16/21 2049    apixaban (ELIQUIS) tablet 5 mg  5 mg Oral BID [facility], DO   5 mg at 12/17/21 1054    gabapentin (NEURONTIN) capsule 300 mg  300 mg Oral Daily [facility], DO   300 mg at 12/17/21 1150    phenytoin (DILANTIN) ER capsule 300 mg  300 mg Oral Nightly [facility], DO   300 mg at 12/16/21 2049    phenytoin (DILANTIN) ER capsule 200 mg  200 mg Oral QAM [facility], DO   200 mg at 12/17/21 1054       REVIEW OF SYSTEMS:    CONSTITUTIONAL:  Denies fever, chill or rigors, nausea or vomiting. HEENT: denies blurring of vision or double vision, denies hearing problem  RESPIRATORY: SOB -some improvement    CARDIOVASCULAR:  Denies palpitation  GASTROINTESTINAL:  Denies abdomen pain, diarrhea or constipation. GENITOURINARY:  Denies burning urination or frequency of urination  INTEGUMENT: denies wound , rash  HEMATOLOGIC/LYMPHATIC:  Denies lymph node swelling, gum bleeding or easy bruising.   MUSCULOSKELETAL: Leg swelling   NEUROLOGICAL:  Denies light headed, dizziness, loss of consciousness      PHYSICAL EXAM: Vitals:     /77   Pulse 84   Temp 97.4 °F (36.3 °C) (Axillary)   Resp 29   Ht 6' (1.829 m)   Wt 275 lb 5 oz (124.9 kg)   SpO2 98%   BMI 37.34 kg/m²     General Appearance:    Awake, alert ,not in resp distress . Head:    Normocephalic, atraumatic   Eyes:    No pallor, no icterus,   Ears:    No obvious deformity or drainage.    Nose:   No nasal drainage   Throat:   Mucosa moist, no oral thrush   Neck:   Supple, no lymphadenopathy   Lungs:     Bilateral wheeze     Heart:    Regular rate and rhythm   Abdomen:     Soft, non-tender, bowel sounds present    Extremities:   ++ edema, non tender    Pulses:   Dorsalis pedis palpable    Skin:   No rash        CBC with Differential:      Lab Results   Component Value Date    WBC 13.5 12/17/2021    RBC 3.75 12/17/2021    HGB 10.8 12/17/2021    HCT 35.4 12/17/2021     12/17/2021    MCV 94.4 12/17/2021    MCH 28.8 12/17/2021    MCHC 30.5 12/17/2021    RDW 14.1 12/17/2021    NRBC 0.9 09/17/2021    METASPCT 0.9 09/17/2021    LYMPHOPCT 11.8 12/16/2021    MONOPCT 6.2 12/16/2021    MYELOPCT 0.9 09/17/2021    BASOPCT 0.4 12/16/2021    MONOSABS 0.62 12/16/2021    LYMPHSABS 1.18 12/16/2021    EOSABS 0.64 12/16/2021    BASOSABS 0.04 12/16/2021       CMP     Lab Results   Component Value Date     12/17/2021    K 4.8 12/17/2021    K 3.7 08/21/2021    CL 96 12/17/2021    CO2 33 12/17/2021    BUN 33 12/17/2021    CREATININE 1.3 12/17/2021    GFRAA >60 12/17/2021    LABGLOM 57 12/17/2021    GLUCOSE 196 12/17/2021    PROT 6.6 12/17/2021    LABALBU 3.8 12/17/2021    CALCIUM 8.9 12/17/2021    BILITOT 0.4 12/17/2021    ALKPHOS 64 12/17/2021    AST 18 12/17/2021    ALT 27 12/17/2021         Hepatic Function Panel:    Lab Results   Component Value Date    ALKPHOS 64 12/17/2021    ALT 27 12/17/2021    AST 18 12/17/2021    PROT 6.6 12/17/2021    BILITOT 0.4 12/17/2021    LABALBU 3.8 12/17/2021       PT/INR:    Lab Results   Component Value Date    PROTIME 12.2 12/10/2021 fever INR 1.1 12/10/2021       TSH:    Lab Results   Component Value Date    TSH 0.507 12/11/2021       U/A:    Lab Results   Component Value Date    COLORU DARK YELLOW 10/10/2021    PHUR 5.5 10/10/2021    WBCUA 1-3 10/10/2021    RBCUA 5-10 10/10/2021    BACTERIA MODERATE 10/10/2021    CLARITYU CLOUDY 10/10/2021    SPECGRAV >=1.030 10/10/2021    LEUKOCYTESUR Negative 10/10/2021    UROBILINOGEN 0.2 10/10/2021    BILIRUBINUR Negative 10/10/2021    BLOODU LARGE 10/10/2021    GLUCOSEU Negative 10/10/2021    AMORPHOUS FEW 10/10/2021       ABG:    Lab Results   Component Value Date    LTS9WDZ 25.5 08/26/2021       MICROBIOLOGY:    Blood culture - negative     Urine antigen - negative         Respiratory Panel, Molecular, with COVID-19 (Restricted: peds pts or suitable admitted adults) [[id number]] Collected: 12/10/21 1446   Order Status: Completed Specimen: Nasopharyngeal Updated: 12/10/21 1627    Adenovirus by PCR Not Detected    Bordetella parapertussis by PCR Not Detected    Bordetella pertussis by PCR Not Detected    Chlamydophilia pneumoniae by PCR Not Detected    Coronavirus 229E by PCR Not Detected    Coronavirus HKU1 by PCR Not Detected    Coronavirus NL63 by PCR Not Detected    Coronavirus OC43 by PCR Not Detected    SARS-CoV-2, PCR Not Detected    Human Metapneumovirus by PCR Not Detected    Human Rhinovirus/Enterovirus by PCR Not Detected    Influenza A by PCR Not Detected    Influenza B by PCR Not Detected    Mycoplasma pneumoniae by PCR Not Detected    Parainfluenza Virus 1 by PCR Not Detected    Parainfluenza Virus 2 by PCR Not Detected    Parainfluenza Virus 3 by PCR Not Detected    Parainfluenza Virus 4 by PCR Not Detected    Respiratory Syncytial Virus by PCR Not Detected   COVID-19, Rapid [[id number]]      Vanco random 11.5     Radiology :    CTA chest -   Impression:       No evidence of pulmonary embolism     Consolidation and ground-glass opacities of the lungs is suspicious for   infectious process.

## 2022-06-13 RX ORDER — GABAPENTIN 300 MG/1
300 CAPSULE ORAL 3 TIMES DAILY
Qty: 90 CAPSULE | Refills: 0 | Status: SHIPPED | OUTPATIENT
Start: 2022-06-13 | End: 2022-07-13

## 2022-09-12 NOTE — TELEPHONE ENCOUNTER
Last Appointment:  4/12/2022  Future Appointments   Date Time Provider Sandra Neville   11/17/2022  3:00 PM [facility]

## 2022-09-13 RX ORDER — PHENYTOIN SODIUM 100 MG/1
300 CAPSULE, EXTENDED RELEASE ORAL NIGHTLY
Qty: 90 CAPSULE | Refills: 1 | Status: SHIPPED | OUTPATIENT
Start: 2022-09-13

## 2022-09-13 RX ORDER — PHENYTOIN SODIUM 100 MG/1
200 CAPSULE, EXTENDED RELEASE ORAL EVERY MORNING
Qty: 60 CAPSULE | Refills: 1 | Status: SHIPPED | OUTPATIENT
Start: 2022-09-13

## 2022-09-16 ENCOUNTER — TELEPHONE (OUTPATIENT)
Dept: FAMILY MEDICINE CLINIC | Age: 57
End: 2022-09-16

## 2022-09-16 RX ORDER — ATORVASTATIN CALCIUM 40 MG/1
40 TABLET, FILM COATED ORAL DAILY
Qty: 30 TABLET | Refills: 1 | Status: SHIPPED | OUTPATIENT
Start: 2022-09-16

## 2022-09-16 NOTE — TELEPHONE ENCOUNTER
Please send Atorvastatin too Susan Bolaños in [facility], pt is out of med

## 2022-10-05 ENCOUNTER — TELEPHONE (OUTPATIENT)
Dept: FAMILY MEDICINE CLINIC | Age: 57
End: 2022-10-05

## 2022-10-05 RX ORDER — BLOOD SUGAR DIAGNOSTIC
STRIP MISCELLANEOUS
COMMUNITY
Start: 2022-07-16 | End: 2022-10-05 | Stop reason: SDUPTHER

## 2022-10-05 RX ORDER — BLOOD SUGAR DIAGNOSTIC
STRIP MISCELLANEOUS
Qty: 100 EACH | Refills: 0 | Status: SHIPPED | OUTPATIENT
Start: 2022-10-05

## 2022-10-05 NOTE — TELEPHONE ENCOUNTER
Please send to [facility] in [facility] the pts test strips he test 1x day, is too establish with Tj on 11-17

## 2022-11-07 RX ORDER — PHENYTOIN SODIUM 100 MG/1
CAPSULE, EXTENDED RELEASE ORAL
Qty: 150 CAPSULE | Refills: 0 | Status: SHIPPED | OUTPATIENT
Start: 2022-11-07

## 2022-11-09 RX ORDER — PHENYTOIN SODIUM 100 MG/1
CAPSULE, EXTENDED RELEASE ORAL
Qty: 150 CAPSULE | Refills: 0 | OUTPATIENT
Start: 2022-11-09

## 2022-11-10 ENCOUNTER — TELEPHONE (OUTPATIENT)
Dept: FAMILY MEDICINE CLINIC | Age: 57
End: 2022-11-10

## 2022-11-14 SDOH — HEALTH STABILITY: PHYSICAL HEALTH: ON AVERAGE, HOW MANY MINUTES DO YOU ENGAGE IN EXERCISE AT THIS LEVEL?: 0 MIN

## 2022-11-17 ENCOUNTER — OFFICE VISIT (OUTPATIENT)
Dept: FAMILY MEDICINE CLINIC | Age: 57
End: 2022-11-17
Payer: COMMERCIAL

## 2022-11-17 VITALS
RESPIRATION RATE: 20 BRPM | OXYGEN SATURATION: 98 % | WEIGHT: 231.8 LBS | HEART RATE: 92 BPM | BODY MASS INDEX: 31.4 KG/M2 | TEMPERATURE: 97.3 F | SYSTOLIC BLOOD PRESSURE: 181 MMHG | HEIGHT: 72 IN | DIASTOLIC BLOOD PRESSURE: 101 MMHG

## 2022-11-17 DIAGNOSIS — D64.9 ANEMIA, UNSPECIFIED TYPE: ICD-10-CM

## 2022-11-17 DIAGNOSIS — E78.5 HYPERLIPIDEMIA, UNSPECIFIED HYPERLIPIDEMIA TYPE: ICD-10-CM

## 2022-11-17 DIAGNOSIS — Z11.59 NEED FOR HEPATITIS C SCREENING TEST: ICD-10-CM

## 2022-11-17 DIAGNOSIS — E11.9 TYPE 2 DIABETES MELLITUS WITHOUT COMPLICATION, WITHOUT LONG-TERM CURRENT USE OF INSULIN (HCC): ICD-10-CM

## 2022-11-17 DIAGNOSIS — Z23 ENCOUNTER FOR IMMUNIZATION: ICD-10-CM

## 2022-11-17 DIAGNOSIS — Z23 NEED FOR PROPHYLACTIC VACCINATION AND INOCULATION AGAINST VARICELLA: ICD-10-CM

## 2022-11-17 DIAGNOSIS — Z76.89 ESTABLISHING CARE WITH NEW DOCTOR, ENCOUNTER FOR: Primary | ICD-10-CM

## 2022-11-17 DIAGNOSIS — Z23 NEED FOR VACCINATION FOR STREP PNEUMONIAE: ICD-10-CM

## 2022-11-17 DIAGNOSIS — Z23 NEED FOR PROPHYLACTIC VACCINATION AGAINST DIPHTHERIA-TETANUS-PERTUSSIS (DTP): ICD-10-CM

## 2022-11-17 DIAGNOSIS — Z87.898 HISTORY OF SEIZURES: ICD-10-CM

## 2022-11-17 DIAGNOSIS — Z11.4 SCREENING FOR HIV WITHOUT PRESENCE OF RISK FACTORS: ICD-10-CM

## 2022-11-17 DIAGNOSIS — I10 ESSENTIAL HYPERTENSION: ICD-10-CM

## 2022-11-17 DIAGNOSIS — E55.9 VITAMIN D DEFICIENCY: ICD-10-CM

## 2022-11-17 DIAGNOSIS — R56.9 SEIZURE (HCC): ICD-10-CM

## 2022-11-17 PROCEDURE — G8427 DOCREV CUR MEDS BY ELIG CLIN: HCPCS | Performed by: STUDENT IN AN ORGANIZED HEALTH CARE EDUCATION/TRAINING PROGRAM

## 2022-11-17 PROCEDURE — 3078F DIAST BP <80 MM HG: CPT | Performed by: STUDENT IN AN ORGANIZED HEALTH CARE EDUCATION/TRAINING PROGRAM

## 2022-11-17 PROCEDURE — 1036F TOBACCO NON-USER: CPT | Performed by: STUDENT IN AN ORGANIZED HEALTH CARE EDUCATION/TRAINING PROGRAM

## 2022-11-17 PROCEDURE — 90471 IMMUNIZATION ADMIN: CPT | Performed by: STUDENT IN AN ORGANIZED HEALTH CARE EDUCATION/TRAINING PROGRAM

## 2022-11-17 PROCEDURE — 3017F COLORECTAL CA SCREEN DOC REV: CPT | Performed by: STUDENT IN AN ORGANIZED HEALTH CARE EDUCATION/TRAINING PROGRAM

## 2022-11-17 PROCEDURE — G8484 FLU IMMUNIZE NO ADMIN: HCPCS | Performed by: STUDENT IN AN ORGANIZED HEALTH CARE EDUCATION/TRAINING PROGRAM

## 2022-11-17 PROCEDURE — 99214 OFFICE O/P EST MOD 30 MIN: CPT | Performed by: STUDENT IN AN ORGANIZED HEALTH CARE EDUCATION/TRAINING PROGRAM

## 2022-11-17 PROCEDURE — 3046F HEMOGLOBIN A1C LEVEL >9.0%: CPT | Performed by: STUDENT IN AN ORGANIZED HEALTH CARE EDUCATION/TRAINING PROGRAM

## 2022-11-17 PROCEDURE — G8417 CALC BMI ABV UP PARAM F/U: HCPCS | Performed by: STUDENT IN AN ORGANIZED HEALTH CARE EDUCATION/TRAINING PROGRAM

## 2022-11-17 PROCEDURE — 2022F DILAT RTA XM EVC RTNOPTHY: CPT | Performed by: STUDENT IN AN ORGANIZED HEALTH CARE EDUCATION/TRAINING PROGRAM

## 2022-11-17 PROCEDURE — 90677 PCV20 VACCINE IM: CPT | Performed by: STUDENT IN AN ORGANIZED HEALTH CARE EDUCATION/TRAINING PROGRAM

## 2022-11-17 PROCEDURE — 3074F SYST BP LT 130 MM HG: CPT | Performed by: STUDENT IN AN ORGANIZED HEALTH CARE EDUCATION/TRAINING PROGRAM

## 2022-11-17 RX ORDER — AMLODIPINE BESYLATE 10 MG/1
10 TABLET ORAL DAILY
Qty: 90 TABLET | Refills: 3 | Status: SHIPPED | OUTPATIENT
Start: 2022-11-17

## 2022-11-17 RX ORDER — LISINOPRIL 10 MG/1
10 TABLET ORAL DAILY
Qty: 90 TABLET | Refills: 3 | Status: SHIPPED | OUTPATIENT
Start: 2022-11-17

## 2022-11-17 RX ORDER — ATORVASTATIN CALCIUM 40 MG/1
40 TABLET, FILM COATED ORAL DAILY
Qty: 90 TABLET | Refills: 3 | Status: SHIPPED | OUTPATIENT
Start: 2022-11-17

## 2022-11-17 SDOH — SOCIAL STABILITY: SOCIAL INSECURITY
WITHIN THE LAST YEAR, HAVE YOU BEEN KICKED, HIT, SLAPPED, OR OTHERWISE PHYSICALLY HURT BY YOUR PARTNER OR EX-PARTNER?: NO

## 2022-11-17 SDOH — ECONOMIC STABILITY: FOOD INSECURITY: WITHIN THE PAST 12 MONTHS, THE FOOD YOU BOUGHT JUST DIDN'T LAST AND YOU DIDN'T HAVE MONEY TO GET MORE.: NEVER TRUE

## 2022-11-17 SDOH — SOCIAL STABILITY: SOCIAL NETWORK: IN A TYPICAL WEEK, HOW MANY TIMES DO YOU TALK ON THE PHONE WITH FAMILY, FRIENDS, OR NEIGHBORS?: NEVER

## 2022-11-17 SDOH — ECONOMIC STABILITY: HOUSING INSECURITY
IN THE LAST 12 MONTHS, WAS THERE A TIME WHEN YOU DID NOT HAVE A STEADY PLACE TO SLEEP OR SLEPT IN A SHELTER (INCLUDING NOW)?: NO

## 2022-11-17 SDOH — SOCIAL STABILITY: SOCIAL INSECURITY: WITHIN THE LAST YEAR, HAVE YOU BEEN HUMILIATED OR EMOTIONALLY ABUSED IN OTHER WAYS BY YOUR PARTNER OR EX-PARTNER?: NO

## 2022-11-17 SDOH — SOCIAL STABILITY: SOCIAL NETWORK: ARE YOU MARRIED, WIDOWED, DIVORCED, SEPARATED, NEVER MARRIED, OR LIVING WITH A PARTNER?: MARRIED

## 2022-11-17 SDOH — HEALTH STABILITY: MENTAL HEALTH: HOW OFTEN DO YOU HAVE A DRINK CONTAINING ALCOHOL?: NEVER

## 2022-11-17 SDOH — ECONOMIC STABILITY: INCOME INSECURITY: HOW HARD IS IT FOR YOU TO PAY FOR THE VERY BASICS LIKE FOOD, HOUSING, MEDICAL CARE, AND HEATING?: NOT HARD AT ALL

## 2022-11-17 SDOH — HEALTH STABILITY: MENTAL HEALTH
STRESS IS WHEN SOMEONE FEELS TENSE, NERVOUS, ANXIOUS, OR CAN'T SLEEP AT NIGHT BECAUSE THEIR MIND IS TROUBLED. HOW STRESSED ARE YOU?: NOT AT ALL

## 2022-11-17 SDOH — ECONOMIC STABILITY: INCOME INSECURITY: IN THE LAST 12 MONTHS, WAS THERE A TIME WHEN YOU WERE NOT ABLE TO PAY THE MORTGAGE OR RENT ON TIME?: NO

## 2022-11-17 SDOH — ECONOMIC STABILITY: TRANSPORTATION INSECURITY
IN THE PAST 12 MONTHS, HAS LACK OF TRANSPORTATION KEPT YOU FROM MEETINGS, WORK, OR FROM GETTING THINGS NEEDED FOR DAILY LIVING?: NO

## 2022-11-17 SDOH — HEALTH STABILITY: PHYSICAL HEALTH: ON AVERAGE, HOW MANY MINUTES DO YOU ENGAGE IN EXERCISE AT THIS LEVEL?: 100 MIN

## 2022-11-17 SDOH — ECONOMIC STABILITY: FOOD INSECURITY: WITHIN THE PAST 12 MONTHS, YOU WORRIED THAT YOUR FOOD WOULD RUN OUT BEFORE YOU GOT MONEY TO BUY MORE.: NEVER TRUE

## 2022-11-17 SDOH — ECONOMIC STABILITY: TRANSPORTATION INSECURITY
IN THE PAST 12 MONTHS, HAS THE LACK OF TRANSPORTATION KEPT YOU FROM MEDICAL APPOINTMENTS OR FROM GETTING MEDICATIONS?: NO

## 2022-11-17 SDOH — SOCIAL STABILITY: SOCIAL NETWORK: HOW OFTEN DO YOU GET TOGETHER WITH FRIENDS OR RELATIVES?: NEVER

## 2022-11-17 SDOH — SOCIAL STABILITY: SOCIAL INSECURITY
WITHIN THE LAST YEAR, HAVE TO BEEN RAPED OR FORCED TO HAVE ANY KIND OF SEXUAL ACTIVITY BY YOUR PARTNER OR EX-PARTNER?: NO

## 2022-11-17 SDOH — HEALTH STABILITY: MENTAL HEALTH: HOW MANY STANDARD DRINKS CONTAINING ALCOHOL DO YOU HAVE ON A TYPICAL DAY?: PATIENT DOES NOT DRINK

## 2022-11-17 SDOH — HEALTH STABILITY: PHYSICAL HEALTH: ON AVERAGE, HOW MANY DAYS PER WEEK DO YOU ENGAGE IN MODERATE TO STRENUOUS EXERCISE (LIKE A BRISK WALK)?: 7 DAYS

## 2022-11-17 SDOH — SOCIAL STABILITY: SOCIAL INSECURITY: WITHIN THE LAST YEAR, HAVE YOU BEEN AFRAID OF YOUR PARTNER OR EX-PARTNER?: NO

## 2022-11-17 SDOH — SOCIAL STABILITY: SOCIAL NETWORK: HOW OFTEN DO YOU ATTEND CHURCH OR RELIGIOUS SERVICES?: MORE THAN 4 TIMES PER YEAR

## 2022-11-17 SDOH — SOCIAL STABILITY: SOCIAL NETWORK
DO YOU BELONG TO ANY CLUBS OR ORGANIZATIONS SUCH AS CHURCH GROUPS UNIONS, FRATERNAL OR ATHLETIC GROUPS, OR SCHOOL GROUPS?: YES

## 2022-11-17 SDOH — SOCIAL STABILITY: SOCIAL NETWORK: HOW OFTEN DO YOU ATTENT MEETINGS OF THE CLUB OR ORGANIZATION YOU BELONG TO?: MORE THAN 4 TIMES PER YEAR

## 2022-11-17 ASSESSMENT — PATIENT HEALTH QUESTIONNAIRE - PHQ9
SUM OF ALL RESPONSES TO PHQ QUESTIONS 1-9: 0
SUM OF ALL RESPONSES TO PHQ9 QUESTIONS 1 & 2: 0
SUM OF ALL RESPONSES TO PHQ QUESTIONS 1-9: 0
SUM OF ALL RESPONSES TO PHQ QUESTIONS 1-9: 0
2. FEELING DOWN, DEPRESSED OR HOPELESS: 0
1. LITTLE INTEREST OR PLEASURE IN DOING THINGS: 0
SUM OF ALL RESPONSES TO PHQ QUESTIONS 1-9: 0

## 2022-11-17 ASSESSMENT — SOCIAL DETERMINANTS OF HEALTH (SDOH): HOW HARD IS IT FOR YOU TO PAY FOR THE VERY BASICS LIKE FOOD, HOUSING, MEDICAL CARE, AND HEATING?: NOT HARD AT ALL

## 2022-11-17 NOTE — PROGRESS NOTES
Rohit Marshall (:  1965) is a 59 y.o. male, New Patient established at office, here for evaluation of the following:  Establish Care         ASSESSMENT/PLAN      1. Establishing care with new doctor, encounter for  Patient is here to establish care, he did have COVID about a year ago, he ended up hospitalized for many weeks, in a coma on a ventilator, now he is doing well, fully recovered  2. History of seizures  He does have history of seizures, he is not sure if they are epileptic or not, he is unsure if he has had E EEG, will send him to neurology, he is on phenytoin, will get a level now, possibility of newer medications with less long-term side effects  -     [facility]  3. Seizure ([facility] [address]  -     Phenytoin Level, Total; Future  -     [facility]  4. Type 2 diabetes mellitus without complication, without long-term current use of insulin (HCC)  Chronic, will get A1c now, continue with current plan, can make adjustments based on the lab results  -     Microalbumin / creatinine urine ratio; Future  -     Lipid Panel; Future  -     Hemoglobin A1C [LAB90}; Future  5. Screening for HIV without presence of risk factors  -     HIV Screen; Future  6. Need for hepatitis C screening test  -     Hepatitis C Antibody; Future  7. Need for prophylactic vaccination against diphtheria-tetanus-pertussis (DTP)  -     Tdap (ADACEL) 5-2-15.5 LF-MCG/0.5 injection; Inject 0.5 mLs into the muscle once for 1 dose, Disp-0.5 mL, R-0Print  8. Need for prophylactic vaccination and inoculation against varicella  -     zoster recombinant adjuvanted vaccine [facility]) 50 MCG/0.5ML SUSR injection; Inject 0.5 mLs into the muscle once for 1 dose 50 MCG IM then repeat 2-6 months., Disp-1 each, R-1Print  9. Encounter for immunization  10.  Essential hypertension  Chronic, not well controlled, will add additional medications, 1 in the morning and 1 at night, will check electrolytes function, he denies any symptoms of sleep apnea and does not want sleep study at this time however he does have hypertrophied tonsils and this may be contributing to the high blood pressure, he does note at home it is not as high, he will bring in his home blood pressure cuff so we can calibrate it along with his log of blood pressures after starting this new medicine  -     amLODIPine (NORVASC) 10 MG tablet; Take 1 tablet by mouth daily, Disp-90 tablet, R-3Normal  -     CBC with Auto Differential; Future  -     Comprehensive Metabolic Panel; Future  -     lisinopril (PRINIVIL;ZESTRIL) 10 MG tablet; Take 1 tablet by mouth daily, Disp-90 tablet, R-3Normal  11. Anemia, unspecified type  Unclear, may be due to his illness and hospitalization, will repeat now  12. Hyperlipidemia, unspecified hyperlipidemia type  Chronic, on high intensity statin, will repeat lipid panel  -     atorvastatin (LIPITOR) 40 MG tablet; Take 1 tablet by mouth daily, Disp-90 tablet, R-3Normal  13. Vitamin D deficiency  -     Vitamin D 25 Hydroxy; Future  14. Need for vaccination for Strep pneumoniae  -     Pneumococcal, PCV20, PREVNAR 21, (age 25 yrs+), IM, PF  Return in about 3 months (around 2/17/2023) for 10 days for bp check and bring his cuff, Follow up chronic disease. Subjective   SUBJECTIVE/OBJECTIVE:  THIERRY  Soha Yeh is here to establish care. He did have COVID. He was in hospital. He was in a coma for 19 days with trach. Now doing better and off oxygen. Blood Pressure at home 140/80's, taking lisinopril  He has history of seizures. He blacks out. He has never seen neuro. He is very tired and disoriented afterwards, never bites his tongue, has urinated before, no shaking every. Cant tell when they will come on. Last one 2008. Last level was low but has been consistently with good control     He checks sugars in Am 130-140. 5.3 1 year ago.      Declined preventative screening identified as care gaps unless ordered through this visit    PHQ2/PHQ9  PHQ-2 Score: 0  PHQ-2 Over the past 2 weeks, how often have you been bothered by any of the following problems? Little interest or pleasure in doing things: Not at all  Feeling down, depressed, or hopeless: Not at all  PHQ-2 Score: 0   PHQ-9 Total Score: 0 (11/17/2022  2:43 PM)       Past Medical History:  has a past medical history of HIGH CHOLESTEROL, Hypertension, and Seizures ([facility] [address] Past Surgical History:  has a past surgical history that includes Appendectomy; picc line insertion nurse (9/8/2021); bronchoscopy (N/A, 9/8/2021); tracheostomy (N/A, 9/9/2021); and Upper gastrointestinal endoscopy (N/A, 9/9/2021). Social History:  reports that he has never smoked. He has never used smokeless tobacco.  Family History: family history is not on file. Allergies: Patient has no known allergies. Medications:   Current Outpatient Medications   Medication Sig Dispense Refill    amLODIPine (NORVASC) 10 MG tablet Take 1 tablet by mouth daily 90 tablet 3    atorvastatin (LIPITOR) 40 MG tablet Take 1 tablet by mouth daily 90 tablet 3    lisinopril (PRINIVIL;ZESTRIL) 10 MG tablet Take 1 tablet by mouth daily 90 tablet 3    Lancets (ONETOUCH DELICA PLUS [id number]) MISC Inject 1 Dose into the skin daily      phenytoin (DILANTIN) 100 MG ER capsule TAKE 2 CAPSULES BY MOUTH IN THE MORNING AND 3 AT BEDTIME 150 capsule 0    ONETOUCH ULTRA strip Check FBS daily 100 each 0    metFORMIN (GLUCOPHAGE) 500 MG tablet TAKE 1 TABLET BY MOUTH TWICE DAILY WITH MEALS 180 tablet 3     No current facility-administered medications for this visit. Allergies: Patient has no known allergies. Review of Systems   Constitutional:  Negative for chills, fatigue, fever and unexpected weight change. HENT:  Negative for hearing loss. Eyes:  Negative for visual disturbance. Respiratory:  Negative for cough, shortness of breath and wheezing. Cardiovascular:  Negative for chest pain, palpitations and leg swelling.    Gastrointestinal:  Negative for abdominal pain, blood in stool, constipation, diarrhea and nausea. Genitourinary:  Negative for dysuria. Musculoskeletal:  Negative for arthralgias. Neurological:  Negative for weakness, light-headedness, numbness and headaches. Psychiatric/Behavioral:  Negative for dysphoric mood and sleep disturbance. The patient is not nervous/anxious. All other systems reviewed and are negative. Objective   BP (!) 181/101 (Site: Left Upper Arm, Position: Sitting, Cuff Size: Large Adult)   Pulse 92   Temp 97.3 °F (36.3 °C) (Temporal)   Resp 20   Ht 6' (1.829 m)   Wt 231 lb 12.8 oz (105.1 kg)   SpO2 98%   BMI 31.44 kg/m²       Lab Results   Component Value Date    LABA1C 5.3 12/11/2021    LABA1C 5.6 12/01/2021    LABA1C 6.7 (H) 08/21/2021     Lab Results   Component Value Date    CHOL 173 12/11/2021    CHOL 166 12/04/2021    CHOL 223 (H) 12/12/2020     Lab Results   Component Value Date    TRIG 100 12/11/2021    TRIG 84 12/04/2021    TRIG 247 (H) 09/01/2021     Lab Results   Component Value Date    HDL 65 12/11/2021    HDL 57 12/04/2021    HDL 50 12/12/2020     Lab Results   Component Value Date    LDLCALC 88 12/11/2021    LDLCALC 92 12/04/2021    LDLCALC 139 (H) 12/12/2020     Lab Results   Component Value Date    LABVLDL 20 12/11/2021    LABVLDL 17 12/04/2021    LABVLDL 34 12/12/2020     No results found for: CHOLHDLRATIO   Creatinine   Date Value Ref Range Status   01/12/2022 1.0 0.7 - 1.2 mg/dL Final   12/27/2021 0.9 0.7 - 1.2 mg/dL Final   12/24/2021 1.1 0.7 - 1.2 mg/dL Final       The 10-year ASCVD risk score (Jun ROSAS, et al., 2019) is: 21.9%    Values used to calculate the score:      Age: 59 years      Sex: Male      Is Non- : No      Diabetic: Yes      Tobacco smoker: No      Systolic Blood Pressure: 387 mmHg      Is BP treated: Yes      HDL Cholesterol: 65 mg/dL      Total Cholesterol: 173 mg/dL     Physical Exam  Constitutional:       General: He is not in acute distress. Appearance: Normal appearance. HENT:      Head: Normocephalic and atraumatic. Right Ear: External ear normal.      Nose: Nose normal.      Mouth/Throat:      Mouth: Mucous membranes are moist.   Eyes:      Extraocular Movements: Extraocular movements intact. Conjunctiva/sclera: Conjunctivae normal.   Cardiovascular:      Rate and Rhythm: Normal rate and regular rhythm. Heart sounds: No murmur heard. Pulmonary:      Effort: Pulmonary effort is normal.      Breath sounds: Normal breath sounds. No wheezing. Musculoskeletal:         General: Normal range of motion. Cervical back: Normal range of motion and neck supple. Lymphadenopathy:      Cervical: No cervical adenopathy. Neurological:      General: No focal deficit present. Mental Status: He is alert. Psychiatric:         Mood and Affect: Mood normal.         Behavior: Behavior normal.           An electronic signature was used to authenticate this note. --Ovidio Khan MD       *NOTE: This report was transcribed using voice recognition software. Every effort was made to ensure accuracy; however, inadvertent computerized transcription errors may be present.

## 2022-11-18 RX ORDER — LANCETS 33 GAUGE
1 EACH MISCELLANEOUS DAILY
COMMUNITY
Start: 2022-10-19

## 2022-11-18 ASSESSMENT — ENCOUNTER SYMPTOMS
COUGH: 0
NAUSEA: 0
DIARRHEA: 0
ABDOMINAL PAIN: 0
SHORTNESS OF BREATH: 0
WHEEZING: 0
BLOOD IN STOOL: 0
CONSTIPATION: 0

## 2022-11-22 PROBLEM — U07.1 PNEUMONIA DUE TO COVID-19 VIRUS: Status: RESOLVED | Noted: 2021-08-21 | Resolved: 2022-11-22

## 2022-11-22 PROBLEM — J96.21 ACUTE ON CHRONIC RESPIRATORY FAILURE WITH HYPOXIA (HCC): Status: RESOLVED | Noted: 2021-12-10 | Resolved: 2022-11-22

## 2022-11-22 PROBLEM — A41.9 SEPSIS (HCC): Status: RESOLVED | Noted: 2021-08-26 | Resolved: 2022-11-22

## 2022-11-22 PROBLEM — E43 SEVERE PROTEIN-CALORIE MALNUTRITION (HCC): Status: RESOLVED | Noted: 2021-12-17 | Resolved: 2022-11-22

## 2022-11-22 PROBLEM — D69.6 THROMBOCYTOPENIA (HCC): Status: RESOLVED | Noted: 2021-08-26 | Resolved: 2022-11-22

## 2022-11-22 PROBLEM — J12.82 PNEUMONIA DUE TO COVID-19 VIRUS: Status: RESOLVED | Noted: 2021-08-21 | Resolved: 2022-11-22

## 2022-11-22 PROBLEM — T83.9XXA FOLEY CATHETER PROBLEM (HCC): Status: RESOLVED | Noted: 2021-08-26 | Resolved: 2022-11-22

## 2022-11-22 PROBLEM — J96.01 ACUTE RESPIRATORY FAILURE WITH HYPOXIA (HCC): Status: RESOLVED | Noted: 2021-12-10 | Resolved: 2022-11-22

## 2022-11-22 NOTE — PROGRESS NOTES
Updated chart diagnosis, did get a message from her insurance company that he needed to be treated for heart failure, he no longer has heart failure, resolved

## 2022-11-25 ENCOUNTER — HOSPITAL ENCOUNTER (OUTPATIENT)
Age: 57
Discharge: HOME | End: 2022-11-25
Payer: COMMERCIAL

## 2022-11-25 DIAGNOSIS — E11.9 TYPE 2 DIABETES MELLITUS WITHOUT COMPLICATION, WITHOUT LONG-TERM CURRENT USE OF INSULIN (HCC): ICD-10-CM

## 2022-11-25 DIAGNOSIS — I10 ESSENTIAL HYPERTENSION: ICD-10-CM

## 2022-11-25 DIAGNOSIS — R56.9 SEIZURE (HCC): ICD-10-CM

## 2022-11-25 DIAGNOSIS — Z11.59 NEED FOR HEPATITIS C SCREENING TEST: ICD-10-CM

## 2022-11-25 DIAGNOSIS — Z11.4 SCREENING FOR HIV WITHOUT PRESENCE OF RISK FACTORS: ICD-10-CM

## 2022-11-25 DIAGNOSIS — E55.9 VITAMIN D DEFICIENCY: ICD-10-CM

## 2022-11-25 LAB
ALBUMIN SERPL-MCNC: 3.7 G/DL (ref 3.5–5.2)
ALP BLD-CCNC: 165 U/L (ref 40–129)
ALT SERPL-CCNC: 27 U/L (ref 0–40)
ANION GAP SERPL CALCULATED.3IONS-SCNC: 9 MMOL/L (ref 7–16)
AST SERPL-CCNC: 18 U/L (ref 0–39)
BASOPHILS ABSOLUTE: 0.05 E9/L (ref 0–0.2)
BASOPHILS RELATIVE PERCENT: 0.6 % (ref 0–2)
BILIRUB SERPL-MCNC: 0.2 MG/DL (ref 0–1.2)
BUN BLDV-MCNC: 19 MG/DL (ref 6–20)
CALCIUM SERPL-MCNC: 9.2 MG/DL (ref 8.6–10.2)
CHLORIDE BLD-SCNC: 105 MMOL/L (ref 98–107)
CHOLESTEROL, TOTAL: 171 MG/DL (ref 0–199)
CO2: 26 MMOL/L (ref 22–29)
CREAT SERPL-MCNC: 1.2 MG/DL (ref 0.7–1.2)
EOSINOPHILS ABSOLUTE: 0.25 E9/L (ref 0.05–0.5)
EOSINOPHILS RELATIVE PERCENT: 3.1 % (ref 0–6)
GFR SERPL CREATININE-BSD FRML MDRD: >60 ML/MIN/1.73
GLUCOSE BLD-MCNC: 163 MG/DL (ref 74–99)
HBA1C MFR BLD: 6.8 % (ref 4–5.6)
HCT VFR BLD CALC: 41.5 % (ref 37–54)
HDLC SERPL-MCNC: 49 MG/DL
HEMOGLOBIN: 14.2 G/DL (ref 12.5–16.5)
IMMATURE GRANULOCYTES #: 0.03 E9/L
IMMATURE GRANULOCYTES %: 0.4 % (ref 0–5)
LDL CHOLESTEROL CALCULATED: 97 MG/DL (ref 0–99)
LYMPHOCYTES ABSOLUTE: 1.96 E9/L (ref 1.5–4)
LYMPHOCYTES RELATIVE PERCENT: 24.2 % (ref 20–42)
MCH RBC QN AUTO: 29.3 PG (ref 26–35)
MCHC RBC AUTO-ENTMCNC: 34.2 % (ref 32–34.5)
MCV RBC AUTO: 85.7 FL (ref 80–99.9)
MONOCYTES ABSOLUTE: 0.58 E9/L (ref 0.1–0.95)
MONOCYTES RELATIVE PERCENT: 7.2 % (ref 2–12)
NEUTROPHILS ABSOLUTE: 5.24 E9/L (ref 1.8–7.3)
NEUTROPHILS RELATIVE PERCENT: 64.5 % (ref 43–80)
PDW BLD-RTO: 12.9 FL (ref 11.5–15)
PHENYTOIN DOSE AMOUNT: ABNORMAL
PHENYTOIN LEVEL: 4.3 UG/ML (ref 10–20)
PLATELET # BLD: 167 E9/L (ref 130–450)
PMV BLD AUTO: 9.5 FL (ref 7–12)
POTASSIUM SERPL-SCNC: 4.5 MMOL/L (ref 3.5–5)
RBC # BLD: 4.84 E12/L (ref 3.8–5.8)
SODIUM BLD-SCNC: 140 MMOL/L (ref 132–146)
TOTAL PROTEIN: 7.1 G/DL (ref 6.4–8.3)
TRIGL SERPL-MCNC: 124 MG/DL (ref 0–149)
VITAMIN D 25-HYDROXY: 13 NG/ML (ref 30–100)
VLDLC SERPL CALC-MCNC: 25 MG/DL
WBC # BLD: 8.1 E9/L (ref 4.5–11.5)

## 2022-11-25 PROCEDURE — 82306 VITAMIN D 25 HYDROXY: CPT

## 2022-11-25 PROCEDURE — 86803 HEPATITIS C AB TEST: CPT

## 2022-11-25 PROCEDURE — 85025 COMPLETE CBC W/AUTO DIFF WBC: CPT

## 2022-11-25 PROCEDURE — 36415 COLL VENOUS BLD VENIPUNCTURE: CPT

## 2022-11-25 PROCEDURE — 80061 LIPID PANEL: CPT

## 2022-11-25 PROCEDURE — 80053 COMPREHEN METABOLIC PANEL: CPT

## 2022-11-25 PROCEDURE — 80185 ASSAY OF PHENYTOIN TOTAL: CPT

## 2022-11-25 PROCEDURE — 83036 HEMOGLOBIN GLYCOSYLATED A1C: CPT

## 2022-11-25 PROCEDURE — 86703 HIV-1/HIV-2 1 RESULT ANTBDY: CPT

## 2022-11-28 ENCOUNTER — NURSE ONLY (OUTPATIENT)
Dept: FAMILY MEDICINE CLINIC | Age: 57
End: 2022-11-28

## 2022-11-28 ENCOUNTER — TELEPHONE (OUTPATIENT)
Dept: FAMILY MEDICINE CLINIC | Age: 57
End: 2022-11-28

## 2022-11-28 DIAGNOSIS — I15.9 SECONDARY HYPERTENSION: Primary | ICD-10-CM

## 2022-11-28 PROCEDURE — 99999 PR OFFICE/OUTPT VISIT,PROCEDURE ONLY: CPT | Performed by: STUDENT IN AN ORGANIZED HEALTH CARE EDUCATION/TRAINING PROGRAM

## 2022-11-28 NOTE — TELEPHONE ENCOUNTER
Patient came into the office for BP check     Left arm-189/102  P-112    Right arm- 183/98  p-111     Pt left BP readings from home for PCP to review in PCP mailbox

## 2022-11-28 NOTE — TELEPHONE ENCOUNTER
All of his blood pressure pressures from home look very good, averaging about 132/79, no values that are even close to being as high as when he comes in here to the office, will not make any medication changes, please let him know he can check his blood pressure once or twice a week from now on just make sure is not going greater than 140 over greater than 85

## 2022-12-12 RX ORDER — PHENYTOIN SODIUM 100 MG/1
CAPSULE, EXTENDED RELEASE ORAL
Qty: 150 CAPSULE | Refills: 2 | Status: SHIPPED
Start: 2022-12-12 | End: 2022-12-14 | Stop reason: SDUPTHER

## 2022-12-12 RX ORDER — PHENYTOIN SODIUM 100 MG/1
CAPSULE, EXTENDED RELEASE ORAL
Qty: 150 CAPSULE | Refills: 0 | OUTPATIENT
Start: 2022-12-12

## 2022-12-14 DIAGNOSIS — R56.9 SEIZURE (HCC): Primary | ICD-10-CM

## 2022-12-14 DIAGNOSIS — E78.5 HYPERLIPIDEMIA, UNSPECIFIED HYPERLIPIDEMIA TYPE: ICD-10-CM

## 2022-12-14 DIAGNOSIS — I10 ESSENTIAL HYPERTENSION: ICD-10-CM

## 2022-12-14 RX ORDER — AMLODIPINE BESYLATE 10 MG/1
10 TABLET ORAL DAILY
Qty: 90 TABLET | Refills: 3 | Status: SHIPPED | OUTPATIENT
Start: 2022-12-14

## 2022-12-14 RX ORDER — ATORVASTATIN CALCIUM 40 MG/1
40 TABLET, FILM COATED ORAL DAILY
Qty: 90 TABLET | Refills: 3 | Status: SHIPPED | OUTPATIENT
Start: 2022-12-14

## 2022-12-14 RX ORDER — PHENYTOIN SODIUM 100 MG/1
CAPSULE, EXTENDED RELEASE ORAL
Qty: 450 CAPSULE | Refills: 3 | Status: SHIPPED | OUTPATIENT
Start: 2022-12-14

## 2022-12-14 RX ORDER — LISINOPRIL 10 MG/1
10 TABLET ORAL DAILY
Qty: 90 TABLET | Refills: 3 | Status: SHIPPED | OUTPATIENT
Start: 2022-12-14

## 2023-01-18 ENCOUNTER — COMMUNITY OUTREACH (OUTPATIENT)
Dept: FAMILY MEDICINE CLINIC | Age: 58
End: 2023-01-18

## 2023-02-21 ENCOUNTER — OFFICE VISIT (OUTPATIENT)
Dept: FAMILY MEDICINE CLINIC | Age: 58
End: 2023-02-21
Payer: COMMERCIAL

## 2023-02-21 VITALS
WEIGHT: 232.4 LBS | BODY MASS INDEX: 31.48 KG/M2 | TEMPERATURE: 97 F | OXYGEN SATURATION: 96 % | SYSTOLIC BLOOD PRESSURE: 140 MMHG | HEART RATE: 114 BPM | RESPIRATION RATE: 20 BRPM | HEIGHT: 72 IN | DIASTOLIC BLOOD PRESSURE: 80 MMHG

## 2023-02-21 DIAGNOSIS — E11.9 TYPE 2 DIABETES MELLITUS WITHOUT COMPLICATION, WITHOUT LONG-TERM CURRENT USE OF INSULIN (HCC): ICD-10-CM

## 2023-02-21 DIAGNOSIS — R56.9 SEIZURE (HCC): Primary | ICD-10-CM

## 2023-02-21 DIAGNOSIS — N39.41 URGENCY INCONTINENCE: ICD-10-CM

## 2023-02-21 PROBLEM — F06.4 ANXIETY DISORDER DUE TO KNOWN PHYSIOLOGICAL CONDITION: Status: RESOLVED | Noted: 2021-12-17 | Resolved: 2023-02-21

## 2023-02-21 PROBLEM — J84.10 PULMONARY FIBROSIS (HCC): Status: ACTIVE | Noted: 2021-12-16

## 2023-02-21 PROCEDURE — 3046F HEMOGLOBIN A1C LEVEL >9.0%: CPT | Performed by: STUDENT IN AN ORGANIZED HEALTH CARE EDUCATION/TRAINING PROGRAM

## 2023-02-21 PROCEDURE — G8484 FLU IMMUNIZE NO ADMIN: HCPCS | Performed by: STUDENT IN AN ORGANIZED HEALTH CARE EDUCATION/TRAINING PROGRAM

## 2023-02-21 PROCEDURE — 2022F DILAT RTA XM EVC RTNOPTHY: CPT | Performed by: STUDENT IN AN ORGANIZED HEALTH CARE EDUCATION/TRAINING PROGRAM

## 2023-02-21 PROCEDURE — G8417 CALC BMI ABV UP PARAM F/U: HCPCS | Performed by: STUDENT IN AN ORGANIZED HEALTH CARE EDUCATION/TRAINING PROGRAM

## 2023-02-21 PROCEDURE — 99214 OFFICE O/P EST MOD 30 MIN: CPT | Performed by: STUDENT IN AN ORGANIZED HEALTH CARE EDUCATION/TRAINING PROGRAM

## 2023-02-21 PROCEDURE — 1036F TOBACCO NON-USER: CPT | Performed by: STUDENT IN AN ORGANIZED HEALTH CARE EDUCATION/TRAINING PROGRAM

## 2023-02-21 PROCEDURE — G8427 DOCREV CUR MEDS BY ELIG CLIN: HCPCS | Performed by: STUDENT IN AN ORGANIZED HEALTH CARE EDUCATION/TRAINING PROGRAM

## 2023-02-21 PROCEDURE — 3017F COLORECTAL CA SCREEN DOC REV: CPT | Performed by: STUDENT IN AN ORGANIZED HEALTH CARE EDUCATION/TRAINING PROGRAM

## 2023-02-21 RX ORDER — PHENYTOIN SODIUM 100 MG/1
CAPSULE, EXTENDED RELEASE ORAL
Qty: 450 CAPSULE | Refills: 3
Start: 2023-02-21

## 2023-02-21 RX ORDER — OXYBUTYNIN CHLORIDE 10 MG/1
10 TABLET, EXTENDED RELEASE ORAL DAILY
Qty: 90 TABLET | Refills: 3 | Status: SHIPPED | OUTPATIENT
Start: 2023-02-21

## 2023-02-21 ASSESSMENT — ENCOUNTER SYMPTOMS
ABDOMINAL PAIN: 0
SHORTNESS OF BREATH: 0
TROUBLE SWALLOWING: 0

## 2023-02-21 NOTE — PATIENT INSTRUCTIONS
Phenytoin     Titration schedule  For 2 weeks take 2 in AM and 2 PM  Then next 2 weeks do 1 in AM, 2 in PM  Then next 2 weeks 1 in AM and 1 in PM  Then next 2 weeks  0 in AM and 1 in PM   Then off all meds      Get 5000 units vitamin D

## 2023-02-21 NOTE — PROGRESS NOTES
Romulo aBnuelos (:  1965) is a 59 y.o. male, established patient follow up , here for evaluation of the following:  Diabetes         ASSESSMENT/PLAN      1. Seizure ([address]  Chronic, last seizure was in , sounds like he only had 2-3 before then, he notes that EEG did not show epilepsy, has been on phenytoin since then, he would like to get off of this medication, did try to send him to neuro however he does not want to go there, did discuss having an MRI or EEG done prior to stopping the seizure medication, he does not want to do this either, based on the very limited history of seizures he has not had any while I think we can titrate off the phenytoin, did provide him with the titration schedule seen in the HPI, did discuss danger of having seizures while he was driving, at work, at other times, he is willing to undertake this risk, if he does have any seizure activity he will go back on the previous doses of the medication  2. Type 2 diabetes mellitus without complication, without long-term current use of insulin ([facility])  Chronic, well controlled, A1c at 7% today, continue the metformin, encouraged him to continue with healthy lifestyle  3. Urgency incontinence  Somewhat new symptom, he notes this is worse than the hesitancy, notes he has trouble at work getting to the bathroom in time, recommend he make sure he is drinking at least 80 ounces of water a day, will also start oxybutynin, try to drink water throughout the day and separate set only at night  -     oxybutynin (DITROPAN XL) 10 MG extended release tablet; Take 1 tablet by mouth daily, Disp-90 tablet, R-3Normal    Return in about 3 months (around 2023) for Diabetes follow up. Subjective   SUBJECTIVE/OBJECTIVE:  HPI  Patient here for follow up. He does not want to go to neuro. Last seizure was . He has had low phenytoin level in the past and continued to have good seizure control. He has had 2-3 seizures in his life.  He has been no meds since. Titration schedule- Phenytoin  For 2 weeks take 2 in AM and 2 PM  Then next 2 weeks do 1 in AM, 2 in PM  Then next 2 weeks 1 in AM and 1 in PM  Then next 2 weeks  0 in AM and 1 in PM   Then off all meds    A1c 7.0% today. He does have some areas where he could cut back. Declined preventative screening identified as care gaps unless ordered through this visit    PHQ2/PHQ9      No data recorded     Past Medical History:  has a past medical history of Acute on chronic respiratory failure with hypoxia ([address] Acute respiratory failure with hypoxia ([address] Busch catheter problem ([facility] [address] HIGH CHOLESTEROL, Hypertension, Pneumonia due to COVID-19 virus, Seizures ([facility] [address] Sepsis ([facility] [address] Severe protein-calorie malnutrition ([facility] [address] Thrombocytopenia ([facility] [address] and VAP (ventilator-associated pneumonia) ([facility] [address] Past Surgical History:  has a past surgical history that includes Appendectomy; picc line insertion nurse (9/8/2021); bronchoscopy (N/A, 9/8/2021); tracheostomy (N/A, 9/9/2021); and Upper gastrointestinal endoscopy (N/A, 9/9/2021). Social History:  reports that he has never smoked. He has never used smokeless tobacco.  Family History: family history is not on file. Allergies: Patient has no known allergies.   Medications:   Current Outpatient Medications   Medication Sig Dispense Refill    oxybutynin (DITROPAN XL) 10 MG extended release tablet Take 1 tablet by mouth daily 90 tablet 3    amLODIPine (NORVASC) 10 MG tablet Take 1 tablet by mouth daily 90 tablet 3    lisinopril (PRINIVIL;ZESTRIL) 10 MG tablet Take 1 tablet by mouth daily 90 tablet 3    atorvastatin (LIPITOR) 40 MG tablet Take 1 tablet by mouth daily 90 tablet 3    phenytoin (DILANTIN) 100 MG ER capsule TAKE 2 CAPSULES BY MOUTH IN THE MORNING AND 3 AT BEDTIME 450 capsule 3    Lancets (ONETOUCH DELICA PLUS [id number]) MISC Inject 1 Dose into the skin daily      ONETOUCH ULTRA strip Check FBS daily 100 each 0    metFORMIN (GLUCOPHAGE) 500 MG tablet TAKE 1 TABLET BY MOUTH TWICE DAILY WITH MEALS 180 tablet 3     No current facility-administered medications for this visit. Allergies: Patient has no known allergies. Review of Systems   Constitutional:  Negative for activity change, appetite change, fatigue, fever and unexpected weight change. HENT:  Negative for trouble swallowing. Eyes:  Negative for visual disturbance. Respiratory:  Negative for shortness of breath. Cardiovascular:  Negative for chest pain. Gastrointestinal:  Negative for abdominal pain. Musculoskeletal:  Negative for arthralgias. Neurological:  Negative for weakness, light-headedness and headaches. Psychiatric/Behavioral:  Negative for confusion and sleep disturbance. All other systems reviewed and are negative.        Objective   BP (!) 140/80   Pulse (!) 114   Temp 97 °F (36.1 °C) (Temporal)   Resp 20   Ht 6' (1.829 m)   Wt 232 lb 6.4 oz (105.4 kg)   SpO2 96%   BMI 31.52 kg/m²       Lab Results   Component Value Date    LABA1C 6.8 (H) 11/25/2022    LABA1C 5.3 12/11/2021    LABA1C 5.6 12/01/2021     Lab Results   Component Value Date    CHOL 171 11/25/2022    CHOL 173 12/11/2021    CHOL 166 12/04/2021     Lab Results   Component Value Date    TRIG 124 11/25/2022    TRIG 100 12/11/2021    TRIG 84 12/04/2021     Lab Results   Component Value Date    HDL 49 11/25/2022    HDL 65 12/11/2021    HDL 57 12/04/2021     Lab Results   Component Value Date    LDLCALC 97 11/25/2022    LDLCALC 88 12/11/2021    LDLCALC 92 12/04/2021     Lab Results   Component Value Date    LABVLDL 25 11/25/2022    LABVLDL 20 12/11/2021    LABVLDL 17 12/04/2021     No results found for: CHOLHDLRATIO   Creatinine   Date Value Ref Range Status   11/25/2022 1.2 0.7 - 1.2 mg/dL Final   01/12/2022 1.0 0.7 - 1.2 mg/dL Final   12/27/2021 0.9 0.7 - 1.2 mg/dL Final       The 10-year ASCVD risk score (Jun ROSAS, et al., 2019) is: 17.4%    Values used to calculate the score:      Age: 59 years Sex: Male      Is Non- : No      Diabetic: Yes      Tobacco smoker: No      Systolic Blood Pressure: 317 mmHg      Is BP treated: Yes      HDL Cholesterol: 49 mg/dL      Total Cholesterol: 171 mg/dL     Physical Exam  Constitutional:       General: He is not in acute distress. Appearance: Normal appearance. HENT:      Head: Normocephalic and atraumatic. Right Ear: External ear normal.      Left Ear: External ear normal.      Nose: Nose normal.      Mouth/Throat:      Mouth: Mucous membranes are moist.   Eyes:      Extraocular Movements: Extraocular movements intact. Conjunctiva/sclera: Conjunctivae normal.   Cardiovascular:      Rate and Rhythm: Normal rate and regular rhythm. Heart sounds: No murmur heard. Pulmonary:      Effort: Pulmonary effort is normal.      Breath sounds: Normal breath sounds. No wheezing. Musculoskeletal:         General: Normal range of motion. Neurological:      General: No focal deficit present. Mental Status: He is alert. Psychiatric:         Mood and Affect: Mood normal.         Behavior: Behavior normal.           An electronic signature was used to authenticate this note. --Antony Butler MD       *NOTE: This report was transcribed using voice recognition software. Every effort was made to ensure accuracy; however, inadvertent computerized transcription errors may be present.

## 2023-05-03 RX ORDER — BLOOD SUGAR DIAGNOSTIC
1 STRIP MISCELLANEOUS DAILY
Qty: 100 EACH | Refills: 3 | Status: SHIPPED | OUTPATIENT
Start: 2023-05-03

## 2023-06-21 ENCOUNTER — APPOINTMENT (OUTPATIENT)
Dept: CT IMAGING | Age: 58
End: 2023-06-21
Attending: STUDENT IN AN ORGANIZED HEALTH CARE EDUCATION/TRAINING PROGRAM

## 2023-06-21 ENCOUNTER — APPOINTMENT (OUTPATIENT)
Dept: GENERAL RADIOLOGY | Age: 58
End: 2023-06-21

## 2023-06-21 ENCOUNTER — HOSPITAL ENCOUNTER (INPATIENT)
Age: 58
LOS: 2 days | Discharge: HOME | End: 2023-06-23
Attending: EMERGENCY MEDICINE | Admitting: INTERNAL MEDICINE

## 2023-06-21 DIAGNOSIS — J69.0 ASPIRATION PNEUMONIA OF BOTH LUNGS, UNSPECIFIED ASPIRATION PNEUMONIA TYPE, UNSPECIFIED PART OF LUNG (HCC): ICD-10-CM

## 2023-06-21 DIAGNOSIS — G45.9 TIA (TRANSIENT ISCHEMIC ATTACK): ICD-10-CM

## 2023-06-21 DIAGNOSIS — J96.01 ACUTE RESPIRATORY FAILURE WITH HYPOXIA (HCC): ICD-10-CM

## 2023-06-21 DIAGNOSIS — R56.9 SEIZURE (HCC): Primary | ICD-10-CM

## 2023-06-21 LAB
ALBUMIN SERPL-MCNC: 3.6 G/DL (ref 3.5–5.2)
ALP SERPL-CCNC: 143 U/L (ref 40–129)
ALT SERPL-CCNC: 27 U/L (ref 0–40)
AMPHET UR QL SCN: NOT DETECTED
ANION GAP SERPL CALCULATED.3IONS-SCNC: 15 MMOL/L (ref 7–16)
APAP SERPL-MCNC: <5 MCG/ML (ref 10–30)
AST SERPL-CCNC: 31 U/L (ref 0–39)
BARBITURATES UR QL SCN: NOT DETECTED
BASOPHILS # BLD: 0 E9/L (ref 0–0.2)
BASOPHILS NFR BLD: 0.2 % (ref 0–2)
BENZODIAZ UR QL SCN: NOT DETECTED
BILIRUB SERPL-MCNC: 0.3 MG/DL (ref 0–1.2)
BUN SERPL-MCNC: 25 MG/DL (ref 6–20)
BURR CELLS: ABNORMAL
CALCIUM SERPL-MCNC: 8.7 MG/DL (ref 8.6–10.2)
CANNABINOIDS UR QL SCN: NOT DETECTED
CHLORIDE SERPL-SCNC: 106 MMOL/L (ref 98–107)
CHP ED QC CHECK: ABNORMAL
CO2 SERPL-SCNC: 18 MMOL/L (ref 22–29)
COCAINE UR QL SCN: NOT DETECTED
CREAT SERPL-MCNC: 1.3 MG/DL (ref 0.7–1.2)
DRUG SCREEN COMMENT UR-IMP: NORMAL
EOSINOPHIL # BLD: 0 E9/L (ref 0.05–0.5)
EOSINOPHIL NFR BLD: 0.1 % (ref 0–6)
ERYTHROCYTE [DISTWIDTH] IN BLOOD BY AUTOMATED COUNT: 12.6 FL (ref 11.5–15)
ETHANOLAMINE SERPL-MCNC: <10 MG/DL (ref 0–0.08)
FENTANYL SCREEN, URINE: NOT DETECTED
GLUCOSE BLD-MCNC: 214 MG/DL
GLUCOSE SERPL-MCNC: 270 MG/DL (ref 74–99)
HCT VFR BLD AUTO: 41.1 % (ref 37–54)
HGB BLD-MCNC: 13.4 G/DL (ref 12.5–16.5)
LYMPHOCYTES # BLD: 1.14 E9/L (ref 1.5–4)
LYMPHOCYTES NFR BLD: 5.2 % (ref 20–42)
MCH RBC QN AUTO: 28.3 PG (ref 26–35)
MCHC RBC AUTO-ENTMCNC: 32.6 % (ref 32–34.5)
MCV RBC AUTO: 86.7 FL (ref 80–99.9)
METER GLUCOSE: 177 MG/DL (ref 74–99)
METER GLUCOSE: 214 MG/DL (ref 74–99)
METER GLUCOSE: 234 MG/DL (ref 74–99)
METHADONE UR QL SCN: NOT DETECTED
MONOCYTES # BLD: 0.68 E9/L (ref 0.1–0.95)
MONOCYTES NFR BLD: 2.6 % (ref 2–12)
NEUTROPHILS # BLD: 20.88 E9/L (ref 1.8–7.3)
NEUTS SEG NFR BLD: 92.2 % (ref 43–80)
OPIATES UR QL SCN: NOT DETECTED
OXYCODONE URINE: NOT DETECTED
PCP UR QL SCN: NOT DETECTED
PLATELET # BLD AUTO: 191 E9/L (ref 130–450)
PMV BLD AUTO: 10.1 FL (ref 7–12)
POIKILOCYTES: ABNORMAL
POTASSIUM SERPL-SCNC: 4 MMOL/L (ref 3.5–5)
PROT SERPL-MCNC: 6.9 G/DL (ref 6.4–8.3)
RBC # BLD AUTO: 4.74 E12/L (ref 3.8–5.8)
REASON FOR REJECTION: NORMAL
REJECTED TEST: NORMAL
SALICYLATES SERPL-MCNC: <0.3 MG/DL (ref 0–30)
SODIUM SERPL-SCNC: 139 MMOL/L (ref 132–146)
TRICYCLIC ANTIDEPRESSANTS SCREEN SERUM: NEGATIVE NG/ML
WBC # BLD: 22.7 E9/L (ref 4.5–11.5)

## 2023-06-21 PROCEDURE — 6370000000 HC RX 637 (ALT 250 FOR IP): Performed by: INTERNAL MEDICINE

## 2023-06-21 PROCEDURE — 2580000003 HC RX 258: Performed by: INTERNAL MEDICINE

## 2023-06-21 PROCEDURE — 2580000003 HC RX 258: Performed by: EMERGENCY MEDICINE

## 2023-06-21 PROCEDURE — 72125 CT NECK SPINE W/O DYE: CPT

## 2023-06-21 PROCEDURE — 80053 COMPREHEN METABOLIC PANEL: CPT

## 2023-06-21 PROCEDURE — 6360000002 HC RX W HCPCS: Performed by: STUDENT IN AN ORGANIZED HEALTH CARE EDUCATION/TRAINING PROGRAM

## 2023-06-21 PROCEDURE — 2500000003 HC RX 250 WO HCPCS: Performed by: EMERGENCY MEDICINE

## 2023-06-21 PROCEDURE — 80143 DRUG ASSAY ACETAMINOPHEN: CPT

## 2023-06-21 PROCEDURE — 96375 TX/PRO/DX INJ NEW DRUG ADDON: CPT

## 2023-06-21 PROCEDURE — 2060000000 HC ICU INTERMEDIATE R&B

## 2023-06-21 PROCEDURE — 99285 EMERGENCY DEPT VISIT HI MDM: CPT

## 2023-06-21 PROCEDURE — 82077 ASSAY SPEC XCP UR&BREATH IA: CPT

## 2023-06-21 PROCEDURE — 90714 TD VACC NO PRESV 7 YRS+ IM: CPT | Performed by: STUDENT IN AN ORGANIZED HEALTH CARE EDUCATION/TRAINING PROGRAM

## 2023-06-21 PROCEDURE — 80179 DRUG ASSAY SALICYLATE: CPT

## 2023-06-21 PROCEDURE — 70450 CT HEAD/BRAIN W/O DYE: CPT

## 2023-06-21 PROCEDURE — 71045 X-RAY EXAM CHEST 1 VIEW: CPT

## 2023-06-21 PROCEDURE — 96374 THER/PROPH/DIAG INJ IV PUSH: CPT

## 2023-06-21 PROCEDURE — 6360000002 HC RX W HCPCS: Performed by: EMERGENCY MEDICINE

## 2023-06-21 PROCEDURE — 6360000002 HC RX W HCPCS: Performed by: INTERNAL MEDICINE

## 2023-06-21 PROCEDURE — 90471 IMMUNIZATION ADMIN: CPT | Performed by: STUDENT IN AN ORGANIZED HEALTH CARE EDUCATION/TRAINING PROGRAM

## 2023-06-21 PROCEDURE — 85025 COMPLETE CBC W/AUTO DIFF WBC: CPT

## 2023-06-21 PROCEDURE — 80307 DRUG TEST PRSMV CHEM ANLYZR: CPT

## 2023-06-21 PROCEDURE — 82962 GLUCOSE BLOOD TEST: CPT

## 2023-06-21 RX ORDER — INSULIN LISPRO 100 [IU]/ML
0-4 INJECTION, SOLUTION INTRAVENOUS; SUBCUTANEOUS NIGHTLY
Status: DISCONTINUED | OUTPATIENT
Start: 2023-06-21 | End: 2023-06-23 | Stop reason: HOSPADM

## 2023-06-21 RX ORDER — AMLODIPINE BESYLATE 10 MG/1
10 TABLET ORAL DAILY
Status: DISCONTINUED | OUTPATIENT
Start: 2023-06-21 | End: 2023-06-23 | Stop reason: HOSPADM

## 2023-06-21 RX ORDER — LEVETIRACETAM 500 MG/5ML
2000 INJECTION, SOLUTION, CONCENTRATE INTRAVENOUS ONCE
Status: COMPLETED | OUTPATIENT
Start: 2023-06-21 | End: 2023-06-21

## 2023-06-21 RX ORDER — ACETAMINOPHEN 650 MG/1
650 SUPPOSITORY RECTAL EVERY 6 HOURS PRN
Status: DISCONTINUED | OUTPATIENT
Start: 2023-06-21 | End: 2023-06-23 | Stop reason: HOSPADM

## 2023-06-21 RX ORDER — LEVETIRACETAM 500 MG/5ML
500 INJECTION, SOLUTION, CONCENTRATE INTRAVENOUS EVERY 12 HOURS
Status: DISCONTINUED | OUTPATIENT
Start: 2023-06-21 | End: 2023-06-23 | Stop reason: HOSPADM

## 2023-06-21 RX ORDER — ONDANSETRON 2 MG/ML
4 INJECTION INTRAMUSCULAR; INTRAVENOUS EVERY 6 HOURS PRN
Status: DISCONTINUED | OUTPATIENT
Start: 2023-06-21 | End: 2023-06-23 | Stop reason: HOSPADM

## 2023-06-21 RX ORDER — TETANUS AND DIPHTHERIA TOXOIDS ADSORBED 2; 2 [LF]/.5ML; [LF]/.5ML
0.5 INJECTION INTRAMUSCULAR ONCE
Status: COMPLETED | OUTPATIENT
Start: 2023-06-21 | End: 2023-06-21

## 2023-06-21 RX ORDER — SODIUM CHLORIDE 9 MG/ML
INJECTION, SOLUTION INTRAVENOUS PRN
Status: DISCONTINUED | OUTPATIENT
Start: 2023-06-21 | End: 2023-06-23 | Stop reason: HOSPADM

## 2023-06-21 RX ORDER — INSULIN LISPRO 100 [IU]/ML
0-8 INJECTION, SOLUTION INTRAVENOUS; SUBCUTANEOUS
Status: DISCONTINUED | OUTPATIENT
Start: 2023-06-21 | End: 2023-06-23 | Stop reason: HOSPADM

## 2023-06-21 RX ORDER — ACETAMINOPHEN 325 MG/1
650 TABLET ORAL EVERY 6 HOURS PRN
Status: DISCONTINUED | OUTPATIENT
Start: 2023-06-21 | End: 2023-06-23 | Stop reason: HOSPADM

## 2023-06-21 RX ORDER — SODIUM CHLORIDE 0.9 % (FLUSH) 0.9 %
5-40 SYRINGE (ML) INJECTION EVERY 12 HOURS SCHEDULED
Status: DISCONTINUED | OUTPATIENT
Start: 2023-06-21 | End: 2023-06-23 | Stop reason: HOSPADM

## 2023-06-21 RX ORDER — ONDANSETRON 4 MG/1
4 TABLET, ORALLY DISINTEGRATING ORAL EVERY 8 HOURS PRN
Status: DISCONTINUED | OUTPATIENT
Start: 2023-06-21 | End: 2023-06-23 | Stop reason: HOSPADM

## 2023-06-21 RX ORDER — ATORVASTATIN CALCIUM 40 MG/1
40 TABLET, FILM COATED ORAL DAILY
Status: DISCONTINUED | OUTPATIENT
Start: 2023-06-21 | End: 2023-06-23 | Stop reason: HOSPADM

## 2023-06-21 RX ORDER — OXYBUTYNIN CHLORIDE 10 MG/1
10 TABLET, EXTENDED RELEASE ORAL DAILY
Status: DISCONTINUED | OUTPATIENT
Start: 2023-06-21 | End: 2023-06-23 | Stop reason: HOSPADM

## 2023-06-21 RX ORDER — POLYETHYLENE GLYCOL 3350 17 G/17G
17 POWDER, FOR SOLUTION ORAL DAILY PRN
Status: DISCONTINUED | OUTPATIENT
Start: 2023-06-21 | End: 2023-06-23 | Stop reason: HOSPADM

## 2023-06-21 RX ORDER — ENOXAPARIN SODIUM 100 MG/ML
30 INJECTION SUBCUTANEOUS 2 TIMES DAILY
Status: DISCONTINUED | OUTPATIENT
Start: 2023-06-21 | End: 2023-06-22

## 2023-06-21 RX ORDER — LISINOPRIL 10 MG/1
10 TABLET ORAL DAILY
Status: DISCONTINUED | OUTPATIENT
Start: 2023-06-21 | End: 2023-06-23 | Stop reason: HOSPADM

## 2023-06-21 RX ORDER — SODIUM CHLORIDE 0.9 % (FLUSH) 0.9 %
5-40 SYRINGE (ML) INJECTION PRN
Status: DISCONTINUED | OUTPATIENT
Start: 2023-06-21 | End: 2023-06-23 | Stop reason: HOSPADM

## 2023-06-21 RX ADMIN — LEVETIRACETAM 500 MG: 100 INJECTION INTRAVENOUS at 20:10

## 2023-06-21 RX ADMIN — SODIUM CHLORIDE, PRESERVATIVE FREE 10 ML: 5 INJECTION INTRAVENOUS at 20:11

## 2023-06-21 RX ADMIN — LEVETIRACETAM 2000 MG: 100 INJECTION INTRAVENOUS at 07:49

## 2023-06-21 RX ADMIN — TETANUS AND DIPHTHERIA TOXOIDS ADSORBED 0.5 ML: 2; 2 INJECTION INTRAMUSCULAR at 07:53

## 2023-06-21 RX ADMIN — SODIUM CHLORIDE 3000 MG: 9 INJECTION, SOLUTION INTRAVENOUS at 18:25

## 2023-06-21 RX ADMIN — METFORMIN HYDROCHLORIDE 500 MG: 500 TABLET ORAL at 18:20

## 2023-06-21 RX ADMIN — OXYBUTYNIN CHLORIDE 10 MG: 10 TABLET, EXTENDED RELEASE ORAL at 20:10

## 2023-06-21 RX ADMIN — LISINOPRIL 10 MG: 10 TABLET ORAL at 18:20

## 2023-06-21 RX ADMIN — SODIUM CHLORIDE 3000 MG: 9 INJECTION, SOLUTION INTRAVENOUS at 10:39

## 2023-06-21 RX ADMIN — INSULIN LISPRO 2 UNITS: 100 INJECTION, SOLUTION INTRAVENOUS; SUBCUTANEOUS at 13:32

## 2023-06-21 RX ADMIN — AMLODIPINE BESYLATE 10 MG: 10 TABLET ORAL at 18:19

## 2023-06-21 RX ADMIN — ATORVASTATIN CALCIUM 40 MG: 40 TABLET, FILM COATED ORAL at 20:10

## 2023-06-21 RX ADMIN — ENOXAPARIN SODIUM 30 MG: 100 INJECTION SUBCUTANEOUS at 20:09

## 2023-06-21 RX ADMIN — INSULIN LISPRO 2 UNITS: 100 INJECTION, SOLUTION INTRAVENOUS; SUBCUTANEOUS at 18:20

## 2023-06-21 RX ADMIN — DOXYCYCLINE 100 MG: 100 INJECTION, POWDER, LYOPHILIZED, FOR SOLUTION INTRAVENOUS at 13:31

## 2023-06-21 ASSESSMENT — LIFESTYLE VARIABLES
HOW OFTEN DO YOU HAVE A DRINK CONTAINING ALCOHOL: NEVER
HOW MANY STANDARD DRINKS CONTAINING ALCOHOL DO YOU HAVE ON A TYPICAL DAY: PATIENT DOES NOT DRINK
HOW MANY STANDARD DRINKS CONTAINING ALCOHOL DO YOU HAVE ON A TYPICAL DAY: PATIENT DOES NOT DRINK
HOW OFTEN DO YOU HAVE A DRINK CONTAINING ALCOHOL: NEVER

## 2023-06-21 ASSESSMENT — PAIN - FUNCTIONAL ASSESSMENT: PAIN_FUNCTIONAL_ASSESSMENT: NONE - DENIES PAIN

## 2023-06-21 NOTE — H&P
Hospital Medicine History & Physical      PCP: No primary care provider on file. Date of Admission: 6/21/2023    Date of Service: Pt seen/examined on 6/21/2023 and admitted to Inpatient with expected LOS greater than two midnights due to medical therapy. Chief Complaint: Seizure      History Of Present Illness:    Mr. Juni Hua, a 59y.o. year old male  who  has a past medical history of Acute on chronic respiratory failure with hypoxia ([address] Acute respiratory failure with hypoxia ([address] Busch catheter problem ([address] HIGH CHOLESTEROL, Hypertension, Pneumonia due to COVID-19 virus, Seizures ([address] Sepsis ([address] Severe protein-calorie malnutrition ([address] Thrombocytopenia ([address] and VAP (ventilator-associated pneumonia) ([address] Patient has history of seizure and he has been Dilantin for at least 15 years. This past February patient was weaned off Dilantin as he has not had any seizure activity for the past 15 years. He comes in today for evaluation of seizure. Apparently around 5 AM this morning patient's wife noticed some noise and patient was noted to have seizure at that time. Patient was confused and was postictal.  Patient was brought into the hospital and he was hypoxic and concern also for aspiration. He at this time is alert and oriented and able to provide on the history. He denies any headache, dizziness, chest pain, shortness of breath, dysphagia, cough or anything out of ordinary until today. Chest x-ray showed hazy opacities bilaterally. Leukocytosis was noted greater than 22,000. He works as a .     Past Medical History:          Diagnosis Date    Acute on chronic respiratory failure with hypoxia ([address] 12/10/2021    Acute respiratory failure with hypoxia ([address] 12/10/2021    Busch catheter problem ([address] 8/26/2021    HIGH CHOLESTEROL     Hypertension     Pneumonia due to COVID-19 virus 8/21/2021    Seizures ([address]     Sepsis ([address] 8/26/2021    Severe protein-calorie

## 2023-06-21 NOTE — ED PROVIDER NOTES
injury  Aspiration pneumonia of both lungs, unspecified aspiration pneumonia type, unspecified part of lung [facility]): acute illness or injury  Seizure [facility]): acute illness or injury    Amount and/or Complexity of Data Reviewed  External Data Reviewed: notes. Labs: ordered. Decision-making details documented in ED Course. Radiology: ordered and independent interpretation performed. Decision-making details documented in ED Course. ECG/medicine tests: ordered and independent interpretation performed. Decision-making details documented in ED Course. Risk  Prescription drug management. Decision regarding hospitalization. CONSULTS:   IP CONSULT TO INTERNAL MEDICINE  IP CONSULT TO NEUROLOGY              CRITICAL CARE TIME (.cct)   none        I am the Primary Clinician of Record. FINAL IMPRESSION      1. Seizure ([facility]    2. Acute respiratory failure with hypoxia (HCC)    3. Aspiration pneumonia of both lungs, unspecified aspiration pneumonia type, unspecified part of lung ([facility]          DISPOSITION/PLAN     DISPOSITION Admitted 06/21/2023 01:13:59 PM      PATIENT REFERRED TO:  No follow-up provider specified.     DISCHARGE MEDICATIONS:  New Prescriptions    No medications on file       DISCONTINUED MEDICATIONS:  Discontinued Medications    LANCETS (ONETOUCH DELICA PLUS [id number]) MISC    Inject 1 Dose into the skin daily    METFORMIN (GLUCOPHAGE) 500 MG TABLET    TAKE 1 TABLET BY MOUTH TWICE DAILY WITH MEALS    ONETOUCH ULTRA STRIP    Inject 1 each into the skin daily Check FBS daily    PHENYTOIN (DILANTIN) 100 MG ER CAPSULE    Titration schedule- Phenytoin, was at 2 in the morning and 3 at bedtime  For 2 weeks take 2 in AM and 2 PM  Then next 2 weeks do 1 in AM, 2 in PM  Then next 2 weeks 1 in AM and 1 in PM  Then next 2 weeks  0 in AM and 1 in PM   Then off all meds              (Please note that portions of this note were completed with a voice recognition program.  Efforts were made to edit the
([facility] 12/10/2021    Busch catheter problem ([facility] 8/26/2021    HIGH CHOLESTEROL     Hypertension     Pneumonia due to COVID-19 virus 8/21/2021    Seizures ([facility]     Sepsis ([facility] [facility] 8/26/2021    Severe protein-calorie malnutrition ([facility] 12/17/2021    Thrombocytopenia ([facility] 8/26/2021    VAP (ventilator-associated pneumonia) ([facility]        CONSULTS: (Who and What was discussed)  IP CONSULT TO INTERNAL MEDICINE    Discussion with Other Profesionals : Admitting Team sound    Social Determinants : None    Records Reviewed : Source outpatient notes from February of this year with PCP tapering patient off of Dilantin per patient request    CC/HPI Summary, DDx, ED Course, and Reassessment:   Patient is a 67-year-old male past medical history of pulmonary fibrosis, type 2 diabetes, hyperlipidemia and seizures. Patient presents with chief complaint of seizure-like activity. Vital signs stable presentation except for mild hypoxia oxygen saturation was in 80s improved with 2 L nasal cannula. On physical exam heart regular rate and rhythm, lungs with coarse breath sounds bilaterally, abdomen soft nontender no rigidity rebound or guarding. Scattered abrasions noted to the face. Differential diagnosis includes breakthrough seizure, aspiration pneumonia, intracranial hemorrhage CT scan of the head and neck obtained demonstrate no acute abnormalities, laboratory work obtained CBC demonstrate leukocytosis of 22.7, CMP demonstrated no acute abnormalities, urine drug screen negative, serum drug screen negative. Chest x-ray obtained demonstrated cardiomegaly with hazy bilateral airspace disease. Patient loaded on 2 g of Keppra. Patient also given 3 g of Unasyn and 100 mg of IV doxycycline. Patient's tetanus shot also updated. Findings assessment breakthrough seizure resulting in aspiration pneumonia.   Given breakthrough seizure as well as elevated white count of 22,000 concern for aspiration will admit patient to the hospital for

## 2023-06-21 NOTE — ED NOTES
Complete bed change done. Patient incontinent of urine. New linen and gown applied to patient.       She Coronado RN  06/21/23 5306

## 2023-06-22 ENCOUNTER — APPOINTMENT (OUTPATIENT)
Dept: NEUROLOGY | Age: 58
End: 2023-06-22

## 2023-06-22 LAB
ANION GAP SERPL CALCULATED.3IONS-SCNC: 11 MMOL/L (ref 7–16)
BUN SERPL-MCNC: 24 MG/DL (ref 6–20)
CALCIUM SERPL-MCNC: 8.9 MG/DL (ref 8.6–10.2)
CHLORIDE SERPL-SCNC: 107 MMOL/L (ref 98–107)
CO2 SERPL-SCNC: 21 MMOL/L (ref 22–29)
CREAT SERPL-MCNC: 1.4 MG/DL (ref 0.7–1.2)
ERYTHROCYTE [DISTWIDTH] IN BLOOD BY AUTOMATED COUNT: 12.8 FL (ref 11.5–15)
GLUCOSE SERPL-MCNC: 175 MG/DL (ref 74–99)
HCT VFR BLD AUTO: 38 % (ref 37–54)
HGB BLD-MCNC: 12.6 G/DL (ref 12.5–16.5)
MCH RBC QN AUTO: 28.6 PG (ref 26–35)
MCHC RBC AUTO-ENTMCNC: 33.2 % (ref 32–34.5)
MCV RBC AUTO: 86.2 FL (ref 80–99.9)
METER GLUCOSE: 150 MG/DL (ref 74–99)
METER GLUCOSE: 189 MG/DL (ref 74–99)
METER GLUCOSE: 199 MG/DL (ref 74–99)
METER GLUCOSE: 239 MG/DL (ref 74–99)
PLATELET # BLD AUTO: 170 E9/L (ref 130–450)
PMV BLD AUTO: 10.4 FL (ref 7–12)
POTASSIUM SERPL-SCNC: 3.6 MMOL/L (ref 3.5–5)
RBC # BLD AUTO: 4.41 E12/L (ref 3.8–5.8)
SODIUM SERPL-SCNC: 139 MMOL/L (ref 132–146)
WBC # BLD: 13.5 E9/L (ref 4.5–11.5)

## 2023-06-22 PROCEDURE — 99223 1ST HOSP IP/OBS HIGH 75: CPT | Performed by: PHYSICIAN ASSISTANT

## 2023-06-22 PROCEDURE — 6360000002 HC RX W HCPCS: Performed by: INTERNAL MEDICINE

## 2023-06-22 PROCEDURE — 2060000000 HC ICU INTERMEDIATE R&B

## 2023-06-22 PROCEDURE — 95816 EEG AWAKE AND DROWSY: CPT

## 2023-06-22 PROCEDURE — 2700000000 HC OXYGEN THERAPY PER DAY

## 2023-06-22 PROCEDURE — 80048 BASIC METABOLIC PNL TOTAL CA: CPT

## 2023-06-22 PROCEDURE — 85027 COMPLETE CBC AUTOMATED: CPT

## 2023-06-22 PROCEDURE — 82962 GLUCOSE BLOOD TEST: CPT

## 2023-06-22 PROCEDURE — 6370000000 HC RX 637 (ALT 250 FOR IP): Performed by: INTERNAL MEDICINE

## 2023-06-22 PROCEDURE — 97161 PT EVAL LOW COMPLEX 20 MIN: CPT

## 2023-06-22 PROCEDURE — 36415 COLL VENOUS BLD VENIPUNCTURE: CPT

## 2023-06-22 PROCEDURE — 2580000003 HC RX 258: Performed by: INTERNAL MEDICINE

## 2023-06-22 RX ORDER — ENOXAPARIN SODIUM 100 MG/ML
40 INJECTION SUBCUTANEOUS 2 TIMES DAILY
Status: DISCONTINUED | OUTPATIENT
Start: 2023-06-22 | End: 2023-06-23 | Stop reason: HOSPADM

## 2023-06-22 RX ADMIN — AMLODIPINE BESYLATE 10 MG: 10 TABLET ORAL at 08:51

## 2023-06-22 RX ADMIN — LISINOPRIL 10 MG: 10 TABLET ORAL at 08:51

## 2023-06-22 RX ADMIN — ACETAMINOPHEN 650 MG: 325 TABLET ORAL at 00:07

## 2023-06-22 RX ADMIN — ATORVASTATIN CALCIUM 40 MG: 40 TABLET, FILM COATED ORAL at 20:24

## 2023-06-22 RX ADMIN — SODIUM CHLORIDE 3000 MG: 9 INJECTION, SOLUTION INTRAVENOUS at 11:57

## 2023-06-22 RX ADMIN — SODIUM CHLORIDE, PRESERVATIVE FREE 10 ML: 5 INJECTION INTRAVENOUS at 08:51

## 2023-06-22 RX ADMIN — OXYBUTYNIN CHLORIDE 10 MG: 10 TABLET, EXTENDED RELEASE ORAL at 08:51

## 2023-06-22 RX ADMIN — METFORMIN HYDROCHLORIDE 500 MG: 500 TABLET ORAL at 17:26

## 2023-06-22 RX ADMIN — SODIUM CHLORIDE 3000 MG: 9 INJECTION, SOLUTION INTRAVENOUS at 23:37

## 2023-06-22 RX ADMIN — LEVETIRACETAM 500 MG: 100 INJECTION INTRAVENOUS at 20:23

## 2023-06-22 RX ADMIN — LEVETIRACETAM 500 MG: 100 INJECTION INTRAVENOUS at 08:51

## 2023-06-22 RX ADMIN — ENOXAPARIN SODIUM 40 MG: 100 INJECTION SUBCUTANEOUS at 20:24

## 2023-06-22 RX ADMIN — SODIUM CHLORIDE 3000 MG: 9 INJECTION, SOLUTION INTRAVENOUS at 17:26

## 2023-06-22 RX ADMIN — METFORMIN HYDROCHLORIDE 500 MG: 500 TABLET ORAL at 08:51

## 2023-06-22 RX ADMIN — SODIUM CHLORIDE 3000 MG: 9 INJECTION, SOLUTION INTRAVENOUS at 00:11

## 2023-06-22 RX ADMIN — INSULIN LISPRO 2 UNITS: 100 INJECTION, SOLUTION INTRAVENOUS; SUBCUTANEOUS at 12:33

## 2023-06-22 RX ADMIN — SODIUM CHLORIDE, PRESERVATIVE FREE 10 ML: 5 INJECTION INTRAVENOUS at 20:24

## 2023-06-22 RX ADMIN — ENOXAPARIN SODIUM 30 MG: 100 INJECTION SUBCUTANEOUS at 08:51

## 2023-06-22 RX ADMIN — SODIUM CHLORIDE 3000 MG: 9 INJECTION, SOLUTION INTRAVENOUS at 05:42

## 2023-06-22 NOTE — CONSULTS
impairment. Attention and concentration appeared normal.     Cranial Nerves  II. Visual fields full to confrontation bilaterally. III, IV, VI: Pupils equally round and reactive to light, 3 to 2 mm bilaterally. EOMs: full, no nystagmus. V. Facial sensation intact to light touch bilaterally  VII: Facial movements symmetric and strong  VIII: Hearing intact to voice  IX,X: Palate elevates symmetrically. No dysarthria  XI: Sternocleidomastoid and trapezius 5/5 bilaterally   XII: Tongue is midline    Motor     Right Left   Right Left   Deltoid 5 5  Hip Flexion 5 5   Biceps 5 5  Knee Extension 5 5   Triceps 5 5  Knee Flexion 5 5   Handgrip 5 5  Ankle Dorsiflexion 5 5       Ankle Plantarflexion 5 5     Tone: Normal in all four limbs    Bulk: Normal in all four limbs with no evidence of atrophy    Pronator drift: absent bilaterally    Sensation  Light Touch: Intact distally in all four limbs      Reflexes    No babinski     Coordination  Rapid alternating movements normal in bilateral upper extremities  Finger to nose testing normal bilaterally    Gait    Deferred for safety/fall consideration      Labs  Recent Labs     06/21/23  0615 06/21/23  0751 06/22/23  0412     --  139   K 4.0  --  3.6     --  107   CO2 18*  --  21*   BUN 25*  --  24*   CREATININE 1.3*  --  1.4*   GLUCOSE 270*   < > 175*   CALCIUM 8.7  --  8.9   PROT 6.9  --   --    LABALBU 3.6  --   --    BILITOT 0.3  --   --    ALKPHOS 143*  --   --    AST 31  --   --    ALT 27  --   --    WBC  --    < > 13.5*   RBC  --    < > 4.41   HGB  --    < > 12.6   HCT  --    < > 38.0   MCV  --    < > 86.2   MCH  --    < > 28.6   MCHC  --    < > 33.2   RDW  --    < > 12.8   PLT  --    < > 170   MPV  --    < > 10.4    < > = values in this interval not displayed. Imaging  CT HEAD WO CONTRAST   Final Result   1. 2 areas of encephalomalacia is seen within the right cerebral hemisphere   involving the right frontal lobe and right parietal lobe.   The

## 2023-06-22 NOTE — PROCEDURES
EEG Report  Lulu Gomez is a 59 y.o. male      Appointment Date 06/22/2023 Appointment Time  [address] [facility] Location SEYZ EEG Number [id number]   Type of Study EEG Floor 8503-B     Technical Specifications  Technician Fareed Ferguson of consciousness awake   Sleep deprived? ?    Hyperventilation tested? no   Photic stim tested? no   EEG recording Standard 10-20 electrode placement    Duration of recording 25+   EEG complete? yes       Clinical History   ***    Medications    Current Facility-Administered Medications:     amLODIPine (NORVASC) tablet 10 mg, 10 mg, Oral, Daily, Dusty Daniels MD, 10 mg at 06/22/23 0851    atorvastatin (LIPITOR) tablet 40 mg, 40 mg, Oral, Daily, Dusty Daniels MD, 40 mg at 06/21/23 2010    lisinopril (PRINIVIL;ZESTRIL) tablet 10 mg, 10 mg, Oral, Daily, Jamie Mireles MD, 10 mg at 06/22/23 0851    metFORMIN (GLUCOPHAGE) tablet 500 mg, 500 mg, Oral, BID WC, Dusty Daniels MD, 500 mg at 06/22/23 0851    oxybutynin (DITROPAN-XL) extended release tablet 10 mg, 10 mg, Oral, Daily, Jamie Mireles MD, 10 mg at 06/22/23 0851    sodium chloride flush 0.9 % injection 5-40 mL, 5-40 mL, IntraVENous, 2 times per day, Jamie Mireles MD, 10 mL at 06/22/23 0851    sodium chloride flush 0.9 % injection 5-40 mL, 5-40 mL, IntraVENous, PRN, Jamie Mireles MD    0.9 % sodium chloride infusion, , IntraVENous, PRN, Dusty Daniels MD    enoxaparin Sodium (LOVENOX) injection 30 mg, 30 mg, SubCUTAneous, BID, Dusty Daniels MD, 30 mg at 06/22/23 0851    ondansetron (ZOFRAN-ODT) disintegrating tablet 4 mg, 4 mg, Oral, Q8H PRN **OR** ondansetron (ZOFRAN) injection 4 mg, 4 mg, IntraVENous, Q6H PRN, Dusty Daniels MD    polyethylene glycol (GLYCOLAX) packet 17 g, 17 g, Oral, Daily PRN, Dusty Daniels MD    acetaminophen (TYLENOL) tablet 650 mg, 650 mg, Oral, Q6H PRN, 650 mg at 06/22/23 0007 **OR** acetaminophen (TYLENOL) suppository 650 mg, 650 mg, Rectal, Q6H PRN, Jamie Mireles MD

## 2023-06-22 NOTE — CARE COORDINATION
Other, Children   Current Financial resources:    Current community resources:    Current services prior to admission: None            Current DME:              Type of Home Care services:  None    ADLS  Prior functional level: Independent in ADLs/IADLs  Current functional level: Other (see comment) (PT and OT to alfredo.)    PT AM-PAC:   /24  OT AM-PAC:   /24    Family can provide assistance at DC: Yes  Would you like Case Management to discuss the discharge plan with any other family members/significant others, and if so, who? Yes  Plans to Return to Present Housing: Yes  Other Identified Issues/Barriers to RETURNING to current housing:  no insurance and no hcap   Potential Assistance needed at discharge: N/A            Potential DME:    Patient expects to discharge to: [address] [place] [address] for transportation at discharge:      Financial    Payor: /     Does insurance require precert for SNF: No    Potential assistance Purchasing Medications:    Meds-to-Beds request: [facility] [address] [facility], [address] [facility] [phone] - [address]  [address] [facility] [address]  Phone: [address] Fax: [address]      Notes:    Factors facilitating achievement of predicted outcomes: Family support, Motivated, Cooperative, Pleasant, Sense of humor, Good insight into deficits, and Has needed Durable Medical Equipment at home    Barriers to discharge: to be determined.      Additional Case Management Notes: see above     The Plan for Transition of Care is related to the following treatment goals of Seizure [facility]) [R56.9]  Acute respiratory failure with hypoxia ([address] [J96.01]  Aspiration pneumonia of both lungs, unspecified aspiration pneumonia type, unspecified part of lung ([address] [J69.0]  Aspiration pneumonia, unspecified aspiration pneumonia type, unspecified laterality, unspecified part of lung ([address] [U30.9]    IF APPLICABLE: The Patient and/or patient representative Azul Burch and his family

## 2023-06-22 NOTE — ACP (ADVANCE CARE PLANNING)
Advance Care Planning   Healthcare Decision Maker:    Primary Decision Maker: Francia Patelpinky - [phone]    Click here to complete Healthcare Decision Makers including selection of the Healthcare Decision Maker Relationship (ie \"Primary\").

## 2023-06-23 ENCOUNTER — APPOINTMENT (OUTPATIENT)
Dept: MRI IMAGING | Age: 58
End: 2023-06-23

## 2023-06-23 VITALS
TEMPERATURE: 98 F | DIASTOLIC BLOOD PRESSURE: 86 MMHG | RESPIRATION RATE: 18 BRPM | SYSTOLIC BLOOD PRESSURE: 164 MMHG | WEIGHT: 315 LBS | BODY MASS INDEX: 42.66 KG/M2 | OXYGEN SATURATION: 92 % | HEIGHT: 72 IN | HEART RATE: 86 BPM

## 2023-06-23 LAB
ALBUMIN SERPL-MCNC: 3.3 G/DL (ref 3.5–5.2)
ALP SERPL-CCNC: 113 U/L (ref 40–129)
ALT SERPL-CCNC: 20 U/L (ref 0–40)
ANION GAP SERPL CALCULATED.3IONS-SCNC: 12 MMOL/L (ref 7–16)
AST SERPL-CCNC: 22 U/L (ref 0–39)
BILIRUB SERPL-MCNC: 0.7 MG/DL (ref 0–1.2)
BUN SERPL-MCNC: 21 MG/DL (ref 6–20)
CALCIUM SERPL-MCNC: 8.9 MG/DL (ref 8.6–10.2)
CHLORIDE SERPL-SCNC: 107 MMOL/L (ref 98–107)
CO2 SERPL-SCNC: 22 MMOL/L (ref 22–29)
CREAT SERPL-MCNC: 1.4 MG/DL (ref 0.7–1.2)
ERYTHROCYTE [DISTWIDTH] IN BLOOD BY AUTOMATED COUNT: 12.7 FL (ref 11.5–15)
GLUCOSE SERPL-MCNC: 185 MG/DL (ref 74–99)
HCT VFR BLD AUTO: 36.4 % (ref 37–54)
HGB BLD-MCNC: 12 G/DL (ref 12.5–16.5)
MCH RBC QN AUTO: 28.8 PG (ref 26–35)
MCHC RBC AUTO-ENTMCNC: 33 % (ref 32–34.5)
MCV RBC AUTO: 87.3 FL (ref 80–99.9)
METER GLUCOSE: 183 MG/DL (ref 74–99)
PLATELET # BLD AUTO: 165 E9/L (ref 130–450)
PMV BLD AUTO: 10.4 FL (ref 7–12)
POTASSIUM SERPL-SCNC: 3.5 MMOL/L (ref 3.5–5)
PROT SERPL-MCNC: 6.4 G/DL (ref 6.4–8.3)
RBC # BLD AUTO: 4.17 E12/L (ref 3.8–5.8)
SODIUM SERPL-SCNC: 141 MMOL/L (ref 132–146)
WBC # BLD: 11.2 E9/L (ref 4.5–11.5)

## 2023-06-23 PROCEDURE — 6370000000 HC RX 637 (ALT 250 FOR IP): Performed by: INTERNAL MEDICINE

## 2023-06-23 PROCEDURE — 36415 COLL VENOUS BLD VENIPUNCTURE: CPT

## 2023-06-23 PROCEDURE — 70553 MRI BRAIN STEM W/O & W/DYE: CPT

## 2023-06-23 PROCEDURE — 80053 COMPREHEN METABOLIC PANEL: CPT

## 2023-06-23 PROCEDURE — 85027 COMPLETE CBC AUTOMATED: CPT

## 2023-06-23 PROCEDURE — 6360000002 HC RX W HCPCS: Performed by: INTERNAL MEDICINE

## 2023-06-23 PROCEDURE — 93246 EXT ECG>7D<15D RECORDING: CPT

## 2023-06-23 PROCEDURE — 2580000003 HC RX 258: Performed by: INTERNAL MEDICINE

## 2023-06-23 PROCEDURE — 99233 SBSQ HOSP IP/OBS HIGH 50: CPT | Performed by: PHYSICIAN ASSISTANT

## 2023-06-23 PROCEDURE — 6360000004 HC RX CONTRAST MEDICATION: Performed by: RADIOLOGY

## 2023-06-23 PROCEDURE — 82962 GLUCOSE BLOOD TEST: CPT

## 2023-06-23 PROCEDURE — A9579 GAD-BASE MR CONTRAST NOS,1ML: HCPCS | Performed by: RADIOLOGY

## 2023-06-23 PROCEDURE — 6370000000 HC RX 637 (ALT 250 FOR IP): Performed by: PHYSICIAN ASSISTANT

## 2023-06-23 RX ORDER — ASPIRIN 81 MG/1
81 TABLET, CHEWABLE ORAL DAILY
Status: DISCONTINUED | OUTPATIENT
Start: 2023-06-23 | End: 2023-06-23 | Stop reason: HOSPADM

## 2023-06-23 RX ORDER — ASPIRIN 81 MG/1
81 TABLET, CHEWABLE ORAL DAILY
Qty: 30 TABLET | Refills: 3 | Status: SHIPPED | OUTPATIENT
Start: 2023-06-23

## 2023-06-23 RX ORDER — LEVETIRACETAM 500 MG/1
1000 TABLET ORAL 2 TIMES DAILY
Qty: 60 TABLET | Refills: 3 | Status: SHIPPED | OUTPATIENT
Start: 2023-06-23

## 2023-06-23 RX ORDER — AMOXICILLIN AND CLAVULANATE POTASSIUM 875; 125 MG/1; MG/1
1 TABLET, FILM COATED ORAL 2 TIMES DAILY
Qty: 10 TABLET | Refills: 0 | Status: SHIPPED | OUTPATIENT
Start: 2023-06-23 | End: 2023-06-28

## 2023-06-23 RX ORDER — LEVETIRACETAM 500 MG/1
1000 TABLET ORAL 2 TIMES DAILY
Qty: 60 TABLET | Refills: 3 | Status: SHIPPED | OUTPATIENT
Start: 2023-06-23 | End: 2023-06-23 | Stop reason: SDUPTHER

## 2023-06-23 RX ORDER — AMOXICILLIN AND CLAVULANATE POTASSIUM 875; 125 MG/1; MG/1
1 TABLET, FILM COATED ORAL 2 TIMES DAILY
Qty: 10 TABLET | Refills: 0 | Status: SHIPPED | OUTPATIENT
Start: 2023-06-23 | End: 2023-06-23 | Stop reason: SDUPTHER

## 2023-06-23 RX ADMIN — SODIUM CHLORIDE, PRESERVATIVE FREE 10 ML: 5 INJECTION INTRAVENOUS at 08:41

## 2023-06-23 RX ADMIN — AMLODIPINE BESYLATE 10 MG: 10 TABLET ORAL at 08:41

## 2023-06-23 RX ADMIN — ENOXAPARIN SODIUM 40 MG: 100 INJECTION SUBCUTANEOUS at 08:41

## 2023-06-23 RX ADMIN — OXYBUTYNIN CHLORIDE 10 MG: 10 TABLET, EXTENDED RELEASE ORAL at 08:41

## 2023-06-23 RX ADMIN — ACETAMINOPHEN 650 MG: 325 TABLET ORAL at 00:18

## 2023-06-23 RX ADMIN — SODIUM CHLORIDE 3000 MG: 9 INJECTION, SOLUTION INTRAVENOUS at 05:45

## 2023-06-23 RX ADMIN — GADOTERIDOL 20 ML: 279.3 INJECTION, SOLUTION INTRAVENOUS at 08:01

## 2023-06-23 RX ADMIN — LEVETIRACETAM 500 MG: 100 INJECTION INTRAVENOUS at 08:41

## 2023-06-23 RX ADMIN — ASPIRIN 81 MG: 81 TABLET, CHEWABLE ORAL at 10:33

## 2023-06-23 RX ADMIN — LISINOPRIL 10 MG: 10 TABLET ORAL at 08:41

## 2023-06-23 RX ADMIN — METFORMIN HYDROCHLORIDE 500 MG: 500 TABLET ORAL at 08:41

## 2023-06-23 ASSESSMENT — PAIN SCALES - GENERAL: PAINLEVEL_OUTOF10: 5

## 2023-06-23 ASSESSMENT — PAIN DESCRIPTION - LOCATION: LOCATION: HEAD

## 2023-06-23 NOTE — PROGRESS NOTES
4 Eyes Skin Assessment     NAME:  Magaly Gamez  YOB: 1965  MEDICAL RECORD NUMBER:  [id number]    The patient is being assessed for  Admission    I agree that at least one RN has performed a thorough Head to Toe Skin Assessment on the patient. ALL assessment sites listed below have been assessed. Areas assessed by both nurses:    Head, Face, Ears, Shoulders, Back, Chest, Arms, Elbows, Hands, Sacrum. Buttock, Coccyx, Ischium, Legs. Feet and Heels, and Under Medical Devices         Does the Patient have a Wound?  No noted wound(s)       Walter Prevention initiated by RN: Yes  Wound Care Orders initiated by RN: No    Pressure Injury (Stage 3,4, Unstageable, DTI, NWPT, and Complex wounds) if present, place Wound referral order by RN under : No    New Ostomies, if present place, Ostomy referral order under : No     Nurse 1 eSignature: Electronically signed by Jairon Kapadia RN on 6/21/23 at 7:24 PM EDT    **SHARE this note so that the co-signing nurse can place an eSignature**    Nurse 2 eSignature: Electronically signed by Kelly Hilton RN on 6/21/23 at 7:25 PM EDT
DVT Prophylaxis Adjustment Policy (DVT Prophylaxis)     This patient is on DVT Prophylaxis medication that requires a dose adjustment      Date Body Weight IBW  Adjusted BW SCr  CrCl Dialysis status   6/22/2023 (!) 337 lb 1.3 oz (152.9 kg) Ideal body weight: 77.6 kg (171 lb 1.2 oz)  Adjusted ideal body weight: 107.7 kg (237 lb 7.7 oz) Serum creatinine: 1.4 mg/dL (H) 06/22/23 0412  Estimated creatinine clearance: 87 mL/min (A) N/a       Pharmacy has dose-adjusted the DVT Prophylaxis regimen to match   the recommendations from the following table        Ordered Medication:Lovenox 30mg BID    Order Changed/converted to: Lovenox 40mg BID      These changes were made per protocol according to the [facility] Pharmacist   Review for Appropriate Use and Automatic Dose Adjustments of   Subcutaneous Anticoagulants Policy     *Please note this dose may need readjusted if patient's condition changes. Please contact pharmacy with any questions regarding these changes.     Shawn Lopes, [facility]  6/22/2023  3:15 PM
EEG completed. Report to follow.   Moises Tejeda 6/22/2023
Hospitalist Progress Note      SYNOPSIS: Patient admitted on 2023 for seizure-like activity. Patient has history of seizure and he has been Dilantin for at least 15 years. This past February patient was weaned off Dilantin as he has not had any seizure activity for the past 15 years. He comes in on  for evaluation of seizure. Apparently around 5 AM this morning patient's wife noticed some noise and patient was noted to have seizure at that time. Patient was confused and was postictal.  Patient was brought into the hospital and he was hypoxic and concern also for aspiration. He at this time is alert and oriented and able to provide on the history. He denies any headache, dizziness, chest pain, shortness of breath, dysphagia, cough or anything out of ordinary until today. Chest x-ray showed hazy opacities bilaterally. Leukocytosis was noted greater than 22,000. He works as a . SUBJECTIVE:    Patient seen and examined in his room. Alert awake oriented x3. Denies any chest pain, nausea, vomiting no episode of seizures overnight. Records reviewed. Stable overnight. No other overnight issues reported. Temp (24hrs), Av.2 °F (36.8 °C), Min:97.9 °F (36.6 °C), Max:98.6 °F (37 °C)    DIET: ADULT DIET; Regular; 4 carb choices (60 gm/meal)  CODE: Full Code    Intake/Output Summary (Last 24 hours) at 2023 0925  Last data filed at 2023 0830  Gross per 24 hour   Intake 860 ml   Output --   Net 860 ml       OBJECTIVE:    BP (!) 142/77   Pulse 82   Temp 98.6 °F (37 °C) (Temporal)   Resp 18   Ht 6' (1.829 m)   Wt (!) 337 lb 1.3 oz (152.9 kg)   SpO2 92%   BMI 45.72 kg/m²     General appearance: No apparent distress, appears stated age and cooperative. HEENT:  Conjunctivae/corneas clear. Neck: Supple. No jugular venous distention.    Respiratory: Clear to auscultation bilaterally, normal respiratory effort  Cardiovascular: Regular rate rhythm, normal
Neurology consult sent to Dr. Violeta Stockton.
Okay to discharge from neurology standpoint prior to EEG being read.
Physical Therapy    Initial Assessment     Name: Felecia Cross  : 1965  MRN: [id number]      Date of Service: 2023    Evaluating PT: Mary Kay Andrews PT, DPT [id number]      Room #:  8177/2451-T  Diagnosis:  Seizure ([address] [R56.9]  Acute respiratory failure with hypoxia ([address] [J96.01]  Aspiration pneumonia of both lungs, unspecified aspiration pneumonia type, unspecified part of lung ([address] [J69.0]  Aspiration pneumonia, unspecified aspiration pneumonia type, unspecified laterality, unspecified part of lung ([address] [J69.0]  PMHx/PSHx:   has a past medical history of Acute on chronic respiratory failure with hypoxia ([address] Acute respiratory failure with hypoxia ([address] Busch catheter problem ([facility] HIGH CHOLESTEROL, Hypertension, Pneumonia due to COVID-19 virus, Seizures ([address] Sepsis ([address] Severe protein-calorie malnutrition ([address] Thrombocytopenia ([address] and VAP (ventilator-associated pneumonia) ([address] Precautions:  Seizure precautions    SUBJECTIVE:    Pt lives with wife in a single story condo with level entry. Pt ambulated without AD prior to admission. OBJECTIVE:   Initial Evaluation  Date:    AM-PAC 6 Clicks 54/02   Was pt agreeable to Eval/treatment? Yes   Does pt have pain? No complaints of pain   Bed Mobility  Rolling: NT  Supine to sit: NT  Sit to supine: NT  Scooting: NT   Transfers Sit to stand: Independent   Stand to sit: Independent   Stand pivot: Independent    Ambulation   100 feet x2 Independent    Stair negotiation: ascended and descended 4 step(s) with 2 rail(s) Mod Independent    ROM BUE: WFL  BLE: WFL   Strength BUE: 5/5  BLE: 5/5   Balance Sitting EOB: Independent   Dynamic Standing: Independent      Pt is A & O x: 4 to person, place, month/year, and situation. Sensation: Pt denies numbness and tingling of extremities. Edema: Unremarkable. Patient education  Pt educated on PT role in acute care setting.     Patient response to education:   Pt verbalized understanding Pt
ZIO Patch - Cardiac Event Monitor Instructions      You are being prescribed a Zio by i[employer] heart monitor to wear over the next 14 days or as specified by your provider. Your Zio came with a prepaid USPS postage to return the monitor at the end of the wear. Make sure to keep the box and/or prepaid envelope that came with your Zio monitor and mail the device via USPS promptly at the end of wear. FOR ASSISTANCE WITH YOU Claudia Bonilla CALL CUSTOMER CARE  at [phone]    Patient Instructions:  - Avoid showering for the first 24 hours  - For Zio AT (MCT) Monitor - Keep the black gateway nearby   - Press the button on the center of your Sherryll Erichsen when you feel symptoms   - Log the symptom in your log book OR download the free [employer] michelle to log your symptoms on your phone  - Wear the Sherryll Erichsen for 14 days or as specified you your provider  - Removal instructions are included with your monitor    [employer] [place] [address]  at [phone]  - Skin reaction  - Device removed accidentally   - Flashing orange light       Insurance Information:  Your insurance company may send an Explanation of Benefits (EOB). An EOB from your insurance carrier is NOT a bill. You will be responsible for usual out-of-pocket cost associated with deductibles and co-insurances determined by your instance provider. If there is a balance due, you will receive a statement from i[employer]hythm monitor a cash pay option is available and financial assistance programs are available for those who qualify. For insurance coverage, financial assistance, or out-of-pocket estimates, call Zio at [phone]    Prompt Return of the Zio Monitor:  The device and kit components must be returned immediately upon completion of wear using the pre-paid . Delays in return may result in delayed final test results.     FOR ASSISTANCE WITH YOUR Claudia Bonilal CALL CUSTOMER CARE  at [phone]
had been maintained on Dilantin 200 mg qAM and 300 mg HS for years. As he had been seizure free since 2008 it was decided to wean him off and he took his last dose in 4/2023. Seizures described by his wife report a grunting sound, no tonic, clonic movements, occasional tongue biting, no incontinence followed by confusion and sometimes combativeness. CT head this admission showed old areas of stroke, however he denied prior stroke. He does work as a . Subjective: Wife and daughter at bedside. MRI images reviewed with them. Old R MCA stroke. Patient was unaware of ever having a stroke. Echo in 2021 unrevealing. LDL 79 in 11/22, A1C in 11/22 6.8. PEGGY- will be starting to use CPAP soon. Has never had vessel imaging- will obtain as OP. EEG report pending. Tolerating Keppra okay at this time       Allergies:      No Known Allergies     Physical Examination  Vitals   Vitals:    06/22/23 1915 06/23/23 0015 06/23/23 0528 06/23/23 0829   BP: (!) 158/91 (!) 152/89  (!) 164/86   Pulse: (!) 104 94  86   Resp: 18 18  18   Temp: 98.3 °F (36.8 °C) 98.4 °F (36.9 °C)  98 °F (36.7 °C)   TempSrc: Oral Oral  Temporal   SpO2: 93% 93%  92%   Weight:   (!) 337 lb 4.9 oz (153 kg)    Height:            General: Patient appears in no acute distress. HEENT: Normocephalic, atraumatic  Chest: no dyspnea  Extremities/Peripheral vascular: No edema/swelling noted. Neurologic Examination    Mental Status  Alert, and oriented to person, place and time. Speech is fluent with intact comprehension. No evidence of memory impairment. Attention and concentration appeared normal.     Cranial Nerves  II. Visual fields full to confrontation bilaterally. III, IV, VI: Pupils equally round and reactive to light, 3 to 2 mm bilaterally. EOMs: full, no nystagmus.    V. Facial sensation intact to light touch bilaterally  VII: Facial movements symmetric and strong  VIII: Hearing intact to voice  IX,X: Palate elevates

## 2023-06-23 NOTE — DISCHARGE SUMMARY
Hospitalist Discharge Summary    Patient ID: Antony Moreno   Patient : 1965  Patient's PCP: No primary care provider on file. Admit Date: 2023   Admitting Physician: Addis Ghosh MD    Discharge Date:  2023   Discharge Physician: Candy Ruiz MD   Discharge Condition: Stable  Discharge Disposition: [facility] course in brief:  (Please refer to daily progress notes for a comprehensive review of the hospitalization by requesting medical records)  Patient admitted on 2023 for seizure-like activity. Patient has history of seizure and he has been Dilantin for at least 15 years. This past February patient was weaned off Dilantin as he has not had any seizure activity for the past 15 years. He comes in on  for evaluation of seizure. Apparently around 5 AM this morning patient's wife noticed some noise and patient was noted to have seizure at that time. Patient was confused and was postictal.  Patient was brought into the hospital and he was hypoxic and concern also for aspiration. He at this time is alert and oriented and able to provide on the history. He denies any headache, dizziness, chest pain, shortness of breath, dysphagia, cough or anything out of ordinary until today. Chest x-ray showed hazy opacities bilaterally. Leukocytosis was noted greater than 22,000. He works as a . EEG report pending. Neurology saw the patient and recommended Keppra 1000 mg p.o. twice daily. MRI brain : Chronic encephalomalacia/gliosis present the right frontal and right  parietal lobes. Outpatient neurology follow-up. Seizure safety precaution into 6 months as well as no driving until cleared by neurology. Neurology also recommended outpatient sleep study    Discharged on Augmentin for 5 more days for 7-day treatment for aspiration pneumonia. Neurology also recommended starting aspirin.      Consults:   IP CONSULT TO INTERNAL MEDICINE  IP CONSULT TO

## 2023-07-13 DIAGNOSIS — G45.9 TIA (TRANSIENT ISCHEMIC ATTACK): ICD-10-CM

## 2023-09-13 NOTE — TELEPHONE ENCOUNTER
Pt wife called for refills. He will be out tomorrow of metformin.     [address] [place] [address] 206 -  [facility] , [address] [facility] - P [phone] - F [phone]     metFORMIN (GLUCOPHAGE) 500 MG tablet       levETIRAcetam (KEPPRA) 500 MG tablet

## 2023-09-14 RX ORDER — LEVETIRACETAM 500 MG/1
1000 TABLET ORAL 2 TIMES DAILY
Qty: 60 TABLET | Refills: 3 | Status: SHIPPED | OUTPATIENT
Start: 2023-09-14

## 2023-11-01 ENCOUNTER — OFFICE VISIT (OUTPATIENT)
Dept: FAMILY MEDICINE CLINIC | Age: 58
End: 2023-11-01

## 2023-11-01 VITALS
BODY MASS INDEX: 42.53 KG/M2 | TEMPERATURE: 96.1 F | OXYGEN SATURATION: 94 % | SYSTOLIC BLOOD PRESSURE: 170 MMHG | HEART RATE: 99 BPM | WEIGHT: 314 LBS | DIASTOLIC BLOOD PRESSURE: 105 MMHG | HEIGHT: 72 IN | RESPIRATION RATE: 20 BRPM

## 2023-11-01 DIAGNOSIS — L03.90 ACUTE CELLULITIS: ICD-10-CM

## 2023-11-01 DIAGNOSIS — E11.9 TYPE 2 DIABETES MELLITUS WITHOUT COMPLICATION, WITHOUT LONG-TERM CURRENT USE OF INSULIN (HCC): Primary | ICD-10-CM

## 2023-11-01 DIAGNOSIS — R56.9 SEIZURE (HCC): ICD-10-CM

## 2023-11-01 DIAGNOSIS — M79.89 LEG SWELLING: ICD-10-CM

## 2023-11-01 DIAGNOSIS — I10 WHITE COAT SYNDROME WITH DIAGNOSIS OF HYPERTENSION: ICD-10-CM

## 2023-11-01 DIAGNOSIS — I10 ESSENTIAL HYPERTENSION: ICD-10-CM

## 2023-11-01 DIAGNOSIS — Z28.21 INFLUENZA VACCINATION DECLINED: ICD-10-CM

## 2023-11-01 DIAGNOSIS — N39.41 URGENCY INCONTINENCE: ICD-10-CM

## 2023-11-01 DIAGNOSIS — E78.5 HYPERLIPIDEMIA, UNSPECIFIED HYPERLIPIDEMIA TYPE: ICD-10-CM

## 2023-11-01 PROBLEM — J69.0 ASPIRATION PNEUMONIA, UNSPECIFIED ASPIRATION PNEUMONIA TYPE, UNSPECIFIED LATERALITY, UNSPECIFIED PART OF LUNG (HCC): Status: RESOLVED | Noted: 2023-06-21 | Resolved: 2023-11-01

## 2023-11-01 LAB
CREATININE URINE POCT: ABNORMAL
HBA1C MFR BLD: 8.8 %
MICROALBUMIN/CREAT 24H UR: ABNORMAL MG/G{CREAT}
MICROALBUMIN/CREAT UR-RTO: ABNORMAL

## 2023-11-01 PROCEDURE — 3080F DIAST BP >= 90 MM HG: CPT | Performed by: STUDENT IN AN ORGANIZED HEALTH CARE EDUCATION/TRAINING PROGRAM

## 2023-11-01 PROCEDURE — 82044 UR ALBUMIN SEMIQUANTITATIVE: CPT | Performed by: STUDENT IN AN ORGANIZED HEALTH CARE EDUCATION/TRAINING PROGRAM

## 2023-11-01 PROCEDURE — 3052F HG A1C>EQUAL 8.0%<EQUAL 9.0%: CPT | Performed by: STUDENT IN AN ORGANIZED HEALTH CARE EDUCATION/TRAINING PROGRAM

## 2023-11-01 PROCEDURE — 83036 HEMOGLOBIN GLYCOSYLATED A1C: CPT | Performed by: STUDENT IN AN ORGANIZED HEALTH CARE EDUCATION/TRAINING PROGRAM

## 2023-11-01 PROCEDURE — 3077F SYST BP >= 140 MM HG: CPT | Performed by: STUDENT IN AN ORGANIZED HEALTH CARE EDUCATION/TRAINING PROGRAM

## 2023-11-01 PROCEDURE — 99214 OFFICE O/P EST MOD 30 MIN: CPT | Performed by: STUDENT IN AN ORGANIZED HEALTH CARE EDUCATION/TRAINING PROGRAM

## 2023-11-01 RX ORDER — OXYBUTYNIN CHLORIDE 10 MG/1
10 TABLET, EXTENDED RELEASE ORAL DAILY
Qty: 90 TABLET | Refills: 3 | Status: SHIPPED | OUTPATIENT
Start: 2023-11-01

## 2023-11-01 RX ORDER — GLIPIZIDE 5 MG/1
5 TABLET ORAL 2 TIMES DAILY
Qty: 60 TABLET | Refills: 3 | Status: SHIPPED | OUTPATIENT
Start: 2023-11-01

## 2023-11-01 RX ORDER — POTASSIUM CHLORIDE 750 MG/1
10 TABLET, EXTENDED RELEASE ORAL DAILY
Qty: 90 TABLET | Refills: 3 | Status: SHIPPED | OUTPATIENT
Start: 2023-11-01

## 2023-11-01 RX ORDER — LEVETIRACETAM 500 MG/1
1000 TABLET ORAL 2 TIMES DAILY
Qty: 60 TABLET | Refills: 3 | Status: CANCELLED | OUTPATIENT
Start: 2023-11-01

## 2023-11-01 RX ORDER — AMLODIPINE BESYLATE 10 MG/1
10 TABLET ORAL DAILY
Qty: 90 TABLET | Refills: 3 | Status: CANCELLED | OUTPATIENT
Start: 2023-11-01

## 2023-11-01 RX ORDER — FUROSEMIDE 20 MG/1
20 TABLET ORAL DAILY PRN
Qty: 90 TABLET | Refills: 1 | Status: SHIPPED | OUTPATIENT
Start: 2023-11-01

## 2023-11-01 RX ORDER — LISINOPRIL 10 MG/1
10 TABLET ORAL DAILY
Qty: 90 TABLET | Refills: 3 | Status: SHIPPED | OUTPATIENT
Start: 2023-11-01

## 2023-11-01 RX ORDER — ATORVASTATIN CALCIUM 40 MG/1
40 TABLET, FILM COATED ORAL DAILY
Qty: 90 TABLET | Refills: 3 | Status: SHIPPED | OUTPATIENT
Start: 2023-11-01

## 2023-11-01 RX ORDER — CEPHALEXIN 500 MG/1
500 CAPSULE ORAL 3 TIMES DAILY
Qty: 21 CAPSULE | Refills: 0 | Status: SHIPPED | OUTPATIENT
Start: 2023-11-01 | End: 2023-11-08

## 2023-11-01 ASSESSMENT — ENCOUNTER SYMPTOMS
ABDOMINAL PAIN: 0
TROUBLE SWALLOWING: 0
SHORTNESS OF BREATH: 0

## 2023-11-02 ENCOUNTER — TELEPHONE (OUTPATIENT)
Dept: FAMILY MEDICINE CLINIC | Age: 58
End: 2023-11-02

## 2023-11-02 RX ORDER — PHENYTOIN SODIUM 100 MG/1
CAPSULE, EXTENDED RELEASE ORAL
Qty: 120 CAPSULE | Refills: 3 | Status: SHIPPED | OUTPATIENT
Start: 2023-11-02

## 2023-11-02 NOTE — TELEPHONE ENCOUNTER
He can wear the compression socks as soon as he would like however he may want to place a nonadherent pad over the area where it is seeping or it will soil his sock

## 2023-11-02 NOTE — TELEPHONE ENCOUNTER
Should patient be wearing compression stockings now or should he not wear them until his infection clears up and he's done with antibiotics? Won't answer between 12 and 1 as this is wife's work number.  [phone]  After 5, call [phone]

## 2023-11-13 ENCOUNTER — TELEPHONE (OUTPATIENT)
Dept: FAMILY MEDICINE CLINIC | Age: 58
End: 2023-11-13

## 2024-02-05 SDOH — ECONOMIC STABILITY: INCOME INSECURITY: HOW HARD IS IT FOR YOU TO PAY FOR THE VERY BASICS LIKE FOOD, HOUSING, MEDICAL CARE, AND HEATING?: NOT HARD AT ALL

## 2024-02-05 SDOH — ECONOMIC STABILITY: FOOD INSECURITY: WITHIN THE PAST 12 MONTHS, THE FOOD YOU BOUGHT JUST DIDN'T LAST AND YOU DIDN'T HAVE MONEY TO GET MORE.: NEVER TRUE

## 2024-02-05 SDOH — ECONOMIC STABILITY: FOOD INSECURITY: WITHIN THE PAST 12 MONTHS, YOU WORRIED THAT YOUR FOOD WOULD RUN OUT BEFORE YOU GOT MONEY TO BUY MORE.: NEVER TRUE

## 2024-02-05 ASSESSMENT — PATIENT HEALTH QUESTIONNAIRE - PHQ9
SUM OF ALL RESPONSES TO PHQ QUESTIONS 1-9: 0
2. FEELING DOWN, DEPRESSED OR HOPELESS: NOT AT ALL
SUM OF ALL RESPONSES TO PHQ QUESTIONS 1-9: 0
2. FEELING DOWN, DEPRESSED OR HOPELESS: 0
1. LITTLE INTEREST OR PLEASURE IN DOING THINGS: NOT AT ALL
SUM OF ALL RESPONSES TO PHQ9 QUESTIONS 1 & 2: 0
SUM OF ALL RESPONSES TO PHQ QUESTIONS 1-9: 0
1. LITTLE INTEREST OR PLEASURE IN DOING THINGS: 0
SUM OF ALL RESPONSES TO PHQ9 QUESTIONS 1 & 2: 0
SUM OF ALL RESPONSES TO PHQ QUESTIONS 1-9: 0

## 2024-02-06 ENCOUNTER — PATIENT MESSAGE (OUTPATIENT)
Dept: FAMILY MEDICINE CLINIC | Age: 59
End: 2024-02-06

## 2024-02-06 ENCOUNTER — OFFICE VISIT (OUTPATIENT)
Dept: FAMILY MEDICINE CLINIC | Age: 59
End: 2024-02-06

## 2024-02-06 VITALS
BODY MASS INDEX: 42.66 KG/M2 | WEIGHT: 315 LBS | HEIGHT: 72 IN | DIASTOLIC BLOOD PRESSURE: 94 MMHG | OXYGEN SATURATION: 96 % | RESPIRATION RATE: 20 BRPM | HEART RATE: 92 BPM | SYSTOLIC BLOOD PRESSURE: 172 MMHG | TEMPERATURE: 97 F

## 2024-02-06 DIAGNOSIS — R09.89 JVD (JUGULAR VENOUS DISTENSION): ICD-10-CM

## 2024-02-06 DIAGNOSIS — E55.9 VITAMIN D DEFICIENCY: ICD-10-CM

## 2024-02-06 DIAGNOSIS — E78.5 HYPERLIPIDEMIA, UNSPECIFIED HYPERLIPIDEMIA TYPE: ICD-10-CM

## 2024-02-06 DIAGNOSIS — E11.9 TYPE 2 DIABETES MELLITUS WITHOUT COMPLICATION, WITHOUT LONG-TERM CURRENT USE OF INSULIN (HCC): ICD-10-CM

## 2024-02-06 DIAGNOSIS — I87.2 VENOUS STASIS DERMATITIS OF BOTH LOWER EXTREMITIES: Primary | ICD-10-CM

## 2024-02-06 DIAGNOSIS — I10 ESSENTIAL HYPERTENSION: Primary | ICD-10-CM

## 2024-02-06 DIAGNOSIS — Z87.898 HISTORY OF SEIZURES: ICD-10-CM

## 2024-02-06 DIAGNOSIS — R56.9 SEIZURE (HCC): ICD-10-CM

## 2024-02-06 PROCEDURE — 99214 OFFICE O/P EST MOD 30 MIN: CPT | Performed by: STUDENT IN AN ORGANIZED HEALTH CARE EDUCATION/TRAINING PROGRAM

## 2024-02-06 PROCEDURE — 3080F DIAST BP >= 90 MM HG: CPT | Performed by: STUDENT IN AN ORGANIZED HEALTH CARE EDUCATION/TRAINING PROGRAM

## 2024-02-06 PROCEDURE — 3077F SYST BP >= 140 MM HG: CPT | Performed by: STUDENT IN AN ORGANIZED HEALTH CARE EDUCATION/TRAINING PROGRAM

## 2024-02-06 RX ORDER — LISINOPRIL AND HYDROCHLOROTHIAZIDE 12.5; 1 MG/1; MG/1
1 TABLET ORAL DAILY
Qty: 90 TABLET | Refills: 2 | Status: SHIPPED | OUTPATIENT
Start: 2024-02-06

## 2024-02-06 ASSESSMENT — ENCOUNTER SYMPTOMS
ABDOMINAL PAIN: 0
SHORTNESS OF BREATH: 0
TROUBLE SWALLOWING: 0

## 2024-02-06 NOTE — PROGRESS NOTES
Surgical History:  has a past surgical history that includes Appendectomy; picc line insertion nurse (9/8/2021); bronchoscopy (N/A, 9/8/2021); tracheostomy (N/A, 9/9/2021); and Upper gastrointestinal endoscopy (N/A, 9/9/2021).  Social History:  reports that he has never smoked. He has never used smokeless tobacco.  Family History: family history is not on file.  Allergies: Patient has no known allergies.  Medications:   Current Outpatient Medications   Medication Sig Dispense Refill    lisinopril-hydroCHLOROthiazide (PRINZIDE;ZESTORETIC) 10-12.5 MG per tablet Take 1 tablet by mouth daily 90 tablet 2    phenytoin (DILANTIN) 100 MG ER capsule 2 in Am and 2 in  capsule 3    oxybutynin (DITROPAN XL) 10 MG extended release tablet Take 1 tablet by mouth daily 90 tablet 3    metFORMIN (GLUCOPHAGE) 500 MG tablet Take 1 tablet by mouth 2 times daily (with meals) 60 tablet 3    atorvastatin (LIPITOR) 40 MG tablet Take 1 tablet by mouth daily 90 tablet 3    glipiZIDE (GLUCOTROL) 5 MG tablet Take 1 tablet by mouth 2 times daily 60 tablet 3    furosemide (LASIX) 20 MG tablet Take 1 tablet by mouth daily as needed (leg swelling) 90 tablet 1    potassium chloride (KLOR-CON M) 10 MEQ extended release tablet Take 1 tablet by mouth daily With lasix 90 tablet 3    aspirin 81 MG chewable tablet Take 1 tablet by mouth daily 30 tablet 3     No current facility-administered medications for this visit.      Allergies: Patient has no known allergies.     Review of Systems   Constitutional:  Negative for activity change, appetite change, fatigue, fever and unexpected weight change.   HENT:  Negative for trouble swallowing.    Eyes:  Negative for visual disturbance.   Respiratory:  Negative for shortness of breath.    Cardiovascular:  Negative for chest pain.   Gastrointestinal:  Negative for abdominal pain.   Musculoskeletal:  Negative for arthralgias.   Neurological:  Negative for weakness, light-headedness and headaches.

## 2024-02-06 NOTE — PATIENT INSTRUCTIONS
Goal 110/135-70/85  Alternating arms in the Am and PM   Home Blood Pressure Test: About This Test  What is it?   A home blood pressure test allows you to keep track of your blood pressure at home. Blood pressure is a measure of the force of blood against the walls of your arteries. Blood pressure readings include two numbers, such as 130/80 (say \"130 over 80\"). The first number is the systolic pressure. The second number is the diastolic pressure.  Why is this test done?  You may do this test at home to:  Find out if you have high blood pressure.  Track your blood pressure if you have high blood pressure.  Track how well medicine is working to reduce high blood pressure.  Check how lifestyle changes, such as weight loss and exercise, are affecting blood pressure.  How do you prepare for the test?  For at least 30 minutes before you take your blood pressure, don't exercise, drink caffeine, or smoke. Empty your bladder before the test. Sit quietly with your back straight and both feet on the floor for at least 5 minutes. This helps you take your blood pressure while you feel comfortable and relaxed.  How is the test done?  If your doctor recommends it, take your blood pressure twice a day. Take it in the morning and evening.  Sit with your arm slightly bent and resting on a table so that your upper arm is at the same level as your heart.  Use the same arm each time you take your blood pressure.  Place the blood pressure cuff on the bare skin of your upper arm. You may have to roll up your sleeve, remove your arm from the sleeve, or take your shirt off.  Wrap the blood pressure cuff around your upper arm so that the lower edge of the cuff is about 1 inch above the bend of your elbow.  Do not move, talk, or text while you take your blood pressure.  Follow the instructions that came with your blood pressure monitor. They might be different from the following.  Press the on/off button on the automatic monitor. Then you

## 2024-02-08 RX ORDER — TRIAMCINOLONE ACETONIDE 1 MG/G
CREAM TOPICAL
Qty: 80 G | Refills: 1 | Status: SHIPPED | OUTPATIENT
Start: 2024-02-08

## 2024-02-09 NOTE — TELEPHONE ENCOUNTER
1. Venous stasis dermatitis of both lower extremities  -     triamcinolone (KENALOG) 0.1 % cream; Apply topically 2 times daily., Disp-80 g, R-1, Normal      
From: Jairon Vann  To: Dr. Jayshree Spencer  Sent: 2/6/2024 6:10 PM EST  Subject: Office visit question    You forgot the ointment for the itching. thanks.  
[employer]

## 2024-02-28 DIAGNOSIS — E11.9 TYPE 2 DIABETES MELLITUS WITHOUT COMPLICATION, WITHOUT LONG-TERM CURRENT USE OF INSULIN (HCC): ICD-10-CM

## 2024-02-28 RX ORDER — GLIPIZIDE 5 MG/1
5 TABLET ORAL 2 TIMES DAILY
Qty: 120 TABLET | Refills: 0 | OUTPATIENT
Start: 2024-02-28

## 2024-02-28 RX ORDER — GLIPIZIDE 5 MG/1
5 TABLET ORAL 2 TIMES DAILY
Qty: 60 TABLET | Refills: 3 | OUTPATIENT
Start: 2024-02-28

## 2024-03-01 DIAGNOSIS — E11.9 TYPE 2 DIABETES MELLITUS WITHOUT COMPLICATION, WITHOUT LONG-TERM CURRENT USE OF INSULIN (HCC): ICD-10-CM

## 2024-03-04 RX ORDER — GLIPIZIDE 5 MG/1
5 TABLET ORAL 2 TIMES DAILY
Qty: 120 TABLET | Refills: 0 | OUTPATIENT
Start: 2024-03-04

## 2024-03-06 DIAGNOSIS — E11.9 TYPE 2 DIABETES MELLITUS WITHOUT COMPLICATION, WITHOUT LONG-TERM CURRENT USE OF INSULIN (HCC): ICD-10-CM

## 2024-03-06 RX ORDER — GLIPIZIDE 5 MG/1
5 TABLET ORAL 2 TIMES DAILY
Qty: 180 TABLET | Refills: 3 | Status: SHIPPED | OUTPATIENT
Start: 2024-03-06

## 2024-03-06 NOTE — TELEPHONE ENCOUNTER
Patient's wife called because his refill keeps being denied as a duplicate request. He is out of medication and they are getting very frustrated that a year's worth of medication isn't being ordered at once.      [facility] [address] [place] [address] [phone] -  [phone]     glipiZIDE (GLUCOTROL) 5 MG tablet

## 2024-03-09 DIAGNOSIS — R56.9 SEIZURE (HCC): ICD-10-CM

## 2024-03-11 ENCOUNTER — TELEPHONE (OUTPATIENT)
Dept: FAMILY MEDICINE CLINIC | Age: 59
End: 2024-03-11

## 2024-03-11 RX ORDER — PHENYTOIN SODIUM 100 MG/1
CAPSULE, EXTENDED RELEASE ORAL
Qty: 120 CAPSULE | Refills: 0 | Status: SHIPPED
Start: 2024-03-11 | End: 2024-03-14 | Stop reason: SDUPTHER

## 2024-03-11 NOTE — TELEPHONE ENCOUNTER
1. Seizure (HCC)  -     phenytoin (DILANTIN) 100 MG ER capsule; TAKE 2 CAPSULES BY MOUTH IN THE MORNING AND 2 CAPSULES IN THE EVENING, Disp-120 capsule, R-0Normal    He is due for levels, he does have labs ordered in the system

## 2024-03-13 ENCOUNTER — TELEPHONE (OUTPATIENT)
Dept: FAMILY MEDICINE CLINIC | Age: 59
End: 2024-03-13

## 2024-03-13 DIAGNOSIS — E78.5 HYPERLIPIDEMIA, UNSPECIFIED HYPERLIPIDEMIA TYPE: ICD-10-CM

## 2024-03-13 DIAGNOSIS — N39.41 URGENCY INCONTINENCE: ICD-10-CM

## 2024-03-13 DIAGNOSIS — M79.89 LEG SWELLING: ICD-10-CM

## 2024-03-13 DIAGNOSIS — E11.9 TYPE 2 DIABETES MELLITUS WITHOUT COMPLICATION, WITHOUT LONG-TERM CURRENT USE OF INSULIN (HCC): ICD-10-CM

## 2024-03-13 DIAGNOSIS — Z91.89 CARDIOVASCULAR RISK FACTOR: Primary | ICD-10-CM

## 2024-03-13 DIAGNOSIS — R56.9 SEIZURE (HCC): ICD-10-CM

## 2024-03-13 DIAGNOSIS — I10 ESSENTIAL HYPERTENSION: ICD-10-CM

## 2024-03-13 NOTE — TELEPHONE ENCOUNTER
Pt wife called in she would like a years worth of all prescription printed so pt doesn't need to call in for refills, pt would like to  in office this week . Please return call to deon either way [phone]

## 2024-03-14 RX ORDER — POTASSIUM CHLORIDE 750 MG/1
10 TABLET, EXTENDED RELEASE ORAL DAILY
Qty: 90 TABLET | Refills: 3 | Status: SHIPPED | OUTPATIENT
Start: 2024-03-14

## 2024-03-14 RX ORDER — ATORVASTATIN CALCIUM 40 MG/1
40 TABLET, FILM COATED ORAL DAILY
Qty: 90 TABLET | Refills: 3 | Status: SHIPPED | OUTPATIENT
Start: 2024-03-14

## 2024-03-14 RX ORDER — ASPIRIN 81 MG/1
81 TABLET, CHEWABLE ORAL DAILY
Qty: 30 TABLET | Refills: 3 | Status: SHIPPED | OUTPATIENT
Start: 2024-03-14

## 2024-03-14 RX ORDER — PHENYTOIN SODIUM 100 MG/1
CAPSULE, EXTENDED RELEASE ORAL
Qty: 360 CAPSULE | Refills: 1 | Status: SHIPPED | OUTPATIENT
Start: 2024-03-14

## 2024-03-14 RX ORDER — GLIPIZIDE 5 MG/1
5 TABLET ORAL 2 TIMES DAILY
Qty: 180 TABLET | Refills: 3 | Status: SHIPPED | OUTPATIENT
Start: 2024-03-14

## 2024-03-14 RX ORDER — FUROSEMIDE 20 MG/1
20 TABLET ORAL DAILY PRN
Qty: 90 TABLET | Refills: 1 | Status: SHIPPED | OUTPATIENT
Start: 2024-03-14

## 2024-03-14 RX ORDER — OXYBUTYNIN CHLORIDE 10 MG/1
10 TABLET, EXTENDED RELEASE ORAL DAILY
Qty: 90 TABLET | Refills: 3 | Status: SHIPPED | OUTPATIENT
Start: 2024-03-14

## 2024-03-14 RX ORDER — LISINOPRIL AND HYDROCHLOROTHIAZIDE 12.5; 1 MG/1; MG/1
1 TABLET ORAL DAILY
Qty: 90 TABLET | Refills: 2 | Status: SHIPPED | OUTPATIENT
Start: 2024-03-14

## 2024-03-14 NOTE — TELEPHONE ENCOUNTER
1. Cardiovascular risk factor  -     aspirin 81 MG chewable tablet; Take 1 tablet by mouth daily, Disp-30 tablet, R-3Print  2. Hyperlipidemia, unspecified hyperlipidemia type  -     atorvastatin (LIPITOR) 40 MG tablet; Take 1 tablet by mouth daily, Disp-90 tablet, R-3Print  3. Leg swelling  -     furosemide (LASIX) 20 MG tablet; Take 1 tablet by mouth daily as needed (leg swelling), Disp-90 tablet, R-1Print  -     potassium chloride (KLOR-CON M) 10 MEQ extended release tablet; Take 1 tablet by mouth daily With lasix, Disp-90 tablet, R-3Print  4. Type 2 diabetes mellitus without complication, without long-term current use of insulin (HCC)  -     glipiZIDE (GLUCOTROL) 5 MG tablet; Take 1 tablet by mouth 2 times daily, Disp-180 tablet, R-3Print  -     metFORMIN (GLUCOPHAGE) 500 MG tablet; Take 1 tablet by mouth 2 times daily (with meals), Disp-180 tablet, R-3Print  5. Essential hypertension  -     lisinopril-hydroCHLOROthiazide (PRINZIDE;ZESTORETIC) 10-12.5 MG per tablet; Take 1 tablet by mouth daily, Disp-90 tablet, R-2Print  6. Urgency incontinence  -     oxyBUTYnin (DITROPAN XL) 10 MG extended release tablet; Take 1 tablet by mouth daily, Disp-90 tablet, R-3Print  7. Seizure (HCC)  -     phenytoin (DILANTIN) 100 MG ER capsule; TAKE 2 CAPSULES BY MOUTH IN THE MORNING AND 2 CAPSULES IN THE EVENING, Disp-360 capsule, R-1Just filled 3/11/2024, can fill this order 04/08/2024Print    Will not fill the phenytoin for 1 year because he does the levels checked, this was ordered at his visit he has not gotten it done yet

## 2024-03-26 ENCOUNTER — COMMUNITY OUTREACH (OUTPATIENT)
Dept: FAMILY MEDICINE CLINIC | Age: 59
End: 2024-03-26

## 2024-08-07 ENCOUNTER — HOSPITAL ENCOUNTER (OUTPATIENT)
Age: 59
Discharge: HOME | End: 2024-08-07

## 2024-08-07 DIAGNOSIS — R56.9 SEIZURE (HCC): ICD-10-CM

## 2024-08-07 LAB — PHENYTOIN SERPL-MCNC: 4.3 UG/ML (ref 10–20)

## 2024-08-07 PROCEDURE — 80185 ASSAY OF PHENYTOIN TOTAL: CPT

## 2024-08-07 PROCEDURE — 36415 COLL VENOUS BLD VENIPUNCTURE: CPT

## 2024-08-08 ENCOUNTER — OFFICE VISIT (OUTPATIENT)
Dept: FAMILY MEDICINE CLINIC | Age: 59
End: 2024-08-08

## 2024-08-08 VITALS
RESPIRATION RATE: 18 BRPM | TEMPERATURE: 97.8 F | DIASTOLIC BLOOD PRESSURE: 87 MMHG | OXYGEN SATURATION: 96 % | BODY MASS INDEX: 42.66 KG/M2 | HEIGHT: 72 IN | SYSTOLIC BLOOD PRESSURE: 152 MMHG | HEART RATE: 97 BPM | WEIGHT: 315 LBS

## 2024-08-08 DIAGNOSIS — I10 ESSENTIAL HYPERTENSION: ICD-10-CM

## 2024-08-08 DIAGNOSIS — E11.9 TYPE 2 DIABETES MELLITUS WITHOUT COMPLICATION, WITHOUT LONG-TERM CURRENT USE OF INSULIN (HCC): Primary | ICD-10-CM

## 2024-08-08 DIAGNOSIS — E78.5 HYPERLIPIDEMIA, UNSPECIFIED HYPERLIPIDEMIA TYPE: ICD-10-CM

## 2024-08-08 DIAGNOSIS — N39.41 URGE INCONTINENCE OF URINE: ICD-10-CM

## 2024-08-08 DIAGNOSIS — E55.9 VITAMIN D DEFICIENCY: ICD-10-CM

## 2024-08-08 DIAGNOSIS — Z12.5 SCREENING FOR PROSTATE CANCER: ICD-10-CM

## 2024-08-08 DIAGNOSIS — R56.9 SEIZURE (HCC): ICD-10-CM

## 2024-08-08 DIAGNOSIS — N32.81 OAB (OVERACTIVE BLADDER): ICD-10-CM

## 2024-08-08 DIAGNOSIS — G47.33 OSA ON CPAP: ICD-10-CM

## 2024-08-08 DIAGNOSIS — J84.10 PULMONARY FIBROSIS (HCC): ICD-10-CM

## 2024-08-08 LAB — HBA1C MFR BLD: 6.5 %

## 2024-08-08 RX ORDER — TOLTERODINE 4 MG/1
4 CAPSULE, EXTENDED RELEASE ORAL DAILY
Qty: 90 CAPSULE | Refills: 1 | Status: SHIPPED | OUTPATIENT
Start: 2024-08-08

## 2024-08-08 RX ORDER — LISINOPRIL AND HYDROCHLOROTHIAZIDE 12.5; 1 MG/1; MG/1
1 TABLET ORAL DAILY
Qty: 90 TABLET | Refills: 2 | Status: SHIPPED | OUTPATIENT
Start: 2024-08-08

## 2024-08-08 RX ORDER — PHENYTOIN SODIUM 100 MG/1
CAPSULE, EXTENDED RELEASE ORAL
Qty: 360 CAPSULE | Refills: 1 | Status: SHIPPED | OUTPATIENT
Start: 2024-08-08

## 2024-08-08 ASSESSMENT — ENCOUNTER SYMPTOMS
TROUBLE SWALLOWING: 0
SHORTNESS OF BREATH: 0
ABDOMINAL PAIN: 0

## 2024-08-08 NOTE — PROGRESS NOTES
Jairon Vann (:  1965) is a 58 y.o. male, established patient follow up , here for evaluation of the followin Month Follow-Up and Diabetes      Assessment & Plan   ASSESSMENT/PLAN      1. Type 2 diabetes mellitus without complication, without long-term current use of insulin (HCC)  Chronic, well-controlled, A1c 6.5, on statin, metformin, aspirin, should follow-up every 6 months at least however he does not have insurance, would like to go the whole year, did advise against this, he also notes he will likely not get labs completed  -     Vitamin D 25 Hydroxy; Future  -     POCT glycosylated hemoglobin (Hb A1C)  2. Seizure (HCC)  Chronic, doing well on the Dilantin, level low, he notes it is always low, however he does not have seizures, we did try to switch him to Keppra, he then did have seizures, notes he has been overall doing very well with the current dosing, denies any side effects  -     phenytoin (DILANTIN) 100 MG ER capsule; TAKE 2 CAPSULES BY MOUTH IN THE MORNING AND 2 CAPSULES IN THE EVENING, Disp-360 capsule, R-1Just filled 3/11/2024, can fill this order 2024Normal  -     Phenytoin Level, Total; Future  3. Pulmonary fibrosis (HCC)  Chronic, he is not having any trouble breathing at this time, does use Lasix to help with leg swelling, again does not want further testing for possible heart failure or vascular problems, he notes when he turns 63 he will get Medicare and then can complete some of this workup  4. Hyperlipidemia, unspecifie  d hyperlipidemia type  He is on statin and aspirin, declines labs  -     Lipid Panel; Future  5. Essential hypertension  Chronic, not well-controlled, continue current medications, he will check at home, if elevated will need to double the medication  -     TSH; Future  -     Comprehensive Metabolic Panel; Future  -     CBC with Auto Differential; Future  -     lisinopril-hydroCHLOROthiazide (PRINZIDE;ZESTORETIC) 10-12.5 MG per tablet; Take 1 tablet by

## 2024-08-15 ENCOUNTER — TELEPHONE (OUTPATIENT)
Dept: FAMILY MEDICINE CLINIC | Age: 59
End: 2024-08-15

## 2024-08-15 NOTE — TELEPHONE ENCOUNTER
----- Message from Dr. Jayshree Spencer MD sent at 8/10/2024  7:45 AM EDT -----  Regarding: Call patient and ask him the below information  Find out what his blood pressures have been at home, if they have been greater than 130 over greater than 80 then he needs to double the blood pressure medication he is taking, keep checking blood pressures and let us know if he needs a refill at the higher dose.    See if the Myrbetriq samples that we gave him have been working or if he is using the tolterodine.  Let him know I did put in a PSA lab to check his prostate to make sure this is not enlarging and causing problems

## 2024-08-15 NOTE — TELEPHONE ENCOUNTER
Patient states he has not been check blood pressure. Hasn't check since he had visit. Patient states that he's doing good on medication. Did advise patient to go to lab for PSA draw. Patient states he's not going to get the blood draw right now b/c he doesn't have insurance to pay for it and he will complete once he get insurance.

## 2024-11-06 ENCOUNTER — TELEPHONE (OUTPATIENT)
Dept: FAMILY MEDICINE CLINIC | Age: 59
End: 2024-11-06

## 2024-11-06 DIAGNOSIS — E78.5 HYPERLIPIDEMIA, UNSPECIFIED HYPERLIPIDEMIA TYPE: ICD-10-CM

## 2024-11-06 RX ORDER — ATORVASTATIN CALCIUM 40 MG/1
40 TABLET, FILM COATED ORAL DAILY
Qty: 90 TABLET | Refills: 3 | Status: SHIPPED | OUTPATIENT
Start: 2024-11-06

## 2024-11-06 NOTE — TELEPHONE ENCOUNTER
Pharmacy [facility] called in wanting to make sure ok to fill - Lipitor 40mg from 3/14/2024. If ok to fill, they would like a new Rx or a return call with verbal to change the date on Rx. Please advise.

## 2024-11-06 NOTE — TELEPHONE ENCOUNTER
1. Hyperlipidemia, unspecified hyperlipidemia type  -     atorvastatin (LIPITOR) 40 MG tablet; Take 1 tablet by mouth daily, Disp-90 tablet, R-3Normal

## 2025-01-22 DIAGNOSIS — M79.89 LEG SWELLING: ICD-10-CM

## 2025-01-23 RX ORDER — POTASSIUM CHLORIDE 750 MG/1
10 TABLET, EXTENDED RELEASE ORAL DAILY
Qty: 90 TABLET | Refills: 1 | Status: SHIPPED | OUTPATIENT
Start: 2025-01-23

## 2025-01-23 NOTE — TELEPHONE ENCOUNTER
Name of Medication(s) Requested:  Requested Prescriptions     Pending Prescriptions Disp Refills    potassium chloride (KLOR-CON M) 10 MEQ extended release tablet 90 tablet 3     Sig: Take 1 tablet by mouth daily With lasix       Medication is on current medication list Yes    Dosage and directions were verified? Yes    Quantity verified: 90 day supply     Pharmacy Verified?  Yes    Last Appointment:  8/8/2024    Future appts:  No future appointments.     (If no appt send self scheduling link. .REFILLAPPT)  Scheduling request sent?     [] Yes  [x] No    Does patient need updated?  [] Yes  [x] No

## 2025-03-01 DIAGNOSIS — E11.9 TYPE 2 DIABETES MELLITUS WITHOUT COMPLICATION, WITHOUT LONG-TERM CURRENT USE OF INSULIN (HCC): ICD-10-CM

## 2025-03-03 RX ORDER — GLIPIZIDE 5 MG/1
5 TABLET ORAL 2 TIMES DAILY
Qty: 180 TABLET | Refills: 0 | Status: SHIPPED | OUTPATIENT
Start: 2025-03-03

## 2025-03-03 NOTE — TELEPHONE ENCOUNTER
Name of Medication(s) Requested:  Requested Prescriptions     Pending Prescriptions Disp Refills    glipiZIDE (GLUCOTROL) 5 MG tablet [Pharmacy Med Name: glipiZIDE 5 MG Oral Tablet] 180 tablet 1     Sig: Take 1 tablet by mouth twice daily    metFORMIN (GLUCOPHAGE) 500 MG tablet [Pharmacy Med Name: metFORMIN HCl 500 MG Oral Tablet] 180 tablet 1     Sig: TAKE 1 TABLET BY MOUTH TWICE DAILY WITH MEALS       Medication is on current medication list Yes    Dosage and directions were verified? Yes    Quantity verified: 90 day supply     Pharmacy Verified?  Yes    Last Appointment:  8/8/2024    Future appts:  No future appointments.     (If no appt send self scheduling link. .REFILLAPPT)  Scheduling request sent?     [] Yes  [x] No Reminder sent 02/26/25    Does patient need updated?  [] Yes  [x] No

## 2025-04-05 DIAGNOSIS — R56.9 SEIZURE (HCC): ICD-10-CM

## 2025-04-05 DIAGNOSIS — E11.9 TYPE 2 DIABETES MELLITUS WITHOUT COMPLICATION, WITHOUT LONG-TERM CURRENT USE OF INSULIN: ICD-10-CM

## 2025-04-07 ENCOUNTER — PATIENT MESSAGE (OUTPATIENT)
Dept: FAMILY MEDICINE CLINIC | Age: 60
End: 2025-04-07

## 2025-04-07 DIAGNOSIS — E11.9 TYPE 2 DIABETES MELLITUS WITHOUT COMPLICATION, WITHOUT LONG-TERM CURRENT USE OF INSULIN: ICD-10-CM

## 2025-04-07 RX ORDER — GLIPIZIDE 5 MG/1
5 TABLET ORAL 2 TIMES DAILY
Qty: 180 TABLET | Refills: 0 | Status: SHIPPED | OUTPATIENT
Start: 2025-04-07

## 2025-04-07 RX ORDER — PHENYTOIN SODIUM 100 MG/1
CAPSULE, EXTENDED RELEASE ORAL
Qty: 360 CAPSULE | Refills: 1 | Status: SHIPPED
Start: 2025-04-07 | End: 2025-04-07

## 2025-04-07 RX ORDER — GLIPIZIDE 5 MG/1
5 TABLET ORAL 2 TIMES DAILY
Qty: 180 TABLET | Refills: 0 | OUTPATIENT
Start: 2025-04-07

## 2025-04-07 RX ORDER — PHENYTOIN SODIUM 100 MG/1
CAPSULE, EXTENDED RELEASE ORAL
Qty: 360 CAPSULE | Refills: 1 | Status: SHIPPED | OUTPATIENT
Start: 2025-04-07

## 2025-04-07 NOTE — TELEPHONE ENCOUNTER
Name of Medication(s) Requested:  Requested Prescriptions     Pending Prescriptions Disp Refills    phenytoin (DILANTIN) 100 MG ER capsule 360 capsule 1     Sig: TAKE 2 CAPSULES BY MOUTH IN THE MORNING AND 2 CAPSULES IN THE EVENING     Refused Prescriptions Disp Refills    glipiZIDE (GLUCOTROL) 5 MG tablet 180 tablet 0     Sig: Take 1 tablet by mouth 2 times daily    metFORMIN (GLUCOPHAGE) 500 MG tablet 180 tablet 0     Sig: Take 1 tablet by mouth 2 times daily (with meals)       Medication is on current medication list Yes    Dosage and directions were verified? Yes    Quantity verified: 90 day supply     Pharmacy Verified?  Yes    Last Appointment:  8/8/2024    Future appts:  Future Appointments   Date Time Provider Department Center   6/11/2025 10:20 AM Jayshree Spencer MD [facility]        (If no appt send self scheduling link. .REFILLAPPT)  Scheduling request sent?     [] Yes  [x] No    Does patient need updated?  [] Yes  [x] No

## 2025-04-07 NOTE — TELEPHONE ENCOUNTER
Name of Medication(s) Requested:  Requested Prescriptions     Pending Prescriptions Disp Refills    glipiZIDE (GLUCOTROL) 5 MG tablet 180 tablet 0     Sig: Take 1 tablet by mouth 2 times daily       Medication is on current medication list Yes    Dosage and directions were verified? Yes    Quantity verified: 90 day supply     Pharmacy Verified?  Yes    Last Appointment:  8/8/2024    Future appts:  Future Appointments   Date Time Provider Department Center   6/11/2025 10:20 AM Jayshree Spencer MD [facility]        (If no appt send self scheduling link. .REFILLAPPT)  Scheduling request sent?     [] Yes  [x] No    Does patient need updated?  [] Yes  [x] No

## 2025-04-07 NOTE — TELEPHONE ENCOUNTER
1. Seizure (HCC)  -     phenytoin (DILANTIN) 100 MG ER capsule; TAKE 2 CAPSULES BY MOUTH IN THE MORNING AND 2 CAPSULES IN THE EVENING, Disp-360 capsule, R-1Normal  2. Type 2 diabetes mellitus without complication, without long-term current use of insulin

## 2025-05-30 DIAGNOSIS — E11.9 TYPE 2 DIABETES MELLITUS WITHOUT COMPLICATION, WITHOUT LONG-TERM CURRENT USE OF INSULIN (HCC): ICD-10-CM

## 2025-05-30 NOTE — PROGRESS NOTES
1. Type 2 diabetes mellitus without complication, without long-term current use of insulin (HCC)  -     metFORMIN (GLUCOPHAGE) 500 MG tablet; Take 1 tablet by mouth 2 times daily (with meals), Disp-180 tablet, R-0Normal

## 2025-06-08 ASSESSMENT — PATIENT HEALTH QUESTIONNAIRE - PHQ9
SUM OF ALL RESPONSES TO PHQ QUESTIONS 1-9: 0
1. LITTLE INTEREST OR PLEASURE IN DOING THINGS: NOT AT ALL
2. FEELING DOWN, DEPRESSED OR HOPELESS: NOT AT ALL
1. LITTLE INTEREST OR PLEASURE IN DOING THINGS: NOT AT ALL
SUM OF ALL RESPONSES TO PHQ QUESTIONS 1-9: 0
SUM OF ALL RESPONSES TO PHQ9 QUESTIONS 1 & 2: 0
SUM OF ALL RESPONSES TO PHQ QUESTIONS 1-9: 0
2. FEELING DOWN, DEPRESSED OR HOPELESS: NOT AT ALL
SUM OF ALL RESPONSES TO PHQ QUESTIONS 1-9: 0

## 2025-06-11 ENCOUNTER — OFFICE VISIT (OUTPATIENT)
Dept: FAMILY MEDICINE CLINIC | Age: 60
End: 2025-06-11

## 2025-06-11 VITALS
BODY MASS INDEX: 42.66 KG/M2 | HEART RATE: 90 BPM | SYSTOLIC BLOOD PRESSURE: 166 MMHG | HEIGHT: 72 IN | WEIGHT: 315 LBS | TEMPERATURE: 97.4 F | RESPIRATION RATE: 17 BRPM | OXYGEN SATURATION: 95 % | DIASTOLIC BLOOD PRESSURE: 74 MMHG

## 2025-06-11 DIAGNOSIS — R79.89 ELEVATED LFTS: ICD-10-CM

## 2025-06-11 DIAGNOSIS — E11.9 TYPE 2 DIABETES MELLITUS WITHOUT COMPLICATION, WITHOUT LONG-TERM CURRENT USE OF INSULIN (HCC): ICD-10-CM

## 2025-06-11 DIAGNOSIS — I10 ESSENTIAL HYPERTENSION: ICD-10-CM

## 2025-06-11 DIAGNOSIS — R56.9 SEIZURE (HCC): ICD-10-CM

## 2025-06-11 DIAGNOSIS — E78.5 HYPERLIPIDEMIA, UNSPECIFIED HYPERLIPIDEMIA TYPE: ICD-10-CM

## 2025-06-11 DIAGNOSIS — J84.10 PULMONARY FIBROSIS (HCC): ICD-10-CM

## 2025-06-11 DIAGNOSIS — Z00.00 ENCOUNTER FOR WELL ADULT EXAM WITHOUT ABNORMAL FINDINGS: Primary | ICD-10-CM

## 2025-06-11 PROBLEM — Z87.898 HISTORY OF SEIZURES: Status: RESOLVED | Noted: 2021-08-10 | Resolved: 2025-06-11

## 2025-06-11 LAB — HBA1C MFR BLD: 7 %

## 2025-06-11 PROCEDURE — 3077F SYST BP >= 140 MM HG: CPT | Performed by: STUDENT IN AN ORGANIZED HEALTH CARE EDUCATION/TRAINING PROGRAM

## 2025-06-11 PROCEDURE — 3078F DIAST BP <80 MM HG: CPT | Performed by: STUDENT IN AN ORGANIZED HEALTH CARE EDUCATION/TRAINING PROGRAM

## 2025-06-11 PROCEDURE — 83036 HEMOGLOBIN GLYCOSYLATED A1C: CPT | Performed by: STUDENT IN AN ORGANIZED HEALTH CARE EDUCATION/TRAINING PROGRAM

## 2025-06-11 PROCEDURE — 99396 PREV VISIT EST AGE 40-64: CPT | Performed by: STUDENT IN AN ORGANIZED HEALTH CARE EDUCATION/TRAINING PROGRAM

## 2025-06-11 RX ORDER — LISINOPRIL AND HYDROCHLOROTHIAZIDE 10; 12.5 MG/1; MG/1
1 TABLET ORAL DAILY
Qty: 90 TABLET | Refills: 3 | Status: SHIPPED | OUTPATIENT
Start: 2025-06-11

## 2025-06-11 RX ORDER — ASPIRIN 81 MG/1
81 TABLET ORAL DAILY
COMMUNITY

## 2025-06-11 RX ORDER — ATORVASTATIN CALCIUM 40 MG/1
40 TABLET, FILM COATED ORAL DAILY
Qty: 90 TABLET | Refills: 3 | Status: SHIPPED | OUTPATIENT
Start: 2025-06-11

## 2025-06-11 RX ORDER — GLIPIZIDE 5 MG/1
5 TABLET ORAL 2 TIMES DAILY
Qty: 180 TABLET | Refills: 3 | Status: SHIPPED | OUTPATIENT
Start: 2025-06-11

## 2025-06-11 SDOH — HEALTH STABILITY: MENTAL HEALTH: HOW OFTEN DO YOU HAVE A DRINK CONTAINING ALCOHOL?: NEVER

## 2025-06-11 SDOH — HEALTH STABILITY: MENTAL HEALTH: HOW MANY DRINKS CONTAINING ALCOHOL DO YOU HAVE ON A TYPICAL DAY WHEN YOU ARE DRINKING?: PATIENT DOES NOT DRINK

## 2025-06-11 SDOH — SOCIAL STABILITY: SOCIAL NETWORK
DO YOU BELONG TO ANY CLUBS OR ORGANIZATIONS SUCH AS CHURCH GROUPS, UNIONS, FRATERNAL OR ATHLETIC GROUPS, OR SCHOOL GROUPS?: NO

## 2025-06-11 SDOH — ECONOMIC STABILITY: FOOD INSECURITY: HOW HARD IS IT FOR YOU TO PAY FOR THE VERY BASICS LIKE FOOD, HOUSING, MEDICAL CARE, AND HEATING?: NOT HARD AT ALL

## 2025-06-11 SDOH — ECONOMIC STABILITY: FOOD INSECURITY: WITHIN THE PAST 12 MONTHS, THE FOOD YOU BOUGHT JUST DIDN'T LAST AND YOU DIDN'T HAVE MONEY TO GET MORE.: NEVER TRUE

## 2025-06-11 SDOH — SOCIAL STABILITY: SOCIAL INSECURITY: WITHIN THE LAST YEAR, HAVE YOU BEEN AFRAID OF YOUR PARTNER OR EX-PARTNER?: NO

## 2025-06-11 SDOH — SOCIAL STABILITY: SOCIAL NETWORK: HOW OFTEN DO YOU GET TOGETHER WITH FRIENDS OR RELATIVES?: NEVER

## 2025-06-11 SDOH — SOCIAL STABILITY: SOCIAL INSECURITY
WITHIN THE LAST YEAR, HAVE YOU BEEN RAPED OR FORCED TO HAVE ANY KIND OF SEXUAL ACTIVITY BY YOUR PARTNER OR EX-PARTNER?: NO

## 2025-06-11 SDOH — ECONOMIC STABILITY: FOOD INSECURITY: WITHIN THE PAST 12 MONTHS, YOU WORRIED THAT YOUR FOOD WOULD RUN OUT BEFORE YOU GOT MONEY TO BUY MORE.: NEVER TRUE

## 2025-06-11 SDOH — HEALTH STABILITY: PHYSICAL HEALTH: ON AVERAGE, HOW MANY DAYS PER WEEK DO YOU ENGAGE IN MODERATE TO STRENUOUS EXERCISE (LIKE A BRISK WALK)?: 5 DAYS

## 2025-06-11 SDOH — SOCIAL STABILITY: SOCIAL NETWORK: HOW OFTEN DO YOU ATTEND CHURCH OR RELIGIOUS SERVICES?: MORE THAN 4 TIMES PER YEAR

## 2025-06-11 SDOH — SOCIAL STABILITY: SOCIAL NETWORK: HOW OFTEN DO YOU ATTEND MEETINGS OF THE CLUBS OR ORGANIZATIONS YOU BELONG TO?: NEVER

## 2025-06-11 SDOH — HEALTH STABILITY: PHYSICAL HEALTH: ON AVERAGE, HOW MANY MINUTES DO YOU ENGAGE IN EXERCISE AT THIS LEVEL?: 60 MIN

## 2025-06-11 SDOH — SOCIAL STABILITY: SOCIAL INSECURITY: ARE YOU MARRIED, WIDOWED, DIVORCED, SEPARATED, NEVER MARRIED, OR LIVING WITH A PARTNER?: MARRIED

## 2025-06-11 SDOH — HEALTH STABILITY: MENTAL HEALTH
DO YOU FEEL STRESS - TENSE, RESTLESS, NERVOUS, OR ANXIOUS, OR UNABLE TO SLEEP AT NIGHT BECAUSE YOUR MIND IS TROUBLED ALL THE TIME - THESE DAYS?: NOT AT ALL

## 2025-06-11 SDOH — SOCIAL STABILITY: SOCIAL INSECURITY: WITHIN THE LAST YEAR, HAVE YOU BEEN HUMILIATED OR EMOTIONALLY ABUSED IN OTHER WAYS BY YOUR PARTNER OR EX-PARTNER?: NO

## 2025-06-11 SDOH — SOCIAL STABILITY: SOCIAL NETWORK: IN A TYPICAL WEEK, HOW MANY TIMES DO YOU TALK ON THE PHONE WITH FAMILY, FRIENDS, OR NEIGHBORS?: ONCE A WEEK

## 2025-06-11 ASSESSMENT — ENCOUNTER SYMPTOMS
TROUBLE SWALLOWING: 0
ABDOMINAL PAIN: 0
SHORTNESS OF BREATH: 0

## 2025-06-11 NOTE — PROGRESS NOTES
and treat if higher than 140/90 mmHg  Maintain blood total cholesterol levels under 5 mmol/l or 190 mg/dl  Maintain LDL cholesterol levels under 3.0 mmol/l or 115 mg/dl   Control blood glucose levels   Provided a follow up plan.  Time spent (minutes): 1

## 2025-06-11 NOTE — PATIENT INSTRUCTIONS
hands, brush your teeth twice a day, and wear a seat belt in the car.   Where can you learn more?  Go to https://[email or web address].net/patientEd and enter P072 to learn more about \"Well Visit, Ages 18 to 65: Care Instructions.\"  Current as of: April 30, 2024  Content Version: 14.5  © 5588-8444 [employer].   Care instructions adapted under license by [employer]. If you have questions about a medical condition or this instruction, always ask your healthcare professional. [employer] disclaims any warranty or liability for your use of this information.

## 2025-06-14 RX ORDER — PHENYTOIN SODIUM 100 MG/1
CAPSULE, EXTENDED RELEASE ORAL
Qty: 360 CAPSULE | Refills: 1 | Status: SHIPPED | OUTPATIENT
Start: 2025-06-14

## (undated) DEVICE — Device

## (undated) DEVICE — MASK RESP UNIV N95 4 PANEL HD STRP INDIVIDUALLY WRP LF

## (undated) DEVICE — SPONGE,DISSECTOR,ROUND CHERRY,XR,ST,5/PK: Brand: MEDLINE

## (undated) DEVICE — APPLICATOR PVP IOD SWABSTK MED NS LF

## (undated) DEVICE — MARKER,SKIN,WI/RULER AND LABELS: Brand: MEDLINE

## (undated) DEVICE — CLEANER,CAUTERY TIP,2X2",STERILE: Brand: MEDLINE

## (undated) DEVICE — DOUBLE BASIN SET: Brand: MEDLINE INDUSTRIES, INC.

## (undated) DEVICE — SYRINGE 20ML LL S/C 50

## (undated) DEVICE — SET INSTR TRACH

## (undated) DEVICE — DRESSING ANTIMIC FOAM OPTIFOAM POSTOP ADH 4 X 6 IN

## (undated) DEVICE — ELECTRODE PT RET AD L9FT HI MOIST COND ADH HYDRGEL CORDED

## (undated) DEVICE — SURGICAL PROCEDURE PACK BRONCH

## (undated) DEVICE — GLOVE SURG SZ 75 CRM LTX FREE POLYISOPRENE POLYMER BEAD ANTI

## (undated) DEVICE — ADAPTER TBNG DIA15MM SWVL FBROPT BRONCHSCP TERM 2 AXIS PEEP

## (undated) DEVICE — SURGICAL PROCEDURE PACK EENT CUST

## (undated) DEVICE — SYRINGE MED 10ML TRNSLUC BRL PLUNG BLK MRK POLYPR CTRL

## (undated) DEVICE — CONTROL SYRINGE LUER-LOCK TIP: Brand: MONOJECT

## (undated) DEVICE — RETRACTOR WEITLANER BLUNT

## (undated) DEVICE — HOLDER TRACH TB W1IN FIT UPTO 19.5IN NK 2 PC ADJ BLU

## (undated) DEVICE — SPONGE,DRAIN,NONWVN,4"X4",6PLY,STRL,LF: Brand: MEDLINE

## (undated) DEVICE — PACK PROCEDURE SURG GEN CUST

## (undated) DEVICE — YANKAUER,OPEN TIP,W/O VENT,STERILE: Brand: MEDLINE INDUSTRIES, INC.

## (undated) DEVICE — MAGNETIC INSTR DRAPE 20X16: Brand: MEDLINE INDUSTRIES, INC.

## (undated) DEVICE — TOWEL,OR,DSP,ST,BLUE,STD,6/PK,12PK/CS: Brand: MEDLINE

## (undated) DEVICE — SOLUTION SURG PREP POV IOD 7.5% 4 OZ

## (undated) DEVICE — 4-PORT MANIFOLD: Brand: NEPTUNE 2

## (undated) DEVICE — 40436 HEAD REST OCULAR: Brand: 40436 HEAD REST OCULAR

## (undated) DEVICE — GOWN,SIRUS,FABRNF,XL,20/CS: Brand: MEDLINE

## (undated) DEVICE — INTENDED FOR TISSUE SEPARATION, AND OTHER PROCEDURES THAT REQUIRE A SHARP SURGICAL BLADE TO PUNCTURE OR CUT.: Brand: BARD-PARKER ® STAINLESS STEEL BLADES

## (undated) DEVICE — SOLUTION IV IRRIG 500ML 0.9% SODIUM CHL 2F7123

## (undated) DEVICE — TUBING SUCT 12FR MAL ALUM SHFT FN CAP VENT UNIV CONN W/ OBT

## (undated) DEVICE — NEEDLE HYPO 22GA L1.5IN BLK POLYPR HUB S STL REG BVL STR